# Patient Record
Sex: FEMALE | Race: WHITE | Employment: FULL TIME | URBAN - METROPOLITAN AREA
[De-identification: names, ages, dates, MRNs, and addresses within clinical notes are randomized per-mention and may not be internally consistent; named-entity substitution may affect disease eponyms.]

---

## 2017-01-09 RX ORDER — FOLIC ACID 1 MG/1
TABLET ORAL
Qty: 90 TABLET | Refills: 3 | Status: SHIPPED | OUTPATIENT
Start: 2017-01-09 | End: 2018-01-22

## 2017-01-09 NOTE — TELEPHONE ENCOUNTER
LOV:11-7  Last Filled: Has not been filled by Dr. Owusu before  Labs:   Future Appointments  Date Time Provider Joel Mann   1/21/2017 10:00 AM [facility] ALLERGY ECSCHALRDEBORAH [facility]   2/20/2017 6:40 PM Debra Alanis MD 2014 [facility]   3/15/2017 4:

## 2017-01-17 ENCOUNTER — TELEPHONE (OUTPATIENT)
Dept: ALLERGY | Facility: CLINIC | Age: 57
End: 2017-01-17

## 2017-01-17 NOTE — TELEPHONE ENCOUNTER
Patient requesting RX for Tussinex. Patient was last seen in June 2016 for dermatitis. LOV for asthma and AR in April 2016 and was advised to f/u in 6 months.      Informed patient that she will need an appointment for further evaluation of her cough an

## 2017-01-17 NOTE — TELEPHONE ENCOUNTER
Call reviewed and noted. Agree with triage advice provided. Patient with follow-up appointment next week on January 24 at 430. Patient offered appointment today at 4:45 PM.  Unable to make it today.

## 2017-01-21 ENCOUNTER — NURSE ONLY (OUTPATIENT)
Dept: ALLERGY | Facility: CLINIC | Age: 57
End: 2017-01-21

## 2017-01-21 DIAGNOSIS — J30.89 ENVIRONMENTAL AND SEASONAL ALLERGIES: Primary | ICD-10-CM

## 2017-01-21 PROCEDURE — 95117 IMMUNOTHERAPY INJECTIONS: CPT | Performed by: ALLERGY & IMMUNOLOGY

## 2017-01-21 PROCEDURE — 95165 ANTIGEN THERAPY SERVICES: CPT | Performed by: ALLERGY & IMMUNOLOGY

## 2017-01-23 NOTE — TELEPHONE ENCOUNTER
LOV: 11-7-16  Last Refilled:#40, 4rfs 8/29/16  Labs:AST 17  ALT 9  11/7/16  Future Appointments  Date Time Provider Joel Mackenzie   1/24/2017 4:30 PM Bryan Rg MD [facility] Mauro João   2/20/2017 6:40 PM Ajay Klein MD [address] [place] [address]

## 2017-01-24 ENCOUNTER — OFFICE VISIT (OUTPATIENT)
Dept: ALLERGY | Facility: CLINIC | Age: 57
End: 2017-01-24

## 2017-01-24 VITALS
RESPIRATION RATE: 19 BRPM | HEIGHT: 69 IN | DIASTOLIC BLOOD PRESSURE: 78 MMHG | TEMPERATURE: 99 F | OXYGEN SATURATION: 96 % | HEART RATE: 93 BPM | BODY MASS INDEX: 43.4 KG/M2 | WEIGHT: 293 LBS | SYSTOLIC BLOOD PRESSURE: 136 MMHG

## 2017-01-24 DIAGNOSIS — R05.9 COUGH: Primary | ICD-10-CM

## 2017-01-24 PROCEDURE — 90736 HZV VACCINE LIVE SUBQ: CPT | Performed by: ALLERGY & IMMUNOLOGY

## 2017-01-24 PROCEDURE — 99213 OFFICE O/P EST LOW 20 MIN: CPT | Performed by: ALLERGY & IMMUNOLOGY

## 2017-01-24 PROCEDURE — 99214 OFFICE O/P EST MOD 30 MIN: CPT | Performed by: ALLERGY & IMMUNOLOGY

## 2017-01-24 PROCEDURE — 94010 BREATHING CAPACITY TEST: CPT | Performed by: ALLERGY & IMMUNOLOGY

## 2017-01-24 PROCEDURE — 90471 IMMUNIZATION ADMIN: CPT | Performed by: ALLERGY & IMMUNOLOGY

## 2017-01-24 RX ORDER — HYDROCODONE POLISTIREX AND CHLORPHENIRAMINE POLISTIREX 10; 8 MG/5ML; MG/5ML
5 SUSPENSION, EXTENDED RELEASE ORAL 2 TIMES DAILY PRN
Qty: 473 ML | Refills: 0 | Status: SHIPPED | OUTPATIENT
Start: 2017-01-24 | End: 2017-02-07

## 2017-01-24 RX ORDER — ALBUTEROL SULFATE 90 UG/1
2 AEROSOL, METERED RESPIRATORY (INHALATION) EVERY 6 HOURS PRN
Qty: 1 INHALER | Refills: 0 | Status: SHIPPED | OUTPATIENT
Start: 2017-01-24 | End: 2017-03-16

## 2017-01-24 NOTE — PROGRESS NOTES
Ryan Wyatt is a 55year old female. HPI:   Patient presents with:  Cough: non-productive with yelllow/gree secretions in throat noted post nasal    Patient is a 60-year-old female who presents for follow-up with a chief complaint of cough.     Gabriel of Onset   • Heart Disorder Father    • leukemia[other] [OTHER] Mother    • Heart Disorder Mother    • Stroke Mother    • Heart Disorder Brother       Social History:   Smoking Status: Never Smoker                      Smokeless Status: Never Used needed Disp: 140 mL Rfl: 0   ergocalciferol (DRISDOL/VITAMIN D2) 95956 UNITS Oral Cap 50,000 Units every 30 (thirty) days.  Disp:  Rfl:    carbidopa-levodopa (SINEMET)  MG Oral Tab Take 5 tablets by oral route every day Disp:  Rfl: 0   PROVENTIL HFA 1 cyanosis, or clubbing     ASSESSMENT/PLAN:   Assessment  Cough  (primary encounter diagnosis)    Patient is a 61-year-old female with one-week history of URI symptoms and cough has become more of a not dry nonproductive cough.   No associated wheezing or sh

## 2017-02-03 ENCOUNTER — LAB ENCOUNTER (OUTPATIENT)
Dept: LAB | Facility: HOSPITAL | Age: 57
End: 2017-02-03
Attending: INTERNAL MEDICINE
Payer: COMMERCIAL

## 2017-02-03 DIAGNOSIS — M06.9 RHEUMATOID ARTHRITIS INVOLVING MULTIPLE SITES, UNSPECIFIED RHEUMATOID FACTOR PRESENCE: ICD-10-CM

## 2017-02-03 DIAGNOSIS — Z51.81 THERAPEUTIC DRUG MONITORING: ICD-10-CM

## 2017-02-03 LAB
ALT SERPL-CCNC: 9 U/L (ref 14–54)
AST SERPL-CCNC: 23 U/L (ref 15–41)
BASOPHILS # BLD: 0.1 K/UL (ref 0–0.2)
BASOPHILS NFR BLD: 1 %
CREAT SERPL-MCNC: 0.87 MG/DL (ref 0.5–1.5)
CRP SERPL-MCNC: 2.2 MG/DL (ref 0–0.9)
EOSINOPHIL # BLD: 0.2 K/UL (ref 0–0.7)
EOSINOPHIL NFR BLD: 2 %
ERYTHROCYTE [DISTWIDTH] IN BLOOD BY AUTOMATED COUNT: 17.7 % (ref 11–15)
ERYTHROCYTE [SEDIMENTATION RATE] IN BLOOD: 31 MM/HR (ref 0–30)
HCT VFR BLD AUTO: 39 % (ref 35–48)
HGB BLD-MCNC: 12.9 G/DL (ref 12–16)
LYMPHOCYTES # BLD: 2.1 K/UL (ref 1–4)
LYMPHOCYTES NFR BLD: 22 %
MCH RBC QN AUTO: 28.6 PG (ref 27–32)
MCHC RBC AUTO-ENTMCNC: 33.1 G/DL (ref 32–37)
MCV RBC AUTO: 86.3 FL (ref 80–100)
MONOCYTES # BLD: 0.5 K/UL (ref 0–1)
MONOCYTES NFR BLD: 5 %
NEUTROPHILS # BLD AUTO: 6.7 K/UL (ref 1.8–7.7)
NEUTROPHILS NFR BLD: 70 %
PLATELET # BLD AUTO: 249 K/UL (ref 140–400)
PMV BLD AUTO: 8.5 FL (ref 7.4–10.3)
RBC # BLD AUTO: 4.52 M/UL (ref 3.7–5.4)
WBC # BLD AUTO: 9.5 K/UL (ref 4–11)

## 2017-02-03 PROCEDURE — 85025 COMPLETE CBC W/AUTO DIFF WBC: CPT

## 2017-02-03 PROCEDURE — 84460 ALANINE AMINO (ALT) (SGPT): CPT

## 2017-02-03 PROCEDURE — 85652 RBC SED RATE AUTOMATED: CPT

## 2017-02-03 PROCEDURE — 86140 C-REACTIVE PROTEIN: CPT

## 2017-02-03 PROCEDURE — 84450 TRANSFERASE (AST) (SGOT): CPT

## 2017-02-03 PROCEDURE — 82565 ASSAY OF CREATININE: CPT

## 2017-02-03 PROCEDURE — 36415 COLL VENOUS BLD VENIPUNCTURE: CPT

## 2017-02-10 ENCOUNTER — PATIENT MESSAGE (OUTPATIENT)
Dept: RHEUMATOLOGY | Facility: CLINIC | Age: 57
End: 2017-02-10

## 2017-02-13 ENCOUNTER — NURSE ONLY (OUTPATIENT)
Dept: ALLERGY | Facility: CLINIC | Age: 57
End: 2017-02-13

## 2017-02-13 DIAGNOSIS — J30.89 ENVIRONMENTAL AND SEASONAL ALLERGIES: Primary | ICD-10-CM

## 2017-02-13 PROCEDURE — 95117 IMMUNOTHERAPY INJECTIONS: CPT | Performed by: ALLERGY & IMMUNOLOGY

## 2017-02-13 NOTE — TELEPHONE ENCOUNTER
Please see below and advise on 2/3/17 test results. Pt was able to view all her results at this time.

## 2017-02-13 NOTE — TELEPHONE ENCOUNTER
Her inflammation markers remain elevated. Otherwise look good. Please release results to her. Thanks.

## 2017-02-13 NOTE — TELEPHONE ENCOUNTER
From: Ryan Wyatt  To: Avtar Trent MD  Sent: 2/10/2017 8:05 PM CST  Subject: Test Results Question    Only have gotten two of the 2/3 test results, when will the others be available?

## 2017-02-15 ENCOUNTER — PATIENT MESSAGE (OUTPATIENT)
Dept: INTERNAL MEDICINE CLINIC | Facility: CLINIC | Age: 57
End: 2017-02-15

## 2017-02-17 RX ORDER — LEVOTHYROXINE SODIUM 137 UG/1
137 TABLET ORAL
Qty: 30 TABLET | Refills: 5 | Status: SHIPPED | OUTPATIENT
Start: 2017-02-17 | End: 2017-03-09

## 2017-02-17 RX ORDER — CALCITRIOL 0.25 UG/1
0.25 CAPSULE, LIQUID FILLED ORAL DAILY
Qty: 30 CAPSULE | Refills: 5 | Status: SHIPPED | OUTPATIENT
Start: 2017-02-17 | End: 2017-03-09

## 2017-02-17 NOTE — TELEPHONE ENCOUNTER
From: Antony Mcgee  To: Eyad Chi MD  Sent: 2/15/2017 8:57 PM CST  Subject: Prescription Question    My Calcitriol & Levothyroxine prescriptions need to be refilled.  Can you please change them to 90 day supplies instead of 30 day supplies for the

## 2017-02-17 NOTE — TELEPHONE ENCOUNTER
Refill Protocol Appointment Criteria  · Appointment scheduled in the past 12 months or in the next 3 months  Recent Visits       Provider Department Primary Dx    4 months ago Jaquan Hernandez MD [facility], [facility], [address], [place] Hypothyroidism, unspeci

## 2017-02-20 ENCOUNTER — OFFICE VISIT (OUTPATIENT)
Dept: RHEUMATOLOGY | Facility: CLINIC | Age: 57
End: 2017-02-20

## 2017-02-20 VITALS — HEART RATE: 91 BPM | SYSTOLIC BLOOD PRESSURE: 151 MMHG | HEIGHT: 69 IN | DIASTOLIC BLOOD PRESSURE: 83 MMHG

## 2017-02-20 DIAGNOSIS — G20 PARKINSON'S DISEASE (HCC): ICD-10-CM

## 2017-02-20 DIAGNOSIS — M06.9 RHEUMATOID ARTHRITIS INVOLVING MULTIPLE SITES, UNSPECIFIED RHEUMATOID FACTOR PRESENCE: Primary | ICD-10-CM

## 2017-02-20 DIAGNOSIS — Z51.81 THERAPEUTIC DRUG MONITORING: ICD-10-CM

## 2017-02-20 PROCEDURE — 99212 OFFICE O/P EST SF 10 MIN: CPT | Performed by: INTERNAL MEDICINE

## 2017-02-20 PROCEDURE — 99214 OFFICE O/P EST MOD 30 MIN: CPT | Performed by: INTERNAL MEDICINE

## 2017-02-20 RX ORDER — MELATONIN 10 MG
1 CAPSULE ORAL NIGHTLY
COMMUNITY
End: 2018-10-04

## 2017-02-20 RX ORDER — SULFASALAZINE 500 MG/1
500 TABLET ORAL 2 TIMES DAILY
Qty: 60 TABLET | Refills: 3 | Status: SHIPPED | OUTPATIENT
Start: 2017-02-20 | End: 2017-10-16

## 2017-02-21 NOTE — PATIENT INSTRUCTIONS
1. Stay on  methotrexate  8 tablets a week and folic acid 1mg a day   2. Cont. Physical therapy exercises at home -   3. Sulfasalazine 500mg twice a day -   4. Check labs in 3 months   5. Tramadol 50mg every 12 hours as needed.    6. Return to clinic in 3 m If you miss a dose, take it as soon as you can. If it is almost time for your next dose, take only that dose. Do not take double or extra doses. Where should I keep my medicine? Keep out of the reach of children.   Store at room temperature between 15 and

## 2017-02-21 NOTE — PROGRESS NOTES
Jerrell Gibson is a 55year old female who presents for Patient presents with:  Rheumatoid Arthritis  Leg Pain  . HPI:     She is a pleasant 49year old who has elevated RF >300 and foot pain - dx with RA -  here on3/7/2016.  I had last seen he gabe  11/3 bothering her for 3-4 weeks. She can still get up the stairs. She feels it's more dragging. No numbness and tingling. She feels her joints are better except her feet for several weeks. Her elbows and fingers are better.    She feels the surgery ddin No shoulders are ok. Her knees still hurts. Wt Readings from Last 2 Encounters:  01/24/17 : 350 lb 8 oz (158.986 kg)  12/19/16 : 350 lb 8 oz (158.986 kg)    There is no weight on file to calculate BMI.         Current Outpatient Prescriptions:  [facility] ergocalciferol (DRISDOL/VITAMIN D2) 65074 UNITS Oral Cap 50,000 Units every 30 (thirty) days.  Disp:  Rfl:    carbidopa-levodopa (SINEMET)  MG Oral Tab Take 5 tablets by oral route every day Disp:  Rfl: 0   Misc Natural Products (OSTEO BI-FLEX ADV D acute distress, no CAD, no neck tendnerness, good ROM,   No rashes  CVS: RRR, no murmurs  RS: CTAB, no crackles, no rhonchi  ABD: Soft Non tender, no HSM felt, BS positive  Joint exam:   Her left and righit ankles tender with braces on  No other joitn tend sesamoiditis. 1. Moderate inframalleolar peroneus longus tendinosis. 2. 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 3. Abductor digit minimi muscle atrophy compatible with Cárdenas neuropathy.   4. Chronic healed fracture deformity of the f 0. 0-0.2 K/UL 0.1 0.1   CREATININE      0.50-1.50 mg/dL 0.87 0.75   GFR, Non-      >=60 >60 >60   GFR, -American      >=60 >60 >60   Vitamin D, 25OH, Total        24.4   ALT (SGPT)      14-54 U/L 9 (L) 9 (L)   AST (SGOT)      15-41 U/ Summary:  1. Stay on  methotrexate  8 tablets a week and folic acid 1mg a day   2. Cont. Physical therapy exercises at home -   3. Sulfasalazine 500mg twice a day -   4. Check labs in 3 months   5. Tramadol 50mg every 12 hours as needed.    6. Return

## 2017-03-01 RX ORDER — DICLOFENAC SODIUM 75 MG/1
TABLET, DELAYED RELEASE ORAL
Qty: 60 TABLET | Refills: 3 | Status: SHIPPED | OUTPATIENT
Start: 2017-03-01 | End: 2017-07-11

## 2017-03-01 NOTE — TELEPHONE ENCOUNTER
NYM:3-27  Last Filled:10-31.  #60 with 3 refills  Labs:2-3, ALT 9, AST 23  Future Appointments  Date Time Provider Joel Mann   3/15/2017 4:20 PM [facility]       Please Advise

## 2017-03-07 ENCOUNTER — PATIENT MESSAGE (OUTPATIENT)
Dept: INTERNAL MEDICINE CLINIC | Facility: CLINIC | Age: 57
End: 2017-03-07

## 2017-03-09 ENCOUNTER — APPOINTMENT (OUTPATIENT)
Dept: LAB | Facility: HOSPITAL | Age: 57
End: 2017-03-09
Attending: INTERNAL MEDICINE
Payer: COMMERCIAL

## 2017-03-09 PROCEDURE — 84443 ASSAY THYROID STIM HORMONE: CPT | Performed by: INTERNAL MEDICINE

## 2017-03-09 RX ORDER — LEVOTHYROXINE SODIUM 137 UG/1
137 TABLET ORAL
Qty: 90 TABLET | Refills: 1 | Status: SHIPPED | OUTPATIENT
Start: 2017-03-09 | End: 2017-03-17

## 2017-03-09 RX ORDER — CALCITRIOL 0.25 UG/1
0.25 CAPSULE, LIQUID FILLED ORAL DAILY
Qty: 90 CAPSULE | Refills: 1 | Status: SHIPPED | OUTPATIENT
Start: 2017-03-09 | End: 2017-12-14

## 2017-03-09 NOTE — TELEPHONE ENCOUNTER
From: Michael Goff  To: Buffy Hare MD  Sent: 3/7/2017 12:16 AM CST  Subject: Prescription Question    My prescriptions for Levothyroxine and Calcitriol need to be refilled. Can you please make them for 90 days instead of 30 days.  Thank you

## 2017-03-09 NOTE — TELEPHONE ENCOUNTER
Advised patient that overdue to get TSH level done from 9/2016. Patient stated that will try and get it done today or tomorrow. Patient stated that has 6 pills left of both medications and would like to get refills for 90 days. Please advise.

## 2017-03-13 ENCOUNTER — NURSE ONLY (OUTPATIENT)
Dept: ALLERGY | Facility: CLINIC | Age: 57
End: 2017-03-13

## 2017-03-13 DIAGNOSIS — J30.89 ENVIRONMENTAL AND SEASONAL ALLERGIES: Primary | ICD-10-CM

## 2017-03-13 PROCEDURE — 95117 IMMUNOTHERAPY INJECTIONS: CPT | Performed by: ALLERGY & IMMUNOLOGY

## 2017-03-14 ENCOUNTER — TELEPHONE (OUTPATIENT)
Dept: INTERNAL MEDICINE CLINIC | Facility: CLINIC | Age: 57
End: 2017-03-14

## 2017-03-14 DIAGNOSIS — E03.8 OTHER SPECIFIED HYPOTHYROIDISM: Primary | ICD-10-CM

## 2017-03-14 NOTE — TELEPHONE ENCOUNTER
The chart shows that she is taking 137 µg a day of levothyroxine. We need to increase the dose up to 150 µg.  Okay to give #30 with 5 refills. Repeat TSH in 8 weeks with diagnosis of hypothyroidism.

## 2017-03-14 NOTE — TELEPHONE ENCOUNTER
Please see result notes. Per pt is taking thyroid medication. Pt states she picked up medication and is concerned if medication need adjusting she will not be able to afford it. Please advise.        Entered by Allison Bragg MD at 3/13/2017 10:16 PM

## 2017-03-15 ENCOUNTER — OFFICE VISIT (OUTPATIENT)
Dept: OBGYN CLINIC | Facility: CLINIC | Age: 57
End: 2017-03-15

## 2017-03-15 VITALS
HEIGHT: 69 IN | BODY MASS INDEX: 43.4 KG/M2 | DIASTOLIC BLOOD PRESSURE: 75 MMHG | HEART RATE: 90 BPM | SYSTOLIC BLOOD PRESSURE: 126 MMHG | WEIGHT: 293 LBS

## 2017-03-15 DIAGNOSIS — Z01.419 WELL WOMAN EXAM WITH ROUTINE GYNECOLOGICAL EXAM: Primary | ICD-10-CM

## 2017-03-15 DIAGNOSIS — Z12.31 ENCOUNTER FOR SCREENING MAMMOGRAM FOR MALIGNANT NEOPLASM OF BREAST: ICD-10-CM

## 2017-03-15 PROCEDURE — 99396 PREV VISIT EST AGE 40-64: CPT | Performed by: CLINICAL NURSE SPECIALIST

## 2017-03-15 NOTE — PROGRESS NOTES
Mary Herman is a 55year old female  No LMP recorded. Patient is not currently having periods (Reason: Perimenopausal). Patient presents with:  Gyn Exam: annual  Last annual exam was 3/9/16. Last pap was 3/4/15 and was normal, HPV negative.  Oliva Galeano Alcohol Use: Yes  0.0 oz/week    0 Standard drinks or equivalent per week         Comment: OCC    Drug Use: No    Sexual Activity: Yes    Partners: Male     Other Topics Concern    Caffeine Concern Yes    Comment: SODA 1 CAN DAILY, TEA 3 CUPS/DAY     Socia AS NEEDED, Disp: 60 tablet, Rfl: 1  •  clotrimazole-betamethasone 1-0.05 % External Cream, Apply 1 Application topically 2 (two) times daily as needed. , Disp: 15 g, Rfl: 5  •  Pravastatin Sodium 40 MG Oral Tab, Take 1 tablet (40 mg total) by mouth once dre developed, well nourished  Head/Face: normocephalic  Neck/Thyroid: thyroid symmetric, no thyromegaly, no nodules, no adenopathy  Lymphatic:no abnormal supraclavicular or axillary adenopathy is noted  Breast: normal without palpable masses, tenderness, asym

## 2017-03-16 NOTE — TELEPHONE ENCOUNTER
Dr. Sruthi Rosenberg,  Pt. Requesting:  Albuterol Sulfate HFA (PROAIR HFA) 108 (90 BASE) MCG/ACT Inhalation Aero Soln 1 Inhaler 0 1/24/2017      Sig :  Inhale 2 puffs into the lungs every 6 (six) hours as needed for Wheezing.         LOV: 1/24/17  Last filled; 1/24/17

## 2017-03-17 RX ORDER — LEVOTHYROXINE SODIUM 0.15 MG/1
150 TABLET ORAL
Qty: 90 TABLET | Refills: 1 | Status: SHIPPED | OUTPATIENT
Start: 2017-03-17 | End: 2017-08-19

## 2017-04-10 ENCOUNTER — NURSE ONLY (OUTPATIENT)
Dept: ALLERGY | Facility: CLINIC | Age: 57
End: 2017-04-10

## 2017-04-10 DIAGNOSIS — J30.89 ENVIRONMENTAL AND SEASONAL ALLERGIES: Primary | ICD-10-CM

## 2017-04-10 PROCEDURE — 95117 IMMUNOTHERAPY INJECTIONS: CPT | Performed by: ALLERGY & IMMUNOLOGY

## 2017-04-17 ENCOUNTER — TELEPHONE (OUTPATIENT)
Dept: OBGYN CLINIC | Facility: CLINIC | Age: 57
End: 2017-04-17

## 2017-04-17 NOTE — TELEPHONE ENCOUNTER
C/O STARTED SOME SPOTTING/LIGHT BLEEDING ON Saturday. DOES NOT FILL A PAD OR A TAMPON BUT CONTINUES WITH THIS. ADVISED TO BE SEEN AND SHE SAID SHE ALREADY MADE AN APPT.   EXPLAINED PROBABLY EB AND SUGGESTED TAKING 2-3 IBUPROFEN ABOUT 30 MINUTES PRIOR TO T

## 2017-04-17 NOTE — TELEPHONE ENCOUNTER
Saw JAMAR on 3/15 and was told if started to bleed to call her she is bleeding very little appt for Wednesday would like to joanne welch nurse.  [phone]

## 2017-04-19 ENCOUNTER — TELEPHONE (OUTPATIENT)
Dept: OBGYN CLINIC | Facility: CLINIC | Age: 57
End: 2017-04-19

## 2017-04-19 RX ORDER — MISOPROSTOL 200 UG/1
400 TABLET ORAL ONCE
Qty: 2 TABLET | Refills: 0 | Status: SHIPPED | OUTPATIENT
Start: 2017-04-19 | End: 2017-04-19

## 2017-04-19 NOTE — TELEPHONE ENCOUNTER
This patient is on my schedule today for an EMB and has not been given Cytotec. She will need to reschedule after she has taken Cytotec the night before.  I agree that an EMB is what she needs but I don't want to do it without having her take the Cytotec th

## 2017-04-19 NOTE — TELEPHONE ENCOUNTER
Reached pt at her home number. Informed pt that MAF wants her to reschedule for the EMB that she has today. Informed pt that a rx was sent for Cytotec and she needs to take the night before her EMB. Pt rescheduled for her EMB for tomorrow.

## 2017-04-20 ENCOUNTER — OFFICE VISIT (OUTPATIENT)
Dept: OBGYN CLINIC | Facility: CLINIC | Age: 57
End: 2017-04-20

## 2017-04-20 VITALS
BODY MASS INDEX: 50 KG/M2 | HEART RATE: 87 BPM | DIASTOLIC BLOOD PRESSURE: 82 MMHG | SYSTOLIC BLOOD PRESSURE: 148 MMHG | WEIGHT: 293 LBS

## 2017-04-20 DIAGNOSIS — N95.0 POSTMENOPAUSAL BLEEDING: Primary | ICD-10-CM

## 2017-04-20 PROCEDURE — 58100 BIOPSY OF UTERUS LINING: CPT | Performed by: CLINICAL NURSE SPECIALIST

## 2017-04-20 PROCEDURE — 88305 TISSUE EXAM BY PATHOLOGIST: CPT | Performed by: CLINICAL NURSE SPECIALIST

## 2017-04-20 NOTE — PROCEDURES
Endometrial Biopsy    Pre-Procedure Care:   Consent was obtained. Procedure/risks were explained. Questions were answered. Correct patient was identified. Correct side and site were confirmed.     Pregnancy Results:  N/a-postmenopausal  Birth control me

## 2017-04-24 ENCOUNTER — TELEPHONE (OUTPATIENT)
Dept: INTERNAL MEDICINE CLINIC | Facility: CLINIC | Age: 57
End: 2017-04-24

## 2017-04-24 ENCOUNTER — HOSPITAL ENCOUNTER (EMERGENCY)
Facility: HOSPITAL | Age: 57
Discharge: HOME | End: 2017-04-24
Attending: EMERGENCY MEDICINE
Payer: COMMERCIAL

## 2017-04-24 ENCOUNTER — APPOINTMENT (OUTPATIENT)
Dept: GENERAL RADIOLOGY | Facility: HOSPITAL | Age: 57
End: 2017-04-24
Attending: EMERGENCY MEDICINE
Payer: COMMERCIAL

## 2017-04-24 VITALS
OXYGEN SATURATION: 95 % | HEART RATE: 105 BPM | BODY MASS INDEX: 43.4 KG/M2 | DIASTOLIC BLOOD PRESSURE: 79 MMHG | HEIGHT: 69 IN | SYSTOLIC BLOOD PRESSURE: 150 MMHG | TEMPERATURE: 97 F | WEIGHT: 293 LBS | RESPIRATION RATE: 22 BRPM

## 2017-04-24 DIAGNOSIS — M25.512 ACUTE PAIN OF LEFT SHOULDER: Primary | ICD-10-CM

## 2017-04-24 PROCEDURE — 73030 X-RAY EXAM OF SHOULDER: CPT

## 2017-04-24 PROCEDURE — 99284 EMERGENCY DEPT VISIT MOD MDM: CPT

## 2017-04-24 PROCEDURE — 93010 ELECTROCARDIOGRAM REPORT: CPT | Performed by: EMERGENCY MEDICINE

## 2017-04-24 PROCEDURE — 93005 ELECTROCARDIOGRAM TRACING: CPT

## 2017-04-24 RX ORDER — HYDROCODONE BITARTRATE AND ACETAMINOPHEN 5; 325 MG/1; MG/1
1 TABLET ORAL ONCE
Status: COMPLETED | OUTPATIENT
Start: 2017-04-24 | End: 2017-04-24

## 2017-04-24 RX ORDER — HYDROCODONE BITARTRATE AND ACETAMINOPHEN 5; 325 MG/1; MG/1
1-2 TABLET ORAL EVERY 4 HOURS PRN
Qty: 10 TABLET | Refills: 0 | Status: SHIPPED | OUTPATIENT
Start: 2017-04-24 | End: 2017-05-01

## 2017-04-24 NOTE — TELEPHONE ENCOUNTER
Actions Requested: Looking for advice for severe pain, advised to go to ED. Situation/Background   Problem: Severe pain to left shoulder and arm.    Onset: this morning, worsening throughout the day   Associated Symptoms: Unsure if she is feeling any pare

## 2017-04-24 NOTE — TELEPHONE ENCOUNTER
Patient said that she has RA and did some gardening yesterday, since then, her left shoulder is in a lot of pain. Took tramadol 11 AM and Ibuprofen at 4:30  She needs advise.   Pain level is a 10

## 2017-04-25 NOTE — ED PROVIDER NOTES
Patient Seen in: [facility] Emergency Department    History   Patient presents with:  Upper Extremity Injury (musculoskeletal)    Stated Complaint: Left shoulder pain, unknown MATTEO    HPI    50-year-old female presents for evaluation of left should breakfast.   PROAIR  (90 Base) MCG/ACT Inhalation Aero Soln,  INHALE 2 PUFFS INTO THE LUNGS EVERY 6 (SIX) HOURS AS NEEDED FOR WHEEZING. calciTRIOL 0.25 MCG Oral Cap,  Take 1 capsule (0.25 mcg total) by mouth daily.    DICLOFENAC SODIUM 75 MG Oral T Cancer Mother      AML-cause of death   • Heart Disorder Brother    • Cancer Paternal Aunt      breast cancer-cause of death at age [de-identified]         Smoking Status: Never Smoker                      Smokeless Status: Never Used                        Alcohol Use Neurological: She is alert and oriented to person, place, and time. No focal deficit, distal left upper extremity 5/5, sensation intact   Skin: Skin is warm and dry. No rash noted. Psychiatric: She has a normal mood and affect.    Nursing note and vit (four) hours as needed for Pain (Do not exceed 8 tabs per day. )., Script printed, Disp-10 tablet, R-0

## 2017-04-25 NOTE — ED INITIAL ASSESSMENT (HPI)
PT reports right shoulder pain r/t gardening over the weekend. Pt is anxious, crying in triage. Pt denies cp, or sob.

## 2017-04-27 ENCOUNTER — LAB ENCOUNTER (OUTPATIENT)
Dept: LAB | Age: 57
End: 2017-04-27
Attending: INTERNAL MEDICINE
Payer: COMMERCIAL

## 2017-04-27 DIAGNOSIS — M06.9 RHEUMATOID ARTHRITIS INVOLVING MULTIPLE SITES, UNSPECIFIED RHEUMATOID FACTOR PRESENCE: ICD-10-CM

## 2017-04-27 DIAGNOSIS — Z51.81 THERAPEUTIC DRUG MONITORING: ICD-10-CM

## 2017-04-27 PROCEDURE — 86140 C-REACTIVE PROTEIN: CPT

## 2017-04-27 PROCEDURE — 85025 COMPLETE CBC W/AUTO DIFF WBC: CPT

## 2017-04-27 PROCEDURE — 36415 COLL VENOUS BLD VENIPUNCTURE: CPT

## 2017-04-27 PROCEDURE — 84450 TRANSFERASE (AST) (SGOT): CPT

## 2017-04-27 PROCEDURE — 82565 ASSAY OF CREATININE: CPT

## 2017-04-27 PROCEDURE — 85652 RBC SED RATE AUTOMATED: CPT

## 2017-04-27 PROCEDURE — 84460 ALANINE AMINO (ALT) (SGPT): CPT

## 2017-04-28 ENCOUNTER — HOSPITAL ENCOUNTER (OUTPATIENT)
Dept: ULTRASOUND IMAGING | Facility: HOSPITAL | Age: 57
Discharge: HOME | End: 2017-04-28
Attending: CLINICAL NURSE SPECIALIST
Payer: COMMERCIAL

## 2017-04-28 DIAGNOSIS — N95.0 POSTMENOPAUSAL BLEEDING: ICD-10-CM

## 2017-04-28 PROCEDURE — 76856 US EXAM PELVIC COMPLETE: CPT

## 2017-04-28 PROCEDURE — 76830 TRANSVAGINAL US NON-OB: CPT

## 2017-05-09 ENCOUNTER — HOSPITAL ENCOUNTER (OUTPATIENT)
Dept: MAMMOGRAPHY | Facility: HOSPITAL | Age: 57
Discharge: HOME | End: 2017-05-09
Attending: CLINICAL NURSE SPECIALIST
Payer: COMMERCIAL

## 2017-05-09 DIAGNOSIS — Z12.31 ENCOUNTER FOR SCREENING MAMMOGRAM FOR MALIGNANT NEOPLASM OF BREAST: ICD-10-CM

## 2017-05-09 PROCEDURE — 77067 SCR MAMMO BI INCL CAD: CPT | Performed by: CLINICAL NURSE SPECIALIST

## 2017-05-13 ENCOUNTER — APPOINTMENT (OUTPATIENT)
Dept: LAB | Age: 57
End: 2017-05-13
Attending: INTERNAL MEDICINE
Payer: COMMERCIAL

## 2017-05-13 ENCOUNTER — NURSE ONLY (OUTPATIENT)
Dept: ALLERGY | Facility: CLINIC | Age: 57
End: 2017-05-13

## 2017-05-13 DIAGNOSIS — E03.8 OTHER SPECIFIED HYPOTHYROIDISM: ICD-10-CM

## 2017-05-13 DIAGNOSIS — J30.89 ENVIRONMENTAL AND SEASONAL ALLERGIES: Primary | ICD-10-CM

## 2017-05-13 PROCEDURE — 84443 ASSAY THYROID STIM HORMONE: CPT

## 2017-05-13 PROCEDURE — 36415 COLL VENOUS BLD VENIPUNCTURE: CPT

## 2017-05-13 PROCEDURE — 95117 IMMUNOTHERAPY INJECTIONS: CPT | Performed by: ALLERGY & IMMUNOLOGY

## 2017-05-22 ENCOUNTER — OFFICE VISIT (OUTPATIENT)
Dept: RHEUMATOLOGY | Facility: CLINIC | Age: 57
End: 2017-05-22

## 2017-05-22 VITALS
HEART RATE: 85 BPM | BODY MASS INDEX: 43.4 KG/M2 | SYSTOLIC BLOOD PRESSURE: 127 MMHG | WEIGHT: 293 LBS | HEIGHT: 69 IN | DIASTOLIC BLOOD PRESSURE: 77 MMHG

## 2017-05-22 DIAGNOSIS — G20 PARKINSON'S DISEASE (HCC): ICD-10-CM

## 2017-05-22 DIAGNOSIS — Z51.81 THERAPEUTIC DRUG MONITORING: ICD-10-CM

## 2017-05-22 DIAGNOSIS — M06.9 RHEUMATOID ARTHRITIS INVOLVING MULTIPLE SITES, UNSPECIFIED RHEUMATOID FACTOR PRESENCE: Primary | ICD-10-CM

## 2017-05-22 PROCEDURE — 99214 OFFICE O/P EST MOD 30 MIN: CPT | Performed by: INTERNAL MEDICINE

## 2017-05-22 PROCEDURE — 99212 OFFICE O/P EST SF 10 MIN: CPT | Performed by: INTERNAL MEDICINE

## 2017-05-22 RX ORDER — LEFLUNOMIDE 10 MG/1
10 TABLET ORAL DAILY
Qty: 90 TABLET | Refills: 0 | Status: SHIPPED | OUTPATIENT
Start: 2017-05-22 | End: 2017-08-20

## 2017-05-22 NOTE — PATIENT INSTRUCTIONS
1. Stay on  methotrexate  8 tablets a week and folic acid 1mg a day  - go back to 8 tablets a week   2. Stop Sulfasalazine  3. Add leflunomide 10mg a day   4. Check labs in 2 months   5. Tramadol 50mg every 12 hours as needed.    6. Return to clinic in 2 mo If you miss a dose, take it as soon as you can. If it is almost time for your next dose, take only that dose. Do not take double or extra doses. Where should I keep my medicine? Keep out of the reach of children.   Store at room temperature between 15 and

## 2017-05-22 NOTE — PROGRESS NOTES
Ryan Wyatt is a 55year old female who presents for Patient presents with:  Rheumatoid Arthritis: leg pain  . HPI:     She is a pleasant 49year old who has elevated RF >300 and foot pain - dx with RA -  here on3/7/2016.  I had last seen he gabe  11/3 bothering her for 3-4 weeks. She can still get up the stairs. She feels it's more dragging. No numbness and tingling. She feels her joints are better except her feet for several weeks. Her elbows and fingers are better.    She feels the surgery ddin No shoulders are ok. Her knees still hurts. 5/22/2017  Her left t shoulder flared - she had bad tramadol. She felt no position helped. She was tearful from the pain . She went nto the ER on 4/27/2017. She was in bad pain.  She was given some norco. For tablet Rfl: 1   TraMADol HCl 50 MG Oral Tab TAKE 1 TABLET BY MOUTH EVERY 12 HOURS AS NEEDED Disp: 60 tablet Rfl: 1   clotrimazole-betamethasone 1-0.05 % External Cream Apply 1 Application topically 2 (two) times daily as needed.  Disp: 15 g Rfl: 5   Pravast COLONOSCOPY  12/16/2013    D & C      Comment for extended period    [address] for infertility    OTHER SURGICAL HISTORY Right 12/3/2015    THYROIDECTOMY      Comment Total       Family hx - reviewed   Social History:  Shashank Mas proximal shaft of the  fifth metatarsal now present which has resulted in broadening of the  proximal fifth metatarsal shaft.  There is a slight medial bowing deformity  of the fifth metatarsal there is a result of the previous fracture but the  distal shaf 12.0-16.0 g/dL  12.9   Hematocrit      35.0-48.0 %  39.6   MCV      80.0-100.0 fL  90.9   MCH      27.0-32.0 pg  29.5   MCHC      32.0-37.0 g/dl  32.5   RDW      11.0-15.0 %  18.9 (H)   Platelet Count      [phone] K/UL  215   MEAN PLATELET VOLUME      7.4- her pain is improved with her other areas except feet  - no rashses on foot - s/p clindamycin  Other hx:  - mri foot in the past showed changes in mtps of left foot that could be early RA - but non specific and not synovitis   - hx of strep viridans in birdie

## 2017-06-05 ENCOUNTER — NURSE ONLY (OUTPATIENT)
Dept: ALLERGY | Facility: CLINIC | Age: 57
End: 2017-06-05

## 2017-06-05 DIAGNOSIS — J30.89 ENVIRONMENTAL AND SEASONAL ALLERGIES: Primary | ICD-10-CM

## 2017-06-05 PROCEDURE — 95165 ANTIGEN THERAPY SERVICES: CPT | Performed by: ALLERGY & IMMUNOLOGY

## 2017-06-05 PROCEDURE — 95117 IMMUNOTHERAPY INJECTIONS: CPT | Performed by: ALLERGY & IMMUNOLOGY

## 2017-07-03 ENCOUNTER — NURSE ONLY (OUTPATIENT)
Dept: ALLERGY | Facility: CLINIC | Age: 57
End: 2017-07-03

## 2017-07-03 DIAGNOSIS — J30.1 SEASONAL ALLERGIC RHINITIS DUE TO POLLEN: ICD-10-CM

## 2017-07-03 PROCEDURE — 95117 IMMUNOTHERAPY INJECTIONS: CPT | Performed by: INTERNAL MEDICINE

## 2017-07-10 ENCOUNTER — PATIENT MESSAGE (OUTPATIENT)
Dept: RHEUMATOLOGY | Facility: CLINIC | Age: 57
End: 2017-07-10

## 2017-07-11 NOTE — TELEPHONE ENCOUNTER
From: Geremias Pappas  To: Rio Benjamin MD  Sent: 7/10/2017 6:19 PM CDT  Subject: Prescription Question    Can you please refill my Diclofenac prescription? [facility] has been trying to get it refilled for a week and they said you're not responding.  I wi

## 2017-07-11 NOTE — TELEPHONE ENCOUNTER
LOV: 5/22/17  Future Appointments  Date Time Provider Joel Mackenzie   7/17/2017 3:30 PM Jian Amanda MD 2014 [facility]     Labs:   Component      Latest Ref Rng & Units 4/27/2017   WBC      4.0 - 11.0 K/UL 6.2   RBC      3.70 - 5.40 M/UL 4.3

## 2017-07-11 NOTE — TELEPHONE ENCOUNTER
Pt calling very upset states she will be out of meds on 07/13. Pt states she requested at pharmacy and is upset it has taken so long. Please advise.

## 2017-07-11 NOTE — TELEPHONE ENCOUNTER
LOV:5/22/17   Last Refilled:#60, 3rfs 3/1/17    Future Appointments  Date Time Provider Joel Mann   7/17/2017 3:30 PM Mundo Richardson MD [address] [place] [address]       Please advise.

## 2017-07-12 RX ORDER — DICLOFENAC SODIUM 75 MG/1
75 TABLET, DELAYED RELEASE ORAL 2 TIMES DAILY
Qty: 60 TABLET | Refills: 3 | Status: SHIPPED | OUTPATIENT
Start: 2017-07-12 | End: 2017-11-10

## 2017-07-17 ENCOUNTER — OFFICE VISIT (OUTPATIENT)
Dept: RHEUMATOLOGY | Facility: CLINIC | Age: 57
End: 2017-07-17

## 2017-07-17 ENCOUNTER — LAB ENCOUNTER (OUTPATIENT)
Dept: LAB | Facility: HOSPITAL | Age: 57
End: 2017-07-17
Attending: INTERNAL MEDICINE
Payer: COMMERCIAL

## 2017-07-17 VITALS
HEIGHT: 69 IN | SYSTOLIC BLOOD PRESSURE: 129 MMHG | HEART RATE: 86 BPM | DIASTOLIC BLOOD PRESSURE: 81 MMHG | WEIGHT: 293 LBS | BODY MASS INDEX: 43.4 KG/M2

## 2017-07-17 DIAGNOSIS — Z51.81 THERAPEUTIC DRUG MONITORING: ICD-10-CM

## 2017-07-17 DIAGNOSIS — M06.9 RHEUMATOID ARTHRITIS INVOLVING MULTIPLE SITES, UNSPECIFIED RHEUMATOID FACTOR PRESENCE: ICD-10-CM

## 2017-07-17 DIAGNOSIS — M54.5 CHRONIC MIDLINE LOW BACK PAIN, WITH SCIATICA PRESENCE UNSPECIFIED: ICD-10-CM

## 2017-07-17 DIAGNOSIS — G89.29 CHRONIC MIDLINE LOW BACK PAIN, WITH SCIATICA PRESENCE UNSPECIFIED: ICD-10-CM

## 2017-07-17 DIAGNOSIS — M06.9 RHEUMATOID ARTHRITIS INVOLVING MULTIPLE SITES, UNSPECIFIED RHEUMATOID FACTOR PRESENCE: Primary | ICD-10-CM

## 2017-07-17 LAB
ALT SERPL-CCNC: 10 U/L (ref 14–54)
AST SERPL-CCNC: 18 U/L (ref 15–41)
BASOPHILS # BLD: 0 K/UL (ref 0–0.2)
BASOPHILS NFR BLD: 1 %
CREAT SERPL-MCNC: 0.85 MG/DL (ref 0.5–1.5)
CRP SERPL-MCNC: 3.1 MG/DL (ref 0–0.9)
EOSINOPHIL # BLD: 0.3 K/UL (ref 0–0.7)
EOSINOPHIL NFR BLD: 4 %
ERYTHROCYTE [DISTWIDTH] IN BLOOD BY AUTOMATED COUNT: 17.1 % (ref 11–15)
ERYTHROCYTE [SEDIMENTATION RATE] IN BLOOD: 37 MM/HR (ref 0–30)
HCT VFR BLD AUTO: 39.7 % (ref 35–48)
HGB BLD-MCNC: 13 G/DL (ref 12–16)
LYMPHOCYTES # BLD: 1.6 K/UL (ref 1–4)
LYMPHOCYTES NFR BLD: 25 %
MCH RBC QN AUTO: 29.6 PG (ref 27–32)
MCHC RBC AUTO-ENTMCNC: 32.8 G/DL (ref 32–37)
MCV RBC AUTO: 90.3 FL (ref 80–100)
MONOCYTES # BLD: 0.5 K/UL (ref 0–1)
MONOCYTES NFR BLD: 8 %
NEUTROPHILS # BLD AUTO: 4.2 K/UL (ref 1.8–7.7)
NEUTROPHILS NFR BLD: 63 %
PLATELET # BLD AUTO: 220 K/UL (ref 140–400)
PMV BLD AUTO: 10 FL (ref 7.4–10.3)
RBC # BLD AUTO: 4.4 M/UL (ref 3.7–5.4)
WBC # BLD AUTO: 6.6 K/UL (ref 4–11)

## 2017-07-17 PROCEDURE — 99214 OFFICE O/P EST MOD 30 MIN: CPT | Performed by: INTERNAL MEDICINE

## 2017-07-17 PROCEDURE — 86140 C-REACTIVE PROTEIN: CPT

## 2017-07-17 PROCEDURE — 82565 ASSAY OF CREATININE: CPT

## 2017-07-17 PROCEDURE — 84443 ASSAY THYROID STIM HORMONE: CPT | Performed by: INTERNAL MEDICINE

## 2017-07-17 PROCEDURE — 84450 TRANSFERASE (AST) (SGOT): CPT

## 2017-07-17 PROCEDURE — 99212 OFFICE O/P EST SF 10 MIN: CPT | Performed by: INTERNAL MEDICINE

## 2017-07-17 PROCEDURE — 36415 COLL VENOUS BLD VENIPUNCTURE: CPT

## 2017-07-17 PROCEDURE — 84460 ALANINE AMINO (ALT) (SGPT): CPT

## 2017-07-17 PROCEDURE — 85025 COMPLETE CBC W/AUTO DIFF WBC: CPT

## 2017-07-17 PROCEDURE — 85652 RBC SED RATE AUTOMATED: CPT

## 2017-07-17 RX ORDER — LEFLUNOMIDE 20 MG/1
20 TABLET ORAL DAILY
Qty: 90 TABLET | Refills: 0 | Status: SHIPPED | OUTPATIENT
Start: 2017-07-17 | End: 2017-09-06

## 2017-07-17 NOTE — PATIENT INSTRUCTIONS
1. Cont. methotrexate  8 tablets a week and folic acid 1mg a day      2. Increase  leflunomide to 20mg a day     4. Check labs in 2 months     5. Tramadol 50mg every 12 hours as needed. 6. Return to clinic in 2-3 months.      Physical therapy - [phone]

## 2017-07-17 NOTE — PROGRESS NOTES
Ryan Wyatt is a 55year old female who presents for Patient presents with:  Rheumatoid Arthritis  Follow - Up  . HPI:     She is a pleasant 49year old who has elevated RF >300 and foot pain - dx with RA -  here on3/7/2016.  I had last seen he gabe  1 It's bothering her for 3-4 weeks. She can still get up the stairs. She feels it's more dragging. No numbness and tingling. She feels her joints are better except her feet for several weeks. Her elbows and fingers are better.    She feels the surgery better. No shoulders are ok. Her knees still hurts. 5/22/2017  Her left t shoulder flared - she had bad tramadol. She felt no position helped. She was tearful from the pain . She went nto the ER on 4/27/2017. She was in bad pain.  She was given some no Oral Cap Take 1 capsule (0.25 mcg total) by mouth daily. Disp: 90 capsule Rfl: 1   Melatonin 10 MG Oral Cap Take 1 tablet by mouth nightly. Disp:  Rfl:    FOLIC ACID 1 MG Oral Tab TAKE 1 TABLET BY MOUTH DAILY.  Disp: 90 tablet Rfl: 3   fluticasone-salmetero CARDIAC CATHETERIZATION - NORMAL LVF; MILD DIAG IRREG'S; NO OBSTRUCTIVE DISEASE   • Chicken pox    • Chronic cough    • Extrapyramidal syndrome 2013    MEDICATION MGMT.  W/ PARKINSONS FEATURES   • Extrinsic asthma, unspecified    • Infertility, female    • tender  Right hip mild tender   Good ext rotation of both hips   SLR positive in right side mildly   No lower back tendnerss to touch      1/28/2005 - esr is 42mm/hr,   2/9/2013 - shana 1:40  8/18/2014 - MRI left foot -   1.  There is localized edema within t related to chronic stress response.     Reviewed notes from 4/2008 - chronic plantar fasciitis  1/23/2013 - mri brain - unremarkable,   3/13/2012 - us venous doppler - right - normal   4/30/2009 - chest xray - unchange, mildly prominent pulmonary markings presents with:  Rheumatoid Arthritis  Follow - Up    1.  Positive AMINAH 1:80, , polyarthralgia with rash on feet - now positive ccp abs - very specific for seropositive Rheumatoid arthriits - likely early migratory -   - stay on  methotrexate 20mg a wee

## 2017-07-22 ENCOUNTER — TELEPHONE (OUTPATIENT)
Dept: INTERNAL MEDICINE CLINIC | Facility: CLINIC | Age: 57
End: 2017-07-22

## 2017-07-22 DIAGNOSIS — R79.89 ABNORMAL TSH: Primary | ICD-10-CM

## 2017-07-22 RX ORDER — LEVOTHYROXINE SODIUM 0.2 MG/1
200 TABLET ORAL
Qty: 90 TABLET | Refills: 0 | Status: SHIPPED | OUTPATIENT
Start: 2017-07-22 | End: 2017-09-06

## 2017-07-22 NOTE — TELEPHONE ENCOUNTER
Pt contacted (Name and  verified) and MD message relayed to pt. Pt verbalizes understanding and states that she has her calendar marked to have her TSH in 3 months. TSH order placed in chart.

## 2017-07-22 NOTE — TELEPHONE ENCOUNTER
Reason for call changed from blank to results  erx for levothyroxine 200mcg 1 po QD #90 R 0  Patient notified      Entered by Allison Bragg MD at 7/22/2017  8:09 AM   Read by Kathi Vazquez at 7/22/2017  8:33 AM   The TSH level is elevated.  It is wors

## 2017-07-22 NOTE — TELEPHONE ENCOUNTER
Patient states she received a message from Dr. Atul Morris thru My chart letting her know that due to the test results he will need to increase her Levothyroxine medication from 150mcg to 200 mcg doctor stated for her to call back and inform if that was okay w

## 2017-07-24 RX ORDER — MONTELUKAST SODIUM 10 MG/1
10 TABLET ORAL NIGHTLY
Qty: 90 TABLET | Refills: 0 | Status: SHIPPED | OUTPATIENT
Start: 2017-07-24 | End: 2017-10-27

## 2017-07-24 NOTE — TELEPHONE ENCOUNTER
Received refill request for:    Medication Quantity Refills Start End   Montelukast Sodium 10 MG Oral Tab 90 tablet 1 12/19/2016    Sig :  Take 1 tablet (10 mg total) by mouth nightly.        LOV: 1/23/17 (dx cough); 12/19/16 (dx asthma, AR)     Advised f/u

## 2017-07-24 NOTE — TELEPHONE ENCOUNTER
Left message for patient that her requested medication was refilled and sent to pharmacy. Due for 6 month f/u. Please call our office to schedule f/u appointment.

## 2017-07-24 NOTE — TELEPHONE ENCOUNTER
Call reviewed and noted. Patient last seen in January 2017 and due for six-month follow-up. Please call to schedule.   Singular prescription renewed ×90 days

## 2017-07-26 RX ORDER — PRAVASTATIN SODIUM 40 MG
40 TABLET ORAL
Qty: 90 TABLET | Refills: 3 | Status: SHIPPED | OUTPATIENT
Start: 2017-07-26 | End: 2018-05-13

## 2017-07-31 ENCOUNTER — NURSE ONLY (OUTPATIENT)
Dept: ALLERGY | Facility: CLINIC | Age: 57
End: 2017-07-31

## 2017-07-31 DIAGNOSIS — J30.89 ENVIRONMENTAL AND SEASONAL ALLERGIES: ICD-10-CM

## 2017-07-31 PROCEDURE — 95117 IMMUNOTHERAPY INJECTIONS: CPT | Performed by: ALLERGY & IMMUNOLOGY

## 2017-08-19 ENCOUNTER — OFFICE VISIT (OUTPATIENT)
Dept: ALLERGY | Facility: CLINIC | Age: 57
End: 2017-08-19

## 2017-08-19 ENCOUNTER — NURSE ONLY (OUTPATIENT)
Dept: ALLERGY | Facility: CLINIC | Age: 57
End: 2017-08-19

## 2017-08-19 VITALS
OXYGEN SATURATION: 94 % | HEART RATE: 97 BPM | RESPIRATION RATE: 20 BRPM | DIASTOLIC BLOOD PRESSURE: 65 MMHG | TEMPERATURE: 98 F | SYSTOLIC BLOOD PRESSURE: 130 MMHG

## 2017-08-19 DIAGNOSIS — J45.40 ASTHMA, EXTRINSIC, MODERATE PERSISTENT, UNCOMPLICATED: Primary | ICD-10-CM

## 2017-08-19 DIAGNOSIS — Z91.09 ALLERGY TO AMERICAN HOUSE DUST MITE: ICD-10-CM

## 2017-08-19 DIAGNOSIS — J30.81 CHRONIC ALLERGIC RHINITIS DUE TO ANIMAL HAIR AND DANDER: ICD-10-CM

## 2017-08-19 DIAGNOSIS — J30.89 ENVIRONMENTAL AND SEASONAL ALLERGIES: ICD-10-CM

## 2017-08-19 DIAGNOSIS — J30.1 CHRONIC SEASONAL ALLERGIC RHINITIS DUE TO POLLEN: ICD-10-CM

## 2017-08-19 PROCEDURE — 99214 OFFICE O/P EST MOD 30 MIN: CPT | Performed by: ALLERGY & IMMUNOLOGY

## 2017-08-19 PROCEDURE — 95117 IMMUNOTHERAPY INJECTIONS: CPT | Performed by: ALLERGY & IMMUNOLOGY

## 2017-08-19 PROCEDURE — 99212 OFFICE O/P EST SF 10 MIN: CPT | Performed by: ALLERGY & IMMUNOLOGY

## 2017-08-19 RX ORDER — DIPHENHYDRAMINE HYDROCHLORIDE 12.5 MG/5ML
25 SOLUTION ORAL ONCE
Status: COMPLETED | OUTPATIENT
Start: 2017-08-19 | End: 2017-08-19

## 2017-08-19 RX ORDER — FLUTICASONE PROPIONATE AND SALMETEROL 250; 50 UG/1; UG/1
POWDER RESPIRATORY (INHALATION)
Qty: 3 EACH | Refills: 1 | Status: SHIPPED | OUTPATIENT
Start: 2017-08-19 | End: 2018-09-17

## 2017-08-19 RX ADMIN — DIPHENHYDRAMINE HYDROCHLORIDE 25 MG: 12.5 SOLUTION ORAL at 11:25:00

## 2017-08-19 NOTE — PROGRESS NOTES
Kb Wang is a 55year old female. HPI:   Patient presents with: Allergies: no symptoms  Asthma: no flare ups    Patient is a 52-year-old female who presents for follow-up with a chief complaint of asthma and allergies.     Patient last seen by me date: FOOT SURGERY Left      Comment: Had right foot done recently.   No date: KNEE ARTHROSCOPY Right  1990: LAPAROSCOPY,PELVIC,BIOPSY      Comment: for infertility  12/3/2015: OTHER SURGICAL HISTORY Right  No date: THYROIDECTOMY      Comment: Total    Fami Disp: 90 capsule Rfl: 1   Melatonin 10 MG Oral Cap Take 1 tablet by mouth nightly. Disp:  Rfl:    FOLIC ACID 1 MG Oral Tab TAKE 1 TABLET BY MOUTH DAILY.  Disp: 90 tablet Rfl: 3   TraMADol HCl 50 MG Oral Tab TAKE 1 TABLET BY MOUTH EVERY 12 HOURS AS NEEDED Jon Wilkinson rash  Respiratory:  Negative for cough, dyspnea and wheezing    PHYSICAL EXAM:   Constitutional: responsive, no acute distress noted  Head/Face: NC/Atraumatic  Eyes/Vision: conjunctiva and lids are normal extraocular motion is intact   Ears/Audiometry: tym interim    Recs: Reviewed avoidance measures  Patient wishes to continue with immunotherapy at this time due to sustained control.   Reviewed with patient that she has been on maintenance dosing since March 2009 is completed the minimum of 3-5 years  Contin

## 2017-09-06 RX ORDER — LEFLUNOMIDE 20 MG/1
20 TABLET ORAL DAILY
Qty: 90 TABLET | Refills: 0 | OUTPATIENT
Start: 2017-09-06 | End: 2017-12-05

## 2017-09-06 RX ORDER — LEVOTHYROXINE SODIUM 0.15 MG/1
150 TABLET ORAL
Qty: 90 TABLET | Refills: 1 | OUTPATIENT
Start: 2017-09-06

## 2017-09-06 RX ORDER — LEFLUNOMIDE 20 MG/1
20 TABLET ORAL DAILY
Qty: 90 TABLET | Refills: 0 | Status: SHIPPED | OUTPATIENT
Start: 2017-09-06 | End: 2018-01-31

## 2017-09-06 RX ORDER — LEVOTHYROXINE SODIUM 0.2 MG/1
200 TABLET ORAL
Qty: 90 TABLET | Refills: 0 | Status: SHIPPED | OUTPATIENT
Start: 2017-09-06 | End: 2017-09-23

## 2017-09-06 NOTE — TELEPHONE ENCOUNTER
SEE lab note. Patient now on 200mg of levothyroxine.   Due to recheck TSH in OCT>  Hypothyroid Medications  Protocol Criteria:  Appointment scheduled in the past 12 months or the next 3 months  TSH resulted in the past 12 months that is normal  Recent Outp

## 2017-09-06 NOTE — TELEPHONE ENCOUNTER
Medication last filled as follows:    leflunomide 20 MG Oral Tab 90 tablet 0 7/17/2017 10/15/2017    Sig - Route:  Take 1 tablet (20 mg total) by mouth daily. - Oral    E-Prescribing Status: Receipt confirmed by pharmacy (7/17/2017  4:15 PM CDT)        [employer]

## 2017-09-06 NOTE — TELEPHONE ENCOUNTER
Patient calling and stated needs thyroid medication. The synthroid was sent on 7/25/17 but the pharmacy stated never received. Resent to the pharmacy. Please advise on Leflunomide. Last refilled on 7/17/17 for 90 day. Last office visit 10/13/16.

## 2017-09-14 ENCOUNTER — TELEPHONE (OUTPATIENT)
Dept: RHEUMATOLOGY | Facility: CLINIC | Age: 57
End: 2017-09-14

## 2017-09-14 DIAGNOSIS — M54.5 CHRONIC LOW BACK PAIN, UNSPECIFIED BACK PAIN LATERALITY, WITH SCIATICA PRESENCE UNSPECIFIED: Primary | ICD-10-CM

## 2017-09-14 DIAGNOSIS — R29.898 LEFT LEG WEAKNESS: ICD-10-CM

## 2017-09-14 DIAGNOSIS — M06.9 RHEUMATOID ARTHRITIS, INVOLVING UNSPECIFIED SITE, UNSPECIFIED RHEUMATOID FACTOR PRESENCE: ICD-10-CM

## 2017-09-14 DIAGNOSIS — G89.29 CHRONIC LOW BACK PAIN, UNSPECIFIED BACK PAIN LATERALITY, WITH SCIATICA PRESENCE UNSPECIFIED: Primary | ICD-10-CM

## 2017-09-14 NOTE — TELEPHONE ENCOUNTER
HYDRO PHYSICAL THERAPY  PATIENT FOUND A PLACE IN [place] AND NEED AN ORDER FOR THE THERAPY   PATIENT HAS APT ON 9/18  FAX  [phone]       OK TO SEND TO MY CHART

## 2017-09-14 NOTE — TELEPHONE ENCOUNTER
Please order to number below and ok to send to [facility], [employer]

## 2017-09-16 ENCOUNTER — LAB ENCOUNTER (OUTPATIENT)
Dept: LAB | Age: 57
End: 2017-09-16
Attending: INTERNAL MEDICINE
Payer: COMMERCIAL

## 2017-09-16 ENCOUNTER — NURSE ONLY (OUTPATIENT)
Dept: ALLERGY | Facility: CLINIC | Age: 57
End: 2017-09-16

## 2017-09-16 DIAGNOSIS — Z51.81 THERAPEUTIC DRUG MONITORING: ICD-10-CM

## 2017-09-16 DIAGNOSIS — M06.9 RHEUMATOID ARTHRITIS INVOLVING MULTIPLE SITES, UNSPECIFIED RHEUMATOID FACTOR PRESENCE: ICD-10-CM

## 2017-09-16 DIAGNOSIS — R79.89 ABNORMAL TSH: ICD-10-CM

## 2017-09-16 DIAGNOSIS — J30.1 SEASONAL ALLERGIC RHINITIS DUE TO POLLEN, UNSPECIFIED CHRONICITY: ICD-10-CM

## 2017-09-16 LAB
ALT SERPL-CCNC: 7 U/L (ref 14–54)
AST SERPL-CCNC: 22 U/L (ref 15–41)
BASOPHILS # BLD: 0 K/UL (ref 0–0.2)
BASOPHILS NFR BLD: 1 %
CREAT SERPL-MCNC: 0.82 MG/DL (ref 0.5–1.5)
CRP SERPL-MCNC: 2.6 MG/DL (ref 0–0.9)
EOSINOPHIL # BLD: 0.2 K/UL (ref 0–0.7)
EOSINOPHIL NFR BLD: 4 %
ERYTHROCYTE [DISTWIDTH] IN BLOOD BY AUTOMATED COUNT: 16.7 % (ref 11–15)
ERYTHROCYTE [SEDIMENTATION RATE] IN BLOOD: 28 MM/HR (ref 0–30)
HCT VFR BLD AUTO: 40.9 % (ref 35–48)
HGB BLD-MCNC: 13.4 G/DL (ref 12–16)
LYMPHOCYTES # BLD: 1.1 K/UL (ref 1–4)
LYMPHOCYTES NFR BLD: 17 %
MCH RBC QN AUTO: 30 PG (ref 27–32)
MCHC RBC AUTO-ENTMCNC: 32.9 G/DL (ref 32–37)
MCV RBC AUTO: 91.3 FL (ref 80–100)
MONOCYTES # BLD: 0.4 K/UL (ref 0–1)
MONOCYTES NFR BLD: 6 %
NEUTROPHILS # BLD AUTO: 4.7 K/UL (ref 1.8–7.7)
NEUTROPHILS NFR BLD: 72 %
PLATELET # BLD AUTO: 167 K/UL (ref 140–400)
PMV BLD AUTO: 9.9 FL (ref 7.4–10.3)
RBC # BLD AUTO: 4.48 M/UL (ref 3.7–5.4)
TSH SERPL-ACNC: 0.72 UIU/ML (ref 0.45–5.33)
WBC # BLD AUTO: 6.5 K/UL (ref 4–11)

## 2017-09-16 PROCEDURE — 36415 COLL VENOUS BLD VENIPUNCTURE: CPT

## 2017-09-16 PROCEDURE — 84443 ASSAY THYROID STIM HORMONE: CPT

## 2017-09-16 PROCEDURE — 84460 ALANINE AMINO (ALT) (SGPT): CPT

## 2017-09-16 PROCEDURE — 86140 C-REACTIVE PROTEIN: CPT

## 2017-09-16 PROCEDURE — 95117 IMMUNOTHERAPY INJECTIONS: CPT | Performed by: ALLERGY & IMMUNOLOGY

## 2017-09-16 PROCEDURE — 85025 COMPLETE CBC W/AUTO DIFF WBC: CPT

## 2017-09-16 PROCEDURE — 85652 RBC SED RATE AUTOMATED: CPT

## 2017-09-16 PROCEDURE — 84450 TRANSFERASE (AST) (SGOT): CPT

## 2017-09-16 PROCEDURE — 82565 ASSAY OF CREATININE: CPT

## 2017-09-20 DIAGNOSIS — Z51.81 THERAPEUTIC DRUG MONITORING: ICD-10-CM

## 2017-09-20 DIAGNOSIS — M06.9 RHEUMATOID ARTHRITIS INVOLVING MULTIPLE SITES, UNSPECIFIED RHEUMATOID FACTOR PRESENCE: ICD-10-CM

## 2017-09-20 RX ORDER — TRAMADOL HYDROCHLORIDE 50 MG/1
TABLET ORAL
Qty: 60 TABLET | Refills: 0 | OUTPATIENT
Start: 2017-09-20 | End: 2017-09-25

## 2017-09-20 NOTE — TELEPHONE ENCOUNTER
LOV: 7/17/17 Script last given 11/7/2016 #60 with 1 refils. Future Appointments  Date Time Provider Joel Coheni   9/23/2017 10:00 AM Terrell Morillo MD [facility]   10/16/2017 6:20 PM Octavia Dewitt MD [address] [place] [address]     Please advise pt request to  script at [facility].

## 2017-09-20 NOTE — TELEPHONE ENCOUNTER
ASKING FOR SCRIPT BY Saturday   [facility] LOCATION TO      Current Outpatient Prescriptions:  TraMADol HCl 50 MG Oral Tab TAKE 1 TABLET BY MOUTH EVERY 12 HOURS AS NEEDED Disp: 60 tablet Rfl: 1

## 2017-09-20 NOTE — TELEPHONE ENCOUNTER
Phoned [facility] pharmacy and left a Tramadol 50mg #60 with 0 refill on the pharmacy voice mail. Phoned patient and left a message on her voice mail that she will get more refills at her f/u visit.

## 2017-09-21 ENCOUNTER — TELEPHONE (OUTPATIENT)
Dept: INTERNAL MEDICINE CLINIC | Facility: CLINIC | Age: 57
End: 2017-09-21

## 2017-09-21 DIAGNOSIS — M06.9 RHEUMATOID ARTHRITIS INVOLVING MULTIPLE SITES, UNSPECIFIED RHEUMATOID FACTOR PRESENCE: ICD-10-CM

## 2017-09-21 DIAGNOSIS — Z51.81 THERAPEUTIC DRUG MONITORING: ICD-10-CM

## 2017-09-23 ENCOUNTER — OFFICE VISIT (OUTPATIENT)
Dept: INTERNAL MEDICINE CLINIC | Facility: CLINIC | Age: 57
End: 2017-09-23

## 2017-09-23 VITALS
SYSTOLIC BLOOD PRESSURE: 128 MMHG | RESPIRATION RATE: 20 BRPM | BODY MASS INDEX: 43.4 KG/M2 | DIASTOLIC BLOOD PRESSURE: 76 MMHG | HEART RATE: 94 BPM | WEIGHT: 293 LBS | HEIGHT: 69 IN | TEMPERATURE: 98 F | OXYGEN SATURATION: 95 %

## 2017-09-23 DIAGNOSIS — M06.9 RHEUMATOID ARTHRITIS INVOLVING MULTIPLE SITES, UNSPECIFIED RHEUMATOID FACTOR PRESENCE: ICD-10-CM

## 2017-09-23 DIAGNOSIS — J45.20 MILD INTERMITTENT ASTHMA WITHOUT COMPLICATION: ICD-10-CM

## 2017-09-23 DIAGNOSIS — E03.9 HYPOTHYROIDISM, UNSPECIFIED TYPE: ICD-10-CM

## 2017-09-23 DIAGNOSIS — G20 PARKINSON DISEASE (HCC): ICD-10-CM

## 2017-09-23 DIAGNOSIS — Z23 NEED FOR VACCINATION: ICD-10-CM

## 2017-09-23 DIAGNOSIS — Z00.00 ROUTINE PHYSICAL EXAMINATION: Primary | ICD-10-CM

## 2017-09-23 DIAGNOSIS — N60.81 SEBACEOUS CYST OF SKIN OF RIGHT BREAST: ICD-10-CM

## 2017-09-23 PROCEDURE — 99396 PREV VISIT EST AGE 40-64: CPT | Performed by: INTERNAL MEDICINE

## 2017-09-23 PROCEDURE — 90686 IIV4 VACC NO PRSV 0.5 ML IM: CPT | Performed by: INTERNAL MEDICINE

## 2017-09-23 PROCEDURE — 90471 IMMUNIZATION ADMIN: CPT | Performed by: INTERNAL MEDICINE

## 2017-09-23 RX ORDER — LEVOTHYROXINE SODIUM 0.2 MG/1
200 TABLET ORAL
Qty: 90 TABLET | Refills: 3 | Status: SHIPPED | OUTPATIENT
Start: 2017-09-23 | End: 2018-04-25 | Stop reason: DRUGHIGH

## 2017-09-23 NOTE — PROGRESS NOTES
HPI:    Patient ID: Farnaz Ellis is a 55year old female. HPI  Patient is here for physical exam and follow-up on chronic medical issues as listed below mainly remarkable for obesity, Parkinson's, rheumatoid arthritis, asthma and allergies.   Last see unspecified    • Infertility, female    • Lipid screening 8/31/2013   • Other and unspecified hyperlipidemia    • [facility] disease ([facility]    • Tibia/fibula fracture     OPEN REDUCTION INTERNAL FIXATION   • Urinary incontinence 2/12/95    after childbirth nervous/anxious.              Current Outpatient Prescriptions:  TraMADol HCl 50 MG Oral Tab TAKE 1 TABLET BY MOUTH EVERY 12 HOURS AS NEEDED Disp: 60 tablet Rfl: 0   Levothyroxine Sodium 200 MCG Oral Tab Take 1 tablet (200 mcg total) by mouth before breakfa by mouth as needed Disp: 140 mL Rfl: 0   ergocalciferol (DRISDOL/VITAMIN D2) 34954 UNITS Oral Cap 50,000 Units every 30 (thirty) days.  Disp:  Rfl:    carbidopa-levodopa (SINEMET)  MG Oral Tab Take 5 tablets by oral route every day Disp:  Rfl: 0   Mis type  Plan: Recent TSH has been normal.  Continue current medication.    (G20) Parkinson disease ([address]  Plan: Patient with significant involuntary body movement. Possible tremors versus dyskinesia.   Follow-up with neurology.    (M06.9) Rheumatoid arthriti

## 2017-09-23 NOTE — TELEPHONE ENCOUNTER
Per pt, she is out of med and waiting for her med. Told pt that it might be address by Monday and pt understood.

## 2017-09-25 RX ORDER — TRAMADOL HYDROCHLORIDE 50 MG/1
TABLET ORAL
Qty: 60 TABLET | Refills: 4 | OUTPATIENT
Start: 2017-09-25 | End: 2018-04-18

## 2017-09-25 NOTE — TELEPHONE ENCOUNTER
Talked to pt. - she's been on this for a while -   Called in refills on tramadol - she'sh tolerated in the past - only emily #60 every 12 hours - with 3 refill s  - if the pharmacy calls back - please tell them it's ok -

## 2017-09-25 NOTE — TELEPHONE ENCOUNTER
Contacted pharmacy and notified them PP said it is okay to dispense tramadol as patient has been on it in the past and has tolerated. They will fill for patient.

## 2017-10-05 ENCOUNTER — TELEPHONE (OUTPATIENT)
Dept: RHEUMATOLOGY | Facility: CLINIC | Age: 57
End: 2017-10-05

## 2017-10-14 ENCOUNTER — NURSE ONLY (OUTPATIENT)
Dept: ALLERGY | Facility: CLINIC | Age: 57
End: 2017-10-14

## 2017-10-14 DIAGNOSIS — J45.909 ASTHMA DUE TO ENVIRONMENTAL ALLERGIES: ICD-10-CM

## 2017-10-14 PROCEDURE — 95165 ANTIGEN THERAPY SERVICES: CPT | Performed by: ALLERGY & IMMUNOLOGY

## 2017-10-14 PROCEDURE — 95117 IMMUNOTHERAPY INJECTIONS: CPT | Performed by: ALLERGY & IMMUNOLOGY

## 2017-10-16 ENCOUNTER — OFFICE VISIT (OUTPATIENT)
Dept: RHEUMATOLOGY | Facility: CLINIC | Age: 57
End: 2017-10-16

## 2017-10-16 ENCOUNTER — TELEPHONE (OUTPATIENT)
Dept: RHEUMATOLOGY | Facility: CLINIC | Age: 57
End: 2017-10-16

## 2017-10-16 ENCOUNTER — HOSPITAL ENCOUNTER (OUTPATIENT)
Dept: GENERAL RADIOLOGY | Facility: HOSPITAL | Age: 57
Discharge: HOME | End: 2017-10-16
Attending: INTERNAL MEDICINE
Payer: COMMERCIAL

## 2017-10-16 VITALS
DIASTOLIC BLOOD PRESSURE: 77 MMHG | BODY MASS INDEX: 43.4 KG/M2 | HEART RATE: 98 BPM | SYSTOLIC BLOOD PRESSURE: 155 MMHG | WEIGHT: 293 LBS | HEIGHT: 69 IN

## 2017-10-16 DIAGNOSIS — Z51.81 THERAPEUTIC DRUG MONITORING: ICD-10-CM

## 2017-10-16 DIAGNOSIS — M54.50 LOW BACK PAIN RADIATING TO LEFT LOWER EXTREMITY: Primary | ICD-10-CM

## 2017-10-16 DIAGNOSIS — M54.50 CHRONIC MIDLINE LOW BACK PAIN WITHOUT SCIATICA: ICD-10-CM

## 2017-10-16 DIAGNOSIS — M06.9 RHEUMATOID ARTHRITIS INVOLVING MULTIPLE SITES, UNSPECIFIED RHEUMATOID FACTOR PRESENCE: Primary | ICD-10-CM

## 2017-10-16 DIAGNOSIS — G89.29 CHRONIC MIDLINE LOW BACK PAIN WITHOUT SCIATICA: ICD-10-CM

## 2017-10-16 DIAGNOSIS — M79.605 LOW BACK PAIN RADIATING TO LEFT LOWER EXTREMITY: Primary | ICD-10-CM

## 2017-10-16 PROCEDURE — 72100 X-RAY EXAM L-S SPINE 2/3 VWS: CPT | Performed by: INTERNAL MEDICINE

## 2017-10-16 PROCEDURE — 99214 OFFICE O/P EST MOD 30 MIN: CPT | Performed by: INTERNAL MEDICINE

## 2017-10-16 PROCEDURE — 99212 OFFICE O/P EST SF 10 MIN: CPT | Performed by: INTERNAL MEDICINE

## 2017-10-16 PROCEDURE — 72202 X-RAY EXAM SI JOINTS 3/> VWS: CPT | Performed by: INTERNAL MEDICINE

## 2017-10-16 RX ORDER — HYDROCODONE BITARTRATE AND ACETAMINOPHEN 5; 325 MG/1; MG/1
1 TABLET ORAL EVERY 12 HOURS PRN
Qty: 60 TABLET | Refills: 0 | Status: SHIPPED | OUTPATIENT
Start: 2017-10-16 | End: 2018-03-21

## 2017-10-16 RX ORDER — PREDNISONE 10 MG/1
TABLET ORAL
Qty: 21 TABLET | Refills: 0 | Status: SHIPPED | OUTPATIENT
Start: 2017-10-16 | End: 2017-10-20

## 2017-10-16 NOTE — PROGRESS NOTES
Danny Rose is a 65year old female who presents for Patient presents with:  Rheumatoid Arthritis: thigh pain  Back Pain  . HPI:     She is a pleasant 49year old who has elevated RF >300 and foot pain - dx with RA -  here on3/7/2016.  I had last seen move. It's bothering her for 3-4 weeks. She can still get up the stairs. She feels it's more dragging. No numbness and tingling. She feels her joints are better except her feet for several weeks. Her elbows and fingers are better.    She feels the s are better. No shoulders are ok. Her knees still hurts. 5/22/2017  Her left t shoulder flared - she had bad tramadol. She felt no position helped. She was tearful from the pain . She went nto the ER on 4/27/2017. She was in bad pain.  She was given zari 4   Levothyroxine Sodium 200 MCG Oral Tab Take 1 tablet (200 mcg total) by mouth before breakfast. Disp: 90 tablet Rfl: 3   leflunomide 20 MG Oral Tab Take 1 tablet (20 mg total) by mouth daily.  Disp: 90 tablet Rfl: 0   fluticasone-salmeterol (Ferdinand Pick BI-FLEX ADV DOUBLE ST OR) Take  by mouth. Disp:  Rfl:    AZILECT 1 MG Oral Tab Take 1 tablet by mouth once daily. Disp:  Rfl: 3   omega-3 fatty acids (FISH OIL) 1000 MG Oral Cap Take 1,000 mg by mouth daily.  Disp:  Rfl:       Past Medical History:   Liliane Borges HSM felt, BS positive  Joint exam:   Her left and righit ankles not tender   Left shoudler not tender now -   No knee tendnerss. No other joitn tendernss -no mtp, or ankle tenderness  No joint tendneress at this time.    No lower back or neck tenderess  N peroneus longus tendinosis. 2. 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 3. Abductor digit minimi muscle atrophy compatible with Cárdenas neuropathy.   4. Chronic healed fracture deformity of the fifth metatarsal.  5. Mild bone marrow jihan GFR, -American      >=60  >60   ALT (SGPT)      14 - 54 U/L  5 (L)   AST (SGOT)      15 - 41 U/L  26   C-REACTIVE PROTEIN      0.0 - 0.9 mg/dL  5.4 (H)   SED RATE      0 - 30 mm/Hr  25   TSH      0.45 - 5.33 uIU/mL 6.33 (H)      Component      Lat difficutly to distinguish from her parkinson's but her crp is elevated still. S/p burst prednisone helped with flarea   - rtc in 3-4 months -   - cont.  diclfoenac 75mg 1-2 tablets prn   - briefly discussed with her biologics - but will see on mtx helps

## 2017-10-17 NOTE — TELEPHONE ENCOUNTER
Spoke with MRI department at the hospital. They do them once a day, it will need to be coordinated with hospital anesthesia.

## 2017-10-17 NOTE — PATIENT INSTRUCTIONS
1. Cont. methotrexate  8 tablets a week and folic acid 1mg a day    2. Cont. leflunomide 20mg a day   3.  Start prednisone 10mg - Take 6 tabsx 1 day, take 5 tabsx 1 day, take 4 tabsx 1 day,take 3 tabsx 1 day, take 2 tabsx 1 day, take 1 tabsx 1 day, then off

## 2017-10-18 ENCOUNTER — PATIENT MESSAGE (OUTPATIENT)
Dept: RHEUMATOLOGY | Facility: CLINIC | Age: 57
End: 2017-10-18

## 2017-10-19 NOTE — TELEPHONE ENCOUNTER
From: Bronson Begin  To: Farida Tierney MD  Sent: 10/18/2017 8:25 PM CDT  Subject: Test Results Question    I have a question about XR LUMBAR SPINE (MIN 2 VIEWS) (CPT=72100) resulted on 10/16/17 at 11:49 PM.    What does this mean?  If the lumbar ar

## 2017-10-20 RX ORDER — PREDNISONE 10 MG/1
TABLET ORAL
Qty: 21 TABLET | Refills: 0 | Status: SHIPPED | OUTPATIENT
Start: 2017-10-20 | End: 2017-10-30 | Stop reason: ALTCHOICE

## 2017-10-20 NOTE — TELEPHONE ENCOUNTER
Talked to pt. - she feels bad today - she was feeling better. Her back is not bad - it's the hips and leg that is hurting her. It's really bad today - she couldn't work today - she had to work from home.    Re-rodered pred burst and diclfoenac topical

## 2017-10-23 NOTE — TELEPHONE ENCOUNTER
PA approved via [employer] #[id number] from 10/23/17 to 11/21/17. Pt will call to schedule MRI, she will call office back if she has any issues.

## 2017-10-26 ENCOUNTER — ANESTHESIA EVENT (OUTPATIENT)
Dept: MRI IMAGING | Facility: HOSPITAL | Age: 57
End: 2017-10-26
Payer: COMMERCIAL

## 2017-10-26 ENCOUNTER — HOSPITAL ENCOUNTER (OUTPATIENT)
Dept: MRI IMAGING | Facility: HOSPITAL | Age: 57
Discharge: HOME | End: 2017-10-26
Attending: INTERNAL MEDICINE
Payer: COMMERCIAL

## 2017-10-26 ENCOUNTER — ANESTHESIA (OUTPATIENT)
Dept: MRI IMAGING | Facility: HOSPITAL | Age: 57
End: 2017-10-26
Payer: COMMERCIAL

## 2017-10-26 VITALS
OXYGEN SATURATION: 94 % | DIASTOLIC BLOOD PRESSURE: 63 MMHG | WEIGHT: 293 LBS | BODY MASS INDEX: 43.4 KG/M2 | RESPIRATION RATE: 16 BRPM | HEART RATE: 82 BPM | TEMPERATURE: 98 F | HEIGHT: 69 IN | SYSTOLIC BLOOD PRESSURE: 135 MMHG

## 2017-10-26 DIAGNOSIS — M54.50 LOW BACK PAIN RADIATING TO LEFT LOWER EXTREMITY: Primary | ICD-10-CM

## 2017-10-26 DIAGNOSIS — M79.605 LOW BACK PAIN RADIATING TO LEFT LOWER EXTREMITY: Primary | ICD-10-CM

## 2017-10-26 PROCEDURE — 72148 MRI LUMBAR SPINE W/O DYE: CPT | Performed by: INTERNAL MEDICINE

## 2017-10-26 RX ORDER — ONDANSETRON 2 MG/ML
4 INJECTION INTRAMUSCULAR; INTRAVENOUS ONCE AS NEEDED
Status: ACTIVE | OUTPATIENT
Start: 2017-10-26 | End: 2017-10-26

## 2017-10-26 RX ORDER — SODIUM CHLORIDE, SODIUM LACTATE, POTASSIUM CHLORIDE, CALCIUM CHLORIDE 600; 310; 30; 20 MG/100ML; MG/100ML; MG/100ML; MG/100ML
INJECTION, SOLUTION INTRAVENOUS CONTINUOUS
Status: DISCONTINUED | OUTPATIENT
Start: 2017-10-26 | End: 2017-10-30

## 2017-10-26 RX ORDER — MIDAZOLAM HYDROCHLORIDE 1 MG/ML
INJECTION INTRAMUSCULAR; INTRAVENOUS AS NEEDED
Status: DISCONTINUED | OUTPATIENT
Start: 2017-10-26 | End: 2017-10-26 | Stop reason: SURG

## 2017-10-26 RX ORDER — MORPHINE SULFATE 2 MG/ML
2 INJECTION, SOLUTION INTRAMUSCULAR; INTRAVENOUS EVERY 10 MIN PRN
Status: DISCONTINUED | OUTPATIENT
Start: 2017-10-26 | End: 2017-10-30

## 2017-10-26 RX ORDER — NEOSTIGMINE METHYLSULFATE 0.5 MG/ML
INJECTION INTRAVENOUS AS NEEDED
Status: DISCONTINUED | OUTPATIENT
Start: 2017-10-26 | End: 2017-10-26 | Stop reason: SURG

## 2017-10-26 RX ORDER — METOCLOPRAMIDE 10 MG/1
10 TABLET ORAL ONCE
Status: COMPLETED | OUTPATIENT
Start: 2017-10-26 | End: 2017-10-26

## 2017-10-26 RX ORDER — MORPHINE SULFATE 10 MG/ML
6 INJECTION, SOLUTION INTRAMUSCULAR; INTRAVENOUS EVERY 10 MIN PRN
Status: DISCONTINUED | OUTPATIENT
Start: 2017-10-26 | End: 2017-10-30

## 2017-10-26 RX ORDER — ONDANSETRON 2 MG/ML
INJECTION INTRAMUSCULAR; INTRAVENOUS AS NEEDED
Status: DISCONTINUED | OUTPATIENT
Start: 2017-10-26 | End: 2017-10-26 | Stop reason: SURG

## 2017-10-26 RX ORDER — ACETAMINOPHEN 325 MG/1
650 TABLET ORAL ONCE
Status: DISCONTINUED | OUTPATIENT
Start: 2017-10-26 | End: 2017-10-30

## 2017-10-26 RX ORDER — FAMOTIDINE 20 MG/1
20 TABLET ORAL ONCE
Status: COMPLETED | OUTPATIENT
Start: 2017-10-26 | End: 2017-10-26

## 2017-10-26 RX ORDER — LIDOCAINE HYDROCHLORIDE 10 MG/ML
INJECTION, SOLUTION EPIDURAL; INFILTRATION; INTRACAUDAL; PERINEURAL AS NEEDED
Status: DISCONTINUED | OUTPATIENT
Start: 2017-10-26 | End: 2017-10-26 | Stop reason: SURG

## 2017-10-26 RX ORDER — HYDROCODONE BITARTRATE AND ACETAMINOPHEN 5; 325 MG/1; MG/1
1 TABLET ORAL AS NEEDED
Status: DISCONTINUED | OUTPATIENT
Start: 2017-10-26 | End: 2017-10-30

## 2017-10-26 RX ORDER — MORPHINE SULFATE 4 MG/ML
4 INJECTION, SOLUTION INTRAMUSCULAR; INTRAVENOUS EVERY 10 MIN PRN
Status: DISCONTINUED | OUTPATIENT
Start: 2017-10-26 | End: 2017-10-30

## 2017-10-26 RX ORDER — HYDROMORPHONE HYDROCHLORIDE 1 MG/ML
0.2 INJECTION, SOLUTION INTRAMUSCULAR; INTRAVENOUS; SUBCUTANEOUS EVERY 5 MIN PRN
Status: DISCONTINUED | OUTPATIENT
Start: 2017-10-26 | End: 2017-10-30

## 2017-10-26 RX ORDER — HYDROMORPHONE HYDROCHLORIDE 1 MG/ML
0.6 INJECTION, SOLUTION INTRAMUSCULAR; INTRAVENOUS; SUBCUTANEOUS EVERY 5 MIN PRN
Status: DISCONTINUED | OUTPATIENT
Start: 2017-10-26 | End: 2017-10-30

## 2017-10-26 RX ORDER — DEXAMETHASONE SODIUM PHOSPHATE 4 MG/ML
VIAL (ML) INJECTION AS NEEDED
Status: DISCONTINUED | OUTPATIENT
Start: 2017-10-26 | End: 2017-10-26 | Stop reason: SURG

## 2017-10-26 RX ORDER — GLYCOPYRROLATE 0.2 MG/ML
INJECTION INTRAMUSCULAR; INTRAVENOUS AS NEEDED
Status: DISCONTINUED | OUTPATIENT
Start: 2017-10-26 | End: 2017-10-26 | Stop reason: SURG

## 2017-10-26 RX ORDER — HALOPERIDOL 5 MG/ML
0.25 INJECTION INTRAMUSCULAR ONCE AS NEEDED
Status: ACTIVE | OUTPATIENT
Start: 2017-10-26 | End: 2017-10-26

## 2017-10-26 RX ORDER — HYDROCODONE BITARTRATE AND ACETAMINOPHEN 5; 325 MG/1; MG/1
2 TABLET ORAL AS NEEDED
Status: DISCONTINUED | OUTPATIENT
Start: 2017-10-26 | End: 2017-10-30

## 2017-10-26 RX ORDER — NALOXONE HYDROCHLORIDE 0.4 MG/ML
80 INJECTION, SOLUTION INTRAMUSCULAR; INTRAVENOUS; SUBCUTANEOUS AS NEEDED
Status: ACTIVE | OUTPATIENT
Start: 2017-10-26 | End: 2017-10-26

## 2017-10-26 RX ORDER — HYDROMORPHONE HYDROCHLORIDE 1 MG/ML
0.4 INJECTION, SOLUTION INTRAMUSCULAR; INTRAVENOUS; SUBCUTANEOUS EVERY 5 MIN PRN
Status: DISCONTINUED | OUTPATIENT
Start: 2017-10-26 | End: 2017-10-30

## 2017-10-26 RX ORDER — ROCURONIUM BROMIDE 10 MG/ML
INJECTION, SOLUTION INTRAVENOUS AS NEEDED
Status: DISCONTINUED | OUTPATIENT
Start: 2017-10-26 | End: 2017-10-26 | Stop reason: SURG

## 2017-10-26 RX ADMIN — METOCLOPRAMIDE 10 MG: 10 TABLET ORAL at 12:47:00

## 2017-10-26 RX ADMIN — LIDOCAINE HYDROCHLORIDE 50 MG: 10 INJECTION, SOLUTION EPIDURAL; INFILTRATION; INTRACAUDAL; PERINEURAL at 13:50:00

## 2017-10-26 RX ADMIN — MIDAZOLAM HYDROCHLORIDE 2 MG: 1 INJECTION INTRAMUSCULAR; INTRAVENOUS at 13:50:00

## 2017-10-26 RX ADMIN — SODIUM CHLORIDE, SODIUM LACTATE, POTASSIUM CHLORIDE, CALCIUM CHLORIDE: 600; 310; 30; 20 INJECTION, SOLUTION INTRAVENOUS at 12:56:00

## 2017-10-26 RX ADMIN — GLYCOPYRROLATE 0.2 MG: 0.2 INJECTION INTRAMUSCULAR; INTRAVENOUS at 13:50:00

## 2017-10-26 RX ADMIN — SODIUM CHLORIDE, SODIUM LACTATE, POTASSIUM CHLORIDE, CALCIUM CHLORIDE: 600; 310; 30; 20 INJECTION, SOLUTION INTRAVENOUS at 13:49:00

## 2017-10-26 RX ADMIN — DEXAMETHASONE SODIUM PHOSPHATE 4 MG: 4 MG/ML VIAL (ML) INJECTION at 13:50:00

## 2017-10-26 RX ADMIN — FAMOTIDINE 20 MG: 20 TABLET ORAL at 12:47:00

## 2017-10-26 RX ADMIN — ONDANSETRON 4 MG: 2 INJECTION INTRAMUSCULAR; INTRAVENOUS at 13:50:00

## 2017-10-26 RX ADMIN — NEOSTIGMINE METHYLSULFATE 1.5 MG: 0.5 INJECTION INTRAVENOUS at 14:30:00

## 2017-10-26 RX ADMIN — GLYCOPYRROLATE 0.3 MG: 0.2 INJECTION INTRAMUSCULAR; INTRAVENOUS at 14:30:00

## 2017-10-26 RX ADMIN — ROCURONIUM BROMIDE 15 MG: 10 INJECTION, SOLUTION INTRAVENOUS at 14:05:00

## 2017-10-26 RX ADMIN — SODIUM CHLORIDE, SODIUM LACTATE, POTASSIUM CHLORIDE, CALCIUM CHLORIDE: 600; 310; 30; 20 INJECTION, SOLUTION INTRAVENOUS at 14:30:00

## 2017-10-26 NOTE — ANESTHESIA PREPROCEDURE EVALUATION
Anesthesia PreOp Note    HPI:     Danny Rose is a 65year old female who presents for preoperative consultation requested by: * No surgeons listed *    Date of Surgery: 10/26/2017    * No procedures listed *  Indication: * No pre-op diagnosis entered THYROIDECTOMY      Comment: Total       (Not in a hospital admission)    Current Outpatient Prescriptions Ordered in Epic:  PREDNISONE 10 MG Oral Tab Take 6 tabsx 1 day, take 5 tabsx 1 day, take 4 tabsx 1 day,take 3 tabsx 1 day, take 2 tabsx 1 day, take 1 Natural Products (OSTEO BI-FLEX ADV DOUBLE ST OR) Take  by mouth. Disp:  Rfl:    AZILECT 1 MG Oral Tab Take 1 tablet by mouth once daily. Disp:  Rfl: 3   omega-3 fatty acids (FISH OIL) 1000 MG Oral Cap Take 1,000 mg by mouth daily.  Disp:  Rfl:    PROAIR HF Comment: OCC    Drug use: No    Sexual activity: Yes    Partners: Male     Other Topics Concern    Caffeine Concern Yes    Comment: SODA 1 CAN DAILY, TEA 3 CUPS/DAY     Social History Narrative   None on file       Available pre-op labs reviewed.     Lab Re plan with:  CRNA, attending and surgeon  Provider Attestation (if preop done by other):   Albuterol pre TX,GA/ETT/video,PONV,dental damages etc,      I have informed Mauro João  of the nature of the anesthetic plan, benefits, risks, major complications

## 2017-10-26 NOTE — H&P
[address] Patient Status:  Outpatient    1960 MRN [id number]   Location [facility] MRI Attending Fer Dahl MD   Hosp Day # 0 PCP Fiona Hilario MD     Date:  10/26/2017  D Mother    • Cancer Paternal Aunt      breast cancer-cause of death at age [de-identified]   • Heart Disorder Brother      Social History:  Smoking status: Never Smoker                                                              Smokeless tobacco: Never Used tenderness.   Pulmonary:  clear to auscultation bilaterally  Cardiovascular: S1, S2 normal, no murmur, click, rub or gallop, regular rate and rhythm  Abdominal: soft, non-tender; bowel sounds normal; no masses,  no organomegaly  Extremities: extremities nor

## 2017-10-26 NOTE — ANESTHESIA POSTPROCEDURE EVALUATION
Patient: Stella Spatz    Procedure Summary     Date:  10/26/17 Room / Location:  [address] [place] [address]; [address] [place] [address] Anesthesia Care Unit    Anesthesia Start:  8594 Anesthesia Stop:      Procedure:  MRI SPINE LUMBAR (VRV=11763) Diagnosis:  Low

## 2017-10-27 ENCOUNTER — TELEPHONE (OUTPATIENT)
Dept: RHEUMATOLOGY | Facility: CLINIC | Age: 57
End: 2017-10-27

## 2017-10-27 NOTE — TELEPHONE ENCOUNTER
Dr. Alanis Cardoso,  Pt. Requesting:  MONTELUKAST SODIUM 10 MG Oral Tab 90 tablet 0 7/24/2017     Sig - Route: TAKE 1 TABLET (10 MG TOTAL) BY MOUTH NIGHTLY.  - Oral    E-Prescribing Status: Receipt confirmed by pharmacy (7/24/2017 11:20 AM CDT)        LOV: 8/19/17

## 2017-10-27 NOTE — TELEPHONE ENCOUNTER
Per pt, she needs a refill on her MONTELUKAST SODIUM and pt stts that she is out of med and this med use to given by Dr Sruthi Rosenberg but since Allergy is out today and tomorrow pt would like to know if Dr Marlene Chew can give her even just one refill until dr Sruthi Rosenberg w

## 2017-10-27 NOTE — TELEPHONE ENCOUNTER
Called pt.  And d/w her the mri lumbar results - she is having more today -   Referred her to dr. Rosana Perez - /dr. Donovan Colbert -

## 2017-10-27 NOTE — TELEPHONE ENCOUNTER
Spoke to pt, she was advised that Dr Christa Ibrahim also out of office. Informed her that her request would be sent to on call physician for review. Pt voices understanding and agrees with plan. Dr. Jacinda Alicia, please advise on behalf of Christa Ibrahim.  Med was prescribed by Dr. Cerrato Grew

## 2017-10-29 RX ORDER — MONTELUKAST SODIUM 10 MG/1
10 TABLET ORAL NIGHTLY
Qty: 90 TABLET | Refills: 1 | Status: SHIPPED | OUTPATIENT
Start: 2017-10-29 | End: 2018-06-25

## 2017-10-30 ENCOUNTER — OFFICE VISIT (OUTPATIENT)
Dept: NEUROLOGY | Facility: CLINIC | Age: 57
End: 2017-10-30

## 2017-10-30 ENCOUNTER — TELEPHONE (OUTPATIENT)
Dept: NEUROLOGY | Facility: CLINIC | Age: 57
End: 2017-10-30

## 2017-10-30 VITALS
BODY MASS INDEX: 43.4 KG/M2 | WEIGHT: 293 LBS | HEART RATE: 92 BPM | RESPIRATION RATE: 16 BRPM | SYSTOLIC BLOOD PRESSURE: 132 MMHG | DIASTOLIC BLOOD PRESSURE: 80 MMHG | HEIGHT: 69 IN

## 2017-10-30 DIAGNOSIS — M48.062 LUMBAR STENOSIS WITH NEUROGENIC CLAUDICATION: Primary | ICD-10-CM

## 2017-10-30 PROCEDURE — 99204 OFFICE O/P NEW MOD 45 MIN: CPT | Performed by: PHYSICAL MEDICINE & REHABILITATION

## 2017-10-30 RX ORDER — MONTELUKAST SODIUM 10 MG/1
10 TABLET ORAL NIGHTLY
Qty: 90 TABLET | Refills: 1 | Status: SHIPPED | OUTPATIENT
Start: 2017-10-30 | End: 2017-10-30

## 2017-10-30 NOTE — H&P
No referring provider defined for this encounter.   Roger Washington MD    PHYSICAL MEDICINE and REHABILITATION NEW PATIENT    Chief Complaint: low back pain    HPI: Cy Franco is a 65year old female who presents with a chief complaint of Low Back Pain Activites of Daily Living affected:  Difficulty walking     Previous diagnostic tests: MRI L-spine. Xray of the L-spine  Previous treatments:  PT and meds as above    Current Pain: 10 with walking.  5-6 with sitting    Sleep: unaffected    Review of system/ PREDNISONE 10 MG Oral Tab Take 6 tabsx 1 day, take 5 tabsx 1 day, take 4 tabsx 1 day,take 3 tabsx 1 day, take 2 tabsx 1 day, take 1 tabsx 1 day, then off Disp: 21 tablet Rfl: 0   Diclofenac Sodium 1 % Transdermal Gel Apply 4 g topically 4 (four) times deborah Albuterol Sulfate (2.5 MG/3ML) 0.083% Inhalation Nebu Soln Take 3 mL (2.5 mg total) by nebulization every 4 (four) hours as needed for Wheezing.  Disp: 3 Box Rfl: 0   Hydrocod Polst-CPM Polst ER 10-8 MG/5ML Oral Liquid CR Take 5 mL by mouth 2 (two) times da Pain Score: The pain is currently  10/10. General/Mental Status: Morbidly obese  female. Patient appears stated age, answers my questions appropriately.   Psych: Does not appear depressed  Skin: No rash and ulceration   CV: RRR  Resp: CTAB  Vasc Imaging: XR of the L-spine reviewed with patient; she has disc space narrowing at the L5-S1 and L3-4 levels with osteophytes at the L4-5 level.  MRI of the L-spine reviewed with patient; she has lumbar central stenosis at the L3-4 and L4-5 levels, worse at

## 2017-10-31 ENCOUNTER — TELEPHONE (OUTPATIENT)
Dept: NEUROLOGY | Facility: CLINIC | Age: 57
End: 2017-10-31

## 2017-10-31 ENCOUNTER — MED REC SCAN ONLY (OUTPATIENT)
Dept: NEUROLOGY | Facility: CLINIC | Age: 57
End: 2017-10-31

## 2017-10-31 NOTE — TELEPHONE ENCOUNTER
Records of previous spinal interventional procedures were received from [facility]. On 12/12/2012 the patient underwent a lumbar epidural steroid injection, interlaminar, at L4-L5.   The indication at that time was for lumbar radiculopathy, degene

## 2017-10-31 NOTE — TELEPHONE ENCOUNTER
Patient was scheduled for interlaminar lumbar epidural steroid injection L4-5 under sedation  on Wednesday, November 8, 2017. Medications and allergies reviewed.  Patient informed to hold aspirins, nsaids, blood thinners, vitamins and fish oils 7 days prio

## 2017-10-31 NOTE — TELEPHONE ENCOUNTER
Called [facility] BS for authorization of approval of L4-5 ILESI cpt code 69435. Talked to Alex Cooley. who no authorization is required. Reference # [id number]. Will inform Nursing.

## 2017-11-03 ENCOUNTER — TELEPHONE (OUTPATIENT)
Dept: RHEUMATOLOGY | Facility: CLINIC | Age: 57
End: 2017-11-03

## 2017-11-03 ENCOUNTER — TELEPHONE (OUTPATIENT)
Dept: NEUROLOGY | Facility: CLINIC | Age: 57
End: 2017-11-03

## 2017-11-03 DIAGNOSIS — M48.062 LUMBAR STENOSIS WITH NEUROGENIC CLAUDICATION: ICD-10-CM

## 2017-11-03 RX ORDER — OXYCODONE HYDROCHLORIDE AND ACETAMINOPHEN 5; 325 MG/1; MG/1
1 TABLET ORAL EVERY 8 HOURS PRN
Qty: 15 TABLET | Refills: 0 | Status: SHIPPED | OUTPATIENT
Start: 2017-11-03 | End: 2018-03-21

## 2017-11-03 RX ORDER — OXYCODONE AND ACETAMINOPHEN 2.5; 325 MG/1; MG/1
1 TABLET ORAL EVERY 8 HOURS PRN
Qty: 15 TABLET | Refills: 0 | Status: SHIPPED | OUTPATIENT
Start: 2017-11-03 | End: 2017-11-03 | Stop reason: CLARIF

## 2017-11-03 RX ORDER — OXYCODONE AND ACETAMINOPHEN 2.5; 325 MG/1; MG/1
1 TABLET ORAL EVERY 8 HOURS PRN
Qty: 15 TABLET | Refills: 0 | Status: SHIPPED | OUTPATIENT
Start: 2017-11-03 | End: 2017-11-03 | Stop reason: DRUGHIGH

## 2017-11-03 NOTE — TELEPHONE ENCOUNTER
Pt states she can barely walk after stopping her anti-inflammatory medication, would like to know if there is anything she can do for the pain in the meantime.

## 2017-11-03 NOTE — TELEPHONE ENCOUNTER
Pt is concerned bout this: lateral midpole cortex of the left kidney compatible with cyst. She stated she was not aware she has a cyst on her kidney.

## 2017-11-03 NOTE — TELEPHONE ENCOUNTER
I gave her a phone call; she has Yoli Barrios at home. She was instructed to take 2 tabs q6h PRN.  She will give the office a phone call at [facility] and let me know how she is doing; if needed I will write her for percocet to control the pain while she is off dic

## 2017-11-03 NOTE — TELEPHONE ENCOUNTER
Talked to pt.  About renal cyst - it's present on T2 enhanced imaging - not sure this means it's contrast enhanced -   - will need to discuss with radiologist - Dr. Jeanette Schrader  Will need to get us kidney -

## 2017-11-03 NOTE — TELEPHONE ENCOUNTER
Spoke to patient who states she is doing well with the Norco 5-325mg taking 2 tabs q6h prn pain. Patient states she is able to walk around and feels she will do fine until her lumbar injection on 11/8/2017.    Reviewed with Dr. Mairno Barajas who gave verbal Alvester Son

## 2017-11-07 ENCOUNTER — TELEPHONE (OUTPATIENT)
Dept: NEUROLOGY | Facility: CLINIC | Age: 57
End: 2017-11-07

## 2017-11-08 ENCOUNTER — OFFICE VISIT (OUTPATIENT)
Dept: SURGERY | Facility: CLINIC | Age: 57
End: 2017-11-08

## 2017-11-08 DIAGNOSIS — M48.062 LUMBAR STENOSIS WITH NEUROGENIC CLAUDICATION: Primary | ICD-10-CM

## 2017-11-08 PROCEDURE — 62323 NJX INTERLAMINAR LMBR/SAC: CPT | Performed by: PHYSICAL MEDICINE & REHABILITATION

## 2017-11-08 NOTE — PROCEDURES
Procedure Note - LUMBAR EPIDURAL STEROID INJECTION, L5-S1 MIDLINE     Informed Consent Obtained by:  Nolene Saint DO  Procedure performed by: Nolene Saint DO    Procedure: LUMBAR EPIDURAL STEROID INJECTION UNDER FLUOROSCOPY OF L5-S1 MIDLINE    Pre-operat condition    Discharge Instructions:    1) Patient was given post-procedure discharge instructions. 2) Patient was ambulating and discharged in stable condition in the care of family member/friend.   3) Patient may continue taking his/her medication as pre

## 2017-11-09 NOTE — TELEPHONE ENCOUNTER
Pt calling checking status of refill after pharmacy received no response. Current Outpatient Prescriptions:  Diclofenac Sodium 75 MG Oral Tab EC Take 1 tablet (75 mg total) by mouth 2 (two) times daily.  Disp: 60 tablet Rfl: 3`   methotrexate 2.5 MG O

## 2017-11-10 RX ORDER — DICLOFENAC SODIUM 75 MG/1
75 TABLET, DELAYED RELEASE ORAL 2 TIMES DAILY
Qty: 60 TABLET | Refills: 3 | Status: SHIPPED | OUTPATIENT
Start: 2017-11-10 | End: 2018-03-14

## 2017-11-10 NOTE — TELEPHONE ENCOUNTER
LOV: 10/16/17  Future Appointments  Date Time Provider Joel Mann   11/21/2017 4:00 PM DO [facility]     Labs:   Component      Latest Ref Rng & Units 9/16/2017   WBC      4.0 - 11.0 K/UL 6.5   RBC      3.70 - 5.40 M/UL 4.

## 2017-11-10 NOTE — TELEPHONE ENCOUNTER
LOV: 10-16-17  Last Refilled: #MTX#45, 4rfs 3/31/17 /  Diclofenac#60, 3rfs 7/12/17  Labs:AST 22  ALT 7  Future Appointments  Date Time Provider Joel Mann   11/21/2017 4:00 PM Inder Arciniega [facility]       Please advise.

## 2017-11-10 NOTE — TELEPHONE ENCOUNTER
pharmacy is calling to follow up on refill requests on       Current Outpatient Prescriptions:  Diclofenac Sodium 1 % Transdermal Gel Apply topically 4 (four) times daily.  Disp:  Rfl:      Methotrexate 2.5 MG

## 2017-11-13 ENCOUNTER — NURSE ONLY (OUTPATIENT)
Dept: ALLERGY | Facility: CLINIC | Age: 57
End: 2017-11-13

## 2017-11-13 DIAGNOSIS — J30.89 ENVIRONMENTAL AND SEASONAL ALLERGIES: ICD-10-CM

## 2017-11-13 PROCEDURE — 95117 IMMUNOTHERAPY INJECTIONS: CPT | Performed by: ALLERGY & IMMUNOLOGY

## 2017-11-21 ENCOUNTER — OFFICE VISIT (OUTPATIENT)
Dept: NEUROLOGY | Facility: CLINIC | Age: 57
End: 2017-11-21

## 2017-11-21 ENCOUNTER — TELEPHONE (OUTPATIENT)
Dept: NEUROLOGY | Facility: CLINIC | Age: 57
End: 2017-11-21

## 2017-11-21 VITALS
SYSTOLIC BLOOD PRESSURE: 110 MMHG | BODY MASS INDEX: 43.4 KG/M2 | WEIGHT: 293 LBS | HEART RATE: 95 BPM | HEIGHT: 69 IN | DIASTOLIC BLOOD PRESSURE: 60 MMHG

## 2017-11-21 DIAGNOSIS — M48.061 SPINAL STENOSIS OF LUMBAR REGION WITHOUT NEUROGENIC CLAUDICATION: ICD-10-CM

## 2017-11-21 DIAGNOSIS — M70.62 GREATER TROCHANTERIC BURSITIS OF LEFT HIP: Primary | ICD-10-CM

## 2017-11-21 PROCEDURE — 20610 DRAIN/INJ JOINT/BURSA W/O US: CPT | Performed by: PHYSICAL MEDICINE & REHABILITATION

## 2017-11-21 PROCEDURE — 99213 OFFICE O/P EST LOW 20 MIN: CPT | Performed by: PHYSICAL MEDICINE & REHABILITATION

## 2017-11-21 RX ORDER — TRIAMCINOLONE ACETONIDE 40 MG/ML
40 INJECTION, SUSPENSION INTRA-ARTICULAR; INTRAMUSCULAR ONCE
Status: COMPLETED | OUTPATIENT
Start: 2017-11-21 | End: 2017-11-21

## 2017-11-21 RX ORDER — LIDOCAINE HYDROCHLORIDE 10 MG/ML
3 INJECTION, SOLUTION INFILTRATION; PERINEURAL ONCE
Status: COMPLETED | OUTPATIENT
Start: 2017-11-21 | End: 2017-11-21

## 2017-11-21 NOTE — PATIENT INSTRUCTIONS
As of October 6th 2014, the Drug Enforcement Agency [facility]) is reclassifying all hydrocodone combination medications from Schedule III to Schedule II. This includes medications such as Norco, Vicodin, Lortab, Zohydro, and Vicoprofen.     What this means for y

## 2017-11-21 NOTE — PROGRESS NOTES
No referring provider defined for this encounter.   Zainab Machado MD    Chief Complaint: Injection (EST Lumbar injection procedure 11/08/2017, c/o of Lt Hip pain, 85/90% better with the injection of the Lumbar)    HPI:  Antony Mcgee is a 65year old fe Rheumatoid arthritis, adult ([facility]     Parkinson disease ([facility]     Post-surgical hypothyroidism     L3-4, L4-5 central stenosis    Impression/plan:  1) lumbar central stenosis, L3-4, L4-5  2) left trochanteric bursitis    Recommend left trochanteric burs

## 2017-11-28 ENCOUNTER — MED REC SCAN ONLY (OUTPATIENT)
Dept: NEUROLOGY | Facility: CLINIC | Age: 57
End: 2017-11-28

## 2017-12-11 ENCOUNTER — OFFICE VISIT (OUTPATIENT)
Dept: NEUROLOGY | Facility: CLINIC | Age: 57
End: 2017-12-11

## 2017-12-11 ENCOUNTER — MED REC SCAN ONLY (OUTPATIENT)
Dept: NEUROLOGY | Facility: CLINIC | Age: 57
End: 2017-12-11

## 2017-12-11 ENCOUNTER — PATIENT MESSAGE (OUTPATIENT)
Dept: RHEUMATOLOGY | Facility: CLINIC | Age: 57
End: 2017-12-11

## 2017-12-11 VITALS
HEIGHT: 69 IN | HEART RATE: 16 BPM | SYSTOLIC BLOOD PRESSURE: 134 MMHG | DIASTOLIC BLOOD PRESSURE: 72 MMHG | WEIGHT: 293 LBS | RESPIRATION RATE: 16 BRPM | BODY MASS INDEX: 43.4 KG/M2

## 2017-12-11 DIAGNOSIS — M70.62 GREATER TROCHANTERIC BURSITIS, LEFT: Primary | ICD-10-CM

## 2017-12-11 DIAGNOSIS — Z51.81 ENCOUNTER FOR THERAPEUTIC DRUG MONITORING: ICD-10-CM

## 2017-12-11 DIAGNOSIS — M48.062 LUMBAR STENOSIS WITH NEUROGENIC CLAUDICATION: ICD-10-CM

## 2017-12-11 DIAGNOSIS — M06.9 RHEUMATOID ARTHRITIS, INVOLVING UNSPECIFIED SITE, UNSPECIFIED RHEUMATOID FACTOR PRESENCE: Primary | ICD-10-CM

## 2017-12-11 PROCEDURE — 99213 OFFICE O/P EST LOW 20 MIN: CPT | Performed by: PHYSICAL MEDICINE & REHABILITATION

## 2017-12-11 RX ORDER — PREDNISONE 10 MG/1
TABLET ORAL
Qty: 30 TABLET | Refills: 0 | Status: SHIPPED | OUTPATIENT
Start: 2017-12-11 | End: 2018-01-23 | Stop reason: ALTCHOICE

## 2017-12-11 NOTE — PROGRESS NOTES
No referring provider defined for this encounter. Taiwo Espinal MD    Chief Complaint: left leg pain    HPI:  Linda Rock is a 65year old female with h/o RA who presents with complaints of Leg Pain (LOV: 11/21/17.  Patient had left trochanteric Burs and states that this usually affects her hands. Denies bowel and bladder incontinence, acute balance change, fever, chills.     PHYSICAL EXAMINATION  /72 (BP Location: Right arm, Patient Position: Sitting, Cuff Size: adult)   Pulse (!) 16   Resp 16 after the completion of physical therapy. Does not need refills of tramadol at this time.     Leanord Search, DO  11:32 AM  12/11/2017

## 2017-12-11 NOTE — PATIENT INSTRUCTIONS
1) Prednisone taper prescribed. Start with 4 tabs daily for 3 days, then 3 tabs daily for 3 days, then 2 tabs daily for 3 days, then 1 tab daily for 3 days. 2) Come see when you're done with physical therapy.     As of October 6th 2014, the Drug Enforcem picking up prescription, office must be given name of individual in advance and they must present an ID as well. · The name of the person picking up your prescription must be documented in your chart.

## 2017-12-11 NOTE — TELEPHONE ENCOUNTER
From: Aure Garcia  To: Sandro Gandhi MD  Sent: 12/11/2017 1:15 PM CST  Subject: Non-Urgent Medical Question    Can you tell when I was suppose to schedule a follow-up visit? Usually it is 3 months. but I want to make sure.  Also, I went to do my

## 2017-12-14 ENCOUNTER — TELEPHONE (OUTPATIENT)
Dept: RHEUMATOLOGY | Facility: CLINIC | Age: 57
End: 2017-12-14

## 2017-12-14 RX ORDER — CALCITRIOL 0.25 UG/1
0.25 CAPSULE, LIQUID FILLED ORAL DAILY
Qty: 90 CAPSULE | Refills: 1 | Status: SHIPPED | OUTPATIENT
Start: 2017-12-14 | End: 2018-06-03

## 2017-12-14 NOTE — TELEPHONE ENCOUNTER
please advise as does not meet RN protocol for refill criteria  calcitrol LR 3/9/17 390 w/1RF    LOV 9/23/17        Refill Protocol Appointment Criteria  · Appointment scheduled in the past 12 months or in the next 3 months  Recent Outpatient Visits

## 2017-12-14 NOTE — TELEPHONE ENCOUNTER
Pt called in requesting a refill for the following medication:      Current Outpatient Prescriptions:     •  calciTRIOL 0.25 MCG Oral Cap, Take 1 capsule (0.25 mcg total) by mouth daily. , Disp: 90 capsule, Rfl: 1

## 2017-12-14 NOTE — TELEPHONE ENCOUNTER
Pt called in requesting a refill for the following medications:      Current Outpatient Prescriptions:     •  ergocalciferol (DRISDOL/VITAMIN D2) 35949 UNITS Oral Cap, 50,000 Units every 30 (thirty) days. , Disp: , Rfl:     leflunomide 20 MG Oral Tab 90 tab

## 2017-12-15 NOTE — TELEPHONE ENCOUNTER
please advise as does not meet RN protocol for refill criteria - historical written Vit D      Ref Range & Units 11/7/16  4:46 PM   Vitamin D, 25OH, Total ng/mL 24.4      LOV 9/23/17    Refill Protocol Appointment Criteria  · Appointment scheduled in the

## 2017-12-21 ENCOUNTER — PATIENT MESSAGE (OUTPATIENT)
Dept: RHEUMATOLOGY | Facility: CLINIC | Age: 57
End: 2017-12-21

## 2017-12-21 DIAGNOSIS — E55.9 VITAMIN D DEFICIENCY: Primary | ICD-10-CM

## 2017-12-22 NOTE — TELEPHONE ENCOUNTER
LOV 10/16/17 -F/U 2/5/18    Pt requesting Ergocaliferol 46283 units Q monthly refill    Per LOV Notes \"Vit d def - check level again\"    Vit D lab pended for provider      Component      Latest Ref Rng & Units 11/7/2016   Vitamin D, 25OH, Total      ng/m

## 2017-12-22 NOTE — TELEPHONE ENCOUNTER
From: Bronson Begin  To: Farida Tierney MD  Sent: 12/21/2017 8:33 PM CST  Subject: Prescription Question    I have been trying to get a refill on my Vitamin D for 2 weeks.  I even called at the beginning of this week to verbally check & they said th

## 2017-12-23 RX ORDER — ERGOCALCIFEROL 1.25 MG/1
50000 CAPSULE ORAL
Qty: 3 CAPSULE | Refills: 0 | Status: SHIPPED | OUTPATIENT
Start: 2017-12-23 | End: 2018-03-23

## 2018-01-06 ENCOUNTER — TELEPHONE (OUTPATIENT)
Dept: ALLERGY | Facility: CLINIC | Age: 58
End: 2018-01-06

## 2018-01-06 NOTE — TELEPHONE ENCOUNTER
Pt called in stating that she missed her allergy shots for the month of December and would like to know what would she need to do to get her next set of allergy shots.   Please advise

## 2018-01-08 RX ORDER — LEVOTHYROXINE SODIUM 0.2 MG/1
200 TABLET ORAL
Qty: 90 TABLET | Refills: 0 | OUTPATIENT
Start: 2018-01-08

## 2018-01-08 NOTE — TELEPHONE ENCOUNTER
Spoke with pt and explained it has been 56 days at this point since last injection. Explained she will need to build up and that Dr will set the dosage when he is in the office tomorrow.  Pt states she has been in good health but due to holiday and travelin

## 2018-01-08 NOTE — TELEPHONE ENCOUNTER
Pt called to find out why med refill was denied. Informed pt med was refilled for one year on 9/23/17. Contacted pharmacy, prescription refill on file, notified pt.

## 2018-01-09 NOTE — TELEPHONE ENCOUNTER
Pt called back and spoke to her about Dr's plan she states she will be in on Saturday at the [facility] pt I would call her back if dosage changes by waiting till Saturday. Pt stated understanding.  Spoke with  and he stated ok to stay at 0.35ml in yudelka

## 2018-01-09 NOTE — TELEPHONE ENCOUNTER
Pt driving will call back told pt to ask for nurse in allergy. Will tell her Dr set dose for next injection. Dr set dose 4 doses down from her top dose. Dosage will be 0.35ml in the blue level.  She will need to build 4 times then do the time spread accordi

## 2018-01-13 ENCOUNTER — LAB ENCOUNTER (OUTPATIENT)
Dept: LAB | Age: 58
End: 2018-01-13
Attending: INTERNAL MEDICINE
Payer: COMMERCIAL

## 2018-01-13 ENCOUNTER — NURSE ONLY (OUTPATIENT)
Dept: ALLERGY | Facility: CLINIC | Age: 58
End: 2018-01-13

## 2018-01-13 DIAGNOSIS — E55.9 VITAMIN D DEFICIENCY: ICD-10-CM

## 2018-01-13 DIAGNOSIS — Z51.81 ENCOUNTER FOR THERAPEUTIC DRUG MONITORING: ICD-10-CM

## 2018-01-13 DIAGNOSIS — J30.89 ENVIRONMENTAL AND SEASONAL ALLERGIES: ICD-10-CM

## 2018-01-13 DIAGNOSIS — M06.9 RHEUMATOID ARTHRITIS, INVOLVING UNSPECIFIED SITE, UNSPECIFIED RHEUMATOID FACTOR PRESENCE: ICD-10-CM

## 2018-01-13 LAB
ALBUMIN SERPL BCP-MCNC: 3.4 G/DL (ref 3.5–4.8)
ALT SERPL-CCNC: 7 U/L (ref 14–54)
AST SERPL-CCNC: 27 U/L (ref 15–41)
BASOPHILS # BLD: 0.1 K/UL (ref 0–0.2)
BASOPHILS NFR BLD: 1 %
CREAT SERPL-MCNC: 0.91 MG/DL (ref 0.5–1.5)
CRP SERPL-MCNC: 3.2 MG/DL (ref 0–0.9)
EOSINOPHIL # BLD: 0.3 K/UL (ref 0–0.7)
EOSINOPHIL NFR BLD: 7 %
ERYTHROCYTE [DISTWIDTH] IN BLOOD BY AUTOMATED COUNT: 17.9 % (ref 11–15)
ERYTHROCYTE [SEDIMENTATION RATE] IN BLOOD: 25 MM/HR (ref 0–30)
HCT VFR BLD AUTO: 39.8 % (ref 35–48)
HGB BLD-MCNC: 13 G/DL (ref 12–16)
LYMPHOCYTES # BLD: 0.9 K/UL (ref 1–4)
LYMPHOCYTES NFR BLD: 17 %
MCH RBC QN AUTO: 30.3 PG (ref 27–32)
MCHC RBC AUTO-ENTMCNC: 32.7 G/DL (ref 32–37)
MCV RBC AUTO: 92.7 FL (ref 80–100)
MONOCYTES # BLD: 0.5 K/UL (ref 0–1)
MONOCYTES NFR BLD: 10 %
NEUTROPHILS # BLD AUTO: 3.5 K/UL (ref 1.8–7.7)
NEUTROPHILS NFR BLD: 65 %
PLATELET # BLD AUTO: 183 K/UL (ref 140–400)
PMV BLD AUTO: 9.7 FL (ref 7.4–10.3)
RBC # BLD AUTO: 4.29 M/UL (ref 3.7–5.4)
WBC # BLD AUTO: 5.3 K/UL (ref 4–11)

## 2018-01-13 PROCEDURE — 95117 IMMUNOTHERAPY INJECTIONS: CPT | Performed by: ALLERGY & IMMUNOLOGY

## 2018-01-13 PROCEDURE — 36415 COLL VENOUS BLD VENIPUNCTURE: CPT

## 2018-01-13 PROCEDURE — 82306 VITAMIN D 25 HYDROXY: CPT

## 2018-01-13 PROCEDURE — 86140 C-REACTIVE PROTEIN: CPT

## 2018-01-13 PROCEDURE — 85025 COMPLETE CBC W/AUTO DIFF WBC: CPT

## 2018-01-13 PROCEDURE — 84450 TRANSFERASE (AST) (SGOT): CPT

## 2018-01-13 PROCEDURE — 82565 ASSAY OF CREATININE: CPT

## 2018-01-13 PROCEDURE — 84460 ALANINE AMINO (ALT) (SGPT): CPT

## 2018-01-13 PROCEDURE — 85652 RBC SED RATE AUTOMATED: CPT

## 2018-01-13 PROCEDURE — 82040 ASSAY OF SERUM ALBUMIN: CPT

## 2018-01-15 LAB — 25(OH)D3 SERPL-MCNC: 32.6 NG/ML

## 2018-01-22 ENCOUNTER — NURSE ONLY (OUTPATIENT)
Dept: ALLERGY | Facility: CLINIC | Age: 58
End: 2018-01-22

## 2018-01-22 DIAGNOSIS — J30.89 ENVIRONMENTAL AND SEASONAL ALLERGIES: ICD-10-CM

## 2018-01-22 PROCEDURE — 95117 IMMUNOTHERAPY INJECTIONS: CPT | Performed by: ALLERGY & IMMUNOLOGY

## 2018-01-22 RX ORDER — FOLIC ACID 1 MG/1
1 TABLET ORAL
Qty: 90 TABLET | Refills: 3 | Status: SHIPPED | OUTPATIENT
Start: 2018-01-22 | End: 2018-12-13

## 2018-01-22 NOTE — TELEPHONE ENCOUNTER
LOV:10-16  Last Filled:1-9-17, #90 with 3 refills  Labs:   Future Appointments  Date Time Provider Joel Mann   1/23/2018 5:00 PM RAFAELA Hewitt [address]   1/30/2018 11:00 AM DO BLANK [facility]   2/5/2018 6:

## 2018-01-23 ENCOUNTER — OFFICE VISIT (OUTPATIENT)
Dept: SURGERY | Facility: CLINIC | Age: 58
End: 2018-01-23

## 2018-01-23 VITALS
HEIGHT: 69 IN | WEIGHT: 293 LBS | SYSTOLIC BLOOD PRESSURE: 110 MMHG | BODY MASS INDEX: 43.4 KG/M2 | HEART RATE: 84 BPM | RESPIRATION RATE: 18 BRPM | DIASTOLIC BLOOD PRESSURE: 72 MMHG

## 2018-01-23 DIAGNOSIS — M06.9 RHEUMATOID ARTHRITIS, INVOLVING UNSPECIFIED SITE, UNSPECIFIED RHEUMATOID FACTOR PRESENCE: ICD-10-CM

## 2018-01-23 DIAGNOSIS — G20 PARKINSON DISEASE (HCC): ICD-10-CM

## 2018-01-23 DIAGNOSIS — G89.29 CHRONIC BACK PAIN, UNSPECIFIED BACK LOCATION, UNSPECIFIED BACK PAIN LATERALITY: ICD-10-CM

## 2018-01-23 DIAGNOSIS — M54.9 CHRONIC BACK PAIN, UNSPECIFIED BACK LOCATION, UNSPECIFIED BACK PAIN LATERALITY: ICD-10-CM

## 2018-01-23 DIAGNOSIS — J45.909 ASTHMA, UNSPECIFIED ASTHMA SEVERITY, UNSPECIFIED WHETHER COMPLICATED, UNSPECIFIED WHETHER PERSISTENT: ICD-10-CM

## 2018-01-23 DIAGNOSIS — E78.5 HYPERLIPIDEMIA, UNSPECIFIED HYPERLIPIDEMIA TYPE: Primary | ICD-10-CM

## 2018-01-23 DIAGNOSIS — E66.01 MORBID OBESITY WITH BMI OF 45.0-49.9, ADULT (HCC): ICD-10-CM

## 2018-01-23 PROCEDURE — 99203 OFFICE O/P NEW LOW 30 MIN: CPT | Performed by: NURSE PRACTITIONER

## 2018-01-23 RX ORDER — LEFLUNOMIDE 20 MG/1
TABLET ORAL
COMMUNITY
Start: 2017-11-05 | End: 2018-02-27 | Stop reason: ALTCHOICE

## 2018-01-23 NOTE — PROGRESS NOTES
The [facility]ness [facility] Weight Loss Consultation Note       Date of Consult:  2018    Patient:  Tayla Dye  :      1960  MRN:      [id number]    Referring Provider: Dr. Diane jauregui.  provider found    Chief Complaint:  Patient presents with:  Weight Greater trochanteric bursitis, left 11/21/2017   • High cholesterol    • Infertility, female    • Lipid screening 8/31/2013   • Other and unspecified hyperlipidemia    • [facility] disease ([facility]    • Tibia/fibula fracture     OPEN REDUCTION INTERNAL FIXATIO Activated INHALE 1 PUFF INTO THE LUNGS EVERY 12 (TWELVE) HOURS. Disp: 3 each Rfl: 1   PRAVASTATIN SODIUM 40 MG Oral Tab TAKE 1 TABLET (40 MG TOTAL) BY MOUTH ONCE DAILY.  Disp: 90 tablet Rfl: 3   PROAIR  (90 Base) MCG/ACT Inhalation Aero Soln INHALE 2 COLONOSCOPY  No date: D & C      Comment: for extended period  No date: FOOT SURGERY Left      Comment: Had right foot done recently.   No date: KNEE ARTHROSCOPY Right  1990: LAPAROSCOPY,PELVIC,BIOPSY      Comment: for infertility  12/3/2015: OTHER SURGICAL Negative. Respiratory: Negative. Negative for cough, shortness of breath and wheezing. Cardiovascular: Negative. Negative for chest pain, palpitations and leg swelling. Gastrointestinal: Negative.   Negative for abdominal distention, abdominal joe involving unspecified site, unspecified rheumatoid factor presence (HCC)  Chronic back pain, unspecified back location, unspecified back pain laterality  Morbid obesity with BMI of 45.0-49.9, adult ([facility]    Charles Rossi is unsure about having bariatric s Patient    Follow up in 4 weeks    RAFAELA Van  1/23/2018

## 2018-01-29 ENCOUNTER — NURSE ONLY (OUTPATIENT)
Dept: ALLERGY | Facility: CLINIC | Age: 58
End: 2018-01-29

## 2018-01-29 ENCOUNTER — TELEPHONE (OUTPATIENT)
Dept: ALLERGY | Facility: CLINIC | Age: 58
End: 2018-01-29

## 2018-01-29 DIAGNOSIS — J30.89 ENVIRONMENTAL AND SEASONAL ALLERGIES: ICD-10-CM

## 2018-01-29 PROCEDURE — 95117 IMMUNOTHERAPY INJECTIONS: CPT | Performed by: ALLERGY & IMMUNOLOGY

## 2018-01-29 PROCEDURE — 95165 ANTIGEN THERAPY SERVICES: CPT | Performed by: ALLERGY & IMMUNOLOGY

## 2018-01-29 NOTE — TELEPHONE ENCOUNTER
Pt in for AIT program and requested a script for a new aerochamber for her inhalers to be sent to her pharmacy on record.

## 2018-01-30 ENCOUNTER — OFFICE VISIT (OUTPATIENT)
Dept: NEUROLOGY | Facility: CLINIC | Age: 58
End: 2018-01-30

## 2018-01-30 ENCOUNTER — TELEPHONE (OUTPATIENT)
Dept: NEUROLOGY | Facility: CLINIC | Age: 58
End: 2018-01-30

## 2018-01-30 VITALS
BODY MASS INDEX: 43.4 KG/M2 | WEIGHT: 293 LBS | RESPIRATION RATE: 16 BRPM | DIASTOLIC BLOOD PRESSURE: 64 MMHG | SYSTOLIC BLOOD PRESSURE: 138 MMHG | HEART RATE: 92 BPM | HEIGHT: 69 IN

## 2018-01-30 DIAGNOSIS — M54.41 CHRONIC BILATERAL LOW BACK PAIN WITH BILATERAL SCIATICA: ICD-10-CM

## 2018-01-30 DIAGNOSIS — G89.29 CHRONIC BILATERAL LOW BACK PAIN WITH BILATERAL SCIATICA: ICD-10-CM

## 2018-01-30 DIAGNOSIS — M54.42 CHRONIC BILATERAL LOW BACK PAIN WITH BILATERAL SCIATICA: ICD-10-CM

## 2018-01-30 DIAGNOSIS — M48.062 LUMBAR STENOSIS WITH NEUROGENIC CLAUDICATION: Primary | ICD-10-CM

## 2018-01-30 PROCEDURE — 99213 OFFICE O/P EST LOW 20 MIN: CPT | Performed by: PHYSICAL MEDICINE & REHABILITATION

## 2018-01-30 RX ORDER — HYDROCODONE BITARTRATE AND ACETAMINOPHEN 10; 325 MG/1; MG/1
1 TABLET ORAL EVERY 6 HOURS PRN
Qty: 40 TABLET | Refills: 0 | Status: SHIPPED | OUTPATIENT
Start: 2018-01-30 | End: 2018-03-21

## 2018-01-30 RX ORDER — INHALER, ASSIST DEVICES
SPACER (EA) MISCELLANEOUS
Qty: 1 EACH | Refills: 1 | Status: SHIPPED | OUTPATIENT
Start: 2018-01-30 | End: 2019-06-03

## 2018-01-30 NOTE — TELEPHONE ENCOUNTER
Called [facility] BS for authorization of approval of L5-S1 ILESI cpt code 53145. Talked to Roland George. who states no authorization is required. Reference # [id number]. To be done at [facility]. Will  inform Nursing.

## 2018-01-30 NOTE — PROGRESS NOTES
HPI:    Patient ID: Jerrell Gibson is a 65year old female. This 60-year-old female with a past medical history of Parkinson's disease and rheumatoid arthritis presents for follow-up. I performed the following procedures:    1.   L5-S1 interlaminar lum HYDROcodone-acetaminophen  MG Oral Tab Take 1 tablet by mouth every 6 (six) hours as needed for Pain. Disp: 40 tablet Rfl: 0   leflunomide 20 MG Oral Tab  Disp:  Rfl:    folic acid 1 MG Oral Tab Take 1 tablet (1 mg total) by mouth once daily.  Disp: Albuterol Sulfate (2.5 MG/3ML) 0.083% Inhalation Nebu Soln Take 3 mL (2.5 mg total) by nebulization every 4 (four) hours as needed for Wheezing.  Disp: 3 Box Rfl: 0   Hydrocod Polst-CPM Polst ER 10-8 MG/5ML Oral Liquid CR Take 5 mL by mouth 2 (two) times she is off the diclofenac. Follow-up for injection.

## 2018-01-30 NOTE — PATIENT INSTRUCTIONS
As of October 6th 2014, the Drug Enforcement Agency [facility]) is reclassifying all hydrocodone combination medications from Schedule III to Schedule II. This includes medications such as Norco, Vicodin, Lortab, Zohydro, and Vicoprofen.     What this means for y

## 2018-01-31 RX ORDER — LEFLUNOMIDE 20 MG/1
20 TABLET ORAL DAILY
Qty: 90 TABLET | Refills: 1 | Status: SHIPPED | OUTPATIENT
Start: 2018-01-31 | End: 2018-03-21

## 2018-01-31 NOTE — TELEPHONE ENCOUNTER
LOV: 10/16/17  Last Refilled:#Never filled by you  Labs:AST 27  ALT 7  1/13/18  Future Appointments  Date Time Provider Joel Mann   2/5/2018 6:40 PM Luciana Betancourt MD 2014 [facility]   2/7/2018 10:40 AM DO Karin Krause [address]

## 2018-02-02 ENCOUNTER — TELEPHONE (OUTPATIENT)
Dept: NEUROLOGY | Facility: CLINIC | Age: 58
End: 2018-02-02

## 2018-02-02 NOTE — TELEPHONE ENCOUNTER
Spoke to patient and notified her of Dr. Farrell Landing message below. She was understanding and thankful for the call.

## 2018-02-05 ENCOUNTER — OFFICE VISIT (OUTPATIENT)
Dept: RHEUMATOLOGY | Facility: CLINIC | Age: 58
End: 2018-02-05

## 2018-02-05 ENCOUNTER — NURSE ONLY (OUTPATIENT)
Dept: ALLERGY | Facility: CLINIC | Age: 58
End: 2018-02-05

## 2018-02-05 VITALS
HEIGHT: 69 IN | HEART RATE: 102 BPM | WEIGHT: 293 LBS | DIASTOLIC BLOOD PRESSURE: 49 MMHG | BODY MASS INDEX: 43.4 KG/M2 | SYSTOLIC BLOOD PRESSURE: 112 MMHG

## 2018-02-05 DIAGNOSIS — J30.89 ENVIRONMENTAL AND SEASONAL ALLERGIES: ICD-10-CM

## 2018-02-05 DIAGNOSIS — G20 PARKINSON'S DISEASE (HCC): ICD-10-CM

## 2018-02-05 DIAGNOSIS — M06.9 RHEUMATOID ARTHRITIS, INVOLVING UNSPECIFIED SITE, UNSPECIFIED RHEUMATOID FACTOR PRESENCE: Primary | ICD-10-CM

## 2018-02-05 DIAGNOSIS — M54.50 LOW BACK PAIN RADIATING TO LEFT LOWER EXTREMITY: ICD-10-CM

## 2018-02-05 DIAGNOSIS — Z51.81 ENCOUNTER FOR THERAPEUTIC DRUG MONITORING: ICD-10-CM

## 2018-02-05 DIAGNOSIS — M79.605 LOW BACK PAIN RADIATING TO LEFT LOWER EXTREMITY: ICD-10-CM

## 2018-02-05 PROCEDURE — 99214 OFFICE O/P EST MOD 30 MIN: CPT | Performed by: INTERNAL MEDICINE

## 2018-02-05 PROCEDURE — 99212 OFFICE O/P EST SF 10 MIN: CPT | Performed by: INTERNAL MEDICINE

## 2018-02-05 PROCEDURE — 95117 IMMUNOTHERAPY INJECTIONS: CPT | Performed by: ALLERGY & IMMUNOLOGY

## 2018-02-06 ENCOUNTER — TELEPHONE (OUTPATIENT)
Dept: RHEUMATOLOGY | Facility: CLINIC | Age: 58
End: 2018-02-06

## 2018-02-06 NOTE — PROGRESS NOTES
Beryle Chain is a 65year old female who presents for Patient presents with:  Rheumatoid Arthritis  . HPI:     She is a pleasant 65year old who has elevated RF >300 and foot pain - dx with RA -  here on 2/5/2018.  I fer seen her in 10/2017 and 3/7 a car and bed it wont' move. It's bothering her for 3-4 weeks. She can still get up the stairs. She feels it's more dragging. No numbness and tingling. She feels her joints are better except her feet for several weeks.    Her elbows and fingers are be needed. Her fingers are better. No shoulders are ok. Her knees still hurts. 5/22/2017  Her left t shoulder flared - she had bad tramadol. She felt no position helped. She was tearful from the pain . She went nto the ER on 4/27/2017.   She was in bad p injecitons only helped hte week or tow. She felt the PT and prednisone doesn't do much. She's working at home right now. She's taking norco 10 #40 with dr. Amina Parekh. Her parkinson's is bad today b/c of stress.  She normally doesn't think she can use Powder, Breath Activated INHALE 1 PUFF INTO THE LUNGS EVERY 12 (TWELVE) HOURS. Disp: 3 each Rfl: 1   PRAVASTATIN SODIUM 40 MG Oral Tab TAKE 1 TABLET (40 MG TOTAL) BY MOUTH ONCE DAILY.  Disp: 90 tablet Rfl: 3   PROAIR  (90 Base) MCG/ACT Inhalation Aer screening 8/31/2013   • Other and unspecified hyperlipidemia    • Parkinson disease ([facility] [address]    • Tibia/fibula fracture     OPEN REDUCTION INTERNAL FIXATION   • Urinary incontinence 2/12/95    after childbirth   • Viral conjunctivitis 2008      Past Surgical Ref Rng & Units 1/13/2018   WBC      4.0 - 11.0 K/UL 5.3   RBC      3.70 - 5.40 M/UL 4.29   Hemoglobin      12.0 - 16.0 g/dL 13.0   Hematocrit      35.0 - 48.0 % 39.8   MCV      80.0 - 100.0 fL 92.7   MCH      27.0 - 32.0 pg 30.3   MCHC      32.0 - 37.0 g/ metatarsals likely  representing a change related to positioning     8/18/2014 - MRI right foot  There is a chronic healed fracture deformity of the proximal shaft of the  fifth metatarsal now present which has resulted in broadening of the  proximal fifth PLAN:   Buffy Abdul is a 65year old female who presents for Patient presents with:  Rheumatoid Arthritis    1.  Positive AMINAH 1:80, , polyarthralgia with rash on feet - now positive ccp abs - very specific for seropositive Rheumatoid arthriits - l left hihg.   - physical thearpy - helped -   - f/u with jo neurologist at [facility] in Jan.   - no hx of CVA and Stroke   5. Vit d def - check level agai -   6.  Lower back pain classic for sspinal stenosis - f/u physiatry - braod based disc bulgd in L3-4

## 2018-02-06 NOTE — PATIENT INSTRUCTIONS
1. Cont. methotrexate  8 tablets a week and folic acid 1mg a day    2. Cont. leflunomide 20mg a day  - plan  To stop this once we start xeljanz -   3. Plan to start xlejanz 11mg a day   4. Return to clinic in 6 weeks. 5. Check labs in 6 weeks.    Kiki · abatacept  · adalimumab  · anakinra  · azathioprine  · certolizumab  · cyclosporine  · etanercept  · golimumab  · infliximab  · live vaccines  · rituximab  · tacrolimus  · tocilizumab  This medicine may also interact with the following medications:  · fl NOTE:This sheet is a summary. It may not cover all possible information. If you have questions about this medicine, talk to your doctor, pharmacist, or health care provider.  Copyright© 2017 [employer]vier

## 2018-02-07 ENCOUNTER — OFFICE VISIT (OUTPATIENT)
Dept: SURGERY | Facility: CLINIC | Age: 58
End: 2018-02-07

## 2018-02-07 DIAGNOSIS — M54.41 CHRONIC BILATERAL LOW BACK PAIN WITH BILATERAL SCIATICA: ICD-10-CM

## 2018-02-07 DIAGNOSIS — G89.29 CHRONIC BILATERAL LOW BACK PAIN WITH BILATERAL SCIATICA: ICD-10-CM

## 2018-02-07 DIAGNOSIS — M48.062 LUMBAR STENOSIS WITH NEUROGENIC CLAUDICATION: ICD-10-CM

## 2018-02-07 DIAGNOSIS — M54.42 CHRONIC BILATERAL LOW BACK PAIN WITH BILATERAL SCIATICA: ICD-10-CM

## 2018-02-07 PROCEDURE — 62323 NJX INTERLAMINAR LMBR/SAC: CPT | Performed by: PHYSICAL MEDICINE & REHABILITATION

## 2018-02-07 NOTE — PROCEDURES
Procedure Note - LUMBAR EPIDURAL STEROID INJECTION, L5-S1 paramedian towards the right     Informed Consent Obtained by:  Eloisa Skinner DO  Procedure performed by: Eloisa Skinner DO     Procedure: LUMBAR EPIDURAL STEROID INJECTION UNDER FLUOROSCOPY OF L5-S1 in care of family/friend in stable condition     Discharge Instructions:    1) Patient was given post-procedure discharge instructions. 2) Patient was ambulating and discharged in stable condition in the care of family member/friend.   3) Patient may urmila

## 2018-02-08 ENCOUNTER — TELEPHONE (OUTPATIENT)
Dept: RHEUMATOLOGY | Facility: CLINIC | Age: 58
End: 2018-02-08

## 2018-02-08 DIAGNOSIS — M06.9 RHEUMATOID ARTHRITIS, INVOLVING UNSPECIFIED SITE, UNSPECIFIED RHEUMATOID FACTOR PRESENCE: Primary | ICD-10-CM

## 2018-02-08 DIAGNOSIS — Z51.81 THERAPEUTIC DRUG MONITORING: ICD-10-CM

## 2018-02-12 ENCOUNTER — NURSE ONLY (OUTPATIENT)
Dept: ALLERGY | Facility: CLINIC | Age: 58
End: 2018-02-12

## 2018-02-12 DIAGNOSIS — J30.89 ENVIRONMENTAL AND SEASONAL ALLERGIES: ICD-10-CM

## 2018-02-12 PROCEDURE — 95117 IMMUNOTHERAPY INJECTIONS: CPT | Performed by: ALLERGY & IMMUNOLOGY

## 2018-02-13 ENCOUNTER — TELEPHONE (OUTPATIENT)
Dept: RHEUMATOLOGY | Facility: CLINIC | Age: 58
End: 2018-02-13

## 2018-02-13 NOTE — TELEPHONE ENCOUNTER
Please see below. Pt rec'd sample of Xeljanz XR 11 mg one daily. Pt advised to start [facility] tomorrow and stop take leflunomide. Pt agreeable with plan and have follow up scheduled for 3/26. Summary:  1. Cont.  methotrexate  8 tablets a week and folic ac

## 2018-02-13 NOTE — TELEPHONE ENCOUNTER
Pt states that she got a trial script for medication below. Pt states that she needs to clarify instructions and follow up to see if there is another medication she needs to stop. Please advise.      Current Outpatient Prescriptions:   •  Tofacitinib Citrat

## 2018-02-16 ENCOUNTER — APPOINTMENT (OUTPATIENT)
Dept: LAB | Facility: HOSPITAL | Age: 58
End: 2018-02-16
Attending: INTERNAL MEDICINE
Payer: COMMERCIAL

## 2018-02-16 DIAGNOSIS — Z51.81 THERAPEUTIC DRUG MONITORING: ICD-10-CM

## 2018-02-16 DIAGNOSIS — M06.9 RHEUMATOID ARTHRITIS, INVOLVING UNSPECIFIED SITE, UNSPECIFIED RHEUMATOID FACTOR PRESENCE: ICD-10-CM

## 2018-02-16 PROCEDURE — 86480 TB TEST CELL IMMUN MEASURE: CPT

## 2018-02-16 PROCEDURE — 36415 COLL VENOUS BLD VENIPUNCTURE: CPT

## 2018-02-19 LAB
M TB IFN-G CD4+ BCKGRND COR BLD-ACNC: -0 IU/ML
M TB IFN-G CD4+ T-CELLS BLD-ACNC: 0.05 IU/ML
M TB TUBERC IFN-G BLD QL: NEGATIVE
M TB TUBERC IGNF/MITOGEN IGNF CONTROL: 8.48 IU/ML

## 2018-02-23 NOTE — TELEPHONE ENCOUNTER
Per pt received denial letter that state Preferred medications are Enbrel , Humira, Jackqueline Baldo. Pt states she can not take injectable drugs. Please advise.

## 2018-02-23 NOTE — TELEPHONE ENCOUNTER
Lets appeal b/c of her parkinson's she can't take the injectiables. The other option is to have her come in for infusions.

## 2018-02-26 ENCOUNTER — NURSE ONLY (OUTPATIENT)
Dept: ALLERGY | Facility: CLINIC | Age: 58
End: 2018-02-26

## 2018-02-26 DIAGNOSIS — J30.89 ENVIRONMENTAL AND SEASONAL ALLERGIES: ICD-10-CM

## 2018-02-26 PROCEDURE — 95117 IMMUNOTHERAPY INJECTIONS: CPT | Performed by: ALLERGY & IMMUNOLOGY

## 2018-02-26 NOTE — TELEPHONE ENCOUNTER
Denial letter rec'd and [employer] to work on appeal. David Horton will call office if anything else is needed from office.

## 2018-02-27 ENCOUNTER — TELEPHONE (OUTPATIENT)
Dept: SURGERY | Facility: CLINIC | Age: 58
End: 2018-02-27

## 2018-02-27 ENCOUNTER — OFFICE VISIT (OUTPATIENT)
Dept: SURGERY | Facility: CLINIC | Age: 58
End: 2018-02-27

## 2018-02-27 VITALS
HEART RATE: 72 BPM | SYSTOLIC BLOOD PRESSURE: 138 MMHG | RESPIRATION RATE: 16 BRPM | DIASTOLIC BLOOD PRESSURE: 88 MMHG | OXYGEN SATURATION: 96 % | WEIGHT: 293 LBS | BODY MASS INDEX: 43.4 KG/M2 | HEIGHT: 69 IN

## 2018-02-27 DIAGNOSIS — G89.29 CHRONIC BILATERAL LOW BACK PAIN WITH BILATERAL SCIATICA: ICD-10-CM

## 2018-02-27 DIAGNOSIS — E66.01 MORBID OBESITY WITH BMI OF 45.0-49.9, ADULT (HCC): ICD-10-CM

## 2018-02-27 DIAGNOSIS — E78.5 HYPERLIPIDEMIA, UNSPECIFIED HYPERLIPIDEMIA TYPE: Primary | ICD-10-CM

## 2018-02-27 DIAGNOSIS — M54.41 CHRONIC BILATERAL LOW BACK PAIN WITH BILATERAL SCIATICA: ICD-10-CM

## 2018-02-27 DIAGNOSIS — M06.9 RHEUMATOID ARTHRITIS, INVOLVING UNSPECIFIED SITE, UNSPECIFIED RHEUMATOID FACTOR PRESENCE: ICD-10-CM

## 2018-02-27 DIAGNOSIS — G20 PARKINSON DISEASE (HCC): ICD-10-CM

## 2018-02-27 DIAGNOSIS — M54.42 CHRONIC BILATERAL LOW BACK PAIN WITH BILATERAL SCIATICA: ICD-10-CM

## 2018-02-27 DIAGNOSIS — J45.909 ASTHMA, UNSPECIFIED ASTHMA SEVERITY, UNSPECIFIED WHETHER COMPLICATED, UNSPECIFIED WHETHER PERSISTENT: ICD-10-CM

## 2018-02-27 PROCEDURE — 99213 OFFICE O/P EST LOW 20 MIN: CPT | Performed by: NURSE PRACTITIONER

## 2018-02-27 NOTE — PROGRESS NOTES
[address] [place] [address] Jennifer Thomas, [address] [address] [address]  [address]  Dept: [phone]     Date:   2018    Patient:  Aure Garcia  :      1960  MRN:      [id number]    Chief Comp Rfl: 0   LEFLUNOMIDE 20 MG Oral Tab TAKE 1 TABLET (20 MG TOTAL) BY MOUTH DAILY.  Disp: 90 tablet Rfl: 1   Spacer/Aero-Holding Chambers (AEROCHAMBER PLUS) Does not apply Misc Please dispense 1 aerochamber Disp: 1 each Rfl: 1   HYDROcodone-acetaminophen 10-32 INHALE 2 PUFFS INTO THE LUNGS EVERY 6 (SIX) HOURS AS NEEDED FOR WHEEZING. Disp: 8.5 Inhaler Rfl: 0   Albuterol Sulfate (2.5 MG/3ML) 0.083% Inhalation Nebu Soln Take 3 mL (2.5 mg total) by nebulization every 4 (four) hours as needed for Wheezing.  Disp: 3 Zaid Total   Family History:    Family History   Problem Relation Age of Onset   • Heart Disorder Father 54     heart attack-cause of death.    • Heart Disorder Mother    • Stroke Mother    • Cancer Mother      AML-cause of death   • leukemia Gwenevere Sherlyn Mother    • Physical Exam:   Physical Exam   Constitutional: She is oriented to person, place, and time. She appears well-developed and well-nourished. No distress. HENT:   Head: Normocephalic and atraumatic. Neck: Neck supple.    Pulmonary/Chest: Effort norm       Asthma: Continue current meds; stable at this time     Parkinson's disease: continue current meds; stable at this time     Rheumatoid arthritis: continue current meds; stable at this time     Low Vitamin D level: Radha Ayala takes [facility]

## 2018-02-27 NOTE — TELEPHONE ENCOUNTER
2/26/18 @ 8:25am Spoke to Angie at [facility], [phone], SHAI#[phone]. She verified that patient has following benefits for Bariatric services: No weight management criteria. No MAKI/Blue Distinction required.  JOE (RADU#[id number]) DX E66.01 Pt has kala

## 2018-02-28 NOTE — TELEPHONE ENCOUNTER
Pt calling to follow up on messages below. Pt stating that she was supposed to have a call yesterday but did not receive anything. Please advise.

## 2018-03-05 RX ORDER — ERGOCALCIFEROL 1.25 MG/1
50000 CAPSULE ORAL
Qty: 3 CAPSULE | Refills: 0 | OUTPATIENT
Start: 2018-03-05 | End: 2018-06-03

## 2018-03-05 NOTE — TELEPHONE ENCOUNTER
LOV:2-5  Last Filled:12-23, #3 with 0 refill  Labs:1-13, Vitamin D 32.6  Future Appointments  Date Time Provider Joel Mann   3/21/2018 6:20 PM RAFAELA Sneed ECCFHOBGYAKASH [facility] OF THE [facility]   3/26/2018 6:40 PM Eddie Ventura MD [address] [place] [address] OF THE [facility]   3/29/2018 4:20 PM Mateus Ross MD [facility] OF [facility]   4/17/2018 5:30 PM Manfred Molina APN [facility] SPECIALTY [address]       Please Advise

## 2018-03-10 ENCOUNTER — NURSE ONLY (OUTPATIENT)
Dept: ALLERGY | Facility: CLINIC | Age: 58
End: 2018-03-10

## 2018-03-10 ENCOUNTER — LAB ENCOUNTER (OUTPATIENT)
Dept: LAB | Age: 58
End: 2018-03-10
Attending: INTERNAL MEDICINE
Payer: COMMERCIAL

## 2018-03-10 DIAGNOSIS — Z51.81 ENCOUNTER FOR THERAPEUTIC DRUG MONITORING: ICD-10-CM

## 2018-03-10 DIAGNOSIS — J30.1 SEASONAL ALLERGIC RHINITIS DUE TO POLLEN: ICD-10-CM

## 2018-03-10 DIAGNOSIS — M06.9 RHEUMATOID ARTHRITIS, INVOLVING UNSPECIFIED SITE, UNSPECIFIED RHEUMATOID FACTOR PRESENCE: ICD-10-CM

## 2018-03-10 LAB
ALBUMIN SERPL BCP-MCNC: 3.2 G/DL (ref 3.5–4.8)
ALT SERPL-CCNC: 5 U/L (ref 14–54)
AST SERPL-CCNC: 23 U/L (ref 15–41)
BASOPHILS # BLD: 0 K/UL (ref 0–0.2)
BASOPHILS NFR BLD: 1 %
CREAT SERPL-MCNC: 0.8 MG/DL (ref 0.5–1.5)
CRP SERPL-MCNC: 1.1 MG/DL (ref 0–0.9)
EOSINOPHIL # BLD: 0.2 K/UL (ref 0–0.7)
EOSINOPHIL NFR BLD: 3 %
ERYTHROCYTE [DISTWIDTH] IN BLOOD BY AUTOMATED COUNT: 16.4 % (ref 11–15)
ERYTHROCYTE [SEDIMENTATION RATE] IN BLOOD: 30 MM/HR (ref 0–30)
HCT VFR BLD AUTO: 39 % (ref 35–48)
HGB BLD-MCNC: 12.4 G/DL (ref 12–16)
LYMPHOCYTES # BLD: 1.6 K/UL (ref 1–4)
LYMPHOCYTES NFR BLD: 23 %
MCH RBC QN AUTO: 30.1 PG (ref 27–32)
MCHC RBC AUTO-ENTMCNC: 31.8 G/DL (ref 32–37)
MCV RBC AUTO: 94.5 FL (ref 80–100)
MONOCYTES # BLD: 0.5 K/UL (ref 0–1)
MONOCYTES NFR BLD: 7 %
NEUTROPHILS # BLD AUTO: 4.6 K/UL (ref 1.8–7.7)
NEUTROPHILS NFR BLD: 67 %
PLATELET # BLD AUTO: 182 K/UL (ref 140–400)
PMV BLD AUTO: 10.2 FL (ref 7.4–10.3)
RBC # BLD AUTO: 4.12 M/UL (ref 3.7–5.4)
WBC # BLD AUTO: 6.9 K/UL (ref 4–11)

## 2018-03-10 PROCEDURE — 82040 ASSAY OF SERUM ALBUMIN: CPT

## 2018-03-10 PROCEDURE — 82565 ASSAY OF CREATININE: CPT

## 2018-03-10 PROCEDURE — 86140 C-REACTIVE PROTEIN: CPT

## 2018-03-10 PROCEDURE — 84450 TRANSFERASE (AST) (SGOT): CPT

## 2018-03-10 PROCEDURE — 85652 RBC SED RATE AUTOMATED: CPT

## 2018-03-10 PROCEDURE — 85025 COMPLETE CBC W/AUTO DIFF WBC: CPT

## 2018-03-10 PROCEDURE — 36415 COLL VENOUS BLD VENIPUNCTURE: CPT

## 2018-03-10 PROCEDURE — 95117 IMMUNOTHERAPY INJECTIONS: CPT | Performed by: ALLERGY & IMMUNOLOGY

## 2018-03-10 PROCEDURE — 84460 ALANINE AMINO (ALT) (SGPT): CPT

## 2018-03-13 ENCOUNTER — TELEPHONE (OUTPATIENT)
Dept: RHEUMATOLOGY | Facility: CLINIC | Age: 58
End: 2018-03-13

## 2018-03-13 NOTE — TELEPHONE ENCOUNTER
Nagi Dowd would like clinical notes present and post, pt's next office visit. Also needs to complete section 3 of the enrollment form that was faxed over. Please fax to [phone].   please advise

## 2018-03-14 RX ORDER — DICLOFENAC SODIUM 75 MG/1
75 TABLET, DELAYED RELEASE ORAL 2 TIMES DAILY
Qty: 60 TABLET | Refills: 3 | Status: SHIPPED | OUTPATIENT
Start: 2018-03-14 | End: 2018-07-01

## 2018-03-14 NOTE — TELEPHONE ENCOUNTER
LOV:2-5  Last Filled:11-10, #60 with 3 refills  Labs:3-10  AST 23 and ALT 5  Future Appointments  Date Time Provider Joel Mann   3/21/2018 6:20 PM [facility]   3/26/2018 6:40 PM Corby Murry MD 2014 [facility]

## 2018-03-21 ENCOUNTER — OFFICE VISIT (OUTPATIENT)
Dept: OBGYN CLINIC | Facility: CLINIC | Age: 58
End: 2018-03-21

## 2018-03-21 VITALS
DIASTOLIC BLOOD PRESSURE: 81 MMHG | HEIGHT: 69 IN | HEART RATE: 103 BPM | BODY MASS INDEX: 43.4 KG/M2 | WEIGHT: 293 LBS | SYSTOLIC BLOOD PRESSURE: 125 MMHG

## 2018-03-21 DIAGNOSIS — Z12.31 ENCOUNTER FOR SCREENING MAMMOGRAM FOR MALIGNANT NEOPLASM OF BREAST: ICD-10-CM

## 2018-03-21 DIAGNOSIS — Z01.419 WELL WOMAN EXAM WITH ROUTINE GYNECOLOGICAL EXAM: Primary | ICD-10-CM

## 2018-03-21 PROCEDURE — 99396 PREV VISIT EST AGE 40-64: CPT | Performed by: CLINICAL NURSE SPECIALIST

## 2018-03-22 NOTE — PROGRESS NOTES
Poncho Byrd is a 65year old female [id number] Patient's last menstrual period was 04/18/2015 (exact date). Patient presents with:  Gyn Exam: annual  Last annual exam was 3/15/17. Last pap was 3/4/15 and normal, HPV negative. Denies hx of abnormal pap's. SURGICAL HISTORY Right  12/3/2015: OTHER SURGICAL HISTORY  No date: THYROIDECTOMY      Comment: Total     SOCIAL HISTORY:    Social History  Social History   Marital status:   Spouse name: N/A    Years of education: N/A  Number of children: N/A HCl 50 MG Oral Tab, TAKE 1 TABLET BY MOUTH EVERY 12 HOURS AS NEEDED, Disp: 60 tablet, Rfl: 4  •  Levothyroxine Sodium 200 MCG Oral Tab, Take 1 tablet (200 mcg total) by mouth before breakfast., Disp: 90 tablet, Rfl: 3  •  PRAVASTATIN SODIUM 40 MG Oral Tab, constipation  Genitourinary:  denies dysuria, incontinence, abnormal vaginal discharge, vaginal itching  Musculoskeletal:  denies back pain. Skin/Breast:  Denies any breast pain, lumps, or discharge.    Neurological:  denies headaches, extremity weakness o Encouraged weight bearing exercise. Follow-up in one year.

## 2018-03-26 ENCOUNTER — TELEPHONE (OUTPATIENT)
Dept: RHEUMATOLOGY | Facility: CLINIC | Age: 58
End: 2018-03-26

## 2018-03-26 ENCOUNTER — NURSE ONLY (OUTPATIENT)
Dept: ALLERGY | Facility: CLINIC | Age: 58
End: 2018-03-26

## 2018-03-26 ENCOUNTER — OFFICE VISIT (OUTPATIENT)
Dept: RHEUMATOLOGY | Facility: CLINIC | Age: 58
End: 2018-03-26

## 2018-03-26 VITALS
BODY MASS INDEX: 43.4 KG/M2 | HEIGHT: 69 IN | SYSTOLIC BLOOD PRESSURE: 115 MMHG | HEART RATE: 94 BPM | DIASTOLIC BLOOD PRESSURE: 71 MMHG | WEIGHT: 293 LBS

## 2018-03-26 DIAGNOSIS — M79.605 LOW BACK PAIN RADIATING TO LEFT LOWER EXTREMITY: ICD-10-CM

## 2018-03-26 DIAGNOSIS — J30.1 SEASONAL ALLERGIC RHINITIS DUE TO POLLEN: ICD-10-CM

## 2018-03-26 DIAGNOSIS — G20 PARKINSON'S DISEASE (HCC): ICD-10-CM

## 2018-03-26 DIAGNOSIS — M54.50 LOW BACK PAIN RADIATING TO LEFT LOWER EXTREMITY: ICD-10-CM

## 2018-03-26 DIAGNOSIS — Z51.81 THERAPEUTIC DRUG MONITORING: ICD-10-CM

## 2018-03-26 DIAGNOSIS — M06.9 RHEUMATOID ARTHRITIS, INVOLVING UNSPECIFIED SITE, UNSPECIFIED RHEUMATOID FACTOR PRESENCE: Primary | ICD-10-CM

## 2018-03-26 PROCEDURE — 95117 IMMUNOTHERAPY INJECTIONS: CPT | Performed by: ALLERGY & IMMUNOLOGY

## 2018-03-26 PROCEDURE — 99212 OFFICE O/P EST SF 10 MIN: CPT | Performed by: INTERNAL MEDICINE

## 2018-03-26 PROCEDURE — 99214 OFFICE O/P EST MOD 30 MIN: CPT | Performed by: INTERNAL MEDICINE

## 2018-03-26 RX ORDER — CYCLOBENZAPRINE HCL 5 MG
5 TABLET ORAL NIGHTLY
Qty: 30 TABLET | Refills: 1 | Status: SHIPPED | OUTPATIENT
Start: 2018-03-26 | End: 2018-05-19

## 2018-03-26 RX ORDER — HYDROCODONE BITARTRATE AND ACETAMINOPHEN 10; 325 MG/1; MG/1
1 TABLET ORAL EVERY 6 HOURS PRN
COMMUNITY
Start: 2018-02-01 | End: 2019-11-23

## 2018-03-26 RX ORDER — ERGOCALCIFEROL (VITAMIN D2) 1250 MCG
50000 CAPSULE ORAL
Qty: 3 CAPSULE | Refills: 4 | Status: SHIPPED | OUTPATIENT
Start: 2018-03-26 | End: 2019-04-21

## 2018-03-27 NOTE — TELEPHONE ENCOUNTER
[facility] calling to advise Dr. Bren Cornell that it is an drug interaction with these 2 meds and would to know what do  want to change meds to.     •  Cyclobenzaprine HCl 5 MG Oral Tab, Take 1 tablet (5 mg total) by mouth nightly., Disp: 30 tablet, Rfl: 1  •  DOC

## 2018-03-27 NOTE — PATIENT INSTRUCTIONS
1. Cont. methotrexate  8 tablets a week and folic acid 1mg a day    2. Cont. xlejanz 11mg a day   3. Return to clinic in 6 -8 weeks. 4. .Check labs in 6 -8 weeks. 5.. Start cylboenzaprine 5mg at night - don't drive on this - for lower back pain.    6.

## 2018-03-27 NOTE — TELEPHONE ENCOUNTER
J) PHARMACOKINETICS  1) Tmax for immediate-release and extended release tablets are 0.5 to 1 hour and 4 hours, respectively. The absolute oral bioavailability is 74%. Protein binding is approximately 40%, primarily to albumin; volume of distribution following IV administration is 87 L. Hepatic metabolism accounts for approximately 70% of the clearance of tofacitinib. The major enzyme responsible for metabolism is CY, with minor contribution from [address] The elimination half-life of tofacitinib is about 3 hours and 6 hours following administration of immediate-release tablets and extended-release tablets, respectively    ADME  Absorption  A) Effects of Food  1) High protein diet: Absorption may be impaired [1]  a) The absorption of carbidopa may be impaired in some patients on a high protein diet [1]. B) Toxic  1) Laboratory Parameters  a) Evaluate hepatic, hematopoietic, and renal function periodically during extended therapy [1].

## 2018-03-27 NOTE — PROGRESS NOTES
Bronson Cortez is a 65year old female who presents for Patient presents with:  Rheumatoid Arthritis: thigh  Back Pain  . HPI:     She is a pleasant 65year old who has elevated RF >300 and foot pain - dx with RA -  here on 3/26/2018.  I had last seen he can't lift her left thigh. She can't lift it out of a car and bed it wont' move. It's bothering her for 3-4 weeks. She can still get up the stairs. She feels it's more dragging. No numbness and tingling.    She feels her joints are better except her fee 8 tablets a week. She takes tramadol once a day as needed. Her fingers are better. No shoulders are ok. Her knees still hurts. 5/22/2017  Her left t shoulder flared - she had bad tramadol. She felt no position helped. She was tearful from the pain . She's off nsaids until her intjeciton. The injecitons only helped hte week or tow. She felt the PT and prednisone doesn't do much. She's working at home right now. She's taking norco 10 #40 with dr. Oziel Colon.    Her parkinson's is bad today b/c of (10 MG TOTAL) BY MOUTH NIGHTLY.  Disp: 90 tablet Rfl: 1   TraMADol HCl 50 MG Oral Tab TAKE 1 TABLET BY MOUTH EVERY 12 HOURS AS NEEDED Disp: 60 tablet Rfl: 4   Levothyroxine Sodium 200 MCG Oral Tab Take 1 tablet (200 mcg total) by mouth before breakfast. Dis trochanteric bursitis, left 11/21/2017   • High cholesterol    • Hyperlipidemia    • Infertility, female    • Lipid screening 8/31/2013   • Other and unspecified hyperlipidemia    • [facility] ([facility]    • Thyroid disease 2016   • Tibia/fibula fractur ankle tenderness- doing better -   No joint tendneress at this time. lower back midline tendnesrs and b/l gluteal tendnerss   No tenderness sin hands.    Squeeze test note   Left hip not tender  Right hip not tender   Good ext rotation of both hips   SLR strain. 2. Low-grade strain pattern in the abductor digiti minimi muscle belly  close to the calcaneus. 3. Prominent tendinopathy of the peroneus longus tendon as it courses  around the lateral malleolus.   There is a calcaneal spur at the insertion of th dessication of l2-3 otherwise unremarkable study wihtout evidenc of tfocal disc herniation or spinal stenosis  10/6/2006 - fev1/fvc is 87  - dlco is 65%,   2/14/2014 - left knee cx - strep mitis Nancy Marti    7/22/2015 - b/l knee xray   Bilateral moderate 2 c of left foot that could be early RA - but non specific and not synovitis   - hx of strep viridans in knee cutlrue in the past - ? - concern for any ongoing or susceptibility to infection - will have to monitor her -       2.  OA knees - s/p cortisone inject

## 2018-03-28 RX ORDER — TIZANIDINE HYDROCHLORIDE 4 MG/1
4 CAPSULE, GELATIN COATED ORAL NIGHTLY PRN
Qty: 30 CAPSULE | Refills: 1 | Status: SHIPPED | OUTPATIENT
Start: 2018-03-28 | End: 2018-05-19

## 2018-03-28 NOTE — TELEPHONE ENCOUNTER
Sent in tizanidine 4mg at night   Talked to pt. And let her know that we switched the prescritpion - she is aware and knows it should be available today.

## 2018-03-29 ENCOUNTER — OFFICE VISIT (OUTPATIENT)
Dept: INTERNAL MEDICINE CLINIC | Facility: CLINIC | Age: 58
End: 2018-03-29

## 2018-03-29 VITALS
WEIGHT: 293 LBS | DIASTOLIC BLOOD PRESSURE: 88 MMHG | TEMPERATURE: 97 F | RESPIRATION RATE: 20 BRPM | HEIGHT: 69 IN | SYSTOLIC BLOOD PRESSURE: 148 MMHG | HEART RATE: 86 BPM | BODY MASS INDEX: 43.4 KG/M2 | OXYGEN SATURATION: 96 %

## 2018-03-29 DIAGNOSIS — G20 PARKINSON DISEASE (HCC): ICD-10-CM

## 2018-03-29 DIAGNOSIS — E78.5 HYPERLIPIDEMIA LDL GOAL <130: ICD-10-CM

## 2018-03-29 DIAGNOSIS — F43.9 STRESS: ICD-10-CM

## 2018-03-29 DIAGNOSIS — J45.20 MILD INTERMITTENT ASTHMA WITHOUT COMPLICATION: ICD-10-CM

## 2018-03-29 DIAGNOSIS — M06.9 RHEUMATOID ARTHRITIS INVOLVING MULTIPLE SITES, UNSPECIFIED RHEUMATOID FACTOR PRESENCE: ICD-10-CM

## 2018-03-29 DIAGNOSIS — E03.9 HYPOTHYROIDISM, UNSPECIFIED TYPE: Primary | ICD-10-CM

## 2018-03-29 PROCEDURE — 99214 OFFICE O/P EST MOD 30 MIN: CPT | Performed by: INTERNAL MEDICINE

## 2018-03-29 PROCEDURE — 99212 OFFICE O/P EST SF 10 MIN: CPT | Performed by: INTERNAL MEDICINE

## 2018-03-29 RX ORDER — FLUTICASONE PROPIONATE AND SALMETEROL 250; 50 UG/1; UG/1
1 POWDER RESPIRATORY (INHALATION)
COMMUNITY
Start: 2015-10-05 | End: 2018-03-29

## 2018-03-29 RX ORDER — FOLIC ACID 1 MG/1
1 TABLET ORAL
COMMUNITY
Start: 2015-10-05 | End: 2018-03-29

## 2018-03-29 RX ORDER — LEVOTHYROXINE SODIUM 0.2 MG/1
TABLET ORAL
COMMUNITY
Start: 2018-01-08 | End: 2018-03-29

## 2018-03-29 RX ORDER — MONTELUKAST SODIUM 10 MG/1
TABLET ORAL
COMMUNITY
Start: 2015-09-28 | End: 2018-03-29

## 2018-03-29 RX ORDER — ERGOCALCIFEROL 1.25 MG/1
CAPSULE ORAL
COMMUNITY
Start: 2015-08-31 | End: 2018-03-29

## 2018-03-29 RX ORDER — RASAGILINE 1 MG/1
1 TABLET ORAL
COMMUNITY
Start: 2016-01-13 | End: 2018-04-25

## 2018-03-29 RX ORDER — DICLOFENAC SODIUM 75 MG/1
75 TABLET, DELAYED RELEASE ORAL
COMMUNITY
Start: 2015-10-14 | End: 2018-03-29

## 2018-03-29 RX ORDER — HYDROCODONE POLISTIREX AND CHLORPHENIRAMINE POLISTIREX 10; 8 MG/5ML; MG/5ML
1 SUSPENSION, EXTENDED RELEASE ORAL
COMMUNITY
Start: 2016-05-14 | End: 2018-03-29

## 2018-03-29 RX ORDER — CALCITRIOL 0.25 UG/1
CAPSULE, LIQUID FILLED ORAL
COMMUNITY
Start: 2017-12-17 | End: 2018-03-29

## 2018-03-29 RX ORDER — ALBUTEROL SULFATE 2.5 MG/3ML
SOLUTION RESPIRATORY (INHALATION)
COMMUNITY
Start: 2016-04-04 | End: 2018-03-29

## 2018-03-29 RX ORDER — PRAVASTATIN SODIUM 40 MG
TABLET ORAL
COMMUNITY
Start: 2015-07-28 | End: 2018-03-29

## 2018-03-29 RX ORDER — TRAMADOL HYDROCHLORIDE 50 MG/1
50 TABLET ORAL
COMMUNITY
End: 2018-03-29

## 2018-03-29 NOTE — PROGRESS NOTES
HPI:    Patient ID: Michael Goff is a 65year old female. HPI  Patient is here requesting follow-up on chronic medical issues as listed below including rheumatoid arthritis, Parkinson's, obesity, hypothyroidism. Last seen here on June 23.   TSH was n • Extrinsic asthma, unspecified    • Greater trochanteric bursitis, left 11/21/2017   • High cholesterol    • Hyperlipidemia    • Infertility, female    • Lipid screening 8/31/2013   • Other and unspecified hyperlipidemia    • Parkinson disease ([facility] Negative for dysuria, hematuria, vaginal discharge, menstrual problem and sexual dysfunction. Musculoskeletal: Positive for joint pain. Skin: Negative for rash. Neurological: Positive for weakness. Negative for numbness and headaches.    Hematological Oral Tab Take 1 tablet (200 mcg total) by mouth before breakfast. Disp: 90 tablet Rfl: 3   fluticasone-salmeterol (ADVAIR DISKUS) 250-50 MCG/DOSE Inhalation Aerosol Powder, Breath Activated INHALE 1 PUFF INTO THE LUNGS EVERY 12 (TWELVE) HOURS.  Disp: 3 each normal. She has no wheezes. She has no rales. Abdominal: Soft. Bowel sounds are normal. She exhibits no mass. There is no tenderness. Musculoskeletal: She exhibits no tenderness. Lymphadenopathy:     She has no cervical adenopathy.    Neurological: Sh

## 2018-03-31 ENCOUNTER — APPOINTMENT (OUTPATIENT)
Dept: LAB | Age: 58
End: 2018-03-31
Attending: INTERNAL MEDICINE
Payer: COMMERCIAL

## 2018-03-31 DIAGNOSIS — E03.9 HYPOTHYROIDISM, UNSPECIFIED TYPE: ICD-10-CM

## 2018-03-31 PROCEDURE — 80061 LIPID PANEL: CPT

## 2018-03-31 PROCEDURE — 82607 VITAMIN B-12: CPT

## 2018-03-31 PROCEDURE — 36415 COLL VENOUS BLD VENIPUNCTURE: CPT

## 2018-03-31 PROCEDURE — 80053 COMPREHEN METABOLIC PANEL: CPT

## 2018-03-31 PROCEDURE — 84443 ASSAY THYROID STIM HORMONE: CPT

## 2018-04-03 ENCOUNTER — TELEPHONE (OUTPATIENT)
Dept: INTERNAL MEDICINE CLINIC | Facility: CLINIC | Age: 58
End: 2018-04-03

## 2018-04-03 DIAGNOSIS — E03.9 HYPOTHYROIDISM, UNSPECIFIED TYPE: Primary | ICD-10-CM

## 2018-04-03 DIAGNOSIS — R79.89 ABNORMAL TSH: ICD-10-CM

## 2018-04-03 RX ORDER — LEVOTHYROXINE SODIUM 175 UG/1
175 TABLET ORAL
Qty: 30 TABLET | Refills: 5 | Status: SHIPPED | OUTPATIENT
Start: 2018-04-03 | End: 2018-05-19

## 2018-04-03 NOTE — TELEPHONE ENCOUNTER
Pt would like to further discuss the change in prescription per the msg she received via [employer]. pls advise.  Thank you

## 2018-04-03 NOTE — TELEPHONE ENCOUNTER
Spoke to patient regarding abnormal TSH. Patient agrees to the plan to decrease the dose of levothyroxine from 200 mcg to 175 mcg once daily. New prescription has been sent to the pharmacy.   Will recheck TSH in about 8 weeks, lab orders have been generat

## 2018-04-16 ENCOUNTER — NURSE ONLY (OUTPATIENT)
Dept: ALLERGY | Facility: CLINIC | Age: 58
End: 2018-04-16

## 2018-04-16 DIAGNOSIS — J30.1 SEASONAL ALLERGIC RHINITIS DUE TO POLLEN: ICD-10-CM

## 2018-04-16 PROCEDURE — 95117 IMMUNOTHERAPY INJECTIONS: CPT | Performed by: ALLERGY & IMMUNOLOGY

## 2018-04-16 PROCEDURE — 95165 ANTIGEN THERAPY SERVICES: CPT | Performed by: ALLERGY & IMMUNOLOGY

## 2018-04-17 ENCOUNTER — OFFICE VISIT (OUTPATIENT)
Dept: SURGERY | Facility: CLINIC | Age: 58
End: 2018-04-17

## 2018-04-17 ENCOUNTER — TELEPHONE (OUTPATIENT)
Dept: RHEUMATOLOGY | Facility: CLINIC | Age: 58
End: 2018-04-17

## 2018-04-17 VITALS
SYSTOLIC BLOOD PRESSURE: 110 MMHG | DIASTOLIC BLOOD PRESSURE: 80 MMHG | HEIGHT: 69 IN | HEART RATE: 98 BPM | BODY MASS INDEX: 43.4 KG/M2 | OXYGEN SATURATION: 99 % | WEIGHT: 293 LBS | RESPIRATION RATE: 18 BRPM

## 2018-04-17 DIAGNOSIS — M54.41 CHRONIC BILATERAL LOW BACK PAIN WITH BILATERAL SCIATICA: ICD-10-CM

## 2018-04-17 DIAGNOSIS — M06.9 RHEUMATOID ARTHRITIS, INVOLVING UNSPECIFIED SITE, UNSPECIFIED RHEUMATOID FACTOR PRESENCE: ICD-10-CM

## 2018-04-17 DIAGNOSIS — J45.909 ASTHMA, UNSPECIFIED ASTHMA SEVERITY, UNSPECIFIED WHETHER COMPLICATED, UNSPECIFIED WHETHER PERSISTENT: ICD-10-CM

## 2018-04-17 DIAGNOSIS — E66.01 MORBID OBESITY WITH BMI OF 45.0-49.9, ADULT (HCC): ICD-10-CM

## 2018-04-17 DIAGNOSIS — E78.5 HYPERLIPIDEMIA, UNSPECIFIED HYPERLIPIDEMIA TYPE: Primary | ICD-10-CM

## 2018-04-17 DIAGNOSIS — G89.29 CHRONIC BILATERAL LOW BACK PAIN WITH BILATERAL SCIATICA: ICD-10-CM

## 2018-04-17 DIAGNOSIS — M54.42 CHRONIC BILATERAL LOW BACK PAIN WITH BILATERAL SCIATICA: ICD-10-CM

## 2018-04-17 DIAGNOSIS — G20 PARKINSON DISEASE (HCC): ICD-10-CM

## 2018-04-17 PROCEDURE — 99213 OFFICE O/P EST LOW 20 MIN: CPT | Performed by: NURSE PRACTITIONER

## 2018-04-17 NOTE — PROGRESS NOTES
[address] [place] [address] Jennifer Thomas, [address] [address] [address], [address]  [address]  Dept: [phone]     Date:   18    Patient:  Aure Garcia  :      1960  MRN:      [id number]    Chief Compla total) by mouth before breakfast. Disp: 30 tablet Rfl: 5   Rasagiline Mesylate (AZILECT) 1 MG Oral Tab Take 1 mg by mouth. Disp:  Rfl:    triamcinolone acetonide 0.1 % External Ointment Apply 1 Application topically.  Disp:  Rfl:    TiZANidine HCl 4 MG Oral PRAVASTATIN SODIUM 40 MG Oral Tab TAKE 1 TABLET (40 MG TOTAL) BY MOUTH ONCE DAILY. Disp: 90 tablet Rfl: 3   PROAIR  (90 Base) MCG/ACT Inhalation Aero Soln INHALE 2 PUFFS INTO THE LUNGS EVERY 6 (SIX) HOURS AS NEEDED FOR WHEEZING.  Disp: 8.5 Inhaler Comment: for extended period  No date: FOOT SURGERY Left      Comment: Had right foot done recently.   No date: KNEE ARTHROSCOPY Right  1990: LAPAROSCOPY,PELVIC,BIOPSY      Comment: for infertility  1990: LAPAROSCOPY,PELVIC,BIOPSY      Comment: for infer Reviewed and Discussed    Chair exercises for  30 Minutes    ROS:    Review of Systems   Constitutional: Negative. Respiratory: Negative. Cardiovascular: Negative. Gastrointestinal: Negative. Genitourinary: Negative.     Musculoskeletal: Positiv medical team. I did advise her against lap band as she has a hx of an autoimmune illness and her chance of rejection is higher. I did explain to Nieves Atwood that after bariatric surgery, she is to avoid caffeine, carbonation and NSAIDS.  Nieves Atwood verbalized Vivien

## 2018-04-17 NOTE — TELEPHONE ENCOUNTER
Per Matias Boyd, she would like to talk to Qamar in regards to pt PA of Brie Good see communication dated 02/08/2018.

## 2018-04-17 NOTE — TELEPHONE ENCOUNTER
Spoke with [employer] and all information was rec'd for them to work on appeal. They will call office back if anything else is needed.

## 2018-04-17 NOTE — PATIENT INSTRUCTIONS
Goals for next month:  1. Keep a food log using [facility]  2. Drink 48-64 ounces of water per day. No fruit juices or regular soda. 3. Increase aerobic exercises (goal is 150 minutes per week)  4. Increase fruit and vegetable servings/day  5.  Keep carbs at or b

## 2018-04-18 ENCOUNTER — PATIENT MESSAGE (OUTPATIENT)
Dept: RHEUMATOLOGY | Facility: CLINIC | Age: 58
End: 2018-04-18

## 2018-04-18 DIAGNOSIS — Z51.81 THERAPEUTIC DRUG MONITORING: ICD-10-CM

## 2018-04-18 DIAGNOSIS — M06.9 RHEUMATOID ARTHRITIS INVOLVING MULTIPLE SITES, UNSPECIFIED RHEUMATOID FACTOR PRESENCE: ICD-10-CM

## 2018-04-18 RX ORDER — TRAMADOL HYDROCHLORIDE 50 MG/1
TABLET ORAL
Qty: 60 TABLET | Refills: 4 | Status: SHIPPED
Start: 2018-04-18 | End: 2018-11-16

## 2018-04-18 NOTE — TELEPHONE ENCOUNTER
From: Geremias Pappas  To: Rio Benjamin MD  Sent: 4/18/2018 11:33 AM CDT  Subject: Prescription Question    I am contemplating taking turmeric and I was wondering if that would interact with any of my other medicines?

## 2018-04-18 NOTE — TELEPHONE ENCOUNTER
Per pt, she stts that her pharmacy have been faxing to office for a refill on her Tramadol but nothing was received,  Pt is totally out of med. Pls advise.       Current Outpatient Prescriptions:

## 2018-04-18 NOTE — TELEPHONE ENCOUNTER
LOV: 3/26/18 LR 9/25/17 #60 with 4 refills  Future Appointments  Date Time Provider Joel Mann   5/10/2018 5:00 PM Alexis Dooely [facility]   5/12/2018 10:00 AM [facility] RM2 [facility] EM [facility]   5/17/2018 5:00 PM Jass Alvarez [facility]

## 2018-04-20 NOTE — TELEPHONE ENCOUNTER
Tramadol 50mg faxed to [facility] pharmacy at [phone] per Dr. Elieser Morales on 4-18. Confirmation received.

## 2018-04-25 ENCOUNTER — OFFICE VISIT (OUTPATIENT)
Dept: NEUROLOGY | Facility: CLINIC | Age: 58
End: 2018-04-25

## 2018-04-25 ENCOUNTER — TELEPHONE (OUTPATIENT)
Dept: NEUROLOGY | Facility: CLINIC | Age: 58
End: 2018-04-25

## 2018-04-25 VITALS
DIASTOLIC BLOOD PRESSURE: 64 MMHG | HEART RATE: 70 BPM | WEIGHT: 293 LBS | RESPIRATION RATE: 20 BRPM | BODY MASS INDEX: 43.4 KG/M2 | HEIGHT: 69 IN | SYSTOLIC BLOOD PRESSURE: 130 MMHG

## 2018-04-25 DIAGNOSIS — M48.062 LUMBAR STENOSIS WITH NEUROGENIC CLAUDICATION: Primary | ICD-10-CM

## 2018-04-25 PROCEDURE — 99213 OFFICE O/P EST LOW 20 MIN: CPT | Performed by: PHYSICAL MEDICINE & REHABILITATION

## 2018-04-25 NOTE — PROGRESS NOTES
HPI:    Patient ID: Jacquelin Sauceda is a 65year old female. This 60-year-old female with a past medical history of Parkinson's disease,obesity, lumbar stenosis and rheumatoid arthritis presents for follow-up.   I performed the following procedures:    problem. Skin: Negative for color change and rash. Neurological: Negative for weakness and numbness.           Current Outpatient Prescriptions:  TraMADol HCl 50 MG Oral Tab TAKE 1 TABLET BY MOUTH EVERY 12 HOURS AS NEEDED Disp: 60 tablet Rfl: 4   [facility] breakfast. Disp: 90 tablet Rfl: 3   fluticasone-salmeterol (ADVAIR DISKUS) 250-50 MCG/DOSE Inhalation Aerosol Powder, Breath Activated INHALE 1 PUFF INTO THE LUNGS EVERY 12 (TWELVE) HOURS.  Disp: 3 each Rfl: 1   PRAVASTATIN SODIUM 40 MG Oral Tab TAKE 1 TABL hip.   Neurological:   Strength: normal muscle bulk in b/l Le. 5/5 b/ LE    Sensation: intact to light touch bilaterally    Resting tremor noted   Nursing note and vitals reviewed.          ASSESSMENT/PLAN:   L3-4, l4-5 central stenosis  (primary encounter

## 2018-04-25 NOTE — TELEPHONE ENCOUNTER
Patient has been scheduled for a Bilateral L5 transforaminal epidural steroid injection under fluoroscopy with MAC anesthesia for Parkinsonian movements. on 5/2/18 at the [facility]. Medications and allergies reviewed.  Patient informed to hold aspirins, nsaids,

## 2018-04-25 NOTE — TELEPHONE ENCOUNTER
Called [facility], for authorization of Bilateral L5 TFESIs under fluoroscopy. CPT code/s R7815414, [id number]. T/t Sarath Peraza who states no authorization is required. Reference #[id number]. Will inform nursing.

## 2018-04-25 NOTE — PATIENT INSTRUCTIONS
As of October 6th 2014, the Drug Enforcement Agency [facility]) is reclassifying all hydrocodone combination medications from Schedule III to Schedule II. This includes medications such as Norco, Vicodin, Lortab, Zohydro, and Vicoprofen.     What this means for y

## 2018-05-02 ENCOUNTER — OFFICE VISIT (OUTPATIENT)
Dept: SURGERY | Facility: CLINIC | Age: 58
End: 2018-05-02

## 2018-05-02 DIAGNOSIS — M48.062 LUMBAR STENOSIS WITH NEUROGENIC CLAUDICATION: Primary | ICD-10-CM

## 2018-05-02 PROCEDURE — 64483 NJX AA&/STRD TFRM EPI L/S 1: CPT | Performed by: PHYSICAL MEDICINE & REHABILITATION

## 2018-05-02 NOTE — PROCEDURES
Husam BARLOW 7.    BILATERAL LUMBAR TRANSFORAMINAL   NAME:  Jessica Padilla    MR #:    [id number] :  1960     PHYSICIAN:  Tao Eagle        Operative Report    DATE OF PROCEDURE: 2018   PREOPERATIVE DIAGNOSES: 1.  L3-4, L4 up the left L5-S1 intervertebral foramen. At this point in time, the patient's skin was anesthetized with 1 to 2 cc of  1% PF lidocaine without epinephrine.   Then, a 7 inch, 22-gauge spinal needle was inserted and directed towards the left L5-S1 intervert

## 2018-05-07 ENCOUNTER — NURSE ONLY (OUTPATIENT)
Dept: ALLERGY | Facility: CLINIC | Age: 58
End: 2018-05-07

## 2018-05-07 DIAGNOSIS — J30.1 SEASONAL ALLERGIC RHINITIS DUE TO POLLEN: ICD-10-CM

## 2018-05-07 PROCEDURE — 95117 IMMUNOTHERAPY INJECTIONS: CPT | Performed by: ALLERGY & IMMUNOLOGY

## 2018-05-09 RX ORDER — METHOTREXATE 2.5 MG/1
22.5 TABLET ORAL
Qty: 45 TABLET | Refills: 3 | Status: SHIPPED | OUTPATIENT
Start: 2018-05-09 | End: 2018-08-27

## 2018-05-09 NOTE — TELEPHONE ENCOUNTER
LOV: 3-26-18  Last Refilled:#45, 4rfs 11/10/17  Labs:AST 25  ALT 7  3/31/18    Future Appointments  Date Time Provider Joel Mackenzie   5/10/2018 5:00 PM Opal Carcamo [address] [place] [address]. [facility]   5/12/2018 10:00 AM [facility] RM2 [facility] EM [facility]   5/17/2018 5:00 PM Opal Carcamo LCPC [facility]   5/19/2018 9:40 AM Clark Campo MD 2014 [facility]   5/22/2018 5:00 PM Riddhi Molina APN [id number] [address]   5/24/2018 5:00 PM Opal Carcamo [facility]   5/31/2018 5:00 PM Opal Carcamo [facility]   8/1/2018 3:00 PM RAFAELA Blevins [facility]   10/4/2018 4:20 PM Raheem Mcmahon MD [facility]       Please advise.

## 2018-05-12 ENCOUNTER — HOSPITAL ENCOUNTER (OUTPATIENT)
Dept: MAMMOGRAPHY | Age: 58
Discharge: HOME | End: 2018-05-12
Attending: CLINICAL NURSE SPECIALIST
Payer: COMMERCIAL

## 2018-05-12 DIAGNOSIS — Z12.31 ENCOUNTER FOR SCREENING MAMMOGRAM FOR MALIGNANT NEOPLASM OF BREAST: ICD-10-CM

## 2018-05-12 PROCEDURE — 77067 SCR MAMMO BI INCL CAD: CPT | Performed by: CLINICAL NURSE SPECIALIST

## 2018-05-14 RX ORDER — PRAVASTATIN SODIUM 40 MG
40 TABLET ORAL
Qty: 90 TABLET | Refills: 3 | Status: SHIPPED | OUTPATIENT
Start: 2018-05-14 | End: 2019-06-14

## 2018-05-17 ENCOUNTER — PATIENT MESSAGE (OUTPATIENT)
Dept: NEUROLOGY | Facility: CLINIC | Age: 58
End: 2018-05-17

## 2018-05-17 NOTE — TELEPHONE ENCOUNTER
From: Rocky Lizama  To: Huang Thompson DO  Sent: 5/17/2018 9:45 AM CDT  Subject: Visit Follow-up Question    Ever since I had the last back injections my left arm has been numb and getting progressively worse. I'm starting to get concerned.  Please advi

## 2018-05-19 ENCOUNTER — OFFICE VISIT (OUTPATIENT)
Dept: RHEUMATOLOGY | Facility: CLINIC | Age: 58
End: 2018-05-19

## 2018-05-19 ENCOUNTER — TELEPHONE (OUTPATIENT)
Dept: RHEUMATOLOGY | Facility: CLINIC | Age: 58
End: 2018-05-19

## 2018-05-19 VITALS
HEART RATE: 102 BPM | DIASTOLIC BLOOD PRESSURE: 66 MMHG | HEIGHT: 69 IN | SYSTOLIC BLOOD PRESSURE: 120 MMHG | BODY MASS INDEX: 43.4 KG/M2 | WEIGHT: 293 LBS

## 2018-05-19 DIAGNOSIS — M54.5 CHRONIC LOW BACK PAIN, UNSPECIFIED BACK PAIN LATERALITY, WITH SCIATICA PRESENCE UNSPECIFIED: ICD-10-CM

## 2018-05-19 DIAGNOSIS — G20 PARKINSON'S DISEASE (HCC): ICD-10-CM

## 2018-05-19 DIAGNOSIS — Z51.81 THERAPEUTIC DRUG MONITORING: ICD-10-CM

## 2018-05-19 DIAGNOSIS — M06.9 RHEUMATOID ARTHRITIS INVOLVING MULTIPLE SITES, UNSPECIFIED RHEUMATOID FACTOR PRESENCE: Primary | ICD-10-CM

## 2018-05-19 DIAGNOSIS — G89.29 CHRONIC LOW BACK PAIN, UNSPECIFIED BACK PAIN LATERALITY, WITH SCIATICA PRESENCE UNSPECIFIED: ICD-10-CM

## 2018-05-19 PROCEDURE — 99214 OFFICE O/P EST MOD 30 MIN: CPT | Performed by: INTERNAL MEDICINE

## 2018-05-19 PROCEDURE — 99212 OFFICE O/P EST SF 10 MIN: CPT | Performed by: INTERNAL MEDICINE

## 2018-05-19 RX ORDER — TIZANIDINE HYDROCHLORIDE 4 MG/1
CAPSULE, GELATIN COATED ORAL
Qty: 60 CAPSULE | Refills: 1 | Status: SHIPPED | OUTPATIENT
Start: 2018-05-19 | End: 2018-07-15

## 2018-05-19 NOTE — PROGRESS NOTES
Rk Vasquez is a 65year old female who presents for Patient presents with:  Rheumatoid Arthritis  Back Pain  Leg Pain  . HPI:     She is a pleasant 65year old who has elevated RF >300 and foot pain - dx with RA -  here on 5/19/2018.  I had hlast see mensiscal tear repair. 6/6/2016  She can't lift her left thigh. She can't lift it out of a car and bed it wont' move. It's bothering her for 3-4 weeks. She can still get up the stairs. She feels it's more dragging. No numbness and tingling.    She parkinson's or thyroid. She's on methotrexate 8 tablets a week. She takes tramadol once a day as needed. Her fingers are better. No shoulders are ok. Her knees still hurts. 5/22/2017  Her left t shoulder flared - she had bad tramadol.  She felt no p she's able to help it with the diclfoenac gel . She's off nsaids until her intjeciton. The injecitons only helped hte week or tow. She felt the PT and prednisone doesn't do much. She's working at home right now.    She's taking norco 10 #40 with  Disp: 90 tablet Rfl: 3   METHOTREXATE 2.5 MG Oral Tab TAKE 9 TABLETS (22.5 MG TOTAL) BY MOUTH EVERY 7 DAYS.  Disp: 45 tablet Rfl: 3   TraMADol HCl 50 MG Oral Tab TAKE 1 TABLET BY MOUTH EVERY 12 HOURS AS NEEDED Disp: 60 tablet Rfl: 4   triamcinolone acetonid Albuterol Sulfate (2.5 MG/3ML) 0.083% Inhalation Nebu Soln Take 3 mL (2.5 mg total) by nebulization every 4 (four) hours as needed for Wheezing.  Disp: 3 Box Rfl: 0   Hydrocod Polst-CPM Polst ER 10-8 MG/5ML Oral Liquid CR Take 5 mL by mouth 2 (two) times Right  12/3/2015: OTHER SURGICAL HISTORY  No date: THYROIDECTOMY      Comment: Total    Family hx - reviewed   Social History:  reviewed     REVIEW OF SYSTEMS:   Review Of Systems:  Fatigue  Constitutional:No fever, no change in weight or appetitie  Derm: - 100 U/L 66    Total Bilirubin      0.3 - 1.2 mg/dL 0.6    TOTAL PROTEIN      5.9 - 8.4 g/dL 6.4    Albumin      3.5 - 4.8 g/dL 3.3 (L) 3.2 (L)   GLOBULIN, TOTAL      2.5 - 3.7 g/dL 3.1    A/G RATIO      1.0 - 2.0 1.1    ANION GAP      0 - 18 mmol/L 6 tendinopathy of the peroneus longus tendon as it courses  around the lateral malleolus. There is a calcaneal spur at the insertion of the plantar  fascia.  There is minimal increased marrow signal within  the base of the second-fifth metatarsals likely  re fev1/fvc is 87  - dlco is 65%,   2/14/2014 - left knee cx - strep mitis Ethyl Rust    7/22/2015 - b/l knee xray   Bilateral moderate 2 compartmental knee osteoarthritis, more  advanced on the right.  Changes have pro\      ASSESSMENT AND PLAN:   Gomez Stroud to infection - will have to monitor her -       2. OA knees - s/p cortisone injections b/l 7/22/2015 by ortho  Diclofenac gel is really helping her. -   3. Hx of parkinson's -   4.  Left thigh welakness - PT is helping - her left leg,   - prednisone burst -

## 2018-05-19 NOTE — PATIENT INSTRUCTIONS
1. Cont. methotrexate  8 tablets a week and folic acid 1mg a day    2. Cont. xlejanz 11mg a day   3. Return to clinic in 2-3 months. 4. .Check labs in June. 5.. Cont. tizandine 4 mg at night - ok to try 4-8 mg at night - watch for drowsiness   6.  Tram

## 2018-05-21 ENCOUNTER — TELEPHONE (OUTPATIENT)
Dept: RHEUMATOLOGY | Facility: CLINIC | Age: 58
End: 2018-05-21

## 2018-05-21 ENCOUNTER — OFFICE VISIT (OUTPATIENT)
Dept: NEUROLOGY | Facility: CLINIC | Age: 58
End: 2018-05-21

## 2018-05-21 VITALS
SYSTOLIC BLOOD PRESSURE: 140 MMHG | BODY MASS INDEX: 43.4 KG/M2 | DIASTOLIC BLOOD PRESSURE: 70 MMHG | HEIGHT: 69 IN | WEIGHT: 293 LBS | HEART RATE: 80 BPM | RESPIRATION RATE: 16 BRPM

## 2018-05-21 DIAGNOSIS — M48.062 LUMBAR STENOSIS WITH NEUROGENIC CLAUDICATION: Primary | ICD-10-CM

## 2018-05-21 DIAGNOSIS — R20.0 LEFT ARM NUMBNESS: ICD-10-CM

## 2018-05-21 PROCEDURE — 99213 OFFICE O/P EST LOW 20 MIN: CPT | Performed by: PHYSICAL MEDICINE & REHABILITATION

## 2018-05-21 RX ORDER — LEVOTHYROXINE SODIUM 175 UG/1
175 TABLET ORAL
COMMUNITY
End: 2018-08-27 | Stop reason: DRUGHIGH

## 2018-05-21 RX ORDER — GABAPENTIN 300 MG/1
300 CAPSULE ORAL 2 TIMES DAILY
Qty: 60 CAPSULE | Refills: 0 | Status: SHIPPED | OUTPATIENT
Start: 2018-05-21 | End: 2018-06-18

## 2018-05-21 NOTE — TELEPHONE ENCOUNTER
Melita Parents Nurse w [address] [facility], [address] is requesting clinical from visit 05/19/2018 due to working on appeal for [employer]. Clinical must state if Patient has responded to [employer].      Fax number [phone]

## 2018-05-21 NOTE — PROGRESS NOTES
HPI:    Patient ID: Shona Lobato is a 65year old female. This 64-year-old female with a past medical history of Parkinson's disease,obesity, lumbar stenosis and rheumatoid arthritis presents for follow-up.   I performed the following procedures:    Musculoskeletal: Positive for back pain and neck pain. Skin: Negative for color change and rash. Neurological: Positive for numbness. Negative for weakness.         Current Outpatient Prescriptions:  Levothyroxine Sodium 175 MCG Oral Tab Take 175 mcg PROAIR  (90 Base) MCG/ACT Inhalation Aero Soln INHALE 2 PUFFS INTO THE LUNGS EVERY 6 (SIX) HOURS AS NEEDED FOR WHEEZING. Disp: 8.5 Inhaler Rfl: 0   Melatonin 10 MG Oral Cap Take 1 tablet by mouth nightly.  Disp:  Rfl:    Albuterol Sulfate (2.5 MG/3 bilaterally    Lange test: Negative bilaterally   Skin:   Healing bruise appreciated in the dorsum of the forearm, about the side of a credit card. Nursing note and vitals reviewed.          ASSESSMENT/PLAN:   L3-4, l4-5 central stenosis  (primary encou

## 2018-05-21 NOTE — TELEPHONE ENCOUNTER
Office notes dated 05/19/18 from Dr. Ron Rhodes were faxed over to [address] [place] [address],  [phone] as requested. FAX # [phone]. No further action is required by RN at this time.

## 2018-05-21 NOTE — PATIENT INSTRUCTIONS
If you are still having issues with the numbness and pain in the left arm then we will do an EMG on June 6th in the afternoon.  Please keep me updated as this will not be officially scheduled until I hear from you - if I don't hear from you I will assume yo the office 48-72 hours to fill the prescription as our physicians rotate between multiple offices and procedure days in the hospitals. · Patient must present photo ID at time of .  If a designated family member will be picking up prescription, offic

## 2018-05-22 ENCOUNTER — OFFICE VISIT (OUTPATIENT)
Dept: SURGERY | Facility: CLINIC | Age: 58
End: 2018-05-22

## 2018-05-22 VITALS
OXYGEN SATURATION: 98 % | RESPIRATION RATE: 16 BRPM | HEIGHT: 69 IN | WEIGHT: 293 LBS | DIASTOLIC BLOOD PRESSURE: 78 MMHG | BODY MASS INDEX: 43.4 KG/M2 | SYSTOLIC BLOOD PRESSURE: 126 MMHG | HEART RATE: 87 BPM

## 2018-05-22 DIAGNOSIS — E78.5 HYPERLIPIDEMIA, UNSPECIFIED HYPERLIPIDEMIA TYPE: ICD-10-CM

## 2018-05-22 DIAGNOSIS — E66.01 MORBID OBESITY WITH BMI OF 45.0-49.9, ADULT (HCC): Primary | ICD-10-CM

## 2018-05-22 DIAGNOSIS — G20 PARKINSON DISEASE (HCC): ICD-10-CM

## 2018-05-22 DIAGNOSIS — J45.909 ASTHMA, UNSPECIFIED ASTHMA SEVERITY, UNSPECIFIED WHETHER COMPLICATED, UNSPECIFIED WHETHER PERSISTENT: ICD-10-CM

## 2018-05-22 DIAGNOSIS — M06.9 RHEUMATOID ARTHRITIS, INVOLVING UNSPECIFIED SITE, UNSPECIFIED RHEUMATOID FACTOR PRESENCE: ICD-10-CM

## 2018-05-22 PROCEDURE — 99213 OFFICE O/P EST LOW 20 MIN: CPT | Performed by: NURSE PRACTITIONER

## 2018-05-22 RX ORDER — RASAGILINE 1 MG/1
1 TABLET ORAL DAILY
COMMUNITY
Start: 2018-03-18 | End: 2019-04-10

## 2018-05-22 NOTE — PATIENT INSTRUCTIONS
Goals for next month:  1. Keep a food log using [facility]  2. Drink 48-64 ounces of water per day. No fruit juices or regular soda. 3. Increase aerobic exercises (goal is 150 minutes per week)  4. Increase fruit and vegetable servings/day  5.  Keep carbs at or b

## 2018-05-22 NOTE — PROGRESS NOTES
[address] [facility] ([place], [place]) Quinn Brewer, [address] [address] [address], [address]  [address]  Dept: [phone]     Date:  18    Patient:  Rocky Lizama  :      1960  MRN:      [id number]    Chief Complai MCG Oral Tab Take 175 mcg by mouth before breakfast. Disp:  Rfl:    gabapentin 300 MG Oral Cap Take 1 capsule (300 mg total) by mouth 2 (two) times daily.  1 cap qHS x1 week; if no side effects in am then BID Disp: 60 capsule Rfl: 0   TiZANidine HCl 4 MG Or PUFFS INTO THE LUNGS EVERY 6 (SIX) HOURS AS NEEDED FOR WHEEZING. Disp: 8.5 Inhaler Rfl: 0   Melatonin 10 MG Oral Cap Take 1 tablet by mouth nightly.  Disp:  Rfl:    Albuterol Sulfate (2.5 MG/3ML) 0.083% Inhalation Nebu Soln Take 3 mL (2.5 mg total) by nebul infertility  1990: LAPAROSCOPY,PELVIC,BIOPSY      Comment: for infertility  12/3/2015: OTHER SURGICAL HISTORY Right  12/3/2015: OTHER SURGICAL HISTORY  No date: THYROIDECTOMY      Comment: Total   Family History:    Family History   Problem Relation Age of Negative. Cardiovascular: Negative. Gastrointestinal: Negative. Genitourinary: Negative. Musculoskeletal: Positive for arthralgias, back pain, gait problem and myalgias. Skin: Negative. Psychiatric/Behavioral: Negative.         Physical Exa function stable.        Lab Results  Component Value Date/Time   CHOLEST 193 05/28/2016 12:09 PM    (H) 05/28/2016 12:09 PM   HDL 58 05/28/2016 12:09 PM   TRIG 138 05/28/2016 12:09 PM         Asthma: Continue current meds; stable at this time     Pa

## 2018-05-23 NOTE — TELEPHONE ENCOUNTER
Irena WHITNEY, notes from 5/19 office visit rec'd for appeal. Pt is approved for interim care. Pt can call for medication to be shipped to her. Pt contacted and aware. She will call office back with any issues.

## 2018-06-02 ENCOUNTER — NURSE ONLY (OUTPATIENT)
Dept: ALLERGY | Facility: CLINIC | Age: 58
End: 2018-06-02

## 2018-06-02 ENCOUNTER — LAB ENCOUNTER (OUTPATIENT)
Dept: LAB | Age: 58
End: 2018-06-02
Attending: INTERNAL MEDICINE
Payer: COMMERCIAL

## 2018-06-02 DIAGNOSIS — Z51.81 ENCOUNTER FOR THERAPEUTIC DRUG MONITORING: ICD-10-CM

## 2018-06-02 DIAGNOSIS — J30.89 ENVIRONMENTAL AND SEASONAL ALLERGIES: ICD-10-CM

## 2018-06-02 DIAGNOSIS — M06.9 RHEUMATOID ARTHRITIS, INVOLVING UNSPECIFIED SITE, UNSPECIFIED RHEUMATOID FACTOR PRESENCE: ICD-10-CM

## 2018-06-02 PROCEDURE — 82040 ASSAY OF SERUM ALBUMIN: CPT

## 2018-06-02 PROCEDURE — 85027 COMPLETE CBC AUTOMATED: CPT

## 2018-06-02 PROCEDURE — 95117 IMMUNOTHERAPY INJECTIONS: CPT | Performed by: ALLERGY & IMMUNOLOGY

## 2018-06-02 PROCEDURE — 85652 RBC SED RATE AUTOMATED: CPT

## 2018-06-02 PROCEDURE — 84460 ALANINE AMINO (ALT) (SGPT): CPT

## 2018-06-02 PROCEDURE — 36415 COLL VENOUS BLD VENIPUNCTURE: CPT

## 2018-06-02 PROCEDURE — 84450 TRANSFERASE (AST) (SGOT): CPT

## 2018-06-02 PROCEDURE — 85025 COMPLETE CBC W/AUTO DIFF WBC: CPT

## 2018-06-02 PROCEDURE — 85007 BL SMEAR W/DIFF WBC COUNT: CPT

## 2018-06-02 PROCEDURE — 82565 ASSAY OF CREATININE: CPT

## 2018-06-02 PROCEDURE — 86140 C-REACTIVE PROTEIN: CPT

## 2018-06-04 ENCOUNTER — TELEPHONE (OUTPATIENT)
Dept: NEUROLOGY | Facility: CLINIC | Age: 58
End: 2018-06-04

## 2018-06-04 NOTE — TELEPHONE ENCOUNTER
Spoke to patient. She states that she is taking gabapentin 300 mg BID and it is greatly helping with left hand. She is wondering if she sill needs to get the EMG done since her symptoms seems to be improving with medication. Please advise.

## 2018-06-04 NOTE — TELEPHONE ENCOUNTER
Spoke to patient and notified her of below. She was understanding. She is scheduled for a follow-up on 7/2/18.

## 2018-06-04 NOTE — TELEPHONE ENCOUNTER
No need for EMG if the patient is improving; please cancel EMG for Wednesday.  Please have the patient come see me in 4 weeks if the symptoms persist.

## 2018-06-05 RX ORDER — CALCITRIOL 0.25 UG/1
0.25 CAPSULE, LIQUID FILLED ORAL DAILY
Qty: 90 CAPSULE | Refills: 1 | Status: SHIPPED | OUTPATIENT
Start: 2018-06-05 | End: 2018-11-30

## 2018-06-05 NOTE — TELEPHONE ENCOUNTER
Please advise on refill request.    Refill Protocol Appointment Criteria  · Appointment scheduled in the past 6 months or in the next 3 months  Recent Outpatient Visits            3 days ago Environmental and seasonal allergies    [address]

## 2018-06-18 DIAGNOSIS — M48.062 LUMBAR STENOSIS WITH NEUROGENIC CLAUDICATION: ICD-10-CM

## 2018-06-18 RX ORDER — GABAPENTIN 300 MG/1
300 CAPSULE ORAL 2 TIMES DAILY
Qty: 60 CAPSULE | Refills: 0 | Status: SHIPPED | OUTPATIENT
Start: 2018-06-18 | End: 2018-07-03 | Stop reason: DRUGHIGH

## 2018-06-18 NOTE — TELEPHONE ENCOUNTER
Refill request for gabapentin 300 mg, BID, #60, no refills    LOV: 5/21/18  NOV: 7/2/18  Last refilled on 5/21/18

## 2018-06-25 ENCOUNTER — OFFICE VISIT (OUTPATIENT)
Dept: ALLERGY | Facility: CLINIC | Age: 58
End: 2018-06-25

## 2018-06-25 ENCOUNTER — NURSE ONLY (OUTPATIENT)
Dept: ALLERGY | Facility: CLINIC | Age: 58
End: 2018-06-25

## 2018-06-25 VITALS
OXYGEN SATURATION: 96 % | SYSTOLIC BLOOD PRESSURE: 136 MMHG | HEART RATE: 88 BPM | DIASTOLIC BLOOD PRESSURE: 79 MMHG | TEMPERATURE: 99 F | RESPIRATION RATE: 18 BRPM

## 2018-06-25 DIAGNOSIS — J30.1 SEASONAL ALLERGIC RHINITIS DUE TO POLLEN: ICD-10-CM

## 2018-06-25 DIAGNOSIS — Z91.09 ALLERGY TO AMERICAN HOUSE DUST MITE: ICD-10-CM

## 2018-06-25 DIAGNOSIS — J45.40 ASTHMA, EXTRINSIC, MODERATE PERSISTENT, UNCOMPLICATED: Primary | ICD-10-CM

## 2018-06-25 DIAGNOSIS — J30.89 ENVIRONMENTAL AND SEASONAL ALLERGIES: ICD-10-CM

## 2018-06-25 PROBLEM — F43.23 ADJUSTMENT DISORDER WITH MIXED ANXIETY AND DEPRESSED MOOD: Status: ACTIVE | Noted: 2018-06-25

## 2018-06-25 PROCEDURE — 99212 OFFICE O/P EST SF 10 MIN: CPT | Performed by: ALLERGY & IMMUNOLOGY

## 2018-06-25 PROCEDURE — 95117 IMMUNOTHERAPY INJECTIONS: CPT | Performed by: ALLERGY & IMMUNOLOGY

## 2018-06-25 PROCEDURE — 94010 BREATHING CAPACITY TEST: CPT | Performed by: ALLERGY & IMMUNOLOGY

## 2018-06-25 PROCEDURE — 99214 OFFICE O/P EST MOD 30 MIN: CPT | Performed by: ALLERGY & IMMUNOLOGY

## 2018-06-25 RX ORDER — MONTELUKAST SODIUM 10 MG/1
10 TABLET ORAL NIGHTLY
Qty: 90 TABLET | Refills: 1 | Status: SHIPPED | OUTPATIENT
Start: 2018-06-25 | End: 2018-12-18

## 2018-06-25 NOTE — PROGRESS NOTES
Valerio Crapio is a 65year old female. HPI:   Patient presents with:  Asthma: Pt reports no asthma flare ups. Allergies: Pt reports symptoms are okay. Post AIT will have a small local reaction, but it is gone within 24 hours.      Patient is a 57-yea OPEN REDUCTION INTERNAL FIXATION   • Urinary incontinence 2/12/95    after childbirth   • Viral conjunctivitis 2008      Past Surgical History:  12/16/2013: COLONOSCOPY  No date: COLPOSCOPY, CERVIX W/UPPER ADJACENT VAGINA; W/*  No date: D & C      Comme Oral Tab TAKE 9 TABLETS (22.5 MG TOTAL) BY MOUTH EVERY 7 DAYS.  Disp: 45 tablet Rfl: 3   TraMADol HCl 50 MG Oral Tab TAKE 1 TABLET BY MOUTH EVERY 12 HOURS AS NEEDED Disp: 60 tablet Rfl: 4   HYDROcodone-acetaminophen  MG Oral Tab Take 1 tablet by mouth (2.5 mg total) by nebulization every 4 (four) hours as needed for Wheezing. Disp: 3 Box Rfl: 0       Allergies: Other                   HIVES    Comment:Reports side effect from DPT vaccine.   Penicillins             HIVES  Diptheria-Tetanus T*    HIVES in the interim. Denies symptoms more than 2 days per week.   Vaccine is up-to-date    Recs: Patient to contact my office when she completes her current Advair 250/50 as we will attempt to stepdown to Advair 100/51 puff once a day  Continue with singular on

## 2018-07-02 ENCOUNTER — OFFICE VISIT (OUTPATIENT)
Dept: NEUROLOGY | Facility: CLINIC | Age: 58
End: 2018-07-02

## 2018-07-02 VITALS
HEART RATE: 88 BPM | WEIGHT: 293 LBS | DIASTOLIC BLOOD PRESSURE: 84 MMHG | SYSTOLIC BLOOD PRESSURE: 140 MMHG | HEIGHT: 69 IN | RESPIRATION RATE: 20 BRPM | BODY MASS INDEX: 43.4 KG/M2

## 2018-07-02 DIAGNOSIS — M48.062 LUMBAR STENOSIS WITH NEUROGENIC CLAUDICATION: Primary | ICD-10-CM

## 2018-07-02 PROCEDURE — 99213 OFFICE O/P EST LOW 20 MIN: CPT | Performed by: PHYSICAL MEDICINE & REHABILITATION

## 2018-07-02 RX ORDER — GABAPENTIN 600 MG/1
600 TABLET ORAL 2 TIMES DAILY
Qty: 60 TABLET | Refills: 0 | Status: SHIPPED | OUTPATIENT
Start: 2018-07-02 | End: 2018-07-16

## 2018-07-02 RX ORDER — DICLOFENAC SODIUM 75 MG/1
75 TABLET, DELAYED RELEASE ORAL 2 TIMES DAILY
Qty: 60 TABLET | Refills: 3 | Status: SHIPPED | OUTPATIENT
Start: 2018-07-02 | End: 2018-11-17

## 2018-07-02 NOTE — PATIENT INSTRUCTIONS
Send me a [employer] message in a month and let me know how you are doing. If the medication is helpful we can discuss either increasing the dose or keeping it at the same dose depending on how you are doing.  If you do not notice a difference then I will star photo ID at time of . If a designated family member will be picking up prescription, office must be given name of individual in advance and they must present an ID as well.   · The name of the person picking up your prescription must be documented in

## 2018-07-02 NOTE — TELEPHONE ENCOUNTER
LOV: 5/19/18  Future Appointments  Date Time Provider Joel Mann   7/2/2018 4:00 PM Neo Arsh [facility] [place] [facility]   7/3/2018 5:00 PM RAFAELA Diggs [facility]   7/30/2018 5:00 PM Katelyn Kennedy [facility] VALENTINO Zavala

## 2018-07-02 NOTE — PROGRESS NOTES
HPI:    Patient ID: Cy Franco is a 65year old female. This 52-year-old female with a past medical history of Parkinson's disease,obesity, lumbar stenosis and rheumatoid arthritis presents for follow-up.   I performed the following procedures:    total) by mouth nightly. Disp: 90 tablet Rfl: 1   gabapentin 300 MG Oral Cap Take 1 capsule (300 mg total) by mouth 2 (two) times daily. Disp: 60 capsule Rfl: 0   CALCITRIOL 0.25 MCG Oral Cap TAKE 1 CAPSULE (0.25 MCG TOTAL) BY MOUTH DAILY.  Disp: 90 capsule as needed for Wheezing. Disp: 3 Box Rfl: 0   Hydrocod Polst-CPM Polst ER 10-8 MG/5ML Oral Liquid CR Take 5 mL by mouth 2 (two) times daily as needed.  Take 1 teaspoon by mouth as needed Disp: 140 mL Rfl: 0   carbidopa-levodopa (SINEMET)  MG Oral Tab T

## 2018-07-03 ENCOUNTER — OFFICE VISIT (OUTPATIENT)
Dept: SURGERY | Facility: CLINIC | Age: 58
End: 2018-07-03

## 2018-07-03 VITALS
OXYGEN SATURATION: 97 % | BODY MASS INDEX: 43.4 KG/M2 | HEIGHT: 69 IN | WEIGHT: 293 LBS | DIASTOLIC BLOOD PRESSURE: 70 MMHG | SYSTOLIC BLOOD PRESSURE: 126 MMHG | RESPIRATION RATE: 16 BRPM | HEART RATE: 96 BPM

## 2018-07-03 DIAGNOSIS — E78.5 HYPERLIPIDEMIA, UNSPECIFIED HYPERLIPIDEMIA TYPE: ICD-10-CM

## 2018-07-03 DIAGNOSIS — J45.909 ASTHMA, UNSPECIFIED ASTHMA SEVERITY, UNSPECIFIED WHETHER COMPLICATED, UNSPECIFIED WHETHER PERSISTENT: ICD-10-CM

## 2018-07-03 DIAGNOSIS — G20 PARKINSON DISEASE (HCC): ICD-10-CM

## 2018-07-03 DIAGNOSIS — E66.01 MORBID OBESITY WITH BMI OF 45.0-49.9, ADULT (HCC): Primary | ICD-10-CM

## 2018-07-03 DIAGNOSIS — M06.9 RHEUMATOID ARTHRITIS, INVOLVING UNSPECIFIED SITE, UNSPECIFIED RHEUMATOID FACTOR PRESENCE: ICD-10-CM

## 2018-07-03 PROCEDURE — 99213 OFFICE O/P EST LOW 20 MIN: CPT | Performed by: NURSE PRACTITIONER

## 2018-07-03 NOTE — PROGRESS NOTES
[address], [address] [place] [address], [address] [address] [address]  [address]  Dept: [phone]     Date:  7/3/18    Patient:  Geremias Pappas  :      1960  MRN:      [id number]    Chief Complain BY MOUTH 2 (TWO) TIMES DAILY. Disp: 60 tablet Rfl: 3   gabapentin 600 MG Oral Tab Take 1 tablet (600 mg total) by mouth 2 (two) times daily. Disp: 60 tablet Rfl: 0   Montelukast Sodium 10 MG Oral Tab Take 1 tablet (10 mg total) by mouth nightly.  Disp: 90 t Disp: 8.5 Inhaler Rfl: 0   Melatonin 10 MG Oral Cap Take 1 tablet by mouth nightly. Disp:  Rfl:    Albuterol Sulfate (2.5 MG/3ML) 0.083% Inhalation Nebu Soln Take 3 mL (2.5 mg total) by nebulization every 4 (four) hours as needed for Wheezing.  Disp: 3 Box infertility  12/3/2015: OTHER SURGICAL HISTORY Right  12/3/2015: OTHER SURGICAL HISTORY  No date: THYROIDECTOMY      Comment: Total   Family History:    Family History   Problem Relation Age of Onset   • Heart Disorder Father 54     heart attack-cause of d Genitourinary: Negative. Musculoskeletal: Positive for arthralgias, back pain, gait problem and myalgias. Skin: Negative. Psychiatric/Behavioral: Negative.         Physical Exam:   Physical Exam   Constitutional: She is oriented to person, place, CHOLEST 193 05/28/2016 12:09 PM    (H) 05/28/2016 12:09 PM   HDL 58 05/28/2016 12:09 PM   TRIG 138 05/28/2016 12:09 PM         Asthma: Continue current meds; stable at this time     Parkinson's disease: continue current meds; stable at this time

## 2018-07-16 RX ORDER — TIZANIDINE HYDROCHLORIDE 4 MG/1
CAPSULE, GELATIN COATED ORAL
Qty: 60 CAPSULE | Refills: 1 | Status: SHIPPED | OUTPATIENT
Start: 2018-07-16 | End: 2018-11-17

## 2018-07-16 RX ORDER — GABAPENTIN 600 MG/1
600 TABLET ORAL 2 TIMES DAILY
Qty: 60 TABLET | Refills: 0 | Status: SHIPPED | OUTPATIENT
Start: 2018-07-16 | End: 2018-08-21

## 2018-07-16 NOTE — TELEPHONE ENCOUNTER
[address] [facility] [address], #60 with 1 refill  Labs:   Future Appointments  Date Time Provider Joel Mann   7/30/2018 5:00 PM Shagufta [facility]   8/21/2018 5:30 PM RAFAELA Schumacher [facility]   8/27/2018 6:2

## 2018-07-16 NOTE — TELEPHONE ENCOUNTER
Refill request for gabapentin 600 mg, BID, #60, no refills    LOV: 7/2/18  NOV: 10/1/18  Last refilled on 7/2/18

## 2018-07-23 ENCOUNTER — NURSE ONLY (OUTPATIENT)
Dept: ALLERGY | Facility: CLINIC | Age: 58
End: 2018-07-23
Payer: COMMERCIAL

## 2018-07-23 DIAGNOSIS — J30.89 ENVIRONMENTAL AND SEASONAL ALLERGIES: ICD-10-CM

## 2018-07-23 PROCEDURE — 95117 IMMUNOTHERAPY INJECTIONS: CPT | Performed by: ALLERGY & IMMUNOLOGY

## 2018-07-27 NOTE — TELEPHONE ENCOUNTER
LOV: 5-19-18  Last Refilled:Never filled by you    Future Appointments  Date Time Provider Joel Mann   7/30/2018 5:00 PM Bonita Cohen [facility]   8/21/2018 5:30 PM RAFAELA [address]   8/27/2018 6:20 PM Kayce Huertas MD [address] [place] [address] SYSTEM OF THE [facility]   10/1/2018 4:00 PM [facility]   10/4/2018 4:20 PM Stacy Feldman [facility]       Please advise.

## 2018-08-01 NOTE — TELEPHONE ENCOUNTER
Pt is running out of her medication and she states she's in the process of a prior authorization. . pls advise. Thank you      Current Outpatient Prescriptions:  Tofacitinib Citrate (XELJANZ XR) 11 MG Oral Tablet 24 Hr Take 11 mg by mouth daily.  Disp:  Rfl:

## 2018-08-01 NOTE — TELEPHONE ENCOUNTER
LOV: 5-19-18  Last Refilled:#30, 0rfs 2/6/18  Pt received interim supply of this. Future Appointments  Date Time Provider Joel Mann   8/9/2018 5:00 PM Derik Howell [facility]   8/21/2018 5:30 PM RAFAELA Espinoza [facility]   8/23/2018 6:00 PM Derik Howell [facility]   8/27/2018 6:20 PM Mora Aguero MD [address] [facility]   10/1/2018 4:00 PM Jules [facility]   10/4/2018 4:20 PM Mely Zhang [facility]       Please advise.

## 2018-08-08 ENCOUNTER — TELEPHONE (OUTPATIENT)
Dept: RHEUMATOLOGY | Facility: CLINIC | Age: 58
End: 2018-08-08

## 2018-08-13 ENCOUNTER — TELEPHONE (OUTPATIENT)
Dept: RHEUMATOLOGY | Facility: CLINIC | Age: 58
End: 2018-08-13

## 2018-08-16 NOTE — TELEPHONE ENCOUNTER
[facility] called in stating they received notice that the PA was denied by the insurance. Calling to check with Dr. Pierson Clayton would like to do.      Pharmacy states that forms should be faxed to the office for completion but if an appeal is desired, p

## 2018-08-16 NOTE — TELEPHONE ENCOUNTER
Spoke with pt and aware insurance denied because is need to try and fail injectable medication first. Office is working with [employer] to continue interim care. Office will work on appeal letter.

## 2018-08-20 ENCOUNTER — NURSE ONLY (OUTPATIENT)
Dept: ALLERGY | Facility: CLINIC | Age: 58
End: 2018-08-20
Payer: COMMERCIAL

## 2018-08-20 ENCOUNTER — LAB ENCOUNTER (OUTPATIENT)
Dept: LAB | Facility: HOSPITAL | Age: 58
End: 2018-08-20
Attending: INTERNAL MEDICINE
Payer: COMMERCIAL

## 2018-08-20 DIAGNOSIS — R79.89 ABNORMAL TSH: ICD-10-CM

## 2018-08-20 DIAGNOSIS — M06.9 RHEUMATOID ARTHRITIS, INVOLVING UNSPECIFIED SITE, UNSPECIFIED RHEUMATOID FACTOR PRESENCE: ICD-10-CM

## 2018-08-20 DIAGNOSIS — J30.1 SEASONAL ALLERGIC RHINITIS DUE TO POLLEN: ICD-10-CM

## 2018-08-20 DIAGNOSIS — Z51.81 ENCOUNTER FOR THERAPEUTIC DRUG MONITORING: ICD-10-CM

## 2018-08-20 DIAGNOSIS — E03.9 HYPOTHYROIDISM, UNSPECIFIED TYPE: ICD-10-CM

## 2018-08-20 LAB
ALBUMIN SERPL BCP-MCNC: 3.5 G/DL (ref 3.5–4.8)
ALT SERPL-CCNC: 9 U/L (ref 14–54)
AST SERPL-CCNC: 21 U/L (ref 15–41)
BASOPHILS # BLD: 0 K/UL (ref 0–0.2)
BASOPHILS NFR BLD: 1 %
CREAT SERPL-MCNC: 1.04 MG/DL (ref 0.5–1.5)
CRP SERPL-MCNC: 1.4 MG/DL (ref 0–0.9)
EOSINOPHIL # BLD: 0.1 K/UL (ref 0–0.7)
EOSINOPHIL NFR BLD: 2 %
ERYTHROCYTE [DISTWIDTH] IN BLOOD BY AUTOMATED COUNT: 16.6 % (ref 11–15)
ERYTHROCYTE [SEDIMENTATION RATE] IN BLOOD: 27 MM/HR (ref 0–30)
HCT VFR BLD AUTO: 39 % (ref 35–48)
HGB BLD-MCNC: 12.8 G/DL (ref 12–16)
LYMPHOCYTES # BLD: 2 K/UL (ref 1–4)
LYMPHOCYTES NFR BLD: 28 %
MCH RBC QN AUTO: 29.9 PG (ref 27–32)
MCHC RBC AUTO-ENTMCNC: 32.8 G/DL (ref 32–37)
MCV RBC AUTO: 91 FL (ref 80–100)
MONOCYTES # BLD: 0.8 K/UL (ref 0–1)
MONOCYTES NFR BLD: 10 %
NEUTROPHILS # BLD AUTO: 4.4 K/UL (ref 1.8–7.7)
NEUTROPHILS NFR BLD: 60 %
PLATELET # BLD AUTO: 231 K/UL (ref 140–400)
PMV BLD AUTO: 9.4 FL (ref 7.4–10.3)
RBC # BLD AUTO: 4.29 M/UL (ref 3.7–5.4)
TSH SERPL-ACNC: 0.25 UIU/ML (ref 0.45–5.33)
WBC # BLD AUTO: 7.4 K/UL (ref 4–11)

## 2018-08-20 PROCEDURE — 84460 ALANINE AMINO (ALT) (SGPT): CPT

## 2018-08-20 PROCEDURE — 86140 C-REACTIVE PROTEIN: CPT

## 2018-08-20 PROCEDURE — 84443 ASSAY THYROID STIM HORMONE: CPT

## 2018-08-20 PROCEDURE — 85025 COMPLETE CBC W/AUTO DIFF WBC: CPT

## 2018-08-20 PROCEDURE — 82565 ASSAY OF CREATININE: CPT

## 2018-08-20 PROCEDURE — 82040 ASSAY OF SERUM ALBUMIN: CPT

## 2018-08-20 PROCEDURE — 84450 TRANSFERASE (AST) (SGOT): CPT

## 2018-08-20 PROCEDURE — 36415 COLL VENOUS BLD VENIPUNCTURE: CPT

## 2018-08-20 PROCEDURE — 95165 ANTIGEN THERAPY SERVICES: CPT | Performed by: ALLERGY & IMMUNOLOGY

## 2018-08-20 PROCEDURE — 95117 IMMUNOTHERAPY INJECTIONS: CPT | Performed by: ALLERGY & IMMUNOLOGY

## 2018-08-20 PROCEDURE — 85652 RBC SED RATE AUTOMATED: CPT

## 2018-08-21 ENCOUNTER — OFFICE VISIT (OUTPATIENT)
Dept: SURGERY | Facility: CLINIC | Age: 58
End: 2018-08-21
Payer: COMMERCIAL

## 2018-08-21 VITALS
BODY MASS INDEX: 43.4 KG/M2 | DIASTOLIC BLOOD PRESSURE: 70 MMHG | WEIGHT: 293 LBS | HEART RATE: 78 BPM | SYSTOLIC BLOOD PRESSURE: 120 MMHG | OXYGEN SATURATION: 98 % | RESPIRATION RATE: 18 BRPM | HEIGHT: 69 IN

## 2018-08-21 DIAGNOSIS — E78.5 HYPERLIPIDEMIA, UNSPECIFIED HYPERLIPIDEMIA TYPE: ICD-10-CM

## 2018-08-21 DIAGNOSIS — E66.01 MORBID OBESITY WITH BMI OF 45.0-49.9, ADULT (HCC): Primary | ICD-10-CM

## 2018-08-21 DIAGNOSIS — G20 PARKINSON DISEASE (HCC): ICD-10-CM

## 2018-08-21 DIAGNOSIS — M06.9 RHEUMATOID ARTHRITIS, INVOLVING UNSPECIFIED SITE, UNSPECIFIED RHEUMATOID FACTOR PRESENCE: ICD-10-CM

## 2018-08-21 DIAGNOSIS — J45.909 ASTHMA, UNSPECIFIED ASTHMA SEVERITY, UNSPECIFIED WHETHER COMPLICATED, UNSPECIFIED WHETHER PERSISTENT: ICD-10-CM

## 2018-08-21 PROCEDURE — 99213 OFFICE O/P EST LOW 20 MIN: CPT | Performed by: NURSE PRACTITIONER

## 2018-08-21 RX ORDER — ERGOCALCIFEROL 1.25 MG/1
CAPSULE ORAL
Refills: 4 | COMMUNITY
Start: 2018-07-01 | End: 2019-04-22

## 2018-08-21 NOTE — PROGRESS NOTES
[address] [place] [address], [address] [address] [address]  [address]  Dept: [phone]     Date:  18    Patient:  Jacquelin Sauceda  :      1960  MRN:      [id number]    Chief Complai tablet Rfl: 0   ergocalciferol 46369 units Oral Cap TAKE 1 CAPSULE (50,000 UNITS TOTAL) BY MOUTH EVERY 30 (THIRTY) DAYS. Disp:  Rfl: 4   Tofacitinib Citrate (XELJANZ XR) 11 MG Oral Tablet 24 Hr Take 11 mg by mouth daily.  Disp: 30 tablet Rfl: 3   DICLOFENAC FOR WHEEZING. Disp: 8.5 Inhaler Rfl: 0   Melatonin 10 MG Oral Cap Take 1 tablet by mouth nightly.  Disp:  Rfl:    Albuterol Sulfate (2.5 MG/3ML) 0.083% Inhalation Nebu Soln Take 3 mL (2.5 mg total) by nebulization every 4 (four) hours as needed for Wheezing Comment: for infertility  12/3/2015: OTHER SURGICAL HISTORY Right  12/3/2015: OTHER SURGICAL HISTORY  No date: THYROIDECTOMY      Comment: Total   Family History:    Family History   Problem Relation Age of Onset   • Heart Disorder Father 54     heart bekah Gastrointestinal: Negative. Genitourinary: Negative. Musculoskeletal: Positive for arthralgias, back pain, gait problem and myalgias. Skin: Negative. Psychiatric/Behavioral: Negative.         Physical Exam:   Physical Exam   Constitutional: She Further consideration for bariatric surgery will be discussed at upcoming clinic visits.       I also advised Georgiana aBrrett to discuss bariatric surgery with her medical team. I did advise her against lap band as she has a hx of an autoimmune illness and her chance carbs at or below 100g/day  6. Avoid skipping meals  7. Prepare meals and snacks in advance  8.  Daily calorie goal 1800cal    Has cut out carol    Advised Lindsay to eat [place] yogurt/cottage cheese/eggs for breakfast instead of cereals and oatmeal as they

## 2018-08-23 NOTE — TELEPHONE ENCOUNTER
Spoke with DEVONTE, pt will have interim care for the next month. Trixie Brambila will call her for delivery.

## 2018-08-23 NOTE — TELEPHONE ENCOUNTER
Pt aware appeal letter sent to insurance.  Spoke with DEVONTE, he will follow up on interim care for [facility] during appeal.

## 2018-08-24 ENCOUNTER — TELEPHONE (OUTPATIENT)
Dept: NEUROLOGY | Facility: CLINIC | Age: 58
End: 2018-08-24

## 2018-08-24 NOTE — TELEPHONE ENCOUNTER
Medication request: Gabapentin 600mg. Take 1 tab TID. #60. No refills        Request denied.   Refilled on 8/21/2018

## 2018-08-27 ENCOUNTER — OFFICE VISIT (OUTPATIENT)
Dept: RHEUMATOLOGY | Facility: CLINIC | Age: 58
End: 2018-08-27
Payer: COMMERCIAL

## 2018-08-27 ENCOUNTER — TELEPHONE (OUTPATIENT)
Dept: OTHER | Age: 58
End: 2018-08-27

## 2018-08-27 VITALS
WEIGHT: 293 LBS | HEART RATE: 81 BPM | SYSTOLIC BLOOD PRESSURE: 126 MMHG | DIASTOLIC BLOOD PRESSURE: 70 MMHG | BODY MASS INDEX: 43.4 KG/M2 | HEIGHT: 69 IN

## 2018-08-27 DIAGNOSIS — Z51.81 THERAPEUTIC DRUG MONITORING: ICD-10-CM

## 2018-08-27 DIAGNOSIS — R29.898 LEFT LEG WEAKNESS: ICD-10-CM

## 2018-08-27 DIAGNOSIS — M48.061 SPINAL STENOSIS OF LUMBAR REGION, UNSPECIFIED WHETHER NEUROGENIC CLAUDICATION PRESENT: ICD-10-CM

## 2018-08-27 DIAGNOSIS — M06.9 RHEUMATOID ARTHRITIS, INVOLVING UNSPECIFIED SITE, UNSPECIFIED RHEUMATOID FACTOR PRESENCE: Primary | ICD-10-CM

## 2018-08-27 DIAGNOSIS — M25.552 LEFT HIP PAIN: ICD-10-CM

## 2018-08-27 DIAGNOSIS — R79.89 ABNORMAL TSH: Primary | ICD-10-CM

## 2018-08-27 PROCEDURE — 99214 OFFICE O/P EST MOD 30 MIN: CPT | Performed by: INTERNAL MEDICINE

## 2018-08-27 PROCEDURE — 99212 OFFICE O/P EST SF 10 MIN: CPT | Performed by: INTERNAL MEDICINE

## 2018-08-27 RX ORDER — LEVOTHYROXINE SODIUM 0.15 MG/1
150 TABLET ORAL
Qty: 90 TABLET | Refills: 0 | Status: SHIPPED | OUTPATIENT
Start: 2018-08-27 | End: 2018-11-29

## 2018-08-27 NOTE — PATIENT INSTRUCTIONS
1. Cont. methotrexate - take  8 tablets a week and folic acid 1mg a day    2. Cont. xlejanz 11mg a day   3. Return to clinic in 2-3 months. 4. .Check labs in 3 months . 5. Tramadol 50mg every 12 hours as needed.    - phyiscal thearpy for left leg

## 2018-08-27 NOTE — PROGRESS NOTES
Geremias Pappas is a 65year old female who presents for Patient presents with:  Rheumatoid Arthritis  Leg Pain  Back Pain  . HPI:     She is a pleasant 65year old who has elevated RF >300 and foot pain - dx with RA -  here on 8/27/2018 .  I had last see the foot. She has a hx of mensiscal tear repair. 6/6/2016  She can't lift her left thigh. She can't lift it out of a car and bed it wont' move. It's bothering her for 3-4 weeks. She can still get up the stairs. She feels it's more dragging.    No doesn 't know if the ra, parkinson's or thyroid. She's on methotrexate 8 tablets a week. She takes tramadol once a day as needed. Her fingers are better. No shoulders are ok. Her knees still hurts.      5/22/2017  Her left t shoulder flared - she had ba She has left hand pain - she's able to help it with the diclfoenac gel . She's off nsaids until her intjeciton. The injecitons only helped hte week or tow. She felt the PT and prednisone doesn't do much. She's working at home right now.    She's t feels her back is her main area of pain. She feels gabapnetin is helping. She's not taking a muscle relaxer.    She is following with dr Tanna Lemus Readings from Last 2 Encounters:  08/27/18 : (!) 322 lb 6.4 oz (146.2 kg)  08/21/18 : (!) 320 lb (1 fluticasone-salmeterol (ADVAIR DISKUS) 250-50 MCG/DOSE Inhalation Aerosol Powder, Breath Activated INHALE 1 PUFF INTO THE LUNGS EVERY 12 (TWELVE) HOURS.  Disp: 3 each Rfl: 1   PROAIR  (90 Base) MCG/ACT Inhalation Aero Soln INHALE 2 PUFFS INTO THE L Thyroid disease 2016   • Tibia/fibula fracture     OPEN REDUCTION INTERNAL FIXATION   • Urinary incontinence 2/12/95    after childbirth   • Viral conjunctivitis 2008      Past Surgical History:  12/16/2013: COLONOSCOPY  No date: COLPOSCOPY, CERVIX W/UPPER hands.   Squeeze test imporved.    Left hip not tender  Right hip not tender   No lower back tendnerss to touch    Component      Latest Ref Rng & Units 8/20/2018   WBC      4.0 - 11.0 K/UL 7.4   RBC      3.70 - 5.40 M/UL 4.29   Hemoglobin      12.0 - 16.0 deformity of the proximal shaft of the  fifth metatarsal now present which has resulted in broadening of the  proximal fifth metatarsal shaft.  There is a slight medial bowing deformity  of the fifth metatarsal there is a result of the previous fracture but Positive AMINAH 1:80, , polyarthralgia with rash on feet - now positive ccp abs - very specific for seropositive Rheumatoid arthriits - likely early migratory -   - stay on  Methotrexate - cut down from 22.5m to  20mg a week   On xeljanz since 2/2018 - that right now. Summary:  1. Cont. methotrexate - take  8 tablets a week and folic acid 1mg a day    2. Cont. xlejanz 11mg a day   3. Return to clinic in 2-3 months. 4. .Check labs in 3 months . 5. Tramadol 50mg every 12 hours as needed.    - physi

## 2018-08-27 NOTE — TELEPHONE ENCOUNTER
PA approved for Janki Giordano from 7/25/18 to 8/25/2020. Script sent to [employer]. Pt aware to activate a co-pay. She will call office back with any questions or concerns.

## 2018-08-27 NOTE — TELEPHONE ENCOUNTER
Pt called, message per  given.   Verbalized understanding and compliance      Viewed by Mariia Levine on 8/27/2018 11:18 AM   Written by Jacqueline Nicole PA-C on 8/22/2018  8:52 PM   Harshad Montoya,     The TSH thyroid blood level is still abnormal.  It is bet

## 2018-08-29 NOTE — TELEPHONE ENCOUNTER
Pt aware [employer] to sent co-pay information to pharmacy, follow up with pharmacy and pt with co-pay card information.

## 2018-09-17 ENCOUNTER — NURSE ONLY (OUTPATIENT)
Dept: ALLERGY | Facility: CLINIC | Age: 58
End: 2018-09-17
Payer: COMMERCIAL

## 2018-09-17 ENCOUNTER — TELEPHONE (OUTPATIENT)
Dept: ALLERGY | Facility: CLINIC | Age: 58
End: 2018-09-17

## 2018-09-17 DIAGNOSIS — J30.89 ENVIRONMENTAL AND SEASONAL ALLERGIES: ICD-10-CM

## 2018-09-17 PROCEDURE — 95117 IMMUNOTHERAPY INJECTIONS: CPT | Performed by: ALLERGY & IMMUNOLOGY

## 2018-09-17 RX ORDER — GABAPENTIN 600 MG/1
600 TABLET ORAL 3 TIMES DAILY
Qty: 60 TABLET | Refills: 0 | Status: SHIPPED | OUTPATIENT
Start: 2018-09-17 | End: 2018-10-04 | Stop reason: DRUGHIGH

## 2018-09-17 RX ORDER — FLUTICASONE PROPIONATE AND SALMETEROL 100; 50 UG/1; UG/1
1 POWDER RESPIRATORY (INHALATION) 2 TIMES DAILY
Qty: 1 EACH | Refills: 0 | Status: SHIPPED | OUTPATIENT
Start: 2018-09-17 | End: 2018-10-20

## 2018-09-17 NOTE — TELEPHONE ENCOUNTER
Prescription for Advair 100/50 1 puff twice a day sent to patient's pharmacy ×1 month.   Please have patient call us in 1 month to ensure she is tolerating the lower dose of Advair and if so will place for additional prescriptions until December

## 2018-09-17 NOTE — TELEPHONE ENCOUNTER
Medication request: Gabapentin 600 mg, Take 1 tablet by mouth three times daily.  [address] Refills 0    LOV: 7/2/18  NOV: 10/1/18    Last refill: 8/21/18

## 2018-09-17 NOTE — TELEPHONE ENCOUNTER
Pt in for AIT and requested refill on her Advair, has finished her 250/50. LOV 6/2018, has an appointment in Dec 2018. Pt stated Dr was thinking of changing Advair strength at last visit when 250 ran out, to 100/51. Please advise.

## 2018-09-18 ENCOUNTER — OFFICE VISIT (OUTPATIENT)
Dept: SURGERY | Facility: CLINIC | Age: 58
End: 2018-09-18
Payer: COMMERCIAL

## 2018-09-18 VITALS
OXYGEN SATURATION: 97 % | SYSTOLIC BLOOD PRESSURE: 120 MMHG | HEART RATE: 84 BPM | BODY MASS INDEX: 43.4 KG/M2 | HEIGHT: 69 IN | WEIGHT: 293 LBS | DIASTOLIC BLOOD PRESSURE: 70 MMHG

## 2018-09-18 DIAGNOSIS — E78.5 HYPERLIPIDEMIA, UNSPECIFIED HYPERLIPIDEMIA TYPE: ICD-10-CM

## 2018-09-18 DIAGNOSIS — J45.909 ASTHMA, UNSPECIFIED ASTHMA SEVERITY, UNSPECIFIED WHETHER COMPLICATED, UNSPECIFIED WHETHER PERSISTENT: ICD-10-CM

## 2018-09-18 DIAGNOSIS — G20 PARKINSON DISEASE (HCC): ICD-10-CM

## 2018-09-18 DIAGNOSIS — M06.9 RHEUMATOID ARTHRITIS, INVOLVING UNSPECIFIED SITE, UNSPECIFIED RHEUMATOID FACTOR PRESENCE: ICD-10-CM

## 2018-09-18 DIAGNOSIS — E66.01 MORBID OBESITY WITH BMI OF 45.0-49.9, ADULT (HCC): Primary | ICD-10-CM

## 2018-09-18 PROCEDURE — 99213 OFFICE O/P EST LOW 20 MIN: CPT | Performed by: NURSE PRACTITIONER

## 2018-09-19 ENCOUNTER — PATIENT MESSAGE (OUTPATIENT)
Dept: INTERNAL MEDICINE CLINIC | Facility: CLINIC | Age: 58
End: 2018-09-19

## 2018-09-19 NOTE — PATIENT INSTRUCTIONS
Goals for next month:  1. Keep a food log using [facility]  2. Drink 48-64 ounces of water per day. No fruit juices or regular soda. 3. Increase aerobic exercises (goal is 150 minutes per week)  4. Increase fruit and vegetable servings/day  5.  Keep carbs at or b

## 2018-09-19 NOTE — PROGRESS NOTES
[address], [address] [place] [address], [address] [address] [address]  [address]  Dept: [phone]     Date:  18    Patient:  Mary Herman  :      1960  MRN:      [id number]    Chief Complai 3 (three) times daily. Disp: 60 tablet Rfl: 0   fluticasone-salmeterol (ADVAIR DISKUS) 100-50 MCG/DOSE Inhalation Aerosol Powder, Breath Activated Inhale 1 puff into the lungs 2 (two) times daily.  Disp: 1 each Rfl: 0   Tofacitinib Citrate (XELJANZ XR) 11 M Inhalation Aero Soln INHALE 2 PUFFS INTO THE LUNGS EVERY 6 (SIX) HOURS AS NEEDED FOR WHEEZING. Disp: 8.5 Inhaler Rfl: 0   Melatonin 10 MG Oral Cap Take 1 tablet by mouth nightly.  Disp:  Rfl:    Albuterol Sulfate (2.5 MG/3ML) 0.083% Inhalation Nebu Soln Paramjit Hazards: Not Asked        Sleep Concern: Not Asked        Stress Concern: Not Asked        Weight Concern: Not Asked        Special Diet: Not Asked        Back Care: Not Asked        Exercise: No        Bike Helmet: Not Asked        Seat Belt: Not Asked nuts  · Amount of soda consumption per day:  Diet mountain dew  · Amount of water (in ounces) per day:  32-40oz  · Drinking between meals only:  no  · Toughest challenge:  exercise    Nutritional Goals  Limit carbohydrates to 100 gms per day, Eat 100-200 c (H) 05/28/2016 12:09 PM   HDL 58 05/28/2016 12:09 PM   TRIG 138 05/28/2016 12:09 PM        Encounter Diagnosis(ses):   No diagnosis found.     PLAN   Attended Bariatric Information Seminar- still unsure about surgery    Discussed with patient the risks and stable.        Lab Results  Component Value Date/Time   CHOLEST 193 05/28/2016 12:09 PM    (H) 05/28/2016 12:09 PM   HDL 58 05/28/2016 12:09 PM   TRIG 138 05/28/2016 12:09 PM      Asthma: Continue current meds; stable at this time     Parkinson's di

## 2018-09-19 NOTE — TELEPHONE ENCOUNTER
From: Jerrell Gibson  To: Michelle Castaneda MD  Sent: 9/19/2018 11:46 AM CDT  Subject: Other    I have a follow-up appointment with Dr. Jamar Garrido on October 4th. Is it possible to get a flu shot while I'm there?

## 2018-10-01 ENCOUNTER — OFFICE VISIT (OUTPATIENT)
Dept: NEUROLOGY | Facility: CLINIC | Age: 58
End: 2018-10-01
Payer: COMMERCIAL

## 2018-10-01 VITALS
BODY MASS INDEX: 43.4 KG/M2 | HEART RATE: 88 BPM | DIASTOLIC BLOOD PRESSURE: 72 MMHG | SYSTOLIC BLOOD PRESSURE: 128 MMHG | WEIGHT: 293 LBS | HEIGHT: 69 IN

## 2018-10-01 DIAGNOSIS — M48.062 LUMBAR STENOSIS WITH NEUROGENIC CLAUDICATION: Primary | ICD-10-CM

## 2018-10-01 PROCEDURE — 99214 OFFICE O/P EST MOD 30 MIN: CPT | Performed by: PHYSICAL MEDICINE & REHABILITATION

## 2018-10-01 RX ORDER — GABAPENTIN 800 MG/1
800 TABLET ORAL 3 TIMES DAILY
Qty: 90 TABLET | Refills: 0 | Status: SHIPPED | OUTPATIENT
Start: 2018-10-01 | End: 2018-10-31

## 2018-10-01 NOTE — PATIENT INSTRUCTIONS
1) We will increase the gabapentin to 800mg TID. 2) If you still have pain that is not managed well with the increase in gabapentin then we will transition you over to Lyrica. Before we do that please ask your insurance if lyrica is covered.     3) May c

## 2018-10-01 NOTE — PROGRESS NOTES
HPI:    Patient ID: Rk Vasquez is a 63year old female. She has a past medical history of Parkinson's disease,obesity, lumbar stenosis and rheumatoid arthritis presents for follow-up.  Has been complaining of chronic lower back pain radiating down fluticasone-salmeterol (ADVAIR DISKUS) 100-50 MCG/DOSE Inhalation Aerosol Powder, Breath Activated Inhale 1 puff into the lungs 2 (two) times daily. Disp: 1 each Rfl: 0   Tofacitinib Citrate (XELJANZ XR) 11 MG Oral Tablet 24 Hr Take 11 mg by mouth daily. LUNGS EVERY 6 (SIX) HOURS AS NEEDED FOR WHEEZING. Disp: 8.5 Inhaler Rfl: 0   Melatonin 10 MG Oral Cap Take 1 tablet by mouth nightly.  Disp:  Rfl:    Albuterol Sulfate (2.5 MG/3ML) 0.083% Inhalation Nebu Soln Take 3 mL (2.5 mg total) by nebulization every 4 epidural injections next month. Follow up in 2 months. 25 minutes spent with patient, 1/2 counseling.     Rachelle Glez DO  Physical Medicine and [facility]

## 2018-10-04 ENCOUNTER — OFFICE VISIT (OUTPATIENT)
Dept: INTERNAL MEDICINE CLINIC | Facility: CLINIC | Age: 58
End: 2018-10-04
Payer: COMMERCIAL

## 2018-10-04 VITALS
HEART RATE: 86 BPM | WEIGHT: 293 LBS | RESPIRATION RATE: 18 BRPM | SYSTOLIC BLOOD PRESSURE: 126 MMHG | HEIGHT: 69 IN | TEMPERATURE: 98 F | DIASTOLIC BLOOD PRESSURE: 78 MMHG | BODY MASS INDEX: 43.4 KG/M2

## 2018-10-04 DIAGNOSIS — M06.9 RHEUMATOID ARTHRITIS INVOLVING MULTIPLE SITES, UNSPECIFIED RHEUMATOID FACTOR PRESENCE: ICD-10-CM

## 2018-10-04 DIAGNOSIS — Z23 NEED FOR VACCINATION: ICD-10-CM

## 2018-10-04 DIAGNOSIS — J45.20 MILD INTERMITTENT ASTHMA WITHOUT COMPLICATION: ICD-10-CM

## 2018-10-04 DIAGNOSIS — G20 PARKINSON DISEASE (HCC): ICD-10-CM

## 2018-10-04 DIAGNOSIS — E03.9 HYPOTHYROIDISM, UNSPECIFIED TYPE: Primary | ICD-10-CM

## 2018-10-04 DIAGNOSIS — E78.5 HYPERLIPIDEMIA LDL GOAL <130: ICD-10-CM

## 2018-10-04 DIAGNOSIS — D23.30 BENIGN NEOPLASM OF SKIN OF FACE: ICD-10-CM

## 2018-10-04 PROCEDURE — 99212 OFFICE O/P EST SF 10 MIN: CPT | Performed by: INTERNAL MEDICINE

## 2018-10-04 PROCEDURE — 90471 IMMUNIZATION ADMIN: CPT | Performed by: INTERNAL MEDICINE

## 2018-10-04 PROCEDURE — 99214 OFFICE O/P EST MOD 30 MIN: CPT | Performed by: INTERNAL MEDICINE

## 2018-10-04 PROCEDURE — 90686 IIV4 VACC NO PRSV 0.5 ML IM: CPT | Performed by: INTERNAL MEDICINE

## 2018-10-04 NOTE — PROGRESS NOTES
HPI:    Patient ID: Aure Garcia is a 63year old female. HPI  Patient is here for follow-up on chronic medical issues as listed below. Last seen here in late March. Her weight was done at that 0.50 pounds.   Refer to behavioral health navigator at [facility] trochanteric bursitis, left 11/21/2017   • High cholesterol    • Hyperlipidemia    • Infertility, female    • Lipid screening 8/31/2013   • Other and unspecified hyperlipidemia    • [facility] ([facility]    • Thyroid disease 2016   • Tibia/fibula hematuria, vaginal discharge, menstrual problem and sexual dysfunction. Musculoskeletal: Positive for joint pain. Skin: Positive for rash. Neurological: Positive for numbness. Negative for weakness and headaches.    Hematological: Bruises/bleeds easil triamcinolone acetonide 0.1 % External Ointment Apply 1 Application topically. Disp:  Rfl:    HYDROcodone-acetaminophen  MG Oral Tab Take 1 tablet by mouth every 6 (six) hours as needed.    Disp:  Rfl:    Spacer/Aero-Holding Chambers (AEROCHAMBER PL Musculoskeletal: She exhibits no tenderness. Lymphadenopathy:     She has no cervical adenopathy. Neurological: She is alert. Marked involuntary tremors and movements particular the upper extremities. Skin: Skin is warm and dry. No rash noted.

## 2018-10-15 DIAGNOSIS — M48.062 LUMBAR STENOSIS WITH NEUROGENIC CLAUDICATION: ICD-10-CM

## 2018-10-16 ENCOUNTER — NURSE ONLY (OUTPATIENT)
Dept: ALLERGY | Facility: CLINIC | Age: 58
End: 2018-10-16
Payer: COMMERCIAL

## 2018-10-16 ENCOUNTER — OFFICE VISIT (OUTPATIENT)
Dept: SURGERY | Facility: CLINIC | Age: 58
End: 2018-10-16
Payer: COMMERCIAL

## 2018-10-16 VITALS
WEIGHT: 293 LBS | SYSTOLIC BLOOD PRESSURE: 120 MMHG | BODY MASS INDEX: 43.4 KG/M2 | HEIGHT: 69 IN | DIASTOLIC BLOOD PRESSURE: 70 MMHG

## 2018-10-16 DIAGNOSIS — F43.23 ADJUSTMENT DISORDER WITH MIXED ANXIETY AND DEPRESSED MOOD: ICD-10-CM

## 2018-10-16 DIAGNOSIS — E78.5 HYPERLIPIDEMIA, UNSPECIFIED HYPERLIPIDEMIA TYPE: ICD-10-CM

## 2018-10-16 DIAGNOSIS — G20 PARKINSON DISEASE (HCC): ICD-10-CM

## 2018-10-16 DIAGNOSIS — M54.42 CHRONIC BILATERAL LOW BACK PAIN WITH BILATERAL SCIATICA: ICD-10-CM

## 2018-10-16 DIAGNOSIS — G89.29 CHRONIC BILATERAL LOW BACK PAIN WITH BILATERAL SCIATICA: ICD-10-CM

## 2018-10-16 DIAGNOSIS — M54.41 CHRONIC BILATERAL LOW BACK PAIN WITH BILATERAL SCIATICA: ICD-10-CM

## 2018-10-16 DIAGNOSIS — J30.1 SEASONAL ALLERGIC RHINITIS DUE TO POLLEN: ICD-10-CM

## 2018-10-16 DIAGNOSIS — M06.9 RHEUMATOID ARTHRITIS, INVOLVING UNSPECIFIED SITE, UNSPECIFIED RHEUMATOID FACTOR PRESENCE: ICD-10-CM

## 2018-10-16 DIAGNOSIS — E66.01 MORBID OBESITY WITH BMI OF 45.0-49.9, ADULT (HCC): Primary | ICD-10-CM

## 2018-10-16 DIAGNOSIS — J45.909 ASTHMA, UNSPECIFIED ASTHMA SEVERITY, UNSPECIFIED WHETHER COMPLICATED, UNSPECIFIED WHETHER PERSISTENT: ICD-10-CM

## 2018-10-16 PROCEDURE — 95117 IMMUNOTHERAPY INJECTIONS: CPT | Performed by: ALLERGY & IMMUNOLOGY

## 2018-10-16 PROCEDURE — 99213 OFFICE O/P EST LOW 20 MIN: CPT | Performed by: NURSE PRACTITIONER

## 2018-10-16 RX ORDER — GABAPENTIN 800 MG/1
TABLET ORAL
Qty: 90 TABLET | Refills: 0 | OUTPATIENT
Start: 2018-10-16

## 2018-10-16 NOTE — PROGRESS NOTES
[address], [address] [place] [address], [address] [address] [address]  [address]  Dept: [phone]     Date:  10/16/18    Patient:  Mary Herman  :      1960  MRN:      [id number]    Chief Compla 100 g Rfl: 3   gabapentin 800 MG Oral Tab Take 1 tablet (800 mg total) by mouth 3 (three) times daily.  Disp: 90 tablet Rfl: 0   fluticasone-salmeterol (ADVAIR DISKUS) 100-50 MCG/DOSE Inhalation Aerosol Powder, Breath Activated Inhale 1 puff into the lungs INHALE 2 PUFFS INTO THE LUNGS EVERY 6 (SIX) HOURS AS NEEDED FOR WHEEZING. Disp: 8.5 Inhaler Rfl: 0   Albuterol Sulfate (2.5 MG/3ML) 0.083% Inhalation Nebu Soln Take 3 mL (2.5 mg total) by nebulization every 4 (four) hours as needed for Wheezing.  Disp: 3 Zaid Concern: Not Asked        Special Diet: Not Asked        Back Care: Not Asked        Exercise: No        Bike Helmet: Not Asked        Seat Belt: Not Asked        Self-Exams: Not Asked    Social History Narrative      The patient uses the following assisti challenge:  exercise    Nutritional Goals  Limit carbohydrates to 100 gms per day, Eat 100-200 calories within 1 hour of waking  and Eat 3-4 cups of fresh fruits or vegetables daily    Behavior Modifications Reviewed and Discussed  Eat breakfast, Eat 3 shira diagnosis found. PLAN   Attended Bariatric Information Seminar- still unsure about surgery    Discussed with patient the risks and benefits of RYGBP and VSG. The patient is interested in bariatric surgery and will begin our presurgical process.      Give 05/28/2016 12:09 PM   TRIG 138 05/28/2016 12:09 PM      Asthma: Continue current meds; stable at this time     Parkinson's disease: continue current meds; stable at this time     Rheumatoid arthritis: continue current meds; stable at this time     Goals fo

## 2018-10-17 NOTE — PATIENT INSTRUCTIONS
Goals: 1. Keep a food log using [facility]  2. Drink 48-64 ounces of water per day. No fruit juices or regular soda. 3. Increase aerobic exercises (goal is 150 minutes per week)  4. Increase fruit and vegetable servings/day  5.  Keep carbs at or below 100g/day

## 2018-10-31 DIAGNOSIS — M48.062 LUMBAR STENOSIS WITH NEUROGENIC CLAUDICATION: ICD-10-CM

## 2018-10-31 RX ORDER — GABAPENTIN 800 MG/1
TABLET ORAL
Qty: 90 TABLET | Refills: 0 | Status: SHIPPED | OUTPATIENT
Start: 2018-10-31 | End: 2018-11-29

## 2018-10-31 NOTE — TELEPHONE ENCOUNTER
Refill request for gabapentin 800 mg, TID, #90, no refills    LOV: 10/1/18  NOV: 12/3/18  Last refilled on 10/1/18

## 2018-11-08 ENCOUNTER — LAB ENCOUNTER (OUTPATIENT)
Dept: LAB | Facility: HOSPITAL | Age: 58
End: 2018-11-08
Attending: INTERNAL MEDICINE
Payer: COMMERCIAL

## 2018-11-08 DIAGNOSIS — R79.89 ABNORMAL TSH: ICD-10-CM

## 2018-11-08 DIAGNOSIS — M06.9 RHEUMATOID ARTHRITIS, INVOLVING UNSPECIFIED SITE, UNSPECIFIED RHEUMATOID FACTOR PRESENCE: ICD-10-CM

## 2018-11-08 DIAGNOSIS — Z51.81 ENCOUNTER FOR THERAPEUTIC DRUG MONITORING: ICD-10-CM

## 2018-11-08 PROCEDURE — 86140 C-REACTIVE PROTEIN: CPT

## 2018-11-08 PROCEDURE — 36415 COLL VENOUS BLD VENIPUNCTURE: CPT

## 2018-11-08 PROCEDURE — 82565 ASSAY OF CREATININE: CPT

## 2018-11-08 PROCEDURE — 85652 RBC SED RATE AUTOMATED: CPT

## 2018-11-08 PROCEDURE — 84443 ASSAY THYROID STIM HORMONE: CPT

## 2018-11-08 PROCEDURE — 84460 ALANINE AMINO (ALT) (SGPT): CPT

## 2018-11-08 PROCEDURE — 82040 ASSAY OF SERUM ALBUMIN: CPT

## 2018-11-08 PROCEDURE — 84450 TRANSFERASE (AST) (SGOT): CPT

## 2018-11-08 PROCEDURE — 85025 COMPLETE CBC W/AUTO DIFF WBC: CPT

## 2018-11-12 ENCOUNTER — NURSE ONLY (OUTPATIENT)
Dept: ALLERGY | Facility: CLINIC | Age: 58
End: 2018-11-12
Payer: COMMERCIAL

## 2018-11-12 DIAGNOSIS — J30.89 ENVIRONMENTAL AND SEASONAL ALLERGIES: ICD-10-CM

## 2018-11-12 PROCEDURE — 95117 IMMUNOTHERAPY INJECTIONS: CPT | Performed by: ALLERGY & IMMUNOLOGY

## 2018-11-16 DIAGNOSIS — M06.9 RHEUMATOID ARTHRITIS INVOLVING MULTIPLE SITES, UNSPECIFIED RHEUMATOID FACTOR PRESENCE: ICD-10-CM

## 2018-11-16 DIAGNOSIS — Z51.81 THERAPEUTIC DRUG MONITORING: ICD-10-CM

## 2018-11-16 RX ORDER — TRAMADOL HYDROCHLORIDE 50 MG/1
TABLET ORAL
Qty: 60 TABLET | Refills: 4 | OUTPATIENT
Start: 2018-11-16 | End: 2019-05-03

## 2018-11-16 NOTE — TELEPHONE ENCOUNTER
LOV:8-27  Last Filled:4-18, #60 with 4 refills  Labs:   Future Appointments   Date Time Provider Joel Mackenzie   11/19/2018  5:30 PM Khushboo Velazquez MD [facility]   11/26/2018  6:40 PM Letha Meneses MD [address] [place] [address]   12/3/2018  5

## 2018-11-16 NOTE — TELEPHONE ENCOUNTER
Pt called in stating that she will be out of her medication by this weekend and is requesting a refill as soon as possible.   Please advise

## 2018-11-16 NOTE — TELEPHONE ENCOUNTER
LOV: 8-27-18  Last Refilled:#60, 4rfs 4/18/18    Future Appointments   Date Time Provider Joel Mackenzie   11/19/2018  5:30 PM Jalen Bell MD [facility]   11/26/2018  6:40 PM Patricio Iyer MD [address] [place] [address]   12/3/2018  5:00 PM

## 2018-11-17 NOTE — TELEPHONE ENCOUNTER
LOV: 8/27/18  Last Refilled:#60, 1rf  7/16/18        Future Appointments   Date Time Provider Joel Mackenzie   11/26/2018  6:40 PM Michelle Moran MD [address] [facility] [address]   12/3/2018  5:00 PM DO BLANK Borden [address]   12/3/2018  6:00

## 2018-11-17 NOTE — TELEPHONE ENCOUNTER
LOV: 8/27/18  Last Refilled:#60, 3rfs  7/2/18    Future Appointments   Date Time Provider Joel Mann   11/26/2018  6:40 PM Mia Colunga MD 2014 [facility]   12/3/2018  5:00 PM DO BLANK Carpenter [facility] OF THE [facility]   12/3/2018  6:00 PM POLO

## 2018-11-19 RX ORDER — DICLOFENAC SODIUM 75 MG/1
75 TABLET, DELAYED RELEASE ORAL 2 TIMES DAILY
Qty: 60 TABLET | Refills: 3 | Status: SHIPPED | OUTPATIENT
Start: 2018-11-19 | End: 2019-03-13

## 2018-11-19 RX ORDER — TIZANIDINE HYDROCHLORIDE 4 MG/1
CAPSULE, GELATIN COATED ORAL
Qty: 60 CAPSULE | Refills: 1 | Status: SHIPPED | OUTPATIENT
Start: 2018-11-19 | End: 2019-01-15

## 2018-11-26 ENCOUNTER — OFFICE VISIT (OUTPATIENT)
Dept: RHEUMATOLOGY | Facility: CLINIC | Age: 58
End: 2018-11-26
Payer: COMMERCIAL

## 2018-11-26 VITALS
WEIGHT: 293 LBS | HEART RATE: 86 BPM | SYSTOLIC BLOOD PRESSURE: 114 MMHG | BODY MASS INDEX: 43.4 KG/M2 | DIASTOLIC BLOOD PRESSURE: 76 MMHG | HEIGHT: 69 IN

## 2018-11-26 DIAGNOSIS — M06.9 RHEUMATOID ARTHRITIS INVOLVING MULTIPLE SITES, UNSPECIFIED RHEUMATOID FACTOR PRESENCE: Primary | ICD-10-CM

## 2018-11-26 DIAGNOSIS — G89.29 CHRONIC LOW BACK PAIN, UNSPECIFIED BACK PAIN LATERALITY, WITH SCIATICA PRESENCE UNSPECIFIED: ICD-10-CM

## 2018-11-26 DIAGNOSIS — M54.5 CHRONIC LOW BACK PAIN, UNSPECIFIED BACK PAIN LATERALITY, WITH SCIATICA PRESENCE UNSPECIFIED: ICD-10-CM

## 2018-11-26 DIAGNOSIS — G20 PARKINSON'S DISEASE (HCC): ICD-10-CM

## 2018-11-26 DIAGNOSIS — Z51.81 THERAPEUTIC DRUG MONITORING: ICD-10-CM

## 2018-11-26 PROCEDURE — 99212 OFFICE O/P EST SF 10 MIN: CPT | Performed by: INTERNAL MEDICINE

## 2018-11-26 PROCEDURE — 99214 OFFICE O/P EST MOD 30 MIN: CPT | Performed by: INTERNAL MEDICINE

## 2018-11-26 RX ORDER — PROPRANOLOL/HYDROCHLOROTHIAZID 40 MG-25MG
TABLET ORAL 2 TIMES DAILY
COMMUNITY
End: 2019-11-06

## 2018-11-27 NOTE — PROGRESS NOTES
Anika Forte is a 63year old female who presents for Patient presents with:  Rheumatoid Arthritis  Back Pain  Hip Pain  . HPI:     She is a pleasant 65year old who has elevated RF >300 and foot pain - c/w seropositive  RA .  I  Last saw her  on 11/26 day goes on. She has   She has bad hip pain. The rest of her pains is not bad. She has to use a walked and get into work. She cna't get an MRI of her lumbar -   Valium doesn't help her. richard needs conscious sedation b/c of her parkinson's.    phsyical th knows that if she tilts her pelvis in the back pain gets better. She sees Dr. Glenn Wang on Monday. She is better in her other joitns. She sees parkinson's clinci in July - and notcied she takes less of the medicatoin while being on [place].      8/27/20 times daily. Disp: 100 g Rfl: 3   Tofacitinib Citrate (XELJANZ XR) 11 MG Oral Tablet 24 Hr Take 11 mg by mouth daily.  Disp: 90 tablet Rfl: 3   Levothyroxine Sodium 150 MCG Oral Tab Take 1 tablet (150 mcg total) by mouth before breakfast. Disp: 90 tablet Rf (FISH OIL) 1000 MG Oral Cap Take 1,000 mg by mouth daily. Disp:  Rfl:    triamcinolone acetonide 0.1 % External Ointment Apply 1 Application topically.  Disp:  Rfl:       Past Medical History:   Diagnosis Date   • Acute torn meniscus of knee 2010    ARTHROS Size: large)   Pulse 86   Ht 5' 9\" (1.753 m)   Wt (!) 327 lb (148.3 kg)   LMP 04/18/2015 (Exact Date)   BMI 48.29 kg/m²   HEENT: Clear oropharynx, no oral ulcers, EOM intact, clear sclear, PERRLA, pleasant, no acute distress, no CAD, no neck tendnerness, 1.5 (H)   SED RATE      0 - 30 mm/Hr 26   TSH      0.45 - 5.33 uIU/mL 0.75       1/28/2005 - esr is 42mm/hr,   2/9/2013 - shana 1:40  8/18/2014 - MRI left foot -   1.  There is localized edema within the intermetatarsal muscle between the  second and third me 4/2008 - chronic plantar fasciitis  1/23/2013 - mri brain - unremarkable,   3/13/2012 - us venous doppler - right - normal   4/30/2009 - chest xray - unchange, mildly prominent pulmonary markings  9/27/2007 - b/l knee ortho xray - minimla degenerative find susceptibility to infection - will have to monitor her -       2. OA knees - s/p cortisone injections b/l 7/22/2015 by ortho  Diclofenac gel is really helping her. -   3. Hx of parkinson's -   4.  Left thigh welakness -  - prednisone burst - helped a little

## 2018-11-27 NOTE — PATIENT INSTRUCTIONS
1. Cont. methotrexate - 8 tablets a week and folic acid 1mg a day  - January can go down to 7 tablets a week. 2. Cont. xlejanz 11mg a day   3. Return to clinic in 2-3 months. 4. .Check labs in 3 months . 5. Tramadol 50mg every 12 hours as needed.

## 2018-11-29 DIAGNOSIS — M48.062 LUMBAR STENOSIS WITH NEUROGENIC CLAUDICATION: ICD-10-CM

## 2018-11-29 RX ORDER — GABAPENTIN 800 MG/1
TABLET ORAL
Qty: 90 TABLET | Refills: 0 | Status: SHIPPED | OUTPATIENT
Start: 2018-11-29 | End: 2018-12-28

## 2018-11-29 NOTE — TELEPHONE ENCOUNTER
Refill request for gabapentin 800 mg, TID, #90, no refills    LOV: 10/1/18  NOV: 12/3/18  Last refilled on 10/31/18

## 2018-12-01 NOTE — TELEPHONE ENCOUNTER
No Protocol on this med. Requested Prescriptions     Pending Prescriptions Disp Refills   • CALCITRIOL 0.25 MCG Oral Cap [Pharmacy Med Name: CALCITRIOL 0.25 MCG CAPSULE] 90 capsule 1     Sig: TAKE 1 CAPSULE (0.25 MCG TOTAL) BY MOUTH DAILY.        Last O

## 2018-12-01 NOTE — TELEPHONE ENCOUNTER
Requested Prescriptions     Pending Prescriptions Disp Refills   • LEVOTHYROXINE SODIUM 150 MCG Oral Tab [Pharmacy Med Name: LEVOTHYROXINE 150 MCG TABLET] 90 tablet 0     Sig: TAKE 1 TABLET (150 MCG TOTAL) BY MOUTH BEFORE BREAKFAST.        Last Office Visit

## 2018-12-03 ENCOUNTER — OFFICE VISIT (OUTPATIENT)
Dept: ALLERGY | Facility: CLINIC | Age: 58
End: 2018-12-03
Payer: COMMERCIAL

## 2018-12-03 ENCOUNTER — TELEPHONE (OUTPATIENT)
Dept: NEUROLOGY | Facility: CLINIC | Age: 58
End: 2018-12-03

## 2018-12-03 ENCOUNTER — OFFICE VISIT (OUTPATIENT)
Dept: NEUROLOGY | Facility: CLINIC | Age: 58
End: 2018-12-03
Payer: COMMERCIAL

## 2018-12-03 VITALS
TEMPERATURE: 98 F | HEART RATE: 89 BPM | OXYGEN SATURATION: 98 % | SYSTOLIC BLOOD PRESSURE: 117 MMHG | DIASTOLIC BLOOD PRESSURE: 77 MMHG

## 2018-12-03 VITALS
SYSTOLIC BLOOD PRESSURE: 122 MMHG | HEIGHT: 69 IN | RESPIRATION RATE: 20 BRPM | HEART RATE: 84 BPM | WEIGHT: 293 LBS | DIASTOLIC BLOOD PRESSURE: 82 MMHG | BODY MASS INDEX: 43.4 KG/M2

## 2018-12-03 DIAGNOSIS — Z91.09 ALLERGY TO AMERICAN HOUSE DUST MITE: ICD-10-CM

## 2018-12-03 DIAGNOSIS — J30.1 SEASONAL ALLERGIC RHINITIS DUE TO POLLEN: ICD-10-CM

## 2018-12-03 DIAGNOSIS — M48.062 LUMBAR STENOSIS WITH NEUROGENIC CLAUDICATION: Primary | ICD-10-CM

## 2018-12-03 DIAGNOSIS — M21.371 ACQUIRED RIGHT FOOT DROP: ICD-10-CM

## 2018-12-03 DIAGNOSIS — J45.40 ASTHMA, EXTRINSIC, MODERATE PERSISTENT, UNCOMPLICATED: Primary | ICD-10-CM

## 2018-12-03 PROCEDURE — 99212 OFFICE O/P EST SF 10 MIN: CPT | Performed by: ALLERGY & IMMUNOLOGY

## 2018-12-03 PROCEDURE — 99214 OFFICE O/P EST MOD 30 MIN: CPT | Performed by: ALLERGY & IMMUNOLOGY

## 2018-12-03 PROCEDURE — 99214 OFFICE O/P EST MOD 30 MIN: CPT | Performed by: PHYSICAL MEDICINE & REHABILITATION

## 2018-12-03 RX ORDER — CALCITRIOL 0.25 UG/1
0.25 CAPSULE, LIQUID FILLED ORAL DAILY
Qty: 90 CAPSULE | Refills: 1 | Status: SHIPPED | OUTPATIENT
Start: 2018-12-03 | End: 2019-05-25

## 2018-12-03 RX ORDER — LEVOTHYROXINE SODIUM 0.15 MG/1
150 TABLET ORAL
Qty: 90 TABLET | Refills: 1 | Status: SHIPPED | OUTPATIENT
Start: 2018-12-03 | End: 2019-05-25

## 2018-12-03 NOTE — TELEPHONE ENCOUNTER
Called [facility] BSor authorization of approval of  Bilateral L4 transforaminal epidural steroid injection under MAC x 3  Talked to [facility]. who states no authorization is required. Reference #       [id number].   NOTE: IF 2nd or 3rd injection is denied, can sub

## 2018-12-03 NOTE — PROGRESS NOTES
HPI:    Patient ID: Farnaz Ellis is a 63year old female. She has a past medical history of Parkinson's disease,obesity, lumbar stenosis and rheumatoid arthritis presents for follow-up.  Has been complaining of chronic lower back pain radiating down b TRAMADOL HCL 50 MG Oral Tab TAKE 1 TABLET BY MOUTH EVERY 12 HOURS AS NEEDED Disp: 60 tablet Rfl: 4   ADVAIR DISKUS 100-50 MCG/DOSE Inhalation Aerosol Powder, Breath Activated TAKE 1 PUFF BY MOUTH TWICE A DAY (Patient taking differently: TAKE 1 PUFF BY MO hours as needed for Wheezing. Disp: 3 Box Rfl: 0   Hydrocod Polst-CPM Polst ER 10-8 MG/5ML Oral Liquid CR Take 5 mL by mouth 2 (two) times daily as needed.  Take 1 teaspoon by mouth as needed Disp: 140 mL Rfl: 0   carbidopa-levodopa (SINEMET)  MG Oral

## 2018-12-04 ENCOUNTER — TELEPHONE (OUTPATIENT)
Dept: NEUROLOGY | Facility: CLINIC | Age: 58
End: 2018-12-04

## 2018-12-04 NOTE — PROGRESS NOTES
Kathi Vazquez is a 63year old female. HPI:   Patient presents with:  Asthma: Pt reports no exacerbations   Allergies: Pt reports no symptoms.       Patient presents with:  Asthma: Pt reports no exacerbations   Allergies: Pt reports no symptoms.     Pa • Thyroid disease 2016   • Tibia/fibula fracture     OPEN REDUCTION INTERNAL FIXATION   • Urinary incontinence 2/12/95    after childbirth   • Viral conjunctivitis 2008      Past Surgical History:   Procedure Laterality Date   • COLONOSCOPY  12/16/2013 MOUTH 2 (TWO) TIMES DAILY.  Disp: 60 tablet Rfl: 3   TRAMADOL HCL 50 MG Oral Tab TAKE 1 TABLET BY MOUTH EVERY 12 HOURS AS NEEDED Disp: 60 tablet Rfl: 4   ADVAIR DISKUS 100-50 MCG/DOSE Inhalation Aerosol Powder, Breath Activated TAKE 1 PUFF BY MOUTH TWICE A Chambers (AEROCHAMBER PLUS) Does not apply Misc Please dispense 1 aerochamber Disp: 1 each Rfl: 1   Albuterol Sulfate (2.5 MG/3ML) 0.083% Inhalation Nebu Soln Take 3 mL (2.5 mg total) by nebulization every 4 (four) hours as needed for Wheezing.  Disp: 3 Box with current regimen.   Medications include Advair 100/51 puff twice a day, singular once a day Pro Air as needed and maintenance dose immunotherapy every 4 weeks to trees ragweed weeds dust mites cats and dogs  Flu vaccine is up-to-date continue with sophia

## 2018-12-04 NOTE — TELEPHONE ENCOUNTER
Contacted patient to schedule bilateral L4 TFESI under MAC sedation.   Patient was at another doctors appt and will call back to schedule

## 2018-12-04 NOTE — TELEPHONE ENCOUNTER
Patient has been scheduled for a bilateral L4 TFESIs  on 12/10/18 and 12/26/18 at the [facility]. Medications and allergies reviewed. Patient informed to hold aspirins, nsaids, blood thinners, multivitamins, vitamin E and fish oils 3-7 days prior to procedure.  P

## 2018-12-07 NOTE — TELEPHONE ENCOUNTER
Called patient. Patient states the [facility] just called her and answered her questions.

## 2018-12-10 ENCOUNTER — OFFICE VISIT (OUTPATIENT)
Dept: SURGERY | Facility: CLINIC | Age: 58
End: 2018-12-10
Payer: COMMERCIAL

## 2018-12-10 ENCOUNTER — NURSE ONLY (OUTPATIENT)
Dept: ALLERGY | Facility: CLINIC | Age: 58
End: 2018-12-10
Payer: COMMERCIAL

## 2018-12-10 DIAGNOSIS — J30.89 ENVIRONMENTAL AND SEASONAL ALLERGIES: ICD-10-CM

## 2018-12-10 DIAGNOSIS — M48.062 LUMBAR STENOSIS WITH NEUROGENIC CLAUDICATION: Primary | ICD-10-CM

## 2018-12-10 PROCEDURE — 64483 NJX AA&/STRD TFRM EPI L/S 1: CPT | Performed by: PHYSICAL MEDICINE & REHABILITATION

## 2018-12-10 PROCEDURE — 95117 IMMUNOTHERAPY INJECTIONS: CPT | Performed by: ALLERGY & IMMUNOLOGY

## 2018-12-10 NOTE — PROCEDURES
[address]    BILATERAL LUMBAR TRANSFORAMINAL   NAME:  Tayla Dye    MR #:    [id number] :  1960     PHYSICIAN:  Ros Crowe        Operative Report    DATE OF PROCEDURE: 12/10/2018   PREOPERATIVE DIAGNOSES: 1.  L3-4, epinephrine. Then, a 7 inch, 22-gauge spinal needle was inserted and directed towards the left L4-5 intervertebral foramen.   When it felt to be in good position, AP fluoroscopy was used to advance the needle to the 6 o'clock position on the left L4 pedicl

## 2018-12-13 RX ORDER — FOLIC ACID 1 MG/1
1 TABLET ORAL
Qty: 90 TABLET | Refills: 3 | Status: SHIPPED | OUTPATIENT
Start: 2018-12-13 | End: 2019-09-04

## 2018-12-13 NOTE — TELEPHONE ENCOUNTER
Requested medication: folic acid 1 MG Oral Tab    LOV: 11/26/2018  Future Appointments   Date Time Provider Joel [place]   12/13/2018  3:30 PM Pipe Mccracken PsyD [facility] [place]   12/26/2018 10:20 AM Cl Valle, [address]

## 2018-12-18 ENCOUNTER — TELEPHONE (OUTPATIENT)
Dept: NEUROLOGY | Facility: CLINIC | Age: 58
End: 2018-12-18

## 2018-12-18 RX ORDER — MONTELUKAST SODIUM 10 MG/1
TABLET ORAL
Qty: 90 TABLET | Refills: 1 | Status: SHIPPED | OUTPATIENT
Start: 2018-12-18 | End: 2019-06-04

## 2018-12-18 NOTE — TELEPHONE ENCOUNTER
Last visit to Allergy was 12/3/2018 for . . .    Asthma, extrinsic, moderate persistent, uncomplicated  (primary encounter diagnosis)  Seasonal allergic rhinitis due to pollen  Allergy to [employer] house dust mite    Refill request received for .  Kvng Lopez Newer

## 2018-12-18 NOTE — TELEPHONE ENCOUNTER
Prescription Letter/Certificate of Medical Necessity for Right AFO from [facility] completed and faxed back [phone]

## 2018-12-18 NOTE — TELEPHONE ENCOUNTER
Patient calling s/p Bilateral L4 transforaminal epidural steroid injections on 12/10/2018    Patient reports 50% improvement to low back. Describes pain as ache which is constant with walking/standing but no pain when sitting. Pain radiates to BLE.  Pain in

## 2018-12-19 ENCOUNTER — MED REC SCAN ONLY (OUTPATIENT)
Dept: NEUROLOGY | Facility: CLINIC | Age: 58
End: 2018-12-19

## 2018-12-26 ENCOUNTER — OFFICE VISIT (OUTPATIENT)
Dept: SURGERY | Facility: CLINIC | Age: 58
End: 2018-12-26
Payer: COMMERCIAL

## 2018-12-26 DIAGNOSIS — M48.062 LUMBAR STENOSIS WITH NEUROGENIC CLAUDICATION: Primary | ICD-10-CM

## 2018-12-26 PROCEDURE — 64483 NJX AA&/STRD TFRM EPI L/S 1: CPT | Performed by: PHYSICAL MEDICINE & REHABILITATION

## 2018-12-26 NOTE — PROCEDURES
[address]    BILATERAL LUMBAR TRANSFORAMINAL   NAME:  Mary Herman    MR #:    [id number] :  1960     PHYSICIAN:  Tammy Barrios        Operative Report    DATE OF PROCEDURE: 2018   PREOPERATIVE DIAGNOSES: 1.  L3-4, skin was anesthetized with 1 to 2 cc of 1% PF lidocaine without epinephrine. Then, a 7 inch, 22-gauge spinal needle was inserted and directed towards the left L4-5 intervertebral foramen.   When it felt to be in good position, AP fluoroscopy was used to ad

## 2018-12-27 ENCOUNTER — OFFICE VISIT (OUTPATIENT)
Dept: SURGERY | Facility: CLINIC | Age: 58
End: 2018-12-27
Payer: COMMERCIAL

## 2018-12-27 VITALS
SYSTOLIC BLOOD PRESSURE: 120 MMHG | BODY MASS INDEX: 43.4 KG/M2 | WEIGHT: 293 LBS | DIASTOLIC BLOOD PRESSURE: 70 MMHG | HEIGHT: 69 IN

## 2018-12-27 DIAGNOSIS — E78.5 HYPERLIPIDEMIA, UNSPECIFIED HYPERLIPIDEMIA TYPE: Primary | ICD-10-CM

## 2018-12-27 DIAGNOSIS — E66.01 MORBID OBESITY WITH BMI OF 45.0-49.9, ADULT (HCC): ICD-10-CM

## 2018-12-27 PROBLEM — R53.82 CHRONIC FATIGUE: Status: ACTIVE | Noted: 2018-12-27

## 2018-12-27 PROCEDURE — 99214 OFFICE O/P EST MOD 30 MIN: CPT | Performed by: INTERNAL MEDICINE

## 2018-12-27 NOTE — PROGRESS NOTES
[address], [address] [place] [address] Siri Mcgowan, [address] [address] [address]  [address]  Dept: [phone]     Date:  10/16/18    Patient:  Ryan Wyatt  :      1960  MRN:      [id number]    Chief Compla tablet for the first week Disp: 30 tablet Rfl: 1   MONTELUKAST SODIUM 10 MG Oral Tab TAKE 1 TABLET BY MOUTH EVERY DAY AT NIGHT Disp: 90 tablet Rfl: 1   FOLIC ACID 1 MG Oral Tab TAKE 1 TABLET (1 MG TOTAL) BY MOUTH ONCE DAILY.  Disp: 90 tablet Rfl: 3   LEVOTH apply Misc Please dispense 1 aerochamber Disp: 1 each Rfl: 1   PROAIR  (90 Base) MCG/ACT Inhalation Aero Soln INHALE 2 PUFFS INTO THE LUNGS EVERY 6 (SIX) HOURS AS NEEDED FOR WHEEZING.  Disp: 8.5 Inhaler Rfl: 0   Albuterol Sulfate (2.5 MG/3ML) 0.083% Asked        Hobby Hazards: Not Asked        Sleep Concern: Not Asked        Stress Concern: Not Asked        Weight Concern: Not Asked        Special Diet: Not Asked        Back Care: Not Asked        Exercise: No        Bike Helmet: Not Asked        Seat · Amount of water (in ounces) per day:  32-40oz  · Drinking between meals only:  no  · Toughest challenge:  exercise    Nutritional Goals  Limit carbohydrates to 100 gms per day, Eat 100-200 calories within 1 hour of waking  and Eat 3-4 cups of fresh fru HDL 58 05/28/2016 12:09 PM   TRIG 138 05/28/2016 12:09 PM        Encounter Diagnosis(ses):   Hyperlipidemia, unspecified hyperlipidemia type  (primary encounter diagnosis)  Morbid obesity with bmi of 45.0-49.9, adult (hcc)    PLAN   Attended Bariatric In 12:09 PM    (H) 05/28/2016 12:09 PM   HDL 58 05/28/2016 12:09 PM   TRIG 138 05/28/2016 12:09 PM      Asthma: Continue current meds; stable at this time     Parkinson's disease: continue current meds; stable at this time     Rheumatoid arthritis: con

## 2018-12-28 DIAGNOSIS — M48.062 LUMBAR STENOSIS WITH NEUROGENIC CLAUDICATION: ICD-10-CM

## 2018-12-28 RX ORDER — GABAPENTIN 800 MG/1
TABLET ORAL
Qty: 90 TABLET | Refills: 0 | Status: SHIPPED | OUTPATIENT
Start: 2018-12-28 | End: 2019-01-29

## 2018-12-28 NOTE — TELEPHONE ENCOUNTER
Medication request: Gabapentin 800mg. Take 1 tablet TID #90. No refills.     LOV-12/3/2018  NOV-none    [place]:11/29/2018

## 2019-01-05 ENCOUNTER — NURSE ONLY (OUTPATIENT)
Dept: ALLERGY | Facility: CLINIC | Age: 59
End: 2019-01-05
Payer: COMMERCIAL

## 2019-01-05 DIAGNOSIS — J30.89 ENVIRONMENTAL AND SEASONAL ALLERGIES: ICD-10-CM

## 2019-01-05 PROCEDURE — 95117 IMMUNOTHERAPY INJECTIONS: CPT | Performed by: ALLERGY & IMMUNOLOGY

## 2019-01-07 ENCOUNTER — TELEPHONE (OUTPATIENT)
Dept: SURGERY | Facility: CLINIC | Age: 59
End: 2019-01-07

## 2019-01-07 NOTE — TELEPHONE ENCOUNTER
Called pharmacy to discuss interaction between zoloft and another medicine x2    No answer/phone line went dead

## 2019-01-07 NOTE — TELEPHONE ENCOUNTER
Regarding: FW: Prescription Question  Contact: [phone]  Sent to essence  ----- Message -----  From: Anika Forte  Sent: 1/5/2019  12:45 PM  To: Susana Singh Clinical Staff  Subject: Prescription Question                            ----- Message from [facility]

## 2019-01-08 ENCOUNTER — TELEPHONE (OUTPATIENT)
Dept: SURGERY | Facility: CLINIC | Age: 59
End: 2019-01-08

## 2019-01-08 NOTE — TELEPHONE ENCOUNTER
Called patient to let her know per dr Elvis Greene she cannot take her sertraline because of a her being on rasagiline. Patient stated she already spoke with dr Elvis Greene and is aware, she is going to her specialist at [facility] and will discuss.

## 2019-01-09 ENCOUNTER — PATIENT MESSAGE (OUTPATIENT)
Dept: ALLERGY | Facility: CLINIC | Age: 59
End: 2019-01-09

## 2019-01-09 ENCOUNTER — TELEPHONE (OUTPATIENT)
Dept: ALLERGY | Facility: CLINIC | Age: 59
End: 2019-01-09

## 2019-01-09 RX ORDER — CODEINE PHOSPHATE AND GUAIFENESIN 10; 100 MG/5ML; MG/5ML
5 SOLUTION ORAL EVERY 6 HOURS PRN
Qty: 180 ML | Refills: 0 | Status: SHIPPED | OUTPATIENT
Start: 2019-01-09 | End: 2019-05-03

## 2019-01-09 NOTE — TELEPHONE ENCOUNTER
Prescription for Cheratussin printed for patient to  as it cannot be faxed or sent via EMR.   Follow-up  In my office if not improving over the next 7-10 days

## 2019-01-09 NOTE — TELEPHONE ENCOUNTER
Dr. Amparo Jeffery, patient requesting refill for Cheratussin cough syrup for dry cough that started 2 days ago. No other symptoms. Reviewed allergies and preferred pharmacy in chart. Patient sent [employer] message c/o cough. Called to triage. Sincere Alexis.  She s

## 2019-01-15 NOTE — TELEPHONE ENCOUNTER
LOV: 11-26-18  Last Refilled:#60, 1rf 11/19/18    Future Appointments   Date Time Provider Department Center   1/16/2019  2:45 PM Judith Pacheco MD [facility]   1/17/2019  3:30 PM Ria Carlson [facility]   2/4/2019  6:20 PM Kayce Huertas MD [address] [place] [address] OF [facility]   2/18/2019  4:00 PM Pablo Morel MD [address] [place] [address]   3/27/2019  5:00 PM SILAS Foster [facility]   4/11/2019  4:20 PM Stacy Feldman MD [facility]       Please advise.

## 2019-01-16 ENCOUNTER — OFFICE VISIT (OUTPATIENT)
Dept: ALLERGY | Facility: CLINIC | Age: 59
End: 2019-01-16
Payer: COMMERCIAL

## 2019-01-16 VITALS
HEART RATE: 88 BPM | OXYGEN SATURATION: 96 % | SYSTOLIC BLOOD PRESSURE: 156 MMHG | RESPIRATION RATE: 17 BRPM | DIASTOLIC BLOOD PRESSURE: 97 MMHG

## 2019-01-16 DIAGNOSIS — J45.40 ASTHMA, EXTRINSIC, MODERATE PERSISTENT, UNCOMPLICATED: Primary | ICD-10-CM

## 2019-01-16 DIAGNOSIS — J21.9 ACUTE BRONCHIOLITIS DUE TO UNSPECIFIED ORGANISM: ICD-10-CM

## 2019-01-16 PROCEDURE — 99214 OFFICE O/P EST MOD 30 MIN: CPT | Performed by: ALLERGY & IMMUNOLOGY

## 2019-01-16 PROCEDURE — 99212 OFFICE O/P EST SF 10 MIN: CPT | Performed by: ALLERGY & IMMUNOLOGY

## 2019-01-16 RX ORDER — TIZANIDINE HYDROCHLORIDE 4 MG/1
CAPSULE, GELATIN COATED ORAL
Qty: 60 CAPSULE | Refills: 3 | Status: SHIPPED | OUTPATIENT
Start: 2019-01-16 | End: 2019-11-06 | Stop reason: ALTCHOICE

## 2019-01-16 RX ORDER — PREDNISONE 20 MG/1
TABLET ORAL
Qty: 10 TABLET | Refills: 0 | Status: SHIPPED | OUTPATIENT
Start: 2019-01-16 | End: 2019-04-10

## 2019-01-16 RX ORDER — HYDROCODONE POLISTIREX AND CHLORPHENIRAMINE POLISTIREX 10; 8 MG/5ML; MG/5ML
5 SUSPENSION, EXTENDED RELEASE ORAL 2 TIMES DAILY PRN
Qty: 115 ML | Refills: 0 | Status: SHIPPED
Start: 2019-01-16 | End: 2019-11-23

## 2019-01-16 RX ORDER — ALBUTEROL SULFATE 90 UG/1
2 AEROSOL, METERED RESPIRATORY (INHALATION) EVERY 6 HOURS PRN
Qty: 1 INHALER | Refills: 0 | Status: SHIPPED | OUTPATIENT
Start: 2019-01-16 | End: 2021-01-30

## 2019-01-16 NOTE — PROGRESS NOTES
Rk Vasquez is a 63year old female. HPI:   Patient presents with:  Cough: Pt reports cough started last friday. She reports it is keeping her up at night. Worse at night, especially when she lays flat.  Albuterol HFA will help, but only after 3-4 pu • Infertility, female    • Lipid screening 8/31/2013   • Other and unspecified hyperlipidemia    • Parkinson disease ([facility]    • Thyroid disease 2016   • Tibia/fibula fracture     OPEN REDUCTION INTERNAL FIXATION   • Urinary incontinence 2/12/95    after 10-8 MG/5ML Oral Suspension Extended Release Take 5 mL by mouth 2 (two) times daily as needed. Disp: 115 mL Rfl: 0   guaiFENesin-codeine (CHERATUSSIN AC) 100-10 MG/5ML Oral Solution Take 5 mL by mouth every 6 (six) hours as needed for cough.  Disp: 180 mL R by mouth every 6 (six) hours as needed.    Disp:  Rfl:    Spacer/Aero-Holding Chambers (AEROCHAMBER PLUS) Does not apply Misc Please dispense 1 aerochamber Disp: 1 each Rfl: 1   PROAIR  (90 Base) MCG/ACT Inhalation Aero Soln INHALE 2 PUFFS INTO THE L Ears/Audiometry: tympanic membranes are normal bilaterally hearing is grossly intact  Nose/Mouth/Throat: nose and throat are clear mucous membranes are moist   Neck/Thyroid: neck is supple without adenopathy  Lymphatic: no abnormal cervical, supraclavicu training related to self administration of these medications. Teaching, instruction and sample was provided to the patient by myself. Teaching included  a review of potential adverse side effects as well as potential efficacy.   Patient's questions were a

## 2019-01-16 NOTE — PATIENT INSTRUCTIONS
Recs: Start prednisone 40 mg once a day with food times 5 days changed to Tussionex 5 mL's every 12 hours as a cough suppressant. Reviewed with patient may cause sedation and does contain a narcotic.   Recommend not to drive while taking continue with [place]

## 2019-01-28 NOTE — TELEPHONE ENCOUNTER
LOV: 11/26/2018  Future Appointments   Date Time Provider Joel [place]   1/31/2019  3:30 PM Shela Scheuermann, PsyD [facility] [place]   2/4/2019  6:20 PM Merry Haji MD [facility]   2/18/2019  4:00 PM Robel Gandhi MD [facility]

## 2019-01-29 DIAGNOSIS — M48.062 LUMBAR STENOSIS WITH NEUROGENIC CLAUDICATION: ICD-10-CM

## 2019-01-29 RX ORDER — GABAPENTIN 800 MG/1
TABLET ORAL
Qty: 90 TABLET | Refills: 0 | Status: SHIPPED | OUTPATIENT
Start: 2019-01-29 | End: 2019-03-01

## 2019-01-29 NOTE — TELEPHONE ENCOUNTER
Thanks for the update. She should touch base with me in a month for a status update. Will hold off on further injections for now.

## 2019-01-29 NOTE — TELEPHONE ENCOUNTER
Called patient s/p Bilateral L4 transforaminal epidural steroid injections on 12/26/2018. Patient reports 50% improvement to low back with BLE radicular sharp pain when walking.  Patient states pain during the day 3/10 but increases in the evening to 7/1

## 2019-01-29 NOTE — TELEPHONE ENCOUNTER
Called patient with Dr. Verner Bruin recommendations to follow up in 1 month. Patient verbalized understanding.

## 2019-01-31 ENCOUNTER — LAB ENCOUNTER (OUTPATIENT)
Dept: LAB | Facility: HOSPITAL | Age: 59
End: 2019-01-31
Attending: INTERNAL MEDICINE
Payer: COMMERCIAL

## 2019-01-31 ENCOUNTER — TELEPHONE (OUTPATIENT)
Dept: RHEUMATOLOGY | Facility: CLINIC | Age: 59
End: 2019-01-31

## 2019-01-31 DIAGNOSIS — Z51.81 ENCOUNTER FOR THERAPEUTIC DRUG MONITORING: ICD-10-CM

## 2019-01-31 DIAGNOSIS — M06.9 RHEUMATOID ARTHRITIS INVOLVING MULTIPLE SITES, UNSPECIFIED RHEUMATOID FACTOR PRESENCE: ICD-10-CM

## 2019-01-31 DIAGNOSIS — M06.9 RHEUMATOID ARTHRITIS INVOLVING MULTIPLE SITES, UNSPECIFIED RHEUMATOID FACTOR PRESENCE: Primary | ICD-10-CM

## 2019-01-31 LAB
ALBUMIN SERPL BCP-MCNC: 3.5 G/DL (ref 3.5–4.8)
ALT SERPL-CCNC: 11 U/L (ref 14–54)
AST SERPL-CCNC: 23 U/L (ref 15–41)
BASOPHILS # BLD AUTO: 0.03 X10(3) UL (ref 0–0.2)
BASOPHILS NFR BLD AUTO: 0.4 %
CREAT SERPL-MCNC: 0.83 MG/DL (ref 0.5–1.5)
CRP SERPL-MCNC: 1.4 MG/DL (ref 0–0.9)
DEPRECATED RDW RBC AUTO: 53.2 FL (ref 35.1–46.3)
EOSINOPHIL # BLD AUTO: 0.12 X10(3) UL (ref 0–0.7)
EOSINOPHIL NFR BLD AUTO: 1.4 %
ERYTHROCYTE [DISTWIDTH] IN BLOOD BY AUTOMATED COUNT: 15.9 % (ref 11–15)
ERYTHROCYTE [SEDIMENTATION RATE] IN BLOOD: 34 MM/HR (ref 0–30)
HCT VFR BLD AUTO: 40.8 % (ref 35–48)
HGB BLD-MCNC: 13.3 G/DL (ref 12–16)
IMM GRANULOCYTES # BLD AUTO: 0.03 X10(3) UL (ref 0–1)
IMM GRANULOCYTES NFR BLD: 0.4 %
LYMPHOCYTES # BLD AUTO: 1.58 X10(3) UL (ref 1–4)
LYMPHOCYTES NFR BLD AUTO: 18.8 %
MCH RBC QN AUTO: 30 PG (ref 26–34)
MCHC RBC AUTO-ENTMCNC: 32.6 G/DL (ref 31–37)
MCV RBC AUTO: 91.9 FL (ref 80–100)
MONOCYTES # BLD AUTO: 0.65 X10(3) UL (ref 0.1–1)
MONOCYTES NFR BLD AUTO: 7.7 %
NEUTROPHILS # BLD AUTO: 6 X10 (3) UL (ref 1.5–7.7)
NEUTROPHILS # BLD AUTO: 6 X10(3) UL (ref 1.5–7.7)
NEUTROPHILS NFR BLD AUTO: 71.3 %
PLATELET # BLD AUTO: 254 10(3)UL (ref 150–450)
RBC # BLD AUTO: 4.44 X10(6)UL (ref 3.8–5.3)
WBC # BLD AUTO: 8.4 X10(3) UL (ref 4–11)

## 2019-01-31 PROCEDURE — 85025 COMPLETE CBC W/AUTO DIFF WBC: CPT

## 2019-01-31 PROCEDURE — 84450 TRANSFERASE (AST) (SGOT): CPT

## 2019-01-31 PROCEDURE — 84460 ALANINE AMINO (ALT) (SGPT): CPT

## 2019-01-31 PROCEDURE — 82565 ASSAY OF CREATININE: CPT

## 2019-01-31 PROCEDURE — 82040 ASSAY OF SERUM ALBUMIN: CPT

## 2019-01-31 PROCEDURE — 36415 COLL VENOUS BLD VENIPUNCTURE: CPT

## 2019-01-31 PROCEDURE — 85652 RBC SED RATE AUTOMATED: CPT

## 2019-01-31 PROCEDURE — 86140 C-REACTIVE PROTEIN: CPT

## 2019-01-31 NOTE — TELEPHONE ENCOUNTER
Received a call from [facility] lab and patient is there for labs and needs a new SORD. New SORD made.

## 2019-02-04 ENCOUNTER — OFFICE VISIT (OUTPATIENT)
Dept: RHEUMATOLOGY | Facility: CLINIC | Age: 59
End: 2019-02-04
Payer: COMMERCIAL

## 2019-02-04 ENCOUNTER — NURSE ONLY (OUTPATIENT)
Dept: ALLERGY | Facility: CLINIC | Age: 59
End: 2019-02-04
Payer: COMMERCIAL

## 2019-02-04 VITALS
BODY MASS INDEX: 43.4 KG/M2 | DIASTOLIC BLOOD PRESSURE: 75 MMHG | HEART RATE: 75 BPM | SYSTOLIC BLOOD PRESSURE: 128 MMHG | HEIGHT: 69 IN | WEIGHT: 293 LBS

## 2019-02-04 DIAGNOSIS — Z51.81 ENCOUNTER FOR THERAPEUTIC DRUG MONITORING: ICD-10-CM

## 2019-02-04 DIAGNOSIS — M06.9 RHEUMATOID ARTHRITIS INVOLVING MULTIPLE SITES, UNSPECIFIED RHEUMATOID FACTOR PRESENCE: Primary | ICD-10-CM

## 2019-02-04 DIAGNOSIS — J30.89 ENVIRONMENTAL AND SEASONAL ALLERGIES: ICD-10-CM

## 2019-02-04 PROCEDURE — 99214 OFFICE O/P EST MOD 30 MIN: CPT | Performed by: INTERNAL MEDICINE

## 2019-02-04 PROCEDURE — 95117 IMMUNOTHERAPY INJECTIONS: CPT | Performed by: ALLERGY & IMMUNOLOGY

## 2019-02-04 PROCEDURE — 99212 OFFICE O/P EST SF 10 MIN: CPT | Performed by: INTERNAL MEDICINE

## 2019-02-05 NOTE — PATIENT INSTRUCTIONS
1. Cont. methotrexate - 8 tablets a week and folic acid 1mg a day   2. Cont. xlejanz 11mg a day   3. Return to clinic in 3-4 months. 4. .Check labs in 3-4  months . 5. Tramadol 50mg every 12 hours as needed.    6. Take diclfoenac 75mg twice ad ay - crystal

## 2019-02-05 NOTE — PROGRESS NOTES
Beryle Chain is a 63year old female who presents for Patient presents with:  Rheumatoid Arthritis: upper thigh pain  Back Pain  Hand Pain: left  . HPI:     She is a pleasant 65year old who has elevated RF >300 and foot pain - c/w seropositive  RA . pain is terrbilw as the day goes on. She has   She has bad hip pain. The rest of her pains is not bad. She has to use a walked and get into work.    She cna't get an MRI of her lumbar -   Valium doesn't help her. richard needs conscious sedation b/c of her pa botheing her the most. She knows that if she tilts her pelvis in the back pain gets better. She sees Dr. Juli Wang on Monday. She is better in her other joitns.    She sees parkinson's clinci in July - and notcied she takes less of the medicatoin while be PENNKINETIC ER) 10-8 MG/5ML Oral Suspension Extended Release Take 5 mL by mouth 2 (two) times daily as needed. Disp: 115 mL Rfl: 0   Sertraline HCl 50 MG Oral Tab Take 1 tablet (50 mg total) by mouth every evening.  Take half tablet for the first week Disp: EVERY 6 (SIX) HOURS AS NEEDED FOR WHEEZING. Disp: 8.5 Inhaler Rfl: 0   Albuterol Sulfate (2.5 MG/3ML) 0.083% Inhalation Nebu Soln Take 3 mL (2.5 mg total) by nebulization every 4 (four) hours as needed for Wheezing.  Disp: 3 Box Rfl: 0   Hydrocod Polst-CPM COLONOSCOPY  12/16/2013   • COLPOSCOPY, CERVIX W/UPPER ADJACENT VAGINA; W/BIOPSY(S), CERVIX     • D & C      for extended period   • D & C      for extended period   • FOOT SURGERY Left     Had right foot done recently.    • KNEE ARTHROSCOPY Right    • LAPA 13.3   Hematocrit      35.0 - 48.0 % 40.8   MCV      80.0 - 100.0 fL 91.9   MCH      26.0 - 34.0 pg 30.0   MCHC      31.0 - 37.0 g/dL 32.6   RDW-SD      35.1 - 46.3 fL 53.2 (H)   RDW      11.0 - 15.0 % 15.9 (H)   Platelet Count      701.0 - 450.0 10(3)uL 2 in broadening of the  proximal fifth metatarsal shaft.  There is a slight medial bowing deformity  of the fifth metatarsal there is a result of the previous fracture but the  distal shaft of the fifth metatarsal is parallel with the distal shaft of  the fou now positive ccp abs - very specific for seropositive Rheumatoid arthriits - likely early migratory -   - stay on  Methotrexate - 20mg a week   On Sharin Agent since 2/2018 - doing better -   Off  leflunomide  - she noticed no improvement of her arthriits on le folic acid 1mg a day   2. Cont. xlejanz 11mg a day   3. Return to clinic in 3-4 months. 4. .Check labs in 3-4  months . 5. Tramadol 50mg every 12 hours as needed.    6. Take diclfoenac 75mg twice ad ay - take evening dose at 4pm -       - overall she's

## 2019-02-18 ENCOUNTER — OFFICE VISIT (OUTPATIENT)
Dept: SURGERY | Facility: CLINIC | Age: 59
End: 2019-02-18
Payer: COMMERCIAL

## 2019-02-18 VITALS
HEIGHT: 69 IN | BODY MASS INDEX: 43.4 KG/M2 | WEIGHT: 293 LBS | DIASTOLIC BLOOD PRESSURE: 62 MMHG | SYSTOLIC BLOOD PRESSURE: 132 MMHG

## 2019-02-18 DIAGNOSIS — E66.01 MORBID OBESITY WITH BMI OF 45.0-49.9, ADULT (HCC): ICD-10-CM

## 2019-02-18 DIAGNOSIS — Z51.81 ENCOUNTER FOR THERAPEUTIC DRUG MONITORING: ICD-10-CM

## 2019-02-18 DIAGNOSIS — M54.42 CHRONIC BILATERAL LOW BACK PAIN WITH BILATERAL SCIATICA: ICD-10-CM

## 2019-02-18 DIAGNOSIS — R53.82 CHRONIC FATIGUE: ICD-10-CM

## 2019-02-18 DIAGNOSIS — E78.5 HYPERLIPIDEMIA, UNSPECIFIED HYPERLIPIDEMIA TYPE: Primary | ICD-10-CM

## 2019-02-18 DIAGNOSIS — M54.41 CHRONIC BILATERAL LOW BACK PAIN WITH BILATERAL SCIATICA: ICD-10-CM

## 2019-02-18 DIAGNOSIS — G89.29 CHRONIC BILATERAL LOW BACK PAIN WITH BILATERAL SCIATICA: ICD-10-CM

## 2019-02-18 PROCEDURE — 99214 OFFICE O/P EST MOD 30 MIN: CPT | Performed by: INTERNAL MEDICINE

## 2019-02-18 RX ORDER — SERTRALINE HYDROCHLORIDE 100 MG/1
100 TABLET, FILM COATED ORAL EVERY EVENING
Qty: 90 TABLET | Refills: 1 | Status: SHIPPED | OUTPATIENT
Start: 2019-02-18 | End: 2019-04-10

## 2019-02-18 NOTE — PROGRESS NOTES
[address] [place] [address] Ramona Alan, [address] [address] [address]  [address]  Dept: [phone]     Date:  10/16/18    Patient:  Janette Carty  :      1960  MRN:      [id number]    Chief Compla GABAPENTIN 800 MG Oral Tab TAKE 1 TABLET BY MOUTH THREE TIMES A DAY Disp: 90 tablet Rfl: 0   METHOTREXATE 2.5 MG Oral Tab TAKE 8 TABLETS (20 MG TOTAL) BY MOUTH EVERY 7 DAYS.  Disp: 40 tablet Rfl: 3   TIZANIDINE HCL 4 MG Oral Cap TAKE 1 TO 2 CAPSULES BY MO Tablet 24 Hr Take 11 mg by mouth daily. Disp: 90 tablet Rfl: 3   ergocalciferol 90141 units Oral Cap TAKE 1 CAPSULE (50,000 UNITS TOTAL) BY MOUTH EVERY 30 (THIRTY) DAYS. Disp:  Rfl: 4   Rasagiline Mesylate 1 MG Oral Tab Take 1 mg by mouth daily.    Disp:  R Medical: Not on file        Non-medical: Not on file    Tobacco Use      Smoking status: Never Smoker      Smokeless tobacco: Never Used    Substance and Sexual Activity      Alcohol use:  Yes        Alcohol/week: 0.0 oz        Comment: 2-3 drinks per week foot done recently.    • KNEE ARTHROSCOPY Right    • LAPAROSCOPY,PELVIC,BIOPSY  1990    for infertility   • Coy 1878    for infertility   • OTHER SURGICAL HISTORY Right 12/3/2015   • OTHER SURGICAL HISTORY  12/3/2015   • THYROIDECTOMY Review of Systems   Constitutional: Negative. Respiratory: Negative. Cardiovascular: Negative. Gastrointestinal: Negative. Genitourinary: Negative. Musculoskeletal: Positive for arthralgias, back pain, gait problem and myalgias.    Skin: Ne weight after 6 months time. Further consideration for bariatric surgery will be discussed at upcoming clinic visits.       I also advised Inez Morales to discuss bariatric surgery with her medical team. I did advise her against lap band as she has a hx of an [place] 1800cal    zoloft started  Tolerating well  Will increase dose to 100 mg    Needs support from daughter and      Follow up in 6-8 weeks    Milo Montero MD

## 2019-02-19 ENCOUNTER — ORDER TRANSCRIPTION (OUTPATIENT)
Dept: PHYSICAL THERAPY | Facility: HOSPITAL | Age: 59
End: 2019-02-19

## 2019-02-19 DIAGNOSIS — G20 PARKINSON'S DISEASE (HCC): Primary | ICD-10-CM

## 2019-02-21 NOTE — TELEPHONE ENCOUNTER
Pt would like to speak with the RN to see if her insurance approved her Felipe Chin medication. Pt can be reached at [phone] whether or not it has been approved.
Pt's pharmacy called in requesting the ICD-10 code for the [facility] medication.   Please advise
Received a call from Nita at [address] [facility] and she wanted the ICD 10 code. Rheumatoid arthritis given. She stated [facility] [employer].
See 8/13 PA encounter.
Spoke with pharmacy script needed to be resent with ICD 10 code. Medication approved for refill by provider on 8/1/18.
Yes

## 2019-02-28 ENCOUNTER — TELEPHONE (OUTPATIENT)
Dept: NEUROLOGY | Facility: CLINIC | Age: 59
End: 2019-02-28

## 2019-02-28 DIAGNOSIS — M48.062 LUMBAR STENOSIS WITH NEUROGENIC CLAUDICATION: Primary | ICD-10-CM

## 2019-02-28 NOTE — TELEPHONE ENCOUNTER
[employer] for authorization of approval for bilateral L4 TFESIs cpt codes [id number], [id number]. Authorization is not required. Transaction [id number]   Will  inform Nursing.

## 2019-02-28 NOTE — TELEPHONE ENCOUNTER
Regarding: Other  Contact: [phone]  ----- Message from Joanna Britt MA sent at 2/28/2019 11:21 AM CST -----       ----- Message from [employer] to Romero Dubose DO sent at 2/28/2019 10:39 AM -----   Dr. Zeb Frank, the pain had returned to ab

## 2019-02-28 NOTE — TELEPHONE ENCOUNTER
Patient has been scheduled for a Bilateral L4 transforaminal epidural steroid injection under fluoroscopy, MAC for Parkinsonian tremor on 03/13/19 at the [facility]. Medications and allergies reviewed.  Patient informed to hold aspirins, nsaids, blood thinners, m

## 2019-03-01 DIAGNOSIS — M48.062 LUMBAR STENOSIS WITH NEUROGENIC CLAUDICATION: ICD-10-CM

## 2019-03-01 RX ORDER — TOFACITINIB 11 MG/1
TABLET, FILM COATED, EXTENDED RELEASE ORAL
Qty: 30 TABLET | Refills: 11 | Status: SHIPPED | OUTPATIENT
Start: 2019-03-01 | End: 2020-02-28

## 2019-03-01 NOTE — TELEPHONE ENCOUNTER
Requested medication: XELJANZ XR 11 MG Oral Tablet   Last refilled: 8/27/18 #90 tablet with 3 refills  LOV: 2/4/19  Future Appointments   Date Time Provider Joel Mann   3/12/2019  5:15 PM Gladystine Marely, PT [facility] PT [facility]   3/13/2019 11:00 AM Mliss Beverage, DO [place] [address] [facility]   3/14/2019  3:30 PM Clearance Odessa PsyD [facility] LO [place]   3/19/2019  5:15 PM Degeorge, Mercy Alias, PT [facility] THE [facility]   3/21/2019  5:15 PM Degeorge, Mercy Alias, PT [facility]   3/26/2019  5:15 PM Degeorge, Mercy Alias, PT [facility]   3/28/2019  5:15 PM Degeorge, Mercy Alias, PT [facility]   4/2/2019  5:15 PM Degeorge, Mercy Alias, PT [facility]   4/4/2019  5:15 PM Degeorge, Mercy Alias, PT [facility] THE [facility]   4/9/2019  5:15 PM Degeorge, Mercy Alias, PT [facility]   4/10/2019  5:00 PM [facility]ILAS [facility]on [facility]   4/11/2019  4:20 PM Raheem Mcmahon MD [facility]   4/11/2019  5:15 PM Degeorge, Mercy Alias, PT [facility] OF [facility] THE [facility]   4/16/2019  4:15 PM Kang Jackson MD [address] CARE [facility] OF THE [facility]   4/16/2019  5:15 PM Mercy Doran, PT [facility] PT EM [facility] OF THE [place]   6/3/2019  6:20 PM Clark Campo MD [address] [place] [address]     Labs:   Component      Latest Ref Rng & Units 1/31/2019   WBC      4.0 - 11.0 x10(3) uL 8.4   RBC      3.80 - 5.30 x10(6)uL 4.44   Hemoglobin      12.0 - 16.0 g/dL 13.3   Hematocrit      35.0 - 48.0 % 40.8   MCV      80.0 - 100.0 fL 91.9   MCH      26.0 - 34.0 pg 30.0   MCHC      31.0 - 37.0 g/dL 32.6   RDW-SD      35.1 - 46.3 fL 53.2 (H)   RDW      11.0 - 15.0 % 15.9 (H)   Platelet Count      050.0 - 450.0 10(3)uL 254.0   Prelim Neutrophil Abs      1.50 - 7.70 x10 (3) uL 6.00   Neutrophils Absolute      1.50 - 7.70 x10(3) uL 6.00   Lymphocytes Absolute      1.00 - 4.00 x10(3) uL 1.58   Monocytes Absolute      0.10 - 1.00 x10(3) uL 0.65   Eosinophils Absolute      0.00 - 0.70 x10(3) uL 0.12   Basophils Absolute      0.00 - 0.20 x10(3) uL 0.03   Immature Granulocyte Absolute      0.00 - 1.00 x10(3) uL 0.03   Neutrophils %      % 71.3 Lymphocytes %      % 18.8   Monocytes %      % 7.7   Eosinophils %      % 1.4   Basophils %      % 0.4   Immature Granulocyte %      % 0.4   CREATININE      0.50 - 1.50 mg/dL 0.83   GFR, Non-      >=60 >60   GFR, -American      >=60 >60   SED RATE      0 - 30 mm/Hr 34 (H)   C-REACTIVE PROTEIN      0.0 - 0.9 mg/dL 1.4 (H)   AST (SGOT)      15 - 41 U/L 23   ALT (SGPT)      14 - 54 U/L 11 (L)   Albumin      3.5 - 4.8 g/dL 3.5       Summary:  1. Cont. methotrexate - 8 tablets a week and folic acid 1mg a day   2. Cont. xlejanz 11mg a day   3. Return to clinic in 3-4 months. 4. .Check labs in 3-4  months . 5. Tramadol 50mg every 12 hours as needed. 6. Take diclfoenac 75mg twice ad ay - take evening dose at 4pm -   - overall she's better on Cresencio Bentley MD  2/4/2019   7:09 PM  - [place]ed [place]  through Epic     Please advise.

## 2019-03-02 NOTE — TELEPHONE ENCOUNTER
Medication request: Gabapentin 800 mg, Take 1 tablet by mouth three times daily.  Qt 90 Refills 0    LOV: 12/3/18  NOV: None    Last refill: 1/29/19

## 2019-03-04 ENCOUNTER — NURSE ONLY (OUTPATIENT)
Dept: ALLERGY | Facility: CLINIC | Age: 59
End: 2019-03-04
Payer: COMMERCIAL

## 2019-03-04 DIAGNOSIS — J30.89 ENVIRONMENTAL AND SEASONAL ALLERGIES: ICD-10-CM

## 2019-03-04 PROCEDURE — 95117 IMMUNOTHERAPY INJECTIONS: CPT | Performed by: ALLERGY & IMMUNOLOGY

## 2019-03-05 RX ORDER — GABAPENTIN 800 MG/1
TABLET ORAL
Qty: 90 TABLET | Refills: 0 | Status: SHIPPED | OUTPATIENT
Start: 2019-03-05 | End: 2019-04-02

## 2019-03-12 ENCOUNTER — OFFICE VISIT (OUTPATIENT)
Dept: PHYSICAL THERAPY | Facility: HOSPITAL | Age: 59
End: 2019-03-12
Attending: Other
Payer: COMMERCIAL

## 2019-03-12 DIAGNOSIS — G20 PARKINSON'S DISEASE (HCC): ICD-10-CM

## 2019-03-12 PROCEDURE — 97162 PT EVAL MOD COMPLEX 30 MIN: CPT

## 2019-03-12 NOTE — PROGRESS NOTES
NEUROLOGICAL PHYSICAL THERAPY EVALUATION:   Referring Physician: Dr. Sadaf Barcenas  Diagnosis: Parkinson's Disease       Date of Onset: 2013 Date of Service: 3/12/2019     PATIENT SUMMARY   Geremias Pappas is a 63year old y/o female who presents to therapy today NO OBSTRUCTIVE DISEASE   • Chicken pox    • Chronic cough    • Disorder of thyroid    • Extrapyramidal syndrome 2013    MEDICATION MGMT.  W/ PARKINSONS FEATURES   • Extrinsic asthma, unspecified    • Greater trochanteric bursitis, left 11/21/2017   • High c Standing with eyes closed   ___4___7. Standing with feet together   ___3___8. Reaching forward with outstretched arm   ___4___9. Retrieving object from floor   ___4__10. Turning to look behind   ___4__11. Turning 360 degrees   ___4__12.  Placing alternate f actively participate in planning and for this course of care.     Thank you for your referral.  If you have any questions, please contact me at Dept: [phone]    Sincerely,  Nick Xiao PT, NCS      Electronically signed by therapist: Nick Xiao

## 2019-03-13 ENCOUNTER — APPOINTMENT (OUTPATIENT)
Dept: PHYSICAL THERAPY | Facility: HOSPITAL | Age: 59
End: 2019-03-13
Attending: Other
Payer: COMMERCIAL

## 2019-03-13 ENCOUNTER — OFFICE VISIT (OUTPATIENT)
Dept: SURGERY | Facility: CLINIC | Age: 59
End: 2019-03-13
Payer: COMMERCIAL

## 2019-03-13 DIAGNOSIS — M48.062 LUMBAR STENOSIS WITH NEUROGENIC CLAUDICATION: Primary | ICD-10-CM

## 2019-03-13 PROCEDURE — 64483 NJX AA&/STRD TFRM EPI L/S 1: CPT | Performed by: PHYSICAL MEDICINE & REHABILITATION

## 2019-03-13 RX ORDER — DICLOFENAC SODIUM 75 MG/1
75 TABLET, DELAYED RELEASE ORAL 2 TIMES DAILY
Qty: 60 TABLET | Refills: 5 | Status: SHIPPED | OUTPATIENT
Start: 2019-03-13 | End: 2019-07-08

## 2019-03-13 NOTE — PROCEDURES
[address].    BILATERAL LUMBAR TRANSFORAMINAL   NAME:  Janette Carty    MR #:    [id number] :  1960     PHYSICIAN:  Julio Cesar Jones        Operative Report    DATE OF PROCEDURE: 3/13/2019   PREOPERATIVE DIAGNOSES: 1.  L3-4, L intervertebral foramen. At this point in time, the patient's skin was anesthetized with 1 to 2 cc of 1% PF lidocaine without epinephrine. Then, a 5 inch, 22-gauge spinal needle was inserted and directed towards the left L4-5 intervertebral foramen.   When

## 2019-03-13 NOTE — TELEPHONE ENCOUNTER
Requested medication: DICLOFENAC SODIUM 75 MG Oral Tab EC  Last refilled: 11/19/18 #60 tab with 3 refills  LOV: 2/4/19    Future Appointments   Date Time Provider Joel Mann   3/13/2019 11:00 AM DO Karin Khan [address] [facility]   3/14/2019  3:30 PM Romie Tang PsyD [facility] L[facility] [place]   3/19/2019  5:15 PM JoniorRhea loaiza, PT CF PT EM [facility] THE [facility]   3/21/2019  5:15 PM Rhea Doran, PT CF PT EM [facility]   3/26/2019  5:15 PM Rhea Doran, PT CF PT [facility] THE [facility]   3/28/2019  5:15 PM Rhea Doran, PT CF PT EM [facility]   4/2/2019  5:15 PM Court Espino, PT [facility] PT [facility]   4/9/2019  5:15 PM Rhea Doran, PT CF PT [facility]   4/10/2019  5:00 PM [facility]IL[facility]   4/11/2019  4:20 PM Terrell Morillo MD [facility]ARE [facility] OF THE [facility]   4/11/2019  5:15 PM Court Espino, PT [facility] PT [facility]   4/16/2019  4:15 PM Jocelyn Schwartz MD [address] [facility] THE [facility]   4/16/2019  5:15 PM Court Record, PT CF PT [facility]   6/3/2019  6:20 PM Octavia Dewitt MD [facility]     Labs:   Component      Latest Ref Rng & Units 1/31/2019   WBC      4.0 - 11.0 x10(3) uL 8.4   RBC      3.80 - 5.30 x10(6)uL 4.44   Hemoglobin      12.0 - 16.0 g/dL 13.3   Hematocrit      35.0 - 48.0 % 40.8   MCV      80.0 - 100.0 fL 91.9   MCH      26.0 - 34.0 pg 30.0   MCHC      31.0 - 37.0 g/dL 32.6   RDW-SD      35.1 - 46.3 fL 53.2 (H)   RDW      11.0 - 15.0 % 15.9 (H)   Platelet Count      918.6 - 450.0 10(3)uL 254.0   Prelim Neutrophil Abs      1.50 - 7.70 x10 (3) uL 6.00   Neutrophils Absolute      1.50 - 7.70 x10(3) uL 6.00   Lymphocytes Absolute      1.00 - 4.00 x10(3) uL 1.58   Monocytes Absolute      0.10 - 1.00 x10(3) uL 0.65   Eosinophils Absolute      0.00 - 0.70 x10(3) uL 0.12   Basophils Absolute      0.00 - 0.20 x10(3) uL 0.03   Immature Granulocyte Absolute      0.00 - 1.00 x10(3) uL 0.03   Neutrophils %      % 71.3   Lymphocytes %      % 18.8   Monocytes %      % 7.7   Eosinophils %      % 1.4   Basophils %      % 0.4 Immature Granulocyte %      % 0.4   CREATININE      0.50 - 1.50 mg/dL 0.83   GFR, Non-      >=60 >60   GFR, -American      >=60 >60   SED RATE      0 - 30 mm/Hr 34 (H)   C-REACTIVE PROTEIN      0.0 - 0.9 mg/dL 1.4 (H)   AST (SGOT)      15 - 41 U/L 23   ALT (SGPT)      14 - 54 U/L 11 (L)   Albumin      3.5 - 4.8 g/dL 3.5       Summary:  1. Cont. methotrexate - 8 tablets a week and folic acid 1mg a day   2. Cont. xlejanz 11mg a day   3. Return to clinic in 3-4 months. 4. .Check labs in 3-4  months . 5. Tramadol 50mg every 12 hours as needed. 6. Take diclfoenac 75mg twice ad ay - take evening dose at 4pm -   - overall she's better on Mick Rowley MD  2/4/2019   7:09 PM  - [place]  through [facility]    Please advise.

## 2019-03-19 ENCOUNTER — OFFICE VISIT (OUTPATIENT)
Dept: PHYSICAL THERAPY | Facility: HOSPITAL | Age: 59
End: 2019-03-19
Attending: Other
Payer: COMMERCIAL

## 2019-03-19 PROCEDURE — 97112 NEUROMUSCULAR REEDUCATION: CPT

## 2019-03-19 PROCEDURE — 97110 THERAPEUTIC EXERCISES: CPT

## 2019-03-19 NOTE — PATIENT INSTRUCTIONS
Do these exercises 2 times each day    1. Lie on your back with both knees bent. Tighten your abdominals as if trying to push belly button into bed (back will flatten slightly and your pelvis may tilt a small amount).  Hold this contraction for 5 seconds, m

## 2019-03-19 NOTE — PROGRESS NOTES
Diagnosis: Parkinson's Disease  Visit (# authorized):   8 per POC [facility] : [facility] PPO)                       Referring Physician: Dr. Tan Sidhu    Precautions:  At risk for falls    Medication changes since last visit?:  No    Subjective: Had injection last week Wednesda

## 2019-03-20 ENCOUNTER — APPOINTMENT (OUTPATIENT)
Dept: PHYSICAL THERAPY | Facility: HOSPITAL | Age: 59
End: 2019-03-20
Attending: Other
Payer: COMMERCIAL

## 2019-03-21 ENCOUNTER — OFFICE VISIT (OUTPATIENT)
Dept: PHYSICAL THERAPY | Facility: HOSPITAL | Age: 59
End: 2019-03-21
Attending: Other
Payer: COMMERCIAL

## 2019-03-21 PROCEDURE — 97112 NEUROMUSCULAR REEDUCATION: CPT

## 2019-03-21 PROCEDURE — 97110 THERAPEUTIC EXERCISES: CPT

## 2019-03-21 NOTE — PROGRESS NOTES
Diagnosis: Parkinson's Disease  Visit (# authorized):   8 per POC [facility]                       Referring Physician: Dr. Rosalba Perry    Precautions: At risk for falls    Medication changes since last visit?:  No    Subjective:  Pain in back currently 6/10.

## 2019-03-22 ENCOUNTER — APPOINTMENT (OUTPATIENT)
Dept: PHYSICAL THERAPY | Facility: HOSPITAL | Age: 59
End: 2019-03-22
Attending: Other
Payer: COMMERCIAL

## 2019-03-26 ENCOUNTER — APPOINTMENT (OUTPATIENT)
Dept: PHYSICAL THERAPY | Facility: HOSPITAL | Age: 59
End: 2019-03-26
Attending: Other
Payer: COMMERCIAL

## 2019-03-27 ENCOUNTER — APPOINTMENT (OUTPATIENT)
Dept: PHYSICAL THERAPY | Facility: HOSPITAL | Age: 59
End: 2019-03-27
Attending: Other
Payer: COMMERCIAL

## 2019-03-28 ENCOUNTER — OFFICE VISIT (OUTPATIENT)
Dept: PHYSICAL THERAPY | Facility: HOSPITAL | Age: 59
End: 2019-03-28
Attending: Other
Payer: COMMERCIAL

## 2019-03-28 PROCEDURE — 97110 THERAPEUTIC EXERCISES: CPT

## 2019-03-28 PROCEDURE — 97112 NEUROMUSCULAR REEDUCATION: CPT

## 2019-03-28 NOTE — PROGRESS NOTES
Diagnosis: Parkinson's Disease  Visit (# authorized):   8 per POC [facility] : [facility] PPO)                       Referring Physician: Dr. Fredrick Swan    Precautions: At risk for falls    Medication changes since last visit?: yes     Subjective:  Pain in back currently 7/10.  P

## 2019-03-29 ENCOUNTER — APPOINTMENT (OUTPATIENT)
Dept: PHYSICAL THERAPY | Facility: HOSPITAL | Age: 59
End: 2019-03-29
Attending: Other
Payer: COMMERCIAL

## 2019-04-01 ENCOUNTER — NURSE ONLY (OUTPATIENT)
Dept: ALLERGY | Facility: CLINIC | Age: 59
End: 2019-04-01
Payer: COMMERCIAL

## 2019-04-01 DIAGNOSIS — J30.89 ENVIRONMENTAL AND SEASONAL ALLERGIES: ICD-10-CM

## 2019-04-01 PROCEDURE — 95117 IMMUNOTHERAPY INJECTIONS: CPT | Performed by: ALLERGY & IMMUNOLOGY

## 2019-04-02 ENCOUNTER — OFFICE VISIT (OUTPATIENT)
Dept: PHYSICAL THERAPY | Facility: HOSPITAL | Age: 59
End: 2019-04-02
Attending: Other
Payer: COMMERCIAL

## 2019-04-02 DIAGNOSIS — M48.062 LUMBAR STENOSIS WITH NEUROGENIC CLAUDICATION: ICD-10-CM

## 2019-04-02 PROCEDURE — 97110 THERAPEUTIC EXERCISES: CPT

## 2019-04-02 PROCEDURE — 97112 NEUROMUSCULAR REEDUCATION: CPT

## 2019-04-02 RX ORDER — GABAPENTIN 800 MG/1
TABLET ORAL
Qty: 90 TABLET | Refills: 0 | Status: SHIPPED | OUTPATIENT
Start: 2019-04-02 | End: 2019-04-24

## 2019-04-02 NOTE — TELEPHONE ENCOUNTER
Gabapentin 800mg. Take 1 tablet TID. #90. No refills.     Last filled-3/5/2019    LOV-12/3/2018  Bilateral L4 TFESI on 3/13/2019  NOV-none

## 2019-04-02 NOTE — PROGRESS NOTES
Diagnosis: Parkinson's Disease  Visit (# authorized):   8 per POC [facility] : [facility] PPO)                       Referring Physician: Dr. Theodore Memos    Precautions: At risk for falls    Medication changes since last visit?: no     Subjective:  Pain in back currently 5/10.  Fe

## 2019-04-03 ENCOUNTER — APPOINTMENT (OUTPATIENT)
Dept: PHYSICAL THERAPY | Facility: HOSPITAL | Age: 59
End: 2019-04-03
Attending: Other
Payer: COMMERCIAL

## 2019-04-04 ENCOUNTER — APPOINTMENT (OUTPATIENT)
Dept: PHYSICAL THERAPY | Facility: HOSPITAL | Age: 59
End: 2019-04-04
Attending: Other
Payer: COMMERCIAL

## 2019-04-05 ENCOUNTER — APPOINTMENT (OUTPATIENT)
Dept: PHYSICAL THERAPY | Facility: HOSPITAL | Age: 59
End: 2019-04-05
Attending: Other
Payer: COMMERCIAL

## 2019-04-09 ENCOUNTER — OFFICE VISIT (OUTPATIENT)
Dept: PHYSICAL THERAPY | Facility: HOSPITAL | Age: 59
End: 2019-04-09
Attending: Other
Payer: COMMERCIAL

## 2019-04-09 PROCEDURE — 97112 NEUROMUSCULAR REEDUCATION: CPT

## 2019-04-09 PROCEDURE — 97110 THERAPEUTIC EXERCISES: CPT

## 2019-04-09 NOTE — PROGRESS NOTES
Diagnosis: Parkinson's Disease  Visit (# authorized):   8 per POC [facility] : [facility] PPO)                       Referring Physician: Dr. Allie Hardwick    Precautions:  At risk for falls    Medication changes since last visit?: no     Subjective:  Pt reports thinking she tore he

## 2019-04-10 ENCOUNTER — APPOINTMENT (OUTPATIENT)
Dept: PHYSICAL THERAPY | Facility: HOSPITAL | Age: 59
End: 2019-04-10
Attending: Other
Payer: COMMERCIAL

## 2019-04-10 ENCOUNTER — OFFICE VISIT (OUTPATIENT)
Dept: OBGYN CLINIC | Facility: CLINIC | Age: 59
End: 2019-04-10
Payer: COMMERCIAL

## 2019-04-10 VITALS
HEART RATE: 85 BPM | DIASTOLIC BLOOD PRESSURE: 72 MMHG | SYSTOLIC BLOOD PRESSURE: 134 MMHG | WEIGHT: 293 LBS | BODY MASS INDEX: 50 KG/M2

## 2019-04-10 DIAGNOSIS — Z01.419 WELL WOMAN EXAM WITH ROUTINE GYNECOLOGICAL EXAM: Primary | ICD-10-CM

## 2019-04-10 DIAGNOSIS — Z12.31 ENCOUNTER FOR SCREENING MAMMOGRAM FOR MALIGNANT NEOPLASM OF BREAST: ICD-10-CM

## 2019-04-10 PROCEDURE — 99396 PREV VISIT EST AGE 40-64: CPT | Performed by: CLINICAL NURSE SPECIALIST

## 2019-04-11 ENCOUNTER — OFFICE VISIT (OUTPATIENT)
Dept: PHYSICAL THERAPY | Facility: HOSPITAL | Age: 59
End: 2019-04-11
Attending: Other
Payer: COMMERCIAL

## 2019-04-11 ENCOUNTER — OFFICE VISIT (OUTPATIENT)
Dept: INTERNAL MEDICINE CLINIC | Facility: CLINIC | Age: 59
End: 2019-04-11
Payer: COMMERCIAL

## 2019-04-11 VITALS
WEIGHT: 293 LBS | HEIGHT: 69 IN | HEART RATE: 84 BPM | SYSTOLIC BLOOD PRESSURE: 134 MMHG | TEMPERATURE: 98 F | DIASTOLIC BLOOD PRESSURE: 76 MMHG | BODY MASS INDEX: 43.4 KG/M2 | RESPIRATION RATE: 20 BRPM

## 2019-04-11 DIAGNOSIS — K63.5 POLYP OF COLON, UNSPECIFIED PART OF COLON, UNSPECIFIED TYPE: ICD-10-CM

## 2019-04-11 DIAGNOSIS — J45.20 MILD INTERMITTENT ASTHMA WITHOUT COMPLICATION: ICD-10-CM

## 2019-04-11 DIAGNOSIS — E03.9 HYPOTHYROIDISM, UNSPECIFIED TYPE: Primary | ICD-10-CM

## 2019-04-11 DIAGNOSIS — M06.9 RHEUMATOID ARTHRITIS INVOLVING MULTIPLE SITES, UNSPECIFIED RHEUMATOID FACTOR PRESENCE: ICD-10-CM

## 2019-04-11 DIAGNOSIS — G20 PARKINSON DISEASE (HCC): ICD-10-CM

## 2019-04-11 DIAGNOSIS — E78.5 HYPERLIPIDEMIA LDL GOAL <130: ICD-10-CM

## 2019-04-11 DIAGNOSIS — M25.512 ACUTE PAIN OF LEFT SHOULDER: ICD-10-CM

## 2019-04-11 PROCEDURE — 99214 OFFICE O/P EST MOD 30 MIN: CPT | Performed by: INTERNAL MEDICINE

## 2019-04-11 PROCEDURE — 97112 NEUROMUSCULAR REEDUCATION: CPT

## 2019-04-11 PROCEDURE — 97110 THERAPEUTIC EXERCISES: CPT

## 2019-04-11 PROCEDURE — 99212 OFFICE O/P EST SF 10 MIN: CPT | Performed by: INTERNAL MEDICINE

## 2019-04-11 NOTE — PROGRESS NOTES
HPI:    Patient ID: Mauro Moyer is a 63year old female. HPI  Patient is here for follow-up on chronic medical issues as listed below. Last seen here in October. At that time exam showed increased evidence of parkinsonian movements.   Since that ti cholesterol    • Hyperlipidemia    • Infertility, female    • Lipid screening 8/31/2013   • Other and unspecified hyperlipidemia    • [facility] disease ([facility]    • Thyroid disease 2016   • Tibia/fibula fracture     OPEN REDUCTION INTERNAL FIXATION   • Wilton sexual dysfunction. Musculoskeletal: Positive for joint pain. Skin: Negative for rash. Neurological: Negative for weakness, numbness and headaches. Hematological: Bruises/bleeds easily. Psychiatric/Behavioral: Negative for depressed mood.  The pat tablet Rfl: 1   CALCITRIOL 0.25 MCG Oral Cap TAKE 1 CAPSULE (0.25 MCG TOTAL) BY MOUTH DAILY. Disp: 90 capsule Rfl: 1   Turmeric 500 MG Oral Cap Take by mouth 2 (two) times daily.  Disp:  Rfl:    TRAMADOL HCL 50 MG Oral Tab TAKE 1 TABLET BY MOUTH EVERY 12 HO Nose normal.   Mouth/Throat: Oropharynx is clear and moist.   Eyes: Pupils are equal, round, and reactive to light. Cardiovascular: Normal rate, regular rhythm, normal heart sounds and intact distal pulses. Edema not present.   Pulmonary/Chest: Effort lose weight. 7. Polyp of colon, unspecified part of colon, unspecified type  Due for follow-up with GI doctor. - EVALUATE & TREAT, GASTRO (INTERNAL)    8. Acute pain of left shoulder  Patient with 6 weeks of ongoing left shoulder pain.   She can take ov

## 2019-04-11 NOTE — PROGRESS NOTES
Gypsy Hernandez is a 63year old female [id number] Patient's last menstrual period was 04/18/2015 (exact date). Patient presents with:  Gyn Exam: Annual  Last annual exam was last year. Last pap was 2015 and normal. HPV negative. No Hx of abnormal pap's.  Chata Acuna 2008     Past Surgical History:   Procedure Laterality Date   • COLONOSCOPY  12/16/2013   • COLPOSCOPY, CERVIX W/UPPER ADJACENT VAGINA; W/BIOPSY(S), CERVIX     • D & C      for extended period   • D & C      for extended period   • FOOT SURGERY Left     Ruperto Kawasaki Not on file    Other Topics      Concerns:         Service: Not Asked        Blood Transfusions: Not Asked        Caffeine Concern: Yes          S2 glasses tea daily        Occupational Exposure: Not Asked        Hobby Hazards: Not Asked        Sle TAKE 1 TO 2 CAPSULES BY MOUTH AT BEDTIME, Disp: 60 capsule, Rfl: 3  •  Albuterol Sulfate HFA (PROAIR HFA) 108 (90 Base) MCG/ACT Inhalation Aero Soln, Inhale 2 puffs into the lungs every 6 (six) hours as needed for Wheezing., Disp: 1 Inhaler, Rfl: 0  •  Hyd Rfl: 0  •  Albuterol Sulfate (2.5 MG/3ML) 0.083% Inhalation Nebu Soln, Take 3 mL (2.5 mg total) by nebulization every 4 (four) hours as needed for Wheezing., Disp: 3 Box, Rfl: 0  •  Hydrocod Polst-CPM Polst ER 10-8 MG/5ML Oral Liquid CR, Take 5 mL by mouth nondistended, no masses  Skin/Hair: no unusual rashes or bruises  Extremities: no edema, no cyanosis  Psychiatric:  Oriented to time, place, person and situation.  Appropriate mood and affect    Pelvic Exam:  External Genitalia: normal appearance, hair dist

## 2019-04-11 NOTE — PROGRESS NOTES
Diagnosis: Parkinson's Disease  Visit (# authorized):   8 per POC [facility] : [facility] PPO)                       Referring Physician: Dr. Santi Holt    Precautions:  At risk for falls    Medication changes since last visit?: no     Subjective:  Pt saw PCP who encouraged ice a

## 2019-04-11 NOTE — PATIENT INSTRUCTIONS
Do these exercises 2 times each day    1. Sit up tall and \"bear down\". Hold for 5-10 seconds. Keep breathing. Do 2 sets of 10.      2. Lie on your back with knees bent. Slow and gentle rotate knees side to side. Your shoulders will stay flat on the bed.

## 2019-04-12 ENCOUNTER — APPOINTMENT (OUTPATIENT)
Dept: PHYSICAL THERAPY | Facility: HOSPITAL | Age: 59
End: 2019-04-12
Attending: Other
Payer: COMMERCIAL

## 2019-04-12 ENCOUNTER — TELEPHONE (OUTPATIENT)
Dept: NEUROLOGY | Facility: CLINIC | Age: 59
End: 2019-04-12

## 2019-04-12 NOTE — TELEPHONE ENCOUNTER
Patient states she is experiencing new left shoulder pain x 6 weeks. Denies injury. Pain is constant causing limited rom. Pain score of 7-8/10 with movement. Seen Dr. Doug Suazo yesterday who informed her she may need cortisone injection.     Patient informed

## 2019-04-16 ENCOUNTER — OFFICE VISIT (OUTPATIENT)
Dept: SURGERY | Facility: CLINIC | Age: 59
End: 2019-04-16
Payer: COMMERCIAL

## 2019-04-16 ENCOUNTER — OFFICE VISIT (OUTPATIENT)
Dept: PHYSICAL THERAPY | Facility: HOSPITAL | Age: 59
End: 2019-04-16
Attending: Other
Payer: COMMERCIAL

## 2019-04-16 VITALS
BODY MASS INDEX: 43.4 KG/M2 | SYSTOLIC BLOOD PRESSURE: 142 MMHG | DIASTOLIC BLOOD PRESSURE: 78 MMHG | HEIGHT: 69 IN | WEIGHT: 293 LBS

## 2019-04-16 DIAGNOSIS — R63.2 INCREASED APPETITE: ICD-10-CM

## 2019-04-16 DIAGNOSIS — Z51.81 ENCOUNTER FOR THERAPEUTIC DRUG MONITORING: ICD-10-CM

## 2019-04-16 DIAGNOSIS — F43.9 STRESS: ICD-10-CM

## 2019-04-16 DIAGNOSIS — R53.82 CHRONIC FATIGUE: ICD-10-CM

## 2019-04-16 DIAGNOSIS — E78.5 HYPERLIPIDEMIA, UNSPECIFIED HYPERLIPIDEMIA TYPE: Primary | ICD-10-CM

## 2019-04-16 DIAGNOSIS — E66.01 MORBID OBESITY WITH BMI OF 45.0-49.9, ADULT (HCC): ICD-10-CM

## 2019-04-16 PROCEDURE — 97110 THERAPEUTIC EXERCISES: CPT

## 2019-04-16 PROCEDURE — 99214 OFFICE O/P EST MOD 30 MIN: CPT | Performed by: INTERNAL MEDICINE

## 2019-04-16 RX ORDER — TOPIRAMATE 25 MG/1
25 TABLET ORAL EVERY EVENING
Qty: 30 TABLET | Refills: 1 | Status: SHIPPED | OUTPATIENT
Start: 2019-04-16 | End: 2019-05-09

## 2019-04-16 NOTE — PROGRESS NOTES
[address], [address] [place] [address] [place], [address] [address] [address]  [address]  Dept: [phone]     Date:  10/16/18    Patient:  Poncho Blood  :      1960  MRN:      [id number]    Chief Compla MG Oral Tab TAKE 1 TABLET BY MOUTH THREE TIMES A DAY Disp: 90 tablet Rfl: 0   ADVAIR DISKUS 100-50 MCG/DOSE Inhalation Aerosol Powder, Breath Activated TAKE 1 PUFF BY MOUTH TWICE A DAY Disp: 3 each Rfl: 1   DICLOFENAC SODIUM 75 MG Oral Tab EC TAKE 1 TABLET units Oral Cap TAKE 1 CAPSULE (50,000 UNITS TOTAL) BY MOUTH EVERY 30 (THIRTY) DAYS. Disp:  Rfl: 4   PRAVASTATIN SODIUM 40 MG Oral Tab TAKE 1 TABLET (40 MG TOTAL) BY MOUTH ONCE DAILY.  Disp: 90 tablet Rfl: 3   HYDROcodone-acetaminophen  MG Oral Tab Nimesh Avelar Activity      Alcohol use:  Yes        Alcohol/week: 0.0 oz        Comment: 2-3 drinks per week      Drug use: No      Sexual activity: Yes        Partners: Male    Lifestyle      Physical activity:        Days per week: Not on file        Minutes per sessi OTHER SURGICAL HISTORY Right 12/3/2015   • OTHER SURGICAL HISTORY  12/3/2015   • THYROIDECTOMY      Total      Family History:    Family History   Problem Relation Age of Onset   • Heart Disorder Father 54        heart attack-cause of death.    • Heart Diso Musculoskeletal: Positive for arthralgias, back pain, gait problem and myalgias. Skin: Negative. Psychiatric/Behavioral: Negative. Physical Exam:   Physical Exam   Constitutional: She is oriented to person, place, and time.  She appears well-d bariatric surgery, she is to avoid caffeine, carbonation and NSAIDS. Judy Jas verbalized understanding.     Reviewed benefits- no weight management criteria    Natalieelvira Mark is still torn on whether or not to have surgery.  She spoke with her Parkinson's specialist wh

## 2019-04-16 NOTE — PROGRESS NOTES
Patient Name: Jessica Padilla, : 1960, MRN: [id number]   Date:  2019  Referring Physician:  Rafal Call    Diagnosis: Parkinson's Disease      Progress Summary    Pt has attended 8 visits in Physical Therapy.      Progress Note with functional gait tasks. In progress       Charges: 3 TE   Total timed treatment: 45 mins  Total treatment time 45 mins       Rehab Potential: good    Plan: Continue skilled Physical Therapy 1 x/week or a total of 4 visits over a 90 day period.  Treatmen

## 2019-04-17 ENCOUNTER — TELEPHONE (OUTPATIENT)
Dept: NEUROLOGY | Facility: CLINIC | Age: 59
End: 2019-04-17

## 2019-04-17 ENCOUNTER — OFFICE VISIT (OUTPATIENT)
Dept: NEUROLOGY | Facility: CLINIC | Age: 59
End: 2019-04-17
Payer: COMMERCIAL

## 2019-04-17 ENCOUNTER — APPOINTMENT (OUTPATIENT)
Dept: PHYSICAL THERAPY | Facility: HOSPITAL | Age: 59
End: 2019-04-17
Attending: Other
Payer: COMMERCIAL

## 2019-04-17 ENCOUNTER — MED REC SCAN ONLY (OUTPATIENT)
Dept: NEUROLOGY | Facility: CLINIC | Age: 59
End: 2019-04-17

## 2019-04-17 VITALS
HEART RATE: 80 BPM | WEIGHT: 293 LBS | BODY MASS INDEX: 43.4 KG/M2 | SYSTOLIC BLOOD PRESSURE: 121 MMHG | RESPIRATION RATE: 18 BRPM | DIASTOLIC BLOOD PRESSURE: 72 MMHG | HEIGHT: 69 IN

## 2019-04-17 DIAGNOSIS — M17.12 PRIMARY OSTEOARTHRITIS OF LEFT KNEE: Primary | ICD-10-CM

## 2019-04-17 DIAGNOSIS — M12.812 ROTATOR CUFF ARTHROPATHY OF LEFT SHOULDER: ICD-10-CM

## 2019-04-17 DIAGNOSIS — M48.062 LUMBAR STENOSIS WITH NEUROGENIC CLAUDICATION: ICD-10-CM

## 2019-04-17 PROCEDURE — 20610 DRAIN/INJ JOINT/BURSA W/O US: CPT | Performed by: PHYSICAL MEDICINE & REHABILITATION

## 2019-04-17 PROCEDURE — 99215 OFFICE O/P EST HI 40 MIN: CPT | Performed by: PHYSICAL MEDICINE & REHABILITATION

## 2019-04-17 RX ORDER — LIDOCAINE HYDROCHLORIDE 10 MG/ML
3 INJECTION, SOLUTION INFILTRATION; PERINEURAL ONCE
Status: COMPLETED | OUTPATIENT
Start: 2019-04-17 | End: 2019-04-17

## 2019-04-17 RX ORDER — TRIAMCINOLONE ACETONIDE 40 MG/ML
40 INJECTION, SUSPENSION INTRA-ARTICULAR; INTRAMUSCULAR ONCE
Status: COMPLETED | OUTPATIENT
Start: 2019-04-17 | End: 2019-04-17

## 2019-04-17 RX ORDER — NORTRIPTYLINE HYDROCHLORIDE 25 MG/1
25 CAPSULE ORAL NIGHTLY
Qty: 30 CAPSULE | Refills: 0 | Status: SHIPPED | OUTPATIENT
Start: 2019-04-17 | End: 2019-05-12

## 2019-04-17 NOTE — PROGRESS NOTES
HPI:    Patient ID: Keagan Rose is a 63year old female. She presents for new complaints of left knee and left shoulder pain in addition to low back pain. Left knee pain is most bothersome today.   She has had left knee pain in the past, previously aggravated by walking.     Patient-reported ROS  Constitutional  Sleep Disturbance: denies   Cardiovascular  Chest Pain: denies  Irregular Heartbeat: denies   Gastrointestinal  Bowel Incontinence: denies  Heartburn: denies   Genitourinary  Difficulty Urinat Oral Tab TAKE 1 TABLET (1 MG TOTAL) BY MOUTH ONCE DAILY. Disp: 90 tablet Rfl: 3   LEVOTHYROXINE SODIUM 150 MCG Oral Tab TAKE 1 TABLET (150 MCG TOTAL) BY MOUTH BEFORE BREAKFAST.  Disp: 90 tablet Rfl: 1   CALCITRIOL 0.25 MCG Oral Cap TAKE 1 CAPSULE (0.25 MCG Head: Normocephalic and atraumatic.    Eyes: Conjunctivae and EOM are normal.   Musculoskeletal:   left Knee exam  Observation: No appreciable knee joint effusion    Range of motion:  Flexion: 100 degrees with pain  Extension: 0 degrees    Palpation:  Med also more advanced on the right. Nodular osteophytes are                present along the peripheral aspect of the patellae, femoral                condyles, and tibial plateaus.  There is slight lateral                subluxation of the right tibia

## 2019-04-17 NOTE — TELEPHONE ENCOUNTER
[employer] for authorization of approval forLeft knee joint injection cpt codes 33957, [id number], [id number]. Authorization is not required. Transaction [id number]. Lori Hernandez RN informed.

## 2019-04-21 DIAGNOSIS — E55.9 VITAMIN D DEFICIENCY: Primary | ICD-10-CM

## 2019-04-22 ENCOUNTER — LAB ENCOUNTER (OUTPATIENT)
Dept: LAB | Facility: HOSPITAL | Age: 59
End: 2019-04-22
Attending: INTERNAL MEDICINE
Payer: COMMERCIAL

## 2019-04-22 ENCOUNTER — HOSPITAL ENCOUNTER (OUTPATIENT)
Dept: GENERAL RADIOLOGY | Facility: HOSPITAL | Age: 59
Discharge: HOME | End: 2019-04-22
Attending: PHYSICAL MEDICINE & REHABILITATION
Payer: COMMERCIAL

## 2019-04-22 DIAGNOSIS — M17.12 PRIMARY OSTEOARTHRITIS OF LEFT KNEE: ICD-10-CM

## 2019-04-22 DIAGNOSIS — Z51.81 ENCOUNTER FOR THERAPEUTIC DRUG MONITORING: ICD-10-CM

## 2019-04-22 DIAGNOSIS — E55.9 VITAMIN D DEFICIENCY: ICD-10-CM

## 2019-04-22 DIAGNOSIS — M06.9 RHEUMATOID ARTHRITIS INVOLVING MULTIPLE SITES, UNSPECIFIED RHEUMATOID FACTOR PRESENCE: ICD-10-CM

## 2019-04-22 DIAGNOSIS — E03.9 HYPOTHYROIDISM, UNSPECIFIED TYPE: ICD-10-CM

## 2019-04-22 PROCEDURE — 84460 ALANINE AMINO (ALT) (SGPT): CPT

## 2019-04-22 PROCEDURE — 82040 ASSAY OF SERUM ALBUMIN: CPT

## 2019-04-22 PROCEDURE — 82565 ASSAY OF CREATININE: CPT

## 2019-04-22 PROCEDURE — 85025 COMPLETE CBC W/AUTO DIFF WBC: CPT

## 2019-04-22 PROCEDURE — 73564 X-RAY EXAM KNEE 4 OR MORE: CPT | Performed by: PHYSICAL MEDICINE & REHABILITATION

## 2019-04-22 PROCEDURE — 85652 RBC SED RATE AUTOMATED: CPT

## 2019-04-22 PROCEDURE — 82306 VITAMIN D 25 HYDROXY: CPT

## 2019-04-22 PROCEDURE — 86140 C-REACTIVE PROTEIN: CPT

## 2019-04-22 PROCEDURE — 36415 COLL VENOUS BLD VENIPUNCTURE: CPT

## 2019-04-22 PROCEDURE — 84443 ASSAY THYROID STIM HORMONE: CPT

## 2019-04-22 PROCEDURE — 84450 TRANSFERASE (AST) (SGOT): CPT

## 2019-04-22 RX ORDER — ERGOCALCIFEROL 1.25 MG/1
CAPSULE ORAL
Qty: 3 CAPSULE | Refills: 11 | Status: SHIPPED | OUTPATIENT
Start: 2019-04-22 | End: 2019-04-26

## 2019-04-22 NOTE — TELEPHONE ENCOUNTER
LOV: 2/4/2019  Future Appointments   Date Time Provider Joel Mackenzie   4/23/2019  6[facility]   4/25/2019  3:30 PM Littlelina Crafts, PsyD [facility] [place]   4/30/2019  5:15 PM Chema Malik, [facility] CARE SYSTEM OF THE [facility] CARE SYSTEM OF [facility]   5/9/2019  3:30 PM Little Branfts, PsyD [facility]   5/16/2019  5:15 PM Ren Doran, [facility] CARE SYSTEM OF THE [facility] SYSTEM OF [facility]   5/20/2019  4:30 PM DO BLANK [place] SYSTEM OF THE [facility]   5/23/2019  3:30 PM Little Crafts, PsyD [facility] [place]   5/23/2019  6:00 PM Ren Doran, [facility] CARE SYSTEM OF THE [facility] SYSTEM OF [facility]   6/3/2019  6:20 PM Grover Navarro MD [facility] SYSTEM OF THE [facility]   6/13/2019  4:30 PM Jacob Irizarry MD 85 [facility]     Labs:   Component      Latest Ref Rng & Units 1/13/2018   Vitamin D, 25OH, Total      ng/mL 32.6

## 2019-04-23 ENCOUNTER — OFFICE VISIT (OUTPATIENT)
Dept: PHYSICAL THERAPY | Facility: HOSPITAL | Age: 59
End: 2019-04-23
Attending: Other
Payer: COMMERCIAL

## 2019-04-23 PROCEDURE — 97110 THERAPEUTIC EXERCISES: CPT

## 2019-04-23 PROCEDURE — 97112 NEUROMUSCULAR REEDUCATION: CPT

## 2019-04-23 NOTE — PROGRESS NOTES
Diagnosis: Parkinson's Disease  Visit (# authorized):   12 [facility]                       Referring Physician: Dr. Odilia Little    Precautions:  At risk for falls    Medication changes since last visit?: no     Subjective:  Saw Dr Renetta Ramsey last week who i

## 2019-04-24 DIAGNOSIS — M48.062 LUMBAR STENOSIS WITH NEUROGENIC CLAUDICATION: ICD-10-CM

## 2019-04-24 NOTE — TELEPHONE ENCOUNTER
Gabapentin 800mg. Take 1 tablet TID. #90.  No refills    Last filled-4/2/2019    LOV-4/17/2019  NOV-5/20/2019

## 2019-04-25 RX ORDER — GABAPENTIN 800 MG/1
TABLET ORAL
Qty: 90 TABLET | Refills: 0 | Status: SHIPPED | OUTPATIENT
Start: 2019-04-25 | End: 2019-05-21

## 2019-04-26 ENCOUNTER — TELEPHONE (OUTPATIENT)
Dept: RHEUMATOLOGY | Facility: CLINIC | Age: 59
End: 2019-04-26

## 2019-04-26 ENCOUNTER — TELEPHONE (OUTPATIENT)
Dept: ALLERGY | Facility: CLINIC | Age: 59
End: 2019-04-26

## 2019-04-26 RX ORDER — ERGOCALCIFEROL (VITAMIN D2) 1250 MCG
50000 CAPSULE ORAL WEEKLY
Qty: 4 CAPSULE | Refills: 2 | Status: SHIPPED | OUTPATIENT
Start: 2019-04-26 | End: 2019-05-26

## 2019-04-26 NOTE — TELEPHONE ENCOUNTER
ADVAIR DISKUS 100-50 MCG/DOSE Inhalation Aerosol Powder, Breath Activated    PHARMACY COMMENTS; alternative requested: would you please send a new rx for \"maryjane-1\" ( no substitute) patient wants the \" brand name\" only insurance will not pay unless physic

## 2019-04-26 NOTE — TELEPHONE ENCOUNTER
Pt called requesting to speak with RN reg Advair refill change. Fax was sent to office today and she wanted to know if office received fax.  Pt requesting a call back

## 2019-04-27 ENCOUNTER — APPOINTMENT (OUTPATIENT)
Dept: LAB | Facility: HOSPITAL | Age: 59
End: 2019-04-27
Attending: INTERNAL MEDICINE
Payer: COMMERCIAL

## 2019-04-27 DIAGNOSIS — E03.9 HYPOTHYROIDISM, UNSPECIFIED TYPE: ICD-10-CM

## 2019-04-27 LAB
ALBUMIN SERPL-MCNC: NORMAL G/DL
ALBUMIN/GLOB SERPL: NORMAL {RATIO}
ALP SERPL-CCNC: NORMAL U/L
ALT SERPL-CCNC: 11 U/L
ANION GAP SERPL CALC-SCNC: 6 MMOL/L
AST SERPL-CCNC: 18 U/L
BILIRUB SERPL-MCNC: NORMAL MG/DL
BUN SERPL-MCNC: 17 MG/DL
BUN/CREAT SERPL: NORMAL
CALCIUM SERPL-MCNC: NORMAL MG/DL
CHLORIDE SERPL-SCNC: 109 MMOL/L
CHOLEST SERPL-MCNC: 207 MG/DL
CHOLEST/HDLC SERPL: 68 {RATIO}
CO2 SERPL-SCNC: 27 MMOL/L
CREAT SERPL-MCNC: 0.85 MG/DL
GLOBULIN SER-MCNC: NORMAL G/DL
GLUCOSE SERPL-MCNC: 79 MG/DL
HDLC SERPL-MCNC: NORMAL MG/DL
LDLC SERPL CALC-MCNC: 108 MG/DL
LENGTH OF FAST TIME PATIENT: NORMAL H
LENGTH OF FAST TIME PATIENT: NORMAL H
NONHDLC SERPL-MCNC: 139 MG/DL
POTASSIUM SERPL-SCNC: 3.7 MMOL/L
PROT SERPL-MCNC: NORMAL G/DL
SODIUM SERPL-SCNC: 142 MMOL/L
TRIGL SERPL-MCNC: 153 MG/DL
VLDLC SERPL CALC-MCNC: NORMAL MG/DL

## 2019-04-27 PROCEDURE — 80061 LIPID PANEL: CPT

## 2019-04-27 PROCEDURE — 80053 COMPREHEN METABOLIC PANEL: CPT

## 2019-04-27 PROCEDURE — 36415 COLL VENOUS BLD VENIPUNCTURE: CPT

## 2019-04-29 ENCOUNTER — NURSE ONLY (OUTPATIENT)
Dept: ALLERGY | Facility: CLINIC | Age: 59
End: 2019-04-29
Payer: COMMERCIAL

## 2019-04-29 DIAGNOSIS — J30.89 ENVIRONMENTAL AND SEASONAL ALLERGIES: ICD-10-CM

## 2019-04-29 PROCEDURE — 95117 IMMUNOTHERAPY INJECTIONS: CPT | Performed by: ALLERGY & IMMUNOLOGY

## 2019-04-29 RX ORDER — FLUTICASONE PROPIONATE AND SALMETEROL 50; 100 UG/1; UG/1
POWDER RESPIRATORY (INHALATION)
Qty: 3 EACH | Refills: 1 | Status: SHIPPED | OUTPATIENT
Start: 2019-04-29 | End: 2019-11-09

## 2019-04-29 NOTE — TELEPHONE ENCOUNTER
Refill requested for ADVAIR DISKUS 100-50 MCG/DOSE Inhalation Aerosol Powder, Breath Activated     PHARMACY COMMENTS; alternative requested: would you please send a new rx for \"maryjane-1\" ( no substitute) patient wants the \" brand name\" only insurance will

## 2019-04-29 NOTE — TELEPHONE ENCOUNTER
Spoke to patient. Informed her that we sent the RX for Advair brand name. Pt verbalizes her understanding. No further action needed at this time.

## 2019-04-29 NOTE — TELEPHONE ENCOUNTER
Spoke to patient regarding medication refill. She verbalizes her understanding. No further action needed at this time.

## 2019-04-30 ENCOUNTER — OFFICE VISIT (OUTPATIENT)
Dept: PHYSICAL THERAPY | Facility: HOSPITAL | Age: 59
End: 2019-04-30
Attending: Other
Payer: COMMERCIAL

## 2019-04-30 NOTE — PROGRESS NOTES
Pt presented for PT today. She reports worsening L knee pain over past week. Minimal relief from recent injection and use of brace. Poor tolerance to standing and walking. Sees Dr Pérez Richardson for follow-up on Friday.      Decided to place therapy on hold until

## 2019-05-03 ENCOUNTER — OFFICE VISIT (OUTPATIENT)
Dept: NEUROLOGY | Facility: CLINIC | Age: 59
End: 2019-05-03
Payer: COMMERCIAL

## 2019-05-03 VITALS
RESPIRATION RATE: 22 BRPM | DIASTOLIC BLOOD PRESSURE: 70 MMHG | HEART RATE: 100 BPM | HEIGHT: 69 IN | SYSTOLIC BLOOD PRESSURE: 138 MMHG | WEIGHT: 293 LBS | BODY MASS INDEX: 43.4 KG/M2

## 2019-05-03 DIAGNOSIS — M25.562 LEFT KNEE PAIN, UNSPECIFIED CHRONICITY: Primary | ICD-10-CM

## 2019-05-03 PROCEDURE — 99214 OFFICE O/P EST MOD 30 MIN: CPT | Performed by: PHYSICAL MEDICINE & REHABILITATION

## 2019-05-03 RX ORDER — RASAGILINE 1 MG/1
1 TABLET ORAL DAILY
COMMUNITY

## 2019-05-03 RX ORDER — TRAMADOL HYDROCHLORIDE 50 MG/1
50 TABLET ORAL 2 TIMES DAILY PRN
Qty: 60 TABLET | Refills: 0 | Status: SHIPPED | OUTPATIENT
Start: 2019-05-03 | End: 2019-11-23

## 2019-05-03 NOTE — PROGRESS NOTES
HPI:    Patient ID: Danny Rose is a 63year old female. 63year old female presents for follow up of left medial knee pain described as a constant sharp pain, worse with weight bearing (standing and ambulation). Associated clicking, locking.  She is Aerosol Powder, Breath Activated TAKE 1 PUFF BY MOUTH TWICE A DAY (Patient taking differently: daily. TAKE 1 PUFF BY MOUTH TWICE A DAY ) Disp: 3 each Rfl: 1   ergocalciferol 71252 units Oral Cap Take 1 capsule (50,000 Units total) by mouth once a week.  Dis Diclofenac Sodium 1 % Transdermal Gel Apply 4 g topically 4 (four) times daily. Disp: 100 g Rfl: 3   PRAVASTATIN SODIUM 40 MG Oral Tab TAKE 1 TABLET (40 MG TOTAL) BY MOUTH ONCE DAILY.  Disp: 90 tablet Rfl: 3   HYDROcodone-acetaminophen  MG Oral Tab and varus stress testing: negative  Posterior drawer test: negative   Neurological:   5/5 LE bilaterally; no pain with resisted knee flexion/extension. Skin: Skin is warm and dry. No rash noted. Nursing note and vitals reviewed.          ASSESSMENT/PLAN

## 2019-05-03 NOTE — PATIENT INSTRUCTIONS
If you do not hear from us about approval for the MRI of the left knee by Tuesday give us a phone call or send a message through [address]

## 2019-05-05 DIAGNOSIS — M48.062 LUMBAR STENOSIS WITH NEUROGENIC CLAUDICATION: ICD-10-CM

## 2019-05-06 ENCOUNTER — TELEPHONE (OUTPATIENT)
Dept: NEUROLOGY | Facility: CLINIC | Age: 59
End: 2019-05-06

## 2019-05-06 RX ORDER — NORTRIPTYLINE HYDROCHLORIDE 25 MG/1
CAPSULE ORAL
Qty: 30 CAPSULE | Refills: 0 | OUTPATIENT
Start: 2019-05-06

## 2019-05-06 NOTE — TELEPHONE ENCOUNTER
[employer] for authorization of approval for MRI KNEE, LEFT CPT CODE 00479. Approval given under authorization # [id number] effective 5/6/19 to 6/4/19. Will call pt to inform. Spoke to patient and advice of approval. Can proceed with scheduling appointment.

## 2019-05-07 ENCOUNTER — TELEPHONE (OUTPATIENT)
Dept: INTERNAL MEDICINE CLINIC | Facility: CLINIC | Age: 59
End: 2019-05-07

## 2019-05-07 DIAGNOSIS — M25.562 LEFT KNEE PAIN, UNSPECIFIED CHRONICITY: Primary | ICD-10-CM

## 2019-05-07 NOTE — TELEPHONE ENCOUNTER
Pt states to have MRI on 5/16, and due to pt's Parkinson's she needs to be sedated. Pt states Dr. Jose F Calhoun informed pt to get approval first from Dr. Doug Suazo. Informed pt Dr. Doug Suazo is out of town, and will have to send to doctor on call. Please advise.

## 2019-05-07 NOTE — TELEPHONE ENCOUNTER
Spoke with pt and informed of Dr Vero Alejandre message below.  Pt states she was told by [facility] scheduling to get written request for MRI sedation from Dr Chanda Yañez and Dr Glenn Wang  already sent in the request.    Fatou Vitale with Triage RN Moody Ugarte and she advised to ask

## 2019-05-07 NOTE — TELEPHONE ENCOUNTER
For patient to be sedated with anesthesia– this needs to come through Dr. Lamine Abdul as he placed the order for the MRI.   They will need to contact diagnostics and send in a written request.

## 2019-05-08 ENCOUNTER — TELEPHONE (OUTPATIENT)
Dept: PHYSICAL THERAPY | Facility: HOSPITAL | Age: 59
End: 2019-05-08

## 2019-05-08 ENCOUNTER — NURSE ONLY (OUTPATIENT)
Dept: GASTROENTEROLOGY | Facility: CLINIC | Age: 59
End: 2019-05-08

## 2019-05-08 DIAGNOSIS — Z86.010 PERSONAL HISTORY OF COLONIC POLYPS: Primary | ICD-10-CM

## 2019-05-08 NOTE — PROGRESS NOTES
Last Procedure:  Colonoscopy on 12/16/13  Last Diagnosis:  1. Two small 6 mm polyps removed from the ascending and   transverse colon  as above. 2.  Mild left sided diverticulosis. 3.  Internal hemorrhoids.   Recalled for (months or years): 5 years  Aixa ileum.    The cecum  was confirmed by landmarks including the appendiceal orifice,   cecal strap,  ileocecal valve. The quality of the prep was good. Retroflexion   was  performed in the rectum.      COLONOSCOPY FINDINGS:  The cecum, terminal ileum were e

## 2019-05-08 NOTE — TELEPHONE ENCOUNTER
Spoke to patient. She is scheduled for MRI of left knee on 5/16/19. H&P needed within last 30 days. Patient was seen by Dr. Rosanne Vasquez on 5/3/19 so she should be set for MRI. Confirmed with MRI department.

## 2019-05-09 RX ORDER — TOPIRAMATE 25 MG/1
25 TABLET ORAL EVERY EVENING
Qty: 30 TABLET | Refills: 0 | Status: SHIPPED | OUTPATIENT
Start: 2019-05-09 | End: 2019-06-01

## 2019-05-12 DIAGNOSIS — M48.062 LUMBAR STENOSIS WITH NEUROGENIC CLAUDICATION: ICD-10-CM

## 2019-05-12 RX ORDER — POLYETHYLENE GLYCOL 3350, SODIUM CHLORIDE, POTASSIUM CHLORIDE, SODIUM BICARBONATE, AND SODIUM SULFATE 240; 5.84; 2.98; 6.72; 22.72 G/4L; G/4L; G/4L; G/4L; G/4L
POWDER, FOR SOLUTION ORAL
Qty: 1 BOTTLE | Refills: 0 | Status: ON HOLD | OUTPATIENT
Start: 2019-05-12 | End: 2019-06-11 | Stop reason: ALTCHOICE

## 2019-05-13 RX ORDER — NORTRIPTYLINE HYDROCHLORIDE 25 MG/1
CAPSULE ORAL
Qty: 30 CAPSULE | Refills: 0 | Status: SHIPPED | OUTPATIENT
Start: 2019-05-13 | End: 2019-06-09

## 2019-05-13 NOTE — PROGRESS NOTES
GI RNs -    Recent visit with Dr. Hines Grade of 4/11/2019 reviewed. Schedule colonoscopy exam at [address] [place] [address] please    This patient IS NOT appropriate for the [facility] endoscopy center.     BMI 48.7    MAC anesthesia    Golytely (PEG) 4L bowel prep sent into

## 2019-05-13 NOTE — PROGRESS NOTES
Scheduled for:  Colonoscopy - [address]  Provider Name:  Dr. Francisco Carter  Date:  6/11/19  Location:  [facility]  Sedation:  MAC  Time:  8:45 (pt is aware to arrive at 7:45 am)  Prep:  Sandro Hood instructions were given to pt over the phone, pt verbalized understanding

## 2019-05-13 NOTE — TELEPHONE ENCOUNTER
Refill request for nortriptyline 25 mg, take 1 cap daily, #30, no refills    LOV: 5/3/19  NOV: 5/20/19  Last refilled on 4/17/19

## 2019-05-14 ENCOUNTER — APPOINTMENT (OUTPATIENT)
Dept: PHYSICAL THERAPY | Facility: HOSPITAL | Age: 59
End: 2019-05-14
Attending: Other
Payer: COMMERCIAL

## 2019-05-16 ENCOUNTER — ANESTHESIA (OUTPATIENT)
Dept: MRI IMAGING | Facility: HOSPITAL | Age: 59
End: 2019-05-16
Payer: COMMERCIAL

## 2019-05-16 ENCOUNTER — APPOINTMENT (OUTPATIENT)
Dept: PHYSICAL THERAPY | Facility: HOSPITAL | Age: 59
End: 2019-05-16
Attending: Other
Payer: COMMERCIAL

## 2019-05-16 ENCOUNTER — ANESTHESIA EVENT (OUTPATIENT)
Dept: MRI IMAGING | Facility: HOSPITAL | Age: 59
End: 2019-05-16
Payer: COMMERCIAL

## 2019-05-16 ENCOUNTER — HOSPITAL ENCOUNTER (OUTPATIENT)
Dept: MRI IMAGING | Facility: HOSPITAL | Age: 59
Discharge: HOME | End: 2019-05-16
Attending: PHYSICAL MEDICINE & REHABILITATION
Payer: COMMERCIAL

## 2019-05-16 VITALS
HEIGHT: 69 IN | TEMPERATURE: 97 F | WEIGHT: 293 LBS | RESPIRATION RATE: 20 BRPM | SYSTOLIC BLOOD PRESSURE: 129 MMHG | HEART RATE: 79 BPM | OXYGEN SATURATION: 93 % | BODY MASS INDEX: 43.4 KG/M2 | DIASTOLIC BLOOD PRESSURE: 77 MMHG

## 2019-05-16 DIAGNOSIS — M25.562 LEFT KNEE PAIN, UNSPECIFIED CHRONICITY: ICD-10-CM

## 2019-05-16 PROCEDURE — 73721 MRI JNT OF LWR EXTRE W/O DYE: CPT | Performed by: PHYSICAL MEDICINE & REHABILITATION

## 2019-05-16 RX ORDER — ROCURONIUM BROMIDE 10 MG/ML
INJECTION, SOLUTION INTRAVENOUS AS NEEDED
Status: DISCONTINUED | OUTPATIENT
Start: 2019-05-16 | End: 2019-05-16 | Stop reason: SURG

## 2019-05-16 RX ORDER — SODIUM CHLORIDE, SODIUM LACTATE, POTASSIUM CHLORIDE, CALCIUM CHLORIDE 600; 310; 30; 20 MG/100ML; MG/100ML; MG/100ML; MG/100ML
INJECTION, SOLUTION INTRAVENOUS CONTINUOUS
Status: DISCONTINUED | OUTPATIENT
Start: 2019-05-16 | End: 2019-05-18

## 2019-05-16 RX ORDER — GLYCOPYRROLATE 0.2 MG/ML
INJECTION, SOLUTION INTRAMUSCULAR; INTRAVENOUS AS NEEDED
Status: DISCONTINUED | OUTPATIENT
Start: 2019-05-16 | End: 2019-05-16 | Stop reason: SURG

## 2019-05-16 RX ORDER — HYDROMORPHONE HYDROCHLORIDE 1 MG/ML
0.2 INJECTION, SOLUTION INTRAMUSCULAR; INTRAVENOUS; SUBCUTANEOUS EVERY 5 MIN PRN
Status: DISCONTINUED | OUTPATIENT
Start: 2019-05-16 | End: 2019-05-18

## 2019-05-16 RX ORDER — ACETAMINOPHEN 500 MG
1000 TABLET ORAL ONCE
Status: COMPLETED | OUTPATIENT
Start: 2019-05-16 | End: 2019-05-16

## 2019-05-16 RX ORDER — NEOSTIGMINE METHYLSULFATE 1 MG/ML
INJECTION INTRAVENOUS AS NEEDED
Status: DISCONTINUED | OUTPATIENT
Start: 2019-05-16 | End: 2019-05-16 | Stop reason: SURG

## 2019-05-16 RX ORDER — FAMOTIDINE 20 MG/1
20 TABLET ORAL ONCE
Status: COMPLETED | OUTPATIENT
Start: 2019-05-16 | End: 2019-05-16

## 2019-05-16 RX ORDER — NALOXONE HYDROCHLORIDE 0.4 MG/ML
80 INJECTION, SOLUTION INTRAMUSCULAR; INTRAVENOUS; SUBCUTANEOUS AS NEEDED
Status: ACTIVE | OUTPATIENT
Start: 2019-05-16 | End: 2019-05-16

## 2019-05-16 RX ORDER — DEXAMETHASONE SODIUM PHOSPHATE 4 MG/ML
VIAL (ML) INJECTION AS NEEDED
Status: DISCONTINUED | OUTPATIENT
Start: 2019-05-16 | End: 2019-05-16 | Stop reason: SURG

## 2019-05-16 RX ORDER — MORPHINE SULFATE 2 MG/ML
2 INJECTION, SOLUTION INTRAMUSCULAR; INTRAVENOUS EVERY 10 MIN PRN
Status: DISCONTINUED | OUTPATIENT
Start: 2019-05-16 | End: 2019-05-18

## 2019-05-16 RX ORDER — LIDOCAINE HYDROCHLORIDE 10 MG/ML
INJECTION, SOLUTION EPIDURAL; INFILTRATION; INTRACAUDAL; PERINEURAL AS NEEDED
Status: DISCONTINUED | OUTPATIENT
Start: 2019-05-16 | End: 2019-05-16 | Stop reason: SURG

## 2019-05-16 RX ORDER — ONDANSETRON 2 MG/ML
4 INJECTION INTRAMUSCULAR; INTRAVENOUS ONCE AS NEEDED
Status: ACTIVE | OUTPATIENT
Start: 2019-05-16 | End: 2019-05-16

## 2019-05-16 RX ADMIN — FAMOTIDINE 20 MG: 20 TABLET ORAL at 12:46:00

## 2019-05-16 RX ADMIN — NEOSTIGMINE METHYLSULFATE 3 MG: 1 INJECTION INTRAVENOUS at 14:44:00

## 2019-05-16 RX ADMIN — DEXAMETHASONE SODIUM PHOSPHATE 4 MG: 4 MG/ML VIAL (ML) INJECTION at 14:24:00

## 2019-05-16 RX ADMIN — GLYCOPYRROLATE 0.6 MG: 0.2 INJECTION, SOLUTION INTRAMUSCULAR; INTRAVENOUS at 14:30:00

## 2019-05-16 RX ADMIN — LIDOCAINE HYDROCHLORIDE 30 MG: 10 INJECTION, SOLUTION EPIDURAL; INFILTRATION; INTRACAUDAL; PERINEURAL at 13:59:00

## 2019-05-16 RX ADMIN — SODIUM CHLORIDE, SODIUM LACTATE, POTASSIUM CHLORIDE, CALCIUM CHLORIDE: 600; 310; 30; 20 INJECTION, SOLUTION INTRAVENOUS at 14:32:00

## 2019-05-16 RX ADMIN — ROCURONIUM BROMIDE 10 MG: 10 INJECTION, SOLUTION INTRAVENOUS at 14:02:00

## 2019-05-16 RX ADMIN — ACETAMINOPHEN 1000 MG: 500 MG TABLET ORAL at 12:46:00

## 2019-05-16 RX ADMIN — SODIUM CHLORIDE, SODIUM LACTATE, POTASSIUM CHLORIDE, CALCIUM CHLORIDE: 600; 310; 30; 20 INJECTION, SOLUTION INTRAVENOUS at 13:45:00

## 2019-05-16 RX ADMIN — SODIUM CHLORIDE, SODIUM LACTATE, POTASSIUM CHLORIDE, CALCIUM CHLORIDE: 600; 310; 30; 20 INJECTION, SOLUTION INTRAVENOUS at 12:46:00

## 2019-05-16 NOTE — ANESTHESIA PREPROCEDURE EVALUATION
Anesthesia PreOp Note    HPI:     Stella Spatz is a 63year old female who presents for preoperative consultation requested by: * No surgeons listed *    Date of Surgery: 5/16/2019    * No procedures listed *  Indication: * No pre-op diagnosis entered * DISEASE   • Chicken pox    • Chronic cough    • Disorder of thyroid    • Extrapyramidal syndrome 2013    MEDICATION MGMT.  W/ PARKINSONS FEATURES   • Extrinsic asthma, unspecified    • Greater trochanteric bursitis, left 11/21/2017   • High cholesterol    • Disp: 4 capsule Rfl: 2   GABAPENTIN 800 MG Oral Tab TAKE 1 TABLET BY MOUTH THREE TIMES A DAY Disp: 90 tablet Rfl: 0   DICLOFENAC SODIUM 75 MG Oral Tab EC TAKE 1 TABLET (75 MG TOTAL) BY MOUTH 2 (TWO) TIMES DAILY.  Disp: 60 tablet Rfl: 5   XELJANZ XR 11 MG Or Disp:  Rfl:    PEG 3350-KCl-NaBcb-NaCl-NaSulf (PEG 3350/ELECTROLYTES) 240 g Oral Recon Soln Take as directed according to colonoscopy instructions Disp: 1 Bottle Rfl: 0   Albuterol Sulfate HFA (PROAIR HFA) 108 (90 Base) MCG/ACT Inhalation Aero Soln Inhale Transportation needs:        Medical: Not on file        Non-medical: Not on file    Tobacco Use      Smoking status: Never Smoker      Smokeless tobacco: Never Used    Substance and Sexual Activity      Alcohol use:  Yes        Alcohol/week: 0.0 oz (H) 04/22/2019    .0 04/22/2019     Lab Results   Component Value Date     04/27/2019    K 3.7 04/27/2019     04/27/2019    CO2 27.0 04/27/2019    BUN 17 04/27/2019    CREATSERUM 0.85 04/27/2019    GLU 79 04/27/2019    CA 8.6 04/27/2019

## 2019-05-16 NOTE — ANESTHESIA POSTPROCEDURE EVALUATION
Patient: Jacquelin Sauceda    Procedure Summary     Date:  05/16/19 Room / Location:  [facility] MRI; [address] [facility]    Anesthesia Start:  0779 Anesthesia Stop:  5235    Procedure:  MRI KNEE, LEFT (BSC=37861) Diagnosis:  L

## 2019-05-16 NOTE — ANESTHESIA PROCEDURE NOTES
Airway  Date/Time: 5/16/2019 1:58 PM  Urgency: elective    Difficult airway    General Information and Staff    Patient location during procedure: OR  Anesthesiologist: Bo Whitaker DO  Resident/CRNA: Patricia Crespo CRNA    Indications and Patient Condition

## 2019-05-18 ENCOUNTER — HOSPITAL ENCOUNTER (OUTPATIENT)
Dept: MAMMOGRAPHY | Facility: HOSPITAL | Age: 59
Discharge: HOME | End: 2019-05-18
Attending: CLINICAL NURSE SPECIALIST
Payer: COMMERCIAL

## 2019-05-18 DIAGNOSIS — Z12.31 ENCOUNTER FOR SCREENING MAMMOGRAM FOR MALIGNANT NEOPLASM OF BREAST: ICD-10-CM

## 2019-05-18 PROCEDURE — 77067 SCR MAMMO BI INCL CAD: CPT | Performed by: CLINICAL NURSE SPECIALIST

## 2019-05-18 PROCEDURE — 77063 BREAST TOMOSYNTHESIS BI: CPT | Performed by: CLINICAL NURSE SPECIALIST

## 2019-05-20 ENCOUNTER — TELEPHONE (OUTPATIENT)
Dept: NEUROLOGY | Facility: CLINIC | Age: 59
End: 2019-05-20

## 2019-05-20 ENCOUNTER — PATIENT MESSAGE (OUTPATIENT)
Dept: NEUROLOGY | Facility: CLINIC | Age: 59
End: 2019-05-20

## 2019-05-20 NOTE — TELEPHONE ENCOUNTER
Informed patient of imaging results and recommendations per Dr. Lory Lefort. Gave Pt contact info for .

## 2019-05-20 NOTE — TELEPHONE ENCOUNTER
From: Ryan Wyatt  To:  Giselle Delaney DO  Sent: 5/20/2019 12:15 PM CDT  Subject: Test Results Question    I have a question about MRI KNEE, LEFT (WITHOUT CONTRAST) (CPT=73721) resulted on 5/16/19 at 5:20 PM.     Can you please translate this into Engl

## 2019-05-20 NOTE — TELEPHONE ENCOUNTER
----- Message from Shantelle Sanchez DO sent at 5/20/2019 12:09 PM CDT -----  Please let her know the MRI shows arthritis in the knee, along with a degenerative tear of the meniscus and an area of increased stress in the bone.  Recommend she see orthopedics, c

## 2019-05-21 ENCOUNTER — APPOINTMENT (OUTPATIENT)
Dept: PHYSICAL THERAPY | Facility: HOSPITAL | Age: 59
End: 2019-05-21
Attending: Other
Payer: COMMERCIAL

## 2019-05-21 DIAGNOSIS — M48.062 LUMBAR STENOSIS WITH NEUROGENIC CLAUDICATION: ICD-10-CM

## 2019-05-21 RX ORDER — GABAPENTIN 800 MG/1
800 TABLET ORAL 3 TIMES DAILY
Qty: 90 TABLET | Refills: 0 | Status: SHIPPED | OUTPATIENT
Start: 2019-05-21 | End: 2019-06-16

## 2019-05-21 NOTE — TELEPHONE ENCOUNTER
Medication request: Gabapentin 800 mg, Take 1 tablet by mouth TID.  Qt 90 Refills 0    LOV: 5/3/19  NOV: None    Last refill: 4/25/19

## 2019-05-23 ENCOUNTER — APPOINTMENT (OUTPATIENT)
Dept: PHYSICAL THERAPY | Facility: HOSPITAL | Age: 59
End: 2019-05-23
Attending: Other
Payer: COMMERCIAL

## 2019-05-28 ENCOUNTER — NURSE ONLY (OUTPATIENT)
Dept: ALLERGY | Facility: CLINIC | Age: 59
End: 2019-05-28
Payer: COMMERCIAL

## 2019-05-28 DIAGNOSIS — J30.89 ENVIRONMENTAL AND SEASONAL ALLERGIES: ICD-10-CM

## 2019-05-28 PROCEDURE — 95117 IMMUNOTHERAPY INJECTIONS: CPT | Performed by: ALLERGY & IMMUNOLOGY

## 2019-05-28 PROCEDURE — 95165 ANTIGEN THERAPY SERVICES: CPT | Performed by: ALLERGY & IMMUNOLOGY

## 2019-05-28 RX ORDER — CALCITRIOL 0.25 UG/1
0.25 CAPSULE, LIQUID FILLED ORAL DAILY
Qty: 90 CAPSULE | Refills: 1 | Status: SHIPPED | OUTPATIENT
Start: 2019-05-28 | End: 2019-11-22

## 2019-05-28 RX ORDER — LEVOTHYROXINE SODIUM 0.15 MG/1
150 TABLET ORAL
Qty: 90 TABLET | Refills: 1 | Status: SHIPPED | OUTPATIENT
Start: 2019-05-28 | End: 2019-10-13

## 2019-05-28 NOTE — TELEPHONE ENCOUNTER
Review pended refill request as it does not fall under a protocol.     Requested Prescriptions     Pending Prescriptions Disp Refills   • CALCITRIOL 0.25 MCG Oral Cap [Pharmacy Med Name: CALCITRIOL 0.25 MCG CAPSULE] 90 capsule 1     Sig: TAKE 1 CAPSULE (0.2

## 2019-05-28 NOTE — TELEPHONE ENCOUNTER
Refill passed per [facility] protocol.   Hypothyroid Medications  Protocol Criteria:  Appointment scheduled in the past 12 months or the next 3 months  TSH resulted in the past 12 months that is normal  Recent Outpatient Visits            2 weeks ago P Lab Results   Component Value Date    TSH 2.790 04/22/2019

## 2019-05-29 ENCOUNTER — OFFICE VISIT (OUTPATIENT)
Dept: ORTHOPEDICS CLINIC | Facility: CLINIC | Age: 59
End: 2019-05-29
Payer: COMMERCIAL

## 2019-05-29 DIAGNOSIS — S82.142A CLOSED FRACTURE OF LEFT TIBIAL PLATEAU, INITIAL ENCOUNTER: Primary | ICD-10-CM

## 2019-05-29 PROCEDURE — 99203 OFFICE O/P NEW LOW 30 MIN: CPT | Performed by: ORTHOPAEDIC SURGERY

## 2019-05-29 PROCEDURE — 99212 OFFICE O/P EST SF 10 MIN: CPT | Performed by: ORTHOPAEDIC SURGERY

## 2019-05-29 NOTE — H&P
Chief Complaint: left knee pain    NURSING INTAKE COMMENTS: Patient presents with:  Consult: C/o constant pain in the left knee for 3 months. Completed MRI left knee on 5/16. Recalls no recent injuries      History of present illness:   This 63year old f 12/3/2015   • THYROIDECTOMY      Total       Family History   Problem Relation Age of Onset   • Heart Disorder Father 54        heart attack-cause of death.    • Heart Disorder Mother    • Stroke Mother    • Cancer Mother         AML-cause of death   • Othe consult.     Diagnoses and all orders for this visit:    Closed fracture of left tibial plateau, initial encounter        Assessment: 63year old female with left knee tibial plateau insufficiency fracture    Plan:   Rest, Ice, Elevation, and NSAID's if not

## 2019-06-03 ENCOUNTER — OFFICE VISIT (OUTPATIENT)
Dept: RHEUMATOLOGY | Facility: CLINIC | Age: 59
End: 2019-06-03
Payer: COMMERCIAL

## 2019-06-03 VITALS
SYSTOLIC BLOOD PRESSURE: 131 MMHG | DIASTOLIC BLOOD PRESSURE: 76 MMHG | HEART RATE: 92 BPM | BODY MASS INDEX: 43.4 KG/M2 | HEIGHT: 69 IN | WEIGHT: 293 LBS

## 2019-06-03 DIAGNOSIS — M06.9 RHEUMATOID ARTHRITIS INVOLVING MULTIPLE SITES, UNSPECIFIED RHEUMATOID FACTOR PRESENCE: Primary | ICD-10-CM

## 2019-06-03 DIAGNOSIS — Z51.81 ENCOUNTER FOR THERAPEUTIC DRUG MONITORING: ICD-10-CM

## 2019-06-03 DIAGNOSIS — M48.061 SPINAL STENOSIS OF LUMBAR REGION, UNSPECIFIED WHETHER NEUROGENIC CLAUDICATION PRESENT: ICD-10-CM

## 2019-06-03 DIAGNOSIS — G20 PARKINSON'S DISEASE (HCC): ICD-10-CM

## 2019-06-03 PROCEDURE — 99212 OFFICE O/P EST SF 10 MIN: CPT | Performed by: INTERNAL MEDICINE

## 2019-06-03 PROCEDURE — 99214 OFFICE O/P EST MOD 30 MIN: CPT | Performed by: INTERNAL MEDICINE

## 2019-06-03 RX ORDER — TOPIRAMATE 25 MG/1
25 TABLET ORAL EVERY EVENING
Qty: 30 TABLET | Refills: 0 | Status: SHIPPED | OUTPATIENT
Start: 2019-06-03 | End: 2019-07-02

## 2019-06-03 RX ORDER — ERGOCALCIFEROL 1.25 MG/1
CAPSULE ORAL WEEKLY
COMMUNITY
End: 2019-07-31

## 2019-06-03 NOTE — PROGRESS NOTES
Stella Spatz is a 63year old female who presents for Patient presents with:  Rheumatoid Arthritis  Back Pain  Leg Pain  . HPI:     She is a pleasant 65year old who has elevated RF >300 and foot pain - c/w seropositive  RA .  I  Last saw her  on 11/26 day goes on. She has   She has bad hip pain. The rest of her pains is not bad. She has to use a walked and get into work. She cna't get an MRI of her lumbar -   Valium doesn't help her. richard needs conscious sedation b/c of her parkinson's.    phsyical th knows that if she tilts her pelvis in the back pain gets better. She sees Dr. Ketan Saba on Monday. She is better in her other joitns. She sees parkinson's clinci in July - and notcied she takes less of the medicatoin while being on [place].      8/27/20 MG Oral Tab TAKE 1 TABLET (25 MG TOTAL) BY MOUTH EVERY EVENING. Disp: 30 tablet Rfl: 0   ergocalciferol 51377 units Oral Cap Take by mouth once a week.  Disp:  Rfl:    LEVOTHYROXINE SODIUM 150 MCG Oral Tab TAKE 1 TABLET (150 MCG TOTAL) BY MOUTH BEFORE BREAK BY MOUTH EVERY DAY AT NIGHT Disp: 90 tablet Rfl: 1   FOLIC ACID 1 MG Oral Tab TAKE 1 TABLET (1 MG TOTAL) BY MOUTH ONCE DAILY. Disp: 90 tablet Rfl: 3   Turmeric 500 MG Oral Cap Take by mouth 2 (two) times daily.  Disp:  Rfl:    Diclofenac Sodium 1 % Transddanii 2016   • Tibia/fibula fracture     OPEN REDUCTION INTERNAL FIXATION   • Urinary incontinence 2/12/95    after childbirth   • Viral conjunctivitis 2008      Past Surgical History:   Procedure Laterality Date   • COLONOSCOPY  12/16/2013   • Kong Stokes tender  Right hip not tender   No lower back tendnerss to touch    Component      Latest Ref Rng & Units 4/27/2019 4/22/2019   WBC      4.0 - 11.0 x10(3) uL  8.1   RBC      3.80 - 5.30 x10(6)uL  4.38   Hemoglobin      12.0 - 16.0 g/dL  13.0   Hematocrit <200 mg/dL 207 (H)    HDL Cholesterol      40 - 59 mg/dL 68 (H)    Triglycerides      30 - 149 mg/dL 153 (H)    LDL Cholesterol Calc      <100 mg/dL 108 (H)    VLDL      0 - 30 mg/dL 31 (H)    NON HDL CHOL      <130 mg/dL 139 (H)    Patient Fasting? tendinosis. 2. 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 3. Abductor digit minimi muscle atrophy compatible with Cárdenas neuropathy.   4. Chronic healed fracture deformity of the fifth metatarsal.  5. Mild bone marrow edema in the base of place for very long b/c of severe hand tremors. Elevated inflammatory markers are better on xeljanz.   - difficutly to distinguish from her parkinson's but her crp is elevated still.    Tramadol 50mg bid prn   Doing better on xeljanz with upper body and

## 2019-06-03 NOTE — PROGRESS NOTES
Patient Name: Janette Carty, : 1960, MRN: [id number]   Date:  6/3/2019  Referring Physician:  Denise Barroso    Diagnosis: Parkinson's Disease    Discharge Summary    Pt has attended 9 visits in Physical Therapy.      Progress Note St

## 2019-06-04 RX ORDER — MONTELUKAST SODIUM 10 MG/1
TABLET ORAL
Qty: 90 TABLET | Refills: 1 | Status: SHIPPED | OUTPATIENT
Start: 2019-06-04 | End: 2019-11-27

## 2019-06-04 NOTE — TELEPHONE ENCOUNTER
Refill requested for    Name from pharmacy: MONTELUKAST SOD 10 MG TABLET         Will file in chart as: MONTELUKAST SODIUM 10 MG Oral Tab    Sig: TAKE 1 TABLET BY MOUTH EVERY DAY AT NIGHT    Disp:  90 tablet    Refills:  1    Start: 6/4/2019    Class: Norm

## 2019-06-09 DIAGNOSIS — M48.062 LUMBAR STENOSIS WITH NEUROGENIC CLAUDICATION: ICD-10-CM

## 2019-06-10 ENCOUNTER — TELEPHONE (OUTPATIENT)
Dept: GASTROENTEROLOGY | Facility: CLINIC | Age: 59
End: 2019-06-10

## 2019-06-10 RX ORDER — NORTRIPTYLINE HYDROCHLORIDE 25 MG/1
CAPSULE ORAL
Qty: 30 CAPSULE | Refills: 0 | Status: SHIPPED | OUTPATIENT
Start: 2019-06-10 | End: 2019-07-02

## 2019-06-10 NOTE — TELEPHONE ENCOUNTER
Pt has CLN scheduled tomorrow, pt has prep questions, will try to transfer to rn, if not pls call at:[phone],thanks.

## 2019-06-10 NOTE — TELEPHONE ENCOUNTER
Refill request for nortriptyline 25 mg, take 1 tab in evening, #30, no refills    LOV: 5/3/19  NOV: None  Last refilled on 5/13/19

## 2019-06-10 NOTE — TELEPHONE ENCOUNTER
I spoke to the pt    She wanted to know if she can drink the entire prep this evening at 5 pm instead of having to get up at 2:45 to drink the other half.     I advised the MD's   want the pt's to do the split dose prep because it does a better job of clean

## 2019-06-11 ENCOUNTER — HOSPITAL ENCOUNTER (OUTPATIENT)
Facility: HOSPITAL | Age: 59
Setting detail: HOSPITAL OUTPATIENT SURGERY
Discharge: HOME | End: 2019-06-11
Attending: INTERNAL MEDICINE | Admitting: INTERNAL MEDICINE
Payer: COMMERCIAL

## 2019-06-11 ENCOUNTER — ANESTHESIA EVENT (OUTPATIENT)
Dept: ENDOSCOPY | Facility: HOSPITAL | Age: 59
End: 2019-06-11
Payer: COMMERCIAL

## 2019-06-11 ENCOUNTER — ANESTHESIA (OUTPATIENT)
Dept: ENDOSCOPY | Facility: HOSPITAL | Age: 59
End: 2019-06-11
Payer: COMMERCIAL

## 2019-06-11 VITALS
HEIGHT: 69 IN | DIASTOLIC BLOOD PRESSURE: 53 MMHG | BODY MASS INDEX: 43.4 KG/M2 | OXYGEN SATURATION: 97 % | SYSTOLIC BLOOD PRESSURE: 123 MMHG | WEIGHT: 293 LBS | RESPIRATION RATE: 15 BRPM | HEART RATE: 83 BPM

## 2019-06-11 DIAGNOSIS — Z86.010 PERSONAL HISTORY OF COLONIC POLYPS: ICD-10-CM

## 2019-06-11 DIAGNOSIS — K64.8 INTERNAL HEMORRHOIDS WITHOUT COMPLICATION: Primary | ICD-10-CM

## 2019-06-11 PROCEDURE — 45378 DIAGNOSTIC COLONOSCOPY: CPT | Performed by: INTERNAL MEDICINE

## 2019-06-11 PROCEDURE — 0DJD8ZZ INSPECTION OF LOWER INTESTINAL TRACT, VIA NATURAL OR ARTIFICIAL OPENING ENDOSCOPIC: ICD-10-PCS | Performed by: INTERNAL MEDICINE

## 2019-06-11 RX ORDER — SODIUM CHLORIDE, SODIUM LACTATE, POTASSIUM CHLORIDE, CALCIUM CHLORIDE 600; 310; 30; 20 MG/100ML; MG/100ML; MG/100ML; MG/100ML
INJECTION, SOLUTION INTRAVENOUS CONTINUOUS
Status: DISCONTINUED | OUTPATIENT
Start: 2019-06-11 | End: 2019-06-11

## 2019-06-11 RX ORDER — LIDOCAINE HYDROCHLORIDE 10 MG/ML
INJECTION, SOLUTION EPIDURAL; INFILTRATION; INTRACAUDAL; PERINEURAL AS NEEDED
Status: DISCONTINUED | OUTPATIENT
Start: 2019-06-11 | End: 2019-06-11 | Stop reason: SURG

## 2019-06-11 RX ORDER — SODIUM CHLORIDE, SODIUM LACTATE, POTASSIUM CHLORIDE, CALCIUM CHLORIDE 600; 310; 30; 20 MG/100ML; MG/100ML; MG/100ML; MG/100ML
INJECTION, SOLUTION INTRAVENOUS CONTINUOUS
Status: CANCELLED | OUTPATIENT
Start: 2019-06-11

## 2019-06-11 RX ORDER — NALOXONE HYDROCHLORIDE 0.4 MG/ML
80 INJECTION, SOLUTION INTRAMUSCULAR; INTRAVENOUS; SUBCUTANEOUS AS NEEDED
Status: CANCELLED | OUTPATIENT
Start: 2019-06-11 | End: 2019-06-11

## 2019-06-11 RX ADMIN — SODIUM CHLORIDE, SODIUM LACTATE, POTASSIUM CHLORIDE, CALCIUM CHLORIDE: 600; 310; 30; 20 INJECTION, SOLUTION INTRAVENOUS at 09:50:00

## 2019-06-11 RX ADMIN — LIDOCAINE HYDROCHLORIDE 25 MG: 10 INJECTION, SOLUTION EPIDURAL; INFILTRATION; INTRACAUDAL; PERINEURAL at 09:15:00

## 2019-06-11 NOTE — ANESTHESIA PREPROCEDURE EVALUATION
Anesthesia PreOp Note    HPI:     Farnaz Ellis is a 63year old female who presents for preoperative consultation requested by: Emerita Schumacher MD    Date of Surgery: 6/11/2019    Procedure(s):  COLONOSCOPY  Indication: Personal history of col OBSTRUCTIVE DISEASE   • Chicken pox    • Chronic cough    • Disorder of thyroid    • Extrapyramidal syndrome 2013    MEDICATION MGMT.  W/ PARKINSONS FEATURES   • Extrinsic asthma, unspecified    • Greater trochanteric bursitis, left 11/21/2017   • High chol Disp:  Rfl:  6/2/2019 at 0800   LEVOTHYROXINE SODIUM 150 MCG Oral Tab TAKE 1 TABLET (150 MCG TOTAL) BY MOUTH BEFORE BREAKFAST. Disp: 90 tablet Rfl: 1 6/10/2019 at 2100   CALCITRIOL 0.25 MCG Oral Cap TAKE 1 CAPSULE (0.25 MCG TOTAL) BY MOUTH DAILY.  Disp: 90 90 tablet Rfl: 3 6/7/2019 at 0800   Turmeric 500 MG Oral Cap Take by mouth 2 (two) times daily. Disp:  Rfl:  6/7/2019 at 0800   Diclofenac Sodium 1 % Transdermal Gel Apply 4 g topically 4 (four) times daily.  Disp: 100 g Rfl: 3 6/9/2019 at prn   PRAVASTATIN       Spouse name: Not on file      Number of children: Not on file      Years of education: Not on file      Highest education level: Not on file    Occupational History      Not on file    Social Needs      Financial resource strain: Not on file following assistive device(s):  single-point cane,uses 2 canes. Walker at night               The patient does live in a home with stairs. Available pre-op labs reviewed.   Lab Results   Component Value Date    WBC 8.1 04/22/2019    RBC 4.38 04/22/2019 damage if relevant, major complications, and any alternative forms of anesthetic management. All of the patient's questions were answered to the best of my ability. The patient desires the anesthetic management as planned. Clementina NIETO AT [facility]  6/11/2019

## 2019-06-11 NOTE — ANESTHESIA POSTPROCEDURE EVALUATION
Patient: Tayla Dye    Procedure Summary     Date:  06/11/19 Room / Location:  [address] [place] [address] ENDOSCOPY 05 / [address] [place] [address] ENDOSCOPY    Anesthesia Start:  8440 Anesthesia Stop:      Procedure:  COLONOSCOPY (N/A ) Diagnosis:       Personal history of colonic polyps      (H

## 2019-06-11 NOTE — H&P
History & Physical Examination    Patient Name: Stella Spatz  MRN: [id number]  [facility]: [id number]  YOB: 1960    Diagnosis: History adenomatous colon polyps    Present Illness: 16-year-old woman with BMI 48, concern for rheumatoid arthritis, h Aerosol Powder, Breath Activated TAKE 1 PUFF BY MOUTH TWICE A DAY (Patient taking differently: daily.  TAKE 1 PUFF BY MOUTH TWICE A DAY ) Disp: 3 each Rfl: 1 6/10/2019 at 2100   DICLOFENAC SODIUM 75 MG Oral Tab EC TAKE 1 TABLET (75 MG TOTAL) BY MOUTH 2 (TWO  MG Oral Tab Take by mouth 3 (three) times daily. Take 5 tablets by oral route every day  Disp:  Rfl: 0 6/11/2019 at 3500 [facility] (OSTEO BI-FLEX ADV DOUBLE ST OR) Take by mouth daily.    Disp:  Rfl:  6/7/2019 at 0800   omega-3 fatty ac infertility   • OTHER SURGICAL HISTORY Right 12/3/2015   • OTHER SURGICAL HISTORY  12/3/2015   • THYROIDECTOMY      Total      Family History   Problem Relation Age of Onset   • Heart Disorder Father 54        heart attack-cause of death.    • Heart Disorde

## 2019-06-11 NOTE — TELEPHONE ENCOUNTER
LOV: 6/3/2019  Future Appointments   Date Time Provider Joel Mann   6/13/2019  4:30 PM Alba Lauren MD [facility]   6/20/2019  3:30 PM Anitra Sotelo PsyD [facility] [place]   6/27/2019  3:40 PM Martha Stover MD [facility] 1.02 mg/dL 0.87   GFR, Non-      >=60 74   GFR, -American      >=60 86   Albumin      3.4 - 5.0 g/dL 3.5   ALT (SGPT)      13 - 56 U/L 9 (L)   AST (SGOT)      15 - 37 U/L 13 (L)   C-REACTIVE PROTEIN      <0.30 mg/dL 2.00 (H)   SED RA

## 2019-06-11 NOTE — OPERATIVE REPORT
COLONOSCOPY PROCEDURE REPORT     DATE OF PROCEDURE:  6/11/2019     PCP: Paz Burnette MD     PREOPERATIVE DIAGNOSIS: Personal history adenomatous colon polyps     POSTOPERATIVE DIAGNOSIS:  See impression. SURGEON:  CATHY Espinosa

## 2019-06-13 ENCOUNTER — OFFICE VISIT (OUTPATIENT)
Dept: SURGERY | Facility: CLINIC | Age: 59
End: 2019-06-13
Payer: COMMERCIAL

## 2019-06-13 VITALS
SYSTOLIC BLOOD PRESSURE: 153 MMHG | DIASTOLIC BLOOD PRESSURE: 70 MMHG | HEIGHT: 69 IN | WEIGHT: 293 LBS | BODY MASS INDEX: 43.4 KG/M2

## 2019-06-13 DIAGNOSIS — R63.2 INCREASED APPETITE: ICD-10-CM

## 2019-06-13 DIAGNOSIS — Z51.81 ENCOUNTER FOR THERAPEUTIC DRUG MONITORING: ICD-10-CM

## 2019-06-13 DIAGNOSIS — G89.29 CHRONIC BILATERAL LOW BACK PAIN WITH BILATERAL SCIATICA: ICD-10-CM

## 2019-06-13 DIAGNOSIS — M54.42 CHRONIC BILATERAL LOW BACK PAIN WITH BILATERAL SCIATICA: ICD-10-CM

## 2019-06-13 DIAGNOSIS — E78.5 HYPERLIPIDEMIA, UNSPECIFIED HYPERLIPIDEMIA TYPE: Primary | ICD-10-CM

## 2019-06-13 DIAGNOSIS — R53.82 CHRONIC FATIGUE: ICD-10-CM

## 2019-06-13 DIAGNOSIS — M54.41 CHRONIC BILATERAL LOW BACK PAIN WITH BILATERAL SCIATICA: ICD-10-CM

## 2019-06-13 DIAGNOSIS — E66.01 MORBID OBESITY WITH BMI OF 45.0-49.9, ADULT (HCC): ICD-10-CM

## 2019-06-13 PROCEDURE — 99214 OFFICE O/P EST MOD 30 MIN: CPT | Performed by: INTERNAL MEDICINE

## 2019-06-13 NOTE — PROGRESS NOTES
[address], [address] [place] [address] Lauretta Bumpers, [address] [address] [address]  [address] Qu[address]  Dept: [phone]     Date:  10/16/18    Patient:  Stella Spatz  :      1960  MRN:      [id number]    Chief Compla (20 MG TOTAL) BY MOUTH EVERY 7 DAYS. Disp: 40 tablet Rfl: 3   NORTRIPTYLINE HCL 25 MG Oral Cap TAKE 1 CAPSULE BY MOUTH EVERY DAY IN THE EVENING Disp: 30 capsule Rfl: 0   Multiple Vitamins-Minerals (MULTIVITAMIN ADULT OR) Take by mouth.  Disp:  Rfl:    MONTE Oral Suspension Extended Release Take 5 mL by mouth 2 (two) times daily as needed. Disp: 529 mL Rfl: 0   FOLIC ACID 1 MG Oral Tab TAKE 1 TABLET (1 MG TOTAL) BY MOUTH ONCE DAILY.  Disp: 90 tablet Rfl: 3   Turmeric 500 MG Oral Cap Take by mouth 2 (two) times Drug use: No      Sexual activity: Yes        Partners: Male    Lifestyle      Physical activity:        Days per week: Not on file        Minutes per session: Not on file      Stress: Not on file    Relationships      Social connections:        Talk OTHER SURGICAL HISTORY Right 12/3/2015   • OTHER SURGICAL HISTORY  12/3/2015   • THYROIDECTOMY      Total      Family History:    Family History   Problem Relation Age of Onset   • Heart Disorder Father 54        heart attack-cause of death.    • Heart Diso Musculoskeletal: Positive for arthralgias, back pain, gait problem and myalgias. Skin: Negative. Psychiatric/Behavioral: Negative. Physical Exam:   Physical Exam   Constitutional: She is oriented to person, place, and time.  She appears well-d rejection is higher. I did explain to Inez Morales that after bariatric surgery, she is to avoid caffeine, carbonation and NSAIDS.  Inez Morales verbalized understanding.     Reviewed benefits- no weight management criteria    Inez Morales saw  Mago Childress (Psychologist that

## 2019-06-14 RX ORDER — PRAVASTATIN SODIUM 40 MG
40 TABLET ORAL
Qty: 90 TABLET | Refills: 1 | Status: SHIPPED | OUTPATIENT
Start: 2019-06-14 | End: 2019-12-11

## 2019-06-14 NOTE — TELEPHONE ENCOUNTER
Refill passed per [facility], [facility] protocol.   Cholesterol Medications  Protocol Criteria:  · Appointment scheduled in the past 12 months or in the next 3 months  · ALT & LDL on file in the past 12 months  · ALT result < 80  · LDL result <130   Recent Outpat Medical Group     In 4 months Samantha Reynolds MD [facility], [facility], [facility] follow up per pt        Lab Results   Component Value Date     (H) 04/27/2019     Lab Results   Component Value Date    ALT 11 (L) 04/27/2019

## 2019-06-16 DIAGNOSIS — M48.062 LUMBAR STENOSIS WITH NEUROGENIC CLAUDICATION: ICD-10-CM

## 2019-06-17 NOTE — TELEPHONE ENCOUNTER
Refill request for gabapentin 800 mg, TID, #90, no refills    LOV: 5/3/19  NOV: none  Last refilled on 5/21/19

## 2019-06-18 RX ORDER — GABAPENTIN 800 MG/1
TABLET ORAL
Qty: 90 TABLET | Refills: 0 | Status: SHIPPED | OUTPATIENT
Start: 2019-06-18 | End: 2019-07-16

## 2019-06-22 NOTE — IP AVS SNAPSHOT
[facility]            (For Outpatient Use Only) Initial Admit Date: 4/21/2021   Inpt/Obs Admit Date: Inpt: 4/21/21 / Obs: N/A   Discharge Date:    Amy Leighton:  [de-identified]   MRN: [de-identified]   CSN: [id number]   CEID: [id number]        MUT Subscriber Name:  Leonarda Peguero :    Subscriber ID:  Pt Rel to Subscriber:    Hospital Account Financial Class: [facility]    2021 DISPLAY PLAN FREE TEXT

## 2019-06-24 ENCOUNTER — NURSE ONLY (OUTPATIENT)
Dept: ALLERGY | Facility: CLINIC | Age: 59
End: 2019-06-24
Payer: COMMERCIAL

## 2019-06-24 DIAGNOSIS — J30.89 ENVIRONMENTAL AND SEASONAL ALLERGIES: ICD-10-CM

## 2019-06-24 PROCEDURE — 95117 IMMUNOTHERAPY INJECTIONS: CPT | Performed by: ALLERGY & IMMUNOLOGY

## 2019-06-27 ENCOUNTER — OFFICE VISIT (OUTPATIENT)
Dept: ORTHOPEDICS CLINIC | Facility: CLINIC | Age: 59
End: 2019-06-27
Payer: COMMERCIAL

## 2019-06-27 DIAGNOSIS — S82.142A CLOSED FRACTURE OF LEFT TIBIAL PLATEAU, INITIAL ENCOUNTER: Primary | ICD-10-CM

## 2019-06-27 PROCEDURE — 99212 OFFICE O/P EST SF 10 MIN: CPT | Performed by: ORTHOPAEDIC SURGERY

## 2019-06-27 PROCEDURE — 99213 OFFICE O/P EST LOW 20 MIN: CPT | Performed by: ORTHOPAEDIC SURGERY

## 2019-06-27 NOTE — PROGRESS NOTES
Time based billing: total face-to-face time spent examining, counseling and treating this patient 15 minutes; more than 50% of time spent in counseling/coordination of care    Patient returns for follow-up. She is feeling much better.     On examination th

## 2019-07-02 ENCOUNTER — TELEPHONE (OUTPATIENT)
Dept: SURGERY | Facility: CLINIC | Age: 59
End: 2019-07-02

## 2019-07-02 DIAGNOSIS — M48.062 LUMBAR STENOSIS WITH NEUROGENIC CLAUDICATION: ICD-10-CM

## 2019-07-02 RX ORDER — TOPIRAMATE 25 MG/1
25 TABLET ORAL EVERY EVENING
Qty: 30 TABLET | Refills: 0 | Status: SHIPPED | OUTPATIENT
Start: 2019-07-02 | End: 2019-07-27

## 2019-07-02 NOTE — TELEPHONE ENCOUNTER
Medication request: Nortriptyline 25mg 1 CAP QPM    LOV: 05/03/19  NOV: none    [place] 06/10/19 #30 r-0

## 2019-07-02 NOTE — TELEPHONE ENCOUNTER
7/2/19 @ 9:16am Spoke to Andra Maldonado at [facility], [phone], [id number]#[phone]. She verified that patient has following benefits for Bariatric services:   • No weight management criteria. • No MAKI/Blue Distinction required.    • JOE (TVF#[id number]) DX E66.01 & E6

## 2019-07-03 ENCOUNTER — DOCUMENTATION ONLY (OUTPATIENT)
Dept: SURGERY | Facility: CLINIC | Age: 59
End: 2019-07-03

## 2019-07-03 ENCOUNTER — TELEPHONE (OUTPATIENT)
Dept: SURGERY | Facility: CLINIC | Age: 59
End: 2019-07-03

## 2019-07-03 ENCOUNTER — OFFICE VISIT (OUTPATIENT)
Dept: SURGERY | Facility: CLINIC | Age: 59
End: 2019-07-03
Payer: COMMERCIAL

## 2019-07-03 VITALS
BODY MASS INDEX: 42.42 KG/M2 | SYSTOLIC BLOOD PRESSURE: 138 MMHG | WEIGHT: 293 LBS | DIASTOLIC BLOOD PRESSURE: 79 MMHG | HEART RATE: 86 BPM | HEIGHT: 69.5 IN | RESPIRATION RATE: 16 BRPM

## 2019-07-03 DIAGNOSIS — E89.0 POST-SURGICAL HYPOTHYROIDISM: ICD-10-CM

## 2019-07-03 DIAGNOSIS — M06.9 RHEUMATOID ARTHRITIS, INVOLVING UNSPECIFIED SITE, UNSPECIFIED RHEUMATOID FACTOR PRESENCE: ICD-10-CM

## 2019-07-03 DIAGNOSIS — E78.5 HYPERLIPIDEMIA, UNSPECIFIED HYPERLIPIDEMIA TYPE: Primary | ICD-10-CM

## 2019-07-03 DIAGNOSIS — G20 PARKINSON DISEASE (HCC): ICD-10-CM

## 2019-07-03 DIAGNOSIS — E66.01 MORBID OBESITY WITH BMI OF 45.0-49.9, ADULT (HCC): ICD-10-CM

## 2019-07-03 DIAGNOSIS — Z51.81 ENCOUNTER FOR THERAPEUTIC DRUG MONITORING: ICD-10-CM

## 2019-07-03 DIAGNOSIS — F43.23 ADJUSTMENT DISORDER WITH MIXED ANXIETY AND DEPRESSED MOOD: ICD-10-CM

## 2019-07-03 DIAGNOSIS — R53.82 CHRONIC FATIGUE: ICD-10-CM

## 2019-07-03 DIAGNOSIS — R63.2 INCREASED APPETITE: ICD-10-CM

## 2019-07-03 RX ORDER — NORTRIPTYLINE HYDROCHLORIDE 25 MG/1
CAPSULE ORAL
Qty: 30 CAPSULE | Refills: 0 | Status: SHIPPED | OUTPATIENT
Start: 2019-07-03 | End: 2019-07-27

## 2019-07-03 NOTE — PROGRESS NOTES
Oriented pt to the bariatric program; provided/reviewed bariatric packet of info, time line, referrals, etc; pt verbalized understanding; attended bariatric seminar on 5/14/18.

## 2019-07-03 NOTE — H&P
[address] [address]  Dept: [phone]    7/3/2019  Bariatric New Patient Evaluation    Chief Complaint:  Morbid obesity     History of Present Illness: Jannette Hermosillo MD at [address] [place] [address] ENDOSCOPY   • COLPOSCOPY, CERVIX W/UPPER ADJACENT VAGINA; W/BIOPSY(S), CERVIX     • D & C      for extended period   • D & C      for extended period   • FOOT SURGERY Left     Had right foot done recently.    • KNEE ARTHROSCOPY R 1 TABLET BY MOUTH THREE TIMES A DAY, Disp: 90 tablet, Rfl: 0  •  PRAVASTATIN SODIUM 40 MG Oral Tab, TAKE 1 TABLET (40 MG TOTAL) BY MOUTH ONCE DAILY. , Disp: 90 tablet, Rfl: 1  •  METHOTREXATE 2.5 MG Oral Tab, TAKE 8 TABLETS (20 MG TOTAL) BY MOUTH EVERY 7 DA 5 mL by mouth 2 (two) times daily as needed. , Disp: 115 mL, Rfl: 0  •  FOLIC ACID 1 MG Oral Tab, TAKE 1 TABLET (1 MG TOTAL) BY MOUTH ONCE DAILY. , Disp: 90 tablet, Rfl: 3  •  Turmeric 500 MG Oral Cap, Take by mouth 2 (two) times daily. , Disp: , Rfl:   •  Aba Marquez bilaterally, no wheezing  Heart: regular rate and rhythm, no murmur detected  Abdomen: soft, obese, non-tender, non-distended, no hernia/mass/organomegaly, small laparoscopic scar at umbilicus  Extremities: normal strength, normal ROM, walks with a walker

## 2019-07-05 ENCOUNTER — TELEPHONE (OUTPATIENT)
Dept: GASTROENTEROLOGY | Facility: CLINIC | Age: 59
End: 2019-07-05

## 2019-07-05 NOTE — TELEPHONE ENCOUNTER
Pt needs to get clearance for gastric surgery - upset that CB first opening is in October - asking if she can see PB for clearance or get in with Dr craig

## 2019-07-05 NOTE — TELEPHONE ENCOUNTER
Spoke to the pt. appt scheduled. Date, time and location verified with the pt.     Future Appointments   Date Time Provider Joel Mann   7/18/2019  3:30 PM Robert Jeffrey PsyD [facility] Pankajstephie   7/29/2019  7:00 PM Misha Hinojosa MD [facility]

## 2019-07-08 PROBLEM — J45.909 ASTHMA: Status: ACTIVE | Noted: 2019-07-08

## 2019-07-08 PROBLEM — E78.5 HYPERLIPIDEMIA: Status: ACTIVE | Noted: 2019-07-08

## 2019-07-08 PROBLEM — F43.23 ADJUSTMENT DISORDER WITH MIXED ANXIETY AND DEPRESSED MOOD: Status: ACTIVE | Noted: 2019-07-08

## 2019-07-08 PROBLEM — M70.62 GREATER TROCHANTERIC BURSITIS, LEFT: Status: ACTIVE | Noted: 2019-07-08

## 2019-07-08 PROBLEM — G89.29 CHRONIC BACK PAIN: Status: ACTIVE | Noted: 2019-07-08

## 2019-07-08 PROBLEM — J30.1 ALLERGIC RHINITIS DUE TO POLLEN: Status: ACTIVE | Noted: 2019-07-08

## 2019-07-08 PROBLEM — R63.2 INCREASED APPETITE: Status: ACTIVE | Noted: 2019-07-08

## 2019-07-08 PROBLEM — G20.A1 PARKINSON'S DISEASE: Status: ACTIVE | Noted: 2019-07-08

## 2019-07-08 PROBLEM — M06.9 RA (RHEUMATOID ARTHRITIS) (CMD): Status: ACTIVE | Noted: 2019-07-08

## 2019-07-08 PROBLEM — M54.9 CHRONIC BACK PAIN: Status: ACTIVE | Noted: 2019-07-08

## 2019-07-08 RX ORDER — DICLOFENAC SODIUM 75 MG/1
75 TABLET, DELAYED RELEASE ORAL 2 TIMES DAILY
Qty: 60 TABLET | Refills: 5 | Status: SHIPPED | OUTPATIENT
Start: 2019-07-08 | End: 2019-12-11

## 2019-07-08 NOTE — TELEPHONE ENCOUNTER
LOV: 6-3-19  Last Refilled:#60, 5rfs 3/13/19    Future Appointments   Date Time Provider Joel Mann   7/18/2019  3:30 PM Tricia Root PsyD [facility] Elmhstephie   7/29/2019  7:00 PM Shireen Romero MD [facility]   8/1/2019  3:30

## 2019-07-12 ENCOUNTER — OFFICE VISIT (OUTPATIENT)
Dept: CARDIOLOGY | Age: 59
End: 2019-07-12

## 2019-07-12 VITALS
BODY MASS INDEX: 43.4 KG/M2 | DIASTOLIC BLOOD PRESSURE: 62 MMHG | SYSTOLIC BLOOD PRESSURE: 140 MMHG | WEIGHT: 293 LBS | OXYGEN SATURATION: 96 % | HEART RATE: 100 BPM | HEIGHT: 69 IN

## 2019-07-12 DIAGNOSIS — E78.2 MIXED HYPERLIPIDEMIA: ICD-10-CM

## 2019-07-12 DIAGNOSIS — Z01.810 PREOP CARDIOVASCULAR EXAM: ICD-10-CM

## 2019-07-12 DIAGNOSIS — E78.00 HYPERCHOLESTEREMIA: Primary | ICD-10-CM

## 2019-07-12 DIAGNOSIS — R06.02 SHORTNESS OF BREATH: ICD-10-CM

## 2019-07-12 PROCEDURE — 99245 OFF/OP CONSLTJ NEW/EST HI 55: CPT | Performed by: INTERNAL MEDICINE

## 2019-07-12 RX ORDER — FOLIC ACID 1 MG/1
1 TABLET ORAL DAILY
COMMUNITY

## 2019-07-12 RX ORDER — ALBUTEROL SULFATE 90 UG/1
2 AEROSOL, METERED RESPIRATORY (INHALATION) EVERY 4 HOURS PRN
COMMUNITY

## 2019-07-12 RX ORDER — METHOTREXATE 2.5 MG/1
2.5 TABLET ORAL
COMMUNITY

## 2019-07-12 RX ORDER — RASAGILINE 1 MG/1
1 TABLET ORAL
COMMUNITY

## 2019-07-12 RX ORDER — GABAPENTIN 800 MG/1
TABLET ORAL 3 TIMES DAILY
COMMUNITY

## 2019-07-12 RX ORDER — NORTRIPTYLINE HYDROCHLORIDE 25 MG/1
25 CAPSULE ORAL NIGHTLY
COMMUNITY

## 2019-07-12 RX ORDER — MONTELUKAST SODIUM 10 MG/1
10 TABLET ORAL DAILY
COMMUNITY

## 2019-07-12 RX ORDER — CALCITRIOL 0.25 UG/1
0.25 CAPSULE, LIQUID FILLED ORAL DAILY
COMMUNITY

## 2019-07-12 RX ORDER — PRAVASTATIN SODIUM 40 MG
40 TABLET ORAL DAILY
COMMUNITY

## 2019-07-12 RX ORDER — TRAMADOL HYDROCHLORIDE 50 MG/1
50 TABLET ORAL EVERY 6 HOURS PRN
COMMUNITY

## 2019-07-12 RX ORDER — DICLOFENAC SODIUM 75 MG/1
75 TABLET, DELAYED RELEASE ORAL 2 TIMES DAILY
COMMUNITY

## 2019-07-12 RX ORDER — TIZANIDINE 4 MG/1
4 TABLET ORAL DAILY
COMMUNITY

## 2019-07-12 RX ORDER — FLUTICASONE PROPIONATE AND SALMETEROL 250; 50 UG/1; UG/1
1 POWDER RESPIRATORY (INHALATION)
COMMUNITY

## 2019-07-12 RX ORDER — TOPIRAMATE 25 MG/1
25 TABLET ORAL DAILY
COMMUNITY

## 2019-07-15 ENCOUNTER — NURSE ONLY (OUTPATIENT)
Dept: ALLERGY | Facility: CLINIC | Age: 59
End: 2019-07-15
Payer: COMMERCIAL

## 2019-07-15 DIAGNOSIS — E78.00 HYPERCHOLESTEREMIA: ICD-10-CM

## 2019-07-15 DIAGNOSIS — J30.89 ENVIRONMENTAL AND SEASONAL ALLERGIES: ICD-10-CM

## 2019-07-15 PROCEDURE — 93000 ELECTROCARDIOGRAM COMPLETE: CPT | Performed by: INTERNAL MEDICINE

## 2019-07-15 PROCEDURE — 95117 IMMUNOTHERAPY INJECTIONS: CPT | Performed by: ALLERGY & IMMUNOLOGY

## 2019-07-16 DIAGNOSIS — M48.062 LUMBAR STENOSIS WITH NEUROGENIC CLAUDICATION: ICD-10-CM

## 2019-07-16 RX ORDER — GABAPENTIN 800 MG/1
TABLET ORAL
Qty: 90 TABLET | Refills: 0 | Status: SHIPPED | OUTPATIENT
Start: 2019-07-16 | End: 2019-09-09

## 2019-07-16 NOTE — TELEPHONE ENCOUNTER
Gabapentin 800mg Take 1 table TID. #90.  No refills    Last filled-5/3/2019      LOV-5/3/2019  NOV-none

## 2019-07-18 ENCOUNTER — LAB ENCOUNTER (OUTPATIENT)
Dept: LAB | Facility: HOSPITAL | Age: 59
End: 2019-07-18
Attending: INTERNAL MEDICINE
Payer: COMMERCIAL

## 2019-07-18 DIAGNOSIS — Z51.81 ENCOUNTER FOR THERAPEUTIC DRUG MONITORING: ICD-10-CM

## 2019-07-18 DIAGNOSIS — G20 PARKINSON DISEASE (HCC): ICD-10-CM

## 2019-07-18 DIAGNOSIS — M06.9 RHEUMATOID ARTHRITIS, INVOLVING UNSPECIFIED SITE, UNSPECIFIED RHEUMATOID FACTOR PRESENCE: ICD-10-CM

## 2019-07-18 DIAGNOSIS — E66.01 MORBID OBESITY WITH BMI OF 45.0-49.9, ADULT (HCC): ICD-10-CM

## 2019-07-18 DIAGNOSIS — R63.2 INCREASED APPETITE: ICD-10-CM

## 2019-07-18 DIAGNOSIS — M06.9 RHEUMATOID ARTHRITIS INVOLVING MULTIPLE SITES, UNSPECIFIED RHEUMATOID FACTOR PRESENCE: ICD-10-CM

## 2019-07-18 DIAGNOSIS — F43.23 ADJUSTMENT DISORDER WITH MIXED ANXIETY AND DEPRESSED MOOD: ICD-10-CM

## 2019-07-18 DIAGNOSIS — E89.0 POST-SURGICAL HYPOTHYROIDISM: ICD-10-CM

## 2019-07-18 DIAGNOSIS — R53.82 CHRONIC FATIGUE: ICD-10-CM

## 2019-07-18 DIAGNOSIS — E78.5 HYPERLIPIDEMIA, UNSPECIFIED HYPERLIPIDEMIA TYPE: ICD-10-CM

## 2019-07-18 LAB
ALBUMIN SERPL-MCNC: 3.5 G/DL (ref 3.4–5)
ALT SERPL-CCNC: 7 U/L (ref 13–56)
AST SERPL-CCNC: 19 U/L (ref 15–37)
BASOPHILS # BLD AUTO: 0.04 X10(3) UL (ref 0–0.2)
BASOPHILS NFR BLD AUTO: 0.5 %
CREAT BLD-MCNC: 0.9 MG/DL (ref 0.55–1.02)
CRP SERPL-MCNC: 1.97 MG/DL (ref ?–0.3)
DEPRECATED HBV CORE AB SER IA-ACNC: 91.4 NG/ML (ref 18–340)
DEPRECATED RDW RBC AUTO: 53.5 FL (ref 35.1–46.3)
EOSINOPHIL # BLD AUTO: 0.18 X10(3) UL (ref 0–0.7)
EOSINOPHIL NFR BLD AUTO: 2.3 %
ERYTHROCYTE [DISTWIDTH] IN BLOOD BY AUTOMATED COUNT: 15.3 % (ref 11–15)
ERYTHROCYTE [SEDIMENTATION RATE] IN BLOOD: 29 MM/HR (ref 0–30)
EST. AVERAGE GLUCOSE BLD GHB EST-MCNC: 117 MG/DL (ref 68–126)
FOLATE SERPL-MCNC: >20 NG/ML (ref 8.7–?)
HAV IGM SER QL: 2.3 MG/DL (ref 1.6–2.6)
HBA1C MFR BLD HPLC: 5.7 % (ref ?–5.7)
HCT VFR BLD AUTO: 40.4 % (ref 35–48)
HGB BLD-MCNC: 12.7 G/DL (ref 12–16)
IMM GRANULOCYTES # BLD AUTO: 0.02 X10(3) UL (ref 0–1)
IMM GRANULOCYTES NFR BLD: 0.3 %
IRON SATURATION: 15 % (ref 15–50)
IRON SERPL-MCNC: 62 UG/DL (ref 50–170)
LYMPHOCYTES # BLD AUTO: 1.64 X10(3) UL (ref 1–4)
LYMPHOCYTES NFR BLD AUTO: 21.3 %
MCH RBC QN AUTO: 30 PG (ref 26–34)
MCHC RBC AUTO-ENTMCNC: 31.4 G/DL (ref 31–37)
MCV RBC AUTO: 95.3 FL (ref 80–100)
MONOCYTES # BLD AUTO: 0.53 X10(3) UL (ref 0.1–1)
MONOCYTES NFR BLD AUTO: 6.9 %
NEUTROPHILS # BLD AUTO: 5.3 X10 (3) UL (ref 1.5–7.7)
NEUTROPHILS # BLD AUTO: 5.3 X10(3) UL (ref 1.5–7.7)
NEUTROPHILS NFR BLD AUTO: 68.7 %
PHOSPHATE SERPL-MCNC: 3.4 MG/DL (ref 2.5–4.9)
PLATELET # BLD AUTO: 237 10(3)UL (ref 150–450)
RBC # BLD AUTO: 4.24 X10(6)UL (ref 3.8–5.3)
TOTAL IRON BINDING CAPACITY: 401 UG/DL (ref 240–450)
TRANSFERRIN SERPL-MCNC: 269 MG/DL (ref 200–360)
VIT B12 SERPL-MCNC: 647 PG/ML (ref 193–986)
WBC # BLD AUTO: 7.7 X10(3) UL (ref 4–11)

## 2019-07-18 PROCEDURE — 82607 VITAMIN B-12: CPT

## 2019-07-18 PROCEDURE — 84466 ASSAY OF TRANSFERRIN: CPT

## 2019-07-18 PROCEDURE — 84425 ASSAY OF VITAMIN B-1: CPT

## 2019-07-18 PROCEDURE — 86140 C-REACTIVE PROTEIN: CPT

## 2019-07-18 PROCEDURE — 84460 ALANINE AMINO (ALT) (SGPT): CPT

## 2019-07-18 PROCEDURE — 82565 ASSAY OF CREATININE: CPT

## 2019-07-18 PROCEDURE — 83036 HEMOGLOBIN GLYCOSYLATED A1C: CPT

## 2019-07-18 PROCEDURE — 84450 TRANSFERASE (AST) (SGOT): CPT

## 2019-07-18 PROCEDURE — 85652 RBC SED RATE AUTOMATED: CPT

## 2019-07-18 PROCEDURE — 83735 ASSAY OF MAGNESIUM: CPT

## 2019-07-18 PROCEDURE — 85025 COMPLETE CBC W/AUTO DIFF WBC: CPT

## 2019-07-18 PROCEDURE — 84100 ASSAY OF PHOSPHORUS: CPT

## 2019-07-18 PROCEDURE — 82040 ASSAY OF SERUM ALBUMIN: CPT

## 2019-07-18 PROCEDURE — 83540 ASSAY OF IRON: CPT

## 2019-07-18 PROCEDURE — 36415 COLL VENOUS BLD VENIPUNCTURE: CPT

## 2019-07-18 PROCEDURE — 82728 ASSAY OF FERRITIN: CPT

## 2019-07-18 PROCEDURE — 82746 ASSAY OF FOLIC ACID SERUM: CPT

## 2019-07-23 LAB — VITAMIN B1 (THIAMINE), WHOLE B: 150 NMOL/L

## 2019-07-27 DIAGNOSIS — M48.062 LUMBAR STENOSIS WITH NEUROGENIC CLAUDICATION: ICD-10-CM

## 2019-07-29 RX ORDER — TOPIRAMATE 25 MG/1
25 TABLET ORAL EVERY EVENING
Qty: 30 TABLET | Refills: 0 | Status: SHIPPED | OUTPATIENT
Start: 2019-07-29 | End: 2019-09-13

## 2019-07-29 RX ORDER — NORTRIPTYLINE HYDROCHLORIDE 25 MG/1
CAPSULE ORAL
Qty: 30 CAPSULE | Refills: 0 | Status: SHIPPED | OUTPATIENT
Start: 2019-07-29 | End: 2019-09-09

## 2019-07-29 NOTE — TELEPHONE ENCOUNTER
Medication request: Nortriptyline HCl 25mg  Take 1 Capsule by mouth every day in the Evening.     LOV  5/3/2019  NOV  none

## 2019-07-31 ENCOUNTER — TELEPHONE (OUTPATIENT)
Dept: RHEUMATOLOGY | Facility: CLINIC | Age: 59
End: 2019-07-31

## 2019-07-31 RX ORDER — ERGOCALCIFEROL 1.25 MG/1
50000 CAPSULE ORAL WEEKLY
Qty: 12 CAPSULE | Refills: 0 | Status: SHIPPED | OUTPATIENT
Start: 2019-07-31 | End: 2019-10-24

## 2019-07-31 NOTE — TELEPHONE ENCOUNTER
LOV: 6/3/19 Please advise, repeat level?   Future Appointments   Date Time Provider Joel Mann   8/5/2019 12:30 PM [address] [place] [address] MRI RM1 (1.5T WIDE) [address] [place] [address] MRI  [facility]   8/15/2019  3:30 PM Radha Blair PsyD [facility] Elmhstephie   8/24/2019 11:00 AM K

## 2019-08-05 ENCOUNTER — HOSPITAL ENCOUNTER (OUTPATIENT)
Dept: MRI IMAGING | Facility: HOSPITAL | Age: 59
Discharge: HOME | End: 2019-08-05
Attending: NEUROLOGICAL SURGERY
Payer: COMMERCIAL

## 2019-08-05 ENCOUNTER — ANESTHESIA (OUTPATIENT)
Dept: MRI IMAGING | Facility: HOSPITAL | Age: 59
End: 2019-08-05
Payer: COMMERCIAL

## 2019-08-05 ENCOUNTER — ANESTHESIA EVENT (OUTPATIENT)
Dept: MRI IMAGING | Facility: HOSPITAL | Age: 59
End: 2019-08-05
Payer: COMMERCIAL

## 2019-08-05 VITALS
WEIGHT: 293 LBS | HEIGHT: 69 IN | TEMPERATURE: 98 F | BODY MASS INDEX: 43.4 KG/M2 | RESPIRATION RATE: 18 BRPM | SYSTOLIC BLOOD PRESSURE: 136 MMHG | DIASTOLIC BLOOD PRESSURE: 65 MMHG | OXYGEN SATURATION: 95 % | HEART RATE: 85 BPM

## 2019-08-05 DIAGNOSIS — M48.061 SPINAL STENOSIS OF LUMBAR REGION, UNSPECIFIED WHETHER NEUROGENIC CLAUDICATION PRESENT: ICD-10-CM

## 2019-08-05 PROCEDURE — 72148 MRI LUMBAR SPINE W/O DYE: CPT | Performed by: NEUROLOGICAL SURGERY

## 2019-08-05 RX ORDER — PROCHLORPERAZINE EDISYLATE 5 MG/ML
5 INJECTION INTRAMUSCULAR; INTRAVENOUS ONCE AS NEEDED
Status: ACTIVE | OUTPATIENT
Start: 2019-08-05 | End: 2019-08-05

## 2019-08-05 RX ORDER — SODIUM CHLORIDE, SODIUM LACTATE, POTASSIUM CHLORIDE, CALCIUM CHLORIDE 600; 310; 30; 20 MG/100ML; MG/100ML; MG/100ML; MG/100ML
INJECTION, SOLUTION INTRAVENOUS CONTINUOUS
Status: DISCONTINUED | OUTPATIENT
Start: 2019-08-05 | End: 2019-08-07

## 2019-08-05 RX ORDER — MORPHINE SULFATE 2 MG/ML
2 INJECTION, SOLUTION INTRAMUSCULAR; INTRAVENOUS EVERY 10 MIN PRN
Status: DISCONTINUED | OUTPATIENT
Start: 2019-08-05 | End: 2019-08-07

## 2019-08-05 RX ORDER — METOCLOPRAMIDE 10 MG/1
10 TABLET ORAL ONCE
Status: DISCONTINUED | OUTPATIENT
Start: 2019-08-05 | End: 2019-08-07

## 2019-08-05 RX ORDER — LIDOCAINE HYDROCHLORIDE 10 MG/ML
INJECTION, SOLUTION EPIDURAL; INFILTRATION; INTRACAUDAL; PERINEURAL AS NEEDED
Status: DISCONTINUED | OUTPATIENT
Start: 2019-08-05 | End: 2019-08-05 | Stop reason: SURG

## 2019-08-05 RX ORDER — MORPHINE SULFATE 4 MG/ML
4 INJECTION, SOLUTION INTRAMUSCULAR; INTRAVENOUS EVERY 10 MIN PRN
Status: DISCONTINUED | OUTPATIENT
Start: 2019-08-05 | End: 2019-08-07

## 2019-08-05 RX ORDER — HYDROCODONE BITARTRATE AND ACETAMINOPHEN 5; 325 MG/1; MG/1
2 TABLET ORAL AS NEEDED
Status: DISCONTINUED | OUTPATIENT
Start: 2019-08-05 | End: 2019-08-07

## 2019-08-05 RX ORDER — FAMOTIDINE 20 MG/1
20 TABLET ORAL ONCE
Status: DISCONTINUED | OUTPATIENT
Start: 2019-08-05 | End: 2019-08-07

## 2019-08-05 RX ORDER — ONDANSETRON 2 MG/ML
INJECTION INTRAMUSCULAR; INTRAVENOUS AS NEEDED
Status: DISCONTINUED | OUTPATIENT
Start: 2019-08-05 | End: 2019-08-05 | Stop reason: SURG

## 2019-08-05 RX ORDER — DEXAMETHASONE SODIUM PHOSPHATE 4 MG/ML
VIAL (ML) INJECTION AS NEEDED
Status: DISCONTINUED | OUTPATIENT
Start: 2019-08-05 | End: 2019-08-05 | Stop reason: SURG

## 2019-08-05 RX ORDER — HYDROMORPHONE HYDROCHLORIDE 1 MG/ML
0.2 INJECTION, SOLUTION INTRAMUSCULAR; INTRAVENOUS; SUBCUTANEOUS EVERY 5 MIN PRN
Status: DISCONTINUED | OUTPATIENT
Start: 2019-08-05 | End: 2019-08-07

## 2019-08-05 RX ORDER — HYDROCODONE BITARTRATE AND ACETAMINOPHEN 5; 325 MG/1; MG/1
1 TABLET ORAL AS NEEDED
Status: DISCONTINUED | OUTPATIENT
Start: 2019-08-05 | End: 2019-08-07

## 2019-08-05 RX ORDER — NALOXONE HYDROCHLORIDE 0.4 MG/ML
80 INJECTION, SOLUTION INTRAMUSCULAR; INTRAVENOUS; SUBCUTANEOUS AS NEEDED
Status: ACTIVE | OUTPATIENT
Start: 2019-08-05 | End: 2019-08-05

## 2019-08-05 RX ORDER — ACETAMINOPHEN 500 MG
1000 TABLET ORAL ONCE
Status: DISCONTINUED | OUTPATIENT
Start: 2019-08-05 | End: 2019-08-07

## 2019-08-05 RX ORDER — MORPHINE SULFATE 10 MG/ML
6 INJECTION, SOLUTION INTRAMUSCULAR; INTRAVENOUS EVERY 10 MIN PRN
Status: DISCONTINUED | OUTPATIENT
Start: 2019-08-05 | End: 2019-08-07

## 2019-08-05 RX ORDER — HYDROMORPHONE HYDROCHLORIDE 1 MG/ML
0.4 INJECTION, SOLUTION INTRAMUSCULAR; INTRAVENOUS; SUBCUTANEOUS EVERY 5 MIN PRN
Status: DISCONTINUED | OUTPATIENT
Start: 2019-08-05 | End: 2019-08-07

## 2019-08-05 RX ORDER — HYDROMORPHONE HYDROCHLORIDE 1 MG/ML
0.6 INJECTION, SOLUTION INTRAMUSCULAR; INTRAVENOUS; SUBCUTANEOUS EVERY 5 MIN PRN
Status: DISCONTINUED | OUTPATIENT
Start: 2019-08-05 | End: 2019-08-07

## 2019-08-05 RX ORDER — HALOPERIDOL 5 MG/ML
0.25 INJECTION INTRAMUSCULAR ONCE AS NEEDED
Status: ACTIVE | OUTPATIENT
Start: 2019-08-05 | End: 2019-08-05

## 2019-08-05 RX ORDER — ONDANSETRON 2 MG/ML
4 INJECTION INTRAMUSCULAR; INTRAVENOUS ONCE AS NEEDED
Status: ACTIVE | OUTPATIENT
Start: 2019-08-05 | End: 2019-08-05

## 2019-08-05 RX ADMIN — SODIUM CHLORIDE, SODIUM LACTATE, POTASSIUM CHLORIDE, CALCIUM CHLORIDE: 600; 310; 30; 20 INJECTION, SOLUTION INTRAVENOUS at 14:30:00

## 2019-08-05 RX ADMIN — LIDOCAINE HYDROCHLORIDE 50 MG: 10 INJECTION, SOLUTION EPIDURAL; INFILTRATION; INTRACAUDAL; PERINEURAL at 13:53:00

## 2019-08-05 RX ADMIN — ONDANSETRON 4 MG: 2 INJECTION INTRAMUSCULAR; INTRAVENOUS at 14:29:00

## 2019-08-05 RX ADMIN — SODIUM CHLORIDE, SODIUM LACTATE, POTASSIUM CHLORIDE, CALCIUM CHLORIDE: 600; 310; 30; 20 INJECTION, SOLUTION INTRAVENOUS at 12:55:00

## 2019-08-05 RX ADMIN — LIDOCAINE HYDROCHLORIDE 50 MG: 10 INJECTION, SOLUTION EPIDURAL; INFILTRATION; INTRACAUDAL; PERINEURAL at 13:47:00

## 2019-08-05 RX ADMIN — DEXAMETHASONE SODIUM PHOSPHATE 4 MG: 4 MG/ML VIAL (ML) INJECTION at 14:29:00

## 2019-08-05 NOTE — ANESTHESIA POSTPROCEDURE EVALUATION
Patient: Janette Carty    Procedure Summary     Date:  08/05/19 Room / Location:  [address] [place] [address]; [address] [place] [address] Anesthesia Care Unit    Anesthesia Start:  9414 Anesthesia Stop:      Procedure:  MRI SPINE LUMBAR (CPT=72148) Diagnosis:  Spi

## 2019-08-05 NOTE — ANESTHESIA PROCEDURE NOTES
Airway  Date/Time: 8/5/2019 1:53 PM  Urgency: elective      General Information and Staff    Patient location during procedure: OR  Anesthesiologist: Mac Donnelly MD  Performed: anesthesiologist     Indications and Patient Condition  Indications for

## 2019-08-05 NOTE — ANESTHESIA PREPROCEDURE EVALUATION
Anesthesia PreOp Note    HPI:     Farnaz Ellis is a 63year old female who presents for preoperative consultation requested by: * No surgeons listed *    Date of Surgery: 8/5/2019    * No procedures listed *  Indication: * No pre-op diagnosis entered * Greater trochanteric bursitis, left 11/21/2017   • High cholesterol    • History of blood transfusion as child no reaction   • Infertility, female    • Parkinson disease ([facility]    • [facility]    • Thyroid disease 2016   • Tibia/fibula fract CALCITRIOL 0.25 MCG Oral Cap TAKE 1 CAPSULE (0.25 MCG TOTAL) BY MOUTH DAILY. Disp: 90 capsule Rfl: 1   Rasagiline Mesylate (AZILECT) 1 MG Oral Tab Take 1 tablet by mouth daily.  Disp:  Rfl:    traMADol HCl 50 MG Oral Tab Take 1 tablet (50 mg total) by enrico every 6 (six) hours as needed. Disp:  Rfl:    Albuterol Sulfate (2.5 MG/3ML) 0.083% Inhalation Nebu Soln Take 3 mL (2.5 mg total) by nebulization every 4 (four) hours as needed for Wheezing.  Disp: 3 Box Rfl: 0   Misc Natural Products (OSTEO BI-FLEX ADV D Types: 4 - 7 Glasses of wine per week        Comment: every other day       Drug use: No      Sexual activity: Yes        Partners: Male    Lifestyle      Physical activity:        Days per week: Not on file        Minutes per session: Not on file      [address] lb 1.6 oz) (abnormal). Her oral temperature is 98.1 °F (36.7 °C). Her blood pressure is 143/72 and her pulse is 93. Her respiration is 20 and oxygen saturation is 96%.     08/01/19  1443 08/05/19  1220   BP:  143/72   Pulse:  93   Resp:  20   Temp:  98.1 °F

## 2019-08-12 ENCOUNTER — NURSE ONLY (OUTPATIENT)
Dept: ALLERGY | Facility: CLINIC | Age: 59
End: 2019-08-12
Payer: COMMERCIAL

## 2019-08-12 DIAGNOSIS — J30.1 SEASONAL ALLERGIC RHINITIS DUE TO POLLEN: ICD-10-CM

## 2019-08-12 PROCEDURE — 95117 IMMUNOTHERAPY INJECTIONS: CPT | Performed by: ALLERGY & IMMUNOLOGY

## 2019-08-13 ENCOUNTER — TELEPHONE (OUTPATIENT)
Dept: INTERNAL MEDICINE CLINIC | Facility: CLINIC | Age: 59
End: 2019-08-13

## 2019-08-13 NOTE — TELEPHONE ENCOUNTER
Pt is requesting a appt to see Dr for a pre-op for back surgery on 9/9 with Dr. Marily Alejo    Please advise

## 2019-08-15 ENCOUNTER — OFFICE VISIT (OUTPATIENT)
Dept: INTERNAL MEDICINE CLINIC | Facility: CLINIC | Age: 59
End: 2019-08-15
Payer: COMMERCIAL

## 2019-08-15 ENCOUNTER — APPOINTMENT (OUTPATIENT)
Dept: LAB | Facility: HOSPITAL | Age: 59
End: 2019-08-15
Attending: NURSE PRACTITIONER
Payer: COMMERCIAL

## 2019-08-15 ENCOUNTER — HOSPITAL ENCOUNTER (OUTPATIENT)
Dept: GENERAL RADIOLOGY | Facility: HOSPITAL | Age: 59
Discharge: HOME | End: 2019-08-15
Attending: NURSE PRACTITIONER
Payer: COMMERCIAL

## 2019-08-15 VITALS
DIASTOLIC BLOOD PRESSURE: 72 MMHG | HEART RATE: 88 BPM | TEMPERATURE: 99 F | WEIGHT: 293 LBS | SYSTOLIC BLOOD PRESSURE: 112 MMHG | BODY MASS INDEX: 50 KG/M2

## 2019-08-15 DIAGNOSIS — E66.01 MORBID OBESITY WITH BMI OF 45.0-49.9, ADULT (HCC): ICD-10-CM

## 2019-08-15 DIAGNOSIS — Z01.818 PRE-OP EXAMINATION: Primary | ICD-10-CM

## 2019-08-15 DIAGNOSIS — Z01.811 PRE-OP CHEST EXAM: ICD-10-CM

## 2019-08-15 DIAGNOSIS — Z01.811 PRE-OPERATIVE RESPIRATORY EXAMINATION: Primary | ICD-10-CM

## 2019-08-15 LAB
ABSOLUTE IMMATURE GRANULOCYTES (OFFPRE24): NORMAL
ALBUMIN SERPL-MCNC: 3.4 G/DL (ref 3.4–5)
ALBUMIN SERPL-MCNC: NORMAL G/DL
ALBUMIN/GLOB SERPL: 0.9 {RATIO} (ref 1–2)
ALBUMIN/GLOB SERPL: NORMAL {RATIO}
ALP LIVER SERPL-CCNC: 120 U/L (ref 46–118)
ALP SERPL-CCNC: NORMAL U/L
ALT SERPL-CCNC: 8 U/L
ALT SERPL-CCNC: 8 U/L (ref 13–56)
ANION GAP SERPL CALC-SCNC: 9 MMOL/L
ANION GAP SERPL CALC-SCNC: 9 MMOL/L (ref 0–18)
APTT PPP: 31.2 SECONDS (ref 23.2–35.3)
AST SERPL-CCNC: 20 U/L
AST SERPL-CCNC: 20 U/L (ref 15–37)
BASO+EOS+MONOS # BLD: NORMAL 10*3/UL
BASO+EOS+MONOS NFR BLD: NORMAL %
BASOPHILS # BLD AUTO: 0.05 X10(3) UL (ref 0–0.2)
BASOPHILS # BLD: NORMAL 10*3/UL
BASOPHILS NFR BLD AUTO: 0.8 %
BASOPHILS NFR BLD: NORMAL %
BILIRUB SERPL-MCNC: 0.3 MG/DL (ref 0.1–2)
BILIRUB SERPL-MCNC: NORMAL MG/DL
BUN BLD-MCNC: 22 MG/DL (ref 7–18)
BUN SERPL-MCNC: 22 MG/DL
BUN/CREAT SERPL: 23.2 (ref 10–20)
BUN/CREAT SERPL: NORMAL
CALCIUM BLD-MCNC: 8.9 MG/DL (ref 8.5–10.1)
CALCIUM SERPL-MCNC: NORMAL MG/DL
CHLORIDE SERPL-SCNC: 108 MMOL/L
CHLORIDE SERPL-SCNC: 108 MMOL/L (ref 98–112)
CO2 SERPL-SCNC: 26 MMOL/L
CO2 SERPL-SCNC: 26 MMOL/L (ref 21–32)
CREAT BLD-MCNC: 0.95 MG/DL (ref 0.55–1.02)
CREAT SERPL-MCNC: 0.95 MG/DL
DEPRECATED RDW RBC AUTO: 54.7 FL (ref 35.1–46.3)
DIFFERENTIAL METHOD BLD: NORMAL
EOSINOPHIL # BLD AUTO: 0.16 X10(3) UL (ref 0–0.7)
EOSINOPHIL # BLD: NORMAL 10*3/UL
EOSINOPHIL NFR BLD AUTO: 2.5 %
EOSINOPHIL NFR BLD: NORMAL %
ERYTHROCYTE [DISTWIDTH] IN BLOOD BY AUTOMATED COUNT: 15.8 % (ref 11–15)
ERYTHROCYTE [DISTWIDTH] IN BLOOD: NORMAL %
GLOBULIN PLAS-MCNC: 3.9 G/DL (ref 2.8–4.4)
GLOBULIN SER-MCNC: NORMAL G/DL
GLUCOSE BLD-MCNC: 89 MG/DL (ref 70–99)
GLUCOSE SERPL-MCNC: 89 MG/DL
HCT VFR BLD AUTO: 40 % (ref 35–48)
HCT VFR BLD CALC: 40 %
HGB BLD-MCNC: 12.8 G/DL
HGB BLD-MCNC: 12.8 G/DL (ref 12–16)
IMM GRANULOCYTES # BLD AUTO: 0.01 X10(3) UL (ref 0–1)
IMM GRANULOCYTES NFR BLD: 0.2 %
IMMATURE GRANULOCYTES (OFFPRE25): NORMAL
INR BLD: 1.04 (ref 0.9–1.2)
LENGTH OF FAST TIME PATIENT: NORMAL H
LYMPHOCYTES # BLD AUTO: 1.7 X10(3) UL (ref 1–4)
LYMPHOCYTES # BLD: NORMAL 10*3/UL
LYMPHOCYTES NFR BLD AUTO: 26.7 %
LYMPHOCYTES NFR BLD: NORMAL %
M PROTEIN MFR SERPL ELPH: 7.3 G/DL (ref 6.4–8.2)
MCH RBC QN AUTO: 30.6 PG
MCH RBC QN AUTO: 30.6 PG (ref 26–34)
MCHC RBC AUTO-ENTMCNC: 32 G/DL
MCHC RBC AUTO-ENTMCNC: 32 G/DL (ref 31–37)
MCV RBC AUTO: 95.7 FL
MCV RBC AUTO: 95.7 FL (ref 80–100)
MONOCYTES # BLD AUTO: 0.55 X10(3) UL (ref 0.1–1)
MONOCYTES # BLD: NORMAL 10*3/UL
MONOCYTES NFR BLD AUTO: 8.6 %
MONOCYTES NFR BLD: NORMAL %
MPV (OFFPRE2): NORMAL
NEUTROPHILS # BLD AUTO: 3.89 X10 (3) UL (ref 1.5–7.7)
NEUTROPHILS # BLD AUTO: 3.89 X10(3) UL (ref 1.5–7.7)
NEUTROPHILS # BLD: NORMAL 10*3/UL
NEUTROPHILS NFR BLD AUTO: 61.2 %
NEUTROPHILS NFR BLD: NORMAL %
NRBC BLD MANUAL-RTO: NORMAL %
OSMOLALITY SERPL CALC.SUM OF ELEC: 299 MOSM/KG (ref 275–295)
PATIENT FASTING: NO
PLAT MORPH BLD: NORMAL
PLATELET # BLD AUTO: 249 10(3)UL (ref 150–450)
PLATELET # BLD: NORMAL 10*3/UL
POTASSIUM SERPL-SCNC: 4.4 MMOL/L
POTASSIUM SERPL-SCNC: 4.4 MMOL/L (ref 3.5–5.1)
PROT SERPL-MCNC: NORMAL G/DL
PROTHROMBIN TIME: 13.4 SECONDS (ref 11.8–14.5)
RBC # BLD AUTO: 4.18 X10(6)UL (ref 3.8–5.3)
RBC # BLD: 4.18 10*6/UL
RBC MORPH BLD: NORMAL
SODIUM SERPL-SCNC: 143 MMOL/L
SODIUM SERPL-SCNC: 143 MMOL/L (ref 136–145)
WBC # BLD AUTO: 6.4 X10(3) UL (ref 4–11)
WBC # BLD: 6.4 10*3/UL
WBC MORPH BLD: NORMAL

## 2019-08-15 PROCEDURE — 85610 PROTHROMBIN TIME: CPT

## 2019-08-15 PROCEDURE — 93005 ELECTROCARDIOGRAM TRACING: CPT

## 2019-08-15 PROCEDURE — 85730 THROMBOPLASTIN TIME PARTIAL: CPT

## 2019-08-15 PROCEDURE — 80053 COMPREHEN METABOLIC PANEL: CPT

## 2019-08-15 PROCEDURE — 99214 OFFICE O/P EST MOD 30 MIN: CPT | Performed by: NURSE PRACTITIONER

## 2019-08-15 PROCEDURE — 36415 COLL VENOUS BLD VENIPUNCTURE: CPT

## 2019-08-15 PROCEDURE — 87081 CULTURE SCREEN ONLY: CPT

## 2019-08-15 PROCEDURE — 93010 ELECTROCARDIOGRAM REPORT: CPT | Performed by: NURSE PRACTITIONER

## 2019-08-15 PROCEDURE — 71046 X-RAY EXAM CHEST 2 VIEWS: CPT | Performed by: NURSE PRACTITIONER

## 2019-08-15 PROCEDURE — 85025 COMPLETE CBC W/AUTO DIFF WBC: CPT

## 2019-08-15 NOTE — PROGRESS NOTES
HPI:    Patient ID: Eduardo Ta is a 63year old female. In menopause. HPI 67-year-old pleasant female here for pre-op visit for back pain. She has preop instructions. States she has had back pain for 10 years. (Chronic).  Pain level varies from d 1.00 x10(3) uL 0.55    Eosinophil Absolute 0.00 - 0.70 x10(3) uL 0.16    Basophil Absolute 0.00 - 0.20 x10(3) uL 0.05    Immature Granulocyte Absolute 0.00 - 1.00 x10(3) uL 0.01    Neutrophil % % 61.2    Lymphocyte % % 26.7    Monocyte % % 8.6    Eosinophi Negative for nausea, vomiting, abdominal pain, diarrhea and constipation. Endocrine: Negative for cold intolerance and heat intolerance. Genitourinary: Negative for dysuria and hematuria.    Musculoskeletal: Positive for joint swelling (Fingers and knee TAKE 8 TABLETS (20 MG TOTAL) BY MOUTH EVERY 7 DAYS. Disp: 40 tablet Rfl: 3   Multiple Vitamins-Minerals (MULTIVITAMIN ADULT OR) Take by mouth.  Disp:  Rfl:    MONTELUKAST SODIUM 10 MG Oral Tab TAKE 1 TABLET BY MOUTH EVERY DAY AT NIGHT Disp: 90 tablet Rfl: 1 Sulfate (2.5 MG/3ML) 0.083% Inhalation Nebu Soln Take 3 mL (2.5 mg total) by nebulization every 4 (four) hours as needed for Wheezing. Disp: 3 Box Rfl: 0   carbidopa-levodopa (SINEMET)  MG Oral Tab Take by mouth 3 (three) times daily.  Take 5 tablets (Completed)    PTT, ACTIVATED (Completed)    PROTHROMBIN TIME (PT) (Completed)    COMP METABOLIC PANEL (14) (Completed)    EKG 12-LEAD    XR CHEST PA + LAT CHEST (CPT=71046) (Completed)    MRSA CULTURE ONLY (Completed)          No follow-ups on file.     Or

## 2019-08-21 ENCOUNTER — OFFICE VISIT (OUTPATIENT)
Dept: SURGERY | Facility: CLINIC | Age: 59
End: 2019-08-21
Payer: COMMERCIAL

## 2019-08-21 VITALS
OXYGEN SATURATION: 93 % | DIASTOLIC BLOOD PRESSURE: 90 MMHG | HEART RATE: 90 BPM | WEIGHT: 293 LBS | SYSTOLIC BLOOD PRESSURE: 164 MMHG | HEIGHT: 69.5 IN | RESPIRATION RATE: 16 BRPM | BODY MASS INDEX: 42.42 KG/M2

## 2019-08-21 DIAGNOSIS — M54.42 CHRONIC BILATERAL LOW BACK PAIN WITH BILATERAL SCIATICA: ICD-10-CM

## 2019-08-21 DIAGNOSIS — M54.41 CHRONIC BILATERAL LOW BACK PAIN WITH BILATERAL SCIATICA: ICD-10-CM

## 2019-08-21 DIAGNOSIS — F43.23 ADJUSTMENT DISORDER WITH MIXED ANXIETY AND DEPRESSED MOOD: ICD-10-CM

## 2019-08-21 DIAGNOSIS — E78.5 HYPERLIPIDEMIA, UNSPECIFIED HYPERLIPIDEMIA TYPE: Primary | ICD-10-CM

## 2019-08-21 DIAGNOSIS — G20 PARKINSON DISEASE (HCC): ICD-10-CM

## 2019-08-21 DIAGNOSIS — E66.01 MORBID OBESITY WITH BMI OF 45.0-49.9, ADULT (HCC): ICD-10-CM

## 2019-08-21 DIAGNOSIS — G89.29 CHRONIC BILATERAL LOW BACK PAIN WITH BILATERAL SCIATICA: ICD-10-CM

## 2019-08-21 PROBLEM — Z01.818 PRE-OP EXAMINATION: Status: ACTIVE | Noted: 2019-08-21

## 2019-08-21 NOTE — PROGRESS NOTES
[address] [address]  Dept: [phone]    8/21/2019   Bariatric Patient Follow-up Evaluation    Chief Complaint:  Morbid obesity     History of Present SURGICAL HISTORY  12/3/2015   • THYROIDECTOMY      Total        Family History:    Family History   Problem Relation Age of Onset   • Heart Disorder Father 54        heart attack-cause of death.    • Heart Disorder Mother    • Stroke Mother    • Cancer Moth tablet, Rfl: 5  •  PRAVASTATIN SODIUM 40 MG Oral Tab, TAKE 1 TABLET (40 MG TOTAL) BY MOUTH ONCE DAILY. , Disp: 90 tablet, Rfl: 1  •  METHOTREXATE 2.5 MG Oral Tab, TAKE 8 TABLETS (20 MG TOTAL) BY MOUTH EVERY 7 DAYS., Disp: 40 tablet, Rfl: 3  •  Multiple Ivette Diclofenac Sodium 1 % Transdermal Gel, Apply 4 g topically 4 (four) times daily. , Disp: 100 g, Rfl: 3  •  HYDROcodone-acetaminophen  MG Oral Tab, Take 1 tablet by mouth every 6 (six) hours as needed.   , Disp: , Rfl:   •  Albuterol Sulfate (2.5 MG/3M motion  Psych: alert and oriented, normal affect  Skin: warm, dry    Assessment: 63year old female with chronic morbid obesity, BMI 49, hyperlipidemia who would benefit from significant and sustained weight loss.   She has a very long list of medications t

## 2019-08-23 NOTE — PROGRESS NOTES
HPI:    Patient ID: Antony Mcege is a 63year old female. HPI      Review of Systems         Current Outpatient Medications:  ergocalciferol 80418 units Oral Cap Take 1 capsule (50,000 Units total) by mouth once a week.  Disp: 12 capsule Rfl: 0   TOPI TO 2 CAPSULES BY MOUTH AT BEDTIME Disp: 60 capsule Rfl: 3   Albuterol Sulfate HFA (PROAIR HFA) 108 (90 Base) MCG/ACT Inhalation Aero Soln Inhale 2 puffs into the lungs every 6 (six) hours as needed for Wheezing.  Disp: 1 Inhaler Rfl: 0   Hydrocod Polst-CPM Normal rate, regular rhythm, normal heart sounds and intact distal pulses. Exam reveals no gallop and no friction rub. No murmur heard. Pulmonary/Chest: Effort normal and breath sounds normal. She has no wheezes. She has no rales. Abdominal: Soft.  B

## 2019-08-24 ENCOUNTER — OFFICE VISIT (OUTPATIENT)
Dept: ALLERGY | Facility: CLINIC | Age: 59
End: 2019-08-24
Payer: COMMERCIAL

## 2019-08-24 VITALS
RESPIRATION RATE: 18 BRPM | DIASTOLIC BLOOD PRESSURE: 74 MMHG | WEIGHT: 293 LBS | BODY MASS INDEX: 42.42 KG/M2 | HEART RATE: 97 BPM | OXYGEN SATURATION: 97 % | HEIGHT: 69.5 IN | TEMPERATURE: 99 F | SYSTOLIC BLOOD PRESSURE: 110 MMHG

## 2019-08-24 DIAGNOSIS — Z91.09 ALLERGY TO AMERICAN HOUSE DUST MITE: ICD-10-CM

## 2019-08-24 DIAGNOSIS — J45.40 ASTHMA, EXTRINSIC, MODERATE PERSISTENT, UNCOMPLICATED: Primary | ICD-10-CM

## 2019-08-24 DIAGNOSIS — J30.1 SEASONAL ALLERGIC RHINITIS DUE TO POLLEN: ICD-10-CM

## 2019-08-24 PROCEDURE — 99214 OFFICE O/P EST MOD 30 MIN: CPT | Performed by: ALLERGY & IMMUNOLOGY

## 2019-08-24 PROCEDURE — 94010 BREATHING CAPACITY TEST: CPT | Performed by: ALLERGY & IMMUNOLOGY

## 2019-08-24 NOTE — PROGRESS NOTES
Mary Boy is a 63year old female. HPI:   Patient presents with: Follow - Up: Patient here for yearly follow up. Reports she is feeling well.     Patient is a 60-year-old female who presents for follow-up with a chief complaint of asthma and aller • FOOT SURGERY Left     Had right foot done recently.    • KNEE ARTHROSCOPY Right    • LAPAROSCOPY,PELVIC,BIOPSY  1990    for infertility   • Coy 1878    for infertility   • OTHER SURGICAL HISTORY Right 12/3/2015   • OTHER SURGICAL MOUTH EVERY DAY AT NIGHT Disp: 90 tablet Rfl: 1   LEVOTHYROXINE SODIUM 150 MCG Oral Tab TAKE 1 TABLET (150 MCG TOTAL) BY MOUTH BEFORE BREAKFAST. Disp: 90 tablet Rfl: 1   CALCITRIOL 0.25 MCG Oral Cap TAKE 1 CAPSULE (0.25 MCG TOTAL) BY MOUTH DAILY.  Disp: 90 mouth 3 (three) times daily. Take 5 tablets by oral route every day  Disp:  Rfl: 0   Misc Natural Products (OSTEO BI-FLEX ADV DOUBLE ST OR) Take by mouth daily. Disp:  Rfl:    omega-3 fatty acids (FISH OIL) 1000 MG Oral Cap Take 1,000 mg by mouth daily. shows an FEV1 of 81% and FVC of 63% suggesting a restrictive pattern    Patient also has a history of Parkinson's disease and bmi of 49      1. Asthma  Continue with Advair 100/50. Currently using Advair 1 puff once a day.   Reviewed the potential to take

## 2019-08-24 NOTE — PATIENT INSTRUCTIONS
1. Asthma  Continue with Advair 100/50. Currently using Advair 1 puff once a day. Reviewed the potential to take up to twice a day if increasing asthma symptoms.   Continue with singular once a day albuterol Pneumovax 23 up-to-date 2 puffs every 4-6 hours

## 2019-08-26 ENCOUNTER — TELEPHONE (OUTPATIENT)
Dept: GASTROENTEROLOGY | Facility: CLINIC | Age: 59
End: 2019-08-26

## 2019-08-26 ENCOUNTER — OFFICE VISIT (OUTPATIENT)
Dept: SURGERY | Facility: CLINIC | Age: 59
End: 2019-08-26
Payer: COMMERCIAL

## 2019-08-26 ENCOUNTER — OFFICE VISIT (OUTPATIENT)
Dept: GASTROENTEROLOGY | Facility: CLINIC | Age: 59
End: 2019-08-26
Payer: COMMERCIAL

## 2019-08-26 VITALS
DIASTOLIC BLOOD PRESSURE: 61 MMHG | BODY MASS INDEX: 42.42 KG/M2 | HEART RATE: 103 BPM | HEIGHT: 69.5 IN | WEIGHT: 293 LBS | SYSTOLIC BLOOD PRESSURE: 131 MMHG

## 2019-08-26 VITALS — WEIGHT: 293 LBS | HEIGHT: 69.5 IN | BODY MASS INDEX: 42.42 KG/M2

## 2019-08-26 DIAGNOSIS — Z01.818 PRE-OP EXAMINATION: ICD-10-CM

## 2019-08-26 DIAGNOSIS — K21.9 GASTROESOPHAGEAL REFLUX DISEASE, ESOPHAGITIS PRESENCE NOT SPECIFIED: ICD-10-CM

## 2019-08-26 DIAGNOSIS — E66.01 MORBID OBESITY WITH BMI OF 45.0-49.9, ADULT (HCC): Primary | ICD-10-CM

## 2019-08-26 DIAGNOSIS — M06.9 RHEUMATOID ARTHRITIS, INVOLVING UNSPECIFIED SITE, UNSPECIFIED RHEUMATOID FACTOR PRESENCE: ICD-10-CM

## 2019-08-26 DIAGNOSIS — K21.9 GASTROESOPHAGEAL REFLUX DISEASE, ESOPHAGITIS PRESENCE NOT SPECIFIED: Primary | ICD-10-CM

## 2019-08-26 DIAGNOSIS — E66.01 CLASS 3 SEVERE OBESITY DUE TO EXCESS CALORIES WITH SERIOUS COMORBIDITY AND BODY MASS INDEX (BMI) OF 45.0 TO 49.9 IN ADULT (HCC): Primary | ICD-10-CM

## 2019-08-26 PROCEDURE — 99214 OFFICE O/P EST MOD 30 MIN: CPT | Performed by: INTERNAL MEDICINE

## 2019-08-26 PROCEDURE — 97802 MEDICAL NUTRITION INDIV IN: CPT | Performed by: DIETITIAN, REGISTERED

## 2019-08-26 NOTE — TELEPHONE ENCOUNTER
Pt didn't shedule in offce. Needs to be scheduled for EGD.     Scheduled for:  [phone]  Provider Name:   Date:    Location:  [facility]  Sedation: MAC   Time:    Prep:NPO after midnight  Meds/Allergies Reconciled?:  physica reviewed  Diagnosis with codes:

## 2019-08-26 NOTE — PATIENT INSTRUCTIONS
Schedule EGD (stomach endoscopy) exam at [facility] only    This patient IS NOT appropriate for the [facility] endoscopy center. Body mass index is 48.62 kg/m².     MAC anesthesia    DX = GERD, pre-operative screening bariatric surgery    Medication instruc

## 2019-08-26 NOTE — PROGRESS NOTES
HPI:    Patient ID: Shona Lobato is a 63year old woman with a very extensive allergy history and rheumatoid arthritis possibly Parkinson's disease; morbid obesity and BMI 48.6 as of today's visit of 8/26/2019; well-known to Ophelia [place] and Jessica Roman Melissa Johnson is a 63year old female who presents for Patient presents with:  Rheumatoid Arthritis  Back Pain  Leg Pain     1.  Positive AMINAH 1:80, , polyarthralgia with rash on feet - now positive ccp abs - very specific for seropositive Rheumato about the RA meds - d/w he rto hold 1 week before and 1 week after - she is not sure if she can do that right now.      Summary:  1. Cont. methotrexate - 8 tablets a week and folic acid 1mg a day   2. Cont. xlejanz 11mg a day   3.   Return to clinic in 3-4 Systems         Current Outpatient Medications:  ergocalciferol 74370 units Oral Cap Take 1 capsule (50,000 Units total) by mouth once a week. Disp: 12 capsule Rfl: 0   TOPIRAMATE 25 MG Oral Tab TAKE 1 TABLET (25 MG TOTAL) BY MOUTH EVERY EVENING.  Disp: 30 Disp: 1 Inhaler Rfl: 0   Hydrocod Polst-CPM Polst ER (TUSSIONEX PENNKINETIC ER) 10-8 MG/5ML Oral Suspension Extended Release Take 5 mL by mouth 2 (two) times daily as needed.  Disp: 120 mL Rfl: 0   FOLIC ACID 1 MG Oral Tab TAKE 1 TABLET (1 MG TOTAL) BY MOUT systems, she describes occasional diet related GERD symptoms, vague dysphagia complaint, daily diclofenac use. Suggest:    Reassuring labs of 8/15/2019 reviewed. Reassuring CBC. AST 20 ALT 8 elevated alkaline phosphatase 120, albumin 3.4.     I recomme

## 2019-08-26 NOTE — PROGRESS NOTES
[address] [place] [address] AND WEIGHT LOSS CLINIC  [address] [place] [address]  Dept: [phone]  Loc: [phone]    08/26/19    Bariatric Initial Nutrition Assessment    Janette Carty is a 63year old female.     Refer Poor food choices and Sedentary lifestyle    Patient has tried: Optifast, JENNIFER[employer], Malka Samaniego and Other: Miah-thin, fads    Patient's most successful weight loss attempt was Optifast. Lost ??? lbs  and kept it off for ~6 months.     Patient has tr 08/15/2019    ALB 3.4 08/15/2019    GLOBULIN 3.9 08/15/2019    AGRATIO 0.9 (L) 08/31/2013     08/15/2019    K 4.4 08/15/2019     08/15/2019    CO2 26.0 08/15/2019     Lab Results   Component Value Date    MG 2.3 07/18/2019     Phosphorus (mg/dL Tab, TAKE 1 TABLET (40 MG TOTAL) BY MOUTH ONCE DAILY. , Disp: 90 tablet, Rfl: 1  •  METHOTREXATE 2.5 MG Oral Tab, TAKE 8 TABLETS (20 MG TOTAL) BY MOUTH EVERY 7 DAYS., Disp: 40 tablet, Rfl: 3  •  Multiple Vitamins-Minerals (MULTIVITAMIN ADULT OR), Take by mo g topically 4 (four) times daily. , Disp: 100 g, Rfl: 3  •  HYDROcodone-acetaminophen  MG Oral Tab, Take 1 tablet by mouth every 6 (six) hours as needed.   , Disp: , Rfl:   •  Albuterol Sulfate (2.5 MG/3ML) 0.083% Inhalation Nebu Soln, Take 3 mL (2.5 m coke)    Activity Level: moderate  · Type: other: Trainer  · Duration: 180 minutes  · Frequency: per week    Other: Pt presents w/ interest in bariatric surgery, specifically the VSG.  Pt reports the struggle with weight began as a child, has tried many dif of care  · F/U to reinforce goals  · F/U on vitamin/mineral supplementation  · Review quizzes  ·  for liquid protein diet prior to surgery  · Other:  Pre-op nutrition labs    Additional RD visits required to review concepts?  Yes, and to monitor goal

## 2019-08-27 ENCOUNTER — ANCILLARY PROCEDURE (OUTPATIENT)
Dept: CARDIOLOGY | Age: 59
End: 2019-08-27
Attending: INTERNAL MEDICINE

## 2019-08-27 VITALS — HEIGHT: 69 IN | BODY MASS INDEX: 43.4 KG/M2 | WEIGHT: 293 LBS

## 2019-08-27 DIAGNOSIS — Z01.810 PREOP CARDIOVASCULAR EXAM: ICD-10-CM

## 2019-08-27 DIAGNOSIS — R06.02 SHORTNESS OF BREATH: ICD-10-CM

## 2019-08-27 PROCEDURE — X1094 NO CHARGE VISIT: HCPCS

## 2019-08-27 PROCEDURE — X1094 NO CHARGE VISIT: HCPCS | Performed by: INTERNAL MEDICINE

## 2019-08-27 PROCEDURE — 93306 TTE W/DOPPLER COMPLETE: CPT | Performed by: INTERNAL MEDICINE

## 2019-08-27 PROCEDURE — 78452 HT MUSCLE IMAGE SPECT MULT: CPT | Performed by: INTERNAL MEDICINE

## 2019-08-27 PROCEDURE — A9502 TC99M TETROFOSMIN: HCPCS | Performed by: INTERNAL MEDICINE

## 2019-08-27 PROCEDURE — 93015 CV STRESS TEST SUPVJ I&R: CPT | Performed by: INTERNAL MEDICINE

## 2019-08-27 RX ORDER — REGADENOSON 0.08 MG/ML
0.4 INJECTION, SOLUTION INTRAVENOUS ONCE
Status: COMPLETED | OUTPATIENT
Start: 2019-08-27 | End: 2019-08-27

## 2019-08-27 RX ADMIN — REGADENOSON 0.4 MG: 0.08 INJECTION, SOLUTION INTRAVENOUS at 08:05

## 2019-08-27 ASSESSMENT — EXERCISE STRESS TEST
PEAK_BP: 110/64
STAGE_CATEGORIES: RECOVERY 0
COMMENTS: 30 SECOND RECOVERY
PEAK_BP: 114/62
PEAK_RPP: 13054
PEAK_RPP: 10788
STAGE_CATEGORIES: RECOVERY 2
STAGE_CATEGORIES: RESTING
PEAK_RPP: 11330
PEAK_HR: 106
STOPPAGE_REASON: PROTOCOL COMPLETE
PEAK_HR: 103
PEAK_BP: 124/70
PEAK_BP: 122/64
PEAK_BP: 116/64
PEAK_RPP: 12084
COMMENTS: 4 MINUTE RECOVERY
PEAK_HR: 93
PEAK_HR: 105
PEAK_RPP: 13020
STAGE_CATEGORIES: 1
PEAK_HR: 107
COMMENTS: 2 MINUTE RECOVERY
STAGE_CATEGORIES: RECOVERY 1

## 2019-08-28 ENCOUNTER — APPOINTMENT (OUTPATIENT)
Dept: CARDIOLOGY | Age: 59
End: 2019-08-28
Attending: INTERNAL MEDICINE

## 2019-08-28 LAB
LV EF: 64 %
STRESS POST EXERCISE DUR MIN: 1 MIN
STRESS POST EXERCISE DUR SEC: 0 SEC
STRESS TARGET HR: 163 BPM

## 2019-08-28 PROCEDURE — X1094 NO CHARGE VISIT: HCPCS

## 2019-08-28 NOTE — TELEPHONE ENCOUNTER
Scheduled for:  [facility] [phone]  Provider Name: Dr. Poncho Pate  Date:  10/15/19  Location:  [facility]  Sedation:  MAC  Time:   9675 (pt is aware to arrive at 1415)   Prep:  Nothing to eat after midnight   Nothing to drink after 1100 the day of the procedure  Meds/Allergie

## 2019-08-28 NOTE — TELEPHONE ENCOUNTER
I am closing this TE since it is duplicate.  Please seen the other TE on 8/26/19 for scheduling information

## 2019-08-29 ENCOUNTER — TELEPHONE (OUTPATIENT)
Dept: CARDIOLOGY | Age: 59
End: 2019-08-29

## 2019-08-30 ENCOUNTER — OFFICE VISIT (OUTPATIENT)
Dept: PULMONOLOGY | Facility: CLINIC | Age: 59
End: 2019-08-30
Payer: COMMERCIAL

## 2019-08-30 VITALS
BODY MASS INDEX: 43.4 KG/M2 | HEART RATE: 96 BPM | WEIGHT: 293 LBS | DIASTOLIC BLOOD PRESSURE: 82 MMHG | OXYGEN SATURATION: 98 % | HEIGHT: 69 IN | SYSTOLIC BLOOD PRESSURE: 144 MMHG | RESPIRATION RATE: 18 BRPM

## 2019-08-30 DIAGNOSIS — G47.33 OSA (OBSTRUCTIVE SLEEP APNEA): Primary | ICD-10-CM

## 2019-08-30 PROCEDURE — 99244 OFF/OP CNSLTJ NEW/EST MOD 40: CPT | Performed by: INTERNAL MEDICINE

## 2019-08-30 NOTE — PROGRESS NOTES
[address], [place]    Report of Consultation    Rocky Lizama Patient Status:  Outpatient    1960 MRN [id number]   Location [address], [place] Attending No att. providers found   Hosp Day # 0 PCP Elaine Kincaid CERVIX W/UPPER ADJACENT VAGINA; W/BIOPSY(S), CERVIX     • D & C      for extended period   • D & C      for extended period   • FOOT SURGERY Left     Had right foot done recently.    • KNEE ARTHROSCOPY Right    • LAPAROSCOPY,PELVIC,BIOPSY  1990    for infer non-pitting edema , no calf tenderness   A,A,Ox 3  No focal deficit     Results:     Laboratory Data:  Lab Results   Component Value Date    WBC 6.4 08/15/2019    HGB 12.8 08/15/2019    HCT 40.0 08/15/2019    .0 08/15/2019    CREATSERUM 0.95 08/15/2 .     Will f/u with her sleep study   F/u in 3 months prn    Addendum :   Pt to start on auto cpap for her Moderate EVIE   Pt with underline asthma / was stable in my office     Pt stable from pulmonary point of view for now to have bariatric surgery   Pt s

## 2019-09-03 ENCOUNTER — OFFICE VISIT (OUTPATIENT)
Dept: NUTRITION/OBESITY MEDICINE | Facility: HOSPITAL | Age: 59
End: 2019-09-03
Attending: SINGLE SPECIALTY
Payer: COMMERCIAL

## 2019-09-03 DIAGNOSIS — E66.01 MORBID OBESITY DUE TO EXCESS CALORIES (HCC): Primary | ICD-10-CM

## 2019-09-03 PROBLEM — R94.39 ABNORMAL NUCLEAR STRESS TEST: Status: ACTIVE | Noted: 2019-09-03

## 2019-09-03 PROBLEM — R06.09 EXERTIONAL DYSPNEA: Status: ACTIVE | Noted: 2019-09-03

## 2019-09-03 PROBLEM — R94.31 ABNORMAL EKG: Status: ACTIVE | Noted: 2019-09-03

## 2019-09-03 PROCEDURE — 96136 PSYCL/NRPSYC TST PHY/QHP 1ST: CPT | Performed by: OTHER

## 2019-09-03 PROCEDURE — 96130 PSYCL TST EVAL PHYS/QHP 1ST: CPT | Performed by: OTHER

## 2019-09-03 NOTE — PROGRESS NOTES
Psychological Evaluation    Patient Name: Linda Rock  Visit Date:  9/3/2019  :   1960    Reason for Referral:    Wayne Courtney is a 63year old   female who was referred for a psychological evaluation prior to being scheduled for [facility] Parkinson's disease. Her current medications include: Ergocalciferol, Topiramate, Nortriptyline, Gabapentin, Carbidopa, Ergocalciferol, Hydrocodone, Levothyroxine, Methotrexate, and Tramadol.   Regarding a familial history of medical issues, Michael Peterson reported open in her presentation of personal information. She was oriented as to time, person, and place. There appeared to be no abnormalities in her sensorium or mental capacity. Her overall affect and mood fell within the normal range.   Also, her speech and t servings of vegetables/potatoes a day, uses 2% or whole milk, uses regular cheese, eats beef, pork, or dark meat chicken more than 2 times a week, eats fried foods, eats chips, cooks with olive oil, eats sweets, eats regular ice cream, and eats high sodium changes to her lifestyle in an effort to prepare for the surgery it appears that she is still struggling with her eating habits, as evidenced by her performance on the REAP.   Consequently, while Nick Anger appears to be a good candidate to proceed with the surg

## 2019-09-04 ENCOUNTER — TELEPHONE (OUTPATIENT)
Dept: RHEUMATOLOGY | Facility: CLINIC | Age: 59
End: 2019-09-04

## 2019-09-04 ENCOUNTER — TELEPHONE (OUTPATIENT)
Dept: CARDIOLOGY | Age: 59
End: 2019-09-04

## 2019-09-04 ENCOUNTER — OFFICE VISIT (OUTPATIENT)
Dept: RHEUMATOLOGY | Facility: CLINIC | Age: 59
End: 2019-09-04
Payer: COMMERCIAL

## 2019-09-04 VITALS
HEIGHT: 69 IN | DIASTOLIC BLOOD PRESSURE: 82 MMHG | SYSTOLIC BLOOD PRESSURE: 126 MMHG | WEIGHT: 293 LBS | RESPIRATION RATE: 18 BRPM | HEART RATE: 102 BPM | BODY MASS INDEX: 43.4 KG/M2

## 2019-09-04 DIAGNOSIS — Z51.81 ENCOUNTER FOR THERAPEUTIC DRUG MONITORING: ICD-10-CM

## 2019-09-04 DIAGNOSIS — M06.9 RHEUMATOID ARTHRITIS INVOLVING MULTIPLE SITES, UNSPECIFIED RHEUMATOID FACTOR PRESENCE: Primary | ICD-10-CM

## 2019-09-04 DIAGNOSIS — R06.09 DYSPNEA ON EXERTION: ICD-10-CM

## 2019-09-04 DIAGNOSIS — R94.31 ABNORMAL EKG: ICD-10-CM

## 2019-09-04 DIAGNOSIS — R94.39 ABNORMAL CARDIOVASCULAR STRESS TEST: Primary | ICD-10-CM

## 2019-09-04 DIAGNOSIS — Z01.810 PRE-OPERATIVE CARDIOVASCULAR EXAMINATION: ICD-10-CM

## 2019-09-04 PROCEDURE — 99214 OFFICE O/P EST MOD 30 MIN: CPT | Performed by: INTERNAL MEDICINE

## 2019-09-04 RX ORDER — FOLIC ACID 1 MG/1
1 TABLET ORAL
Qty: 90 TABLET | Refills: 3 | Status: SHIPPED | OUTPATIENT
Start: 2019-09-04 | End: 2020-11-08

## 2019-09-04 NOTE — PATIENT INSTRUCTIONS
1. Cont. methotrexate - 8 tablets a week and folic acid 1mg a day - skip methotrexate week of surgery -   2. Cont. xlejanz 11mg a day - hold 1 week for surgery time - 3 days before and 3 days after -  3. Return to clinic in 3-4 months.    4. .Check labs in

## 2019-09-04 NOTE — TELEPHONE ENCOUNTER
Pt. Is wondering if she needs to renew the [address] [place] [address] Blvd card or if it's automatically renewed. I am not actually sure - when this needs to be renewed for her.

## 2019-09-04 NOTE — TELEPHONE ENCOUNTER
Pt contacted and advised to contact [employer] to activate another co-pay card. She can also Hershall Gins co-pay card and apply on line for card. Pt verbalized understanding.

## 2019-09-04 NOTE — PROGRESS NOTES
Tayla Dye is a 63year old female who presents for Patient presents with: Follow - Up  Rheumatoid Arthritis  Lab Results  Joint Pain  . HPI:     She is a pleasant 65year old who has elevated RF >300 and foot pain - c/w seropositive  RA .  I  Last has to use a walked and get into work. She cna't get an MRI of her lumbar -   Valium doesn't help her. semarialuisa needs conscious sedation b/c of her parkinson's. phsyical therapy aqua therapy is not helping her.    She's takint tramadol twice a day -   [employer] Todd on Monday. She is better in her other joitns. She sees parkinson's clinci in July - and notcied she takes less of the medicatoin while being on [place]. 8/27/2018  She's doing ok. The [place] is helping overall in her hands .  She still has week.   She was told she would be off diclfoenac for gastric sleeve surgery - this helps with everything not just lower back. She has 5/10 pain. Her back and hands hurt right now.          Wt Readings from Last 2 Encounters:  09/04/19 : (!) 333 lb (151 XELJANZ XR 11 MG Oral Tablet 24 Hr TAKE ONE TABLET (11 MG) BY MOUTH ONCE DAILY. MAY BE TAKEN WITH OR WITHOUT FOOD. SWALLOW TABLET WHOLE. DO NOT CRUSH, SPLIT OR CHEW.  STORE AT Disp: 30 tablet Rfl: 11   TIZANIDINE HCL 4 MG Oral Cap TAKE 1 TO 2 CAPSULES BY • Infertility, female    • [facility] ([facility]    • Rheumatoid arthritis ([facility]    • Thyroid disease 2016   • Tibia/fibula fracture     OPEN REDUCTION INTERNAL FIXATION   • Urinary incontinence 2/12/95    after childbirth   • Viral conjunctivitis 2008 or ankle tenderness- doing better -   No joint tendneress at this time. lower back midline tendnesrs and b/l gluteal tendnerss   No tenderness sin hands. Squeeze test imporved.    Left hip not tender  Right hip not tender   No lower back tendnerss to t (SGOT)      15 - 37 U/L 20 19   ALKALINE PHOSPHATASE      46 - 118 U/L 120 (H)    Total Bilirubin      0.1 - 2.0 mg/dL 0.3    TOTAL PROTEIN      6.4 - 8.2 g/dL 7.3    Albumin      3.4 - 5.0 g/dL 3.4 3.5   Globulin      2.8 - 4.4 g/dL 3.9    A/G Ratio metatarsal there is a result of the previous fracture but the  distal shaft of the fifth metatarsal is parallel with the distal shaft of  the fourth metatarsal and alignment is near-anatomic.   There is subtle bone marrow edema in the base of the third meta as detailed. Findings have progressed since October, 2017. Notable levels as follows:     2. L3-L4:  Severe multifactorial spinal canal stenosis, which is exacerbated by a medially directed 7-8 mm synovial cyst arising from the right facet joint.   Mild b b/l 7/22/2015 by ortho  Diclofenac gel is really helping her. -   3. Hx of parkinson's -   4.  Left thigh welakness -  - prednisone burst - helped a little bit for the left thigh -   - emg left thigh - not advisde for left hihg.   - physical thearpy - helpe

## 2019-09-06 NOTE — PROGRESS NOTES
This writer spoke with Mrs. Culp [facility] therapist, Dr. Jarrod Roman, to discuss her progress in therapy thus far.   Dr. Dashawn Frank related that she is planning on increasing therapy sessions with Mrs. Teresa Marsh both prior to and following surgery, as she indicate

## 2019-09-09 ENCOUNTER — LAB REQUISITION (OUTPATIENT)
Dept: LAB | Facility: HOSPITAL | Age: 59
End: 2019-09-09
Payer: COMMERCIAL

## 2019-09-09 DIAGNOSIS — Z01.89 ENCOUNTER FOR OTHER SPECIFIED SPECIAL EXAMINATIONS: ICD-10-CM

## 2019-09-09 DIAGNOSIS — M48.062 LUMBAR STENOSIS WITH NEUROGENIC CLAUDICATION: ICD-10-CM

## 2019-09-09 PROCEDURE — 88304 TISSUE EXAM BY PATHOLOGIST: CPT | Performed by: NEUROLOGICAL SURGERY

## 2019-09-09 RX ORDER — NORTRIPTYLINE HYDROCHLORIDE 25 MG/1
25 CAPSULE ORAL NIGHTLY
Qty: 90 CAPSULE | Refills: 2 | Status: SHIPPED | OUTPATIENT
Start: 2019-09-09 | End: 2019-12-11

## 2019-09-09 RX ORDER — GABAPENTIN 800 MG/1
800 TABLET ORAL 3 TIMES DAILY
Qty: 270 TABLET | Refills: 2 | Status: SHIPPED | OUTPATIENT
Start: 2019-09-09 | End: 2020-05-11

## 2019-09-09 NOTE — TELEPHONE ENCOUNTER
Refill request per patient for gabapentin and nortriptyline. LOV 5/3/19. NOV none. Gabapentin increased to  800 mg TID on 10/1/18. Monthly refill since then. Nortriptyline 25 mg HS started 4/17/19. Monthly refill since then.      Pend Gabapentin 800

## 2019-09-11 ENCOUNTER — OFFICE VISIT (OUTPATIENT)
Dept: SURGERY | Facility: CLINIC | Age: 59
End: 2019-09-11
Payer: COMMERCIAL

## 2019-09-11 ENCOUNTER — LAB ENCOUNTER (OUTPATIENT)
Dept: LAB | Facility: HOSPITAL | Age: 59
End: 2019-09-11
Attending: INTERNAL MEDICINE
Payer: COMMERCIAL

## 2019-09-11 VITALS
SYSTOLIC BLOOD PRESSURE: 150 MMHG | HEIGHT: 69 IN | WEIGHT: 293 LBS | DIASTOLIC BLOOD PRESSURE: 70 MMHG | BODY MASS INDEX: 43.4 KG/M2

## 2019-09-11 DIAGNOSIS — R94.31 ABNORMAL EKG: ICD-10-CM

## 2019-09-11 DIAGNOSIS — R53.82 CHRONIC FATIGUE: ICD-10-CM

## 2019-09-11 DIAGNOSIS — E78.5 HYPERLIPIDEMIA, UNSPECIFIED HYPERLIPIDEMIA TYPE: Primary | ICD-10-CM

## 2019-09-11 DIAGNOSIS — Z51.81 ENCOUNTER FOR THERAPEUTIC DRUG MONITORING: ICD-10-CM

## 2019-09-11 DIAGNOSIS — E66.01 MORBID OBESITY WITH BMI OF 45.0-49.9, ADULT (HCC): ICD-10-CM

## 2019-09-11 DIAGNOSIS — R94.39 ABNORMAL CARDIOVASCULAR STRESS TEST: ICD-10-CM

## 2019-09-11 DIAGNOSIS — Z01.810 PRE-OPERATIVE CARDIOVASCULAR EXAMINATION: ICD-10-CM

## 2019-09-11 DIAGNOSIS — R06.00 DYSPNEA ON EXERTION: ICD-10-CM

## 2019-09-11 DIAGNOSIS — Z01.818 PRE-OP EXAMINATION: ICD-10-CM

## 2019-09-11 LAB
ABSOLUTE IMMATURE GRANULOCYTES (OFFPRE24): NORMAL
ANION GAP SERPL CALC-SCNC: 8 MMOL/L (ref 0–18)
ANION GAP SERPL CALC-SCNC: NORMAL MMOL/L
BASO+EOS+MONOS # BLD: NORMAL 10*3/UL
BASO+EOS+MONOS NFR BLD: NORMAL %
BASOPHILS # BLD AUTO: 0.05 X10(3) UL (ref 0–0.2)
BASOPHILS # BLD: NORMAL 10*3/UL
BASOPHILS NFR BLD AUTO: 0.5 %
BASOPHILS NFR BLD: NORMAL %
BUN BLD-MCNC: 20 MG/DL (ref 7–18)
BUN SERPL-MCNC: 20 MG/DL
BUN/CREAT SERPL: 22.5 (ref 10–20)
BUN/CREAT SERPL: NORMAL
CALCIUM BLD-MCNC: 8.7 MG/DL (ref 8.5–10.1)
CALCIUM SERPL-MCNC: 8.7 MG/DL
CHLORIDE SERPL-SCNC: 110 MMOL/L
CHLORIDE SERPL-SCNC: 110 MMOL/L (ref 98–112)
CO2 SERPL-SCNC: 25 MMOL/L (ref 21–32)
CO2 SERPL-SCNC: NORMAL MMOL/L
CREAT BLD-MCNC: 0.89 MG/DL (ref 0.55–1.02)
CREAT SERPL-MCNC: 0.89 MG/DL
DEPRECATED RDW RBC AUTO: 55 FL (ref 35.1–46.3)
DIFFERENTIAL METHOD BLD: NORMAL
EOSINOPHIL # BLD AUTO: 0.17 X10(3) UL (ref 0–0.7)
EOSINOPHIL # BLD: NORMAL 10*3/UL
EOSINOPHIL NFR BLD AUTO: 1.7 %
EOSINOPHIL NFR BLD: NORMAL %
ERYTHROCYTE [DISTWIDTH] IN BLOOD BY AUTOMATED COUNT: 15.9 % (ref 11–15)
ERYTHROCYTE [DISTWIDTH] IN BLOOD: NORMAL %
GLUCOSE BLD-MCNC: 84 MG/DL (ref 70–99)
GLUCOSE SERPL-MCNC: 84 MG/DL
HCT VFR BLD AUTO: 38.6 % (ref 35–48)
HCT VFR BLD CALC: 38.6 %
HGB BLD-MCNC: 12.1 G/DL
HGB BLD-MCNC: 12.1 G/DL (ref 12–16)
IMM GRANULOCYTES # BLD AUTO: 0.04 X10(3) UL (ref 0–1)
IMM GRANULOCYTES NFR BLD: 0.4 %
IMMATURE GRANULOCYTES (OFFPRE25): NORMAL
LENGTH OF FAST TIME PATIENT: NORMAL H
LYMPHOCYTES # BLD AUTO: 1.54 X10(3) UL (ref 1–4)
LYMPHOCYTES # BLD: NORMAL 10*3/UL
LYMPHOCYTES NFR BLD AUTO: 15.7 %
LYMPHOCYTES NFR BLD: NORMAL %
MCH RBC QN AUTO: 29.9 PG (ref 26–34)
MCH RBC QN AUTO: NORMAL PG
MCHC RBC AUTO-ENTMCNC: 31.3 G/DL (ref 31–37)
MCHC RBC AUTO-ENTMCNC: NORMAL G/DL
MCV RBC AUTO: 95.3 FL (ref 80–100)
MCV RBC AUTO: NORMAL FL
MONOCYTES # BLD AUTO: 0.94 X10(3) UL (ref 0.1–1)
MONOCYTES # BLD: NORMAL 10*3/UL
MONOCYTES NFR BLD AUTO: 9.6 %
MONOCYTES NFR BLD: NORMAL %
MPV (OFFPRE2): NORMAL
NEUTROPHILS # BLD AUTO: 7.08 X10 (3) UL (ref 1.5–7.7)
NEUTROPHILS # BLD AUTO: 7.08 X10(3) UL (ref 1.5–7.7)
NEUTROPHILS # BLD: NORMAL 10*3/UL
NEUTROPHILS NFR BLD AUTO: 72.1 %
NEUTROPHILS NFR BLD: NORMAL %
NRBC BLD MANUAL-RTO: NORMAL %
OSMOLALITY SERPL CALC.SUM OF ELEC: 298 MOSM/KG (ref 275–295)
PATIENT FASTING: NO
PLAT MORPH BLD: NORMAL
PLATELET # BLD AUTO: 230 10(3)UL (ref 150–450)
PLATELET # BLD: 230 10*3/UL
POTASSIUM SERPL-SCNC: 4.4 MMOL/L
POTASSIUM SERPL-SCNC: 4.4 MMOL/L (ref 3.5–5.1)
RBC # BLD AUTO: 4.05 X10(6)UL (ref 3.8–5.3)
RBC # BLD: 4.05 10*6/UL
RBC MORPH BLD: NORMAL
SODIUM SERPL-SCNC: 143 MMOL/L
SODIUM SERPL-SCNC: 143 MMOL/L (ref 136–145)
WBC # BLD AUTO: 9.8 X10(3) UL (ref 4–11)
WBC # BLD: 9.8 10*3/UL
WBC MORPH BLD: NORMAL

## 2019-09-11 PROCEDURE — 99214 OFFICE O/P EST MOD 30 MIN: CPT | Performed by: INTERNAL MEDICINE

## 2019-09-11 PROCEDURE — 36415 COLL VENOUS BLD VENIPUNCTURE: CPT

## 2019-09-11 PROCEDURE — 85025 COMPLETE CBC W/AUTO DIFF WBC: CPT

## 2019-09-11 PROCEDURE — 80048 BASIC METABOLIC PNL TOTAL CA: CPT

## 2019-09-11 RX ORDER — METHOCARBAMOL 750 MG/1
750 TABLET, FILM COATED ORAL 3 TIMES DAILY
COMMUNITY
Start: 2019-09-09 | End: 2019-11-06 | Stop reason: ALTCHOICE

## 2019-09-11 NOTE — PROGRESS NOTES
[address], [address] [place] [address] Josh Tequila, [address] [address] [address]  [address]  Dept: [phone]     Date:  10/16/18    Patient:  Beryle Chain  :      1960  MRN:      [id number]    Chief Compla Diclofenac Sodium 1 % Transdermal Gel Apply 4 g topically 4 (four) times daily. Disp: 553 g Rfl: 3   folic acid 1 MG Oral Tab Take 1 tablet (1 mg total) by mouth once daily.  Disp: 90 tablet Rfl: 3   methotrexate 2.5 MG Oral Tab Take 8 tablets (20 mg tota Aero Soln Inhale 2 puffs into the lungs every 6 (six) hours as needed for Wheezing.  Disp: 1 Inhaler Rfl: 0   Hydrocod Polst-CPM Polst ER (TUSSIONEX PENNKINETIC ER) 10-8 MG/5ML Oral Suspension Extended Release Take 5 mL by mouth 2 (two) times daily as neede week: Not on file        Minutes per session: Not on file      Stress: Not on file    Relationships      Social connections:        Talks on phone: Not on file        Gets together: Not on file        Attends Scientologist service: Not on file        Active me History:    Family History   Problem Relation Age of Onset   • Heart Disorder Father 54        heart attack-cause of death.    • Heart Disorder Mother    • Stroke Mother    • Cancer Mother         AML-cause of death   • Other (leukemia) Mother    • Cancer P Psychiatric/Behavioral: Negative. Physical Exam:   Physical Exam   Constitutional: She is oriented to person, place, and time. She appears well-developed and well-nourished. No distress. HENT:   Head: Normocephalic and atraumatic.    Neck: Neck s guidance she is receiving from seeing Jerry Elliott and will continue to see her. Wayne Sherwin is also apprehensive about surgery because she would have to stop NSAIDS.  She states that NSAIDS help control her pain the best.     DYSLIPIDEMIA: Stable on the above prescr

## 2019-09-12 ENCOUNTER — TELEPHONE (OUTPATIENT)
Dept: CARDIOLOGY | Age: 59
End: 2019-09-12

## 2019-09-12 ENCOUNTER — CLINICAL ABSTRACT (OUTPATIENT)
Dept: CARDIOLOGY | Age: 59
End: 2019-09-12

## 2019-09-13 RX ORDER — TOPIRAMATE 25 MG/1
25 TABLET ORAL EVERY EVENING
Qty: 30 TABLET | Refills: 0 | Status: SHIPPED | OUTPATIENT
Start: 2019-09-13 | End: 2019-10-11

## 2019-09-14 ENCOUNTER — NURSE ONLY (OUTPATIENT)
Dept: ALLERGY | Facility: CLINIC | Age: 59
End: 2019-09-14
Payer: COMMERCIAL

## 2019-09-14 DIAGNOSIS — J30.89 ENVIRONMENTAL AND SEASONAL ALLERGIES: ICD-10-CM

## 2019-09-14 PROCEDURE — 95117 IMMUNOTHERAPY INJECTIONS: CPT | Performed by: ALLERGY & IMMUNOLOGY

## 2019-09-17 ENCOUNTER — ANESTHESIA (OUTPATIENT)
Dept: INTERVENTIONAL RADIOLOGY/VASCULAR | Facility: HOSPITAL | Age: 59
End: 2019-09-17
Payer: COMMERCIAL

## 2019-09-17 ENCOUNTER — HOSPITAL ENCOUNTER (OUTPATIENT)
Dept: INTERVENTIONAL RADIOLOGY/VASCULAR | Facility: HOSPITAL | Age: 59
Discharge: HOME | End: 2019-09-17
Attending: INTERNAL MEDICINE | Admitting: INTERNAL MEDICINE
Payer: COMMERCIAL

## 2019-09-17 VITALS
TEMPERATURE: 98 F | OXYGEN SATURATION: 93 % | WEIGHT: 293 LBS | RESPIRATION RATE: 34 BRPM | DIASTOLIC BLOOD PRESSURE: 56 MMHG | SYSTOLIC BLOOD PRESSURE: 99 MMHG | BODY MASS INDEX: 49 KG/M2 | HEART RATE: 97 BPM

## 2019-09-17 DIAGNOSIS — R06.00 DOE (DYSPNEA ON EXERTION): ICD-10-CM

## 2019-09-17 DIAGNOSIS — Z01.818 PRE-OP EVALUATION: ICD-10-CM

## 2019-09-17 DIAGNOSIS — R94.31 ABNORMAL EKG: ICD-10-CM

## 2019-09-17 DIAGNOSIS — R94.39 ABNORMAL STRESS TEST: ICD-10-CM

## 2019-09-17 PROCEDURE — 4A023N7 MEASUREMENT OF CARDIAC SAMPLING AND PRESSURE, LEFT HEART, PERCUTANEOUS APPROACH: ICD-10-PCS | Performed by: INTERNAL MEDICINE

## 2019-09-17 PROCEDURE — B2151ZZ FLUOROSCOPY OF LEFT HEART USING LOW OSMOLAR CONTRAST: ICD-10-PCS | Performed by: INTERNAL MEDICINE

## 2019-09-17 PROCEDURE — 36415 COLL VENOUS BLD VENIPUNCTURE: CPT

## 2019-09-17 PROCEDURE — 93458 L HRT ARTERY/VENTRICLE ANGIO: CPT | Performed by: INTERNAL MEDICINE

## 2019-09-17 PROCEDURE — 93458 L HRT ARTERY/VENTRICLE ANGIO: CPT

## 2019-09-17 PROCEDURE — 99152 MOD SED SAME PHYS/QHP 5/>YRS: CPT | Performed by: INTERNAL MEDICINE

## 2019-09-17 PROCEDURE — B2111ZZ FLUOROSCOPY OF MULTIPLE CORONARY ARTERIES USING LOW OSMOLAR CONTRAST: ICD-10-PCS | Performed by: INTERNAL MEDICINE

## 2019-09-17 RX ORDER — MIDAZOLAM HYDROCHLORIDE 1 MG/ML
INJECTION INTRAMUSCULAR; INTRAVENOUS
Status: DISCONTINUED
Start: 2019-09-17 | End: 2019-09-17 | Stop reason: WASHOUT

## 2019-09-17 RX ORDER — SODIUM CHLORIDE 9 MG/ML
INJECTION, SOLUTION INTRAVENOUS
Status: DISCONTINUED
Start: 2019-09-17 | End: 2019-09-17

## 2019-09-17 RX ORDER — SODIUM CHLORIDE, SODIUM LACTATE, POTASSIUM CHLORIDE, CALCIUM CHLORIDE 600; 310; 30; 20 MG/100ML; MG/100ML; MG/100ML; MG/100ML
INJECTION, SOLUTION INTRAVENOUS CONTINUOUS
Status: DISCONTINUED | OUTPATIENT
Start: 2019-09-17 | End: 2019-09-17

## 2019-09-17 RX ORDER — NALOXONE HYDROCHLORIDE 0.4 MG/ML
80 INJECTION, SOLUTION INTRAMUSCULAR; INTRAVENOUS; SUBCUTANEOUS AS NEEDED
Status: DISCONTINUED | OUTPATIENT
Start: 2019-09-17 | End: 2019-09-17

## 2019-09-17 RX ORDER — ONDANSETRON 2 MG/ML
INJECTION INTRAMUSCULAR; INTRAVENOUS AS NEEDED
Status: DISCONTINUED | OUTPATIENT
Start: 2019-09-17 | End: 2019-09-17 | Stop reason: SURG

## 2019-09-17 RX ORDER — HEPARIN SODIUM 1000 [USP'U]/ML
INJECTION, SOLUTION INTRAVENOUS; SUBCUTANEOUS
Status: DISCONTINUED
Start: 2019-09-17 | End: 2019-09-17 | Stop reason: WASHOUT

## 2019-09-17 RX ORDER — PROCHLORPERAZINE EDISYLATE 5 MG/ML
5 INJECTION INTRAMUSCULAR; INTRAVENOUS ONCE AS NEEDED
Status: DISCONTINUED | OUTPATIENT
Start: 2019-09-17 | End: 2019-09-17

## 2019-09-17 RX ORDER — DEXAMETHASONE SODIUM PHOSPHATE 4 MG/ML
VIAL (ML) INJECTION AS NEEDED
Status: DISCONTINUED | OUTPATIENT
Start: 2019-09-17 | End: 2019-09-17 | Stop reason: SURG

## 2019-09-17 RX ORDER — LIDOCAINE HYDROCHLORIDE 20 MG/ML
INJECTION, SOLUTION EPIDURAL; INFILTRATION; INTRACAUDAL; PERINEURAL
Status: COMPLETED
Start: 2019-09-17 | End: 2019-09-17

## 2019-09-17 RX ORDER — SODIUM CHLORIDE 9 MG/ML
INJECTION, SOLUTION INTRAVENOUS
Status: COMPLETED | OUTPATIENT
Start: 2019-09-17 | End: 2019-09-17

## 2019-09-17 RX ORDER — LIDOCAINE HYDROCHLORIDE 20 MG/ML
INJECTION, SOLUTION EPIDURAL; INFILTRATION; INTRACAUDAL; PERINEURAL
Status: DISCONTINUED
Start: 2019-09-17 | End: 2019-09-17 | Stop reason: WASHOUT

## 2019-09-17 RX ORDER — LIDOCAINE HYDROCHLORIDE 40 MG/ML
SOLUTION TOPICAL AS NEEDED
Status: DISCONTINUED | OUTPATIENT
Start: 2019-09-17 | End: 2019-09-17 | Stop reason: SURG

## 2019-09-17 RX ORDER — HALOPERIDOL 5 MG/ML
0.25 INJECTION INTRAMUSCULAR ONCE AS NEEDED
Status: DISCONTINUED | OUTPATIENT
Start: 2019-09-17 | End: 2019-09-17

## 2019-09-17 RX ORDER — ONDANSETRON 2 MG/ML
4 INJECTION INTRAMUSCULAR; INTRAVENOUS ONCE AS NEEDED
Status: DISCONTINUED | OUTPATIENT
Start: 2019-09-17 | End: 2019-09-17

## 2019-09-17 RX ADMIN — ONDANSETRON 4 MG: 2 INJECTION INTRAMUSCULAR; INTRAVENOUS at 10:35:00

## 2019-09-17 RX ADMIN — DEXAMETHASONE SODIUM PHOSPHATE 4 MG: 4 MG/ML VIAL (ML) INJECTION at 10:35:00

## 2019-09-17 RX ADMIN — LIDOCAINE HYDROCHLORIDE 4 ML: 40 SOLUTION TOPICAL at 10:24:00

## 2019-09-17 RX ADMIN — SODIUM CHLORIDE: 9 INJECTION, SOLUTION INTRAVENOUS at 10:24:00

## 2019-09-17 NOTE — ANESTHESIA PROCEDURE NOTES
Airway  Date/Time: 9/17/2019 10:26 AM  Urgency: elective    Airway not difficult    General Information and Staff    Patient location during procedure: OR  Anesthesiologist: Romain Shahid MD  Performed: anesthesiologist     Indications and Patient Conditi

## 2019-09-17 NOTE — PROCEDURES
[facility]  [facility] Cardiac Cath Procedure Note  Aure Gunnarluis Patient Status:  Outpatient in a Bed    1960 MRN [id number]   Location [facility] Attending Milena Huff, [address] [facility] [address] Day # 0 PCP Ngozi Mullen the exam 30 minutes.       Kylee Gutierrez MD  09/17/19

## 2019-09-17 NOTE — IVS NOTE
Pt was able to eat and drink, no nausea or vomiting.  Discharge instructions given, Pt to follow up with Dr. Albin RUSSO or nurse in 1 week

## 2019-09-17 NOTE — ANESTHESIA PREPROCEDURE EVALUATION
Anesthesia PreOp Note    HPI:     Tayla Dye is a 63year old female who presents for preoperative consultation requested by: * No surgeons listed *    Date of Surgery: 9/17/2019    * No procedures listed *  Indication: * No pre-op diagnosis entered * trochanteric bursitis, left 11/21/2017   • High cholesterol    • History of blood transfusion as child no reaction   • Infertility, female    • Parkinson disease ([facility]    • Rheumatoid arthritis ([facility]    • Thyroid disease 2016   • Tibia/fibula fracture MONTELUKAST SODIUM 10 MG Oral Tab TAKE 1 TABLET BY MOUTH EVERY DAY AT NIGHT Disp: 90 tablet Rfl: 1 9/16/2019 at Unknown time   LEVOTHYROXINE SODIUM 150 MCG Oral Tab TAKE 1 TABLET (150 MCG TOTAL) BY MOUTH BEFORE BREAKFAST.  Disp: 90 tablet Rfl: 1 9/17/2019 (four) times daily. Disp: 396 g Rfl: 3    folic acid 1 MG Oral Tab Take 1 tablet (1 mg total) by mouth once daily. Disp: 90 tablet Rfl: 3 8/30/2019   ergocalciferol 49048 units Oral Cap Take 1 capsule (50,000 Units total) by mouth once a week.  Disp: 12 cap Food insecurity:        Worry: Not on file        Inability: Not on file      Transportation needs:        Medical: Not on file        Non-medical: Not on file    Tobacco Use      Smoking status: Never Smoker      Smokeless tobacco: Never Used    Substance 09/11/2019    HGB 12.1 09/11/2019    HCT 38.6 09/11/2019    MCV 95.3 09/11/2019    MCH 29.9 09/11/2019    MCHC 31.3 09/11/2019    RDW 15.9 (H) 09/11/2019    .0 09/11/2019     Lab Results   Component Value Date     09/11/2019    K 4.4 09/11/201

## 2019-09-17 NOTE — ANESTHESIA POSTPROCEDURE EVALUATION
Patient: Lake Ramirez    Procedure Summary     Date:  09/17/19 Room / Location:  [place] [address].    Anesthesia Start:  2955 Anesthesia Stop:  5712    Procedure:  CATH LEFT HEART CATH W/INTERVENTION Diagnosis:       Abnormal stres

## 2019-09-18 ENCOUNTER — TELEPHONE (OUTPATIENT)
Dept: CARDIOLOGY | Age: 59
End: 2019-09-18

## 2019-09-18 ENCOUNTER — TELEPHONE (OUTPATIENT)
Dept: RHEUMATOLOGY | Facility: CLINIC | Age: 59
End: 2019-09-18

## 2019-09-18 NOTE — TELEPHONE ENCOUNTER
Pt is requesting a call back from the nurse to discuss medication on when pt can return back to using her medication she was off due to her surgery    Please advise

## 2019-09-18 NOTE — TELEPHONE ENCOUNTER
Pt contacted and aware she can restart diclofenac per provider's instructions below. Pt verbalized understand and agreeable with plan.

## 2019-09-18 NOTE — TELEPHONE ENCOUNTER
Pt reports having back surgery (over a week ago) and angiogram yesterday. She did discuss with Dr. Leela Marr when to stop and start other medication but not when to start back on diclofenac sodium.  She report they went through her groin for the angiogram, when can

## 2019-09-20 RX ORDER — LEVOTHYROXINE SODIUM 0.15 MG/1
150 TABLET ORAL
COMMUNITY
Start: 2019-05-28

## 2019-09-20 RX ORDER — VIT C/B6/B5/MAGNESIUM/HERB 173 50-5-6-5MG
CAPSULE ORAL
COMMUNITY

## 2019-09-20 RX ORDER — TRIAMCINOLONE ACETONIDE 1 MG/G
OINTMENT TOPICAL
COMMUNITY
Start: 2016-06-14

## 2019-09-20 RX ORDER — ERGOCALCIFEROL 1.25 MG/1
50000 CAPSULE ORAL
COMMUNITY
Start: 2015-08-31

## 2019-09-20 RX ORDER — METHOCARBAMOL 750 MG/1
750 TABLET, FILM COATED ORAL
COMMUNITY
Start: 2019-09-09

## 2019-09-20 RX ORDER — HYDROCODONE BITARTRATE AND ACETAMINOPHEN 10; 325 MG/1; MG/1
TABLET ORAL
Refills: 0 | COMMUNITY
Start: 2019-09-09

## 2019-09-24 ENCOUNTER — OFFICE VISIT (OUTPATIENT)
Dept: CARDIOLOGY | Age: 59
End: 2019-09-24

## 2019-09-24 VITALS
HEART RATE: 94 BPM | OXYGEN SATURATION: 95 % | SYSTOLIC BLOOD PRESSURE: 120 MMHG | BODY MASS INDEX: 43.4 KG/M2 | DIASTOLIC BLOOD PRESSURE: 60 MMHG | WEIGHT: 293 LBS | HEIGHT: 69 IN

## 2019-09-24 DIAGNOSIS — R94.39 ABNORMAL NUCLEAR STRESS TEST: Primary | ICD-10-CM

## 2019-09-24 PROBLEM — M70.62 GREATER TROCHANTERIC BURSITIS, LEFT: Status: RESOLVED | Noted: 2019-07-08 | Resolved: 2019-09-24

## 2019-09-24 PROBLEM — Z01.810 PREOP CARDIOVASCULAR EXAM: Status: RESOLVED | Noted: 2019-07-12 | Resolved: 2019-09-24

## 2019-09-24 PROBLEM — R63.2 INCREASED APPETITE: Status: RESOLVED | Noted: 2019-07-08 | Resolved: 2019-09-24

## 2019-09-24 PROBLEM — J30.1 ALLERGIC RHINITIS DUE TO POLLEN: Status: RESOLVED | Noted: 2019-07-08 | Resolved: 2019-09-24

## 2019-09-24 PROBLEM — G89.29 CHRONIC BACK PAIN: Status: RESOLVED | Noted: 2019-07-08 | Resolved: 2019-09-24

## 2019-09-24 PROBLEM — M54.9 CHRONIC BACK PAIN: Status: RESOLVED | Noted: 2019-07-08 | Resolved: 2019-09-24

## 2019-09-24 PROCEDURE — 99213 OFFICE O/P EST LOW 20 MIN: CPT | Performed by: NURSE PRACTITIONER

## 2019-09-24 RX ORDER — CLINDAMYCIN HYDROCHLORIDE 300 MG/1
300 CAPSULE ORAL 3 TIMES DAILY
COMMUNITY

## 2019-09-25 ENCOUNTER — OFFICE VISIT (OUTPATIENT)
Dept: SURGERY | Facility: CLINIC | Age: 59
End: 2019-09-25
Payer: COMMERCIAL

## 2019-09-25 ENCOUNTER — TELEPHONE (OUTPATIENT)
Dept: SURGERY | Facility: CLINIC | Age: 59
End: 2019-09-25

## 2019-09-25 ENCOUNTER — TELEPHONE (OUTPATIENT)
Dept: PHYSICAL THERAPY | Facility: HOSPITAL | Age: 59
End: 2019-09-25

## 2019-09-25 VITALS
WEIGHT: 293 LBS | DIASTOLIC BLOOD PRESSURE: 78 MMHG | OXYGEN SATURATION: 99 % | BODY MASS INDEX: 42.42 KG/M2 | HEART RATE: 88 BPM | HEIGHT: 69.5 IN | SYSTOLIC BLOOD PRESSURE: 135 MMHG | RESPIRATION RATE: 16 BRPM

## 2019-09-25 DIAGNOSIS — R94.39 ABNORMAL CARDIOVASCULAR STRESS TEST: ICD-10-CM

## 2019-09-25 DIAGNOSIS — E66.01 MORBID OBESITY WITH BMI OF 45.0-49.9, ADULT (HCC): ICD-10-CM

## 2019-09-25 DIAGNOSIS — Z01.810 PRE-OPERATIVE CARDIOVASCULAR EXAMINATION: ICD-10-CM

## 2019-09-25 DIAGNOSIS — J45.909 ASTHMA, UNSPECIFIED ASTHMA SEVERITY, UNSPECIFIED WHETHER COMPLICATED, UNSPECIFIED WHETHER PERSISTENT: ICD-10-CM

## 2019-09-25 DIAGNOSIS — G20 PARKINSON DISEASE (HCC): ICD-10-CM

## 2019-09-25 DIAGNOSIS — E78.5 HYPERLIPIDEMIA, UNSPECIFIED HYPERLIPIDEMIA TYPE: Primary | ICD-10-CM

## 2019-09-25 DIAGNOSIS — R94.31 ABNORMAL EKG: ICD-10-CM

## 2019-09-25 DIAGNOSIS — R06.09 DYSPNEA ON EXERTION: ICD-10-CM

## 2019-09-25 NOTE — PROGRESS NOTES
[address] [address]  Dept: [phone]    9/25/2019    Bariatric Patient Follow-up Evaluation    Chief Complaint:  Morbid obesity     History of Present Relation Age of Onset   • Heart Disorder Father 54        heart attack-cause of death.    • Heart Disorder Mother    • Stroke Mother    • Cancer Mother         AML-cause of death   • Other (leukemia) Mother    • Cancer Paternal Aunt         breast cancer-ca MG Oral Tab, Take 8 tablets (20 mg total) by mouth every 7 days. , Disp: 40 tablet, Rfl: 5  •  ergocalciferol 23234 units Oral Cap, Take 1 capsule (50,000 Units total) by mouth once a week., Disp: 12 capsule, Rfl: 0  •  DICLOFENAC SODIUM 75 MG Oral Tab EC, needed. , Disp: 115 mL, Rfl: 0  •  Turmeric 500 MG Oral Cap, Take by mouth 2 (two) times daily. , Disp: , Rfl:   •  HYDROcodone-acetaminophen  MG Oral Tab, Take 1 tablet by mouth every 6 (six) hours as needed.   , Disp: , Rfl:   •  Albuterol Sulfate (2. motion  Psych: alert and oriented, normal affect  Skin: warm, dry  Back: small incision, no drainage or erythema    Assessment: 63year old female with chronic morbid obesity, BMI 49, hyperlipidemia who would benefit from significant and sustained weight l

## 2019-09-25 NOTE — TELEPHONE ENCOUNTER
Left message with Dr. Estela Gavin' office to please sent cardiac clearance note and yesterday's office visit note to our office ASAP.

## 2019-09-27 ENCOUNTER — OFFICE VISIT (OUTPATIENT)
Dept: SLEEP CENTER | Age: 59
End: 2019-09-27
Attending: INTERNAL MEDICINE
Payer: COMMERCIAL

## 2019-09-27 DIAGNOSIS — Z76.89 SLEEP CONCERN: Primary | ICD-10-CM

## 2019-09-27 PROCEDURE — 95810 POLYSOM 6/> YRS 4/> PARAM: CPT

## 2019-09-27 PROCEDURE — 95811 POLYSOM 6/>YRS CPAP 4/> PARM: CPT

## 2019-09-30 ENCOUNTER — OFFICE VISIT (OUTPATIENT)
Dept: SURGERY | Facility: CLINIC | Age: 59
End: 2019-09-30
Payer: COMMERCIAL

## 2019-09-30 VITALS — HEIGHT: 69.5 IN | WEIGHT: 293 LBS | BODY MASS INDEX: 42.42 KG/M2

## 2019-09-30 DIAGNOSIS — E66.01 CLASS 3 SEVERE OBESITY DUE TO EXCESS CALORIES WITHOUT SERIOUS COMORBIDITY WITH BODY MASS INDEX (BMI) OF 45.0 TO 49.9 IN ADULT (HCC): Primary | ICD-10-CM

## 2019-09-30 PROCEDURE — 97803 MED NUTRITION INDIV SUBSEQ: CPT | Performed by: DIETITIAN, REGISTERED

## 2019-09-30 NOTE — PROCEDURES
[address] [facility]  Accredited by the [place] of [employer] (AASM)    PATIENT'S NAME: Heidi Almanzar   ATTENDING PHYSICIAN: Phong Cabral. Ambar Martinez MD   REFERRING PHYSICIAN: Phong Cabral.  Ambar Martinez MD   PATIENT ACCOUNT #: [de-identified] LOCATION: [facility] arousal index was 8.6 events per hour, and the spontaneous arousal index is 12.5 events per hour, for a combined arousal index of 21.1 events per hour. There were no significant periodic limb movements. The lowest desaturation was to 87%.   The average he

## 2019-09-30 NOTE — PROGRESS NOTES
[address]  [address] [address]  Dept: [phone]  Loc: [phone]    09/30/19      Bariatric Follow-up Nutrition Session    Kathi File is a 63year old female.      Ass 68 (H) 04/27/2019     (H) 04/27/2019    VLDL 31 (H) 04/27/2019    NONHDLC 139 (H) 04/27/2019    CALCNONHDL 135 (H) 05/28/2016        Vitamins/Minerals:  Lab Results   Component Value Date    B12 647 07/18/2019    VITB12 432 05/28/2016     Lab Result BREAKFAST., Disp: 90 tablet, Rfl: 1  •  CALCITRIOL 0.25 MCG Oral Cap, TAKE 1 CAPSULE (0.25 MCG TOTAL) BY MOUTH DAILY. , Disp: 90 capsule, Rfl: 1  •  Rasagiline Mesylate (AZILECT) 1 MG Oral Tab, Take 1 tablet by mouth daily. , Disp: , Rfl:   •  traMADol HCl 5 , Rfl:     Height:  Ht Readings from Last 1 Encounters:  09/30/19 : 5' 9.5\" (1.765 m)    Weight:  Wt Readings from Last 6 Encounters:  09/30/19 : (!) 332 lb 9.6 oz  09/25/19 : (!) 332 lb 9.6 oz  09/16/19 : (!) 330 lb  09/11/19 : (!) 335 lb  09/04/19 : Zev Anand Keep carbs under 100g per day  4.  Cont working w/     Monitor/Evaluate     Anthropometric measurements, Food/fluid intake/choices, Food intolerances, Activity level, Vitamin/mineral supplementation, Reinforce goals, Calorie/protein intake and Other:

## 2019-10-07 ENCOUNTER — PATIENT MESSAGE (OUTPATIENT)
Dept: PULMONOLOGY | Facility: CLINIC | Age: 59
End: 2019-10-07

## 2019-10-07 ENCOUNTER — OFFICE VISIT (OUTPATIENT)
Dept: SURGERY | Facility: CLINIC | Age: 59
End: 2019-10-07
Payer: COMMERCIAL

## 2019-10-07 ENCOUNTER — LAB ENCOUNTER (OUTPATIENT)
Dept: LAB | Facility: HOSPITAL | Age: 59
End: 2019-10-07
Attending: INTERNAL MEDICINE
Payer: COMMERCIAL

## 2019-10-07 ENCOUNTER — NURSE ONLY (OUTPATIENT)
Dept: ALLERGY | Facility: CLINIC | Age: 59
End: 2019-10-07
Payer: COMMERCIAL

## 2019-10-07 VITALS — WEIGHT: 293 LBS | BODY MASS INDEX: 42.42 KG/M2 | HEIGHT: 69.5 IN

## 2019-10-07 DIAGNOSIS — G47.33 OSA (OBSTRUCTIVE SLEEP APNEA): Primary | ICD-10-CM

## 2019-10-07 DIAGNOSIS — E66.01 CLASS 3 SEVERE OBESITY DUE TO EXCESS CALORIES WITH SERIOUS COMORBIDITY AND BODY MASS INDEX (BMI) OF 45.0 TO 49.9 IN ADULT (HCC): Primary | ICD-10-CM

## 2019-10-07 DIAGNOSIS — Z51.81 ENCOUNTER FOR THERAPEUTIC DRUG MONITORING: ICD-10-CM

## 2019-10-07 DIAGNOSIS — Z23 NEEDS FLU SHOT: Primary | ICD-10-CM

## 2019-10-07 DIAGNOSIS — J30.89 ENVIRONMENTAL AND SEASONAL ALLERGIES: ICD-10-CM

## 2019-10-07 DIAGNOSIS — M06.9 RHEUMATOID ARTHRITIS INVOLVING MULTIPLE SITES, UNSPECIFIED RHEUMATOID FACTOR PRESENCE: ICD-10-CM

## 2019-10-07 PROCEDURE — 82565 ASSAY OF CREATININE: CPT

## 2019-10-07 PROCEDURE — 86140 C-REACTIVE PROTEIN: CPT

## 2019-10-07 PROCEDURE — 84460 ALANINE AMINO (ALT) (SGPT): CPT

## 2019-10-07 PROCEDURE — 95117 IMMUNOTHERAPY INJECTIONS: CPT | Performed by: ALLERGY & IMMUNOLOGY

## 2019-10-07 PROCEDURE — 84450 TRANSFERASE (AST) (SGOT): CPT

## 2019-10-07 PROCEDURE — 90471 IMMUNIZATION ADMIN: CPT | Performed by: ALLERGY & IMMUNOLOGY

## 2019-10-07 PROCEDURE — 85025 COMPLETE CBC W/AUTO DIFF WBC: CPT

## 2019-10-07 PROCEDURE — 97803 MED NUTRITION INDIV SUBSEQ: CPT | Performed by: DIETITIAN, REGISTERED

## 2019-10-07 PROCEDURE — 95165 ANTIGEN THERAPY SERVICES: CPT | Performed by: ALLERGY & IMMUNOLOGY

## 2019-10-07 PROCEDURE — 90686 IIV4 VACC NO PRSV 0.5 ML IM: CPT | Performed by: ALLERGY & IMMUNOLOGY

## 2019-10-07 PROCEDURE — 85652 RBC SED RATE AUTOMATED: CPT

## 2019-10-07 PROCEDURE — 36415 COLL VENOUS BLD VENIPUNCTURE: CPT

## 2019-10-07 PROCEDURE — 82040 ASSAY OF SERUM ALBUMIN: CPT

## 2019-10-07 NOTE — PROGRESS NOTES
[address]  [address] [address]  Dept: [phone]  Loc: [phone]    10/07/19      Bariatric Follow-up Nutrition Session    Fanny Rao is a 63year old female.      Ass 68 (H) 04/27/2019     (H) 04/27/2019    VLDL 31 (H) 04/27/2019    NONHDLC 139 (H) 04/27/2019    CALCNONHDL 135 (H) 05/28/2016        Vitamins/Minerals:  Lab Results   Component Value Date    B12 647 07/18/2019    VITB12 432 05/28/2016     Lab Result BREAKFAST., Disp: 90 tablet, Rfl: 1  •  CALCITRIOL 0.25 MCG Oral Cap, TAKE 1 CAPSULE (0.25 MCG TOTAL) BY MOUTH DAILY. , Disp: 90 capsule, Rfl: 1  •  Rasagiline Mesylate (AZILECT) 1 MG Oral Tab, Take 1 tablet by mouth daily. , Disp: , Rfl:   •  traMADol HCl 5 , Rfl:     Height:  Ht Readings from Last 1 Encounters:  10/07/19 : 5' 9.5\" (1.765 m)    Weight:  Wt Readings from Last 6 Encounters:  10/07/19 : (!) 334 lb 11.2 oz (151.8 kg)  09/30/19 : (!) 332 lb 9.6 oz (150.9 kg)  09/25/19 : (!) 332 lb 9.6 oz (150.9 k concepts? Yes, and to monitor goals  Patient understands protein requirements? Yes  Patient understand fluid requirements (amount and method of intake)? Yes  Patient understands post-operative diet? Yes  Patient keeping consistent food records?  Yes  Patien

## 2019-10-10 NOTE — PROGRESS NOTES
Thank you for the update. Sleep apnea is not a contraindication to outpatient EGD or colonoscopy examination. Ms. Jhonny Ritchie did well during the colonoscopy examination of June 2019.     She should plan on proceeding with next week's exam.

## 2019-10-11 ENCOUNTER — PATIENT MESSAGE (OUTPATIENT)
Dept: RHEUMATOLOGY | Facility: CLINIC | Age: 59
End: 2019-10-11

## 2019-10-11 ENCOUNTER — TELEPHONE (OUTPATIENT)
Dept: GASTROENTEROLOGY | Facility: CLINIC | Age: 59
End: 2019-10-11

## 2019-10-11 ENCOUNTER — TELEPHONE (OUTPATIENT)
Dept: PULMONOLOGY | Facility: CLINIC | Age: 59
End: 2019-10-11

## 2019-10-11 RX ORDER — TOPIRAMATE 25 MG/1
25 TABLET ORAL EVERY EVENING
Qty: 30 TABLET | Refills: 0 | Status: SHIPPED | OUTPATIENT
Start: 2019-10-11 | End: 2019-11-13

## 2019-10-11 NOTE — TELEPHONE ENCOUNTER
Dr. Roxana Phelan: Please advise on clearance for gastric sleeve surgery. Recently had CPAP titration study.  See patient email from 10/7/19.      Pulmo RNs: I have ordered a DME external order for autoCPAP as instructed by Dr. Roxana Phelan and faxed with pertinent [employer] Yaakov

## 2019-10-11 NOTE — TELEPHONE ENCOUNTER
From: Melissa Johnson  To: Mora Nicholson MD  Sent: 10/11/2019 11:51 AM CDT  Subject: Test Results Question    I have only received 3 of be the 6 test results I usually get & was wondering why I haven't received the others.   Thank you,  Maged Patino

## 2019-10-11 NOTE — TELEPHONE ENCOUNTER
From: Juani Burnett  To: Patricio Iyer MD  Sent: 10/11/2019 12:24 PM CDT  Subject: Test Results Question    I have a question about CBC W/ DIFFERENTIAL resulted on 10/7/19 at 7:15 PM.    Is there anything I need to do about the abnormal test resul

## 2019-10-11 NOTE — TELEPHONE ENCOUNTER
From: Ryan Wyatt  To: Avtar Trent MD  Sent: 10/11/2019 12:20 PM CDT  Subject: Test Results Question    I have a question about C-REACTIVE PROTEIN resulted on 10/7/19 at 7:48 PM.    Is there anything I need to about the abnormal test result.  Trisha Padgett

## 2019-10-11 NOTE — TELEPHONE ENCOUNTER
I spoke to the pt and confirmed her arrival time as 2:15 on 10/15/19.  She verbalizes understanding    Future Appointments   Date Time Provider Joel Mann   10/15/2019  3:15 PM RANDA, PROCEDURE ECWMOGIPROC None

## 2019-10-11 NOTE — TELEPHONE ENCOUNTER
From: Mauro João  To: Ajay Klein MD  Sent: 10/11/2019 12:23 PM CDT  Subject: Test Results Question    I have a question about SED RATE, MELQUIADES (AUTOMATED) resulted on 10/7/19 at 7:49 PM.    Is there anything I need to do about this abnor

## 2019-10-13 RX ORDER — LEVOTHYROXINE SODIUM 0.15 MG/1
150 TABLET ORAL
Qty: 90 TABLET | Refills: 1 | Status: SHIPPED | OUTPATIENT
Start: 2019-10-13 | End: 2020-06-01

## 2019-10-13 NOTE — TELEPHONE ENCOUNTER
Refill passed per [facility] protocol.   Hypothyroid Medications  Protocol Criteria:  Appointment scheduled in the past 12 months or the next 3 months  TSH resulted in the past 12 months that is normal  Recent Outpatient Visits            6 days ago Ne F/U & MED REVIEW    In 3 months Genaro Brown MD [facility], [address], [place] 4 mo f/u        Lab Results   Component Value Date    TSH 2.790 04/22/2019

## 2019-10-14 NOTE — TELEPHONE ENCOUNTER
Medication last filled 9/4/2019 for 7 month supply by Dr. Jaime Holland. Contacted pharmacy to clarify. Per pharmacist Heide rico to disregard request, script from 9/4 was received.

## 2019-10-15 ENCOUNTER — TELEPHONE (OUTPATIENT)
Dept: GASTROENTEROLOGY | Facility: CLINIC | Age: 59
End: 2019-10-15

## 2019-10-15 ENCOUNTER — HOSPITAL ENCOUNTER (OUTPATIENT)
Facility: HOSPITAL | Age: 59
Setting detail: HOSPITAL OUTPATIENT SURGERY
Discharge: HOME | End: 2019-10-15
Attending: INTERNAL MEDICINE | Admitting: INTERNAL MEDICINE
Payer: COMMERCIAL

## 2019-10-15 ENCOUNTER — ANESTHESIA (OUTPATIENT)
Dept: ENDOSCOPY | Facility: HOSPITAL | Age: 59
End: 2019-10-15
Payer: COMMERCIAL

## 2019-10-15 ENCOUNTER — ANESTHESIA EVENT (OUTPATIENT)
Dept: ENDOSCOPY | Facility: HOSPITAL | Age: 59
End: 2019-10-15
Payer: COMMERCIAL

## 2019-10-15 VITALS
HEART RATE: 79 BPM | WEIGHT: 293 LBS | BODY MASS INDEX: 43.4 KG/M2 | HEIGHT: 69 IN | OXYGEN SATURATION: 97 % | SYSTOLIC BLOOD PRESSURE: 135 MMHG | RESPIRATION RATE: 20 BRPM | DIASTOLIC BLOOD PRESSURE: 75 MMHG | TEMPERATURE: 98 F

## 2019-10-15 DIAGNOSIS — Z01.818 PRE-OP EXAMINATION: ICD-10-CM

## 2019-10-15 DIAGNOSIS — K21.9 GASTROESOPHAGEAL REFLUX DISEASE, ESOPHAGITIS PRESENCE NOT SPECIFIED: ICD-10-CM

## 2019-10-15 PROCEDURE — 0DB48ZX EXCISION OF ESOPHAGOGASTRIC JUNCTION, VIA NATURAL OR ARTIFICIAL OPENING ENDOSCOPIC, DIAGNOSTIC: ICD-10-PCS | Performed by: INTERNAL MEDICINE

## 2019-10-15 PROCEDURE — 0DB68ZX EXCISION OF STOMACH, VIA NATURAL OR ARTIFICIAL OPENING ENDOSCOPIC, DIAGNOSTIC: ICD-10-PCS | Performed by: INTERNAL MEDICINE

## 2019-10-15 PROCEDURE — 43239 EGD BIOPSY SINGLE/MULTIPLE: CPT | Performed by: INTERNAL MEDICINE

## 2019-10-15 RX ORDER — NALOXONE HYDROCHLORIDE 0.4 MG/ML
80 INJECTION, SOLUTION INTRAMUSCULAR; INTRAVENOUS; SUBCUTANEOUS AS NEEDED
Status: DISCONTINUED | OUTPATIENT
Start: 2019-10-15 | End: 2019-10-15

## 2019-10-15 RX ORDER — SODIUM CHLORIDE, SODIUM LACTATE, POTASSIUM CHLORIDE, CALCIUM CHLORIDE 600; 310; 30; 20 MG/100ML; MG/100ML; MG/100ML; MG/100ML
INJECTION, SOLUTION INTRAVENOUS CONTINUOUS
Status: DISCONTINUED | OUTPATIENT
Start: 2019-10-15 | End: 2019-10-15

## 2019-10-15 NOTE — OPERATIVE REPORT
EGD PROCEDURE REPORT    DATE OF PROCEDURE:  10/15/2019    PCP: Cliff White MD     PREOPERATIVE DIAGNOSIS:  GERD symptoms, dysphagia     POSTOPERATIVE DIAGNOSIS:  See impression. SURGEON:  Dago Rg M.D.     [facility] Corporation[facility]asha [facility] anesthesia

## 2019-10-15 NOTE — TELEPHONE ENCOUNTER
I spoke to the pt and she was wondering how long she can drink fluids prior to her Endoscopy. I advised the pt she has to stop drinking fluids 3 hours prior to procedure time.  She verbalizes understanding and will stop drinking clear liquids at 12:15 pm

## 2019-10-15 NOTE — H&P
History & Physical Examination    Patient Name: Fanny Rao  MRN: [id number]  CSN: [id number]  YOB: 1960    Diagnosis: GERD symptoms, dysphagia    Present Illness:     63year old woman with a very extensive allergy history and rheumatoid (0.25 MCG TOTAL) BY MOUTH DAILY. , Disp: 90 capsule, Rfl: 1, Taking  Rasagiline Mesylate (AZILECT) 1 MG Oral Tab, Take 1 tablet by mouth daily. , Disp: , Rfl: , Taking  traMADol HCl 50 MG Oral Tab, Take 1 tablet (50 mg total) by mouth 2 (two) times daily as capsule, Rfl: 3, Taking  Hydrocod Polst-CPM Polst ER (TUSSIONEX PENNKINETIC ER) 10-8 MG/5ML Oral Suspension Extended Release, Take 5 mL by mouth 2 (two) times daily as needed. , Disp: 115 mL, Rfl: 0, Taking  HYDROcodone-acetaminophen  MG Oral Tab, [facility] Onset   • Heart Disorder Father 54        heart attack-cause of death.    • Heart Disorder Mother    • Stroke Mother    • Cancer Mother         AML-cause of death   • Other (leukemia) Mother    • Cancer Paternal Aunt         breast cancer-cause of death at

## 2019-10-15 NOTE — ANESTHESIA POSTPROCEDURE EVALUATION
Patient: Anika Forte    Procedure Summary     Date:  10/15/19 Room / Location:  [address] [place] [address] ENDOSCOPY 05 / [address] [place] [address] ENDOSCOPY    Anesthesia Start:  0268 Anesthesia Stop:  5980    Procedure:  ESOPHAGOGASTRODUODENOSCOPY (EGD) (N/A ) Diagnosis:       Gastroesophageal

## 2019-10-15 NOTE — ANESTHESIA PREPROCEDURE EVALUATION
Anesthesia PreOp Note    HPI:     Poncho Byrd is a 63year old female who presents for preoperative consultation requested by: Danelle Adrian MD    Date of Surgery: 10/15/2019    Procedure(s):  ESOPHAGOGASTRODUODENOSCOPY (EGD)  Indication: G thyroid    • Extrapyramidal syndrome 2013    MEDICATION MGMT.  W/ PARKINSONS FEATURES   • Greater trochanteric bursitis, left 11/21/2017   • High cholesterol    • History of blood transfusion as child no reaction   • Infertility, female    • Parkinson disea Rfl: 5, 10/15/2019  PRAVASTATIN SODIUM 40 MG Oral Tab, TAKE 1 TABLET (40 MG TOTAL) BY MOUTH ONCE DAILY. , Disp: 90 tablet, Rfl: 1, 10/14/2019  Multiple Vitamins-Minerals (MULTIVITAMIN ADULT OR), Take by mouth., Disp: , Rfl: , 10/10/2019  MONTELUKAST SODIUM 10/10/2019  LEVOTHYROXINE SODIUM 150 MCG Oral Tab, TAKE 1 TABLET (150 MCG TOTAL) BY MOUTH BEFORE BREAKFAST., Disp: 90 tablet, Rfl: 1, 10/15/2019  TOPIRAMATE 25 MG Oral Tab, TAKE 1 TABLET (25 MG TOTAL) BY MOUTH EVERY EVENING., Disp: 30 tablet, Rfl: 0, 10/14 on file    Social Needs      Financial resource strain: Not on file      Food insecurity:        Worry: Not on file        Inability: Not on file      Transportation needs:        Medical: Not on file        Non-medical: Not on file    Tobacco Use      Smo WBC 7.4 10/07/2019    RBC 4.32 10/07/2019    HGB 12.9 10/07/2019    HCT 40.7 10/07/2019    MCV 94.2 10/07/2019    MCH 29.9 10/07/2019    MCHC 31.7 10/07/2019    RDW 15.9 (H) 10/07/2019    .0 10/07/2019     Lab Results   Component Value Date    NA

## 2019-10-21 ENCOUNTER — APPOINTMENT (OUTPATIENT)
Dept: LAB | Facility: HOSPITAL | Age: 59
End: 2019-10-21
Attending: INTERNAL MEDICINE
Payer: COMMERCIAL

## 2019-10-21 ENCOUNTER — OFFICE VISIT (OUTPATIENT)
Dept: INTERNAL MEDICINE CLINIC | Facility: CLINIC | Age: 59
End: 2019-10-21
Payer: COMMERCIAL

## 2019-10-21 VITALS
TEMPERATURE: 99 F | DIASTOLIC BLOOD PRESSURE: 76 MMHG | BODY MASS INDEX: 43.4 KG/M2 | SYSTOLIC BLOOD PRESSURE: 124 MMHG | RESPIRATION RATE: 20 BRPM | HEIGHT: 69 IN | HEART RATE: 86 BPM | WEIGHT: 293 LBS

## 2019-10-21 DIAGNOSIS — M48.062 LUMBAR STENOSIS WITH NEUROGENIC CLAUDICATION: ICD-10-CM

## 2019-10-21 DIAGNOSIS — G20 PARKINSON DISEASE (HCC): ICD-10-CM

## 2019-10-21 DIAGNOSIS — E55.9 VITAMIN D DEFICIENCY: Primary | ICD-10-CM

## 2019-10-21 DIAGNOSIS — G47.33 OSA (OBSTRUCTIVE SLEEP APNEA): ICD-10-CM

## 2019-10-21 DIAGNOSIS — M06.9 RHEUMATOID ARTHRITIS, INVOLVING UNSPECIFIED SITE, UNSPECIFIED RHEUMATOID FACTOR PRESENCE: ICD-10-CM

## 2019-10-21 DIAGNOSIS — E66.01 MORBID OBESITY WITH BMI OF 45.0-49.9, ADULT (HCC): Primary | ICD-10-CM

## 2019-10-21 DIAGNOSIS — E55.9 VITAMIN D DEFICIENCY: ICD-10-CM

## 2019-10-21 PROCEDURE — 99214 OFFICE O/P EST MOD 30 MIN: CPT | Performed by: INTERNAL MEDICINE

## 2019-10-21 PROCEDURE — 82306 VITAMIN D 25 HYDROXY: CPT

## 2019-10-21 PROCEDURE — 36415 COLL VENOUS BLD VENIPUNCTURE: CPT

## 2019-10-21 RX ORDER — ERGOCALCIFEROL 1.25 MG/1
CAPSULE ORAL
Qty: 12 CAPSULE | Refills: 0 | OUTPATIENT
Start: 2019-10-21

## 2019-10-21 RX ORDER — CLINDAMYCIN HYDROCHLORIDE 300 MG/1
CAPSULE ORAL
Refills: 0 | COMMUNITY
Start: 2019-09-23 | End: 2019-10-21 | Stop reason: ALTCHOICE

## 2019-10-21 NOTE — TELEPHONE ENCOUNTER
Requested Prescriptions     Pending Prescriptions Disp Refills   • ERGOCALCIFEROL 14299 units Oral Cap [Pharmacy Med Name: VITAMIN D2 1.25MG(50,000 UNIT)] 12 capsule 0     Sig: TAKE ONE CAPSULE BY MOUTH ONE TIME PER WEEK     Last filled: 7/31/2019 #12 cap

## 2019-10-21 NOTE — TELEPHONE ENCOUNTER
Called and spoke with patient, informed per Dr. Suma Sebastian she needs to repeat Vit D blood test for recheck levels in order to refill medication. Blood test is order. Patient verbalized understanding.

## 2019-10-23 ENCOUNTER — OFFICE VISIT (OUTPATIENT)
Dept: SURGERY | Facility: CLINIC | Age: 59
End: 2019-10-23
Payer: COMMERCIAL

## 2019-10-23 VITALS
OXYGEN SATURATION: 100 % | HEART RATE: 87 BPM | RESPIRATION RATE: 16 BRPM | BODY MASS INDEX: 42.42 KG/M2 | HEIGHT: 69.5 IN | SYSTOLIC BLOOD PRESSURE: 138 MMHG | DIASTOLIC BLOOD PRESSURE: 81 MMHG | WEIGHT: 293 LBS

## 2019-10-23 DIAGNOSIS — E66.01 MORBID OBESITY WITH BMI OF 45.0-49.9, ADULT (HCC): ICD-10-CM

## 2019-10-23 DIAGNOSIS — Z01.818 PRE-OP EXAMINATION: ICD-10-CM

## 2019-10-23 DIAGNOSIS — E78.5 HYPERLIPIDEMIA, UNSPECIFIED HYPERLIPIDEMIA TYPE: Primary | ICD-10-CM

## 2019-10-23 DIAGNOSIS — R63.2 INCREASED APPETITE: ICD-10-CM

## 2019-10-23 DIAGNOSIS — G20 PARKINSON DISEASE (HCC): ICD-10-CM

## 2019-10-23 DIAGNOSIS — Z51.81 ENCOUNTER FOR THERAPEUTIC DRUG MONITORING: ICD-10-CM

## 2019-10-23 DIAGNOSIS — M06.9 RHEUMATOID ARTHRITIS, INVOLVING UNSPECIFIED SITE, UNSPECIFIED RHEUMATOID FACTOR PRESENCE: ICD-10-CM

## 2019-10-23 NOTE — PROGRESS NOTES
[address] [address]  Dept: [phone]    10/23/2019   Bariatric Patient Follow-up Evaluation    Chief Complaint:  Morbid obesity     History of Present for infertility   • Coy 1878    for infertility   • OTHER SURGICAL HISTORY Right 12/3/2015   • OTHER SURGICAL HISTORY  12/3/2015   • THYROIDECTOMY      Total        Family History:    Family History   Problem Relation Age of Onset , Disp: , Rfl:   •  Nortriptyline HCl 25 MG Oral Cap, Take 1 capsule (25 mg total) by mouth nightly., Disp: 90 capsule, Rfl: 2  •  gabapentin 800 MG Oral Tab, Take 1 tablet (800 mg total) by mouth 3 (three) times daily. , Disp: 270 tablet, Rfl: 2  •  Diclof 60 capsule, Rfl: 3  •  Albuterol Sulfate HFA (PROAIR HFA) 108 (90 Base) MCG/ACT Inhalation Aero Soln, Inhale 2 puffs into the lungs every 6 (six) hours as needed for Wheezing., Disp: 1 Inhaler, Rfl: 0  •  Hydrocod Polst-CPM Polst ER (Irene Avendano ausculation bilaterally, no wheezing  Heart: regular rate and rhythm, no murmur detected  Abdomen: soft, obese, non-tender, non-distended, no hernia/mass/organomegaly, small laparoscopic scar at umbilicus  Extremities: normal strength, normal ROM, walks wi can be gained quickly). Patient has opted for sleeve gastrectomy.   Risks of the surgery including but not limited to bleeding, infection, VTE, leak, ulceration, hernias, failure to achieve desired weight loss, acid reflux, and damage to adjacent structu

## 2019-10-24 ENCOUNTER — OFFICE VISIT (OUTPATIENT)
Dept: SURGERY | Facility: CLINIC | Age: 59
End: 2019-10-24
Payer: COMMERCIAL

## 2019-10-24 VITALS — HEIGHT: 69.5 IN | WEIGHT: 293 LBS | BODY MASS INDEX: 42.42 KG/M2

## 2019-10-24 DIAGNOSIS — E66.01 CLASS 3 SEVERE OBESITY DUE TO EXCESS CALORIES WITH SERIOUS COMORBIDITY AND BODY MASS INDEX (BMI) OF 45.0 TO 49.9 IN ADULT (HCC): Primary | ICD-10-CM

## 2019-10-24 RX ORDER — ERGOCALCIFEROL 1.25 MG/1
50000 CAPSULE ORAL WEEKLY
Qty: 12 CAPSULE | Refills: 0 | Status: SHIPPED | OUTPATIENT
Start: 2019-10-24 | End: 2019-11-06 | Stop reason: ALTCHOICE

## 2019-10-24 NOTE — PROGRESS NOTES
[address]  [address] [address]  Dept: [phone]  Loc: [phone]    10/24/19      Bariatric Follow-up Nutrition Session    Linda Rock is a 63year old female.      Ass 68 (H) 04/27/2019     (H) 04/27/2019    VLDL 31 (H) 04/27/2019    NONHDLC 139 (H) 04/27/2019    CALCNONHDL 135 (H) 05/28/2016        Vitamins/Minerals:  Lab Results   Component Value Date    B12 647 07/18/2019    VITB12 432 05/28/2016     Lab Result Multiple Vitamins-Minerals (MULTIVITAMIN ADULT OR), Take by mouth., Disp: , Rfl:   •  MONTELUKAST SODIUM 10 MG Oral Tab, TAKE 1 TABLET BY MOUTH EVERY DAY AT NIGHT, Disp: 90 tablet, Rfl: 1  •  CALCITRIOL 0.25 MCG Oral Cap, TAKE 1 CAPSULE (0.25 MCG TOTAL) BY BI-FLEX ADV DOUBLE ST OR), Take by mouth daily. , Disp: , Rfl:   •  omega-3 fatty acids (FISH OIL) 1000 MG Oral Cap, Take 1,000 mg by mouth daily. , Disp: , Rfl:     Height:  Ht Readings from Last 1 Encounters:  10/24/19 : 5' 9.5\" (1.765 m)    Weight:   Wt instead of having a large amount at one meal. Pt verbalized understanding, will f/u in two weeks for the liquid protein diet session.     Nutrition Diagnosis     Nutrition Diagnosis: Morbid obesity: Improvement shown     Intervention     Recommendations/goa

## 2019-10-31 ENCOUNTER — OFFICE VISIT (OUTPATIENT)
Dept: SURGERY | Facility: CLINIC | Age: 59
End: 2019-10-31
Payer: COMMERCIAL

## 2019-10-31 VITALS — BODY MASS INDEX: 42.42 KG/M2 | WEIGHT: 293 LBS | HEIGHT: 69.5 IN

## 2019-10-31 DIAGNOSIS — E66.01 CLASS 3 SEVERE OBESITY DUE TO EXCESS CALORIES WITH SERIOUS COMORBIDITY AND BODY MASS INDEX (BMI) OF 45.0 TO 49.9 IN ADULT (HCC): Primary | ICD-10-CM

## 2019-10-31 PROCEDURE — 97803 MED NUTRITION INDIV SUBSEQ: CPT | Performed by: DIETITIAN, REGISTERED

## 2019-10-31 NOTE — PROGRESS NOTES
HPI:    Patient ID: Farnaz Ellis is a 63year old female. HPI  Patient is here for follow-up on chronic medical issues and for evaluation for possible gastric surgery. She was last seen here on April 11.   Since that time she was seen by the pulmonar transfusion as child no reaction   • Infertility, female    • Parkinson disease ([facility]    • Rheumatoid arthritis ([facility]    • Sleep apnea    • Thyroid disease 2016   • Tibia/fibula fracture     OPEN REDUCTION INTERNAL FIXATION   • Urinary incontinence 2/12/95 palpitations. Gastrointestinal: Positive for constipation. Negative for nausea, vomiting, abdominal pain and diarrhea. Genitourinary: Negative for dysuria, hematuria, vaginal discharge, menstrual problem and sexual dysfunction.    Musculoskeletal: Posit mouth daily. , Disp: , Rfl:   traMADol HCl 50 MG Oral Tab, Take 1 tablet (50 mg total) by mouth 2 (two) times daily as needed for Pain., Disp: 60 tablet, Rfl: 0  ADVAIR DISKUS 100-50 MCG/DOSE Inhalation Aerosol Powder, Breath Activated, TAKE 1 PUFF BY MOUTH HIVES  Diptheria-Tetanus T*    HIVES  Seasonal                Runny nose     PHYSICAL EXAM:   /76 (BP Location: Right arm, Patient Position: Sitting, Cuff Size: large)   Pulse 86   Temp 98.8 °F (37.1 °C) (Oral)   Resp 20   Ht 5' 9\" (1.753 m) At this point in time, I see no medical contraindication to proceeding with the surgery. 2. L3-4, L4-5 central stenosis  Patient did well following the L3-4 decompression on September 9. She did have some recurrent symptoms more recently.   Continue wi

## 2019-10-31 NOTE — PROGRESS NOTES
[address] [address]  Dept: [phone]  Loc: [phone]    10/31/19    Bariatric Liquid Protein Nutrition Session    Shona Lobato is a 63year old female    As Date    HGB 12.9 10/07/2019    HGB 12.1 09/11/2019    HGB 12.8 08/15/2019      Lab Results   Component Value Date    .0 10/07/2019    .0 09/11/2019    .0 08/15/2019        Diabetes:    HGBA1C:    Lab Results   Component Value Date    A mouth every 7 days. , Disp: 40 tablet, Rfl: 5  •  DICLOFENAC SODIUM 75 MG Oral Tab EC, TAKE 1 TABLET (75 MG TOTAL) BY MOUTH 2 (TWO) TIMES DAILY. , Disp: 60 tablet, Rfl: 5  •  PRAVASTATIN SODIUM 40 MG Oral Tab, TAKE 1 TABLET (40 MG TOTAL) BY MOUTH ONCE DAILY. Take 3 mL (2.5 mg total) by nebulization every 4 (four) hours as needed for Wheezing., Disp: 3 Box, Rfl: 0  •  carbidopa-levodopa (SINEMET)  MG Oral Tab, Take by mouth 3 (three) times daily.  Take 5 tablets by oral route every day , Disp: , Rfl: 0  • scheduled for 12/2/2019  Reviewed ERAS requirements. Provided CHG bath and carb loading handouts.   Recommendations/goals: Begin LPD 11/18/2019, taper off of food over the weekend  Keep it going goals: Continue keeping food records, prioritizing protein, l

## 2019-11-04 ENCOUNTER — NURSE ONLY (OUTPATIENT)
Dept: ALLERGY | Facility: CLINIC | Age: 59
End: 2019-11-04
Payer: COMMERCIAL

## 2019-11-04 DIAGNOSIS — J30.89 ENVIRONMENTAL AND SEASONAL ALLERGIES: ICD-10-CM

## 2019-11-04 PROCEDURE — 95117 IMMUNOTHERAPY INJECTIONS: CPT | Performed by: ALLERGY & IMMUNOLOGY

## 2019-11-06 ENCOUNTER — OFFICE VISIT (OUTPATIENT)
Dept: SURGERY | Facility: CLINIC | Age: 59
End: 2019-11-06
Payer: COMMERCIAL

## 2019-11-06 VITALS
WEIGHT: 293 LBS | DIASTOLIC BLOOD PRESSURE: 80 MMHG | SYSTOLIC BLOOD PRESSURE: 140 MMHG | BODY MASS INDEX: 43.4 KG/M2 | HEIGHT: 69 IN

## 2019-11-06 DIAGNOSIS — E66.01 MORBID OBESITY WITH BMI OF 45.0-49.9, ADULT (HCC): ICD-10-CM

## 2019-11-06 DIAGNOSIS — M54.41 CHRONIC BILATERAL LOW BACK PAIN WITH BILATERAL SCIATICA: ICD-10-CM

## 2019-11-06 DIAGNOSIS — G89.29 CHRONIC BILATERAL LOW BACK PAIN WITH BILATERAL SCIATICA: ICD-10-CM

## 2019-11-06 DIAGNOSIS — R53.82 CHRONIC FATIGUE: ICD-10-CM

## 2019-11-06 DIAGNOSIS — M54.42 CHRONIC BILATERAL LOW BACK PAIN WITH BILATERAL SCIATICA: ICD-10-CM

## 2019-11-06 DIAGNOSIS — E78.5 HYPERLIPIDEMIA, UNSPECIFIED HYPERLIPIDEMIA TYPE: Primary | ICD-10-CM

## 2019-11-06 DIAGNOSIS — Z51.81 ENCOUNTER FOR THERAPEUTIC DRUG MONITORING: ICD-10-CM

## 2019-11-06 PROCEDURE — 99214 OFFICE O/P EST MOD 30 MIN: CPT | Performed by: INTERNAL MEDICINE

## 2019-11-06 NOTE — PROGRESS NOTES
[address] [place] [address] Cynthia Ramachandran. Alberto Olsenjcieleandro [address], [address]  Dept: [phone]       Patient:  Jerrell Gibson  :      1960  MRN:      [id number]    Chief Complaint:  Patient pre (150 MCG TOTAL) BY MOUTH BEFORE BREAKFAST. 90 tablet 1   • TOPIRAMATE 25 MG Oral Tab TAKE 1 TABLET (25 MG TOTAL) BY MOUTH EVERY EVENING. 30 tablet 0   • methocarbamol 750 MG Oral Tab Take 750 mg by mouth 3 (three) times daily.        • Nortriptyline HCl 25 into the lungs every 6 (six) hours as needed for Wheezing. 1 Inhaler 0   • Hydrocod Polst-CPM Polst ER (TUSSIONEX PENNKINETIC ER) 10-8 MG/5ML Oral Suspension Extended Release Take 5 mL by mouth 2 (two) times daily as needed.  115 mL 0   • Turmeric 500 MG Or Talks on phone: Not on file        Gets together: Not on file        Attends Zoroastrian service: Not on file        Active member of club or organization: Not on file        Attends meetings of clubs or organizations: Not on file        Relationship status: History   Problem Relation Age of Onset   • Heart Disorder Father 54        heart attack-cause of death.    • Heart Disorder Mother    • Stroke Mother    • Cancer Mother         AML-cause of death   • Other (leukemia) Mother    • Cancer Paternal Aunt Physical Exam:   Physical Exam   Constitutional: She is oriented to person, place, and time. She appears well-developed and well-nourished. No distress. HENT:   Head: Normocephalic and atraumatic. Neck: Neck supple.    Pulmonary/Chest: Effort norm motivation and guidance she is receiving from seeing Catarina Long and will continue to see her. Mignon Mortimer is also apprehensive about surgery because she would have to stop NSAIDS.  She states that NSAIDS help control her pain the best.     DYSLIPIDEMIA: Stable on t Nortriptyline HCl 25 MG Oral Cap Take 1 capsule (25 mg total) by mouth nightly. 11/6/2019: Open capsule and mix with apple sauce    • gabapentin 800 MG Oral Tab Take 1 tablet (800 mg total) by mouth 3 (three) times daily.  11/6/2019: Continue before and aft DAILY. MAY BE TAKEN WITH OR WITHOUT FOOD. SWALLOW TABLET WHOLE. DO NOT CRUSH, SPLIT OR CHEW.  STORE AT 11/6/2019: Continue before and after bariatric surgery   • [DISCONTINUED] TIZANIDINE HCL 4 MG Oral Cap TAKE 1 TO 2 CAPSULES BY MOUTH AT BEDTIME    • Ulises

## 2019-11-06 NOTE — PATIENT INSTRUCTIONS
Outpatient Encounter Medications as of 11/6/2019   Medication Sig Note   • [DISCONTINUED] ergocalciferol 50008 units Oral Cap Take 1 capsule (50,000 Units total) by mouth once a week.     • LEVOTHYROXINE SODIUM 150 MCG Oral Tab TAKE 1 TABLET (150 MCG TOTAL) MG Oral Tab Take 1 tablet by mouth daily. 11/6/2019: Continue before and after bariatric surgery   • traMADol HCl 50 MG Oral Tab Take 1 tablet (50 mg total) by mouth 2 (two) times daily as needed for Pain.  11/6/2019: Discontinue after bariatric surgery   • (OSTEO BI-FLEX ADV DOUBLE ST OR) Take by mouth daily. • [DISCONTINUED] omega-3 fatty acids (FISH OIL) 1000 MG Oral Cap Take 1,000 mg by mouth daily.  8/5/2014: Sig received from external source: take 1 twice daily     No facility-administered encounter

## 2019-11-09 RX ORDER — FLUTICASONE PROPIONATE AND SALMETEROL 50; 100 UG/1; UG/1
POWDER RESPIRATORY (INHALATION)
Qty: 3 EACH | Refills: 1 | Status: SHIPPED | OUTPATIENT
Start: 2019-11-09 | End: 2020-10-06

## 2019-11-09 NOTE — TELEPHONE ENCOUNTER
Pt spoken with at time of original call. Pt states that she is completely out of Advair and needs a refill. Pt denies any acute asthma flare at this time. Pt last seen in Allergy 8/24/2019 for .  . .    Asthma, extrinsic, moderate persistent, uncomplic

## 2019-11-09 NOTE — TELEPHONE ENCOUNTER
Pt contacted, informed that Advair refill was sent to her [facility] pharmacy in [address] scheduled her 6 month f/u appt for 2/3/2020.

## 2019-11-12 ENCOUNTER — TELEPHONE (OUTPATIENT)
Dept: SURGERY | Facility: CLINIC | Age: 59
End: 2019-11-12

## 2019-11-12 ENCOUNTER — TELEPHONE (OUTPATIENT)
Dept: CARDIOLOGY | Age: 59
End: 2019-11-12

## 2019-11-12 NOTE — TELEPHONE ENCOUNTER
Spoke with medical records at [place] [address] to request final cardiac clearance note for surgery. 3rd attempt.    Also faxed request note to [phone]

## 2019-11-12 NOTE — TELEPHONE ENCOUNTER
Hi Dr Oziel Lew,    Pt needs cardiac clearance for bariatric surgery and she has been pended for 12/2/19. Rico Freire had seen pt on 9/24/19.   Pls send note as soon as you are able as we will need this for insurance submittal.    Thank you,    Hai Montano

## 2019-11-13 RX ORDER — TOPIRAMATE 25 MG/1
25 TABLET ORAL EVERY EVENING
Qty: 30 TABLET | Refills: 0 | Status: SHIPPED | OUTPATIENT
Start: 2019-11-13 | End: 2019-12-11

## 2019-11-18 RX ORDER — METHOTREXATE 2.5 MG/1
20 TABLET ORAL
Qty: 104 TABLET | Refills: 0 | Status: SHIPPED | OUTPATIENT
Start: 2019-11-18 | End: 2019-11-23

## 2019-11-18 NOTE — TELEPHONE ENCOUNTER
LOV: 9/4/19  Last Refilled:#40, 5rfs 9/4/19  Labs:AST 20  ALT 8  10/7/19    Future Appointments   Date Time Provider Joel Mackenzie   11/21/2019  3:30 PM Ria Main [place]   1/2/2020  2:30 PM Gabriela Saucedo, RD [facility]   1/15/2020  4:30 PM Nikki Galindo MD [address] [place] [address] 150   1/20/2020  4:40 PM Quynh Choi MD [facility] [place] 150   2/3/2020  4:30 PM Jeri Asif [place] [facility]   2/10/2020  4:00 PM Arianna Ulloa MD [facility]       Please advise.

## 2019-11-22 RX ORDER — CALCITRIOL 0.25 UG/1
0.25 CAPSULE, LIQUID FILLED ORAL DAILY
Qty: 90 CAPSULE | Refills: 1 | Status: SHIPPED | OUTPATIENT
Start: 2019-11-22 | End: 2020-06-08

## 2019-11-22 NOTE — TELEPHONE ENCOUNTER
Review pended refill request as it does not fall under a protocol.     Last Rx: 5/28/19 #90 x 1  Requested Prescriptions     Pending Prescriptions Disp Refills   • CALCITRIOL 0.25 MCG Oral Cap [Pharmacy Med Name: CALCITRIOL 0.25 MCG CAPSULE] 90 capsule 1

## 2019-11-25 ENCOUNTER — NURSE ONLY (OUTPATIENT)
Dept: ALLERGY | Facility: CLINIC | Age: 59
End: 2019-11-25
Payer: COMMERCIAL

## 2019-11-25 ENCOUNTER — LAB ENCOUNTER (OUTPATIENT)
Dept: LAB | Facility: HOSPITAL | Age: 59
End: 2019-11-25
Attending: SINGLE SPECIALTY
Payer: COMMERCIAL

## 2019-11-25 DIAGNOSIS — J30.89 ENVIRONMENTAL AND SEASONAL ALLERGIES: ICD-10-CM

## 2019-11-25 DIAGNOSIS — Z01.818 PRE-OP TESTING: ICD-10-CM

## 2019-11-25 PROCEDURE — 86901 BLOOD TYPING SEROLOGIC RH(D): CPT

## 2019-11-25 PROCEDURE — 36415 COLL VENOUS BLD VENIPUNCTURE: CPT

## 2019-11-25 PROCEDURE — 95117 IMMUNOTHERAPY INJECTIONS: CPT | Performed by: ALLERGY & IMMUNOLOGY

## 2019-11-25 PROCEDURE — 86850 RBC ANTIBODY SCREEN: CPT

## 2019-11-25 PROCEDURE — 86900 BLOOD TYPING SEROLOGIC ABO: CPT

## 2019-11-27 RX ORDER — MONTELUKAST SODIUM 10 MG/1
TABLET ORAL
Qty: 90 TABLET | Refills: 0 | Status: SHIPPED | OUTPATIENT
Start: 2019-11-27 | End: 2020-01-13

## 2019-11-27 NOTE — TELEPHONE ENCOUNTER
Received refill request for Singulair 10 MG- 1 tablet by mouth daily. LOV 12-3-18 and per Dr. Karis Myers progress notes to follow up in  6 months. No appointment scheduled as of today 11/27/19.     Refill pended and forwarded to Dr. Tosin Agosto for further direct

## 2019-12-02 ENCOUNTER — ANESTHESIA EVENT (OUTPATIENT)
Dept: SURGERY | Facility: HOSPITAL | Age: 59
DRG: 621 | End: 2019-12-02
Payer: COMMERCIAL

## 2019-12-02 ENCOUNTER — HOSPITAL ENCOUNTER (INPATIENT)
Facility: HOSPITAL | Age: 59
LOS: 1 days | Discharge: HOME | DRG: 621 | End: 2019-12-03
Attending: SINGLE SPECIALTY | Admitting: SINGLE SPECIALTY
Payer: COMMERCIAL

## 2019-12-02 ENCOUNTER — ANESTHESIA (OUTPATIENT)
Dept: SURGERY | Facility: HOSPITAL | Age: 59
DRG: 621 | End: 2019-12-02
Payer: COMMERCIAL

## 2019-12-02 DIAGNOSIS — Z01.818 PRE-OP TESTING: Primary | ICD-10-CM

## 2019-12-02 DIAGNOSIS — E78.5 HYPERLIPIDEMIA, UNSPECIFIED HYPERLIPIDEMIA TYPE: ICD-10-CM

## 2019-12-02 DIAGNOSIS — E66.01 MORBID OBESITY WITH BMI OF 45.0-49.9, ADULT (HCC): ICD-10-CM

## 2019-12-02 PROBLEM — G47.33 OSA (OBSTRUCTIVE SLEEP APNEA): Chronic | Status: ACTIVE | Noted: 2019-12-02

## 2019-12-02 PROCEDURE — 99232 SBSQ HOSP IP/OBS MODERATE 35: CPT | Performed by: HOSPITALIST

## 2019-12-02 PROCEDURE — 0DB64Z3 EXCISION OF STOMACH, PERCUTANEOUS ENDOSCOPIC APPROACH, VERTICAL: ICD-10-PCS | Performed by: SINGLE SPECIALTY

## 2019-12-02 RX ORDER — PROCHLORPERAZINE EDISYLATE 5 MG/ML
5 INJECTION INTRAMUSCULAR; INTRAVENOUS ONCE AS NEEDED
Status: DISCONTINUED | OUTPATIENT
Start: 2019-12-02 | End: 2019-12-02 | Stop reason: HOSPADM

## 2019-12-02 RX ORDER — HYDROCODONE BITARTRATE AND ACETAMINOPHEN 5; 325 MG/1; MG/1
1 TABLET ORAL AS NEEDED
Status: DISCONTINUED | OUTPATIENT
Start: 2019-12-02 | End: 2019-12-02 | Stop reason: HOSPADM

## 2019-12-02 RX ORDER — ROCURONIUM BROMIDE 10 MG/ML
INJECTION, SOLUTION INTRAVENOUS AS NEEDED
Status: DISCONTINUED | OUTPATIENT
Start: 2019-12-02 | End: 2019-12-02 | Stop reason: SURG

## 2019-12-02 RX ORDER — CLINDAMYCIN PHOSPHATE 900 MG/50ML
900 INJECTION INTRAVENOUS ONCE
Status: DISCONTINUED | OUTPATIENT
Start: 2019-12-02 | End: 2019-12-02 | Stop reason: HOSPADM

## 2019-12-02 RX ORDER — CLINDAMYCIN PHOSPHATE 150 MG/ML
INJECTION, SOLUTION INTRAVENOUS AS NEEDED
Status: DISCONTINUED | OUTPATIENT
Start: 2019-12-02 | End: 2019-12-02 | Stop reason: SURG

## 2019-12-02 RX ORDER — LIDOCAINE HYDROCHLORIDE 10 MG/ML
INJECTION, SOLUTION EPIDURAL; INFILTRATION; INTRACAUDAL; PERINEURAL AS NEEDED
Status: DISCONTINUED | OUTPATIENT
Start: 2019-12-02 | End: 2019-12-02 | Stop reason: SURG

## 2019-12-02 RX ORDER — HYDROMORPHONE HYDROCHLORIDE 1 MG/ML
0.2 INJECTION, SOLUTION INTRAMUSCULAR; INTRAVENOUS; SUBCUTANEOUS EVERY 5 MIN PRN
Status: DISCONTINUED | OUTPATIENT
Start: 2019-12-02 | End: 2019-12-02 | Stop reason: HOSPADM

## 2019-12-02 RX ORDER — MORPHINE SULFATE 2 MG/ML
1 INJECTION, SOLUTION INTRAMUSCULAR; INTRAVENOUS EVERY 2 HOUR PRN
Status: DISCONTINUED | OUTPATIENT
Start: 2019-12-02 | End: 2019-12-03

## 2019-12-02 RX ORDER — SODIUM CHLORIDE 0.9 % (FLUSH) 0.9 %
10 SYRINGE (ML) INJECTION AS NEEDED
Status: DISCONTINUED | OUTPATIENT
Start: 2019-12-02 | End: 2019-12-03

## 2019-12-02 RX ORDER — MORPHINE SULFATE 10 MG/ML
6 INJECTION, SOLUTION INTRAMUSCULAR; INTRAVENOUS EVERY 10 MIN PRN
Status: DISCONTINUED | OUTPATIENT
Start: 2019-12-02 | End: 2019-12-02 | Stop reason: HOSPADM

## 2019-12-02 RX ORDER — EPHEDRINE SULFATE 50 MG/ML
INJECTION, SOLUTION INTRAVENOUS AS NEEDED
Status: DISCONTINUED | OUTPATIENT
Start: 2019-12-02 | End: 2019-12-02 | Stop reason: SURG

## 2019-12-02 RX ORDER — HYDROMORPHONE HYDROCHLORIDE 1 MG/ML
0.4 INJECTION, SOLUTION INTRAMUSCULAR; INTRAVENOUS; SUBCUTANEOUS EVERY 5 MIN PRN
Status: DISCONTINUED | OUTPATIENT
Start: 2019-12-02 | End: 2019-12-02 | Stop reason: HOSPADM

## 2019-12-02 RX ORDER — MORPHINE SULFATE 2 MG/ML
2 INJECTION, SOLUTION INTRAMUSCULAR; INTRAVENOUS EVERY 10 MIN PRN
Status: DISCONTINUED | OUTPATIENT
Start: 2019-12-02 | End: 2019-12-02 | Stop reason: HOSPADM

## 2019-12-02 RX ORDER — SODIUM CHLORIDE, SODIUM LACTATE, POTASSIUM CHLORIDE, CALCIUM CHLORIDE 600; 310; 30; 20 MG/100ML; MG/100ML; MG/100ML; MG/100ML
INJECTION, SOLUTION INTRAVENOUS CONTINUOUS
Status: DISCONTINUED | OUTPATIENT
Start: 2019-12-02 | End: 2019-12-02

## 2019-12-02 RX ORDER — CARBIDOPA AND LEVODOPA 25; 100 MG/1; MG/1
1.5 TABLET, EXTENDED RELEASE ORAL 2 TIMES DAILY
Status: DISCONTINUED | OUTPATIENT
Start: 2019-12-02 | End: 2019-12-02

## 2019-12-02 RX ORDER — KETOROLAC TROMETHAMINE 15 MG/ML
15 INJECTION, SOLUTION INTRAMUSCULAR; INTRAVENOUS EVERY 6 HOURS
Status: DISCONTINUED | OUTPATIENT
Start: 2019-12-02 | End: 2019-12-03

## 2019-12-02 RX ORDER — NORTRIPTYLINE HYDROCHLORIDE 25 MG/1
25 CAPSULE ORAL NIGHTLY
Status: DISCONTINUED | OUTPATIENT
Start: 2019-12-02 | End: 2019-12-03

## 2019-12-02 RX ORDER — MORPHINE SULFATE 2 MG/ML
2 INJECTION, SOLUTION INTRAMUSCULAR; INTRAVENOUS EVERY 2 HOUR PRN
Status: DISCONTINUED | OUTPATIENT
Start: 2019-12-02 | End: 2019-12-03

## 2019-12-02 RX ORDER — PRAVASTATIN SODIUM 20 MG
20 TABLET ORAL NIGHTLY
Status: DISCONTINUED | OUTPATIENT
Start: 2019-12-03 | End: 2019-12-03

## 2019-12-02 RX ORDER — FAMOTIDINE 20 MG/1
20 TABLET ORAL ONCE
Status: COMPLETED | OUTPATIENT
Start: 2019-12-02 | End: 2019-12-02

## 2019-12-02 RX ORDER — GABAPENTIN 300 MG/1
300 CAPSULE ORAL ONCE
Status: DISCONTINUED | OUTPATIENT
Start: 2019-12-02 | End: 2019-12-02 | Stop reason: HOSPADM

## 2019-12-02 RX ORDER — HYDROCODONE BITARTRATE AND ACETAMINOPHEN 5; 325 MG/1; MG/1
2 TABLET ORAL AS NEEDED
Status: DISCONTINUED | OUTPATIENT
Start: 2019-12-02 | End: 2019-12-02 | Stop reason: HOSPADM

## 2019-12-02 RX ORDER — ALBUTEROL SULFATE 90 UG/1
2 AEROSOL, METERED RESPIRATORY (INHALATION) EVERY 6 HOURS PRN
Status: DISCONTINUED | OUTPATIENT
Start: 2019-12-02 | End: 2019-12-03

## 2019-12-02 RX ORDER — PANTOPRAZOLE SODIUM 40 MG/1
40 TABLET, DELAYED RELEASE ORAL
Status: DISCONTINUED | OUTPATIENT
Start: 2019-12-02 | End: 2019-12-03

## 2019-12-02 RX ORDER — ACETAMINOPHEN 10 MG/ML
1000 INJECTION, SOLUTION INTRAVENOUS EVERY 6 HOURS
Status: COMPLETED | OUTPATIENT
Start: 2019-12-02 | End: 2019-12-03

## 2019-12-02 RX ORDER — SODIUM CHLORIDE, SODIUM LACTATE, POTASSIUM CHLORIDE, CALCIUM CHLORIDE 600; 310; 30; 20 MG/100ML; MG/100ML; MG/100ML; MG/100ML
INJECTION, SOLUTION INTRAVENOUS CONTINUOUS
Status: DISCONTINUED | OUTPATIENT
Start: 2019-12-02 | End: 2019-12-02 | Stop reason: HOSPADM

## 2019-12-02 RX ORDER — HYDROMORPHONE HYDROCHLORIDE 1 MG/ML
0.6 INJECTION, SOLUTION INTRAMUSCULAR; INTRAVENOUS; SUBCUTANEOUS EVERY 5 MIN PRN
Status: DISCONTINUED | OUTPATIENT
Start: 2019-12-02 | End: 2019-12-02 | Stop reason: HOSPADM

## 2019-12-02 RX ORDER — CELECOXIB 200 MG/1
400 CAPSULE ORAL ONCE
Status: COMPLETED | OUTPATIENT
Start: 2019-12-02 | End: 2019-12-02

## 2019-12-02 RX ORDER — HEPARIN SODIUM 5000 [USP'U]/ML
5000 INJECTION, SOLUTION INTRAVENOUS; SUBCUTANEOUS ONCE
Status: COMPLETED | OUTPATIENT
Start: 2019-12-02 | End: 2019-12-02

## 2019-12-02 RX ORDER — MAGNESIUM HYDROXIDE 1200 MG/15ML
LIQUID ORAL CONTINUOUS PRN
Status: COMPLETED | OUTPATIENT
Start: 2019-12-02 | End: 2019-12-02

## 2019-12-02 RX ORDER — TOPIRAMATE 25 MG/1
25 TABLET ORAL EVERY EVENING
Status: DISCONTINUED | OUTPATIENT
Start: 2019-12-03 | End: 2019-12-03

## 2019-12-02 RX ORDER — BUPIVACAINE HYDROCHLORIDE 2.5 MG/ML
INJECTION, SOLUTION EPIDURAL; INFILTRATION; INTRACAUDAL AS NEEDED
Status: DISCONTINUED | OUTPATIENT
Start: 2019-12-02 | End: 2019-12-02 | Stop reason: HOSPADM

## 2019-12-02 RX ORDER — ONDANSETRON 2 MG/ML
4 INJECTION INTRAMUSCULAR; INTRAVENOUS EVERY 6 HOURS PRN
Status: DISCONTINUED | OUTPATIENT
Start: 2019-12-02 | End: 2019-12-03

## 2019-12-02 RX ORDER — ONDANSETRON 2 MG/ML
INJECTION INTRAMUSCULAR; INTRAVENOUS AS NEEDED
Status: DISCONTINUED | OUTPATIENT
Start: 2019-12-02 | End: 2019-12-02 | Stop reason: SURG

## 2019-12-02 RX ORDER — FOLIC ACID 1 MG/1
1 TABLET ORAL DAILY
Status: DISCONTINUED | OUTPATIENT
Start: 2019-12-03 | End: 2019-12-03

## 2019-12-02 RX ORDER — RASAGILINE 0.5 MG/1
1 TABLET ORAL DAILY
Status: DISCONTINUED | OUTPATIENT
Start: 2019-12-03 | End: 2019-12-03

## 2019-12-02 RX ORDER — NALOXONE HYDROCHLORIDE 0.4 MG/ML
80 INJECTION, SOLUTION INTRAMUSCULAR; INTRAVENOUS; SUBCUTANEOUS AS NEEDED
Status: DISCONTINUED | OUTPATIENT
Start: 2019-12-02 | End: 2019-12-02 | Stop reason: HOSPADM

## 2019-12-02 RX ORDER — GLYCOPYRROLATE 0.2 MG/ML
INJECTION INTRAMUSCULAR; INTRAVENOUS AS NEEDED
Status: DISCONTINUED | OUTPATIENT
Start: 2019-12-02 | End: 2019-12-02 | Stop reason: SURG

## 2019-12-02 RX ORDER — METOCLOPRAMIDE 10 MG/1
10 TABLET ORAL ONCE
Status: COMPLETED | OUTPATIENT
Start: 2019-12-02 | End: 2019-12-02

## 2019-12-02 RX ORDER — SODIUM CHLORIDE, SODIUM LACTATE, POTASSIUM CHLORIDE, CALCIUM CHLORIDE 600; 310; 30; 20 MG/100ML; MG/100ML; MG/100ML; MG/100ML
INJECTION, SOLUTION INTRAVENOUS CONTINUOUS
Status: DISCONTINUED | OUTPATIENT
Start: 2019-12-02 | End: 2019-12-03

## 2019-12-02 RX ORDER — CALCITRIOL 0.25 UG/1
0.25 CAPSULE, LIQUID FILLED ORAL DAILY
Status: DISCONTINUED | OUTPATIENT
Start: 2019-12-03 | End: 2019-12-03

## 2019-12-02 RX ORDER — GABAPENTIN 400 MG/1
800 CAPSULE ORAL 3 TIMES DAILY
Status: DISCONTINUED | OUTPATIENT
Start: 2019-12-03 | End: 2019-12-02

## 2019-12-02 RX ORDER — PHENYLEPHRINE HCL 10 MG/ML
VIAL (ML) INJECTION AS NEEDED
Status: DISCONTINUED | OUTPATIENT
Start: 2019-12-02 | End: 2019-12-02 | Stop reason: SURG

## 2019-12-02 RX ORDER — KETOROLAC TROMETHAMINE 30 MG/ML
30 INJECTION, SOLUTION INTRAMUSCULAR; INTRAVENOUS EVERY 6 HOURS
Status: DISCONTINUED | OUTPATIENT
Start: 2019-12-02 | End: 2019-12-03

## 2019-12-02 RX ORDER — MONTELUKAST SODIUM 10 MG/1
10 TABLET ORAL NIGHTLY
Status: DISCONTINUED | OUTPATIENT
Start: 2019-12-03 | End: 2019-12-03

## 2019-12-02 RX ORDER — ALBUTEROL SULFATE 2.5 MG/3ML
2.5 SOLUTION RESPIRATORY (INHALATION) EVERY 4 HOURS PRN
Status: DISCONTINUED | OUTPATIENT
Start: 2019-12-02 | End: 2019-12-03

## 2019-12-02 RX ORDER — ONDANSETRON 2 MG/ML
4 INJECTION INTRAMUSCULAR; INTRAVENOUS ONCE AS NEEDED
Status: DISCONTINUED | OUTPATIENT
Start: 2019-12-02 | End: 2019-12-02 | Stop reason: HOSPADM

## 2019-12-02 RX ORDER — DEXAMETHASONE SODIUM PHOSPHATE 4 MG/ML
VIAL (ML) INJECTION AS NEEDED
Status: DISCONTINUED | OUTPATIENT
Start: 2019-12-02 | End: 2019-12-02 | Stop reason: SURG

## 2019-12-02 RX ORDER — MORPHINE SULFATE 4 MG/ML
4 INJECTION, SOLUTION INTRAMUSCULAR; INTRAVENOUS EVERY 10 MIN PRN
Status: DISCONTINUED | OUTPATIENT
Start: 2019-12-02 | End: 2019-12-02 | Stop reason: HOSPADM

## 2019-12-02 RX ORDER — MORPHINE SULFATE 4 MG/ML
4 INJECTION, SOLUTION INTRAMUSCULAR; INTRAVENOUS EVERY 2 HOUR PRN
Status: DISCONTINUED | OUTPATIENT
Start: 2019-12-02 | End: 2019-12-03

## 2019-12-02 RX ORDER — ENOXAPARIN SODIUM 100 MG/ML
30 INJECTION SUBCUTANEOUS EVERY 12 HOURS
Status: DISCONTINUED | OUTPATIENT
Start: 2019-12-02 | End: 2019-12-03

## 2019-12-02 RX ORDER — LEVOTHYROXINE SODIUM 0.15 MG/1
150 TABLET ORAL
Status: DISCONTINUED | OUTPATIENT
Start: 2019-12-03 | End: 2019-12-03

## 2019-12-02 RX ORDER — ACETAMINOPHEN 500 MG
1000 TABLET ORAL ONCE
Status: COMPLETED | OUTPATIENT
Start: 2019-12-02 | End: 2019-12-02

## 2019-12-02 RX ORDER — GABAPENTIN 400 MG/1
800 CAPSULE ORAL 3 TIMES DAILY
Status: DISCONTINUED | OUTPATIENT
Start: 2019-12-02 | End: 2019-12-03

## 2019-12-02 RX ADMIN — ROCURONIUM BROMIDE 5 MG: 10 INJECTION, SOLUTION INTRAVENOUS at 08:27:00

## 2019-12-02 RX ADMIN — EPHEDRINE SULFATE 5 MG: 50 INJECTION, SOLUTION INTRAVENOUS at 08:13:00

## 2019-12-02 RX ADMIN — CLINDAMYCIN PHOSPHATE 900 MG: 150 INJECTION, SOLUTION INTRAVENOUS at 07:44:00

## 2019-12-02 RX ADMIN — DEXAMETHASONE SODIUM PHOSPHATE 4 MG: 4 MG/ML VIAL (ML) INJECTION at 07:56:00

## 2019-12-02 RX ADMIN — ONDANSETRON 4 MG: 2 INJECTION INTRAMUSCULAR; INTRAVENOUS at 08:38:00

## 2019-12-02 RX ADMIN — PHENYLEPHRINE HCL 100 MCG: 10 MG/ML VIAL (ML) INJECTION at 08:16:00

## 2019-12-02 RX ADMIN — SODIUM CHLORIDE, SODIUM LACTATE, POTASSIUM CHLORIDE, CALCIUM CHLORIDE: 600; 310; 30; 20 INJECTION, SOLUTION INTRAVENOUS at 08:39:00

## 2019-12-02 RX ADMIN — ROCURONIUM BROMIDE 35 MG: 10 INJECTION, SOLUTION INTRAVENOUS at 07:49:00

## 2019-12-02 RX ADMIN — SODIUM CHLORIDE, SODIUM LACTATE, POTASSIUM CHLORIDE, CALCIUM CHLORIDE: 600; 310; 30; 20 INJECTION, SOLUTION INTRAVENOUS at 08:42:00

## 2019-12-02 RX ADMIN — PHENYLEPHRINE HCL 100 MCG: 10 MG/ML VIAL (ML) INJECTION at 08:13:00

## 2019-12-02 RX ADMIN — LIDOCAINE HYDROCHLORIDE 50 MG: 10 INJECTION, SOLUTION EPIDURAL; INFILTRATION; INTRACAUDAL; PERINEURAL at 07:49:00

## 2019-12-02 RX ADMIN — GLYCOPYRROLATE 0.1 MG: 0.2 INJECTION INTRAMUSCULAR; INTRAVENOUS at 07:42:00

## 2019-12-02 NOTE — PROGRESS NOTES
Inpatient Bariatric Nutrition Note      Priya Abebe is a 63year old female.     Procedure: Sleeve gastrectomy  Surgery Date: 12/2/2019  Medical Diagnosis: Morbid obesity    Height:  Ht Readings from Last 1 Encounters:  12/02/19 : 5' 9\" (1.753 m) mg, Intravenous, Q6H  •  Ketorolac Tromethamine (TORADOL) injection 15 mg, 15 mg, Intravenous, Q6H **OR** ketorolac tromethamine (TORADOL) 30 MG/ML injection 30 mg, 30 mg, Intravenous, Q6H  •  HYDROcodone-acetaminophen 7.5-325 MG/15ML solution 10 mL, 10 mL Recommendations/Goals: Reviewed bariatric diet stages, fluid intake goals and chart, continue bariatric diet stage 1 upon discharge from hospital, Follow discharge instructions , Progress through bariatric diet stages as directed and as tolerated, Star

## 2019-12-02 NOTE — PLAN OF CARE
Problem: PAIN - ADULT  Goal: Verbalizes/displays adequate comfort level or patient's stated pain goal  Description  INTERVENTIONS:  - Encourage pt to monitor pain and request assistance  - Assess pain using appropriate pain scale  - Administer analgesics development  - Assess and document skin integrity  - Monitor for areas of redness and/or skin breakdown  - Initiate interventions, skin care algorithm/standards of care as needed  Outcome: Progressing  Goal: Incision(s), wounds(s) or drain site(s) healing

## 2019-12-02 NOTE — ANESTHESIA PREPROCEDURE EVALUATION
Anesthesia PreOp Note    HPI:     Laila Dennis is a 63year old female who presents for preoperative consultation requested by:  Luz Marina Villafana MD    Date of Surgery: 12/2/2019    Procedure(s):  BARIATRIC SLEEVE GASTRECTOMY LAPAROSCOPIC  Indication: morb • Chronic cough    • Disorder of thyroid    • Extrapyramidal syndrome 2013    MEDICATION MGMT.  W/ PARKINSONS FEATURES   • Greater trochanteric bursitis, left 11/21/2017   • High cholesterol    • History of blood transfusion [facility]    [facility]   • Infertility, f 25 MG Oral Cap, Take 1 capsule (25 mg total) by mouth nightly., Disp: 90 capsule, Rfl: 2, 12/1/2019 at 2100  gabapentin 800 MG Oral Tab, Take 1 tablet (800 mg total) by mouth 3 (three) times daily. , Disp: 270 tablet, Rfl: 2, 78/9/0895 at 7706  folic acid 1 Drew Pérez MD, Last Rate: 20 mL/hr at 12/02/19 3644  gabapentin (NEURONTIN) cap 300 mg, 300 mg, Oral, Once, Drew Pérez MD  clindamycin (CLEOCIN) in D5W 900mg/50ml premix, 900 mg, Intravenous, Once, Drew Pérez MD    No current Epic-ordered outpatient club or organization: Not on file        Attends meetings of clubs or organizations: Not on file        Relationship status: Not on file      Intimate partner violence:        Fear of current or ex partner: Not on file        Emotionally abused: Not on rikki (!) 147.4 kg (325 lb) (!) 143.3 kg (316 lb)   Height: 1.753 m (5' 9\") 1.753 m (5' 9\")        Anesthesia Evaluation     Patient summary reviewed and Nursing notes reviewed    Airway   Mallampati: III  Dental      Pulmonary     breath sounds clear to auscu

## 2019-12-02 NOTE — ANESTHESIA POSTPROCEDURE EVALUATION
Patient: Bari Davis    Procedure Summary     Date:  12/02/19 Room / Location:  [address] [place] [address] MAIN OR 01 / [address] [facility] [place] [place]    Anesthesia Start:  8554 Anesthesia Stop:  5791    Procedure:  BARIATRIC SLEEVE GASTRECTOMY LAPAROSCOPIC (N/A ) Diagnosis:  (morbid obesity

## 2019-12-02 NOTE — ANESTHESIA PROCEDURE NOTES
Airway  Urgency: Elective      General Information and Staff    Patient location during procedure: OR  Anesthesiologist: Melissa Medina MD  Resident/CRNA: Valeriy Collazo CRNA  Performed: CRNA     Indications and Patient Condition  Indications for air

## 2019-12-02 NOTE — OPERATIVE REPORT
Eddie Lozano / Gwen Trent / Felisha Hutton / Bella Cho / Lisa Conner / Janine Crawford - [phone]  [facility] Service [phone]        Date: 12/2/2019  Preop Diagnosis: Morbid Obesity secondary to excess calories   Postop D operating room and placed under general anesthesia. Preoperative antibiotics and heparin were given and was secured to the operating room table. Prepped and draped in a sterile fashion.   An Optiview technique was used to insert an 11 mm left paramedian t twisting or corkscrewing of the sleeve along its path. We completed our sleeve with a total of 2 black loads and 2 green loads, taking care to ensure that we did not leave a significant posterior fundic pouch.   The final seam guard was cut sharply, comple

## 2019-12-02 NOTE — H&P
Abdoul Mckeon / Akanksha Escobar / Chaparrita Peters / Aaron Omalley / Serena Glasgow / Shekhar Gonzalez - [phone]  Answering Service [phone]      Thad Flor Patient Status:  Surgery Admit - Inpt    1960 MRN [id number] MEDICATION MGMT.  W/ PARKINSONS FEATURES   • Greater trochanteric bursitis, left 11/21/2017   • High cholesterol    • History of blood transfusion [address] [place] [address]   • Infertility, female    • Parkinson disease ([facility]    • Parkinson's disease ([facility]    • Rheumatoi Rfl: 2, 12/1/2019 at 2100  gabapentin 800 MG Oral Tab, Take 1 tablet (800 mg total) by mouth 3 (three) times daily. , Disp: 270 tablet, Rfl: 2, 44/7/8578 at 6357  folic acid 1 MG Oral Tab, Take 1 tablet (1 mg total) by mouth once daily. , Disp: 90 tablet, Rf HIVES  Seasonal                Runny nose   Social History    Tobacco Use      Smoking status: Never Smoker      Smokeless tobacco: Never Used    Alcohol use:  Yes      Alcohol/week: 2.0 - 3.0 standard drinks      Types: 2 - 3 Glasses of wine per week obstruction, bleeding, and sepsis. Any of these could require further surgery. Other risks include DVT, PE, pneumonia, wound dehiscence, hernia, wound infection, the need for dilatations, and the inability to lose appropriate weight and keep it off.      We

## 2019-12-02 NOTE — PROGRESS NOTES
[facility]    Progress Note    Gauri Schwartz Patient Status:  Inpatient    1960 MRN [id number]   Location [facility] 4W/SW/SE Attending Abisai Sanchez MD   Hosp Day # 0 PCP Seb Souza MD        Subjective:     Liv Ray MEDS, MONITOR. Hyperlipidemia  CONT HOME MEDS,. Rheumatoid arthritis, adult ([facility]  HOLD MTX AND Darwin Redo. [facility]  CONT HOME MEDS,     hypothyroidism  CONT HOME MEDS.        EVIE (obstructive sleep apnea)  CONT CPAP, EVIE PROT

## 2019-12-02 NOTE — RESPIRATORY THERAPY NOTE
EVIE ASSESSMENT:    Pt does have a previous diagnosis of EVIE. Pt does routinely use a CPAP device at home.  This  CPAP INITIATION:    Pt to be placed on CPAP: yes-TONIGHT

## 2019-12-03 VITALS
RESPIRATION RATE: 16 BRPM | WEIGHT: 293 LBS | BODY MASS INDEX: 43.4 KG/M2 | HEIGHT: 69 IN | HEART RATE: 72 BPM | OXYGEN SATURATION: 93 % | TEMPERATURE: 98 F | SYSTOLIC BLOOD PRESSURE: 126 MMHG | DIASTOLIC BLOOD PRESSURE: 60 MMHG

## 2019-12-03 PROCEDURE — 99239 HOSP IP/OBS DSCHRG MGMT >30: CPT | Performed by: HOSPITALIST

## 2019-12-03 RX ORDER — PANTOPRAZOLE SODIUM 40 MG/1
40 TABLET, DELAYED RELEASE ORAL
Qty: 90 TABLET | Refills: 0 | Status: SHIPPED | OUTPATIENT
Start: 2019-12-04 | End: 2020-02-17

## 2019-12-03 NOTE — PLAN OF CARE
Patient cleared for discharge today home. Tolerating PO intake, voiding freely. Up with 1 assist with walker, uses cane/walker at home. Multiple falls at home due to Parkinsons'/Loss of Balance per patient. IVF fluids, patient has Zelphia Christy.  Understands diet appropriate and evaluate response  - Consider cultural and social influences on pain and pain management  - Manage/alleviate anxiety  - Utilize distraction and/or relaxation techniques  - Monitor for opioid side effects  - Notify MD/LIP if interventions un document risk factors for pressure ulcer development  - Assess and document skin integrity  - Assess and document dressing/incision, wound bed, drain sites and surrounding tissue  - Implement wound care per orders  - Initiate isolation precautions as appro

## 2019-12-03 NOTE — PAYOR COMM NOTE
--------------  ADMISSION REVIEW     Payor: [employer] [facility]  Subscriber #:  [id number]  Authorization Number: [id number]    Admit date: 12/2/19  Admit time: 4740       Admitting Physician: Helga Laboy MD  Attending Physician:  Salinas Cisneros MD  Primary Care detected  Abdomen: soft, obese, non-tender, non-distended  Extremities: normal strength, normal ROM  Neuro: no focal deficits, cranial nerves grossly intact  Psych: alert and oriented, normal affect  Skin: warm, dry    Data Review: CBC:   Lab Results   Com EPHEDrine Sulfate injection     Date Action Dose Route User    12/2/2019 0813 Given 5 mg Intravenous Chan Raman CRNA      fentaNYL citrate (SUBLIMAZE) 0.05 MG/ML injection     Date Action Dose Route User    12/2/2019 0807 Given 50 mcg Intraven Given 40 mg Oral Larwence Jose RN    12/2/2019 1050 Given 40 mg Oral Iona Morning, RN      phenylephrine HCl (DOLORES-SYNEPHRINE) 10 MG/ML injection     Date Action Dose Route User    12/2/2019 0816 Given 100 mcg Intravenous Benita Medrano CRNA    12/

## 2019-12-03 NOTE — PLAN OF CARE
NELLA IS A/O X4, V/S STABLE, ON RA. TELE #66 PRESENTING WITH NSR. AMBULATES WITH STANDBY ASST AND WALKER, HX PARKINSONS AND TREMORS. PAIN MANAGED WITH SCHED TORADOL AND TYLENOL. IV FLUIDS RUNNING. TOLERATING CLEAR LIQ BARIATRIC DIET.  PLANS TO D/C HOME, Implement non-pharmacological measures as appropriate and evaluate response  - Consider cultural and social influences on pain and pain management  - Manage/alleviate anxiety  - Utilize distraction and/or relaxation techniques  - Monitor for opioid side ef and document skin integrity  - Monitor for areas of redness and/or skin breakdown  - Initiate interventions, skin care algorithm/standards of care as needed  12/3/2019 0434 by Mark Garcia RN  Outcome: Progressing  12/3/2019 0434 by Mark Garcia, post-hospital services based on physician/LIP order or complex needs related to functional status, cognitive ability or social support system  12/3/2019 0434 by Mark Garcia, RN  Outcome: Progressing  12/3/2019 0434 by Mark Garcia, RN  Outcome: Pr

## 2019-12-03 NOTE — DISCHARGE SUMMARY
[facility]    Discharge Summary    [address] [facility] Patient Status:  Inpatient    1960 MRN [id number]   Location [facility] 4W/SW/SE Attending Tiffani Obrien MD   Hosp Day # 1 PCP Nia May MD     Date of Admission: 1    hypothyroidism  CONT HOME MEDS.        EVIE (obstructive sleep apnea)  CONT CPAP, EVIE PROTOCOL.      Complications: none     Consultants     Provider Role Specialty    Mariah Vigil MD Consulting Physician  HOSPITALIST        Surgical Procedures DO NOT CRUSH, SPLIT OR CHEW. STORE AT    carbidopa-levodopa (SINEMET)  MG Oral Tab  Take 1 tablet by mouth 3 (three) times daily.  1 1/2 twice in am and 1 afternoon and 1 evening     Diclofenac Sodium 1 % Transdermal Gel  Apply 4 g topically 4 (four) Aers  Commonly known as:  PROAIR HFA      Inhale 2 puffs into the lungs every 6 (six) hours as needed for Wheezing. Quantity:  1 Inhaler  Refills:  0     AZILECT 1 MG Tabs  Generic drug:  rasagiline mesylate      Take 1 tablet by mouth daily.    Refills: tablet  Refills:  0     XELJANZ XR 11 MG Tb24  Generic drug:  Tofacitinib Citrate      TAKE ONE TABLET (11 MG) BY MOUTH ONCE DAILY. MAY BE TAKEN WITH OR WITHOUT FOOD. SWALLOW TABLET WHOLE. DO NOT CRUSH, SPLIT OR CHEW.  STORE AT   Quantity:  30 tablet  Refil Full Liquid Diet upon discharge from the hospital. Stay on this diet for 1 week before advancing to Stage III, Pureed Diet. Follow this diet for 1 week and then advance to Stage IV, Soft Diet.  If you experience abdominal discomfort while advancing your die In some cases, fainting can also occur. So avoid foods that cause this. · Take vitamin supplements as directed by your doctor. Activity  · Keep in mind that recovery takes several weeks. It is normal to feel tired. Rest as needed.   · Walk as often as y you need medical attention. · Ask your doctor when you can start driving again. Don't drive if you are taking pain medication.     When to Call Your Doctor    Call your doctor right away if you have any of the following:  · Cloudy or smelly drainage from t

## 2019-12-03 NOTE — PROGRESS NOTES
Jessie Hernandez / Neto Churchill / Zandra Lobato / Anabel Maldonado / Christian Griffin / Ellen Clarke - [phone]  Answering Service [phone]      Progress Note    Vahid Ham Patient Status:  Inpatient    1960 MRN [id number]  MG per tab 1.5 tablet, 1.5 tablet, Oral, BID  Fluticasone Furoate-Vilanterol (BREO ELLIPTA) 100-25 MCG/INH inhaler 1 puff, 1 puff, Inhalation, Daily  albuterol sulfate (VENTOLIN) (2.5 MG/3ML) 0.083% nebulizer solution 2.5 mg, 2.5 mg, Nebulization, Q

## 2019-12-04 ENCOUNTER — TELEPHONE (OUTPATIENT)
Dept: SURGERY | Facility: CLINIC | Age: 59
End: 2019-12-04

## 2019-12-06 ENCOUNTER — TELEPHONE (OUTPATIENT)
Dept: SURGERY | Facility: CLINIC | Age: 59
End: 2019-12-06

## 2019-12-11 ENCOUNTER — OFFICE VISIT (OUTPATIENT)
Dept: INTERNAL MEDICINE CLINIC | Facility: CLINIC | Age: 59
End: 2019-12-11
Payer: COMMERCIAL

## 2019-12-11 VITALS
BODY MASS INDEX: 43.4 KG/M2 | WEIGHT: 293 LBS | SYSTOLIC BLOOD PRESSURE: 124 MMHG | RESPIRATION RATE: 20 BRPM | HEIGHT: 69 IN | DIASTOLIC BLOOD PRESSURE: 78 MMHG | TEMPERATURE: 98 F | HEART RATE: 88 BPM

## 2019-12-11 DIAGNOSIS — Z98.84 S/P LAPAROSCOPIC SLEEVE GASTRECTOMY: Primary | ICD-10-CM

## 2019-12-11 DIAGNOSIS — M48.062 LUMBAR STENOSIS WITH NEUROGENIC CLAUDICATION: ICD-10-CM

## 2019-12-11 DIAGNOSIS — G20 PARKINSON DISEASE (HCC): ICD-10-CM

## 2019-12-11 DIAGNOSIS — E66.01 MORBID OBESITY WITH BMI OF 45.0-49.9, ADULT (HCC): ICD-10-CM

## 2019-12-11 PROCEDURE — 99213 OFFICE O/P EST LOW 20 MIN: CPT | Performed by: INTERNAL MEDICINE

## 2019-12-11 NOTE — PROGRESS NOTES
HPI:    Patient ID: Debra Ha is a 63year old female. HPI  Patient is here for follow-up on recent hospitalization. She was last seen here on October 21 for preoperative evaluation.   On December 2 she underwent gastric sleeve surgery under the • Rheumatoid arthritis ([facility]    • Sleep apnea     cpap   • Thyroid disease 2016   • Tibia/fibula fracture     OPEN REDUCTION INTERNAL FIXATION   • Urinary incontinence 2/12/95    after childbirth   • Viral conjunctivitis 2008      Past Surgical History: Cardiovascular: Negative for chest pain and palpitations. Gastrointestinal: Positive for abdominal pain. Negative for nausea, vomiting, diarrhea and constipation.    Genitourinary: Negative for dysuria, hematuria, vaginal discharge, menstrual problem an TAKE ONE TABLET (11 MG) BY MOUTH ONCE DAILY. MAY BE TAKEN WITH OR WITHOUT FOOD. SWALLOW TABLET WHOLE. DO NOT CRUSH, SPLIT OR CHEW.  STORE AT 30 tablet 11   • Albuterol Sulfate HFA (PROAIR HFA) 108 (90 Base) MCG/ACT Inhalation Aero Soln Inhale 2 puffs into t Encounters:  12/11/19 : (!) 306 lb 6.4 oz (139 kg)  12/02/19 : (!) 316 lb (143.3 kg)  11/06/19 : (!) 329 lb (149.2 kg)  10/31/19 : (!) 330 lb 3.2 oz (149.8 kg)  10/24/19 : (!) 332 lb (150.6 kg)  10/23/19 : (!) 332 lb (150.6 kg)            ASSESSMENT/PLAN:

## 2019-12-18 ENCOUNTER — DOCUMENTATION ONLY (OUTPATIENT)
Dept: SURGERY | Facility: CLINIC | Age: 59
End: 2019-12-18

## 2019-12-18 ENCOUNTER — OFFICE VISIT (OUTPATIENT)
Dept: SURGERY | Facility: CLINIC | Age: 59
End: 2019-12-18
Payer: COMMERCIAL

## 2019-12-18 VITALS
WEIGHT: 293 LBS | HEART RATE: 100 BPM | DIASTOLIC BLOOD PRESSURE: 90 MMHG | HEIGHT: 69.5 IN | BODY MASS INDEX: 42.42 KG/M2 | RESPIRATION RATE: 16 BRPM | SYSTOLIC BLOOD PRESSURE: 123 MMHG

## 2019-12-18 DIAGNOSIS — E66.01 MORBID (SEVERE) OBESITY DUE TO EXCESS CALORIES (HCC): ICD-10-CM

## 2019-12-18 DIAGNOSIS — E78.5 HYPERLIPIDEMIA, UNSPECIFIED HYPERLIPIDEMIA TYPE: ICD-10-CM

## 2019-12-18 DIAGNOSIS — G47.33 OSA (OBSTRUCTIVE SLEEP APNEA): Primary | Chronic | ICD-10-CM

## 2019-12-18 NOTE — PROGRESS NOTES
Saw patient in office for first post-op visit. All post-op instructions reviewed. *Covered fluid intake of 64 oz/day and to call if not meeting at 30-40 oz/day. *Monitor incision for any redness, drainage, swelling and/or increased pain in abd.  Keep area

## 2019-12-18 NOTE — PROGRESS NOTES
Provided/reviewed Bariatric HELP card; instructed pt keep in wallet and present to MD in ED/UC/IC in the unlikely event she ends up there; pt agreed and verbalized understanding.   Reviewed post-op signs and symptoms of concern, contact info, importance of

## 2019-12-18 NOTE — PROGRESS NOTES
[address], [facility]  [address] Pooja Finch [address] [address]  Dept: [phone]    12/18/2019   Bariatric Patient Follow-up Evaluation    Chief Complaint:  Morbid obesity, 12/2/19 sleeve, preop Damaris Patel MD at [address] [place] [address] ENDOSCOPY   • FOOT SURGERY Left     Had right foot done recently.    • KNEE ARTHROSCOPY Right    • LAP SLEEVE GASTRECTOMY  12/02/2019   • LAPAROSCOPY,PELVIC,BIOPSY  1990    for infertility   • LAPAROSCOPY,PELVIC,BIOPSY  1990    for infertili Oral Tab, TAKE 1 TABLET BY MOUTH EVERY DAY AT NIGHT, Disp: 90 tablet, Rfl: 0  •  CALCITRIOL 0.25 MCG Oral Cap, TAKE 1 CAPSULE (0.25 MCG TOTAL) BY MOUTH DAILY. , Disp: 90 capsule, Rfl: 1  •  ADVAIR DISKUS 100-50 MCG/DOSE Inhalation Aerosol Powder, Breath Act Comment:Reports side effect from DPT vaccine.   Penicillins             HIVES  Diptheria-Tetanus T*    HIVES  Seasonal                Runny nose    ROS:      Constitutional: negative  HEENT: negative  Respiratory: negative  Cardiovascular: negative  Amauri Weeks

## 2019-12-21 ENCOUNTER — NURSE ONLY (OUTPATIENT)
Dept: ALLERGY | Facility: CLINIC | Age: 59
End: 2019-12-21
Payer: COMMERCIAL

## 2019-12-21 DIAGNOSIS — J30.89 ENVIRONMENTAL AND SEASONAL ALLERGIES: ICD-10-CM

## 2019-12-21 PROCEDURE — 95117 IMMUNOTHERAPY INJECTIONS: CPT | Performed by: ALLERGY & IMMUNOLOGY

## 2019-12-30 ENCOUNTER — LAB ENCOUNTER (OUTPATIENT)
Dept: LAB | Facility: HOSPITAL | Age: 59
End: 2019-12-30
Attending: INTERNAL MEDICINE
Payer: COMMERCIAL

## 2019-12-30 DIAGNOSIS — M06.9 RHEUMATOID ARTHRITIS INVOLVING MULTIPLE SITES, UNSPECIFIED RHEUMATOID FACTOR PRESENCE: ICD-10-CM

## 2019-12-30 DIAGNOSIS — Z51.81 ENCOUNTER FOR THERAPEUTIC DRUG MONITORING: ICD-10-CM

## 2019-12-30 LAB
ALBUMIN SERPL-MCNC: 3.5 G/DL (ref 3.4–5)
ALT SERPL-CCNC: 8 U/L (ref 13–56)
AST SERPL-CCNC: 19 U/L (ref 15–37)
BASOPHILS # BLD AUTO: 0.04 X10(3) UL (ref 0–0.2)
BASOPHILS NFR BLD AUTO: 0.6 %
CREAT BLD-MCNC: 0.78 MG/DL (ref 0.55–1.02)
CRP SERPL-MCNC: 1.59 MG/DL (ref ?–0.3)
DEPRECATED RDW RBC AUTO: 53.9 FL (ref 35.1–46.3)
EOSINOPHIL # BLD AUTO: 0.08 X10(3) UL (ref 0–0.7)
EOSINOPHIL NFR BLD AUTO: 1.2 %
ERYTHROCYTE [DISTWIDTH] IN BLOOD BY AUTOMATED COUNT: 15.9 % (ref 11–15)
ERYTHROCYTE [SEDIMENTATION RATE] IN BLOOD: 26 MM/HR (ref 0–30)
HCT VFR BLD AUTO: 41.8 % (ref 35–48)
HGB BLD-MCNC: 13.3 G/DL (ref 12–16)
IMM GRANULOCYTES # BLD AUTO: 0.02 X10(3) UL (ref 0–1)
IMM GRANULOCYTES NFR BLD: 0.3 %
LYMPHOCYTES # BLD AUTO: 1.6 X10(3) UL (ref 1–4)
LYMPHOCYTES NFR BLD AUTO: 23.7 %
MCH RBC QN AUTO: 29.7 PG (ref 26–34)
MCHC RBC AUTO-ENTMCNC: 31.8 G/DL (ref 31–37)
MCV RBC AUTO: 93.3 FL (ref 80–100)
MONOCYTES # BLD AUTO: 0.59 X10(3) UL (ref 0.1–1)
MONOCYTES NFR BLD AUTO: 8.7 %
NEUTROPHILS # BLD AUTO: 4.42 X10 (3) UL (ref 1.5–7.7)
NEUTROPHILS # BLD AUTO: 4.42 X10(3) UL (ref 1.5–7.7)
NEUTROPHILS NFR BLD AUTO: 65.5 %
PLATELET # BLD AUTO: 222 10(3)UL (ref 150–450)
RBC # BLD AUTO: 4.48 X10(6)UL (ref 3.8–5.3)
WBC # BLD AUTO: 6.8 X10(3) UL (ref 4–11)

## 2019-12-30 PROCEDURE — 84450 TRANSFERASE (AST) (SGOT): CPT

## 2019-12-30 PROCEDURE — 85652 RBC SED RATE AUTOMATED: CPT

## 2019-12-30 PROCEDURE — 36415 COLL VENOUS BLD VENIPUNCTURE: CPT

## 2019-12-30 PROCEDURE — 82040 ASSAY OF SERUM ALBUMIN: CPT

## 2019-12-30 PROCEDURE — 82565 ASSAY OF CREATININE: CPT

## 2019-12-30 PROCEDURE — 84460 ALANINE AMINO (ALT) (SGPT): CPT

## 2019-12-30 PROCEDURE — 86140 C-REACTIVE PROTEIN: CPT

## 2019-12-30 PROCEDURE — 85025 COMPLETE CBC W/AUTO DIFF WBC: CPT

## 2020-01-02 ENCOUNTER — TELEPHONE (OUTPATIENT)
Dept: SURGERY | Facility: CLINIC | Age: 60
End: 2020-01-02

## 2020-01-02 ENCOUNTER — OFFICE VISIT (OUTPATIENT)
Dept: SURGERY | Facility: CLINIC | Age: 60
End: 2020-01-02
Payer: COMMERCIAL

## 2020-01-02 VITALS — WEIGHT: 293 LBS | HEIGHT: 69 IN | BODY MASS INDEX: 43.4 KG/M2

## 2020-01-02 DIAGNOSIS — E66.01 CLASS 3 SEVERE OBESITY DUE TO EXCESS CALORIES WITHOUT SERIOUS COMORBIDITY WITH BODY MASS INDEX (BMI) OF 40.0 TO 44.9 IN ADULT (HCC): Primary | ICD-10-CM

## 2020-01-02 PROCEDURE — 97803 MED NUTRITION INDIV SUBSEQ: CPT | Performed by: DIETITIAN, REGISTERED

## 2020-01-02 NOTE — PROGRESS NOTES
[address] [address]  Dept: [phone]  Loc: [phone]    01/02/20      Bariatric Follow-up Nutrition Session    Jyoti Horan is a 63year old female.      A HDL 68 (H) 04/27/2019     (H) 04/27/2019    VLDL 31 (H) 04/27/2019    NONHDLC 139 (H) 04/27/2019    CALCNONHDL 135 (H) 05/28/2016        Vitamins/Minerals:  Lab Results   Component Value Date    B12 647 07/18/2019    VITB12 432 05/28/2016     Lab Re (AZILECT) 1 MG Oral Tab, Take 1 tablet by mouth daily. , Disp: , Rfl:   •  XELJANZ XR 11 MG Oral Tablet 24 Hr, TAKE ONE TABLET (11 MG) BY MOUTH ONCE DAILY. MAY BE TAKEN WITH OR WITHOUT FOOD. SWALLOW TABLET WHOLE. DO NOT CRUSH, SPLIT OR CHEW.  STORE AT, Disp: loss 1 mos s/p VSG at 9.5% EWL. Just started MV's, appears to be tolerating well. Kcal intake is appropriate for post-op stage and pt is meeting protein and fluid goals w/ no issues.  Reviewed kcal/pro/carb goals for the first year post-op, pt verbalized un

## 2020-01-03 NOTE — TELEPHONE ENCOUNTER
1/2/20  @ 11:11am Spoke to Valier at [facility], #[phone], FYK#[id number].  They verified that patient has following benefits for below services:   DX E66.01     [facility] (WSV#[id number])   Rendering Alburtis Nail

## 2020-01-06 RX ORDER — DICLOFENAC SODIUM 75 MG/1
TABLET, DELAYED RELEASE ORAL
Qty: 60 TABLET | Refills: 1 | Status: SHIPPED | OUTPATIENT
Start: 2020-01-06 | End: 2020-05-13

## 2020-01-06 NOTE — TELEPHONE ENCOUNTER
Last filled: 7/8/19 #60 tab with 5 refills  *Dicontinued after bariatric surgery*  LOV: 9/4/19  Future Appointments   Date Time Provider Joel Mann   1/15/2020  4:30 PM Justine Mccabe MD 2014 [facility] SYSTEM OF [facility]   1/20/2020  4:40 PM Natalie Portillo MD [facility] SYSTEM OF [facility]   1/30/2020  3:30 PM Harwood Dandy, PsyD [employer]   2/3/2020  4:30 PM Jennifer Briones MD [facility]   2/10/2020  4:00 PM Karen Collins MD [address] [facility] SYSTEM OF [facility]   2/27/2020  8:00 AM Suellen Mendez, RD 85 [facility] OF [facility]   3/11/2020  8:00 AM Natalie Portillo MD [facility]     Labs:   Component      Latest Ref Rng & Units 12/30/2019   WBC      4.0 - 11.0 x10(3) uL 6.8   RBC      3.80 - 5.30 x10(6)uL 4.48   Hemoglobin      12.0 - 16.0 g/dL 13.3   Hematocrit      35.0 - 48.0 % 41.8   MCV      80.0 - 100.0 fL 93.3   MCH      26.0 - 34.0 pg 29.7   MCHC      31.0 - 37.0 g/dL 31.8   RDW-SD      35.1 - 46.3 fL 53.9 (H)   RDW      11.0 - 15.0 % 15.9 (H)   Platelet Count      218.8 - 450.0 10(3)uL 222.0   Prelim Neutrophil Abs      1.50 - 7.70 x10 (3) uL 4.42   Neutrophils Absolute      1.50 - 7.70 x10(3) uL 4.42   Lymphocytes Absolute      1.00 - 4.00 x10(3) uL 1.60   Monocytes Absolute      0.10 - 1.00 x10(3) uL 0.59   Eosinophils Absolute      0.00 - 0.70 x10(3) uL 0.08   Basophils Absolute      0.00 - 0.20 x10(3) uL 0.04   Immature Granulocyte Absolute      0.00 - 1.00 x10(3) uL 0.02   Neutrophils %      % 65.5   Lymphocytes %      % 23.7   Monocytes %      % 8.7   Eosinophils %      % 1.2   Basophils %      % 0.6   Immature Granulocyte %      % 0.3   CREATININE      0.55 - 1.02 mg/dL 0.78   eGFR NON-AFR. AMERICAN      >=60 84   eGFR       >=60 97   SED RATE      0 - 30 mm/Hr 26   C-REACTIVE PROTEIN      <0.30 mg/dL 1.59 (H)   AST (SGOT)      15 - 37 U/L 19   ALT (SGPT)      13 - 56 U/L 8 (L)   Albumin      3.4 - 5.0 g/dL 3.5       Summary:  1. Cont.  methotrexate - 8 tablets a week and folic acid 1mg a day - skip methotrexate week of surgery -   2. Cont. xlejanz 11mg a day - hold 1 week for surgery time - 3 days before and 3 days after -  3. Return to clinic in 3-4 months. 4. .Check labs in 3-4  months . 5. Tramadol 50mg every 12 hours as needed. 6. Take diclfoenac 75mg twice ad ay - take evening dose at 4pm - hold for surgery -   7. gabapnetin 800mg three times a day   8. Diclofenac gel 1 % for left knee. 9. Ask about brace for lower back post surgery - like a velcro brace at least b/c of involuntary movements - ask how that would affect recovery - with dr. Barrington Gill  - overall she's better on Tosin Issa MD  9/4/2019   10:20 AM  - [place]ed [place]  through [facility]  Please advise.

## 2020-01-13 RX ORDER — MONTELUKAST SODIUM 10 MG/1
TABLET ORAL
Qty: 90 TABLET | Refills: 0 | Status: SHIPPED | OUTPATIENT
Start: 2020-01-13 | End: 2020-07-06

## 2020-01-13 NOTE — TELEPHONE ENCOUNTER
Received refill request for Singulair 10 MG -1 tablet by mouth daily. LOV 8-24-19 for allergies and asthma. Patient has an appointment scheduled for 2/3/2020. Refill pended and forwarded to Dr. Manny Bosch for further directions.

## 2020-01-15 ENCOUNTER — OFFICE VISIT (OUTPATIENT)
Dept: RHEUMATOLOGY | Facility: CLINIC | Age: 60
End: 2020-01-15
Payer: COMMERCIAL

## 2020-01-15 VITALS
DIASTOLIC BLOOD PRESSURE: 71 MMHG | SYSTOLIC BLOOD PRESSURE: 114 MMHG | HEART RATE: 82 BPM | WEIGHT: 293 LBS | HEIGHT: 69 IN | BODY MASS INDEX: 43.4 KG/M2 | RESPIRATION RATE: 18 BRPM

## 2020-01-15 DIAGNOSIS — Z51.81 ENCOUNTER FOR THERAPEUTIC DRUG MONITORING: ICD-10-CM

## 2020-01-15 DIAGNOSIS — M06.9 RHEUMATOID ARTHRITIS INVOLVING MULTIPLE SITES, UNSPECIFIED RHEUMATOID FACTOR PRESENCE: Primary | ICD-10-CM

## 2020-01-15 PROCEDURE — 99214 OFFICE O/P EST MOD 30 MIN: CPT | Performed by: INTERNAL MEDICINE

## 2020-01-15 NOTE — PROGRESS NOTES
Laila Dennis is a 63year old female who presents for Patient presents with:  Rheumatoid Arthritis  Lab Results  Medication Follow-Up  Back Pain  .    HPI:     She is a pleasant 65year old who has elevated RF >300 and foot pain - c/w seropositive  RA bad.   She has to use a walked and get into work. She cna't get an MRI of her lumbar -   Valium doesn't help her. richard needs conscious sedation b/c of her parkinson's. phsyical therapy aqua therapy is not helping her.    She's takint tramadol twice a day sees Dr. Rosanne Vasquez on Monday. She is better in her other joitns. She sees parkinson's clinci in July - and notcied she takes less of the medicatoin while being on [place]. 8/27/2018  She's doing ok. The [place] is helping overall in her hands .  She s surgery next week. She was told she would be off diclfoenac for gastric sleeve surgery - this helps with everything not just lower back. She has 5/10 pain. Her back and hands hurt right now.      1/15/2020 -   She got decompression sx of her back in 9 differently: Take 20 mg by mouth every 7 days. TAKES ON MONDAYS ) 40 tablet 5   • Rasagiline Mesylate (AZILECT) 1 MG Oral Tab Take 1 tablet by mouth daily. • XELJANZ XR 11 MG Oral Tablet 24 Hr TAKE ONE TABLET (11 MG) BY MOUTH ONCE DAILY.  MAY BE TAKEN W LAPAROSCOPIC N/A 12/2/2019    Performed by Masha Agosto MD at [address] [place] [address] MAIN OR   • COLONOSCOPY  12/16/2013   • COLONOSCOPY N/A 6/11/2019    Performed by Nikolas Cheema MD at [address] [place] [address] ENDOSCOPY   • COLPOSCOPY, CERVIX W/UPPER ADJACENT VAGINA; W/BIOPSY(S), C tendneress at this time. lower back midline tendnesrs and b/l gluteal tendnerss   No tenderness sin hands. Squeeze test imporved.    Left hip not tender  Right hip not tender   No lower back tendnerss to touch    Component      Latest Ref Rng & Units 1 (SGOT)      15 - 37 U/L 19    ALT (SGPT)      13 - 56 U/L 8 (L)    Albumin      3.4 - 5.0 g/dL 3.5      1/28/2005 - esr is 42mm/hr,   2/9/2013 - shana 1:40  8/18/2014 - MRI left foot -   1.  There is localized edema within the intermetatarsal muscle between t response.     Reviewed notes from 4/2008 - chronic plantar fasciitis  1/23/2013 - mri brain - unremarkable,   3/13/2012 - us venous doppler - right - normal   4/30/2009 - chest xray - unchange, mildly prominent pulmonary markings  9/27/2007 - b/l knee ortho Methotrexate - 20mg a week   On xeljanz since 2/2018 - doing better -   Off  leflunomide  - she noticed no improvement of her arthriits on leflunomide -   - no improvement on ssz  - d/w her about xlejanz 11mg a day --   She's ont able to use subq injection

## 2020-01-15 NOTE — PATIENT INSTRUCTIONS
1. Cont. methotrexate - 8 tablets a week and folic acid 1mg a day   2. Cont. xlejanz 11mg a day   3. Return to clinic in 4 months. 4. .Check labs in 4  months . 5. Diclofenac gel 1 % for left knee.

## 2020-01-20 ENCOUNTER — NURSE ONLY (OUTPATIENT)
Dept: ALLERGY | Facility: CLINIC | Age: 60
End: 2020-01-20
Payer: COMMERCIAL

## 2020-01-20 DIAGNOSIS — J30.89 ENVIRONMENTAL AND SEASONAL ALLERGIES: ICD-10-CM

## 2020-01-20 PROCEDURE — 95117 IMMUNOTHERAPY INJECTIONS: CPT | Performed by: ALLERGY & IMMUNOLOGY

## 2020-01-22 ENCOUNTER — OFFICE VISIT (OUTPATIENT)
Dept: SURGERY | Facility: CLINIC | Age: 60
End: 2020-01-22
Payer: COMMERCIAL

## 2020-01-22 VITALS
RESPIRATION RATE: 16 BRPM | BODY MASS INDEX: 42.42 KG/M2 | SYSTOLIC BLOOD PRESSURE: 148 MMHG | HEART RATE: 90 BPM | HEIGHT: 69.5 IN | DIASTOLIC BLOOD PRESSURE: 79 MMHG | WEIGHT: 293 LBS

## 2020-01-22 DIAGNOSIS — G47.33 OSA (OBSTRUCTIVE SLEEP APNEA): Chronic | ICD-10-CM

## 2020-01-22 DIAGNOSIS — E66.01 MORBID (SEVERE) OBESITY DUE TO EXCESS CALORIES (HCC): ICD-10-CM

## 2020-01-22 DIAGNOSIS — E78.5 HYPERLIPIDEMIA, UNSPECIFIED HYPERLIPIDEMIA TYPE: Primary | ICD-10-CM

## 2020-01-22 PROBLEM — Z98.84 S/P LAPAROSCOPIC SLEEVE GASTRECTOMY: Status: ACTIVE | Noted: 2020-01-22

## 2020-01-22 NOTE — PROGRESS NOTES
[address] [address]  Dept: [phone]    1/22/2020   Bariatric Patient Follow-up Evaluation    Chief Complaint:  Morbid obesity, 12/2/19 sleeve, preop W/UPPER ADJACENT VAGINA; W/BIOPSY(S), CERVIX     • D & C      for extended period   • D & C      for extended period   • ESOPHAGOGASTRODUODENOSCOPY (EGD) N/A 10/15/2019    Performed by Emerita Schumacher MD at [address] [place] [address] Transdermal Gel, Apply 4 g topically 4 (four) times daily. , Disp: 100 g, Rfl: 3  •  MONTELUKAST SODIUM 10 MG Oral Tab, TAKE 1 TABLET BY MOUTH EVERY DAY AT NIGHT, Disp: 90 tablet, Rfl: 0  •  Multiple Vitamins-Minerals (BARIATRIC FUSION) Oral Chew Tab, Chew Rfl: 1  •  gabapentin 800 MG Oral Tab, Take 1 tablet (800 mg total) by mouth 3 (three) times daily. (Patient not taking: Reported on 1/15/2020 ), Disp: 270 tablet, Rfl: 2  •  XELJANZ XR 11 MG Oral Tablet 24 Hr, TAKE ONE TABLET (11 MG) BY MOUTH ONCE DAILY. suggest hernia. Discussed with patient that if still present at next visit may order CT scan to check anyway.     Plan:       Cont portion controlled meals  Cont fluid and protein goals  Cont PPI for total of 3 months  Exercise - may increase activity, no

## 2020-02-03 ENCOUNTER — OFFICE VISIT (OUTPATIENT)
Dept: ALLERGY | Facility: CLINIC | Age: 60
End: 2020-02-03
Payer: COMMERCIAL

## 2020-02-03 VITALS
DIASTOLIC BLOOD PRESSURE: 65 MMHG | OXYGEN SATURATION: 95 % | RESPIRATION RATE: 17 BRPM | TEMPERATURE: 98 F | HEART RATE: 77 BPM | SYSTOLIC BLOOD PRESSURE: 113 MMHG

## 2020-02-03 DIAGNOSIS — J45.40 ASTHMA, EXTRINSIC, MODERATE PERSISTENT, UNCOMPLICATED: Primary | ICD-10-CM

## 2020-02-03 DIAGNOSIS — Z23 NEED FOR TDAP VACCINATION: ICD-10-CM

## 2020-02-03 DIAGNOSIS — J30.1 SEASONAL ALLERGIC RHINITIS DUE TO POLLEN: ICD-10-CM

## 2020-02-03 DIAGNOSIS — Z91.09 ALLERGY TO AMERICAN HOUSE DUST MITE: ICD-10-CM

## 2020-02-03 PROCEDURE — 99214 OFFICE O/P EST MOD 30 MIN: CPT | Performed by: ALLERGY & IMMUNOLOGY

## 2020-02-03 PROCEDURE — 90471 IMMUNIZATION ADMIN: CPT | Performed by: ALLERGY & IMMUNOLOGY

## 2020-02-03 PROCEDURE — 90715 TDAP VACCINE 7 YRS/> IM: CPT | Performed by: ALLERGY & IMMUNOLOGY

## 2020-02-03 NOTE — PROGRESS NOTES
Bari Davis is a 63year old female. HPI:   Patient presents with:  Asthma: Pt reports no asthma flare ups     Patient is a 13-year-old female who presents for follow-up with a chief complaint of asthma and allergies.     Patient last seen by me in by Acacia Reyna MD at [address] [place] [address] OR   • COLONOSCOPY  12/16/2013   • COLONOSCOPY N/A 6/11/2019    Performed by Cole Braun MD at [address] [place] [address] ENDOSCOPY   • COLPOSCOPY, CERVIX W/UPPER ADJACENT VAGINA; W/BIOPSY(S), CERVIX     • D & C      for extended michael CALCITRIOL 0.25 MCG Oral Cap TAKE 1 CAPSULE (0.25 MCG TOTAL) BY MOUTH DAILY.  90 capsule 1   • ADVAIR DISKUS 100-50 MCG/DOSE Inhalation Aerosol Powder, Breath Activated TAKE 1 PUFF BY MOUTH TWICE A DAY (Patient taking differently: Inhale 1 puff into the [employer] Runny nose      ROS:   Allergic/Immuno:  See hpi  Cardiovascular:  Negative for irregular heartbeat/palpitations, chest pain, edema  Constitutional:  Negative night sweats,weight loss, irritability and lethargy  ENMT:  Negative for ear drainage 10 mg once a day. May add Zyrtec 10 mg once a night at bedtime if refractory  Consider Flonase 2 sprays per nostril nasal congestion is prominent  Continue with immunotherapy at this time every 4 weeks.   Reviewed potential trial off of immunotherapy as pa

## 2020-02-03 NOTE — PATIENT INSTRUCTIONS
1. Asthma  Continue with current regimen including Advair 100/50  puff once a day. May increase up to twice a day if increasing symptoms.    Continue with Singulair, montelukast 10 mg once a day  Albuterol 2 puffs every 4-6 hours as needed  Reviewed signs

## 2020-02-10 ENCOUNTER — OFFICE VISIT (OUTPATIENT)
Dept: SURGERY | Facility: CLINIC | Age: 60
End: 2020-02-10
Payer: COMMERCIAL

## 2020-02-10 VITALS
HEART RATE: 70 BPM | BODY MASS INDEX: 43.34 KG/M2 | SYSTOLIC BLOOD PRESSURE: 117 MMHG | DIASTOLIC BLOOD PRESSURE: 80 MMHG | HEIGHT: 69 IN | WEIGHT: 292.63 LBS

## 2020-02-10 DIAGNOSIS — G47.33 OSA (OBSTRUCTIVE SLEEP APNEA): Primary | Chronic | ICD-10-CM

## 2020-02-10 DIAGNOSIS — R63.2 INCREASED APPETITE: ICD-10-CM

## 2020-02-10 DIAGNOSIS — Z98.84 S/P LAPAROSCOPIC SLEEVE GASTRECTOMY: ICD-10-CM

## 2020-02-10 DIAGNOSIS — E78.5 HYPERLIPIDEMIA, UNSPECIFIED HYPERLIPIDEMIA TYPE: ICD-10-CM

## 2020-02-10 DIAGNOSIS — R53.82 CHRONIC FATIGUE: ICD-10-CM

## 2020-02-10 DIAGNOSIS — E66.01 MORBID OBESITY WITH BMI OF 40.0-44.9, ADULT (HCC): ICD-10-CM

## 2020-02-10 PROCEDURE — 99214 OFFICE O/P EST MOD 30 MIN: CPT | Performed by: INTERNAL MEDICINE

## 2020-02-10 NOTE — PROGRESS NOTES
[address], [address]  Dept: [phone]    Patient:  Tierra Salter  :      1960  MRN:      [id number]    Referring Provider: Nia May, Rfl: 1  •  ADVAIR DISKUS 100-50 MCG/DOSE Inhalation Aerosol Powder, Breath Activated, TAKE 1 PUFF BY MOUTH TWICE A DAY (Patient taking differently: Inhale 1 puff into the lungs nightly.  TAKE 1 PUFF BY MOUTH TWICE A DAY ), Disp: 3 each, Rfl: 1  •  LEVOTHYRO Laterality Date   • BACK SURGERY     • BARIATRIC SLEEVE GASTRECTOMY LAPAROSCOPIC N/A 12/2/2019    Performed by Masha Agosto MD at [address] [place] [address] MAIN OR   • COLONOSCOPY  12/16/2013   • COLONOSCOPY N/A 6/11/2019    Performed by Nikolas Cheema MD at [address] [place] [address] bilaterally  Heart: S1, S2 normal, no murmur, click, rub or gallop, regular rate and rhythm  Abdomen: soft, obese, non tender  Extremities: extremities normal, atraumatic, no cyanosis or edema  Pulses: 2+ and symmetric  Neurologic: Grossly normal   ROS:

## 2020-02-15 ENCOUNTER — NURSE ONLY (OUTPATIENT)
Dept: ALLERGY | Facility: CLINIC | Age: 60
End: 2020-02-15
Payer: COMMERCIAL

## 2020-02-15 DIAGNOSIS — J30.89 ENVIRONMENTAL AND SEASONAL ALLERGIES: ICD-10-CM

## 2020-02-15 PROCEDURE — 95165 ANTIGEN THERAPY SERVICES: CPT | Performed by: ALLERGY & IMMUNOLOGY

## 2020-02-15 PROCEDURE — 95117 IMMUNOTHERAPY INJECTIONS: CPT | Performed by: ALLERGY & IMMUNOLOGY

## 2020-02-17 RX ORDER — PANTOPRAZOLE SODIUM 40 MG/1
TABLET, DELAYED RELEASE ORAL
Qty: 90 TABLET | Refills: 0 | Status: SHIPPED | OUTPATIENT
Start: 2020-02-17 | End: 2020-05-18

## 2020-02-17 NOTE — TELEPHONE ENCOUNTER
Last filled: 9/4/19 #40 tab with 5 refills   LOV: 1/15/2020  Future Appointments   Date Time Provider Joel Mann   2/26/2020  8:00 AM Masha Agosto MD [address] [facility] SYSTEM [facility]   2/27/2020  8:00 AM Germania Burroughs, [address] [place] [address] [address] [facility]   2/27/202

## 2020-02-17 NOTE — TELEPHONE ENCOUNTER
• methotrexate 2.5 MG Oral Tab Take 8 tablets (20 mg total) by mouth every 7 days. (Patient taking differently: Take 20 mg by mouth every 7 days.  TAKES ON MONDAYS ) 104 tablet 5

## 2020-02-26 ENCOUNTER — OFFICE VISIT (OUTPATIENT)
Dept: SURGERY | Facility: CLINIC | Age: 60
End: 2020-02-26
Payer: COMMERCIAL

## 2020-02-26 VITALS
RESPIRATION RATE: 16 BRPM | HEART RATE: 86 BPM | BODY MASS INDEX: 41.55 KG/M2 | HEIGHT: 69.5 IN | DIASTOLIC BLOOD PRESSURE: 70 MMHG | SYSTOLIC BLOOD PRESSURE: 118 MMHG | WEIGHT: 287 LBS

## 2020-02-26 DIAGNOSIS — E66.01 MORBID (SEVERE) OBESITY DUE TO EXCESS CALORIES (HCC): ICD-10-CM

## 2020-02-26 DIAGNOSIS — Z98.84 S/P LAPAROSCOPIC SLEEVE GASTRECTOMY: Primary | ICD-10-CM

## 2020-02-26 NOTE — PROGRESS NOTES
[address] [place] [address] Pooja Carrion  [address]  Dept: [phone]    2/26/2020   Bariatric Patient Follow-up Evaluation    Chief Complaint:  Morbid obesity, 12/2/19 sleeve, preop 10/15/2019    Performed by Chris Rodriguez MD at [address] [place] [address] ENDOSCOPY   • FOOT SURGERY Left     Had right foot done recently.    • KNEE ARTHROSCOPY Right    • LAP SLEEVE GASTRECTOMY  12/02/2019    Dr Jim Lujan, [address] 8 tablets (20 mg total) by mouth every 7 days. , Disp: 40 tablet, Rfl: 5  •  Diclofenac Sodium 1 % Transdermal Gel, Apply 4 g topically 4 (four) times daily. , Disp: 100 g, Rfl: 3  •  MONTELUKAST SODIUM 10 MG Oral Tab, TAKE 1 TABLET BY MOUTH EVERY DAY AT NIG in am and 1 afternoon and 1 evening , Disp: , Rfl: 0     Allergies: Other                   HIVES    Comment:Reports side effect from DPT vaccine.   Penicillins             HIVES  Seasonal                Runny nose    ROS:      Constitutional: negative

## 2020-02-27 ENCOUNTER — OFFICE VISIT (OUTPATIENT)
Dept: SURGERY | Facility: CLINIC | Age: 60
End: 2020-02-27
Payer: COMMERCIAL

## 2020-02-27 VITALS — HEIGHT: 69.5 IN | BODY MASS INDEX: 41.55 KG/M2 | WEIGHT: 287 LBS

## 2020-02-27 DIAGNOSIS — E66.01 CLASS 3 SEVERE OBESITY DUE TO EXCESS CALORIES WITH SERIOUS COMORBIDITY AND BODY MASS INDEX (BMI) OF 40.0 TO 44.9 IN ADULT (HCC): Primary | ICD-10-CM

## 2020-02-27 PROCEDURE — 97803 MED NUTRITION INDIV SUBSEQ: CPT | Performed by: DIETITIAN, REGISTERED

## 2020-02-27 NOTE — PROGRESS NOTES
[address]  [address] [address]  Dept: [phone]  Loc: [phone]    02/27/20      Bariatric Follow-up Nutrition Session    Chio Hunt is a 63year old female.      A HDL 68 (H) 04/27/2019     (H) 04/27/2019    VLDL 31 (H) 04/27/2019    NONHDLC 139 (H) 04/27/2019    CALCNONHDL 135 (H) 05/28/2016        Vitamins/Minerals:  Lab Results   Component Value Date    B12 647 07/18/2019    VITB12 432 05/28/2016     Lab Re Mesylate (AZILECT) 1 MG Oral Tab, Take 1 tablet by mouth daily. , Disp: , Rfl:   •  XELJANZ XR 11 MG Oral Tablet 24 Hr, TAKE ONE TABLET (11 MG) BY MOUTH ONCE DAILY. MAY BE TAKEN WITH OR WITHOUT FOOD. SWALLOW TABLET WHOLE. DO NOT CRUSH, SPLIT OR CHEW.  STORE reported  Vitamin/mineral supplements:  Bariatric Choice QID  Protein supplements:  Premier Protein     Activity Level: Trainer 2x per week    Other: Pt doing well 3 mos s/p VSG w/ 19.8% EWL.  Kcal + protein intake overall appears to be appropriate for post

## 2020-02-28 RX ORDER — TOFACITINIB 11 MG/1
TABLET, FILM COATED, EXTENDED RELEASE ORAL
Qty: 30 TABLET | Refills: 10 | Status: SHIPPED | OUTPATIENT
Start: 2020-02-28 | End: 2021-02-15

## 2020-02-28 NOTE — TELEPHONE ENCOUNTER
Requested Prescriptions     Pending Prescriptions Disp Refills   • XELJANZ XR 11 MG Oral Tablet 24 Hr [Pharmacy Med Name: Milka Estrin XR 11MG] 30 tablet 10     Sig: TAKE ONE TABLET BY MOUTH ONCE DAILY. MAY BE TAKEN WITH OR WITHOUT FOOD. SWALLOW TABLET WHOLE.  D Chloride      98 - 112 mmol/L  111   Carbon Dioxide, Total      21.0 - 32.0 mmol/L  24.0   ANION GAP      0 - 18 mmol/L  5   BUN      7 - 18 mg/dL  14   CREATININE      0.55 - 1.02 mg/dL 0.78 0.70   BUN/CREAT Ratio      10.0 - 20.0  20.0   CALCIUM      8

## 2020-03-12 ENCOUNTER — TELEPHONE (OUTPATIENT)
Dept: SURGERY | Facility: CLINIC | Age: 60
End: 2020-03-12

## 2020-03-12 NOTE — TELEPHONE ENCOUNTER
Appointment audit/chart review phone call per protocol.   Per records, patient appointments as follows:  Surgeon 2/26/20  Dietician 2/27/20  [address] [facility] 2/10/20  Requested a call back to inform office if pt has been hospitalized or visited any ED since linda

## 2020-03-16 ENCOUNTER — NURSE ONLY (OUTPATIENT)
Dept: ALLERGY | Facility: CLINIC | Age: 60
End: 2020-03-16
Payer: COMMERCIAL

## 2020-03-16 DIAGNOSIS — J30.89 ENVIRONMENTAL AND SEASONAL ALLERGIES: ICD-10-CM

## 2020-03-16 PROCEDURE — 95117 IMMUNOTHERAPY INJECTIONS: CPT | Performed by: ALLERGY & IMMUNOLOGY

## 2020-03-17 NOTE — TELEPHONE ENCOUNTER
Requested Prescriptions     Pending Prescriptions Disp Refills   • methotrexate 2.5 MG Oral Tab 96 tablet 2     Sig: Take 8 tablets (20 mg total) by mouth every 7 days.      *90 day supply : request for Authorization  Last filled: 2/17/20 #40 tab w/ 5 rf  L - 56 U/L 8 (L)   Albumin      3.4 - 5.0 g/dL 3.5     Summary:  1. Cont. methotrexate - 8 tablets a week and folic acid 1mg a day   2. Cont. xlejanz 11mg a day   3. Return to clinic in 4 months. 4. .Check labs in 4  months .    5. Diclofenac gel 1 % for l

## 2020-03-25 ENCOUNTER — TELEPHONE (OUTPATIENT)
Dept: ALLERGY | Facility: CLINIC | Age: 60
End: 2020-03-25

## 2020-03-25 NOTE — TELEPHONE ENCOUNTER
Spoke with patient. Informed patient regarding the need to schedule allergy shots and regarding the screening for COVID.  Also informed patient of injection hours on Mondays and Tuesdays will be 5:30 pm as the building will be closing at 6 pm. Patient Geoff Hummel

## 2020-03-25 NOTE — TELEPHONE ENCOUNTER
Called patient to inform her of some changes in Shot Hours. Monday and Tuesdays our ending hours are 530 pm for injections. Injections also need to be scheduled. Asked patient to call to schedule injection appointment.  They are scheduled in 10 kemal

## 2020-04-07 NOTE — TELEPHONE ENCOUNTER
OK to continue.
Spoke to patient. She wanted to know if she can continue using diclofenac gel since she has to hold diclofenac pills. Explained that it was okay to continue with gel.
Walking/Toileting/Standing

## 2020-04-13 ENCOUNTER — NURSE ONLY (OUTPATIENT)
Dept: ALLERGY | Facility: CLINIC | Age: 60
End: 2020-04-13
Payer: COMMERCIAL

## 2020-04-13 DIAGNOSIS — J30.89 ENVIRONMENTAL AND SEASONAL ALLERGIES: ICD-10-CM

## 2020-04-13 PROCEDURE — 95117 IMMUNOTHERAPY INJECTIONS: CPT | Performed by: ALLERGY & IMMUNOLOGY

## 2020-04-17 NOTE — H&P
[facility]    Cardiology Consultation    [facility] Patient Status:  Outpatient in a Bed    1960 MRN [id number]   Location [place] Attending No att. providers found   [facility] Day # 0 PCP Stefani Drummond Right    • LAP SLEEVE GASTRECTOMY  12/02/2019    Dr Jose Luis Manzanares, [facility]   • LAPAROSCOPIC GASTROSTOMY  09/09/2019   • [id number]    for infertility   • LAPAROSCOPY,PELVIC,BIOPSY  1990    for infertility   • OTHER SURGICAL HISTORY Ri No apparent distress. No respiratory or constitutional distress. HEENT: Normocephalic, anicteric sclera, neck supple. Neck: No JVD, carotids 2+, no bruits. Cardiac: Regular rate and rhythm.  S1, S2 normal. No murmur, pericardial rub, S3.  Lungs: Clear wi

## 2020-04-30 ENCOUNTER — TELEPHONE (OUTPATIENT)
Dept: CARDIOLOGY | Age: 60
End: 2020-04-30

## 2020-05-01 ENCOUNTER — APPOINTMENT (OUTPATIENT)
Dept: LAB | Facility: HOSPITAL | Age: 60
End: 2020-05-01
Attending: INTERNAL MEDICINE
Payer: COMMERCIAL

## 2020-05-01 DIAGNOSIS — M06.9 RHEUMATOID ARTHRITIS INVOLVING MULTIPLE SITES, UNSPECIFIED RHEUMATOID FACTOR PRESENCE: ICD-10-CM

## 2020-05-01 DIAGNOSIS — Z51.81 ENCOUNTER FOR THERAPEUTIC DRUG MONITORING: ICD-10-CM

## 2020-05-01 PROCEDURE — 84460 ALANINE AMINO (ALT) (SGPT): CPT

## 2020-05-01 PROCEDURE — 84450 TRANSFERASE (AST) (SGOT): CPT

## 2020-05-01 PROCEDURE — 36415 COLL VENOUS BLD VENIPUNCTURE: CPT

## 2020-05-01 PROCEDURE — 85652 RBC SED RATE AUTOMATED: CPT

## 2020-05-01 PROCEDURE — 85027 COMPLETE CBC AUTOMATED: CPT

## 2020-05-01 PROCEDURE — 82565 ASSAY OF CREATININE: CPT

## 2020-05-01 PROCEDURE — 86140 C-REACTIVE PROTEIN: CPT

## 2020-05-06 ENCOUNTER — PATIENT MESSAGE (OUTPATIENT)
Dept: RHEUMATOLOGY | Facility: CLINIC | Age: 60
End: 2020-05-06

## 2020-05-06 NOTE — TELEPHONE ENCOUNTER
Appt changed to video visit:    Future Appointments   Date Time Provider Joel Mann   5/13/2020  8:40 AM Evgeny Blount MD 2014 [facility]

## 2020-05-11 ENCOUNTER — TELEMEDICINE (OUTPATIENT)
Dept: INTERNAL MEDICINE CLINIC | Facility: CLINIC | Age: 60
End: 2020-05-11

## 2020-05-11 ENCOUNTER — NURSE ONLY (OUTPATIENT)
Dept: ALLERGY | Facility: CLINIC | Age: 60
End: 2020-05-11
Payer: COMMERCIAL

## 2020-05-11 DIAGNOSIS — E78.5 HYPERLIPIDEMIA LDL GOAL <130: ICD-10-CM

## 2020-05-11 DIAGNOSIS — G20 PARKINSON DISEASE (HCC): ICD-10-CM

## 2020-05-11 DIAGNOSIS — M48.062 LUMBAR STENOSIS WITH NEUROGENIC CLAUDICATION: ICD-10-CM

## 2020-05-11 DIAGNOSIS — E66.9 OBESITY, UNSPECIFIED CLASSIFICATION, UNSPECIFIED OBESITY TYPE, UNSPECIFIED WHETHER SERIOUS COMORBIDITY PRESENT: Primary | ICD-10-CM

## 2020-05-11 DIAGNOSIS — M06.9 RHEUMATOID ARTHRITIS INVOLVING MULTIPLE SITES, UNSPECIFIED RHEUMATOID FACTOR PRESENCE: ICD-10-CM

## 2020-05-11 DIAGNOSIS — J30.89 ENVIRONMENTAL AND SEASONAL ALLERGIES: ICD-10-CM

## 2020-05-11 DIAGNOSIS — J45.40 MODERATE PERSISTENT EXTRINSIC ASTHMA WITHOUT COMPLICATION: ICD-10-CM

## 2020-05-11 DIAGNOSIS — Z98.84 S/P LAPAROSCOPIC SLEEVE GASTRECTOMY: ICD-10-CM

## 2020-05-11 DIAGNOSIS — E03.9 HYPOTHYROIDISM, UNSPECIFIED TYPE: ICD-10-CM

## 2020-05-11 PROCEDURE — 95117 IMMUNOTHERAPY INJECTIONS: CPT | Performed by: ALLERGY & IMMUNOLOGY

## 2020-05-11 PROCEDURE — 99214 OFFICE O/P EST MOD 30 MIN: CPT | Performed by: INTERNAL MEDICINE

## 2020-05-11 NOTE — PROGRESS NOTES
HPI:    Patient ID: Devyn Marie is a 63year old female. Virtual/Telephone Check-In    Devyn Marie verbally consents to a Virtual/Telephone Check-In service on 05/11/20.   Patient understands and accepts financial responsibility for any deducti arthritis, adult ([facility]     Parkinson disease ([facility]     Post-surgical hypothyroidism     L3-4, L4-5 central stenosis     Greater trochanteric bursitis, left     Chronic back pain     Morbid obesity with BMI of 40.0-44.9, adult ([facility])     Adjustment disorder recently.    • KNEE ARTHROSCOPY Right    • LAP SLEEVE GASTRECTOMY  12/02/2019    Dr Jimena Hebert, [address]  09/09/2019   • [id number]    for infertility   • LAPAROSCOPY,PELVIC,BIOPSY  1990    for infertilit times daily.  100 g 3   • MONTELUKAST SODIUM 10 MG Oral Tab TAKE 1 TABLET BY MOUTH EVERY DAY AT NIGHT 90 tablet 0   • DICLOFENAC SODIUM 75 MG Oral Tab EC TAKE 1 TABLET BY MOUTH TWICE A DAY 60 tablet 1   • Multiple Vitamins-Minerals (BARIATRIC FUSION) Oral C Encounters:  02/27/20 : 287 lb (130.2 kg)  02/26/20 : 287 lb (130.2 kg)  02/10/20 : 292 lb 9.6 oz (132.7 kg)  01/22/20 : 295 lb (133.8 kg)  01/15/20 : (!) 302 lb (137 kg)  01/02/20 : (!) 302 lb (137 kg)            ASSESSMENT/PLAN:   1.  Obesity, unspecified Referrals:  None         Roger Washington MD

## 2020-05-12 RX ORDER — PANTOPRAZOLE SODIUM 40 MG/1
TABLET, DELAYED RELEASE ORAL
Qty: 90 TABLET | Refills: 0 | OUTPATIENT
Start: 2020-05-12

## 2020-05-13 ENCOUNTER — TELEMEDICINE (OUTPATIENT)
Dept: RHEUMATOLOGY | Facility: CLINIC | Age: 60
End: 2020-05-13

## 2020-05-13 DIAGNOSIS — G20 PARKINSON'S DISEASE (HCC): ICD-10-CM

## 2020-05-13 DIAGNOSIS — M06.9 RHEUMATOID ARTHRITIS INVOLVING MULTIPLE SITES, UNSPECIFIED RHEUMATOID FACTOR PRESENCE: Primary | ICD-10-CM

## 2020-05-13 DIAGNOSIS — Z51.81 ENCOUNTER FOR THERAPEUTIC DRUG MONITORING: ICD-10-CM

## 2020-05-13 PROCEDURE — 99214 OFFICE O/P EST MOD 30 MIN: CPT | Performed by: INTERNAL MEDICINE

## 2020-05-13 NOTE — PATIENT INSTRUCTIONS
1. Cont. methotrexate - 8 tablets a week and folic acid 1mg a day   2. Cont. xlejanz 11mg a day   3. Return to clinic in 4 months. - next time goto every 6 months -   4. .Check labs in 4  months . 5. Diclofenac gel 1 % for left knee.     6. Ok to restart

## 2020-05-13 NOTE — PROGRESS NOTES
This visit is conducted using Telemedicine with live, interactive video and audio. Patient understands and accepts financial responsibility for any deductible, co-insurance and/or co-pays associated with this service.     Thong Cannon is a 63 year o pain is much better 2/10 pain. And bariatric sx - for gastric sleeve. She's taking less parkinson's medication b/c she might have had more   Healing was ok - right sided area still hurts. Not on pain meds. She's off the diclofenac as well.    Back s Multiple Vitamins-Minerals (BARIATRIC FUSION) Oral Chew Tab Chew 1 tablet by mouth daily. • CALCITRIOL 0.25 MCG Oral Cap TAKE 1 CAPSULE (0.25 MCG TOTAL) BY MOUTH DAILY.  90 capsule 1   • ADVAIR DISKUS 100-50 MCG/DOSE Inhalation Aerosol Powder, Breath Ac conjunctivitis 2008      Past Surgical History:   Procedure Laterality Date   • BACK SURGERY     • BARIATRIC SLEEVE GASTRECTOMY LAPAROSCOPIC N/A 12/2/2019    Performed by Cherylene Delton, MD at [facility] OR   • COLONOSCOPY  12/16/2013   • COLONOSCOPY N/A 6/11/2 46.3 fL 53.6 (H)   Platelet Count      809.7 - 450.0 10(3)uL 259.0   CREATININE      0.55 - 1.02 mg/dL 0.82   eGFR NON-AFR.  AMERICAN      >=60 79   eGFR AFRICAN AMERICAN      >=60 91   SED RATE      0 - 30 mm/Hr 19   C-REACTIVE PROTEIN      <0.30 mg/dL 0.7 compatible with Cárdenas neuropathy. 4. Chronic healed fracture deformity of the fifth metatarsal.  5. Mild bone marrow edema in the base of the third metatarsal which could  be degenerative or related to chronic stress response.     Reviewed notes from 4/20 arthriits  -  Positive AMINAH 1:80, , pos.  CCP polyarthralgia with rash on feet - now positive ccp abs - very specific for -   Stable -   - stay on  Methotrexate - 20mg a week   On xeljanz since 2/2018 - doing better -   -was off  diclfoenac 75mg 1-2 ta ask dr. Ranulfo Gonsales     - overall she's better on Jorge Luis Dalton MD  5/13/2020   1:06 PM  - [place]ed [place]  through Epic       Please note that the following visit was completed using two-way, real-time interactive audio and/or vide

## 2020-05-18 ENCOUNTER — TELEPHONE (OUTPATIENT)
Dept: RHEUMATOLOGY | Facility: CLINIC | Age: 60
End: 2020-05-18

## 2020-05-18 ENCOUNTER — VIRTUAL PHONE E/M (OUTPATIENT)
Dept: SURGERY | Facility: CLINIC | Age: 60
End: 2020-05-18
Payer: COMMERCIAL

## 2020-05-18 VITALS — WEIGHT: 272 LBS | BODY MASS INDEX: 40.29 KG/M2 | HEIGHT: 69 IN

## 2020-05-18 DIAGNOSIS — E55.9 VITAMIN D DEFICIENCY: ICD-10-CM

## 2020-05-18 DIAGNOSIS — R63.2 INCREASED APPETITE: ICD-10-CM

## 2020-05-18 DIAGNOSIS — Z51.81 ENCOUNTER FOR THERAPEUTIC DRUG MONITORING: ICD-10-CM

## 2020-05-18 DIAGNOSIS — E66.01 MORBID OBESITY WITH BMI OF 40.0-44.9, ADULT (HCC): ICD-10-CM

## 2020-05-18 DIAGNOSIS — E78.2 MIXED HYPERLIPIDEMIA: Primary | ICD-10-CM

## 2020-05-18 DIAGNOSIS — R73.9 ELEVATED BLOOD SUGAR: ICD-10-CM

## 2020-05-18 DIAGNOSIS — Z98.84 S/P LAPAROSCOPIC SLEEVE GASTRECTOMY: ICD-10-CM

## 2020-05-18 PROCEDURE — 99214 OFFICE O/P EST MOD 30 MIN: CPT | Performed by: INTERNAL MEDICINE

## 2020-05-18 RX ORDER — DICLOFENAC SODIUM 75 MG/1
75 TABLET, DELAYED RELEASE ORAL 2 TIMES DAILY
Qty: 60 TABLET | Refills: 3 | Status: SHIPPED | OUTPATIENT
Start: 2020-05-18 | End: 2021-01-18

## 2020-05-18 RX ORDER — PANTOPRAZOLE SODIUM 40 MG/1
40 TABLET, DELAYED RELEASE ORAL
Qty: 90 TABLET | Refills: 2 | Status: SHIPPED | OUTPATIENT
Start: 2020-05-18 | End: 2021-02-08

## 2020-05-18 NOTE — PROGRESS NOTES
[address]  Dept: [phone]    Virtual Telephone Check-In    Sully Domínguez verbally consents to a Virtual/Telephone Check-In visit on 05/ Rfl: 10  •  Diclofenac Sodium 1 % Transdermal Gel, Apply 4 g topically 4 (four) times daily. , Disp: 100 g, Rfl: 3  •  MONTELUKAST SODIUM 10 MG Oral Tab, TAKE 1 TABLET BY MOUTH EVERY DAY AT NIGHT, Disp: 90 tablet, Rfl: 0  •  Multiple Vitamins-Minerals (STEFANY 12/2/2019    Performed by Ruth Cabello MD at [facility] MAIN OR   • COLONOSCOPY  12/16/2013   • COLONOSCOPY N/A 6/11/2019    Performed by Raeann Ellis MD at [facility] ENDOSCOPY   • COLPOSCOPY, CERVIX W/UPPER ADJACENT VAGINA; W/BIOPSY(S), CERVIX     • D & C cholesterol has been well controlled on her current medication.     Lab Results   Component Value Date/Time    CHOLEST 207 (H) 04/27/2019 11:41 AM     (H) 04/27/2019 11:41 AM    HDL 68 (H) 04/27/2019 11:41 AM    TRIG 153 (H) 04/27/2019 11:41 AM    VL Future          Standing Expiration Date: 5/18/2021      Lipid Panel          Standing Status: Future          Standing Expiration Date: 5/18/2021        Kai Frazier MD

## 2020-05-18 NOTE — TELEPHONE ENCOUNTER
Contacted pt and informed of provider message. Diclofenac instructions for use reviewed with pt. Pt verbalized understanding with no further questions.

## 2020-05-20 ENCOUNTER — TELEPHONE (OUTPATIENT)
Dept: SURGERY | Facility: CLINIC | Age: 60
End: 2020-05-20

## 2020-05-20 ENCOUNTER — OFFICE VISIT (OUTPATIENT)
Dept: SURGERY | Facility: CLINIC | Age: 60
End: 2020-05-20
Payer: COMMERCIAL

## 2020-05-20 ENCOUNTER — APPOINTMENT (OUTPATIENT)
Dept: LAB | Facility: HOSPITAL | Age: 60
End: 2020-05-20
Attending: INTERNAL MEDICINE
Payer: COMMERCIAL

## 2020-05-20 VITALS
SYSTOLIC BLOOD PRESSURE: 104 MMHG | HEIGHT: 69.5 IN | BODY MASS INDEX: 39.96 KG/M2 | HEART RATE: 74 BPM | OXYGEN SATURATION: 96 % | DIASTOLIC BLOOD PRESSURE: 64 MMHG | WEIGHT: 276 LBS

## 2020-05-20 DIAGNOSIS — R63.2 INCREASED APPETITE: ICD-10-CM

## 2020-05-20 DIAGNOSIS — E55.9 VITAMIN D DEFICIENCY: ICD-10-CM

## 2020-05-20 DIAGNOSIS — G47.33 OSA (OBSTRUCTIVE SLEEP APNEA): Chronic | ICD-10-CM

## 2020-05-20 DIAGNOSIS — Z51.81 ENCOUNTER FOR THERAPEUTIC DRUG MONITORING: ICD-10-CM

## 2020-05-20 DIAGNOSIS — R73.9 ELEVATED BLOOD SUGAR: ICD-10-CM

## 2020-05-20 DIAGNOSIS — Z98.84 S/P LAPAROSCOPIC SLEEVE GASTRECTOMY: ICD-10-CM

## 2020-05-20 DIAGNOSIS — E66.01 MORBID OBESITY WITH BMI OF 40.0-44.9, ADULT (HCC): ICD-10-CM

## 2020-05-20 DIAGNOSIS — E78.5 HYPERLIPIDEMIA, UNSPECIFIED HYPERLIPIDEMIA TYPE: Primary | ICD-10-CM

## 2020-05-20 DIAGNOSIS — E66.01 MORBID (SEVERE) OBESITY DUE TO EXCESS CALORIES (HCC): ICD-10-CM

## 2020-05-20 DIAGNOSIS — E78.2 MIXED HYPERLIPIDEMIA: ICD-10-CM

## 2020-05-20 PROCEDURE — 36415 COLL VENOUS BLD VENIPUNCTURE: CPT

## 2020-05-20 PROCEDURE — 84443 ASSAY THYROID STIM HORMONE: CPT

## 2020-05-20 PROCEDURE — 80053 COMPREHEN METABOLIC PANEL: CPT

## 2020-05-20 PROCEDURE — 82306 VITAMIN D 25 HYDROXY: CPT

## 2020-05-20 PROCEDURE — 82607 VITAMIN B-12: CPT

## 2020-05-20 PROCEDURE — 84425 ASSAY OF VITAMIN B-1: CPT

## 2020-05-20 PROCEDURE — 80061 LIPID PANEL: CPT

## 2020-05-20 PROCEDURE — 83036 HEMOGLOBIN GLYCOSYLATED A1C: CPT

## 2020-05-20 NOTE — PROGRESS NOTES
[address] [address]  Dept: [phone]    5/20/2020   Bariatric Patient Follow-up Evaluation    Chief Complaint:  Morbid obesity, 12/2/19 sleeve, preop CERVIX W/UPPER ADJACENT VAGINA; W/BIOPSY(S), CERVIX     • D & C      for extended period   • D & C      for extended period   • ESOPHAGOGASTRODUODENOSCOPY (EGD) N/A 10/15/2019    Performed by Theola Krabbe, MD at [address] [place] [address] Medications:   •  Pantoprazole Sodium 40 MG Oral Tab EC, Take 1 tablet (40 mg total) by mouth every morning before breakfast., Disp: 90 tablet, Rfl: 2  •  Diclofenac Sodium 75 MG Oral Tab EC, Take 1 tablet (75 mg total) by mouth 2 (two) times daily.  As nee Rfl: 0  •  carbidopa-levodopa (SINEMET)  MG Oral Tab, Take 1 tablet by mouth 3 (three) times daily. 1 1/2 twice in am and 1 afternoon and 1 evening , Disp: , Rfl: 0     Allergies:     Other                   HIVES    Comment:Reports side effect from D

## 2020-05-29 ENCOUNTER — PATIENT MESSAGE (OUTPATIENT)
Dept: INTERNAL MEDICINE CLINIC | Facility: CLINIC | Age: 60
End: 2020-05-29

## 2020-05-29 RX ORDER — ERGOCALCIFEROL 1.25 MG/1
50000 CAPSULE ORAL WEEKLY
Qty: 12 CAPSULE | Refills: 3 | Status: SHIPPED | OUTPATIENT
Start: 2020-05-29 | End: 2021-03-16 | Stop reason: DRUGHIGH

## 2020-05-29 NOTE — TELEPHONE ENCOUNTER
From: Chio Hunt  To: Leonard Kang MD  Sent: 5/29/2020 11:14 AM CDT  Subject: Test Results Question    I have a question about ASSAY, THYROID STIM HORMONE resulted on 5/20/20 at 9:50 AM.    Dr Santa Roy, Dr Bladimir Jimenez wanted me to ask you about this th

## 2020-06-01 RX ORDER — LEVOTHYROXINE SODIUM 137 UG/1
137 TABLET ORAL
Qty: 90 TABLET | Refills: 0 | Status: SHIPPED | OUTPATIENT
Start: 2020-06-01 | End: 2020-08-12

## 2020-06-01 NOTE — TELEPHONE ENCOUNTER
Please let patient know that we decreased her levothyroxine to 137 mcq daily and she should have a repeat lab draw in 8 weeks.     Labs will be placed in the computer and she will need to schedule this with Central Scheduling    MARCELLA Sr  Working wi

## 2020-06-04 DIAGNOSIS — Z98.84 S/P LAPAROSCOPIC SLEEVE GASTRECTOMY: Primary | ICD-10-CM

## 2020-06-04 DIAGNOSIS — E66.01 MORBID OBESITY WITH BMI OF 40.0-44.9, ADULT (HCC): ICD-10-CM

## 2020-06-08 ENCOUNTER — TELEMEDICINE (OUTPATIENT)
Dept: SURGERY | Facility: CLINIC | Age: 60
End: 2020-06-08

## 2020-06-08 ENCOUNTER — OFFICE VISIT (OUTPATIENT)
Dept: OBGYN CLINIC | Facility: CLINIC | Age: 60
End: 2020-06-08
Payer: COMMERCIAL

## 2020-06-08 VITALS
WEIGHT: 274.81 LBS | SYSTOLIC BLOOD PRESSURE: 107 MMHG | BODY MASS INDEX: 40 KG/M2 | HEART RATE: 84 BPM | DIASTOLIC BLOOD PRESSURE: 71 MMHG

## 2020-06-08 VITALS — BODY MASS INDEX: 39.21 KG/M2 | HEIGHT: 69.5 IN | WEIGHT: 270.81 LBS

## 2020-06-08 DIAGNOSIS — Z12.4 CERVICAL CANCER SCREENING: ICD-10-CM

## 2020-06-08 DIAGNOSIS — Z01.419 WELL WOMAN EXAM WITH ROUTINE GYNECOLOGICAL EXAM: Primary | ICD-10-CM

## 2020-06-08 DIAGNOSIS — Z12.31 ENCOUNTER FOR SCREENING MAMMOGRAM FOR MALIGNANT NEOPLASM OF BREAST: ICD-10-CM

## 2020-06-08 DIAGNOSIS — E66.01 CLASS 2 SEVERE OBESITY DUE TO EXCESS CALORIES WITH SERIOUS COMORBIDITY AND BODY MASS INDEX (BMI) OF 39.0 TO 39.9 IN ADULT (HCC): Primary | ICD-10-CM

## 2020-06-08 PROCEDURE — 97803 MED NUTRITION INDIV SUBSEQ: CPT | Performed by: DIETITIAN, REGISTERED

## 2020-06-08 PROCEDURE — 99396 PREV VISIT EST AGE 40-64: CPT | Performed by: CLINICAL NURSE SPECIALIST

## 2020-06-08 RX ORDER — CALCITRIOL 0.25 UG/1
0.25 CAPSULE, LIQUID FILLED ORAL DAILY
Qty: 90 CAPSULE | Refills: 1 | Status: SHIPPED | OUTPATIENT
Start: 2020-06-08 | End: 2020-11-27

## 2020-06-08 NOTE — PROGRESS NOTES
Chio Hunt is a 63year old female [id number] Patient's last menstrual period was 04/18/2015 (exact date). Patient presents with:  Gyn Exam: Annual exam   Last annual exam was last year. Last pap was 2015 and normal, HPV negative.  Denies hx of abnormal CERVIX     • D & C      for extended period   • D & C      for extended period   • ESOPHAGOGASTRODUODENOSCOPY (EGD) N/A 10/15/2019    Performed by Roula Worley MD at [address]     Had right foot done recently.    • TORIN Emotionally abused: Not on file        Physically abused: Not on file        Forced sexual activity: Not on file    Other Topics      Concerns:         Service: Not Asked        Blood Transfusions: Not Asked        Caffeine Concern:  Yes total) by mouth every 7 days. , Disp: 96 tablet, Rfl: 1  •  XELJANZ XR 11 MG Oral Tablet 24 Hr, TAKE ONE TABLET BY MOUTH ONCE DAILY. MAY BE TAKEN WITH OR WITHOUT FOOD. SWALLOW TABLET WHOLE. DO NOT CRUSH, SPLIT OR CHEW.  STORE AT ROOM TEMPERATURE., Disp: 30 t vision  Cardiovascular:  denies chest pain or palpitations  Respiratory:  denies shortness of breath  Gastrointestinal:  denies heartburn, abdominal pain, diarrhea or constipation  Genitourinary:  denies dysuria, incontinence, abnormal vaginal discharge, v exam    Encounter for screening mammogram for malignant neoplasm of breast  -     [facility] MAYDA 2D+3D SCREENING BILAT (CPT=77067/37027); Future    Cervical cancer screening      Pap with HPV done. ASSCP guidelines reviewed in detail. Annual exams encouraged.

## 2020-06-08 NOTE — PROGRESS NOTES
[address]  [address] [address]  Dept: [phone]  Loc: [phone]    06/08/20      Bariatric Follow-up Nutrition Session    Consultation conducted via telehealth.      Pt ve .0 12/03/2019       Diabetes:    Lab Results   Component Value Date     05/20/2020    A1C 5.6 05/20/2020       Lipid Panel:  Lab Results   Component Value Date    CHOLEST 273 (H) 05/20/2020    TRIG 168 (H) 05/20/2020    HDL 61 (H) 05/20/20 NIGHT, Disp: 90 tablet, Rfl: 0  •  Multiple Vitamins-Minerals (BARIATRIC FUSION) Oral Chew Tab, Chew 1 tablet by mouth daily. , Disp: , Rfl:   •  CALCITRIOL 0.25 MCG Oral Cap, TAKE 1 CAPSULE (0.25 MCG TOTAL) BY MOUTH DAILY. , Disp: 90 capsule, Rfl: 1  •  ADV or prepared meals- ravioli, pasta, Hello Fresh    After dinner: Small ice cream bar (150 kcals)     +Increased carb intake lately    +No more cookies after dinner    Total Calories: 1200+ kcal per day  Excessive in: simple sugars and other: carbs some days with pt: 30 minutes

## 2020-06-14 ENCOUNTER — TELEPHONE (OUTPATIENT)
Dept: INTERNAL MEDICINE CLINIC | Facility: CLINIC | Age: 60
End: 2020-06-14

## 2020-06-15 ENCOUNTER — NURSE ONLY (OUTPATIENT)
Dept: ALLERGY | Facility: CLINIC | Age: 60
End: 2020-06-15
Payer: COMMERCIAL

## 2020-06-15 DIAGNOSIS — J30.89 ENVIRONMENTAL AND SEASONAL ALLERGIES: ICD-10-CM

## 2020-06-15 PROCEDURE — 95117 IMMUNOTHERAPY INJECTIONS: CPT | Performed by: ALLERGY & IMMUNOLOGY

## 2020-06-15 NOTE — TELEPHONE ENCOUNTER
RN pls call pt and  triage thanks.      Future Appointments   Date Time Provider Joel Mann   6/15/2020  5:20 PM [facility] ALLERGY ECSCHALRGY MACARENA [place]   6/27/2020 10:20 AM [facility] RM2 [facility] EM [facility]   7/16/2020  3:30 PM Marko Daly PsyD [facility]

## 2020-06-15 NOTE — TELEPHONE ENCOUNTER
please see pt Mychart message below. Pt stated that she feels she is shaking more and has more join pain since she got on the Pravastatin. Is there  something different that she can take.  Please Advise     From: Kirill Dawson  To: Jaden Vigil

## 2020-06-15 NOTE — TELEPHONE ENCOUNTER
Please call the patient and just advise her to stop the pravastatin altogether. Then contact our office in a week or 2 with an update on how she is doing in regards to her tremors and pain.   I do not want a start anything now until she is clear of the med

## 2020-06-15 NOTE — TELEPHONE ENCOUNTER
From: Rod Mcgowan  To: Nayely Guy MD  Sent: 6/14/2020  9:13 PM CDT  Subject: Prescription Question    Dr. Maribel Mcintosh, since I restarted my pravastatin, my Parkinson's has gotten worse and my RA pain has returned in the evening.  Do I really need the

## 2020-06-27 ENCOUNTER — HOSPITAL ENCOUNTER (OUTPATIENT)
Dept: MAMMOGRAPHY | Facility: HOSPITAL | Age: 60
Discharge: HOME | End: 2020-06-27
Attending: CLINICAL NURSE SPECIALIST
Payer: COMMERCIAL

## 2020-06-27 DIAGNOSIS — Z12.31 ENCOUNTER FOR SCREENING MAMMOGRAM FOR MALIGNANT NEOPLASM OF BREAST: ICD-10-CM

## 2020-06-27 PROCEDURE — 77067 SCR MAMMO BI INCL CAD: CPT | Performed by: CLINICAL NURSE SPECIALIST

## 2020-06-27 PROCEDURE — 77063 BREAST TOMOSYNTHESIS BI: CPT | Performed by: CLINICAL NURSE SPECIALIST

## 2020-07-06 RX ORDER — MONTELUKAST SODIUM 10 MG/1
TABLET ORAL
Qty: 90 TABLET | Refills: 0 | Status: SHIPPED | OUTPATIENT
Start: 2020-07-06 | End: 2020-08-10

## 2020-07-06 NOTE — TELEPHONE ENCOUNTER
Refill requested for:    MONTELUKAST SODIUM 10 MG Oral Tab 90 tablet 0 1/13/2020    Sig:   TAKE 1 TABLET BY MOUTH EVERY DAY AT NIGHT         Last office visit: 2/3/20    Previously advised to follow up in:  6 months    F/U currently scheduled?  No, has rivera

## 2020-07-13 ENCOUNTER — NURSE ONLY (OUTPATIENT)
Dept: ALLERGY | Facility: CLINIC | Age: 60
End: 2020-07-13
Payer: COMMERCIAL

## 2020-07-13 DIAGNOSIS — J30.89 ENVIRONMENTAL AND SEASONAL ALLERGIES: ICD-10-CM

## 2020-07-13 PROCEDURE — 95117 IMMUNOTHERAPY INJECTIONS: CPT | Performed by: ALLERGY & IMMUNOLOGY

## 2020-07-13 PROCEDURE — 95165 ANTIGEN THERAPY SERVICES: CPT | Performed by: ALLERGY & IMMUNOLOGY

## 2020-07-23 ENCOUNTER — OFFICE VISIT (OUTPATIENT)
Dept: INTERNAL MEDICINE CLINIC | Facility: CLINIC | Age: 60
End: 2020-07-23
Payer: COMMERCIAL

## 2020-07-23 VITALS
WEIGHT: 270 LBS | HEART RATE: 79 BPM | SYSTOLIC BLOOD PRESSURE: 102 MMHG | HEIGHT: 69.5 IN | BODY MASS INDEX: 39.09 KG/M2 | DIASTOLIC BLOOD PRESSURE: 66 MMHG

## 2020-07-23 DIAGNOSIS — S70.01XS CONTUSION OF RIGHT HIP, SEQUELA: Primary | ICD-10-CM

## 2020-07-23 PROCEDURE — 3008F BODY MASS INDEX DOCD: CPT | Performed by: INTERNAL MEDICINE

## 2020-07-23 PROCEDURE — 3074F SYST BP LT 130 MM HG: CPT | Performed by: INTERNAL MEDICINE

## 2020-07-23 PROCEDURE — 3078F DIAST BP <80 MM HG: CPT | Performed by: INTERNAL MEDICINE

## 2020-07-23 PROCEDURE — 99213 OFFICE O/P EST LOW 20 MIN: CPT | Performed by: INTERNAL MEDICINE

## 2020-07-23 PROCEDURE — 99212 OFFICE O/P EST SF 10 MIN: CPT | Performed by: INTERNAL MEDICINE
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
## 2020-07-24 ENCOUNTER — OFFICE VISIT (OUTPATIENT)
Dept: SURGERY | Facility: CLINIC | Age: 60
End: 2020-07-24
Payer: COMMERCIAL

## 2020-07-24 VITALS
HEART RATE: 85 BPM | DIASTOLIC BLOOD PRESSURE: 66 MMHG | BODY MASS INDEX: 40.58 KG/M2 | SYSTOLIC BLOOD PRESSURE: 112 MMHG | HEIGHT: 69 IN | WEIGHT: 274 LBS | OXYGEN SATURATION: 97 %

## 2020-07-24 DIAGNOSIS — E66.01 MORBID OBESITY WITH BMI OF 40.0-44.9, ADULT (HCC): ICD-10-CM

## 2020-07-24 DIAGNOSIS — Z51.81 ENCOUNTER FOR THERAPEUTIC DRUG MONITORING: ICD-10-CM

## 2020-07-24 DIAGNOSIS — Z98.84 S/P LAPAROSCOPIC SLEEVE GASTRECTOMY: ICD-10-CM

## 2020-07-24 DIAGNOSIS — G47.33 OSA (OBSTRUCTIVE SLEEP APNEA): Chronic | ICD-10-CM

## 2020-07-24 DIAGNOSIS — E78.5 HYPERLIPIDEMIA, UNSPECIFIED HYPERLIPIDEMIA TYPE: Primary | ICD-10-CM

## 2020-07-24 DIAGNOSIS — R63.2 INCREASED APPETITE: ICD-10-CM

## 2020-07-24 PROCEDURE — 3008F BODY MASS INDEX DOCD: CPT | Performed by: INTERNAL MEDICINE

## 2020-07-24 PROCEDURE — 99214 OFFICE O/P EST MOD 30 MIN: CPT | Performed by: INTERNAL MEDICINE

## 2020-07-24 PROCEDURE — 3078F DIAST BP <80 MM HG: CPT | Performed by: INTERNAL MEDICINE

## 2020-07-24 PROCEDURE — 3074F SYST BP LT 130 MM HG: CPT | Performed by: INTERNAL MEDICINE

## 2020-07-24 NOTE — PROGRESS NOTES
[address]  Dept: [phone]    Patient:  Edelmira Tobias  :      1960  MRN:      [id number]    Referring Provider: Theo Richardson, total) by mouth every morning before breakfast., Disp: 90 tablet, Rfl: 2  •  Diclofenac Sodium 75 MG Oral Tab EC, Take 1 tablet (75 mg total) by mouth 2 (two) times daily.  As needed, Disp: 60 tablet, Rfl: 3  •  methotrexate 2.5 MG Oral Tab, Take 8 tablets Glasses of wine per week    Drug use: No    Surgical History:    Past Surgical History:   Procedure Laterality Date   • BACK SURGERY     • BARIATRIC SLEEVE GASTRECTOMY LAPAROSCOPIC N/A 12/2/2019    Performed by Hernando Lynn MD at [place] [address] bruit, no JVD, supple, symmetrical, trachea midline and thyroid not enlarged, symmetric, no tenderness/mass/nodules  Lungs: clear to auscultation bilaterally  Heart: S1, S2 normal, no murmur, click, rub or gallop, regular rate and rhythm  Abdomen: soft, ob PHENTERMINE: Since the patient would like to try phentermine, and is aware of the potential side effects (hypertension, palpitations, tachycardia, and anxiety), I will give Jose R Stanleying a prescription today to be used in conjunction with the above

## 2020-08-03 ENCOUNTER — PATIENT MESSAGE (OUTPATIENT)
Dept: INTERNAL MEDICINE CLINIC | Facility: CLINIC | Age: 60
End: 2020-08-03

## 2020-08-03 NOTE — TELEPHONE ENCOUNTER
From: Laila Dennis  To: Fiona Hilario MD  Sent: 8/3/2020 1:44 PM CDT  Subject: Non-Urgent Medical Question    I have a physical scheduled for the 12th of August. He mentioned that he wanted me to get some blood tests before the physical. I was wonde

## 2020-08-08 ENCOUNTER — LAB ENCOUNTER (OUTPATIENT)
Dept: LAB | Facility: HOSPITAL | Age: 60
End: 2020-08-08
Attending: NURSE PRACTITIONER
Payer: COMMERCIAL

## 2020-08-08 DIAGNOSIS — E78.2 MIXED HYPERLIPIDEMIA: ICD-10-CM

## 2020-08-08 DIAGNOSIS — E03.9 HYPOTHYROIDISM, UNSPECIFIED TYPE: ICD-10-CM

## 2020-08-08 LAB
CHOLEST SMN-MCNC: 197 MG/DL (ref ?–200)
HDLC SERPL-MCNC: 70 MG/DL (ref 40–59)
LDLC SERPL CALC-MCNC: 107 MG/DL (ref ?–100)
NONHDLC SERPL-MCNC: 127 MG/DL (ref ?–130)
PATIENT FASTING Y/N/NP: YES
T3FREE SERPL-MCNC: 1.2 PG/ML (ref 2.4–4.2)
T4 FREE SERPL-MCNC: 1.2 NG/DL (ref 0.8–1.7)
TRIGL SERPL-MCNC: 102 MG/DL (ref 30–149)
TSI SER-ACNC: 0.27 MIU/ML (ref 0.36–3.74)
VLDLC SERPL CALC-MCNC: 20 MG/DL (ref 0–30)

## 2020-08-08 PROCEDURE — 84439 ASSAY OF FREE THYROXINE: CPT

## 2020-08-08 PROCEDURE — 84443 ASSAY THYROID STIM HORMONE: CPT

## 2020-08-08 PROCEDURE — 84481 FREE ASSAY (FT-3): CPT

## 2020-08-08 PROCEDURE — 80061 LIPID PANEL: CPT

## 2020-08-08 PROCEDURE — 36415 COLL VENOUS BLD VENIPUNCTURE: CPT

## 2020-08-10 ENCOUNTER — NURSE ONLY (OUTPATIENT)
Dept: ALLERGY | Facility: CLINIC | Age: 60
End: 2020-08-10
Payer: COMMERCIAL

## 2020-08-10 ENCOUNTER — OFFICE VISIT (OUTPATIENT)
Dept: ALLERGY | Facility: CLINIC | Age: 60
End: 2020-08-10
Payer: COMMERCIAL

## 2020-08-10 VITALS
DIASTOLIC BLOOD PRESSURE: 55 MMHG | RESPIRATION RATE: 20 BRPM | TEMPERATURE: 99 F | OXYGEN SATURATION: 95 % | BODY MASS INDEX: 39.69 KG/M2 | SYSTOLIC BLOOD PRESSURE: 126 MMHG | HEIGHT: 69 IN | HEART RATE: 80 BPM | WEIGHT: 268 LBS

## 2020-08-10 DIAGNOSIS — J45.40 ASTHMA, EXTRINSIC, MODERATE PERSISTENT, UNCOMPLICATED: Primary | ICD-10-CM

## 2020-08-10 DIAGNOSIS — J30.1 SEASONAL ALLERGIC RHINITIS DUE TO POLLEN: ICD-10-CM

## 2020-08-10 DIAGNOSIS — Z91.09 ALLERGY TO AMERICAN HOUSE DUST MITE: ICD-10-CM

## 2020-08-10 DIAGNOSIS — J30.89 ENVIRONMENTAL AND SEASONAL ALLERGIES: ICD-10-CM

## 2020-08-10 PROCEDURE — 99214 OFFICE O/P EST MOD 30 MIN: CPT | Performed by: ALLERGY & IMMUNOLOGY

## 2020-08-10 PROCEDURE — 95117 IMMUNOTHERAPY INJECTIONS: CPT | Performed by: ALLERGY & IMMUNOLOGY

## 2020-08-10 PROCEDURE — 3074F SYST BP LT 130 MM HG: CPT | Performed by: ALLERGY & IMMUNOLOGY

## 2020-08-10 PROCEDURE — 3078F DIAST BP <80 MM HG: CPT | Performed by: ALLERGY & IMMUNOLOGY

## 2020-08-10 PROCEDURE — 3008F BODY MASS INDEX DOCD: CPT | Performed by: ALLERGY & IMMUNOLOGY

## 2020-08-10 RX ORDER — MONTELUKAST SODIUM 10 MG/1
10 TABLET ORAL NIGHTLY
Qty: 90 TABLET | Refills: 0 | Status: SHIPPED | OUTPATIENT
Start: 2020-08-10 | End: 2020-09-29

## 2020-08-10 NOTE — PATIENT INSTRUCTIONS
1. Asthma  Denies persistent symptoms more than 2 days/week. No ED visits or prednisone in the interim.   Unable to perform spirometry due to current coronavirus pandemic  Asthma control test is 25 out of 25  Pneumovax up-to-date    Recs: Continue with Adv

## 2020-08-10 NOTE — PROGRESS NOTES
Kamini Zamora is a 63year old female. HPI:   Patient presents with: Allergies: Pt presents for 6 month asthma f/u, denies current flares. Asthma: Pt presents for 6 month AR, AIT f/u.      Patient is a 26-year-old female who presents for follow-up • BACK SURGERY     • BARIATRIC SLEEVE GASTRECTOMY LAPAROSCOPIC N/A 12/2/2019    Performed by Javier Christine MD at [facility] OR   • COLONOSCOPY  12/16/2013   • COLONOSCOPY N/A 6/11/2019    Performed by Liza Jc MD at [address] total) by mouth nightly. 30 tablet 3   • CALCITRIOL 0.25 MCG Oral Cap TAKE 1 CAPSULE (0.25 MCG TOTAL) BY MOUTH DAILY.  90 capsule 1   • Levothyroxine Sodium 137 MCG Oral Tab Take 137 mcg by mouth before breakfast. 90 tablet 0   • ergocalciferol 1.25 MG (500 Allergies: Other                   HIVES    Comment:Reports side effect from DPT vaccine.   Penicillins             HIVES  Seasonal                Runny nose      ROS:   Allergic/Immuno:  See hpi  Cardiovascular:  Negative for irregular heartbeat/p day  Reviewed FDA warning with Singulair. Patient denies side effects  Albuterol 2 puffs every 4-6 hours as needed  Recommend a flu shot this fall    2.  Ar  Stable with current regimen including Singulair and maintenance dose immunotherapy every 4 weeks t

## 2020-08-12 ENCOUNTER — OFFICE VISIT (OUTPATIENT)
Dept: INTERNAL MEDICINE CLINIC | Facility: CLINIC | Age: 60
End: 2020-08-12
Payer: COMMERCIAL

## 2020-08-12 VITALS
HEART RATE: 81 BPM | DIASTOLIC BLOOD PRESSURE: 67 MMHG | WEIGHT: 269 LBS | HEIGHT: 69 IN | BODY MASS INDEX: 39.84 KG/M2 | SYSTOLIC BLOOD PRESSURE: 120 MMHG

## 2020-08-12 DIAGNOSIS — E03.9 HYPOTHYROIDISM, UNSPECIFIED TYPE: ICD-10-CM

## 2020-08-12 DIAGNOSIS — M06.9 RHEUMATOID ARTHRITIS INVOLVING MULTIPLE SITES, UNSPECIFIED RHEUMATOID FACTOR PRESENCE: ICD-10-CM

## 2020-08-12 DIAGNOSIS — Z00.00 ROUTINE PHYSICAL EXAMINATION: Primary | ICD-10-CM

## 2020-08-12 DIAGNOSIS — G47.33 OSA (OBSTRUCTIVE SLEEP APNEA): ICD-10-CM

## 2020-08-12 DIAGNOSIS — J45.40 MODERATE PERSISTENT EXTRINSIC ASTHMA WITHOUT COMPLICATION: ICD-10-CM

## 2020-08-12 DIAGNOSIS — G20 PARKINSON DISEASE (HCC): ICD-10-CM

## 2020-08-12 DIAGNOSIS — Z98.84 S/P LAPAROSCOPIC SLEEVE GASTRECTOMY: ICD-10-CM

## 2020-08-12 DIAGNOSIS — E66.9 OBESITY, UNSPECIFIED CLASSIFICATION, UNSPECIFIED OBESITY TYPE, UNSPECIFIED WHETHER SERIOUS COMORBIDITY PRESENT: ICD-10-CM

## 2020-08-12 DIAGNOSIS — E78.5 HYPERLIPIDEMIA LDL GOAL <130: ICD-10-CM

## 2020-08-12 PROCEDURE — 99396 PREV VISIT EST AGE 40-64: CPT | Performed by: INTERNAL MEDICINE

## 2020-08-12 PROCEDURE — 3008F BODY MASS INDEX DOCD: CPT | Performed by: INTERNAL MEDICINE

## 2020-08-12 PROCEDURE — 3078F DIAST BP <80 MM HG: CPT | Performed by: INTERNAL MEDICINE

## 2020-08-12 PROCEDURE — 3074F SYST BP LT 130 MM HG: CPT | Performed by: INTERNAL MEDICINE

## 2020-08-12 RX ORDER — LEVOTHYROXINE SODIUM 0.12 MG/1
125 TABLET ORAL
Qty: 90 TABLET | Refills: 1 | Status: SHIPPED | OUTPATIENT
Start: 2020-08-12 | End: 2020-08-29

## 2020-08-12 NOTE — PROGRESS NOTES
HPI:    Patient ID: Rod Mcgowan is a 63year old female. HPI  Patient is here requesting physical exam and follow-up on multiple chronic medical issues as listed below. Last seen here on May 11. That was following a sleeve gastrectomy.   Her sathish fatigue     Encounter for therapeutic drug monitoring     Increased appetite     Pre-op examination     Morbid (severe) obesity due to excess calories (HCC)     EVIE (obstructive sleep apnea)     S/P laparoscopic sleeve gastrectomy         HISTORY:  Past Me OTHER SURGICAL HISTORY  12/3/2015   • THYROIDECTOMY      Total       Family History   Problem Relation Age of Onset   • Heart Disorder Father 54        heart attack-cause of death.    • Heart Disorder Mother    • Stroke Mother    • Cancer Mother         AML Levothyroxine Sodium 137 MCG Oral Tab Take 137 mcg by mouth before breakfast. 90 tablet 0   • ergocalciferol 1.25 MG (42265 UT) Oral Cap Take 1 capsule (50,000 Units total) by mouth once a week.  12 capsule 3   • Pantoprazole Sodium 40 MG Oral Tab EC Take 1 Runny nose     PHYSICAL EXAM:   /67 (BP Location: Left arm, Patient Position: Sitting, Cuff Size: large)   Pulse 81   Ht 5' 9\" (1.753 m)   Wt 269 lb (122 kg)   LMP 04/18/2015 (Exact Date)   BMI 39.72 kg/m²      Physical Exam    Constitutional: type  Recent TSH was suppressed. We will decrease the dose of the levothyroxine from 137 down to 125 mcg a day. Recheck TSH in 3 months. - ASSAY, THYROID STIM HORMONE; Future    3. Hyperlipidemia LDL goal <130  Recent lipid profile looks good.   Continue

## 2020-08-17 ENCOUNTER — TELEPHONE (OUTPATIENT)
Dept: RHEUMATOLOGY | Facility: CLINIC | Age: 60
End: 2020-08-17

## 2020-08-29 RX ORDER — LEVOTHYROXINE SODIUM 137 UG/1
TABLET ORAL
Qty: 90 TABLET | Refills: 0 | OUTPATIENT
Start: 2020-08-29

## 2020-09-03 ENCOUNTER — OFFICE VISIT (OUTPATIENT)
Dept: SURGERY | Facility: CLINIC | Age: 60
End: 2020-09-03
Payer: COMMERCIAL

## 2020-09-03 VITALS — HEIGHT: 69 IN | WEIGHT: 267.5 LBS | BODY MASS INDEX: 39.62 KG/M2

## 2020-09-03 DIAGNOSIS — E66.01 CLASS 2 SEVERE OBESITY DUE TO EXCESS CALORIES WITH SERIOUS COMORBIDITY AND BODY MASS INDEX (BMI) OF 39.0 TO 39.9 IN ADULT (HCC): Primary | ICD-10-CM

## 2020-09-03 PROCEDURE — 3008F BODY MASS INDEX DOCD: CPT | Performed by: DIETITIAN, REGISTERED

## 2020-09-03 PROCEDURE — 97803 MED NUTRITION INDIV SUBSEQ: CPT | Performed by: DIETITIAN, REGISTERED

## 2020-09-03 NOTE — PROGRESS NOTES
[address]  [address] [address]  Dept: [phone]  Loc: [phone]    09/03/20      Bariatric Follow-up Nutrition Session    Sully Domínguez is a 63year old female.      A 08/08/2020    HDL 70 (H) 08/08/2020     (H) 08/08/2020    VLDL 20 08/08/2020    NONHDLC 127 08/08/2020    CALCNONHDL 135 (H) 05/28/2016        Vitamins/Minerals:  Lab Results   Component Value Date    B12 513 05/20/2020    VITB12 432 05/28/2016 tablet by mouth daily. , Disp: , Rfl:   •  ADVAIR DISKUS 100-50 MCG/DOSE Inhalation Aerosol Powder, Breath Activated, TAKE 1 PUFF BY MOUTH TWICE A DAY (Patient taking differently: Inhale 1 puff into the lungs nightly.  TAKE 1 PUFF BY MOUTH TWICE A DAY ), Dis carbs some days  Inadequate in:  fruits, vegetables and fiber     Patient has made some modifications and adjustments to diet: yes, is sticking w/ moderate portions and prioritizing protein, however, has been eating more carbs and sugar lately  Food intole

## 2020-09-05 ENCOUNTER — LAB ENCOUNTER (OUTPATIENT)
Dept: LAB | Facility: HOSPITAL | Age: 60
End: 2020-09-05
Attending: INTERNAL MEDICINE
Payer: COMMERCIAL

## 2020-09-05 DIAGNOSIS — Z51.81 ENCOUNTER FOR THERAPEUTIC DRUG MONITORING: ICD-10-CM

## 2020-09-05 DIAGNOSIS — M06.9 RHEUMATOID ARTHRITIS INVOLVING MULTIPLE SITES, UNSPECIFIED RHEUMATOID FACTOR PRESENCE: ICD-10-CM

## 2020-09-05 LAB
ALT SERPL-CCNC: 10 U/L (ref 13–56)
AST SERPL-CCNC: 20 U/L (ref 15–37)
CREAT BLD-MCNC: 0.98 MG/DL (ref 0.55–1.02)
CRP SERPL-MCNC: 0.48 MG/DL (ref ?–0.3)
DEPRECATED RDW RBC AUTO: 54.2 FL (ref 35.1–46.3)
ERYTHROCYTE [DISTWIDTH] IN BLOOD BY AUTOMATED COUNT: 15.1 % (ref 11–15)
ERYTHROCYTE [SEDIMENTATION RATE] IN BLOOD: 13 MM/HR (ref 0–30)
HCT VFR BLD AUTO: 40.4 % (ref 35–48)
HGB BLD-MCNC: 13.1 G/DL (ref 12–16)
MCH RBC QN AUTO: 31.6 PG (ref 26–34)
MCHC RBC AUTO-ENTMCNC: 32.4 G/DL (ref 31–37)
MCV RBC AUTO: 97.3 FL (ref 80–100)
PLATELET # BLD AUTO: 223 10(3)UL (ref 150–450)
RBC # BLD AUTO: 4.15 X10(6)UL (ref 3.8–5.3)
WBC # BLD AUTO: 6.9 X10(3) UL (ref 4–11)

## 2020-09-05 PROCEDURE — 84450 TRANSFERASE (AST) (SGOT): CPT

## 2020-09-05 PROCEDURE — 86140 C-REACTIVE PROTEIN: CPT

## 2020-09-05 PROCEDURE — 82565 ASSAY OF CREATININE: CPT

## 2020-09-05 PROCEDURE — 84460 ALANINE AMINO (ALT) (SGPT): CPT

## 2020-09-05 PROCEDURE — 85027 COMPLETE CBC AUTOMATED: CPT

## 2020-09-05 PROCEDURE — 36415 COLL VENOUS BLD VENIPUNCTURE: CPT

## 2020-09-05 PROCEDURE — 85652 RBC SED RATE AUTOMATED: CPT

## 2020-09-12 ENCOUNTER — NURSE ONLY (OUTPATIENT)
Dept: ALLERGY | Facility: CLINIC | Age: 60
End: 2020-09-12
Payer: COMMERCIAL

## 2020-09-12 ENCOUNTER — TELEPHONE (OUTPATIENT)
Dept: ALLERGY | Facility: CLINIC | Age: 60
End: 2020-09-12

## 2020-09-12 DIAGNOSIS — J30.89 ENVIRONMENTAL AND SEASONAL ALLERGIES: ICD-10-CM

## 2020-09-12 PROCEDURE — 95117 IMMUNOTHERAPY INJECTIONS: CPT | Performed by: ALLERGY & IMMUNOLOGY

## 2020-09-12 NOTE — TELEPHONE ENCOUNTER
Pt in office today for AIT. Pt would like to know if she can get her flu shot done with our office. RN advised her that we can, we just have not received our shipment of flu shots yet for this year.    Hopefully by next scheduled appointment we will have fl

## 2020-09-15 ENCOUNTER — OFFICE VISIT (OUTPATIENT)
Dept: RHEUMATOLOGY | Facility: CLINIC | Age: 60
End: 2020-09-15
Payer: COMMERCIAL

## 2020-09-15 VITALS
HEIGHT: 69 IN | HEART RATE: 91 BPM | RESPIRATION RATE: 16 BRPM | BODY MASS INDEX: 39.55 KG/M2 | DIASTOLIC BLOOD PRESSURE: 68 MMHG | WEIGHT: 267 LBS | SYSTOLIC BLOOD PRESSURE: 106 MMHG

## 2020-09-15 DIAGNOSIS — M06.9 RHEUMATOID ARTHRITIS INVOLVING MULTIPLE SITES, UNSPECIFIED RHEUMATOID FACTOR PRESENCE: Primary | ICD-10-CM

## 2020-09-15 DIAGNOSIS — Z51.81 ENCOUNTER FOR THERAPEUTIC DRUG MONITORING: ICD-10-CM

## 2020-09-15 PROCEDURE — 3074F SYST BP LT 130 MM HG: CPT | Performed by: INTERNAL MEDICINE

## 2020-09-15 PROCEDURE — 3008F BODY MASS INDEX DOCD: CPT | Performed by: INTERNAL MEDICINE

## 2020-09-15 PROCEDURE — 99214 OFFICE O/P EST MOD 30 MIN: CPT | Performed by: INTERNAL MEDICINE

## 2020-09-15 PROCEDURE — 3078F DIAST BP <80 MM HG: CPT | Performed by: INTERNAL MEDICINE

## 2020-09-15 NOTE — PROGRESS NOTES
Carly Foote is a 63year old female who presents for Patient presents with:  Rheumatoid Arthritis  Lab Results  Medication Follow-Up  Back Pain  .    HPI:     She is a pleasant 65year old who has elevated RF >300 and foot pain - c/w seropositive ok - right sided area still hurts. Not on pain meds. She's off the diclofenac as well. Back sx was first and then the sleeve was done. Not taking tramadol or gabapentin at this time. B/c she's better.    She is using the diclofenac gel 1 %   She has 12 capsule 3   • Pantoprazole Sodium 40 MG Oral Tab EC Take 1 tablet (40 mg total) by mouth every morning before breakfast. 90 tablet 2   • Diclofenac Sodium 75 MG Oral Tab EC Take 1 tablet (75 mg total) by mouth 2 (two) times daily.  As needed 60 tablet 3 11/21/2017   • High cholesterol    • History of blood transfusion [address] [place] [address]   • Infertility, female    • Parkinson disease ([facility]    • Parkinson's disease ([facility]    • Rheumatoid arthritis ([facility]    • S/P laparoscopic sleeve gastrectomy 01/22/2020    for [place] BMI 39.43 kg/m²   HEENT:clear sclera PERRLA, pleasant, no acute distress, no CAD, no neck tendnerness, good ROM,   No rashes  CVS: RRR, no murmurs  RS: CTAB, no crackles, no rhonchi  ABD: Soft Non tender,   Joint exam:   Not tender in 1-5th mcps , no syn medial bowing deformity  of the fifth metatarsal there is a result of the previous fracture but the  distal shaft of the fifth metatarsal is parallel with the distal shaft of  the fourth metatarsal and alignment is near-anatomic.   There is subtle bone vandana degenerative changes of the lumbar spine as detailed. Findings have progressed since October, 2017. Notable levels as follows:     2.  L3-L4:  Severe multifactorial spinal canal stenosis, which is exacerbated by a medially directed 7-8 mm synovial cyst ar injections b/l 7/22/2015 by ortho  Diclofenac gel is really helping her. -   3. Hx of parkinson's -   4.  Lower back pain classic for spinal stenosis - f/u physiatry - braod based disc bulgd in L3-4   S/p L2-3-4 minimally invasive laminectomy - 9/9/2019 -

## 2020-09-15 NOTE — PATIENT INSTRUCTIONS
1. Cont. methotrexate - 8 tablets a week and folic acid 1mg a day   2. Cont. xlejanz 11mg a day   3. Return to clinic in  6 months -   4. .Check labs in 4  months . 5. Diclofenac gel 1 % for left knee. 6. Cont.   diclofenac 75mg twice a day  - - can t

## 2020-09-21 RX ORDER — PRAVASTATIN SODIUM 20 MG
TABLET ORAL
Qty: 90 TABLET | Refills: 1 | Status: SHIPPED | OUTPATIENT
Start: 2020-09-21 | End: 2021-01-11

## 2020-09-24 NOTE — TELEPHONE ENCOUNTER
Last filled: 3/17/2020 #96 tab with 1 refill   LOV: 9/15/2020  Future Appointments   Date Time Provider Joel Mann   9/28/2020  9:45 AM Kassie Dejesus MD [address] [place] [address]   10/10/2020  9:00 AM [facility] ALLERGY ECSCHATORRESGY MACARENA [place]   10/15/2020  3:3

## 2020-09-28 ENCOUNTER — OFFICE VISIT (OUTPATIENT)
Dept: SURGERY | Facility: CLINIC | Age: 60
End: 2020-09-28
Payer: COMMERCIAL

## 2020-09-28 VITALS
BODY MASS INDEX: 39.69 KG/M2 | SYSTOLIC BLOOD PRESSURE: 110 MMHG | WEIGHT: 268 LBS | DIASTOLIC BLOOD PRESSURE: 70 MMHG | HEIGHT: 69 IN

## 2020-09-28 DIAGNOSIS — Z51.81 ENCOUNTER FOR THERAPEUTIC DRUG MONITORING: ICD-10-CM

## 2020-09-28 DIAGNOSIS — E78.2 MIXED HYPERLIPIDEMIA: ICD-10-CM

## 2020-09-28 DIAGNOSIS — R63.2 INCREASED APPETITE: ICD-10-CM

## 2020-09-28 DIAGNOSIS — G47.33 OSA (OBSTRUCTIVE SLEEP APNEA): Primary | ICD-10-CM

## 2020-09-28 DIAGNOSIS — Z98.84 S/P LAPAROSCOPIC SLEEVE GASTRECTOMY: ICD-10-CM

## 2020-09-28 DIAGNOSIS — E66.9 OBESITY (BMI 30-39.9): ICD-10-CM

## 2020-09-28 PROBLEM — E66.01 MORBID OBESITY WITH BMI OF 40.0-44.9, ADULT (HCC): Status: RESOLVED | Noted: 2018-01-23 | Resolved: 2020-09-28

## 2020-09-28 PROBLEM — E66.01 MORBID (SEVERE) OBESITY DUE TO EXCESS CALORIES (HCC): Status: RESOLVED | Noted: 2019-12-02 | Resolved: 2020-09-28

## 2020-09-28 PROCEDURE — 3074F SYST BP LT 130 MM HG: CPT | Performed by: INTERNAL MEDICINE

## 2020-09-28 PROCEDURE — 3078F DIAST BP <80 MM HG: CPT | Performed by: INTERNAL MEDICINE

## 2020-09-28 PROCEDURE — 99214 OFFICE O/P EST MOD 30 MIN: CPT | Performed by: INTERNAL MEDICINE

## 2020-09-28 PROCEDURE — 3008F BODY MASS INDEX DOCD: CPT | Performed by: INTERNAL MEDICINE

## 2020-09-28 RX ORDER — PHENTERMINE HYDROCHLORIDE 8 MG/1
TABLET ORAL
COMMUNITY
Start: 2020-09-19 | End: 2020-10-16

## 2020-09-28 RX ORDER — TOPIRAMATE 25 MG/1
25 TABLET ORAL
Qty: 30 TABLET | Refills: 2 | Status: SHIPPED | OUTPATIENT
Start: 2020-09-28 | End: 2020-12-21

## 2020-09-28 NOTE — PROGRESS NOTES
[address]  Dept: [phone]    Patient:  Adelso Nicole  :      1960  MRN:      [id number]    Referring Provider: Shannon Vaca, mouth once a week., Disp: 12 capsule, Rfl: 3  •  Pantoprazole Sodium 40 MG Oral Tab EC, Take 1 tablet (40 mg total) by mouth every morning before breakfast., Disp: 90 tablet, Rfl: 2  •  Diclofenac Sodium 75 MG Oral Tab EC, Take 1 tablet (75 mg total) by mo Glasses of wine per week    Drug use: No    Surgical History:    Past Surgical History:   Procedure Laterality Date   • BACK SURGERY     • BARIATRIC SLEEVE GASTRECTOMY LAPAROSCOPIC N/A 12/2/2019    Performed by Estrella Gomez MD at [place] [address] tenderness/mass/nodules  Lungs: clear to auscultation bilaterally  Heart: S1, S2 normal, no murmur, click, rub or gallop, regular rate and rhythm  Abdomen: soft, obese, non tender  Extremities: extremities normal, atraumatic, no cyanosis or edema  Pulses: Mckay Garcia MD

## 2020-09-29 RX ORDER — MONTELUKAST SODIUM 10 MG/1
10 TABLET ORAL NIGHTLY
Qty: 90 TABLET | Refills: 1 | Status: SHIPPED | OUTPATIENT
Start: 2020-09-29 | End: 2021-03-15

## 2020-10-06 ENCOUNTER — TELEPHONE (OUTPATIENT)
Dept: ALLERGY | Facility: CLINIC | Age: 60
End: 2020-10-06

## 2020-10-10 ENCOUNTER — NURSE ONLY (OUTPATIENT)
Dept: ALLERGY | Facility: CLINIC | Age: 60
End: 2020-10-10
Payer: COMMERCIAL

## 2020-10-10 DIAGNOSIS — Z23 NEEDS FLU SHOT: ICD-10-CM

## 2020-10-10 DIAGNOSIS — J30.89 ENVIRONMENTAL AND SEASONAL ALLERGIES: ICD-10-CM

## 2020-10-10 PROCEDURE — 90471 IMMUNIZATION ADMIN: CPT | Performed by: ALLERGY & IMMUNOLOGY

## 2020-10-10 PROCEDURE — 90686 IIV4 VACC NO PRSV 0.5 ML IM: CPT | Performed by: ALLERGY & IMMUNOLOGY

## 2020-10-10 PROCEDURE — 95117 IMMUNOTHERAPY INJECTIONS: CPT | Performed by: ALLERGY & IMMUNOLOGY

## 2020-10-16 RX ORDER — PHENTERMINE HYDROCHLORIDE 8 MG/1
1 TABLET ORAL DAILY
Qty: 30 TABLET | Refills: 0 | Status: SHIPPED | OUTPATIENT
Start: 2020-10-16 | End: 2020-11-19

## 2020-10-27 ENCOUNTER — PATIENT MESSAGE (OUTPATIENT)
Dept: INTERNAL MEDICINE CLINIC | Facility: CLINIC | Age: 60
End: 2020-10-27

## 2020-10-27 DIAGNOSIS — M21.371 RIGHT FOOT DROP: Primary | ICD-10-CM

## 2020-10-27 NOTE — TELEPHONE ENCOUNTER
From: Kamini Zamora  To: Roger Washington MD  Sent: 10/27/2020 10:20 AM CDT  Subject: Prescription Question    I am trying to get a new brace for my right foot to improve my balance. Due to my 80 lb weight loss, my old one is too loose.  However, they ne

## 2020-10-30 NOTE — TELEPHONE ENCOUNTER
I have created and pended the order for the AFO.   Please fax to the proper person as per the patient's request

## 2020-11-05 NOTE — TELEPHONE ENCOUNTER
Patient called back and states order was not received by Kemar Chapman at [facility] . Order faxed again to [phone]     Spoke to Falguni Dalal at [facility] in [facility].

## 2020-11-07 ENCOUNTER — NURSE ONLY (OUTPATIENT)
Dept: ALLERGY | Facility: CLINIC | Age: 60
End: 2020-11-07
Payer: COMMERCIAL

## 2020-11-07 DIAGNOSIS — J30.89 ENVIRONMENTAL AND SEASONAL ALLERGIES: ICD-10-CM

## 2020-11-07 PROCEDURE — 95117 IMMUNOTHERAPY INJECTIONS: CPT | Performed by: ALLERGY & IMMUNOLOGY

## 2020-11-08 RX ORDER — FOLIC ACID 1 MG/1
1 TABLET ORAL
Qty: 90 TABLET | Refills: 3 | Status: SHIPPED | OUTPATIENT
Start: 2020-11-08 | End: 2021-11-09

## 2020-11-11 NOTE — TELEPHONE ENCOUNTER
-- DO NOT REPLY / DO NOT REPLY ALL --  -- Message is from the [facility]--    Patient is requesting a medication refill - medication is on active medication list    Patient is currently OUT of the requested medication.    Was Medication Pended?  Yes.    Rx Name and Dose:  fluticasone-salmeterol (ADVAIR DISKUS) 500-50 MCG/DOSE inhaler    Duration: 90 days    Pharmacy  [id number] [place], [place] - [address] [facility]    Patient confirmed the above pharmacy as correct?  Yes    Caller Information       Type Contact Phone    11/11/2020 12:22 PM CST Phone (Incoming) TemitopeKen morales (Self) [phone] (M)          Alternative phone number:     Turnaround time given to caller:   \"This message will be sent to [state Provider's name]. The clinical team will fulfill your request as soon as they review your message.\"   Called [facility] for authorization of approval of left hip steroid injection cpt codes 39546, [id number], . Talked to James Noriega.      who states no authorization is required. Reference # [id number]. Will inform Nursing.

## 2020-11-14 ENCOUNTER — LAB ENCOUNTER (OUTPATIENT)
Dept: LAB | Facility: HOSPITAL | Age: 60
End: 2020-11-14
Attending: INTERNAL MEDICINE
Payer: COMMERCIAL

## 2020-11-14 DIAGNOSIS — M06.9 RHEUMATOID ARTHRITIS INVOLVING MULTIPLE SITES (HCC): ICD-10-CM

## 2020-11-14 DIAGNOSIS — E03.9 HYPOTHYROIDISM, UNSPECIFIED TYPE: ICD-10-CM

## 2020-11-14 DIAGNOSIS — Z51.81 ENCOUNTER FOR THERAPEUTIC DRUG MONITORING: ICD-10-CM

## 2020-11-14 PROCEDURE — 36415 COLL VENOUS BLD VENIPUNCTURE: CPT

## 2020-11-14 PROCEDURE — 84450 TRANSFERASE (AST) (SGOT): CPT

## 2020-11-14 PROCEDURE — 85027 COMPLETE CBC AUTOMATED: CPT

## 2020-11-14 PROCEDURE — 85652 RBC SED RATE AUTOMATED: CPT

## 2020-11-14 PROCEDURE — 86140 C-REACTIVE PROTEIN: CPT

## 2020-11-14 PROCEDURE — 84443 ASSAY THYROID STIM HORMONE: CPT

## 2020-11-14 PROCEDURE — 84460 ALANINE AMINO (ALT) (SGPT): CPT

## 2020-11-14 PROCEDURE — 82565 ASSAY OF CREATININE: CPT

## 2020-11-19 RX ORDER — PHENTERMINE HYDROCHLORIDE 8 MG/1
TABLET ORAL
Qty: 30 TABLET | Refills: 0 | Status: SHIPPED | OUTPATIENT
Start: 2020-11-19 | End: 2020-12-21

## 2020-11-24 ENCOUNTER — MED REC SCAN ONLY (OUTPATIENT)
Dept: INTERNAL MEDICINE CLINIC | Facility: CLINIC | Age: 60
End: 2020-11-24

## 2020-11-27 RX ORDER — CALCITRIOL 0.25 UG/1
CAPSULE, LIQUID FILLED ORAL
Qty: 90 CAPSULE | Refills: 1 | Status: SHIPPED | OUTPATIENT
Start: 2020-11-27 | End: 2021-03-16

## 2020-12-03 ENCOUNTER — OFFICE VISIT (OUTPATIENT)
Dept: SURGERY | Facility: CLINIC | Age: 60
End: 2020-12-03
Payer: COMMERCIAL

## 2020-12-03 VITALS — HEIGHT: 69 IN | WEIGHT: 261.31 LBS | BODY MASS INDEX: 38.7 KG/M2

## 2020-12-03 DIAGNOSIS — E66.01 CLASS 2 SEVERE OBESITY DUE TO EXCESS CALORIES WITH SERIOUS COMORBIDITY AND BODY MASS INDEX (BMI) OF 38.0 TO 38.9 IN ADULT (HCC): Primary | ICD-10-CM

## 2020-12-03 PROCEDURE — 3008F BODY MASS INDEX DOCD: CPT | Performed by: DIETITIAN, REGISTERED

## 2020-12-03 PROCEDURE — 97803 MED NUTRITION INDIV SUBSEQ: CPT | Performed by: DIETITIAN, REGISTERED

## 2020-12-03 NOTE — PROGRESS NOTES
[address] [address]  Dept: [phone]  Loc: [phone]    12/03/20      Bariatric Follow-up Nutrition Session    Kat Baez is a 62year old female.      A HDL 70 (H) 08/08/2020     (H) 08/08/2020    VLDL 20 08/08/2020    NONHDLC 127 08/08/2020    CALCNONHDL 135 (H) 05/28/2016        Vitamins/Minerals:  Lab Results   Component Value Date    B12 513 05/20/2020    VITB12 432 05/28/2016     Lab Results 3  •  XELJANZ XR 11 MG Oral Tablet 24 Hr, TAKE ONE TABLET BY MOUTH ONCE DAILY. MAY BE TAKEN WITH OR WITHOUT FOOD. SWALLOW TABLET WHOLE. DO NOT CRUSH, SPLIT OR CHEW.  STORE AT ROOM TEMPERATURE., Disp: 30 tablet, Rfl: 10  •  Diclofenac Sodium 1 % Transdermal Total Calories: 1000+ kcal per day  Excessive in: simple sugars and other: carbs some days  Inadequate in:  fruits, vegetables and fiber     Patient has made some modifications and adjustments to diet: yes, is sticking w/ moderate portions and prioriti spent with pt: 30 minutes

## 2020-12-05 ENCOUNTER — NURSE ONLY (OUTPATIENT)
Dept: ALLERGY | Facility: CLINIC | Age: 60
End: 2020-12-05
Payer: COMMERCIAL

## 2020-12-05 DIAGNOSIS — J30.89 ENVIRONMENTAL AND SEASONAL ALLERGIES: ICD-10-CM

## 2020-12-05 PROCEDURE — 95165 ANTIGEN THERAPY SERVICES: CPT | Performed by: ALLERGY & IMMUNOLOGY

## 2020-12-05 PROCEDURE — 95117 IMMUNOTHERAPY INJECTIONS: CPT | Performed by: ALLERGY & IMMUNOLOGY

## 2020-12-16 ENCOUNTER — TELEPHONE (OUTPATIENT)
Dept: SURGERY | Facility: CLINIC | Age: 60
End: 2020-12-16

## 2020-12-21 ENCOUNTER — LAB ENCOUNTER (OUTPATIENT)
Dept: LAB | Facility: HOSPITAL | Age: 60
End: 2020-12-21
Attending: INTERNAL MEDICINE
Payer: COMMERCIAL

## 2020-12-21 ENCOUNTER — OFFICE VISIT (OUTPATIENT)
Dept: SURGERY | Facility: CLINIC | Age: 60
End: 2020-12-21
Payer: COMMERCIAL

## 2020-12-21 VITALS
WEIGHT: 261 LBS | HEIGHT: 69 IN | SYSTOLIC BLOOD PRESSURE: 110 MMHG | DIASTOLIC BLOOD PRESSURE: 70 MMHG | BODY MASS INDEX: 38.66 KG/M2

## 2020-12-21 DIAGNOSIS — Z98.84 S/P LAPAROSCOPIC SLEEVE GASTRECTOMY: ICD-10-CM

## 2020-12-21 DIAGNOSIS — Z51.81 ENCOUNTER FOR THERAPEUTIC DRUG MONITORING: ICD-10-CM

## 2020-12-21 DIAGNOSIS — G47.33 OSA (OBSTRUCTIVE SLEEP APNEA): ICD-10-CM

## 2020-12-21 DIAGNOSIS — R63.2 INCREASED APPETITE: ICD-10-CM

## 2020-12-21 DIAGNOSIS — E66.9 OBESITY (BMI 30-39.9): ICD-10-CM

## 2020-12-21 DIAGNOSIS — G47.33 OSA (OBSTRUCTIVE SLEEP APNEA): Primary | ICD-10-CM

## 2020-12-21 DIAGNOSIS — E55.9 VITAMIN D DEFICIENCY: ICD-10-CM

## 2020-12-21 PROCEDURE — 3008F BODY MASS INDEX DOCD: CPT | Performed by: INTERNAL MEDICINE

## 2020-12-21 PROCEDURE — 36415 COLL VENOUS BLD VENIPUNCTURE: CPT

## 2020-12-21 PROCEDURE — 84425 ASSAY OF VITAMIN B-1: CPT

## 2020-12-21 PROCEDURE — 99214 OFFICE O/P EST MOD 30 MIN: CPT | Performed by: INTERNAL MEDICINE

## 2020-12-21 PROCEDURE — 3078F DIAST BP <80 MM HG: CPT | Performed by: INTERNAL MEDICINE

## 2020-12-21 PROCEDURE — 82306 VITAMIN D 25 HYDROXY: CPT

## 2020-12-21 PROCEDURE — 3074F SYST BP LT 130 MM HG: CPT | Performed by: INTERNAL MEDICINE

## 2020-12-21 PROCEDURE — 82397 CHEMILUMINESCENT ASSAY: CPT

## 2020-12-21 RX ORDER — PHENTERMINE HYDROCHLORIDE 8 MG/1
1 TABLET ORAL
Qty: 60 TABLET | Refills: 2 | Status: SHIPPED | OUTPATIENT
Start: 2020-12-21 | End: 2021-03-22

## 2020-12-21 RX ORDER — TOPIRAMATE 25 MG/1
25 TABLET ORAL 2 TIMES DAILY
Qty: 180 TABLET | Refills: 1 | Status: SHIPPED | OUTPATIENT
Start: 2020-12-21 | End: 2021-03-22

## 2020-12-21 NOTE — PROGRESS NOTES
[address]  Dept: [phone]    Patient:  Rod Mcgowan  :      1960  MRN:      [id number]    Referring Provider: Nayely Guy, Disp: 96 tablet, Rfl: 1  •  PRAVASTATIN SODIUM 20 MG Oral Tab, TAKE 1 TABLET BY MOUTH EVERY DAY AT NIGHT, Disp: 90 tablet, Rfl: 1  •  Levothyroxine Sodium 125 MCG Oral Tab, Take 1 tablet (125 mcg total) by mouth before breakfast., Disp: 90 tablet, Rfl: 1 3 Glasses of wine per week    Drug use: No    Surgical History:    Past Surgical History:   Procedure Laterality Date   • BACK SURGERY     • BARIATRIC SLEEVE GASTRECTOMY LAPAROSCOPIC N/A 12/2/2019    Performed by Jaime Mata MD at [facility] [address] enlarged, symmetric, no tenderness/mass/nodules  Lungs: clear to auscultation bilaterally  Heart: S1, S2 normal, no murmur, click, rub or gallop, regular rate and rhythm  Abdomen: soft, obese, non tender  Extremities: extremities normal, atraumatic, no cya

## 2020-12-22 RX ORDER — TOPIRAMATE 25 MG/1
TABLET ORAL
Qty: 90 TABLET | Refills: 0 | OUTPATIENT
Start: 2020-12-22

## 2020-12-29 ENCOUNTER — PATIENT MESSAGE (OUTPATIENT)
Dept: INTERNAL MEDICINE CLINIC | Facility: CLINIC | Age: 60
End: 2020-12-29

## 2020-12-30 ENCOUNTER — OFFICE VISIT (OUTPATIENT)
Dept: SURGERY | Facility: CLINIC | Age: 60
End: 2020-12-30
Payer: COMMERCIAL

## 2020-12-30 VITALS
WEIGHT: 257.63 LBS | BODY MASS INDEX: 38.16 KG/M2 | DIASTOLIC BLOOD PRESSURE: 78 MMHG | HEART RATE: 93 BPM | SYSTOLIC BLOOD PRESSURE: 128 MMHG | RESPIRATION RATE: 18 BRPM | OXYGEN SATURATION: 96 % | HEIGHT: 69 IN

## 2020-12-30 DIAGNOSIS — G47.33 OSA (OBSTRUCTIVE SLEEP APNEA): Chronic | ICD-10-CM

## 2020-12-30 DIAGNOSIS — Z98.84 S/P LAPAROSCOPIC SLEEVE GASTRECTOMY: ICD-10-CM

## 2020-12-30 DIAGNOSIS — E78.5 HYPERLIPIDEMIA, UNSPECIFIED HYPERLIPIDEMIA TYPE: Primary | ICD-10-CM

## 2020-12-30 DIAGNOSIS — G20 PARKINSON DISEASE (HCC): ICD-10-CM

## 2020-12-30 NOTE — PROGRESS NOTES
[address] [place], [place]  [address] [address]  Dept: [phone]    12/30/2020   Bariatric Patient Follow-up Evaluation    Chief Complaint:  Morbid obesity, 12/2/19 sleeve, preop Brittany Washington MD at [facility] ENDOSCOPY   • FOOT SURGERY Left     Had right foot done recently.    • KNEE ARTHROSCOPY Right    • LAP SLEEVE GASTRECTOMY  12/02/2019    Dr Nickolas Mccray, [address]  09/09/2019   • LAPAROSCOPY, 1 tablet (25 mg total) by mouth 2 (two) times daily. , Disp: 180 tablet, Rfl: 1  •  CALCITRIOL 0.25 MCG Oral Cap, TAKE 1 CAPSULE BY MOUTH EVERY DAY, Disp: 90 capsule, Rfl: 1  •  folic acid 1 MG Oral Tab, Take 1 tablet (1 mg total) by mouth once daily. , Disp Disp: 1 Inhaler, Rfl: 0  •  Albuterol Sulfate (2.5 MG/3ML) 0.083% Inhalation Nebu Soln, Take 3 mL (2.5 mg total) by nebulization every 4 (four) hours as needed for Wheezing., Disp: 3 Box, Rfl: 0  •  carbidopa-levodopa (SINEMET)  MG Oral Tab, Take 1 t   Labs - ordered by [facility], vit levels ok  Follow-up annually with Dr. Ken Mar for continued surveillance and with me as needed    Nicolette Mustafa, 12/30/20, 1:19 PM

## 2020-12-30 NOTE — TELEPHONE ENCOUNTER
From: Taylor Pate  To: Silvina Ramon MD  Sent: 12/29/2020 5:40 PM CST  Subject: Non-Urgent Medical Question    I assume with my asthma and rheumatoid arthritis and Parkinson's that I would be considered a high risk person.  I know high risk people a

## 2021-01-04 ENCOUNTER — NURSE ONLY (OUTPATIENT)
Dept: ALLERGY | Facility: CLINIC | Age: 61
End: 2021-01-04
Payer: COMMERCIAL

## 2021-01-04 DIAGNOSIS — J30.89 ENVIRONMENTAL AND SEASONAL ALLERGIES: ICD-10-CM

## 2021-01-04 PROCEDURE — 95117 IMMUNOTHERAPY INJECTIONS: CPT | Performed by: ALLERGY & IMMUNOLOGY

## 2021-01-11 RX ORDER — PRAVASTATIN SODIUM 20 MG
TABLET ORAL
Qty: 90 TABLET | Refills: 1 | Status: SHIPPED | OUTPATIENT
Start: 2021-01-11 | End: 2021-09-13

## 2021-01-11 RX ORDER — FLUTICASONE PROPIONATE AND SALMETEROL 50; 100 UG/1; UG/1
POWDER RESPIRATORY (INHALATION)
Qty: 1 EACH | Refills: 0 | Status: SHIPPED | OUTPATIENT
Start: 2021-01-11 | End: 2021-03-20

## 2021-01-11 NOTE — TELEPHONE ENCOUNTER
Refill requested for    Name from pharmacy: [facility]         Will file in chart as: ADVAIR DISKUS 100-50 MCG/DOSE Inhalation Aerosol Powder, Breath Activated    Sig: INHALE 1 PUFF BY MOUTH TWICE A DAY    Disp:  Not specified (Pharmacy requested

## 2021-01-11 NOTE — TELEPHONE ENCOUNTER
Advair refilled x1 month. Patient due for 6-month follow-up in February 2021.   Please call to schedule

## 2021-01-11 NOTE — TELEPHONE ENCOUNTER
Follow up scheduled for 1/30 as she is getting her AIT injections that day as well. Patient aware of RX refill as well. RN told her to come a little bit early so we can get her checked in for her AIT injections as well prior to her appointment.

## 2021-01-18 ENCOUNTER — TELEPHONE (OUTPATIENT)
Dept: NEUROLOGY | Facility: CLINIC | Age: 61
End: 2021-01-18

## 2021-01-18 RX ORDER — DICLOFENAC SODIUM 75 MG/1
75 TABLET, DELAYED RELEASE ORAL 2 TIMES DAILY
Qty: 60 TABLET | Refills: 3 | Status: SHIPPED | OUTPATIENT
Start: 2021-01-18 | End: 2021-11-08

## 2021-01-18 NOTE — TELEPHONE ENCOUNTER
Requested Prescriptions     Pending Prescriptions Disp Refills   • DICLOFENAC SODIUM 75 MG Oral Tab EC [Pharmacy Med Name: DICLOFENAC SOD EC 75 MG TAB] 60 tablet 3     Sig: TAKE 1 TABLET (75 MG TOTAL) BY MOUTH 2 (TWO) TIMES DAILY.  AS NEEDED     LF: 5/18/20 for her.      - overall she's better on Angelica Mendenhall MD  9/15/2020   8:57 AM  - [place]ed [place]  through Epic

## 2021-01-19 NOTE — TELEPHONE ENCOUNTER
Patient has appt on 02/02/21. Patient asking if she can restart gabapentin. Patient has not been seen since 05/03/19. Advised patient to not restart gabapentin. Medications will be discussed at [facility].

## 2021-01-30 ENCOUNTER — NURSE ONLY (OUTPATIENT)
Dept: ALLERGY | Facility: CLINIC | Age: 61
End: 2021-01-30
Payer: COMMERCIAL

## 2021-01-30 ENCOUNTER — OFFICE VISIT (OUTPATIENT)
Dept: ALLERGY | Facility: CLINIC | Age: 61
End: 2021-01-30
Payer: COMMERCIAL

## 2021-01-30 VITALS — HEART RATE: 82 BPM | SYSTOLIC BLOOD PRESSURE: 101 MMHG | DIASTOLIC BLOOD PRESSURE: 68 MMHG | OXYGEN SATURATION: 100 %

## 2021-01-30 DIAGNOSIS — J30.1 SEASONAL ALLERGIC RHINITIS DUE TO POLLEN: ICD-10-CM

## 2021-01-30 DIAGNOSIS — Z23 NEED FOR SHINGLES VACCINE: ICD-10-CM

## 2021-01-30 DIAGNOSIS — Z91.09 ALLERGY TO AMERICAN HOUSE DUST MITE: ICD-10-CM

## 2021-01-30 DIAGNOSIS — J45.40 ASTHMA, EXTRINSIC, MODERATE PERSISTENT, UNCOMPLICATED: Primary | ICD-10-CM

## 2021-01-30 DIAGNOSIS — J30.89 ENVIRONMENTAL AND SEASONAL ALLERGIES: ICD-10-CM

## 2021-01-30 PROCEDURE — 95117 IMMUNOTHERAPY INJECTIONS: CPT | Performed by: ALLERGY & IMMUNOLOGY

## 2021-01-30 PROCEDURE — 99214 OFFICE O/P EST MOD 30 MIN: CPT | Performed by: ALLERGY & IMMUNOLOGY

## 2021-01-30 PROCEDURE — 3078F DIAST BP <80 MM HG: CPT | Performed by: ALLERGY & IMMUNOLOGY

## 2021-01-30 PROCEDURE — 3074F SYST BP LT 130 MM HG: CPT | Performed by: ALLERGY & IMMUNOLOGY

## 2021-01-30 RX ORDER — ALBUTEROL SULFATE 2.5 MG/3ML
2.5 SOLUTION RESPIRATORY (INHALATION) EVERY 4 HOURS PRN
Qty: 1 BOX | Refills: 0 | Status: SHIPPED | OUTPATIENT
Start: 2021-01-30 | End: 2021-02-10

## 2021-01-30 RX ORDER — ALBUTEROL SULFATE 90 UG/1
2 AEROSOL, METERED RESPIRATORY (INHALATION) EVERY 6 HOURS PRN
Qty: 1 INHALER | Refills: 0 | Status: SHIPPED | OUTPATIENT
Start: 2021-01-30 | End: 2021-09-13

## 2021-01-30 NOTE — PROGRESS NOTES
Gauri Schwartz is a 62year old female. HPI:   Patient presents with:  Asthma: Pt reports no asthma flares, no ER visits or prednisone usage. Pt feels well controlled.  No emergency inhaler usage  Allergies: Pt reports her allergies have been well con 2/12/95    after childbirth   • Viral conjunctivitis 2008      Past Surgical History:   Procedure Laterality Date   • BACK SURGERY     • BARIATRIC SLEEVE GASTRECTOMY LAPAROSCOPIC N/A 12/2/2019    Performed by Shirley Painting MD at [facility] OR   • COLONOSCOPY Wheezing. 1 Inhaler 0   • albuterol sulfate (2.5 MG/3ML) 0.083% Inhalation Nebu Soln Take 3 mL (2.5 mg total) by nebulization every 4 (four) hours as needed for Wheezing.  1 Box 0   • DICLOFENAC SODIUM 75 MG Oral Tab EC TAKE 1 TABLET (75 MG TOTAL) BY MOUTH • Albuterol Sulfate (2.5 MG/3ML) 0.083% Inhalation Nebu Soln Take 3 mL (2.5 mg total) by nebulization every 4 (four) hours as needed for Wheezing.  (Patient not taking: Reported on 1/30/2021 ) 3 Box 0       Allergies:    Penicillins             HIVES  Sea with Advair 100/50  1 puff once a day.   May increase up to twice a day if symptomatic more than 2 days/week  Albuterol every 4-6 hours as needed if having active coughing wheezing or shortness of breath  Flu vaccine and Pneumovax 23 up-to-date  Reviewed si

## 2021-02-02 ENCOUNTER — OFFICE VISIT (OUTPATIENT)
Dept: NEUROLOGY | Facility: CLINIC | Age: 61
End: 2021-02-02
Payer: COMMERCIAL

## 2021-02-02 ENCOUNTER — TELEPHONE (OUTPATIENT)
Dept: NEUROLOGY | Facility: CLINIC | Age: 61
End: 2021-02-02

## 2021-02-02 VITALS — WEIGHT: 249 LBS | BODY MASS INDEX: 36.88 KG/M2 | HEIGHT: 69 IN

## 2021-02-02 DIAGNOSIS — M54.16 LUMBAR RADICULITIS: Primary | ICD-10-CM

## 2021-02-02 PROCEDURE — 3008F BODY MASS INDEX DOCD: CPT | Performed by: PHYSICAL MEDICINE & REHABILITATION

## 2021-02-02 PROCEDURE — 99214 OFFICE O/P EST MOD 30 MIN: CPT | Performed by: PHYSICAL MEDICINE & REHABILITATION

## 2021-02-02 RX ORDER — HYDROCODONE BITARTRATE AND ACETAMINOPHEN 10; 325 MG/1; MG/1
1 TABLET ORAL 2 TIMES DAILY PRN
Qty: 14 TABLET | Refills: 0 | Status: SHIPPED | OUTPATIENT
Start: 2021-02-02 | End: 2021-02-11

## 2021-02-02 RX ORDER — METHYLPREDNISOLONE 4 MG/1
TABLET ORAL
Qty: 1 PACKAGE | Refills: 0 | Status: SHIPPED | OUTPATIENT
Start: 2021-02-02 | End: 2021-02-10 | Stop reason: ALTCHOICE

## 2021-02-02 NOTE — PROGRESS NOTES
Progress note    C/C: low back and leg pain 50-year-old female with    HPI: History of Parkinson's disease, obesity now status post gastric sleeve surgery 7 months ago presents for follow-up.   She has recurrent lower back pain, on the left side, radiating Dosepak. Norco 10/325 twice daily as needed severe pain, 14 tablets prescribed. MRI of the lumbar spine with and without contrast given history of previous surgery.  She has quite a bit of Parkinsonian movements and I am not sure how well she would be abl

## 2021-02-02 NOTE — PATIENT INSTRUCTIONS
Stop the diclofenac while taking the medrol dosepak.     If Doctor has ordered an injection or MRI and if you do not hear from us within 3 business days please call the office or send a message through [address] and ask if the injection/MRI has been approved

## 2021-02-02 NOTE — TELEPHONE ENCOUNTER
[employer] for authorization of approval for MRI L-spine w/wo cpt code 92089. Authorization is not required. Transaction ID: [id number]. Pt. informed authorization is not required. She will call to schedule appt.
Abdomen soft, nontender, nondistended, bowel sounds present in all 4 quadrants.

## 2021-02-04 ENCOUNTER — TELEPHONE (OUTPATIENT)
Dept: NEUROLOGY | Facility: CLINIC | Age: 61
End: 2021-02-04

## 2021-02-04 NOTE — TELEPHONE ENCOUNTER
Ramírez Bauman calling from [place] regarding patient MRI procedure that is scheduled for 02/11/2021. She stated that she needs a History and Physical (H&P) prior to MRI being done. Please advise?

## 2021-02-04 NOTE — PAT NURSING NOTE
Dr. Andrés Salamanca office notified pt. Needs history and physical done for MRI on 2/11. States they will FU with  And pt.

## 2021-02-08 ENCOUNTER — TELEPHONE (OUTPATIENT)
Dept: NEUROLOGY | Facility: CLINIC | Age: 61
End: 2021-02-08

## 2021-02-08 ENCOUNTER — TELEPHONE (OUTPATIENT)
Dept: INTERNAL MEDICINE CLINIC | Facility: CLINIC | Age: 61
End: 2021-02-08

## 2021-02-08 ENCOUNTER — LAB ENCOUNTER (OUTPATIENT)
Dept: LAB | Facility: HOSPITAL | Age: 61
End: 2021-02-08
Attending: PHYSICAL MEDICINE & REHABILITATION
Payer: COMMERCIAL

## 2021-02-08 DIAGNOSIS — Z01.818 PREOP EXAMINATION: ICD-10-CM

## 2021-02-08 DIAGNOSIS — Z11.59 ENCOUNTER FOR SCREENING FOR OTHER VIRAL DISEASES: ICD-10-CM

## 2021-02-08 LAB — SARS-COV-2 RNA RESP QL NAA+PROBE: NOT DETECTED

## 2021-02-08 RX ORDER — PANTOPRAZOLE SODIUM 40 MG/1
TABLET, DELAYED RELEASE ORAL
Qty: 90 TABLET | Refills: 2 | Status: SHIPPED | OUTPATIENT
Start: 2021-02-08 | End: 2021-10-29

## 2021-02-08 NOTE — TELEPHONE ENCOUNTER
Patient, states that she is having an MRI with sedation on Thursday 2-11-21. Patient, states that she was told by Dr. Louie Nguyen that she needs medical clearance from Dr. Ashkan Vasques. Pt would like too see Dr. Ashkan Vasques asap. No available appointments. Pt states that she is scheduled to have her covid test today at noon. Pt would like to know if she should reschedule that if the doctor can't see her.

## 2021-02-08 NOTE — TELEPHONE ENCOUNTER
S/w Karen and informed her that the patient should be reaching out to Dr. Atul Morris to have the H&P done or she will need to reschedule if it cannot be done prior to MRI.

## 2021-02-09 NOTE — TELEPHONE ENCOUNTER
If possible, get the patient set up with nurse practitioner Jn Paula. She can do the visit. Then the nurse practitioner and I will review everything and I will sign off on it.

## 2021-02-10 ENCOUNTER — OFFICE VISIT (OUTPATIENT)
Dept: INTERNAL MEDICINE CLINIC | Facility: CLINIC | Age: 61
End: 2021-02-10
Payer: COMMERCIAL

## 2021-02-10 VITALS
RESPIRATION RATE: 20 BRPM | WEIGHT: 251.69 LBS | DIASTOLIC BLOOD PRESSURE: 67 MMHG | SYSTOLIC BLOOD PRESSURE: 107 MMHG | HEART RATE: 108 BPM | BODY MASS INDEX: 37.28 KG/M2 | HEIGHT: 69 IN

## 2021-02-10 DIAGNOSIS — Z01.818 PRE-OPERATIVE CLEARANCE: Primary | ICD-10-CM

## 2021-02-10 PROCEDURE — 3078F DIAST BP <80 MM HG: CPT | Performed by: NURSE PRACTITIONER

## 2021-02-10 PROCEDURE — 99214 OFFICE O/P EST MOD 30 MIN: CPT | Performed by: NURSE PRACTITIONER

## 2021-02-10 PROCEDURE — 3008F BODY MASS INDEX DOCD: CPT | Performed by: NURSE PRACTITIONER

## 2021-02-10 PROCEDURE — 3074F SYST BP LT 130 MM HG: CPT | Performed by: NURSE PRACTITIONER

## 2021-02-10 NOTE — PROGRESS NOTES
HPI:    Patient ID: Laila Dennis is a 62year old female. HPI Pre-op Clearance  Lumbar radiculitis. Here for Physical Exam to have MRI under sedation.    67-year-old female who saw Dr. Radha Adams for bilateral leg pain, left leg pain is constant aching She had a laminectomy in 2019 and tolerated anesthesia well. Her physical exam was unremarkable today. Her last labs were reviewed. No further testing needed for minimal sedation. I am clearing her to have sedation for MRI imaging.       Past Medical Hi file      Years of education: Not on file      Highest education level: Not on file    Tobacco Use      Smoking status: Never Smoker      Smokeless tobacco: Never Used    Substance and Sexual Activity      Alcohol use:  Yes        Alcohol/week: 2.0 - 3.0 st Pain. 14 tablet 0   • Albuterol Sulfate HFA (PROAIR HFA) 108 (90 Base) MCG/ACT Inhalation Aero Soln Inhale 2 puffs into the lungs every 6 (six) hours as needed for Wheezing.  1 Inhaler 0   • albuterol sulfate (2.5 MG/3ML) 0.083% Inhalation Nebu Soln Take 3 4 (four) hours as needed for Wheezing. 3 Box 0   • carbidopa-levodopa (SINEMET)  MG Oral Tab Take 1 tablet by mouth 3 (three) times daily.  1 1/2 twice in am and 1 afternoon and 1 evening   0     Allergies:  Penicillins             HIVES  Seasonal lb (112.9 kg)    Body mass index is 37.17 kg/m². (2)           ASSESSMENT/PLAN:     Problem List Items Addressed This Visit        Unprioritized    Pre-operative clearance - Primary     A/P Patient has a history of a allergic rhinitis, asthma, hyperlipidemi

## 2021-02-10 NOTE — ASSESSMENT & PLAN NOTE
A/P Patient has a history of a allergic rhinitis, asthma controlled, hyperlipidemia, rheumatoid arthritis, parkinson's disease, postsurgical hypothyroidism, L3-4 L4-5 central stenosis, chronic back pain, mixed anxiety and depression disorder chronic fatigu

## 2021-02-11 ENCOUNTER — ANESTHESIA (OUTPATIENT)
Dept: MRI IMAGING | Facility: HOSPITAL | Age: 61
End: 2021-02-11
Payer: COMMERCIAL

## 2021-02-11 ENCOUNTER — HOSPITAL ENCOUNTER (OUTPATIENT)
Dept: MRI IMAGING | Facility: HOSPITAL | Age: 61
Discharge: HOME | End: 2021-02-11
Attending: PHYSICAL MEDICINE & REHABILITATION
Payer: COMMERCIAL

## 2021-02-11 ENCOUNTER — ANESTHESIA EVENT (OUTPATIENT)
Dept: MRI IMAGING | Facility: HOSPITAL | Age: 61
End: 2021-02-11
Payer: COMMERCIAL

## 2021-02-11 VITALS
WEIGHT: 250 LBS | HEIGHT: 69 IN | OXYGEN SATURATION: 97 % | BODY MASS INDEX: 37.03 KG/M2 | RESPIRATION RATE: 16 BRPM | DIASTOLIC BLOOD PRESSURE: 58 MMHG | TEMPERATURE: 98 F | HEART RATE: 85 BPM | SYSTOLIC BLOOD PRESSURE: 109 MMHG

## 2021-02-11 DIAGNOSIS — M54.16 LUMBAR RADICULITIS: ICD-10-CM

## 2021-02-11 PROCEDURE — 72158 MRI LUMBAR SPINE W/O & W/DYE: CPT | Performed by: PHYSICAL MEDICINE & REHABILITATION

## 2021-02-11 PROCEDURE — A9575 INJ GADOTERATE MEGLUMI 0.1ML: HCPCS | Performed by: PHYSICAL MEDICINE & REHABILITATION

## 2021-02-11 RX ORDER — FAMOTIDINE 20 MG/1
20 TABLET ORAL ONCE
Status: DISCONTINUED | OUTPATIENT
Start: 2021-02-11 | End: 2021-02-13

## 2021-02-11 RX ORDER — ONDANSETRON 2 MG/ML
4 INJECTION INTRAMUSCULAR; INTRAVENOUS ONCE AS NEEDED
Status: CANCELLED | OUTPATIENT
Start: 2021-02-11 | End: 2021-02-11

## 2021-02-11 RX ORDER — SODIUM CHLORIDE, SODIUM LACTATE, POTASSIUM CHLORIDE, CALCIUM CHLORIDE 600; 310; 30; 20 MG/100ML; MG/100ML; MG/100ML; MG/100ML
INJECTION, SOLUTION INTRAVENOUS CONTINUOUS
Status: CANCELLED | OUTPATIENT
Start: 2021-02-11

## 2021-02-11 RX ORDER — HYDROCODONE BITARTRATE AND ACETAMINOPHEN 10; 325 MG/1; MG/1
1 TABLET ORAL 2 TIMES DAILY PRN
Qty: 14 TABLET | Refills: 0 | Status: SHIPPED | OUTPATIENT
Start: 2021-02-11 | End: 2021-07-07 | Stop reason: ALTCHOICE

## 2021-02-11 RX ORDER — HYDROCODONE BITARTRATE AND ACETAMINOPHEN 5; 325 MG/1; MG/1
1 TABLET ORAL AS NEEDED
Status: CANCELLED | OUTPATIENT
Start: 2021-02-11

## 2021-02-11 RX ORDER — ONDANSETRON 2 MG/ML
INJECTION INTRAMUSCULAR; INTRAVENOUS AS NEEDED
Status: DISCONTINUED | OUTPATIENT
Start: 2021-02-11 | End: 2021-02-11 | Stop reason: SURG

## 2021-02-11 RX ORDER — NALOXONE HYDROCHLORIDE 0.4 MG/ML
80 INJECTION, SOLUTION INTRAMUSCULAR; INTRAVENOUS; SUBCUTANEOUS AS NEEDED
Status: CANCELLED | OUTPATIENT
Start: 2021-02-11 | End: 2021-02-11

## 2021-02-11 RX ORDER — ACETAMINOPHEN 500 MG
1000 TABLET ORAL ONCE
Status: DISCONTINUED | OUTPATIENT
Start: 2021-02-11 | End: 2021-02-13

## 2021-02-11 RX ORDER — METOCLOPRAMIDE 10 MG/1
10 TABLET ORAL ONCE
Status: DISCONTINUED | OUTPATIENT
Start: 2021-02-11 | End: 2021-02-13

## 2021-02-11 RX ORDER — LIDOCAINE HYDROCHLORIDE 10 MG/ML
INJECTION, SOLUTION EPIDURAL; INFILTRATION; INTRACAUDAL; PERINEURAL AS NEEDED
Status: DISCONTINUED | OUTPATIENT
Start: 2021-02-11 | End: 2021-02-11 | Stop reason: SURG

## 2021-02-11 RX ORDER — HYDROCODONE BITARTRATE AND ACETAMINOPHEN 5; 325 MG/1; MG/1
2 TABLET ORAL AS NEEDED
Status: CANCELLED | OUTPATIENT
Start: 2021-02-11

## 2021-02-11 RX ORDER — SODIUM CHLORIDE, SODIUM LACTATE, POTASSIUM CHLORIDE, CALCIUM CHLORIDE 600; 310; 30; 20 MG/100ML; MG/100ML; MG/100ML; MG/100ML
INJECTION, SOLUTION INTRAVENOUS CONTINUOUS
Status: DISCONTINUED | OUTPATIENT
Start: 2021-02-11 | End: 2021-02-13

## 2021-02-11 RX ORDER — HYDROMORPHONE HYDROCHLORIDE 1 MG/ML
0.2 INJECTION, SOLUTION INTRAMUSCULAR; INTRAVENOUS; SUBCUTANEOUS EVERY 5 MIN PRN
Status: CANCELLED | OUTPATIENT
Start: 2021-02-11

## 2021-02-11 RX ORDER — MORPHINE SULFATE 4 MG/ML
2 INJECTION, SOLUTION INTRAMUSCULAR; INTRAVENOUS EVERY 10 MIN PRN
Status: CANCELLED | OUTPATIENT
Start: 2021-02-11

## 2021-02-11 RX ADMIN — LIDOCAINE HYDROCHLORIDE 50 MG: 10 INJECTION, SOLUTION EPIDURAL; INFILTRATION; INTRACAUDAL; PERINEURAL at 14:36:00

## 2021-02-11 RX ADMIN — ONDANSETRON 4 MG: 2 INJECTION INTRAMUSCULAR; INTRAVENOUS at 15:23:00

## 2021-02-11 RX ADMIN — SODIUM CHLORIDE, SODIUM LACTATE, POTASSIUM CHLORIDE, CALCIUM CHLORIDE: 600; 310; 30; 20 INJECTION, SOLUTION INTRAVENOUS at 13:59:00

## 2021-02-11 NOTE — ANESTHESIA PREPROCEDURE EVALUATION
Anesthesia PreOp Note    HPI:     Kamini Zamora is a 62year old female who presents for preoperative consultation requested by: * No surgeons listed *    Date of Surgery: 2/11/2021    * No procedures listed *  Indication: * No pre-op diagnosis entered trochanteric bursitis, left 11/21/2017   • High cholesterol    • History of blood transfusion [address] [place] [address]   • Infertility, female    • Parkinson disease ([facility]    • [facility]    • [facility]    • S/P laparoscopic sleeve gastr NIGHT, Disp: 90 tablet, Rfl: 1    •  topiramate 25 MG Oral Tab, Take 1 tablet (25 mg total) by mouth 2 (two) times daily. , Disp: 180 tablet, Rfl: 1    •  CALCITRIOL 0.25 MCG Oral Cap, TAKE 1 CAPSULE BY MOUTH EVERY DAY, Disp: 90 capsule, Rfl: 1    •  folic total) by nebulization every 4 (four) hours as needed for Wheezing., Disp: 3 Box, Rfl: 0    No current Epic-ordered facility-administered medications on file.          Penicillins             HIVES  Seasonal                Runny nose    Family History   Pro on file        Emotionally abused: Not on file        Physically abused: Not on file        Forced sexual activity: Not on file    Other Topics      Concerns:         Service: Not Asked        Blood Transfusions: Not Asked        Caffeine Concern: discussed the anesthetic plan, major risks and alternatives with the patient and answered all questions. The patient desires to proceed with surgery and anesthesia as planned.    Informed Consent Plan and Risks Discussed With:  Patient      I have informed

## 2021-02-11 NOTE — TELEPHONE ENCOUNTER
Medication request: Norco 10-325mg Take 1 tablet by mouth 2 (two) times daily as needed for Pain.     LOV: 02/02/21  NOV: 02/15/21    [place] 02/02/21 #14 r-0

## 2021-02-11 NOTE — ANESTHESIA POSTPROCEDURE EVALUATION
Patient: Dhruv Romano    Procedure Summary     Date: 02/11/21 Room / Location: [facility] MRI; [address] [facility] Anesthesia Care Unit    Anesthesia Start: 7285 Anesthesia Stop: 1206    Procedure: MRI SPINE LUMBAR (W+WO) (CPT=72158) Diagnos

## 2021-02-11 NOTE — ANESTHESIA PROCEDURE NOTES
Airway  Urgency: Elective    Airway not difficult    General Information and Staff    Patient location during procedure: OR  Anesthesiologist: Vladimir Rodriguez DO  Performed: anesthesiologist     Indications and Patient Condition  Indications for airway josey

## 2021-02-12 ENCOUNTER — OFFICE VISIT (OUTPATIENT)
Dept: INTERNAL MEDICINE CLINIC | Facility: CLINIC | Age: 61
End: 2021-02-12
Payer: COMMERCIAL

## 2021-02-12 ENCOUNTER — TELEPHONE (OUTPATIENT)
Dept: NEUROLOGY | Facility: CLINIC | Age: 61
End: 2021-02-12

## 2021-02-12 VITALS
DIASTOLIC BLOOD PRESSURE: 71 MMHG | SYSTOLIC BLOOD PRESSURE: 125 MMHG | HEIGHT: 69 IN | BODY MASS INDEX: 37.2 KG/M2 | WEIGHT: 251.13 LBS | HEART RATE: 92 BPM

## 2021-02-12 DIAGNOSIS — G47.33 OSA (OBSTRUCTIVE SLEEP APNEA): ICD-10-CM

## 2021-02-12 DIAGNOSIS — Z23 NEED FOR VACCINATION: ICD-10-CM

## 2021-02-12 DIAGNOSIS — G20 PARKINSON DISEASE (HCC): ICD-10-CM

## 2021-02-12 DIAGNOSIS — M48.061 SPINAL STENOSIS OF LUMBAR REGION, UNSPECIFIED WHETHER NEUROGENIC CLAUDICATION PRESENT: ICD-10-CM

## 2021-02-12 DIAGNOSIS — E78.5 HYPERLIPIDEMIA LDL GOAL <130: ICD-10-CM

## 2021-02-12 DIAGNOSIS — E03.9 HYPOTHYROIDISM, UNSPECIFIED TYPE: Primary | ICD-10-CM

## 2021-02-12 DIAGNOSIS — Z98.84 S/P LAPAROSCOPIC SLEEVE GASTRECTOMY: ICD-10-CM

## 2021-02-12 PROCEDURE — 3008F BODY MASS INDEX DOCD: CPT | Performed by: INTERNAL MEDICINE

## 2021-02-12 PROCEDURE — 3074F SYST BP LT 130 MM HG: CPT | Performed by: INTERNAL MEDICINE

## 2021-02-12 PROCEDURE — 99213 OFFICE O/P EST LOW 20 MIN: CPT | Performed by: INTERNAL MEDICINE

## 2021-02-12 PROCEDURE — 3078F DIAST BP <80 MM HG: CPT | Performed by: INTERNAL MEDICINE

## 2021-02-12 NOTE — PROGRESS NOTES
HPI:    Patient ID: Debra Ha is a 62year old female. HPI  Patient is here for follow-up on chronic medical issues as listed below. I last saw her in August.  At that time we decreased decrease the dose of the Synthroid medication.   Follow-up transfusion 1973    [facility]   • Infertility, female    • Parkinson disease ([facility] [address]    • Parkinson's disease ([facility]    • Rheumatoid arthritis ([facility]    • S/P laparoscopic sleeve gastrectomy 01/22/2020    for weight loss   • Sleep apnea     cpap   • Thyroid disease wine per week    Drug use: No         Review of Systems         Current Outpatient Medications   Medication Sig Dispense Refill   • HYDROcodone-acetaminophen  MG Oral Tab Take 1 tablet by mouth 2 (two) times daily as needed for Pain.  14 tablet 0   • daily.     • Rasagiline Mesylate (AZILECT) 1 MG Oral Tab Take 1 tablet by mouth daily. • Albuterol Sulfate (2.5 MG/3ML) 0.083% Inhalation Nebu Soln Take 3 mL (2.5 mg total) by nebulization every 4 (four) hours as needed for Wheezing.  3 Box 0   • carbid of pravastatin if needed. Keep working on diet and exercise and further weight loss. - LIPID PANEL; Future    3. EVIE (obstructive sleep apnea)  Stable. Continue with CPAP. 4. Need for vaccination  Discussed possible shingles vaccination.   We will hol

## 2021-02-12 NOTE — TELEPHONE ENCOUNTER
Informed patient of imaging results. Patient has f/u 02/15/21. Patient states she does not think the medrol dosepak had any effect on her pain.  She did mention the pain has improved slightly but she thinks this is because all she has done the last two w

## 2021-02-12 NOTE — TELEPHONE ENCOUNTER
----- Message from Emily Birmingham DO sent at 2/12/2021  9:02 AM CST -----  Please let the patient know she has a disc herniation at one of the lower levels of the lower back, just below her previous level of surgery.  Along with arthritis in that same area

## 2021-02-15 ENCOUNTER — TELEMEDICINE (OUTPATIENT)
Dept: NEUROLOGY | Facility: CLINIC | Age: 61
End: 2021-02-15
Payer: COMMERCIAL

## 2021-02-15 ENCOUNTER — TELEPHONE (OUTPATIENT)
Dept: RHEUMATOLOGY | Facility: CLINIC | Age: 61
End: 2021-02-15

## 2021-02-15 ENCOUNTER — TELEPHONE (OUTPATIENT)
Dept: NEUROLOGY | Facility: CLINIC | Age: 61
End: 2021-02-15

## 2021-02-15 ENCOUNTER — LAB ENCOUNTER (OUTPATIENT)
Dept: LAB | Facility: HOSPITAL | Age: 61
End: 2021-02-15
Attending: INTERNAL MEDICINE
Payer: COMMERCIAL

## 2021-02-15 DIAGNOSIS — Z51.81 ENCOUNTER FOR THERAPEUTIC DRUG MONITORING: Primary | ICD-10-CM

## 2021-02-15 DIAGNOSIS — E78.5 HYPERLIPIDEMIA LDL GOAL <130: ICD-10-CM

## 2021-02-15 DIAGNOSIS — E03.9 HYPOTHYROIDISM, UNSPECIFIED TYPE: ICD-10-CM

## 2021-02-15 DIAGNOSIS — M51.26 HERNIATED NUCLEUS PULPOSUS, L4-5: Primary | ICD-10-CM

## 2021-02-15 DIAGNOSIS — Z51.81 ENCOUNTER FOR THERAPEUTIC DRUG MONITORING: ICD-10-CM

## 2021-02-15 LAB
ALBUMIN SERPL-MCNC: 3.6 G/DL (ref 3.4–5)
ALT SERPL-CCNC: 9 U/L
AST SERPL-CCNC: 19 U/L (ref 15–37)
BASOPHILS # BLD AUTO: 0.05 X10(3) UL (ref 0–0.2)
BASOPHILS NFR BLD AUTO: 0.7 %
CHOLEST SMN-MCNC: 199 MG/DL (ref ?–200)
CREAT BLD-MCNC: 1.08 MG/DL
CRP SERPL-MCNC: 0.43 MG/DL (ref ?–0.3)
DEPRECATED RDW RBC AUTO: 53 FL (ref 35.1–46.3)
EOSINOPHIL # BLD AUTO: 0.11 X10(3) UL (ref 0–0.7)
EOSINOPHIL NFR BLD AUTO: 1.5 %
ERYTHROCYTE [DISTWIDTH] IN BLOOD BY AUTOMATED COUNT: 14.8 % (ref 11–15)
ERYTHROCYTE [SEDIMENTATION RATE] IN BLOOD: 12 MM/HR
HCT VFR BLD AUTO: 41.3 %
HDLC SERPL-MCNC: 71 MG/DL (ref 40–59)
HGB BLD-MCNC: 13.3 G/DL
IMM GRANULOCYTES # BLD AUTO: 0.01 X10(3) UL (ref 0–1)
IMM GRANULOCYTES NFR BLD: 0.1 %
LDLC SERPL CALC-MCNC: 101 MG/DL (ref ?–100)
LYMPHOCYTES # BLD AUTO: 1.04 X10(3) UL (ref 1–4)
LYMPHOCYTES NFR BLD AUTO: 14.1 %
MCH RBC QN AUTO: 31.4 PG (ref 26–34)
MCHC RBC AUTO-ENTMCNC: 32.2 G/DL (ref 31–37)
MCV RBC AUTO: 97.4 FL
MONOCYTES # BLD AUTO: 0.51 X10(3) UL (ref 0.1–1)
MONOCYTES NFR BLD AUTO: 6.9 %
NEUTROPHILS # BLD AUTO: 5.66 X10 (3) UL (ref 1.5–7.7)
NEUTROPHILS # BLD AUTO: 5.66 X10(3) UL (ref 1.5–7.7)
NEUTROPHILS NFR BLD AUTO: 76.7 %
NONHDLC SERPL-MCNC: 128 MG/DL (ref ?–130)
PATIENT FASTING Y/N/NP: YES
PLATELET # BLD AUTO: 213 10(3)UL (ref 150–450)
RBC # BLD AUTO: 4.24 X10(6)UL
TRIGL SERPL-MCNC: 133 MG/DL (ref 30–149)
TSI SER-ACNC: 0.45 MIU/ML (ref 0.36–3.74)
VLDLC SERPL CALC-MCNC: 27 MG/DL (ref 0–30)
WBC # BLD AUTO: 7.4 X10(3) UL (ref 4–11)

## 2021-02-15 PROCEDURE — 85652 RBC SED RATE AUTOMATED: CPT

## 2021-02-15 PROCEDURE — 85025 COMPLETE CBC W/AUTO DIFF WBC: CPT

## 2021-02-15 PROCEDURE — 84460 ALANINE AMINO (ALT) (SGPT): CPT

## 2021-02-15 PROCEDURE — 86140 C-REACTIVE PROTEIN: CPT

## 2021-02-15 PROCEDURE — 36415 COLL VENOUS BLD VENIPUNCTURE: CPT

## 2021-02-15 PROCEDURE — 99214 OFFICE O/P EST MOD 30 MIN: CPT | Performed by: PHYSICAL MEDICINE & REHABILITATION

## 2021-02-15 PROCEDURE — 84450 TRANSFERASE (AST) (SGOT): CPT

## 2021-02-15 PROCEDURE — 84443 ASSAY THYROID STIM HORMONE: CPT

## 2021-02-15 PROCEDURE — 82565 ASSAY OF CREATININE: CPT

## 2021-02-15 PROCEDURE — 80061 LIPID PANEL: CPT

## 2021-02-15 PROCEDURE — 82040 ASSAY OF SERUM ALBUMIN: CPT

## 2021-02-15 RX ORDER — TOFACITINIB 11 MG/1
11 TABLET, FILM COATED, EXTENDED RELEASE ORAL DAILY
Qty: 30 TABLET | Refills: 10 | Status: ON HOLD | OUTPATIENT
Start: 2021-02-15 | End: 2021-04-24

## 2021-02-15 NOTE — TELEPHONE ENCOUNTER
Yoly Level from [facility] requesting labs for patient. States patient is currently there but does not know which labs she needed. ,

## 2021-02-15 NOTE — TELEPHONE ENCOUNTER
[employer] for authorization of approval for Bilateral L5 transforaminal epidural steroid injection under fluoroscopy  cpt codes E361563, 05028. Authorization is not required. Transaction ID: [id number]. Will inform Nursing.

## 2021-02-15 NOTE — TELEPHONE ENCOUNTER
LOV: 9/15/20  Last Refilled:#30, 10rfs  2/28/20  Labs:AST in process   Future Appointments   Date Time Provider Joel Mann   2/15/2021  2:00 PM Alon Maki [facility] [place] [facility]   2/18/2021  4:00 PM Duy AlonzoyD [facility] [place]   2/20/2021 11:10 AM EC ALLERGY ECSCHALRGY [facility] [place]   3/15/2021  8:40 AM Tierra Titus MD [address] [place] [address] EC H   3/18/2021  8:00 AM Awais Disla, [address] [place] [address] [address] [facility]   3/22/2021  9:00 AM Nate Romano MD [address] [facility]   5/12/2021  8:00 AM Rito Burdick MD [facility]       Please advise.

## 2021-02-15 NOTE — PROGRESS NOTES
Telehealth outside of [address] Verbal Consent   I conducted a telehealth visit with Fletcher Hull today, 02/15/21, which was completed using two-way, real-time interactive audio and video communication.  This has been done in good deandre to pr n/t. Underwent MRI of the lumbar spine w/wo contrast under MAC due to Parkinsonian movements.     Pertinent allergies: PCN    Physical exam:  Virtual - no vitals    No apparent distress  Mild limitation in lumbar extension with provoked back and leg pain  N

## 2021-02-15 NOTE — TELEPHONE ENCOUNTER
Patient has been scheduled for a  Bilateral L5 transforaminal epidural steroid injection under fluoroscopy MAC on 2/24 at the [facility]. Medications and allergies reviewed.  Patient informed we will need verbal or written authorization from patients Primary Care

## 2021-02-20 ENCOUNTER — NURSE ONLY (OUTPATIENT)
Dept: ALLERGY | Facility: CLINIC | Age: 61
End: 2021-02-20
Payer: COMMERCIAL

## 2021-02-20 DIAGNOSIS — J30.89 ENVIRONMENTAL AND SEASONAL ALLERGIES: ICD-10-CM

## 2021-02-20 PROCEDURE — 95117 IMMUNOTHERAPY INJECTIONS: CPT | Performed by: ALLERGY & IMMUNOLOGY

## 2021-02-21 ENCOUNTER — LAB REQUISITION (OUTPATIENT)
Dept: LAB | Facility: HOSPITAL | Age: 61
End: 2021-02-21
Payer: COMMERCIAL

## 2021-02-21 DIAGNOSIS — Z01.818 ENCOUNTER FOR OTHER PREPROCEDURAL EXAMINATION: ICD-10-CM

## 2021-02-21 LAB — SARS-COV-2 RNA RESP QL NAA+PROBE: NOT DETECTED

## 2021-02-24 ENCOUNTER — OFFICE VISIT (OUTPATIENT)
Dept: SURGERY | Facility: CLINIC | Age: 61
End: 2021-02-24

## 2021-02-24 DIAGNOSIS — M51.26 HERNIATED NUCLEUS PULPOSUS, L4-5: Primary | ICD-10-CM

## 2021-02-24 PROCEDURE — 64483 NJX AA&/STRD TFRM EPI L/S 1: CPT | Performed by: PHYSICAL MEDICINE & REHABILITATION

## 2021-02-24 RX ORDER — METHOTREXATE 2.5 MG/1
TABLET ORAL
Qty: 96 TABLET | Refills: 1 | Status: ON HOLD | OUTPATIENT
Start: 2021-02-24 | End: 2021-04-24

## 2021-02-24 NOTE — TELEPHONE ENCOUNTER
Requested Prescriptions     Pending Prescriptions Disp Refills   • METHOTREXATE SODIUM 2.5 MG Oral Tab [Pharmacy Med Name: METHOTREXATE 2.5 MG TABLET] 96 tablet 1     Sig: TAKE 8 TABLETS (20 MG TOTAL) BY MOUTH EVERY 7 DAYS.      LF: 9/24/20 #96 TAB W/ 1 RF mg/dL 0.43 (H)   SED RATE      0 - 30 mm/Hr 12       Summary:  1. Cont. methotrexate - 8 tablets a week and folic acid 1mg a day   2. Cont. xlejanz 11mg a day   3. Return to clinic in  6 months -   4. .Check labs in 4  months .    5. Diclofenac gel 1 % for

## 2021-02-24 NOTE — PROCEDURES
[facility]    BILATERAL LUMBAR TRANSFORAMINAL   NAME:  Adelso Nicole    MR #:    [id number] :  1960     PHYSICIAN:  Rc Hicks        Operative Report    DATE OF PROCEDURE: 2021   PREOPERATIVE DIAGNOSES: 1.  Reagan Adler intervertebral foramen. At this point in time, the patient's skin was anesthetized with 1 to 2 cc of 1% PF lidocaine without epinephrine. Then, a 5 inch, 22-gauge spinal needle was inserted and directed towards the left L5-S1 intervertebral foramen.   Whe

## 2021-02-25 ENCOUNTER — PATIENT MESSAGE (OUTPATIENT)
Dept: NEUROLOGY | Facility: CLINIC | Age: 61
End: 2021-02-25

## 2021-02-25 NOTE — TELEPHONE ENCOUNTER
From: Edelmira Tobias  To: Petrona Hodges DO  Sent: 2/25/2021 9:39 AM CST  Subject: Non-Urgent Medical Question    I have had and had nagging headaches since the injections yesterday. Should I be concerned?      Also second question, could you tell me if

## 2021-02-25 NOTE — TELEPHONE ENCOUNTER
Patient is notified. State she will call with an update in 2 weeks either in MyChart of per phone call. If pain is increased she will schedule a follow up appointment.
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
## 2021-02-25 NOTE — TELEPHONE ENCOUNTER
Spoke to patient state she been having headache since the injection. After taking Norco 10am pain been decreased to a 4-5/10. Notified patient that at times a headache happens the day after.      Also second question, could you tell me if the disc that's co

## 2021-03-09 ENCOUNTER — PATIENT MESSAGE (OUTPATIENT)
Dept: NEUROLOGY | Facility: CLINIC | Age: 61
End: 2021-03-09

## 2021-03-09 DIAGNOSIS — M51.26 HERNIATED NUCLEUS PULPOSUS, L4-5: Primary | ICD-10-CM

## 2021-03-10 NOTE — TELEPHONE ENCOUNTER
Would recommend we repeat injection. Does not need to be seen for follow up beforehand unless she wants to discuss further.

## 2021-03-10 NOTE — TELEPHONE ENCOUNTER
From: Janet Erazo  To: Tammy Barrios DO  Sent: 3/9/2021 8:35 PM CST  Subject: Visit Follow-up Question    The back injections that I had on February 24th made my constant leg pain better but it's still very painful to walk or do stairs.  The only way

## 2021-03-10 NOTE — TELEPHONE ENCOUNTER
S/w patient in regards of post injection f/u 30% improvement from this injection, with a pain rate when sitting 0-10. But when she is walking/standing 8-10.   Taking Norco as needed but does not like her Norco.  Patient states she has seen her Parkinson sp

## 2021-03-11 ENCOUNTER — TELEPHONE (OUTPATIENT)
Dept: NEUROLOGY | Facility: CLINIC | Age: 61
End: 2021-03-11

## 2021-03-11 NOTE — TELEPHONE ENCOUNTER
S/w patient in regards of Dr. Charlie Zuniga recommendation, patient would like to proceed with the repeat of Bilateral TFESI please place order.

## 2021-03-11 NOTE — TELEPHONE ENCOUNTER
Patient has been scheduled for a  Bilateral L5 transforaminal epidural steroid injection under fluoroscopy, MAC for Parkinsonian movements  on 3/17/21 at the [facility]. Patient informed she will need a .  Patient informed not to eat or drink anything after

## 2021-03-11 NOTE — TELEPHONE ENCOUNTER
Bilateral L5 transforaminal epidural steroid injection under fluoroscopy, MAC for Parkinsonian movements CPT CODE: 57808-87 DX: M51.26-APPROVED     [employer] online for authorization of approval for above. Authorization is not required.    Transaction ID: 25

## 2021-03-14 ENCOUNTER — LAB REQUISITION (OUTPATIENT)
Dept: LAB | Facility: HOSPITAL | Age: 61
End: 2021-03-14
Payer: COMMERCIAL

## 2021-03-14 DIAGNOSIS — Z01.818 ENCOUNTER FOR OTHER PREPROCEDURAL EXAMINATION: ICD-10-CM

## 2021-03-14 LAB — SARS-COV-2 RNA RESP QL NAA+PROBE: NOT DETECTED

## 2021-03-15 ENCOUNTER — OFFICE VISIT (OUTPATIENT)
Dept: RHEUMATOLOGY | Facility: CLINIC | Age: 61
End: 2021-03-15
Payer: COMMERCIAL

## 2021-03-15 VITALS
BODY MASS INDEX: 36.51 KG/M2 | HEIGHT: 69 IN | WEIGHT: 246.5 LBS | HEART RATE: 87 BPM | RESPIRATION RATE: 16 BRPM | SYSTOLIC BLOOD PRESSURE: 107 MMHG | DIASTOLIC BLOOD PRESSURE: 74 MMHG

## 2021-03-15 DIAGNOSIS — M06.9 RHEUMATOID ARTHRITIS, INVOLVING UNSPECIFIED SITE, UNSPECIFIED WHETHER RHEUMATOID FACTOR PRESENT (HCC): Primary | ICD-10-CM

## 2021-03-15 DIAGNOSIS — Z51.81 THERAPEUTIC DRUG MONITORING: ICD-10-CM

## 2021-03-15 PROCEDURE — 99214 OFFICE O/P EST MOD 30 MIN: CPT | Performed by: INTERNAL MEDICINE

## 2021-03-15 PROCEDURE — 3074F SYST BP LT 130 MM HG: CPT | Performed by: INTERNAL MEDICINE

## 2021-03-15 PROCEDURE — 3008F BODY MASS INDEX DOCD: CPT | Performed by: INTERNAL MEDICINE

## 2021-03-15 PROCEDURE — 3078F DIAST BP <80 MM HG: CPT | Performed by: INTERNAL MEDICINE

## 2021-03-15 RX ORDER — LEVOTHYROXINE SODIUM 0.12 MG/1
125 TABLET ORAL
Qty: 90 TABLET | Refills: 1 | Status: SHIPPED | OUTPATIENT
Start: 2021-03-15 | End: 2021-11-22

## 2021-03-15 RX ORDER — MONTELUKAST SODIUM 10 MG/1
TABLET ORAL
Qty: 90 TABLET | Refills: 1 | Status: SHIPPED | OUTPATIENT
Start: 2021-03-15 | End: 2021-10-11

## 2021-03-15 NOTE — TELEPHONE ENCOUNTER
Refill requested for    Name from pharmacy: MONTELUKAST SOD 10 MG TABLET         Will file in chart as: MONTELUKAST SODIUM 10 MG Oral Tab    Sig: TAKE 1 TABLET BY MOUTH EVERY DAY AT NIGHT    Disp:  90 tablet    Refills:  1    Start: 3/14/2021    Class: Nor

## 2021-03-15 NOTE — PROGRESS NOTES
Zhou Mcghee is a 62year old female who presents for Patient presents with: Follow - Up: 6 month follow up on Rheumatoid arthritis   . HPI:     She is a pleasant 65year old who has elevated RF >300 and foot pain - c/w seropositive  RA.       Marina Hernandez on pain meds. She's off the diclofenac as well. Back sx was first and then the sleeve was done. Not taking tramadol or gabapentin at this time. B/c she's better. She is using the diclofenac gel 1 %   She has lost 40 pounds already!      5/13/2020  V BREAKFAST. 90 tablet 1   • METHOTREXATE SODIUM 2.5 MG Oral Tab TAKE 8 TABLETS (20 MG TOTAL) BY MOUTH EVERY 7 DAYS. 96 tablet 1   • Tofacitinib Citrate ER (XELJANZ XR) 11 MG Oral Tablet 24 Hr Take 11 mg by mouth daily.  30 tablet 10   • HYDROcodone-acetamino (90263 UT) Oral Cap Take 1 capsule (50,000 Units total) by mouth once a week.  (Patient not taking: Reported on 3/15/2021 ) 12 capsule 3      Past Medical History:   Diagnosis Date   • Acute torn meniscus of knee 2010    ARTHROSCOPY OF KNEE   • Amenorrhea 4 Systems:  Fatigue  Constitutional:No fever, no change in weight or appetitie  Derm: get rashes on legs - on bottom of her legss after itching even after lotion, x 1-2 months. Joint/Muscluskeltal: see HPI,   All other ROS are negative.      EXAM:   / <130 mg/dL 128   Patient Fasting? Yes   CREATININE      0.55 - 1.02 mg/dL 1.08 (H)   eGFR NON-AFR.  AMERICAN      >=60 56 (L)   eGFR       >=60 65   TSH      0.358 - 3.740 mIU/mL 0.453   Albumin      3.4 - 5.0 g/dL 3.6   ALT (SGPT) talofibular ligament. 3. Abductor digit minimi muscle atrophy compatible with Cárdenas neuropathy.   4. Chronic healed fracture deformity of the fifth metatarsal.  5. Mild bone marrow edema in the base of the third metatarsal which could  be degenerative or for Patient presents with: Follow - Up: 6 month follow up on Rheumatoid arthritis     1. Seropositive Rheumatoid arthriits  -  Positive AMINAH 1:80, , pos.  CCP polyarthralgia with rash on feet - now positive ccp abs - very specific for -   Stable - doin .   5. Diclofenac gel 1 % for left knee. 6. Cont. diclofenac 75mg twice a day  - - can taper to 75mg   aday   7. norco as needed.  .     - overall she's been stable on Violet Shipman MD  3/15/2021   8:48 AM  - Reviewed [place]- information

## 2021-03-16 RX ORDER — CALCITRIOL 0.25 UG/1
CAPSULE, LIQUID FILLED ORAL
Qty: 90 CAPSULE | Refills: 1 | Status: SHIPPED | OUTPATIENT
Start: 2021-03-16 | End: 2021-11-22

## 2021-03-17 ENCOUNTER — OFFICE VISIT (OUTPATIENT)
Dept: SURGERY | Facility: CLINIC | Age: 61
End: 2021-03-17

## 2021-03-17 DIAGNOSIS — M51.26 HERNIATED NUCLEUS PULPOSUS, L4-5: Primary | ICD-10-CM

## 2021-03-17 PROCEDURE — 64483 NJX AA&/STRD TFRM EPI L/S 1: CPT | Performed by: PHYSICAL MEDICINE & REHABILITATION

## 2021-03-18 ENCOUNTER — TELEMEDICINE (OUTPATIENT)
Dept: SURGERY | Facility: CLINIC | Age: 61
End: 2021-03-18

## 2021-03-18 VITALS — WEIGHT: 246.5 LBS | BODY MASS INDEX: 36.51 KG/M2 | HEIGHT: 69 IN

## 2021-03-18 DIAGNOSIS — E66.01 CLASS 2 SEVERE OBESITY DUE TO EXCESS CALORIES WITH SERIOUS COMORBIDITY AND BODY MASS INDEX (BMI) OF 36.0 TO 36.9 IN ADULT (HCC): Primary | ICD-10-CM

## 2021-03-18 PROCEDURE — 97803 MED NUTRITION INDIV SUBSEQ: CPT | Performed by: DIETITIAN, REGISTERED

## 2021-03-18 PROCEDURE — 3008F BODY MASS INDEX DOCD: CPT | Performed by: DIETITIAN, REGISTERED

## 2021-03-18 NOTE — PROGRESS NOTES
Lenka [address] [address]  Dept: [phone]  Loc: [phone]    03/18/21      Bariatric Follow-up Nutrition Session    Consultation conducted via telehealth.      Pt ve .0 11/14/2020    .0 09/05/2020       Diabetes:    Lab Results   Component Value Date     05/20/2020    A1C 5.6 05/20/2020       Lipid Panel:  Lab Results   Component Value Date    CHOLEST 199 02/15/2021    TRIG 133 02/15/2021    HDL 71 DICLOFENAC SODIUM 75 MG Oral Tab EC, TAKE 1 TABLET (75 MG TOTAL) BY MOUTH 2 (TWO) TIMES DAILY.  AS NEEDED, Disp: 60 tablet, Rfl: 3  •  ADVAIR DISKUS 100-50 MCG/DOSE Inhalation Aerosol Powder, Breath Activated, INHALE 1 PUFF BY MOUTH TWICE A DAY, Disp: 1 eac chops    After dinner: Ice cream     +feels she is eating a lot of bread/crackers    Total Calories: 1300+ kcal per day  Excessive in: simple sugars and other: carbs some days  Inadequate in:  fruits, vegetables and fiber     Patient has made some modifica intake/choices, Food intolerances, Activity level, Vitamin/mineral supplementation, Reinforce goals, Calorie/protein intake    Jose Luis Lombardo MS RD LDN  Face-to-face time spent with pt: 30 minutes

## 2021-03-20 ENCOUNTER — NURSE ONLY (OUTPATIENT)
Dept: ALLERGY | Facility: CLINIC | Age: 61
End: 2021-03-20
Payer: COMMERCIAL

## 2021-03-20 DIAGNOSIS — J30.89 ENVIRONMENTAL AND SEASONAL ALLERGIES: ICD-10-CM

## 2021-03-20 PROCEDURE — 95117 IMMUNOTHERAPY INJECTIONS: CPT | Performed by: ALLERGY & IMMUNOLOGY

## 2021-03-20 RX ORDER — FLUTICASONE PROPIONATE AND SALMETEROL 50; 100 UG/1; UG/1
POWDER RESPIRATORY (INHALATION)
Qty: 3 EACH | Refills: 1 | Status: SHIPPED | OUTPATIENT
Start: 2021-03-20

## 2021-03-20 NOTE — TELEPHONE ENCOUNTER
Refill requested for Advair diskus    Last office visit: 1/30/2021     Previously advised to follow up in Follow-up in 6 months or sooner if needed    F/U currently scheduled? AIT is scheduled. Follow up is not scheduled at this time.      ACTION: Routed to

## 2021-03-22 ENCOUNTER — OFFICE VISIT (OUTPATIENT)
Dept: SURGERY | Facility: CLINIC | Age: 61
End: 2021-03-22
Payer: COMMERCIAL

## 2021-03-22 VITALS
SYSTOLIC BLOOD PRESSURE: 130 MMHG | DIASTOLIC BLOOD PRESSURE: 80 MMHG | BODY MASS INDEX: 36.58 KG/M2 | WEIGHT: 247 LBS | HEIGHT: 69 IN

## 2021-03-22 DIAGNOSIS — R63.2 INCREASED APPETITE: ICD-10-CM

## 2021-03-22 DIAGNOSIS — E66.9 OBESITY (BMI 30-39.9): ICD-10-CM

## 2021-03-22 DIAGNOSIS — Z98.84 S/P LAPAROSCOPIC SLEEVE GASTRECTOMY: Primary | ICD-10-CM

## 2021-03-22 PROCEDURE — 3008F BODY MASS INDEX DOCD: CPT | Performed by: INTERNAL MEDICINE

## 2021-03-22 PROCEDURE — 99214 OFFICE O/P EST MOD 30 MIN: CPT | Performed by: INTERNAL MEDICINE

## 2021-03-22 PROCEDURE — 3075F SYST BP GE 130 - 139MM HG: CPT | Performed by: INTERNAL MEDICINE

## 2021-03-22 PROCEDURE — 3079F DIAST BP 80-89 MM HG: CPT | Performed by: INTERNAL MEDICINE

## 2021-03-22 RX ORDER — TOPIRAMATE 25 MG/1
25 TABLET ORAL 2 TIMES DAILY
Qty: 180 TABLET | Refills: 1 | Status: SHIPPED | OUTPATIENT
Start: 2021-03-22 | End: 2021-06-20

## 2021-03-22 RX ORDER — PHENTERMINE HYDROCHLORIDE 8 MG/1
1 TABLET ORAL
Qty: 60 TABLET | Refills: 2 | Status: ON HOLD | OUTPATIENT
Start: 2021-03-22 | End: 2021-04-24

## 2021-03-22 NOTE — PROGRESS NOTES
[address]  Dept: [phone]    Patient:  Rohith Wells  :      1960  MRN:      [id number]    Referring Provider: Mauro Clancy, XR) 11 MG Oral Tablet 24 Hr, Take 11 mg by mouth daily. , Disp: 30 tablet, Rfl: 10  •  HYDROcodone-acetaminophen  MG Oral Tab, Take 1 tablet by mouth 2 (two) times daily as needed for Pain., Disp: 14 tablet, Rfl: 0  •  PANTOPRAZOLE SODIUM 40 MG Oral T • COLONOSCOPY  12/16/2013   • COLONOSCOPY N/A 6/11/2019    Performed by Solitario Trent MD at [address] [place] [address] ENDOSCOPY   • COLPOSCOPY, CERVIX W/UPPER ADJACENT VAGINA; W/BIOPSY(S), CERVIX     • D & C      for extended period   • D & C      for extended per symmetric  Neurologic: Grossly normal       ROS:    Constitutional: negative  Respiratory: negative  Cardiovascular: negative  Gastrointestinal: negative  Musculoskeletal:negative  Neurological: negative  Behavioral/Psych: negative  Endocrine: negative  Al

## 2021-03-26 ENCOUNTER — PATIENT MESSAGE (OUTPATIENT)
Dept: NEUROLOGY | Facility: CLINIC | Age: 61
End: 2021-03-26

## 2021-03-26 NOTE — TELEPHONE ENCOUNTER
From: Vahid Ham  To: Ashley Paula DO  Sent: 3/26/2021 8:04 AM CDT  Subject: Visit Follow-up Question    Dr Lizzette Romano wanted me to follow up a week after my injections.  I am still in pain it's gone from about a nine to a six so it's improved but I s

## 2021-03-31 ENCOUNTER — IMMUNIZATION (OUTPATIENT)
Dept: LAB | Facility: HOSPITAL | Age: 61
End: 2021-03-31
Attending: HOSPITALIST
Payer: COMMERCIAL

## 2021-03-31 DIAGNOSIS — Z23 NEED FOR VACCINATION: Primary | ICD-10-CM

## 2021-03-31 PROCEDURE — 0011A SARSCOV2 VAC 100MCG/0.5ML IM: CPT

## 2021-04-10 ENCOUNTER — OFFICE VISIT (OUTPATIENT)
Dept: INTERNAL MEDICINE CLINIC | Facility: CLINIC | Age: 61
End: 2021-04-10
Payer: COMMERCIAL

## 2021-04-10 ENCOUNTER — LAB ENCOUNTER (OUTPATIENT)
Dept: LAB | Facility: HOSPITAL | Age: 61
End: 2021-04-10
Attending: INTERNAL MEDICINE
Payer: COMMERCIAL

## 2021-04-10 ENCOUNTER — HOSPITAL ENCOUNTER (OUTPATIENT)
Dept: GENERAL RADIOLOGY | Facility: HOSPITAL | Age: 61
Discharge: HOME | End: 2021-04-10
Attending: INTERNAL MEDICINE
Payer: COMMERCIAL

## 2021-04-10 VITALS
DIASTOLIC BLOOD PRESSURE: 63 MMHG | RESPIRATION RATE: 20 BRPM | WEIGHT: 246 LBS | HEART RATE: 84 BPM | HEIGHT: 69 IN | SYSTOLIC BLOOD PRESSURE: 102 MMHG | BODY MASS INDEX: 36.43 KG/M2

## 2021-04-10 DIAGNOSIS — G20 PARKINSON DISEASE (HCC): ICD-10-CM

## 2021-04-10 DIAGNOSIS — Z01.818 PRE-OP EXAMINATION: Primary | ICD-10-CM

## 2021-04-10 DIAGNOSIS — Z01.818 PRE-OP EXAMINATION: ICD-10-CM

## 2021-04-10 DIAGNOSIS — Z98.84 S/P LAPAROSCOPIC SLEEVE GASTRECTOMY: ICD-10-CM

## 2021-04-10 DIAGNOSIS — E03.9 HYPOTHYROIDISM, UNSPECIFIED TYPE: ICD-10-CM

## 2021-04-10 DIAGNOSIS — G47.33 OSA (OBSTRUCTIVE SLEEP APNEA): ICD-10-CM

## 2021-04-10 DIAGNOSIS — M48.061 SPINAL STENOSIS OF LUMBAR REGION, UNSPECIFIED WHETHER NEUROGENIC CLAUDICATION PRESENT: ICD-10-CM

## 2021-04-10 PROCEDURE — 99214 OFFICE O/P EST MOD 30 MIN: CPT | Performed by: INTERNAL MEDICINE

## 2021-04-10 PROCEDURE — 85025 COMPLETE CBC W/AUTO DIFF WBC: CPT

## 2021-04-10 PROCEDURE — 93005 ELECTROCARDIOGRAM TRACING: CPT

## 2021-04-10 PROCEDURE — 87641 MR-STAPH DNA AMP PROBE: CPT

## 2021-04-10 PROCEDURE — 71046 X-RAY EXAM CHEST 2 VIEWS: CPT | Performed by: INTERNAL MEDICINE

## 2021-04-10 PROCEDURE — 3078F DIAST BP <80 MM HG: CPT | Performed by: INTERNAL MEDICINE

## 2021-04-10 PROCEDURE — 3008F BODY MASS INDEX DOCD: CPT | Performed by: INTERNAL MEDICINE

## 2021-04-10 PROCEDURE — 80053 COMPREHEN METABOLIC PANEL: CPT

## 2021-04-10 PROCEDURE — 36415 COLL VENOUS BLD VENIPUNCTURE: CPT

## 2021-04-10 PROCEDURE — 85610 PROTHROMBIN TIME: CPT

## 2021-04-10 PROCEDURE — 3074F SYST BP LT 130 MM HG: CPT | Performed by: INTERNAL MEDICINE

## 2021-04-10 PROCEDURE — 93010 ELECTROCARDIOGRAM REPORT: CPT | Performed by: INTERNAL MEDICINE

## 2021-04-10 PROCEDURE — 87086 URINE CULTURE/COLONY COUNT: CPT | Performed by: INTERNAL MEDICINE

## 2021-04-10 PROCEDURE — 81001 URINALYSIS AUTO W/SCOPE: CPT | Performed by: INTERNAL MEDICINE

## 2021-04-10 RX ORDER — ERGOCALCIFEROL (VITAMIN D2) 1250 MCG
CAPSULE ORAL
COMMUNITY
End: 2021-05-05

## 2021-04-10 NOTE — PROGRESS NOTES
HPI:    Patient ID: Jyoti Horan is a 62year old female. HPI  Patient is here for preoperative evaluation prior to undergoing lumbar spine surgery under the guidance of Dr. Gume Feng on April 21.   Entry in the EMR states right L4-5 transforaminal laparoscopic sleeve gastrectomy     Obesity (BMI 30-39. 9)         HISTORY:  Past Medical History:   Diagnosis Date   • Acute torn meniscus of knee 2010    ARTHROSCOPY OF KNEE   • Amenorrhea 4/2016   • Asthma    • Chicken pox    • Chronic cough    • Disorde Cancer Mother         AML-cause of death   • Other (leukemia) Mother    • Cancer Paternal Aunt         breast cancer-cause of death at age [de-identified]   • Breast Cancer Paternal Aunt [de-identified]        approx age   • Heart Disorder Brother    • Ovarian Cancer Neg       Soci METHOTREXATE SODIUM 2.5 MG Oral Tab TAKE 8 TABLETS (20 MG TOTAL) BY MOUTH EVERY 7 DAYS. 96 tablet 1   • Tofacitinib Citrate ER (XELJANZ XR) 11 MG Oral Tablet 24 Hr Take 11 mg by mouth daily.  30 tablet 10   • HYDROcodone-acetaminophen  MG Oral Tab Jacinta Jeronimo white  Eyes:      Conjunctiva/sclera: Conjunctivae normal.   Cardiovascular:      Rate and Rhythm: Normal rate. Heart sounds: Normal heart sounds. No murmur heard. No friction rub. No gallop.     Pulmonary:      Effort: Pulmonary effort is normal. TIME (PT); Future  - XR CHEST PA + LAT CHEST (CPT=71046); Future  - EKG 12-LEAD    2. Spinal stenosis of lumbar region, unspecified whether neurogenic claudication present  Surgery as above. 3. Parkinson disease ([facility]  Somewhat controlled.   Patient has

## 2021-04-17 ENCOUNTER — NURSE ONLY (OUTPATIENT)
Dept: ALLERGY | Facility: CLINIC | Age: 61
End: 2021-04-17
Payer: COMMERCIAL

## 2021-04-17 DIAGNOSIS — J30.89 ENVIRONMENTAL AND SEASONAL ALLERGIES: ICD-10-CM

## 2021-04-17 PROCEDURE — 95165 ANTIGEN THERAPY SERVICES: CPT | Performed by: ALLERGY & IMMUNOLOGY

## 2021-04-17 PROCEDURE — 95117 IMMUNOTHERAPY INJECTIONS: CPT | Performed by: ALLERGY & IMMUNOLOGY

## 2021-04-18 ENCOUNTER — LAB ENCOUNTER (OUTPATIENT)
Dept: LAB | Facility: HOSPITAL | Age: 61
End: 2021-04-18
Attending: NEUROLOGICAL SURGERY
Payer: COMMERCIAL

## 2021-04-18 DIAGNOSIS — Z01.818 PREOP TESTING: ICD-10-CM

## 2021-04-18 PROCEDURE — 86901 BLOOD TYPING SEROLOGIC RH(D): CPT

## 2021-04-18 PROCEDURE — 86900 BLOOD TYPING SEROLOGIC ABO: CPT

## 2021-04-18 PROCEDURE — 86850 RBC ANTIBODY SCREEN: CPT

## 2021-04-18 PROCEDURE — 36415 COLL VENOUS BLD VENIPUNCTURE: CPT

## 2021-04-21 ENCOUNTER — ANESTHESIA (OUTPATIENT)
Dept: SURGERY | Facility: HOSPITAL | Age: 61
DRG: 455 | End: 2021-04-21
Payer: COMMERCIAL

## 2021-04-21 ENCOUNTER — ANESTHESIA EVENT (OUTPATIENT)
Dept: SURGERY | Facility: HOSPITAL | Age: 61
DRG: 455 | End: 2021-04-21
Payer: COMMERCIAL

## 2021-04-21 ENCOUNTER — HOSPITAL ENCOUNTER (INPATIENT)
Facility: HOSPITAL | Age: 61
LOS: 4 days | Discharge: NURSING, REHAB OR LONG-TERM CARE | DRG: 455 | End: 2021-04-25
Attending: NEUROLOGICAL SURGERY | Admitting: NEUROLOGICAL SURGERY
Payer: COMMERCIAL

## 2021-04-21 ENCOUNTER — APPOINTMENT (OUTPATIENT)
Dept: GENERAL RADIOLOGY | Facility: HOSPITAL | Age: 61
DRG: 455 | End: 2021-04-21
Attending: NEUROLOGICAL SURGERY
Payer: COMMERCIAL

## 2021-04-21 DIAGNOSIS — Z01.818 PREOP TESTING: Primary | ICD-10-CM

## 2021-04-21 PROBLEM — M48.061 LUMBAR SPINAL STENOSIS: Status: ACTIVE | Noted: 2017-10-30

## 2021-04-21 PROCEDURE — 0ST20ZZ RESECTION OF LUMBAR VERTEBRAL DISC, OPEN APPROACH: ICD-10-PCS | Performed by: NEUROLOGICAL SURGERY

## 2021-04-21 PROCEDURE — 0SG00AJ FUSION OF LUMBAR VERTEBRAL JOINT WITH INTERBODY FUSION DEVICE, POSTERIOR APPROACH, ANTERIOR COLUMN, OPEN APPROACH: ICD-10-PCS | Performed by: NEUROLOGICAL SURGERY

## 2021-04-21 PROCEDURE — 99232 SBSQ HOSP IP/OBS MODERATE 35: CPT | Performed by: HOSPITALIST

## 2021-04-21 PROCEDURE — 01NB0ZZ RELEASE LUMBAR NERVE, OPEN APPROACH: ICD-10-PCS | Performed by: NEUROLOGICAL SURGERY

## 2021-04-21 PROCEDURE — 76000 FLUOROSCOPY <1 HR PHYS/QHP: CPT | Performed by: NEUROLOGICAL SURGERY

## 2021-04-21 PROCEDURE — 0SG0071 FUSION OF LUMBAR VERTEBRAL JOINT WITH AUTOLOGOUS TISSUE SUBSTITUTE, POSTERIOR APPROACH, POSTERIOR COLUMN, OPEN APPROACH: ICD-10-PCS | Performed by: NEUROLOGICAL SURGERY

## 2021-04-21 DEVICE — RELINE MAS MOD SCREW 6.5X35 2C: Type: IMPLANTABLE DEVICE | Site: BACK | Status: FUNCTIONAL

## 2021-04-21 DEVICE — BONE GRAFT KIT 7510100 INFUSE X SMALL
Type: IMPLANTABLE DEVICE | Site: BACK | Status: FUNCTIONAL
Brand: INFUSE® BONE GRAFT

## 2021-04-21 DEVICE — RELINE MAS TI ROD 5.5X25 LRDTC: Type: IMPLANTABLE DEVICE | Site: BACK | Status: FUNCTIONAL

## 2021-04-21 DEVICE — OSTEOCEL PRO LARGE BULK BUY: Type: IMPLANTABLE DEVICE | Site: BACK | Status: FUNCTIONAL

## 2021-04-21 DEVICE — 8.11-MAS MIDLINE ALLOGRAFT WID: Type: IMPLANTABLE DEVICE | Site: BACK | Status: FUNCTIONAL

## 2021-04-21 DEVICE — RELINE LCK SCRW 5.5 OPEN TULIP: Type: IMPLANTABLE DEVICE | Site: BACK | Status: FUNCTIONAL

## 2021-04-21 DEVICE — RELINE MAS MOD REDUCTION EXT: Type: IMPLANTABLE DEVICE | Site: BACK | Status: FUNCTIONAL

## 2021-04-21 RX ORDER — METOCLOPRAMIDE 10 MG/1
10 TABLET ORAL ONCE
Status: COMPLETED | OUTPATIENT
Start: 2021-04-21 | End: 2021-04-21

## 2021-04-21 RX ORDER — MIDAZOLAM HYDROCHLORIDE 1 MG/ML
INJECTION INTRAMUSCULAR; INTRAVENOUS AS NEEDED
Status: DISCONTINUED | OUTPATIENT
Start: 2021-04-21 | End: 2021-04-21 | Stop reason: SURG

## 2021-04-21 RX ORDER — ROCURONIUM BROMIDE 10 MG/ML
INJECTION, SOLUTION INTRAVENOUS AS NEEDED
Status: DISCONTINUED | OUTPATIENT
Start: 2021-04-21 | End: 2021-04-21 | Stop reason: SURG

## 2021-04-21 RX ORDER — VANCOMYCIN HYDROCHLORIDE
15 EVERY 12 HOURS
Status: DISCONTINUED | OUTPATIENT
Start: 2021-04-22 | End: 2021-04-23

## 2021-04-21 RX ORDER — MORPHINE SULFATE 2 MG/ML
1 INJECTION, SOLUTION INTRAMUSCULAR; INTRAVENOUS EVERY 2 HOUR PRN
Status: DISCONTINUED | OUTPATIENT
Start: 2021-04-21 | End: 2021-04-25

## 2021-04-21 RX ORDER — FOLIC ACID 1 MG/1
1 TABLET ORAL
Status: DISCONTINUED | OUTPATIENT
Start: 2021-04-22 | End: 2021-04-25

## 2021-04-21 RX ORDER — HYDROCODONE BITARTRATE AND ACETAMINOPHEN 5; 325 MG/1; MG/1
1 TABLET ORAL AS NEEDED
Status: DISCONTINUED | OUTPATIENT
Start: 2021-04-21 | End: 2021-04-21 | Stop reason: HOSPADM

## 2021-04-21 RX ORDER — BUPIVACAINE HYDROCHLORIDE AND EPINEPHRINE 5; 5 MG/ML; UG/ML
INJECTION, SOLUTION PERINEURAL AS NEEDED
Status: DISCONTINUED | OUTPATIENT
Start: 2021-04-21 | End: 2021-04-21 | Stop reason: HOSPADM

## 2021-04-21 RX ORDER — HYDROCODONE BITARTRATE AND ACETAMINOPHEN 5; 325 MG/1; MG/1
2 TABLET ORAL AS NEEDED
Status: DISCONTINUED | OUTPATIENT
Start: 2021-04-21 | End: 2021-04-21 | Stop reason: HOSPADM

## 2021-04-21 RX ORDER — SODIUM CHLORIDE, SODIUM LACTATE, POTASSIUM CHLORIDE, CALCIUM CHLORIDE 600; 310; 30; 20 MG/100ML; MG/100ML; MG/100ML; MG/100ML
INJECTION, SOLUTION INTRAVENOUS CONTINUOUS
Status: DISCONTINUED | OUTPATIENT
Start: 2021-04-21 | End: 2021-04-21 | Stop reason: HOSPADM

## 2021-04-21 RX ORDER — PRAVASTATIN SODIUM 20 MG
20 TABLET ORAL NIGHTLY
Status: DISCONTINUED | OUTPATIENT
Start: 2021-04-21 | End: 2021-04-25

## 2021-04-21 RX ORDER — ONDANSETRON 2 MG/ML
4 INJECTION INTRAMUSCULAR; INTRAVENOUS ONCE AS NEEDED
Status: DISCONTINUED | OUTPATIENT
Start: 2021-04-21 | End: 2021-04-21 | Stop reason: HOSPADM

## 2021-04-21 RX ORDER — TOPIRAMATE 25 MG/1
25 TABLET ORAL 2 TIMES DAILY
Status: DISCONTINUED | OUTPATIENT
Start: 2021-04-21 | End: 2021-04-25

## 2021-04-21 RX ORDER — MORPHINE SULFATE 4 MG/ML
4 INJECTION, SOLUTION INTRAMUSCULAR; INTRAVENOUS EVERY 10 MIN PRN
Status: DISCONTINUED | OUTPATIENT
Start: 2021-04-21 | End: 2021-04-21 | Stop reason: HOSPADM

## 2021-04-21 RX ORDER — ACETAMINOPHEN 500 MG
1000 TABLET ORAL ONCE
Status: DISCONTINUED | OUTPATIENT
Start: 2021-04-21 | End: 2021-04-21 | Stop reason: HOSPADM

## 2021-04-21 RX ORDER — LEVOTHYROXINE SODIUM 0.12 MG/1
125 TABLET ORAL
Status: DISCONTINUED | OUTPATIENT
Start: 2021-04-22 | End: 2021-04-25

## 2021-04-21 RX ORDER — ONDANSETRON 2 MG/ML
INJECTION INTRAMUSCULAR; INTRAVENOUS AS NEEDED
Status: DISCONTINUED | OUTPATIENT
Start: 2021-04-21 | End: 2021-04-21 | Stop reason: SURG

## 2021-04-21 RX ORDER — ONDANSETRON 2 MG/ML
4 INJECTION INTRAMUSCULAR; INTRAVENOUS EVERY 4 HOURS PRN
Status: ACTIVE | OUTPATIENT
Start: 2021-04-21 | End: 2021-04-22

## 2021-04-21 RX ORDER — HYDROMORPHONE HYDROCHLORIDE 1 MG/ML
0.2 INJECTION, SOLUTION INTRAMUSCULAR; INTRAVENOUS; SUBCUTANEOUS EVERY 5 MIN PRN
Status: DISCONTINUED | OUTPATIENT
Start: 2021-04-21 | End: 2021-04-21 | Stop reason: HOSPADM

## 2021-04-21 RX ORDER — DIPHENHYDRAMINE HCL 25 MG
25 CAPSULE ORAL EVERY 4 HOURS PRN
Status: DISCONTINUED | OUTPATIENT
Start: 2021-04-21 | End: 2021-04-25

## 2021-04-21 RX ORDER — CALCITRIOL 0.25 UG/1
0.25 CAPSULE, LIQUID FILLED ORAL DAILY
Status: DISCONTINUED | OUTPATIENT
Start: 2021-04-22 | End: 2021-04-25

## 2021-04-21 RX ORDER — MORPHINE SULFATE 2 MG/ML
0.5 INJECTION, SOLUTION INTRAMUSCULAR; INTRAVENOUS EVERY 2 HOUR PRN
Status: DISCONTINUED | OUTPATIENT
Start: 2021-04-21 | End: 2021-04-25

## 2021-04-21 RX ORDER — DEXAMETHASONE SODIUM PHOSPHATE 4 MG/ML
VIAL (ML) INJECTION AS NEEDED
Status: DISCONTINUED | OUTPATIENT
Start: 2021-04-21 | End: 2021-04-21 | Stop reason: SURG

## 2021-04-21 RX ORDER — HYDROMORPHONE HYDROCHLORIDE 1 MG/ML
0.4 INJECTION, SOLUTION INTRAMUSCULAR; INTRAVENOUS; SUBCUTANEOUS EVERY 5 MIN PRN
Status: DISCONTINUED | OUTPATIENT
Start: 2021-04-21 | End: 2021-04-21 | Stop reason: HOSPADM

## 2021-04-21 RX ORDER — PROCHLORPERAZINE EDISYLATE 5 MG/ML
5 INJECTION INTRAMUSCULAR; INTRAVENOUS ONCE AS NEEDED
Status: DISCONTINUED | OUTPATIENT
Start: 2021-04-21 | End: 2021-04-21 | Stop reason: HOSPADM

## 2021-04-21 RX ORDER — SODIUM CHLORIDE, SODIUM LACTATE, POTASSIUM CHLORIDE, CALCIUM CHLORIDE 600; 310; 30; 20 MG/100ML; MG/100ML; MG/100ML; MG/100ML
INJECTION, SOLUTION INTRAVENOUS CONTINUOUS
Status: DISCONTINUED | OUTPATIENT
Start: 2021-04-21 | End: 2021-04-24

## 2021-04-21 RX ORDER — METHOCARBAMOL 750 MG/1
750 TABLET, FILM COATED ORAL 3 TIMES DAILY PRN
Status: DISCONTINUED | OUTPATIENT
Start: 2021-04-21 | End: 2021-04-25

## 2021-04-21 RX ORDER — FAMOTIDINE 20 MG/1
20 TABLET ORAL ONCE
Status: DISCONTINUED | OUTPATIENT
Start: 2021-04-21 | End: 2021-04-21 | Stop reason: HOSPADM

## 2021-04-21 RX ORDER — DIPHENHYDRAMINE HYDROCHLORIDE 50 MG/ML
25 INJECTION INTRAMUSCULAR; INTRAVENOUS EVERY 4 HOURS PRN
Status: DISCONTINUED | OUTPATIENT
Start: 2021-04-21 | End: 2021-04-25

## 2021-04-21 RX ORDER — SENNOSIDES 8.6 MG
17.2 TABLET ORAL NIGHTLY
Status: DISCONTINUED | OUTPATIENT
Start: 2021-04-21 | End: 2021-04-25

## 2021-04-21 RX ORDER — NALOXONE HYDROCHLORIDE 0.4 MG/ML
80 INJECTION, SOLUTION INTRAMUSCULAR; INTRAVENOUS; SUBCUTANEOUS AS NEEDED
Status: DISCONTINUED | OUTPATIENT
Start: 2021-04-21 | End: 2021-04-21 | Stop reason: HOSPADM

## 2021-04-21 RX ORDER — HALOPERIDOL 5 MG/ML
0.25 INJECTION INTRAMUSCULAR ONCE AS NEEDED
Status: DISCONTINUED | OUTPATIENT
Start: 2021-04-21 | End: 2021-04-21 | Stop reason: HOSPADM

## 2021-04-21 RX ORDER — ACETAMINOPHEN 325 MG/1
650 TABLET ORAL EVERY 4 HOURS PRN
Status: DISCONTINUED | OUTPATIENT
Start: 2021-04-21 | End: 2021-04-25

## 2021-04-21 RX ORDER — MORPHINE SULFATE 4 MG/ML
2 INJECTION, SOLUTION INTRAMUSCULAR; INTRAVENOUS EVERY 10 MIN PRN
Status: DISCONTINUED | OUTPATIENT
Start: 2021-04-21 | End: 2021-04-21 | Stop reason: HOSPADM

## 2021-04-21 RX ORDER — HYDROCODONE BITARTRATE AND ACETAMINOPHEN 10; 325 MG/1; MG/1
1 TABLET ORAL EVERY 4 HOURS PRN
Status: DISCONTINUED | OUTPATIENT
Start: 2021-04-21 | End: 2021-04-25

## 2021-04-21 RX ORDER — MORPHINE SULFATE 2 MG/ML
2 INJECTION, SOLUTION INTRAMUSCULAR; INTRAVENOUS EVERY 2 HOUR PRN
Status: DISCONTINUED | OUTPATIENT
Start: 2021-04-21 | End: 2021-04-25

## 2021-04-21 RX ORDER — GLYCOPYRROLATE 0.2 MG/ML
INJECTION, SOLUTION INTRAMUSCULAR; INTRAVENOUS AS NEEDED
Status: DISCONTINUED | OUTPATIENT
Start: 2021-04-21 | End: 2021-04-21 | Stop reason: SURG

## 2021-04-21 RX ORDER — PHENYLEPHRINE HCL 10 MG/ML
VIAL (ML) INJECTION AS NEEDED
Status: DISCONTINUED | OUTPATIENT
Start: 2021-04-21 | End: 2021-04-21 | Stop reason: SURG

## 2021-04-21 RX ORDER — ALBUTEROL SULFATE 2.5 MG/3ML
2.5 SOLUTION RESPIRATORY (INHALATION) EVERY 4 HOURS PRN
Status: DISCONTINUED | OUTPATIENT
Start: 2021-04-21 | End: 2021-04-25

## 2021-04-21 RX ORDER — HYDROMORPHONE HYDROCHLORIDE 1 MG/ML
0.6 INJECTION, SOLUTION INTRAMUSCULAR; INTRAVENOUS; SUBCUTANEOUS EVERY 5 MIN PRN
Status: DISCONTINUED | OUTPATIENT
Start: 2021-04-21 | End: 2021-04-21 | Stop reason: HOSPADM

## 2021-04-21 RX ORDER — RASAGILINE 0.5 MG/1
1 TABLET ORAL DAILY
Status: DISCONTINUED | OUTPATIENT
Start: 2021-04-22 | End: 2021-04-25

## 2021-04-21 RX ORDER — MONTELUKAST SODIUM 10 MG/1
10 TABLET ORAL NIGHTLY
Status: DISCONTINUED | OUTPATIENT
Start: 2021-04-21 | End: 2021-04-25

## 2021-04-21 RX ORDER — LIDOCAINE HYDROCHLORIDE 10 MG/ML
INJECTION, SOLUTION EPIDURAL; INFILTRATION; INTRACAUDAL; PERINEURAL AS NEEDED
Status: DISCONTINUED | OUTPATIENT
Start: 2021-04-21 | End: 2021-04-21 | Stop reason: SURG

## 2021-04-21 RX ORDER — HYDROCODONE BITARTRATE AND ACETAMINOPHEN 10; 325 MG/1; MG/1
2 TABLET ORAL EVERY 4 HOURS PRN
Status: DISCONTINUED | OUTPATIENT
Start: 2021-04-21 | End: 2021-04-25

## 2021-04-21 RX ORDER — ALBUTEROL SULFATE 90 UG/1
2 AEROSOL, METERED RESPIRATORY (INHALATION) EVERY 6 HOURS PRN
Status: DISCONTINUED | OUTPATIENT
Start: 2021-04-21 | End: 2021-04-25

## 2021-04-21 RX ORDER — SODIUM CHLORIDE 9 MG/ML
INJECTION, SOLUTION INTRAVENOUS CONTINUOUS
Status: DISCONTINUED | OUTPATIENT
Start: 2021-04-21 | End: 2021-04-24

## 2021-04-21 RX ORDER — PANTOPRAZOLE SODIUM 40 MG/1
40 TABLET, DELAYED RELEASE ORAL
Status: DISCONTINUED | OUTPATIENT
Start: 2021-04-22 | End: 2021-04-25

## 2021-04-21 RX ORDER — MORPHINE SULFATE 10 MG/ML
6 INJECTION, SOLUTION INTRAMUSCULAR; INTRAVENOUS EVERY 10 MIN PRN
Status: DISCONTINUED | OUTPATIENT
Start: 2021-04-21 | End: 2021-04-21 | Stop reason: HOSPADM

## 2021-04-21 RX ORDER — VANCOMYCIN HYDROCHLORIDE
15 ONCE
Status: COMPLETED | OUTPATIENT
Start: 2021-04-21 | End: 2021-04-21

## 2021-04-21 RX ADMIN — SODIUM CHLORIDE, SODIUM LACTATE, POTASSIUM CHLORIDE, CALCIUM CHLORIDE: 600; 310; 30; 20 INJECTION, SOLUTION INTRAVENOUS at 16:50:00

## 2021-04-21 RX ADMIN — GLYCOPYRROLATE 0.2 MG: 0.2 INJECTION, SOLUTION INTRAMUSCULAR; INTRAVENOUS at 14:44:00

## 2021-04-21 RX ADMIN — LIDOCAINE HYDROCHLORIDE 50 MG: 10 INJECTION, SOLUTION EPIDURAL; INFILTRATION; INTRACAUDAL; PERINEURAL at 14:44:00

## 2021-04-21 RX ADMIN — MIDAZOLAM HYDROCHLORIDE 2 MG: 1 INJECTION INTRAMUSCULAR; INTRAVENOUS at 14:42:00

## 2021-04-21 RX ADMIN — SODIUM CHLORIDE, SODIUM LACTATE, POTASSIUM CHLORIDE, CALCIUM CHLORIDE: 600; 310; 30; 20 INJECTION, SOLUTION INTRAVENOUS at 14:40:00

## 2021-04-21 RX ADMIN — ROCURONIUM BROMIDE 10 MG: 10 INJECTION, SOLUTION INTRAVENOUS at 14:44:00

## 2021-04-21 RX ADMIN — PHENYLEPHRINE HCL 100 MCG: 10 MG/ML VIAL (ML) INJECTION at 15:21:00

## 2021-04-21 RX ADMIN — SODIUM CHLORIDE, SODIUM LACTATE, POTASSIUM CHLORIDE, CALCIUM CHLORIDE: 600; 310; 30; 20 INJECTION, SOLUTION INTRAVENOUS at 16:49:00

## 2021-04-21 RX ADMIN — DEXAMETHASONE SODIUM PHOSPHATE 4 MG: 4 MG/ML VIAL (ML) INJECTION at 14:44:00

## 2021-04-21 RX ADMIN — PHENYLEPHRINE HCL 100 MCG: 10 MG/ML VIAL (ML) INJECTION at 15:19:00

## 2021-04-21 RX ADMIN — ONDANSETRON 4 MG: 2 INJECTION INTRAMUSCULAR; INTRAVENOUS at 14:44:00

## 2021-04-21 NOTE — BRIEF OP NOTE
[address] BRIEF OPERATIVE NOTE     Darien Skinner Location: OR   [facility] [id number] N [id number]   Admission Date 4/21/2021 Operation Date 4/21/2021     Operating Physician Antonia Stover MD        Preoperative Diagnosis:

## 2021-04-21 NOTE — ANESTHESIA PROCEDURE NOTES
Peripheral IV  Inserted by: Jackelyn Porter MD    Placement  Needle size: 20 G  Laterality: left  Location: hand  Local anesthetic: none  Site prep: alcohol  Technique: anatomical landmarks  Attempts: 1

## 2021-04-21 NOTE — INTERVAL H&P NOTE
Pre-op Diagnosis: lumbar spinal stenosis    The above referenced H&P was reviewed by Dusty Lees MD on 4/21/2021, the patient was examined and no significant changes have occurred in the patient's condition since the H&P was performed.   I discussed with reji

## 2021-04-21 NOTE — PROGRESS NOTES
[facility]    Progress Note    Thong Cannon Patient Status:  Inpatient    1960 MRN [id number]   Location [address] [facility] Attending Stephanie Ng MD   Hosp Day # 0 PCP Christiane Lerma MD     HPI/Subj 142 04/10/2021    K 4.7 04/10/2021     04/10/2021    CO2 29.0 04/10/2021    GLU 84 04/10/2021    CA 9.2 04/10/2021    ALB 3.4 04/10/2021    ALKPHO 79 04/10/2021    BILT 0.5 04/10/2021    TP 7.2 04/10/2021    AST 21 04/10/2021    ALT 9 (L) 04/10/2021

## 2021-04-21 NOTE — ANESTHESIA PROCEDURE NOTES
Airway  Date/Time: 4/21/2021 2:48 PM  Urgency: Elective    Airway not difficult    General Information and Staff    Patient location during procedure: OR  Anesthesiologist: Deborah Holbrook MD  Resident/CRNA: Silvano Howell CRNA  Performed: CRNA and anest

## 2021-04-21 NOTE — PLAN OF CARE
NEUROLOGICAL SURGERY & SPINE SURGERY      4/21/2021  2:38 PM     PRE-OPERATIVE CONSULTATION    Darien Skinner was again informed of the nature of the problem, planned treatment, indications and alternatives.   We again reviewed the expected be

## 2021-04-21 NOTE — ANESTHESIA PREPROCEDURE EVALUATION
Anesthesia PreOp Note    HPI:     Janet Erazo is a 62year old female who presents for preoperative consultation requested by: Tamir Kurtz MD    Date of Surgery: 4/21/2021    Procedure(s):  right L4-5 transforaminal lumbar interbody fusion  Indicati ARTHROSCOPY OF KNEE   • Amenorrhea 4/2016   • Asthma    • Back problem    • Chicken pox    • Chronic cough    • Disorder of thyroid    • Extrapyramidal syndrome 2013    MEDICATION MGMT.  W/ PARKINSONS FEATURES   • Greater trochanteric bursitis, left 11/21/2 Inhalation Aerosol Powder, Breath Activated, INHALE 1 PUFF BY MOUTH TWICE A DAY, Disp: 3 each, Rfl: 1, 4/20/2021 at Unknown time  CALCITRIOL 0.25 MCG Oral Cap, TAKE 1 CAPSULE BY MOUTH EVERY DAY, Disp: 90 capsule, Rfl: 1, 4/21/2021 at Unknown time  [facility] Oral Tab, Take 1 tablet by mouth 4 (four) times daily.  1  in am and 3/4 11AM and 3/4 afternoon and 1 evening , Disp: , Rfl: 0, 4/21/2021 at Unknown time  Albuterol Sulfate HFA (PROAIR HFA) 108 (90 Base) MCG/ACT Inhalation Aero Soln, Inhale 2 puffs into the Activity      Alcohol use:  Yes        Alcohol/week: 2.0 - 3.0 standard drinks        Types: 2 - 3 Glasses of wine per week      Drug use: No      Sexual activity: Yes        Partners: Male    Other Topics      Concerns:         Service: Not Asked 04/10/2021    MCH 31.7 04/10/2021    MCHC 32.5 04/10/2021    RDW 15.1 (H) 04/10/2021    .0 04/10/2021     Lab Results   Component Value Date     04/10/2021    K 4.7 04/10/2021     04/10/2021    CO2 29.0 04/10/2021    BUN 16 04/10/2021

## 2021-04-21 NOTE — ANESTHESIA POSTPROCEDURE EVALUATION
Patient: Chio Hunt    Procedure Summary     Date: 04/21/21 Room / Location: [address] [place] [address] MAIN OR 09 / [address] [place] [address] MAIN OR    Anesthesia Start: 1440 Anesthesia Stop: 5294    Procedure: right L4-5 transforaminal lumbar interbody fusion (Right Spine Lumbar) Diagnosis:

## 2021-04-22 PROCEDURE — 99233 SBSQ HOSP IP/OBS HIGH 50: CPT | Performed by: HOSPITALIST

## 2021-04-22 NOTE — PLAN OF CARE
Problem: Patient Centered Care  Goal: Patient preferences are identified and integrated in the patient's plan of care  Description: Interventions:  - What would you like us to know as we care for you?  To keep me and my family updated   - Provide timely, Progressing  Pain has been well controlled with norco     Problem: SAFETY ADULT - FALL  Goal: Free from fall injury  Description: INTERVENTIONS:  - Assess pt frequently for physical needs  - Identify cognitive and physical deficits and behaviors that affec standing, transferring and ambulating w/ or w/o assistive devices  - Assist with transfers and ambulation using safe patient handling equipment as needed  - Ensure adequate protection for wounds/incisions during mobilization  - Obtain PT/OT consults as nee

## 2021-04-22 NOTE — CONSULTS
Physician Medicine & Rehabilitation Consult Note         SUBJECTIVE:    Chief Complaint: To assess rehabilitation needs following Mobility and ADL dysfunction s/p Lumbar spinal stenosis as per request of Dr. Juan Quiros. Tristin Pereira     History of Present Illness: [facility]   • Infertility, female    • Parkinson disease ([address]    • Parkinson's disease ([address]    • Rheumatoid arthritis ([address]    • S/P laparoscopic sleeve gastrectomy 01/22/2020    for weight loss   • Sleep apnea     cpap   • Thyroid disease 2016   • Tibia/fi HYDROcodone-acetaminophen, morphINE sulfate **OR** morphINE sulfate **OR** morphINE sulfate, methocarbamol      Penicillins             HIVES  Seasonal                Runny nose     Family History   Problem Relation Age of Onset   • Heart Disorder Father 5 problems dysphagia chest pain or shortness of breath or abdominal pain no nausea vomiting or constipation or diarrhea. No significant weight changes or appetite changes or sleep problems. No joint aches or muscle cramps.     OBJECTIVE:     04/22/21  6804 testing.     Patient Active Problem List:     Allergic rhinitis     Asthma     Hyperlipidemia     Asthma, extrinsic     Allergic rhinitis due to pollen     Allergic rhinitis due to other allergen     Rheumatoid arthritis, adult ([facility]     Parkinson disease ( functional impairments requiring at least minimum assistance.  Physical Therapy , Speech Therapy  and Occupational Therapy   · This patient should be able to tolerate at least 3 hours per day of skilled therapy, at least 5 days a week, in one or more of the

## 2021-04-22 NOTE — PROGRESS NOTES
[facility]    Progress Note    Priya Abebe Patient Status:  Inpatient    1960 MRN [id number]   Location [facility] 4W/SW/SE Attending Estrellita Wu MD   Hosp Day # 1 PCP James Ferraro MD       Subjective:   Inez Morales Oral Nightly   • Pantoprazole Sodium  40 mg Oral Before breakfast   • Pravastatin Sodium  20 mg Oral Nightly   • rasagiline mesylate  1 mg Oral Daily   • topiramate  25 mg Oral BID   • Senna  17.2 mg Oral Nightly   • vancomycin  15 mg/kg (Adjusted) Margarita Dukes this exam.   Dictated by (CST): Jacinda Zepeda MD on 4/21/2021 at 5:54 PM     Finalized by (CST): Jacinda Zepeda MD on 4/21/2021 at 5:54 PM                   Lab Results   Component Value Date    PIPPA 91.4 07/18/2019    CRP 0.43 (H) 02/15/2021    CRP <0.29 11/1

## 2021-04-22 NOTE — OCCUPATIONAL THERAPY NOTE
OCCUPATIONAL THERAPY EVALUATION - INPATIENT      Room Number: 404/404-A  Evaluation Date: 4/22/2021  Type of Evaluation: Initial  Presenting Problem:  (lumbar spinal stenosis)    Physician Order: IP Consult to Occupational Therapy  Reason for Therapy: ADL/ techniques;ADL training;Functional transfer training; Endurance training;Patient/Family education;Patient/Family training;Equipment eval/education; Compensatory technique education       OCCUPATIONAL THERAPY MEDICAL/SOCIAL HISTORY     Problem List   Rhea Baum Coy Dooley    for infertility   • Coy Dooley    for infertility   • OTHER SURGICAL HISTORY Right 12/3/2015   • OTHER SURGICAL HISTORY  12/3/2015   • THYROIDECTOMY      Total        HOME SITUATION  Type of Home: Andrzej Rausch TRANSFER ASSESSMENT  Supine to Sit : Not tested  Sit to Stand: Minimum assistance  Toilet Transfer: min A from standard height, use of grab bars   Shower Transfer: NT  Chair Transfer: min A, cues for hand placement    Bedroom Mobility: min-mod a for balanc

## 2021-04-22 NOTE — PROGRESS NOTES
[facility] Dosing Service    Initial Pharmacokinetic Consult for Vancomycin Dosing     Asa Nissen is a 62year old patient who is being treated for surgical prophylaxis.   Pharmacy has been asked to dose Vancomycin by Dr. Alvaro Patel:  Noam Tobias

## 2021-04-22 NOTE — PLAN OF CARE
Pt weaned to room air and O2 sats stable. Tele monitor in place. CPAP at HS. Duran in place- remove in AM per protocol. Gel ice packs. Hemovac in place- monitoring output. Pain controlled with prn medication.     Problem: Patient Centered Care  Goal: Fernanda assessment  - Modify environment to reduce risk of injury  - Provide assistive devices as appropriate  - Consider OT/PT consult to assist with strengthening/mobility  - Encourage toileting schedule  Outcome: Progressing     Problem: [address] or hip dislocation precautions)  Outcome: Progressing

## 2021-04-22 NOTE — PHYSICAL THERAPY NOTE
PHYSICAL THERAPY EVALUATION - INPATIENT     Room Number: 404/404-A  Evaluation Date: 4/22/2021  Type of Evaluation: Initial   Physician Order: PT Eval and Treat    Presenting Problem:  (lumbar spine)  Reason for Therapy: Mobility Dysfunction and Discharge arthritis, adult ([facility]    Parkinson disease ([facility]    EVIE (obstructive sleep apnea)    Preop testing      Past Medical History  Past Medical History:   Diagnosis Date   • Acute torn meniscus of knee 2010    ARTHROSCOPY OF KNEE   • Amenorrhea 4/2016   • Asth Glasses    Prior Level of [place]: mod ind, limited house hold distances and \"crawled\" up the stairs at home    SUBJECTIVE  \"I haven't been able to stand upright in forever! \"    PHYSICAL THERAPY EXAMINATION     OBJECTIVE  Precautions: Spine  Fall dyskinesia    Bed Mobility:MIN a    Transfers: MOD a    Exercise/Education Provided:  Bed mobility  Gait training  Transfer training    Patient End of Session: Up in chair    CURRENT GOALS    Goals to be met by: 4/29  Patient Goal Patient's self-stated goa

## 2021-04-22 NOTE — OPERATIVE REPORT
[facility]    PATIENT'S NAME: Tanisha Diaz LUISITO   ATTENDING PHYSICIAN: Delfino Riggs MD   OPERATING PHYSICIAN: Jose Antonio Moeller MD   PATIENT ACCOUNT#:   [id number]    LOCATION:  [address] [place] [address] #:   [id number]       DATE OF BIRTH: Electrophysiologic monitoring was initiated after electrodes were applied. The patient was positioned on open frame Kulwinder table in a prone position. All pressure points were adequately padded.   Fluoroscopy was utilized to identify the incision site, wh We decorticated the L4 and L5 endplates with a rasp for arthrodesis purposes. We then placed an extra-small BMP pouch, followed by close to 9 mL of Osteocel Pro into the intervertebral space.   This was also supplemented by some morselized autograft that h MD  d: 04/21/2021 17:13:27  t: 04/22/2021 08:38:49  [facility] [id number]  TB/    cc: MD Miguel Disla MD

## 2021-04-22 NOTE — PROGRESS NOTES
[facility]    Neurosurgery Progress Note    Kamini Zamora Patient Status:  Inpatient    1960 MRN [id number]   Location [facility] 4W/SW/SE Attending June No MD   Hosp Day # 1 PCP MD Rosario Cleaning diphenhydrAMINE HCl (BENADRYL) IV PUSH injection 25 mg, 25 mg, Intravenous, Q4H PRN  •  acetaminophen (TYLENOL) tab 650 mg, 650 mg, Oral, Q4H PRN **OR** HYDROcodone-acetaminophen (NORCO)  MG per tab 1 tablet, 1 tablet, Oral, Q4H PRN **OR** HYDROcodon arthritis, adult ([facility]    Parkinson disease ([facility]    EVIE (obstructive sleep apnea)    Preop testing    POD#1, s/p L4-5 TLIF, doing well. C/e drain  C/e Abx  Mobilize with PTOT today      All questions and concerns were addressed.  We appreciate the opportu

## 2021-04-23 PROCEDURE — 99232 SBSQ HOSP IP/OBS MODERATE 35: CPT | Performed by: HOSPITALIST

## 2021-04-23 NOTE — PHYSICAL THERAPY NOTE
PHYSICAL THERAPY TREATMENT NOTE - INPATIENT     Room Number: 337/687-X       Presenting Problem:  (lumbar spine)    Problem List  Principal Problem:    Lumbar spinal stenosis  Active Problems:    Hyperlipidemia    Asthma, extrinsic    Rheumatoid arthritis, +  Dynamic Sitting: Fair           Static Standing: Poor +  Dynamic Standing: Poor    ACTIVITY TOLERANCE  Pulse: 74  Heart Rate Source: Monitor  Resp: 18  BP: 127/89  BP Location: Right arm  BP Method: Automatic  Patient Position: Sitting    O2 WALK  Oxyge rolling     Goal #2  Current Status Min/Mod A   Goal #3 Patient is able to ambulate 300 feet with assist device: LRAD at assistance level: minimum assistance   Goal #3   Current Status Min A  ft   Goal #4    Goal #4   Current Status    Goal #5 Fernanda

## 2021-04-23 NOTE — CM/SW NOTE
OMID self referred to pt's chart after therapy recommended acute rehab. SW to discuss discharge planning with pt. SW called and spoke with pt over the phone. SW confirmed pt's address. Pt lives in a 2 level house with spouse and children.  There is/are 2

## 2021-04-23 NOTE — PAYOR COMM NOTE
--------------  CONTINUED STAY REVIEW    Payor: [employer]  Subscriber #:  [id number]  Authorization Number: [id number]    Admit date: 4/21/21  Admit time:  2:03 PM    Admitting Physician: Augie Garcia MD  Attending Physician:  Golden Saucedo MD  [facility] Dose Route User    4/23/2021 3438 Given 2 tablet Oral Cristina Ian, RN    4/22/2021 1853 Given 2 tablet Oral Achilles Uzma, RN    4/22/2021 1257 Given 2 tablet Oral Achilles Uzma, RN      Levothyroxine Sodium tab 125 mcg     Date Action Dose Route

## 2021-04-23 NOTE — PROGRESS NOTES
[facility]    Progress Note    Edelmira Jatinder Patient Status:  Inpatient    1960 MRN [id number]   Location [facility] 4W/SW/SE Attending Fercho Campbell MD   Hosp Day # 2 PCP Theo Richardson MD       Subjective:   Trish Bedoya Oral Nightly   • rasagiline mesylate  1 mg Oral Daily   • topiramate  25 mg Oral BID   • Senna  17.2 mg Oral Nightly       Current PRN Inpatient Meds:      albuterol sulfate, Albuterol Sulfate HFA, diphenhydrAMINE **OR** diphenhydrAMINE HCl, acetaminophen images as well as a 1 page-dose summary image are stored with this exam.   Dictated by (CST): Carin Aguilera MD on 4/21/2021 at 5:54 PM     Finalized by (CST): Carin Aguilera MD on 4/21/2021 at 5:54 PM                   Lab Results   Component Value Date    FE

## 2021-04-23 NOTE — PROGRESS NOTES
[facility]    Neurosurgery Progress Note    Jose R Riding Patient Status:  Inpatient    1960 MRN [id number]   Location [facility] 4W/SW/SE Attending Christiano Romero MD   Hosp Day # 2 PCP MD Beka Ellis **OR** diphenhydrAMINE HCl (BENADRYL) IV PUSH injection 25 mg, 25 mg, Intravenous, Q4H PRN  •  acetaminophen (TYLENOL) tab 650 mg, 650 mg, Oral, Q4H PRN **OR** HYDROcodone-acetaminophen (NORCO)  MG per tab 1 tablet, 1 tablet, Oral, Q4H PRN **OR** HYD to call our office ([phone]) with any issues.     Rainer Ta  4/23/2021  7:01 AM

## 2021-04-24 PROCEDURE — 99232 SBSQ HOSP IP/OBS MODERATE 35: CPT | Performed by: HOSPITALIST

## 2021-04-24 RX ORDER — BISACODYL 10 MG
10 SUPPOSITORY, RECTAL RECTAL
Refills: 0 | Status: SHIPPED | COMMUNITY
Start: 2021-04-24 | End: 2021-09-09

## 2021-04-24 RX ORDER — METHOCARBAMOL 750 MG/1
750 TABLET, FILM COATED ORAL 3 TIMES DAILY PRN
Refills: 0 | Status: SHIPPED | COMMUNITY
Start: 2021-04-24 | End: 2021-09-13

## 2021-04-24 RX ORDER — BISACODYL 10 MG
10 SUPPOSITORY, RECTAL RECTAL
Status: DISCONTINUED | OUTPATIENT
Start: 2021-04-24 | End: 2021-04-25

## 2021-04-24 RX ORDER — POLYETHYLENE GLYCOL 3350 17 G/17G
17 POWDER, FOR SOLUTION ORAL DAILY PRN
Status: DISCONTINUED | OUTPATIENT
Start: 2021-04-24 | End: 2021-04-25

## 2021-04-24 RX ORDER — POLYETHYLENE GLYCOL 3350 17 G/17G
17 POWDER, FOR SOLUTION ORAL DAILY PRN
Refills: 0 | Status: SHIPPED | COMMUNITY
Start: 2021-04-24

## 2021-04-24 NOTE — PLAN OF CARE
Pt A+Ox4, VSS, saturating well on room air, pain controlled with PRN norco. Up with x1 assist and walker. Surgical dressing to lower back in place, CDI. Up in chair. Plan is to discharge to [facility], however pt needs to have a BM prior to discharge.  Refugio Womack pain with activity and pre-medicate as appropriate  Outcome: Progressing     Problem: SAFETY ADULT - FALL  Goal: Free from fall injury  Description: INTERVENTIONS:  - Assess pt frequently for physical needs  - Identify cognitive and physical deficits and b protection for wounds/incisions during mobilization  - Obtain PT/OT consults as needed  - Advance activity as appropriate  - Communicate ordered activity level and limitations with patient/family  Outcome: Progressing  Goal: Maintain proper alignment of af

## 2021-04-24 NOTE — DISCHARGE SUMMARY
[facility]    Discharge Summary    Debra Labor Patient Status:  Inpatient    1960 MRN [id number]   Location [facility] 4W/SW/SE Attending Letitia Diaz MD   Hosp Day # 3 PCP Rogerio Quinn MD     Date of Admissi by (CST): Carin Aguilera MD on 4/21/2021 at 5:54 PM            LABS :     Lab Results   Component Value Date    WBC 7.6 04/24/2021    HGB 11.1 (L) 04/24/2021    HCT 34.5 (L) 04/24/2021    .0 04/24/2021    CREATSERUM 0.77 04/24/2021    BUN 10 04/24/202 Refills: 0            CONTINUE taking these medications        Instructions Prescription details   Advair Diskus 100-50 MCG/DOSE Aepb  Generic drug: fluticasone-salmeterol      INHALE 1 PUFF BY MOUTH TWICE A DAY   Quantity: 3 each  Refills: 1     albuterol Quantity: 90 tablet  Refills: 1     Montelukast Sodium 10 MG Tabs  Commonly known as: SINGULAIR      TAKE 1 TABLET BY MOUTH EVERY DAY AT NIGHT   Quantity: 90 tablet  Refills: 1     Pantoprazole Sodium 40 MG Tbec  Commonly known as: PROTONIX      TAKE 1 TAB tolerated. Brace:   Wear brace at all times except when in shower. Anticipate wearing the brace for 6-12 weeks post-op. A bone growth stimulator might be given to you post-operatively. The device is    to be worn as directed for 6 months post-op.

## 2021-04-24 NOTE — PLAN OF CARE
Patient is alert and oriented x4. Patient is voiding often and pain has been managed with norco. Patient has been able to tolerate diet well. Patient has fall precautions in place and call light within reach.  Patient verbalizes understanding to call for as for opioid side effects  - Notify MD/LIP if interventions unsuccessful or patient reports new pain  - Anticipate increased pain with activity and pre-medicate as appropriate  Outcome: Progressing     Problem: SAFETY ADULT - FALL  Goal: Free from fall injur

## 2021-04-24 NOTE — CM/SW NOTE
SW informed pt has a discharge order. Per Jorge Luis Sterling @ [address] ([phone]) pt will need to have a bowel movement prior to discharge. PCS for medicare in AVS, on will call per Indra Fernandez. Plan:  [facility]OR[facility] GARRISON pending pt BM.

## 2021-04-24 NOTE — CM/SW NOTE
04/24/21 1000   Discharge disposition   Expected discharge disposition [facility] [address]   Name of [address]   Patient is Discharged to a [address] [place] [address] Yes   Discharge transportation [employer]  (Will call for Sat/

## 2021-04-24 NOTE — PHYSICAL THERAPY NOTE
PHYSICAL THERAPY TREATMENT NOTE - INPATIENT     Room Number: 316/029-S       Presenting Problem: lumbar spine    Problem List  Principal Problem:    Lumbar spinal stenosis  Active Problems:    Hyperlipidemia    Asthma, extrinsic    Rheumatoid arthritis, ad INPATIENT SHORT FORM - BASIC MOBILITY  How much difficulty does the patient currently have. ..  -   Turning over in bed (including adjusting bedclothes, sheets and blankets)?: A Lot   -   Sitting down on and standing up from a chair with arms (e.g., wheelch Status In progress   Goal #6    Goal #6  Current Status

## 2021-04-24 NOTE — PLAN OF CARE
Patient is alert and oriented X 4 on room air and resting in bed. Patient is on CPAP at night . Tele in place with a call for tachycardia  when patient was ambulating. Patient voids freely. Norco, tylenol and  morphine  For pain management.  Patient is up w pain and pain management  - Manage/alleviate anxiety  - Utilize distraction and/or relaxation techniques  - Monitor for opioid side effects  - Notify MD/LIP if interventions unsuccessful or patient reports new pain  - Anticipate increased pain with activit INTERVENTIONS:  - Assess patient stability and activity tolerance for standing, transferring and ambulating w/ or w/o assistive devices  - Assist with transfers and ambulation using safe patient handling equipment as needed  - Ensure adequate protection fo

## 2021-04-25 VITALS
TEMPERATURE: 99 F | HEART RATE: 83 BPM | HEIGHT: 69 IN | SYSTOLIC BLOOD PRESSURE: 122 MMHG | BODY MASS INDEX: 36.14 KG/M2 | OXYGEN SATURATION: 98 % | WEIGHT: 244 LBS | RESPIRATION RATE: 18 BRPM | DIASTOLIC BLOOD PRESSURE: 64 MMHG

## 2021-04-25 PROCEDURE — 99239 HOSP IP/OBS DSCHRG MGMT >30: CPT | Performed by: HOSPITALIST

## 2021-04-25 NOTE — PLAN OF CARE
POD# 4. Alert & oriented x4. Lumbar dressing c/d/I. Ice gel pack to lower back. Pain managed with Norco & robaxin prn. CMS intact. Generalized tremors noted. OOB with assist using walker. Fall precautions in place. Call light in reach & bed alarm on.   Pass new pain  - Anticipate increased pain with activity and pre-medicate as appropriate  Outcome: Progressing     Problem: SAFETY ADULT - FALL  Goal: Free from fall injury  Description: INTERVENTIONS:  - Assess pt frequently for physical needs  - Identify cogn needed  - Ensure adequate protection for wounds/incisions during mobilization  - Obtain PT/OT consults as needed  - Advance activity as appropriate  - Communicate ordered activity level and limitations with patient/family  Outcome: Progressing  Goal: Maint

## 2021-04-25 NOTE — CM/SW NOTE
RN informed SW that pt had a BM today and is ready to discharge. RN inquired about options for 2nd dose of Brenita Theo, which is due today. SW spoke w/ Nimco Spencer from [facility], who stated pt will need to f/u with where she received the 1st dose.  MJ can help arrange tra

## 2021-04-25 NOTE — PHYSICAL THERAPY NOTE
PHYSICAL THERAPY TREATMENT NOTE - INPATIENT     Room Number: 998/124-M       Presenting Problem: lumbar spine    Problem List  Principal Problem:    Lumbar spinal stenosis  Active Problems:    Hyperlipidemia    Asthma, extrinsic    Rheumatoid arthritis, ad Ratin  Location: lumbar  Management Techniques: Activity promotion; Body mechanics;Breathing techniques;Relaxation;Repositioning    BALANCE Status Min A   Goal #2 Patient is able to demonstrate transfers Sit to/from Stand at assistance level: independent with walker - rolling      Goal #2  Current Status CGA with RW   Goal #3 Patient is able to ambulate 300 feet with assist device: LRAD at ass

## 2021-04-25 NOTE — PROGRESS NOTES
[facility]    Progress Note    Bari Davis Patient Status:  Inpatient    1960 MRN [id number]   Location [facility] 4W/SW/SE Attending Jasmin Zhou MD   Hosp Day # 4 PCP Bobbi Chau MD       Subjective:   Michael Peterson rasagiline mesylate  1 mg Oral Daily   • topiramate  25 mg Oral BID   • Senna  17.2 mg Oral Nightly       Current PRN Inpatient Meds:      PEG 3350, magnesium hydroxide, bisacodyl, albuterol sulfate, Albuterol Sulfate HFA, diphenhydrAMINE **OR** diphenhydr Dictated by (CST): Ashok Marino MD on 4/21/2021 at 5:54 PM     Finalized by (CST): Ashok Marino MD on 4/21/2021 at 5:54 PM                   Lab Results   Component Value Date    PIPPA 91.4 07/18/2019    CRP 0.43 (H) 02/15/2021    CRP <0.29 11/14/2020    CRP

## 2021-04-25 NOTE — PLAN OF CARE
Problem: Patient Centered Care  Goal: Patient preferences are identified and integrated in the patient's plan of care  Description: Interventions:  - What would you like us to know as we care for you? Lives with  at home.   Chronic tremors from Par Encourage pt to monitor pain and request assistance  - Assess pain using appropriate pain scale  - Administer analgesics based on type and severity of pain and evaluate response  - Implement non-pharmacological measures as appropriate and evaluate response responsible for managing their own health  - Refer to Case Management Department for coordinating discharge planning if the patient needs post-hospital services based on physician/LIP order or complex needs related to functional status, cognitive ability o

## 2021-04-26 NOTE — PAYOR COMM NOTE
--------------  DISCHARGE REVIEW    Payor: [employer] CHILE[employer]  Subscriber #:  [id number]  Authorization Number: [id number]    Admit date: 4/21/21  Admit time:   2:03 PM  Discharge Date: 4/25/2021  2:10 PM     Admitting Physician: Carmelo Stephenson MD  Attending Physici visit on 04/21/21. IMAGING STUDIES: SOME MAY NEED FOLLOW UP WITH PCP   XR CHEST PA + LAT CHEST (CPT=71046)    Result Date: 4/10/2021  CONCLUSION: No acute cardiopulmonary disease.     Dictated by (CST): Diane Barlow MD on 4/10/2021 at 1:16 PM     Fi 112 109   CO2 31.0  --  27.0 29.0   ALKPHO  --   --  65  --    AST  --   --  27  --    ALT  --   --  17  --    BILT  --   --  0.5  --    TP  --   --  6.2*  --      Lab Results   Component Value Date    PT 13.2 07/30/2016    INR 1.00 04/10/2021    INR 1.04 Tbec  Commonly known as: VOLTAREN      TAKE 1 TABLET (75 MG TOTAL) BY MOUTH 2 (TWO) TIMES DAILY.  AS NEEDED   Quantity: 60 tablet  Refills: 3     ergocalciferol 1.25 MG (40218 UT) Caps  Commonly known as: ERGOCALCIFEROL       Refills: 0     folic acid 1 MG baths until first post-op follow-up. Leave drain dressing until the next day. May remove then and shower. Activity:   Resume activity as tolerated. Walking as much as possible is highly encouraged.    Do not drive while taking narcotic pain med administered. 4/25  POD# 4. Alert & oriented x4. Lumbar dressing c/d/I. Ice gel pack to lower back. Pain managed with Norco & robaxin prn. CMS intact. Generalized tremors noted. OOB with assist using walker. Fall precautions in place.  Call light in taylor 125 mcg Oral Before breakfast   • Montelukast Sodium  10 mg Oral Nightly   • Pantoprazole Sodium  40 mg Oral Before breakfast   • Pravastatin Sodium  20 mg Oral Nightly   • rasagiline mesylate  1 mg Oral Daily   • topiramate  25 mg Oral BID   • Senna  17. 2 FLUOROSCOPY C-ARM TIME <1 HOUR  (CPT=76000)     Result Date: 4/21/2021  CONCLUSION: Fluoroscopic guidance as above.  132.9-seconds of fluoroscopy time were used.  2 fluoroscopic images as well as a 1 page-dose summary image are stored with this exam.   Dict

## 2021-04-28 ENCOUNTER — IMMUNIZATION (OUTPATIENT)
Dept: LAB | Facility: HOSPITAL | Age: 61
End: 2021-04-28
Attending: EMERGENCY MEDICINE
Payer: COMMERCIAL

## 2021-04-28 DIAGNOSIS — Z23 NEED FOR VACCINATION: Primary | ICD-10-CM

## 2021-04-28 PROCEDURE — 0012A SARSCOV2 VAC 100MCG/0.5ML IM: CPT

## 2021-05-05 RX ORDER — ERGOCALCIFEROL 1.25 MG/1
CAPSULE ORAL
Qty: 12 CAPSULE | Refills: 3 | Status: SHIPPED | OUTPATIENT
Start: 2021-05-05

## 2021-05-12 ENCOUNTER — OFFICE VISIT (OUTPATIENT)
Dept: INTERNAL MEDICINE CLINIC | Facility: CLINIC | Age: 61
End: 2021-05-12
Payer: COMMERCIAL

## 2021-05-12 VITALS
HEIGHT: 69 IN | HEART RATE: 88 BPM | BODY MASS INDEX: 35.7 KG/M2 | SYSTOLIC BLOOD PRESSURE: 96 MMHG | DIASTOLIC BLOOD PRESSURE: 58 MMHG | RESPIRATION RATE: 20 BRPM | WEIGHT: 241 LBS

## 2021-05-12 DIAGNOSIS — E03.9 HYPOTHYROIDISM, UNSPECIFIED TYPE: Primary | ICD-10-CM

## 2021-05-12 DIAGNOSIS — G47.33 OSA (OBSTRUCTIVE SLEEP APNEA): ICD-10-CM

## 2021-05-12 DIAGNOSIS — G20 PARKINSON DISEASE (HCC): ICD-10-CM

## 2021-05-12 DIAGNOSIS — M48.061 SPINAL STENOSIS OF LUMBAR REGION, UNSPECIFIED WHETHER NEUROGENIC CLAUDICATION PRESENT: ICD-10-CM

## 2021-05-12 DIAGNOSIS — E78.5 HYPERLIPIDEMIA LDL GOAL <130: ICD-10-CM

## 2021-05-12 DIAGNOSIS — Z98.84 S/P LAPAROSCOPIC SLEEVE GASTRECTOMY: ICD-10-CM

## 2021-05-12 PROCEDURE — 3074F SYST BP LT 130 MM HG: CPT | Performed by: INTERNAL MEDICINE

## 2021-05-12 PROCEDURE — 99213 OFFICE O/P EST LOW 20 MIN: CPT | Performed by: INTERNAL MEDICINE

## 2021-05-12 PROCEDURE — 3078F DIAST BP <80 MM HG: CPT | Performed by: INTERNAL MEDICINE

## 2021-05-12 PROCEDURE — 3008F BODY MASS INDEX DOCD: CPT | Performed by: INTERNAL MEDICINE

## 2021-05-12 RX ORDER — PHENTERMINE HYDROCHLORIDE 8 MG/1
TABLET ORAL
COMMUNITY
End: 2021-07-07 | Stop reason: DRUGHIGH

## 2021-05-12 RX ORDER — TOFACITINIB 11 MG/1
TABLET, FILM COATED, EXTENDED RELEASE ORAL
COMMUNITY
End: 2021-06-08

## 2021-05-12 NOTE — PROGRESS NOTES
HPI:    Patient ID: Darien Skinner is a 62year old female. HPI  Patient is here for follow-up on chronic medical issues and recent surgery. We saw her on April 10 for preoperative evaluation.   She was admitted to the hospital on April 21 for L4-5 l 80    [facility]   • Infertility, female    • Parkinson disease ([facility]    • Parkinson's disease ([facility]    • Rheumatoid arthritis ([facility]    • S/P laparoscopic sleeve gastrectomy 01/22/2020    for weight loss   • Sleep apnea     cpap   • Thyroid disease 2016   • [employer]          Current Outpatient Medications   Medication Sig Dispense Refill   • ERGOCALCIFEROL 1.25 MG (62038 UT) Oral Cap TAKE 1 CAPSULE BY MOUTH ONE TIME PER WEEK 12 capsule 3   • methocarbamol 750 MG Oral Tab Take 1 tablet (750 mg total) by mouth 3 tablet 3   • Multiple Vitamins-Minerals (BARIATRIC FUSION) Oral Chew Tab Chew 1 tablet by mouth daily. • Rasagiline Mesylate (AZILECT) 1 MG Oral Tab Take 1 tablet by mouth daily.      • Albuterol Sulfate (2.5 MG/3ML) 0.083% Inhalation Nebu Soln Take 3 m adenopathy. Skin:     General: Skin is warm and dry. Findings: No rash. Neurological:      Mental Status: She is alert. Comments: Chronic involuntary movements   Psychiatric:         Behavior: Behavior normal.         Thought Content:  Thought

## 2021-05-15 ENCOUNTER — NURSE ONLY (OUTPATIENT)
Dept: ALLERGY | Facility: CLINIC | Age: 61
End: 2021-05-15
Payer: COMMERCIAL

## 2021-05-15 DIAGNOSIS — J30.89 ENVIRONMENTAL AND SEASONAL ALLERGIES: ICD-10-CM

## 2021-05-15 PROCEDURE — 95117 IMMUNOTHERAPY INJECTIONS: CPT | Performed by: ALLERGY & IMMUNOLOGY

## 2021-06-02 ENCOUNTER — TELEPHONE (OUTPATIENT)
Dept: INTERNAL MEDICINE CLINIC | Facility: CLINIC | Age: 61
End: 2021-06-02

## 2021-06-02 DIAGNOSIS — N60.01 CYST OF RIGHT BREAST: ICD-10-CM

## 2021-06-02 DIAGNOSIS — Z12.31 ENCOUNTER FOR SCREENING MAMMOGRAM FOR BREAST CANCER: Primary | ICD-10-CM

## 2021-06-02 NOTE — TELEPHONE ENCOUNTER
Spoke to patient and ordered her mammogram..she usually has her OB order it but she said ok for us to do so. Advised to schedule soon. She agreed.

## 2021-06-03 ENCOUNTER — OFFICE VISIT (OUTPATIENT)
Dept: SURGERY | Facility: CLINIC | Age: 61
End: 2021-06-03
Payer: COMMERCIAL

## 2021-06-03 VITALS — WEIGHT: 241.88 LBS | BODY MASS INDEX: 35.82 KG/M2 | HEIGHT: 69 IN

## 2021-06-03 DIAGNOSIS — E66.01 CLASS 2 SEVERE OBESITY DUE TO EXCESS CALORIES WITH SERIOUS COMORBIDITY AND BODY MASS INDEX (BMI) OF 35.0 TO 35.9 IN ADULT (HCC): Primary | ICD-10-CM

## 2021-06-03 PROCEDURE — 3008F BODY MASS INDEX DOCD: CPT | Performed by: DIETITIAN, REGISTERED

## 2021-06-03 PROCEDURE — 97803 MED NUTRITION INDIV SUBSEQ: CPT | Performed by: DIETITIAN, REGISTERED

## 2021-06-03 NOTE — PROGRESS NOTES
[address] [address]  Dept: [phone]  Loc: [phone]    06/3/21      Bariatric Follow-up Nutrition Session    Janet Erazo is a 62year old female.      As HDL 71 (H) 02/15/2021     (H) 02/15/2021    VLDL 27 02/15/2021    NONHDLC 128 02/15/2021    CALCNONHDL 135 (H) 05/28/2016        Vitamins/Minerals:  Lab Results   Component Value Date    B12 513 05/20/2020    VITB12 432 05/28/2016     Lab Results BEFORE BREAKFAST., Disp: 90 tablet, Rfl: 1  •  Diclofenac Sodium 1 % External Gel, Apply 4 g topically 4 (four) times daily. , Disp: 100 g, Rfl: 3  •  HYDROcodone-acetaminophen  MG Oral Tab, Take 1 tablet by mouth 2 (two) times daily as needed for [employer] grams/day, 100-150g CHO per day  Fluid intake: 48-72 ounces/day    Diet history:       Breakfast      AM Snack       Lunch     PM Snack     Dinner   [employer] w/ egg life wraps, Lissy Annalee Berry Premnoam shake, eggs w/ feta, cereal  Honey goals below, will f/u at 2 years post-op or sooner, if needed. Nutrition Diagnosis     Nutrition Diagnosis: Morbid obesity: Improvement shown     Intervention     Recommendations/goals:    1.  Stay under 1200 kcal per day  2. 70g protein, stay under 130g

## 2021-06-08 ENCOUNTER — PATIENT MESSAGE (OUTPATIENT)
Dept: RHEUMATOLOGY | Facility: CLINIC | Age: 61
End: 2021-06-08

## 2021-06-08 RX ORDER — TOFACITINIB 11 MG/1
11 TABLET, FILM COATED, EXTENDED RELEASE ORAL DAILY
Qty: 30 TABLET | Refills: 10 | Status: SHIPPED | OUTPATIENT
Start: 2021-06-08

## 2021-06-08 NOTE — TELEPHONE ENCOUNTER
From: Sully Domínguez  To: Karis Keyes MD  Sent: 6/8/2021 2:04 PM CDT  Subject: Prescription Question    I just tried to refill my prescription for [place] through [facility] specialty pharmacy and I was told that it was canceled on 4/24 by my Dr office

## 2021-06-08 NOTE — TELEPHONE ENCOUNTER
Last filled:  2/15/21    Summary:  1. Cont. methotrexate - 8 tablets a week and folic acid 1mg a day   2. Cont. xlejanz 11mg a day   3. Return to clinic in  6 months -   4. .Check labs in 4  months . 5. Diclofenac gel 1 % for left knee. 6. Cont.   dic

## 2021-06-09 NOTE — TELEPHONE ENCOUNTER
Patient has scheduled her mammogram for 6/28/21 at 6:20 pm and her annual physical with OB is scheduled for 6/14/21.

## 2021-06-14 ENCOUNTER — NURSE ONLY (OUTPATIENT)
Dept: ALLERGY | Facility: CLINIC | Age: 61
End: 2021-06-14
Payer: COMMERCIAL

## 2021-06-14 ENCOUNTER — TELEPHONE (OUTPATIENT)
Dept: INTERNAL MEDICINE CLINIC | Facility: CLINIC | Age: 61
End: 2021-06-14

## 2021-06-14 ENCOUNTER — OFFICE VISIT (OUTPATIENT)
Dept: OBGYN CLINIC | Facility: CLINIC | Age: 61
End: 2021-06-14
Payer: COMMERCIAL

## 2021-06-14 VITALS
SYSTOLIC BLOOD PRESSURE: 122 MMHG | HEART RATE: 85 BPM | BODY MASS INDEX: 36 KG/M2 | WEIGHT: 243 LBS | DIASTOLIC BLOOD PRESSURE: 77 MMHG

## 2021-06-14 DIAGNOSIS — J30.89 ENVIRONMENTAL AND SEASONAL ALLERGIES: ICD-10-CM

## 2021-06-14 DIAGNOSIS — N60.01 BREAST CYST, RIGHT: ICD-10-CM

## 2021-06-14 DIAGNOSIS — Z01.419 WELL WOMAN EXAM WITH ROUTINE GYNECOLOGICAL EXAM: Primary | ICD-10-CM

## 2021-06-14 DIAGNOSIS — Z23 NEED FOR VACCINATION: Primary | ICD-10-CM

## 2021-06-14 PROCEDURE — 95117 IMMUNOTHERAPY INJECTIONS: CPT | Performed by: ALLERGY & IMMUNOLOGY

## 2021-06-14 PROCEDURE — 3078F DIAST BP <80 MM HG: CPT | Performed by: CLINICAL NURSE SPECIALIST

## 2021-06-14 PROCEDURE — 3074F SYST BP LT 130 MM HG: CPT | Performed by: CLINICAL NURSE SPECIALIST

## 2021-06-14 PROCEDURE — 99396 PREV VISIT EST AGE 40-64: CPT | Performed by: CLINICAL NURSE SPECIALIST

## 2021-06-14 NOTE — TELEPHONE ENCOUNTER
Patient is checking the status of medication due to stating it was cancelled and states almost out of medication.

## 2021-06-14 NOTE — TELEPHONE ENCOUNTER
Patient would like to receive her shingles vaccine, patient requested to be contacted via [employer] when order is ready. Please advise.

## 2021-06-14 NOTE — TELEPHONE ENCOUNTER
After input from [facility], we changed the mammo order to diagnositc due to presence of breast cyst.

## 2021-06-14 NOTE — PROGRESS NOTES
Kamini Zamora is a 62year old female [id number] Patient's last menstrual period was 04/18/2015 (exact date). Patient presents with:  Gyn Exam: ANNUAL EXAM  Last annual exam and pap was last year. Pap was normal, HPV negative. Denies any vaginal bleeding. Procedure: COLONOSCOPY;  Surgeon: Emerita Schumacher MD;  Location: [address] [place] [address] ENDOSCOPY   • COLPOSCOPY, CERVIX W/UPPER ADJACENT VAGINA; W/BIOPSY(S), CERVIX     • D & C      for extended period   • D & C      for extended period   • FOOT SURGERY Left     H with stairs.     Social Determinants of Health  Financial Resource Strain:       Difficulty of Paying Living Expenses:   Food Insecurity:       Worried About Running Out of Food in the Last Year:       Ran Out of Food in the Last Year:   Transportation Need bisacodyl 10 MG Rectal Suppos, Place 1 suppository (10 mg total) rectally daily as needed. , Disp: , Rfl: 0  •  magnesium hydroxide 400 MG/5ML Oral Suspension, Take 30 mL by mouth daily as needed for constipation. , Disp: , Rfl: 0  •  PEG 3350 17 g Oral Powd Take 1 tablet by mouth daily. , Disp: , Rfl:   •  Albuterol Sulfate (2.5 MG/3ML) 0.083% Inhalation Nebu Soln, Take 3 mL (2.5 mg total) by nebulization every 4 (four) hours as needed for Wheezing., Disp: 3 Box, Rfl: 0  •  carbidopa-levodopa (SINEMET)  edema, no cyanosis  Psychiatric:  Oriented to time, place, person and situation.  Appropriate mood and affect    Pelvic Exam:  External Genitalia: normal appearance, hair distribution, and no lesions  Urethral Meatus:  normal in size, location, without lesi

## 2021-06-16 NOTE — TELEPHONE ENCOUNTER
Spoke, with the patient and she scheduled her shingles vaccine on 6-21-21 at 5:00. Ok to book per encounter below.

## 2021-06-21 ENCOUNTER — NURSE ONLY (OUTPATIENT)
Dept: INTERNAL MEDICINE CLINIC | Facility: CLINIC | Age: 61
End: 2021-06-21
Payer: COMMERCIAL

## 2021-06-21 DIAGNOSIS — Z23 IMMUNIZATION DUE: ICD-10-CM

## 2021-06-21 PROCEDURE — 90750 HZV VACC RECOMBINANT IM: CPT | Performed by: INTERNAL MEDICINE

## 2021-06-21 PROCEDURE — 90471 IMMUNIZATION ADMIN: CPT | Performed by: INTERNAL MEDICINE

## 2021-06-21 NOTE — PROGRESS NOTES
Patient here for scheduled nurse visit for Shingrix vaccine ordered per Dr Maribel Mcintosh on 6/15/21. Patient name, date of birth and allergies verified. VIS given to patient.  Patient given Shingrix Vaccine left deltoid Lot# [id number] exp. 10/25/22, patient tolerated

## 2021-07-02 ENCOUNTER — HOSPITAL ENCOUNTER (OUTPATIENT)
Dept: ULTRASOUND IMAGING | Facility: HOSPITAL | Age: 61
Discharge: HOME | End: 2021-07-02
Attending: INTERNAL MEDICINE
Payer: COMMERCIAL

## 2021-07-02 ENCOUNTER — TELEPHONE (OUTPATIENT)
Dept: INTERNAL MEDICINE CLINIC | Facility: CLINIC | Age: 61
End: 2021-07-02

## 2021-07-02 ENCOUNTER — HOSPITAL ENCOUNTER (OUTPATIENT)
Dept: MAMMOGRAPHY | Facility: HOSPITAL | Age: 61
Discharge: HOME | End: 2021-07-02
Attending: INTERNAL MEDICINE
Payer: COMMERCIAL

## 2021-07-02 DIAGNOSIS — N60.19 FIBROCYSTIC BREAST DISEASE (FCBD), UNSPECIFIED LATERALITY: ICD-10-CM

## 2021-07-02 DIAGNOSIS — Z12.31 ENCOUNTER FOR SCREENING MAMMOGRAM FOR BREAST CANCER: ICD-10-CM

## 2021-07-02 DIAGNOSIS — N60.19 FIBROCYSTIC BREAST DISEASE (FCBD), UNSPECIFIED LATERALITY: Primary | ICD-10-CM

## 2021-07-02 DIAGNOSIS — N60.01 CYST OF RIGHT BREAST: ICD-10-CM

## 2021-07-02 PROCEDURE — 77066 DX MAMMO INCL CAD BI: CPT | Performed by: INTERNAL MEDICINE

## 2021-07-02 PROCEDURE — 77062 BREAST TOMOSYNTHESIS BI: CPT | Performed by: INTERNAL MEDICINE

## 2021-07-02 PROCEDURE — 76642 ULTRASOUND BREAST LIMITED: CPT | Performed by: INTERNAL MEDICINE

## 2021-07-02 NOTE — TELEPHONE ENCOUNTER
Patient is at the mammogram room trying to get her mammogram done but the order has the incorrect Dx code   Ama Steinberg is looking to get a new order generated with the correct Dx code since patient has been there since 7:30 AM

## 2021-07-07 ENCOUNTER — OFFICE VISIT (OUTPATIENT)
Dept: SURGERY | Facility: CLINIC | Age: 61
End: 2021-07-07
Payer: COMMERCIAL

## 2021-07-07 VITALS
SYSTOLIC BLOOD PRESSURE: 108 MMHG | BODY MASS INDEX: 36.09 KG/M2 | WEIGHT: 243.69 LBS | OXYGEN SATURATION: 98 % | HEART RATE: 88 BPM | HEIGHT: 69 IN | DIASTOLIC BLOOD PRESSURE: 63 MMHG

## 2021-07-07 DIAGNOSIS — E66.9 OBESITY (BMI 30-39.9): ICD-10-CM

## 2021-07-07 DIAGNOSIS — Z98.84 S/P LAPAROSCOPIC SLEEVE GASTRECTOMY: ICD-10-CM

## 2021-07-07 DIAGNOSIS — R63.2 INCREASED APPETITE: Primary | ICD-10-CM

## 2021-07-07 PROCEDURE — 3078F DIAST BP <80 MM HG: CPT | Performed by: INTERNAL MEDICINE

## 2021-07-07 PROCEDURE — 99214 OFFICE O/P EST MOD 30 MIN: CPT | Performed by: INTERNAL MEDICINE

## 2021-07-07 PROCEDURE — 3008F BODY MASS INDEX DOCD: CPT | Performed by: INTERNAL MEDICINE

## 2021-07-07 PROCEDURE — 3074F SYST BP LT 130 MM HG: CPT | Performed by: INTERNAL MEDICINE

## 2021-07-07 RX ORDER — PHENTERMINE HYDROCHLORIDE 37.5 MG/1
TABLET ORAL
Qty: 30 TABLET | Refills: 2 | Status: SHIPPED | OUTPATIENT
Start: 2021-07-07 | End: 2021-08-23 | Stop reason: DRUGHIGH

## 2021-07-07 NOTE — PROGRESS NOTES
[address]  Dept: [phone]    Patient:  Philip Young  :      1960  MRN:      [id number]    Referring Provider: Cesilia Maldonado, Oral Suspension, Take 30 mL by mouth daily as needed for constipation. , Disp: , Rfl: 0  •  PEG 3350 17 g Oral Powd Pack, Take 17 g by mouth daily as needed. , Disp: , Rfl: 0  •  ADVAIR DISKUS 100-50 MCG/DOSE Inhalation Aerosol Powder, Breath Activated, INHA 0    Allergies:  Penicillins and Seasonal     Social History:  Social History    Tobacco Use      Smoking status: Never Smoker      Smokeless tobacco: Never Used    Vaping Use      Vaping Use: Never used    Alcohol use:  Yes      Alcohol/week: 2.0 - 3.0 sta conjunctivae/corneas clear. PERRL, EOM's intact. Fundi benign.   Neck: no adenopathy, no carotid bruit, no JVD, supple, symmetrical, trachea midline and thyroid not enlarged, symmetric, no tenderness/mass/nodules  Lungs: clear to auscultation bilaterally  H due      Omero Dodd MD

## 2021-07-12 ENCOUNTER — LAB ENCOUNTER (OUTPATIENT)
Dept: LAB | Facility: HOSPITAL | Age: 61
End: 2021-07-12
Attending: INTERNAL MEDICINE
Payer: COMMERCIAL

## 2021-07-12 DIAGNOSIS — Z51.81 THERAPEUTIC DRUG MONITORING: ICD-10-CM

## 2021-07-12 DIAGNOSIS — M06.9 RHEUMATOID ARTHRITIS, INVOLVING UNSPECIFIED SITE, UNSPECIFIED WHETHER RHEUMATOID FACTOR PRESENT (HCC): ICD-10-CM

## 2021-07-12 LAB
ALT SERPL-CCNC: 9 U/L
AST SERPL-CCNC: 19 U/L (ref 15–37)
CREAT BLD-MCNC: 0.88 MG/DL
CRP SERPL-MCNC: 0.34 MG/DL (ref ?–0.3)
DEPRECATED RDW RBC AUTO: 47.6 FL (ref 35.1–46.3)
ERYTHROCYTE [DISTWIDTH] IN BLOOD BY AUTOMATED COUNT: 13.7 % (ref 11–15)
ERYTHROCYTE [SEDIMENTATION RATE] IN BLOOD: 27 MM/HR
HCT VFR BLD AUTO: 42.2 %
HGB BLD-MCNC: 13.4 G/DL
MCH RBC QN AUTO: 29.7 PG (ref 26–34)
MCHC RBC AUTO-ENTMCNC: 31.8 G/DL (ref 31–37)
MCV RBC AUTO: 93.6 FL
PLATELET # BLD AUTO: 234 10(3)UL (ref 150–450)
RBC # BLD AUTO: 4.51 X10(6)UL
WBC # BLD AUTO: 5.9 X10(3) UL (ref 4–11)

## 2021-07-12 PROCEDURE — 36415 COLL VENOUS BLD VENIPUNCTURE: CPT

## 2021-07-12 PROCEDURE — 86140 C-REACTIVE PROTEIN: CPT

## 2021-07-12 PROCEDURE — 85652 RBC SED RATE AUTOMATED: CPT

## 2021-07-12 PROCEDURE — 84460 ALANINE AMINO (ALT) (SGPT): CPT

## 2021-07-12 PROCEDURE — 84450 TRANSFERASE (AST) (SGOT): CPT

## 2021-07-12 PROCEDURE — 82565 ASSAY OF CREATININE: CPT

## 2021-07-12 PROCEDURE — 85027 COMPLETE CBC AUTOMATED: CPT

## 2021-07-17 ENCOUNTER — OFFICE VISIT (OUTPATIENT)
Dept: ALLERGY | Facility: CLINIC | Age: 61
End: 2021-07-17
Payer: COMMERCIAL

## 2021-07-17 ENCOUNTER — NURSE ONLY (OUTPATIENT)
Dept: ALLERGY | Facility: CLINIC | Age: 61
End: 2021-07-17
Payer: COMMERCIAL

## 2021-07-17 VITALS
SYSTOLIC BLOOD PRESSURE: 120 MMHG | OXYGEN SATURATION: 98 % | HEART RATE: 98 BPM | RESPIRATION RATE: 20 BRPM | DIASTOLIC BLOOD PRESSURE: 77 MMHG

## 2021-07-17 DIAGNOSIS — J45.40 ASTHMA, EXTRINSIC, MODERATE PERSISTENT, UNCOMPLICATED: Primary | ICD-10-CM

## 2021-07-17 DIAGNOSIS — J30.89 ENVIRONMENTAL AND SEASONAL ALLERGIES: ICD-10-CM

## 2021-07-17 DIAGNOSIS — J30.1 SEASONAL ALLERGIC RHINITIS DUE TO POLLEN: ICD-10-CM

## 2021-07-17 DIAGNOSIS — Z91.09 ALLERGY TO AMERICAN HOUSE DUST MITE: ICD-10-CM

## 2021-07-17 PROCEDURE — 99214 OFFICE O/P EST MOD 30 MIN: CPT | Performed by: ALLERGY & IMMUNOLOGY

## 2021-07-17 PROCEDURE — 3074F SYST BP LT 130 MM HG: CPT | Performed by: ALLERGY & IMMUNOLOGY

## 2021-07-17 PROCEDURE — 95117 IMMUNOTHERAPY INJECTIONS: CPT | Performed by: ALLERGY & IMMUNOLOGY

## 2021-07-17 PROCEDURE — 3078F DIAST BP <80 MM HG: CPT | Performed by: ALLERGY & IMMUNOLOGY

## 2021-07-17 RX ORDER — HYDROCODONE BITARTRATE AND ACETAMINOPHEN 5; 325 MG/1; MG/1
1 TABLET ORAL EVERY 6 HOURS PRN
COMMUNITY
Start: 2021-05-05 | End: 2021-09-09

## 2021-07-17 RX ORDER — HYDROCODONE POLISTIREX AND CHLORPHENIRAMINE POLISTIREX 10; 8 MG/5ML; MG/5ML
5 SUSPENSION, EXTENDED RELEASE ORAL 2 TIMES DAILY PRN
Qty: 115 ML | Refills: 0 | Status: SHIPPED | OUTPATIENT
Start: 2021-07-17

## 2021-07-17 RX ORDER — ALBUTEROL SULFATE 2.5 MG/3ML
2.5 SOLUTION RESPIRATORY (INHALATION) EVERY 4 HOURS PRN
Qty: 75 ML | Refills: 0 | Status: SHIPPED | OUTPATIENT
Start: 2021-07-17 | End: 2021-09-13

## 2021-07-17 RX ORDER — ALBUTEROL SULFATE 90 UG/1
2 AEROSOL, METERED RESPIRATORY (INHALATION) EVERY 6 HOURS PRN
Qty: 1 EACH | Refills: 0 | Status: SHIPPED | OUTPATIENT
Start: 2021-07-17

## 2021-07-17 NOTE — PROGRESS NOTES
Priya Abebe is a 62year old female. HPI:   Patient presents with:  Asthma: Patient here for routine follow up. Reports recent coughing in the evening before bed. Reports it is a dry cough.          Patient is a 60-year-old female who presents for REDUCTION INTERNAL FIXATION   • Urinary incontinence 2/12/95    after childbirth   • Viral conjunctivitis 2008   • Visual impairment     glasses      Past Surgical History:   Procedure Laterality Date   • BACK SURGERY     • BACK SURGERY  04/21/2021   • COL Wheezing. 1 each 0   • albuterol sulfate (2.5 MG/3ML) 0.083% Inhalation Nebu Soln Take 3 mL (2.5 mg total) by nebulization every 4 (four) hours as needed for Wheezing.  75 mL 0   • Hydrocod Polst-CPM Polst ER (TUSSIONEX PENNKINETIC ER) 10-8 MG/5ML Oral Susp Vitamins-Minerals (BARIATRIC FUSION) Oral Chew Tab Chew 1 tablet by mouth daily. • Rasagiline Mesylate (AZILECT) 1 MG Oral Tab Take 1 tablet by mouth daily.      • Albuterol Sulfate (2.5 MG/3ML) 0.083% Inhalation Nebu Soln Take 3 mL (2.5 mg total) by ne noted  Respiratory: normal to inspection lungs are clear to auscultation bilaterally normal respiratory effort   Cardiovascular: regular rate and rhythm no murmurs, gallups, or rubs  Abdomen: soft non-tender non-distended  Skin/Hair: no unusual rashes pres Soln 75 mL 0     Sig: Take 3 mL (2.5 mg total) by nebulization every 4 (four) hours as needed for Wheezing.    • Hydrocod Polst-CPM Polst ER (TUSSIONEX PENNKINETIC ER) 10-8 MG/5ML Oral Suspension Extended Release 115 mL 0     Sig: Take 5 mL by mouth 2 (two)

## 2021-07-17 NOTE — PATIENT INSTRUCTIONS
1. Asthma  Recent dry cough over the preceding few weeks. Patient is yet to try albuterol. No wheezing no shortness of breath no hemoptysis. No ACE inhibitor is no beta-blockers. Recs: Trial of albuterol 2 puffs every 4-6 hours.   If albuterol is help

## 2021-07-23 ENCOUNTER — TELEPHONE (OUTPATIENT)
Dept: INTERNAL MEDICINE CLINIC | Facility: CLINIC | Age: 61
End: 2021-07-23

## 2021-07-23 NOTE — TELEPHONE ENCOUNTER
[facility]  calling due to the patient is concerned about her medication change for the medication below by her bariatric doctor, please call patient back to discuss.      •  Phentermine HCl 37.5 MG Oral Tab, Take half tablet every morning, Disp

## 2021-07-23 NOTE — TELEPHONE ENCOUNTER
Left detailed message for patient (hipaa confirmed), patient to reach out to Bariatric Dr. Jj Begun he made the changes; otherwise if there is any other questions or concerns, call our office back.

## 2021-07-24 ENCOUNTER — HOSPITAL ENCOUNTER (OUTPATIENT)
Dept: GENERAL RADIOLOGY | Facility: HOSPITAL | Age: 61
Discharge: HOME | End: 2021-07-24
Attending: NEUROLOGICAL SURGERY
Payer: COMMERCIAL

## 2021-07-24 DIAGNOSIS — M47.26 OTHER SPONDYLOSIS WITH RADICULOPATHY, LUMBAR REGION: ICD-10-CM

## 2021-07-24 DIAGNOSIS — M48.062 SPINAL STENOSIS OF LUMBAR REGION WITH NEUROGENIC CLAUDICATION: ICD-10-CM

## 2021-07-24 PROCEDURE — 72100 X-RAY EXAM L-S SPINE 2/3 VWS: CPT | Performed by: NEUROLOGICAL SURGERY

## 2021-08-05 RX ORDER — ALBUTEROL SULFATE 90 UG/1
2 AEROSOL, METERED RESPIRATORY (INHALATION) EVERY 6 HOURS PRN
Qty: 1 EACH | Refills: 0 | OUTPATIENT
Start: 2021-08-05

## 2021-08-05 NOTE — TELEPHONE ENCOUNTER
Refill request denied as patient did not request albuterol inhaler nor needs at this time. Asthma action plan completed.

## 2021-08-05 NOTE — TELEPHONE ENCOUNTER
Patient last seen in Allergy 7/17/2021 for . . .    Asthma, extrinsic, moderate persistent, uncomplicated  (primary encounter diagnosis)  Seasonal allergic rhinitis due to pollen  Allergy to [employer] house dust mite    Refill request received for . . .

## 2021-08-07 ENCOUNTER — NURSE ONLY (OUTPATIENT)
Dept: ALLERGY | Facility: CLINIC | Age: 61
End: 2021-08-07
Payer: COMMERCIAL

## 2021-08-07 DIAGNOSIS — J30.89 ENVIRONMENTAL AND SEASONAL ALLERGIES: ICD-10-CM

## 2021-08-07 PROCEDURE — 95165 ANTIGEN THERAPY SERVICES: CPT | Performed by: ALLERGY & IMMUNOLOGY

## 2021-08-07 PROCEDURE — 95117 IMMUNOTHERAPY INJECTIONS: CPT | Performed by: ALLERGY & IMMUNOLOGY

## 2021-08-09 RX ORDER — ALBUTEROL SULFATE 90 UG/1
2 AEROSOL, METERED RESPIRATORY (INHALATION) EVERY 6 HOURS PRN
Qty: 1 EACH | Refills: 0 | OUTPATIENT
Start: 2021-08-09

## 2021-08-09 NOTE — TELEPHONE ENCOUNTER
Refill requested for Requested Prescriptions     Albuterol Sulfate HFA (PROAIR HFA) 108 (90 Base) MCG/ACT Inhalation Aero Soln         Warning: The previous medication was prescribed with free-text dispense value of 1 Inhaler.  Please review the discrete di

## 2021-08-09 NOTE — TELEPHONE ENCOUNTER
Refill request received     Medication Quantity Refills Start End   Albuterol Sulfate HFA (PROAIR HFA) 108 (90 Base) MCG/ACT Inhalation Aero Soln 1 each 0 7/17/2021    Sig:   Inhale 2 puffs into the lungs every 6 (six) hours as needed for Wheezing.      Rou

## 2021-08-23 DIAGNOSIS — R63.2 INCREASED APPETITE: ICD-10-CM

## 2021-08-23 DIAGNOSIS — E66.9 OBESITY (BMI 30-39.9): ICD-10-CM

## 2021-08-23 RX ORDER — PHENTERMINE HYDROCHLORIDE 8 MG/1
1 TABLET ORAL
Qty: 60 TABLET | Refills: 2 | Status: SHIPPED | OUTPATIENT
Start: 2021-08-23 | End: 2021-09-22

## 2021-08-30 ENCOUNTER — NURSE ONLY (OUTPATIENT)
Dept: ALLERGY | Facility: CLINIC | Age: 61
End: 2021-08-30
Payer: COMMERCIAL

## 2021-08-30 DIAGNOSIS — J30.89 ENVIRONMENTAL AND SEASONAL ALLERGIES: ICD-10-CM

## 2021-08-30 PROCEDURE — 95165 ANTIGEN THERAPY SERVICES: CPT | Performed by: ALLERGY & IMMUNOLOGY

## 2021-08-30 PROCEDURE — 95117 IMMUNOTHERAPY INJECTIONS: CPT | Performed by: ALLERGY & IMMUNOLOGY

## 2021-09-03 ENCOUNTER — ORDER TRANSCRIPTION (OUTPATIENT)
Dept: ADMINISTRATIVE | Facility: HOSPITAL | Age: 61
End: 2021-09-03

## 2021-09-03 DIAGNOSIS — Z11.59 SPECIAL SCREENING EXAMINATION FOR VIRAL DISEASE: Primary | ICD-10-CM

## 2021-09-08 ENCOUNTER — TELEPHONE (OUTPATIENT)
Dept: INTERNAL MEDICINE CLINIC | Facility: CLINIC | Age: 61
End: 2021-09-08

## 2021-09-08 NOTE — TELEPHONE ENCOUNTER
Need Clearance to get CT Scan of R Foot done on 9/15/2021, under Anesthesia as the pt hs Parkinsons Disease, she is not able to stay still for the test. Pts PCP is Dr Nigel Mcfarland, but he is out of the office Appt. sched for surgery clearance with [facility]

## 2021-09-09 ENCOUNTER — LAB ENCOUNTER (OUTPATIENT)
Dept: LAB | Facility: HOSPITAL | Age: 61
End: 2021-09-09
Attending: NURSE PRACTITIONER
Payer: COMMERCIAL

## 2021-09-09 ENCOUNTER — OFFICE VISIT (OUTPATIENT)
Dept: INTERNAL MEDICINE CLINIC | Facility: CLINIC | Age: 61
End: 2021-09-09
Payer: COMMERCIAL

## 2021-09-09 ENCOUNTER — TELEPHONE (OUTPATIENT)
Dept: RHEUMATOLOGY | Facility: CLINIC | Age: 61
End: 2021-09-09

## 2021-09-09 VITALS
SYSTOLIC BLOOD PRESSURE: 112 MMHG | BODY MASS INDEX: 35.87 KG/M2 | DIASTOLIC BLOOD PRESSURE: 75 MMHG | WEIGHT: 242.19 LBS | HEART RATE: 80 BPM | HEIGHT: 69 IN

## 2021-09-09 DIAGNOSIS — Z01.818 PREOP TESTING: ICD-10-CM

## 2021-09-09 DIAGNOSIS — Z01.818 PRE-OP EXAM: ICD-10-CM

## 2021-09-09 DIAGNOSIS — Z01.818 PRE-OP EXAM: Primary | ICD-10-CM

## 2021-09-09 LAB
ALBUMIN SERPL-MCNC: 3.6 G/DL (ref 3.4–5)
ALBUMIN/GLOB SERPL: 1 {RATIO} (ref 1–2)
ALP LIVER SERPL-CCNC: 91 U/L
ALT SERPL-CCNC: 11 U/L
ANION GAP SERPL CALC-SCNC: 7 MMOL/L (ref 0–18)
AST SERPL-CCNC: 26 U/L (ref 15–37)
BASOPHILS # BLD AUTO: 0.03 X10(3) UL (ref 0–0.2)
BASOPHILS NFR BLD AUTO: 0.5 %
BILIRUB SERPL-MCNC: 0.4 MG/DL (ref 0.1–2)
BUN BLD-MCNC: 23 MG/DL (ref 7–18)
BUN/CREAT SERPL: 25.3 (ref 10–20)
CALCIUM BLD-MCNC: 9.2 MG/DL (ref 8.5–10.1)
CHLORIDE SERPL-SCNC: 109 MMOL/L (ref 98–112)
CO2 SERPL-SCNC: 23 MMOL/L (ref 21–32)
CREAT BLD-MCNC: 0.91 MG/DL
DEPRECATED RDW RBC AUTO: 54.2 FL (ref 35.1–46.3)
EOSINOPHIL # BLD AUTO: 0.17 X10(3) UL (ref 0–0.7)
EOSINOPHIL NFR BLD AUTO: 3.1 %
ERYTHROCYTE [DISTWIDTH] IN BLOOD BY AUTOMATED COUNT: 16.2 % (ref 11–15)
GLOBULIN PLAS-MCNC: 3.6 G/DL (ref 2.8–4.4)
GLUCOSE BLD-MCNC: 90 MG/DL (ref 70–99)
HCT VFR BLD AUTO: 40.4 %
HGB BLD-MCNC: 12.9 G/DL
IMM GRANULOCYTES # BLD AUTO: 0.01 X10(3) UL (ref 0–1)
IMM GRANULOCYTES NFR BLD: 0.2 %
LYMPHOCYTES # BLD AUTO: 2.04 X10(3) UL (ref 1–4)
LYMPHOCYTES NFR BLD AUTO: 36.8 %
M PROTEIN MFR SERPL ELPH: 7.2 G/DL (ref 6.4–8.2)
MCH RBC QN AUTO: 29.3 PG (ref 26–34)
MCHC RBC AUTO-ENTMCNC: 31.9 G/DL (ref 31–37)
MCV RBC AUTO: 91.6 FL
MONOCYTES # BLD AUTO: 0.41 X10(3) UL (ref 0.1–1)
MONOCYTES NFR BLD AUTO: 7.4 %
NEUTROPHILS # BLD AUTO: 2.88 X10 (3) UL (ref 1.5–7.7)
NEUTROPHILS # BLD AUTO: 2.88 X10(3) UL (ref 1.5–7.7)
NEUTROPHILS NFR BLD AUTO: 52 %
OSMOLALITY SERPL CALC.SUM OF ELEC: 291 MOSM/KG (ref 275–295)
PATIENT FASTING Y/N/NP: NO
PLATELET # BLD AUTO: 207 10(3)UL (ref 150–450)
POTASSIUM SERPL-SCNC: 4.3 MMOL/L (ref 3.5–5.1)
RBC # BLD AUTO: 4.41 X10(6)UL
SODIUM SERPL-SCNC: 139 MMOL/L (ref 136–145)
WBC # BLD AUTO: 5.5 X10(3) UL (ref 4–11)

## 2021-09-09 PROCEDURE — 85025 COMPLETE CBC W/AUTO DIFF WBC: CPT

## 2021-09-09 PROCEDURE — 3078F DIAST BP <80 MM HG: CPT | Performed by: NURSE PRACTITIONER

## 2021-09-09 PROCEDURE — 36415 COLL VENOUS BLD VENIPUNCTURE: CPT

## 2021-09-09 PROCEDURE — 3074F SYST BP LT 130 MM HG: CPT | Performed by: NURSE PRACTITIONER

## 2021-09-09 PROCEDURE — 93005 ELECTROCARDIOGRAM TRACING: CPT

## 2021-09-09 PROCEDURE — 93010 ELECTROCARDIOGRAM REPORT: CPT | Performed by: NURSE PRACTITIONER

## 2021-09-09 PROCEDURE — 99214 OFFICE O/P EST MOD 30 MIN: CPT | Performed by: NURSE PRACTITIONER

## 2021-09-09 PROCEDURE — 80053 COMPREHEN METABOLIC PANEL: CPT

## 2021-09-09 PROCEDURE — 3008F BODY MASS INDEX DOCD: CPT | Performed by: NURSE PRACTITIONER

## 2021-09-09 NOTE — PROGRESS NOTES
HPI:    Patient ID: Kamini Zamora is a 62year old female.     Immunization History  Administered            Date(s) Administered    Covid-19 Vaccine Moderna 100 mcg/0.5 ml                          03/31/2021 04/28/2021      FLULAVAL 6 months & older 0 left 11/21/2017   • High cholesterol    • History of blood transfusion [address] [place] [address]   • Infertility, female    • Parkinson disease ([facility]    • Parkinson's disease ([facility]    • Rheumatoid arthritis ([facility]    • S/P laparoscopic sleeve gastrectomy 01/22/2020    fo Concern: Yes          S2 glasses tea daily        Exercise: No         Review of Systems   Constitutional: Negative for chills, fatigue and fever. HENT: Negative for ear pain, hearing loss, sinus pain, sore throat and trouble swallowing.     Eyes: Negativ UT) Oral Cap TAKE 1 CAPSULE BY MOUTH ONE TIME PER WEEK 12 capsule 3   • PEG 3350 17 g Oral Powd Pack Take 17 g by mouth daily as needed.   0   • ADVAIR DISKUS 100-50 MCG/DOSE Inhalation Aerosol Powder, Breath Activated INHALE 1 PUFF BY MOUTH TWICE A DAY 3 e PHYSICAL EXAM:   Physical Exam  Vitals and nursing note reviewed. Constitutional:       Appearance: Normal appearance. She is well-developed. HENT:      Head: Normocephalic.       Right Ear: Tympanic membrane normal.      Left Ear: Tympanic membrane n Ht 5' 9\" (1.753 m)   Wt 242 lb 3.2 oz (109.9 kg)   LMP 04/18/2015 (Exact Date)   BMI 35.77 kg/m²   Wt Readings from Last 2 Encounters:  09/09/21 : 242 lb 3.2 oz (109.9 kg)  07/07/21 : 243 lb 11.2 oz (110.5 kg)    Body mass index is 35.77 kg/m². (2)

## 2021-09-09 NOTE — TELEPHONE ENCOUNTER
PA renewal request for Bony Palacios from [employer]. Completed and faxed back with supporting documentation .

## 2021-09-09 NOTE — ASSESSMENT & PLAN NOTE
A/P Patient has a history of a allergic rhinitis, asthma, hyperlipidemia, rheumatoid arthritis, parkinson's disease, postsurgical hypothyroidism, L3-4 L4-5 central stenosis, chronic back pain, mixed anxiety and depression disorder chronic fatigue, history

## 2021-09-12 ENCOUNTER — PATIENT MESSAGE (OUTPATIENT)
Dept: RHEUMATOLOGY | Facility: CLINIC | Age: 61
End: 2021-09-12

## 2021-09-12 ENCOUNTER — LAB ENCOUNTER (OUTPATIENT)
Dept: LAB | Facility: HOSPITAL | Age: 61
End: 2021-09-12
Attending: PODIATRIST
Payer: COMMERCIAL

## 2021-09-12 DIAGNOSIS — Z11.59 SPECIAL SCREENING EXAMINATION FOR VIRAL DISEASE: ICD-10-CM

## 2021-09-13 RX ORDER — PRAVASTATIN SODIUM 20 MG
20 TABLET ORAL NIGHTLY
Qty: 90 TABLET | Refills: 1 | Status: SHIPPED | OUTPATIENT
Start: 2021-09-13

## 2021-09-13 RX ORDER — CHLORAL HYDRATE 500 MG
1000 CAPSULE ORAL DAILY
COMMUNITY

## 2021-09-13 NOTE — TELEPHONE ENCOUNTER
From: Janet Erazo  To: Alon Gomez MD  Sent: 9/12/2021 11:25 PM CDT  Subject: New methotrexate prescription needed    I tried to refill my methotrexate prescription and they said I couldn't veronica I needed a new one.  Could you please send a new

## 2021-09-13 NOTE — TELEPHONE ENCOUNTER
Refill passed per [facility], [facility] protocol.    Requested Prescriptions   Pending Prescriptions Disp Refills    PRAVASTATIN 20 MG Oral Tab [Pharmacy Med Name: PRAVASTATIN SODIUM 20 MG TAB] 90 tablet 1     Sig: TAKE 1 TABLET BY MOUTH EVERY DAY AT NIGHT  [facility], [facility] physical and second Shingles vaccine shot

## 2021-09-13 NOTE — TELEPHONE ENCOUNTER
Last filled: 2/24/21 #96 tab with 1 refill   LOV: 3/15/21  Future Appointments   Date Time Provider Joel Mann   9/15/2021  9:45 AM [facility]CH[facility] LAB Violet Queen   9/15/2021 10:45 AM [facility] CT MAIN RM1 [facility] CT Violet Queen   9/22/2021  8:40 AM [place]

## 2021-09-14 ENCOUNTER — ANESTHESIA EVENT (OUTPATIENT)
Dept: CT IMAGING | Facility: HOSPITAL | Age: 61
End: 2021-09-14
Payer: COMMERCIAL

## 2021-09-14 LAB — SARS-COV-2 RNA RESP QL NAA+PROBE: NOT DETECTED

## 2021-09-15 ENCOUNTER — NURSE ONLY (OUTPATIENT)
Dept: LAB | Facility: HOSPITAL | Age: 61
End: 2021-09-15
Attending: PODIATRIST
Payer: COMMERCIAL

## 2021-09-15 ENCOUNTER — HOSPITAL ENCOUNTER (OUTPATIENT)
Dept: CT IMAGING | Facility: HOSPITAL | Age: 61
Discharge: HOME | End: 2021-09-15
Attending: PODIATRIST
Payer: COMMERCIAL

## 2021-09-15 ENCOUNTER — ANESTHESIA (OUTPATIENT)
Dept: CT IMAGING | Facility: HOSPITAL | Age: 61
End: 2021-09-15
Payer: COMMERCIAL

## 2021-09-15 VITALS
BODY MASS INDEX: 35.84 KG/M2 | OXYGEN SATURATION: 98 % | HEART RATE: 87 BPM | SYSTOLIC BLOOD PRESSURE: 133 MMHG | TEMPERATURE: 98 F | HEIGHT: 69 IN | WEIGHT: 242 LBS | RESPIRATION RATE: 15 BRPM | DIASTOLIC BLOOD PRESSURE: 70 MMHG

## 2021-09-15 DIAGNOSIS — M25.571 SINUS TARSITIS OF RIGHT FOOT: ICD-10-CM

## 2021-09-15 DIAGNOSIS — M21.6X1 CAVOVARUS DEFORMITY OF FOOT, ACQUIRED, RIGHT: ICD-10-CM

## 2021-09-15 PROCEDURE — 73700 CT LOWER EXTREMITY W/O DYE: CPT | Performed by: PODIATRIST

## 2021-09-15 PROCEDURE — 76377 3D RENDER W/INTRP POSTPROCES: CPT | Performed by: PODIATRIST

## 2021-09-15 RX ORDER — SODIUM CHLORIDE 9 MG/ML
INJECTION, SOLUTION INTRAVENOUS CONTINUOUS
Status: DISCONTINUED | OUTPATIENT
Start: 2021-09-15 | End: 2021-09-17

## 2021-09-15 NOTE — TELEPHONE ENCOUNTER
PA approved through 09/09/2022. PA# [id number] Fernandez [id number]    Order sent to pharmacy on 6/8/21 with 10 refills.

## 2021-09-15 NOTE — IMAGING NOTE
Patient here accompanied by  Ayala Ott for CT scan of R ankle to be done with anesthesia d/t ongoing tremors from Parkinson's Disease. Pre-procedure vitals stable and IV placed without difficulty by [facility].  Pt interviewed by Dr Nitza White, [address] [place] [address]

## 2021-09-22 ENCOUNTER — OFFICE VISIT (OUTPATIENT)
Dept: RHEUMATOLOGY | Facility: CLINIC | Age: 61
End: 2021-09-22
Payer: COMMERCIAL

## 2021-09-22 VITALS
SYSTOLIC BLOOD PRESSURE: 99 MMHG | HEART RATE: 91 BPM | HEIGHT: 69 IN | BODY MASS INDEX: 35.84 KG/M2 | WEIGHT: 242 LBS | RESPIRATION RATE: 16 BRPM | DIASTOLIC BLOOD PRESSURE: 62 MMHG

## 2021-09-22 DIAGNOSIS — Z51.81 THERAPEUTIC DRUG MONITORING: ICD-10-CM

## 2021-09-22 DIAGNOSIS — M06.9 RHEUMATOID ARTHRITIS, INVOLVING UNSPECIFIED SITE, UNSPECIFIED WHETHER RHEUMATOID FACTOR PRESENT (HCC): Primary | ICD-10-CM

## 2021-09-22 PROCEDURE — 3074F SYST BP LT 130 MM HG: CPT | Performed by: INTERNAL MEDICINE

## 2021-09-22 PROCEDURE — 90686 IIV4 VACC NO PRSV 0.5 ML IM: CPT | Performed by: INTERNAL MEDICINE

## 2021-09-22 PROCEDURE — 3078F DIAST BP <80 MM HG: CPT | Performed by: INTERNAL MEDICINE

## 2021-09-22 PROCEDURE — 90471 IMMUNIZATION ADMIN: CPT | Performed by: INTERNAL MEDICINE

## 2021-09-22 PROCEDURE — 99214 OFFICE O/P EST MOD 30 MIN: CPT | Performed by: INTERNAL MEDICINE

## 2021-09-22 PROCEDURE — 3008F BODY MASS INDEX DOCD: CPT | Performed by: INTERNAL MEDICINE

## 2021-09-22 RX ORDER — PREDNISONE 2.5 MG
2.5 TABLET ORAL DAILY
Qty: 14 TABLET | Refills: 0 | Status: SHIPPED | OUTPATIENT
Start: 2021-09-22 | End: 2021-11-13 | Stop reason: ALTCHOICE

## 2021-09-22 NOTE — PROGRESS NOTES
Carolina Phelan is a 62year old female who presents for Patient presents with:  Rheumatoid Arthritis  Lab Results  Medication Follow-Up  . HPI:     She is a pleasant 65year old who has elevated RF >300 and foot pain - c/w seropositive  RA.       Claudia Lopez on pain meds. She's off the diclofenac as well. Back sx was first and then the sleeve was done. Not taking tramadol or gabapentin at this time. B/c she's better. She is using the diclofenac gel 1 %   She has lost 40 pounds already!      5/13/2020  V Her back ffeels great. The 2nd round of physical therapy she felt great. Wt Readings from Last 2 Encounters:  09/22/21 : 242 lb (109.8 kg)  09/13/21 : 242 lb (109.8 kg)    Body mass index is 35.74 kg/m².       Current Outpatient Medications   Medica Tab EC TAKE 1 TABLET BY MOUTH EVERY DAY IN THE MORNING BEFORE BREAKFAST 90 tablet 2   • DICLOFENAC SODIUM 75 MG Oral Tab EC TAKE 1 TABLET (75 MG TOTAL) BY MOUTH 2 (TWO) TIMES DAILY.  AS NEEDED (Patient taking differently: Take 75 mg by mouth 2 (two) times d Viviana Feldman MD;  Location: [address] [place] [address] ENDOSCOPY   • COLPOSCOPY, CERVIX W/UPPER ADJACENT VAGINA; W/BIOPSY(S), CERVIX     • D & C      for extended period   • D & C      for extended period   • FOOT SURGERY Left     Had right foot done recently.    • GASTRIC B 1.50 - 7.70 x10 (3) uL 2.88    Neutrophils Absolute      1.50 - 7.70 x10(3) uL 2.88    Lymphocytes Absolute      1.00 - 4.00 x10(3) uL 2.04    Monocytes Absolute      0.10 - 1.00 x10(3) uL 0.41    Eosinophils Absolute      0.00 - 0.70 x10(3) uL 0.17    Ba malleolus. There is a calcaneal spur at the insertion of the plantar  fascia.  There is minimal increased marrow signal within  the base of the second-fifth metatarsals likely  representing a change related to positioning     8/18/2014 - MRI right foot  Th Fabian Knight    7/22/2015 - b/l knee xray   Bilateral moderate 2 compartmental knee osteoarthritis, more  advanced on the right.  Changes have pro\    5/16/2019 - mri left knee     Tricompartmental osteoarthritis, severe in the medial compartment with a complex on leflunomide -   - no improvement on ssz   She's ont able to use subq injections b/c of her really bad parkinson's not able to hold down a pen in one place for very long b/c of severe hand tremors. Elevated inflammatory markers are better on xeljanz.

## 2021-09-22 NOTE — PATIENT INSTRUCTIONS
1. Cont. methotrexate - 8 tablets a week and folic acid 1mg a day   2. Cont. xlejanz 11mg a day   3. Return to clinic in  6 months -   4. .Check labs in 4  months . 5. Diclofenac gel 1 % for left knee. 6. Cont. diclofenac 75mg twice a day  - -   7.

## 2021-09-25 ENCOUNTER — NURSE ONLY (OUTPATIENT)
Dept: ALLERGY | Facility: CLINIC | Age: 61
End: 2021-09-25
Payer: COMMERCIAL

## 2021-09-25 DIAGNOSIS — J30.89 ENVIRONMENTAL AND SEASONAL ALLERGIES: ICD-10-CM

## 2021-09-25 PROCEDURE — 95117 IMMUNOTHERAPY INJECTIONS: CPT | Performed by: ALLERGY & IMMUNOLOGY

## 2021-10-11 ENCOUNTER — OFFICE VISIT (OUTPATIENT)
Dept: SURGERY | Facility: CLINIC | Age: 61
End: 2021-10-11
Payer: COMMERCIAL

## 2021-10-11 VITALS
SYSTOLIC BLOOD PRESSURE: 110 MMHG | HEIGHT: 69 IN | DIASTOLIC BLOOD PRESSURE: 62 MMHG | WEIGHT: 246.63 LBS | HEART RATE: 91 BPM | OXYGEN SATURATION: 98 % | BODY MASS INDEX: 36.53 KG/M2

## 2021-10-11 DIAGNOSIS — E78.5 HYPERLIPIDEMIA, UNSPECIFIED HYPERLIPIDEMIA TYPE: ICD-10-CM

## 2021-10-11 DIAGNOSIS — Z98.84 S/P LAPAROSCOPIC SLEEVE GASTRECTOMY: ICD-10-CM

## 2021-10-11 DIAGNOSIS — E66.9 OBESITY (BMI 30-39.9): ICD-10-CM

## 2021-10-11 DIAGNOSIS — G47.33 OSA (OBSTRUCTIVE SLEEP APNEA): Primary | ICD-10-CM

## 2021-10-11 DIAGNOSIS — R63.2 INCREASED APPETITE: ICD-10-CM

## 2021-10-11 DIAGNOSIS — Z51.81 ENCOUNTER FOR THERAPEUTIC DRUG MONITORING: ICD-10-CM

## 2021-10-11 DIAGNOSIS — E55.9 VITAMIN D DEFICIENCY: ICD-10-CM

## 2021-10-11 PROCEDURE — 3074F SYST BP LT 130 MM HG: CPT | Performed by: INTERNAL MEDICINE

## 2021-10-11 PROCEDURE — 99214 OFFICE O/P EST MOD 30 MIN: CPT | Performed by: INTERNAL MEDICINE

## 2021-10-11 PROCEDURE — 3008F BODY MASS INDEX DOCD: CPT | Performed by: INTERNAL MEDICINE

## 2021-10-11 PROCEDURE — 3078F DIAST BP <80 MM HG: CPT | Performed by: INTERNAL MEDICINE

## 2021-10-11 RX ORDER — MONTELUKAST SODIUM 10 MG/1
TABLET ORAL
Qty: 90 TABLET | Refills: 1 | Status: SHIPPED | OUTPATIENT
Start: 2021-10-11

## 2021-10-11 RX ORDER — PHENTERMINE HYDROCHLORIDE 8 MG/1
1 TABLET ORAL 3 TIMES DAILY
Qty: 90 TABLET | Refills: 2 | Status: SHIPPED | OUTPATIENT
Start: 2021-10-11 | End: 2021-10-21

## 2021-10-11 RX ORDER — TOPIRAMATE 50 MG/1
50 TABLET, FILM COATED ORAL 2 TIMES DAILY
Qty: 180 TABLET | Refills: 1 | Status: SHIPPED | OUTPATIENT
Start: 2021-10-11 | End: 2022-01-09

## 2021-10-11 NOTE — PROGRESS NOTES
[address]  Dept: [phone]    Patient:  Bari Davis  :      1960  MRN:      [id number]    Referring Provider: Bobbi Chau, mouth daily.  OSTEO BI-FLEX, Disp: , Rfl:   •  methotrexate 2.5 MG Oral Tab, Take 8 tabs weekly by mouth, Disp: 96 tablet, Rfl: 1  •  Albuterol Sulfate HFA (PROAIR HFA) 108 (90 Base) MCG/ACT Inhalation Aero Soln, Inhale 2 puffs into the lungs every 6 (six) rasagiline mesylate 1 MG Oral Tab, Take 1 tablet by mouth daily. , Disp: , Rfl:   •  Albuterol Sulfate (2.5 MG/3ML) 0.083% Inhalation Nebu Soln, Take 3 mL (2.5 mg total) by nebulization every 4 (four) hours as needed for Wheezing., Disp: 3 Box, Rfl: 0  •  c Paternal Aunt [de-identified]        approx age   • Heart Disorder Brother    • Ovarian Cancer Neg        Physical Exam:  Vital signs: /62   Pulse 91   Ht 5' 9\" (1.753 m)   Wt 246 lb 9.6 oz (111.9 kg)   LMP 04/18/2015 (Exact Date)   SpO2 98%   BMI 36.42 kg/m² deficiency    Plan     S/p VSG on 12/02/2019  Doing well       Continue current meds    Patient was instructed to continue wearing their CPaP as recommended. DYSLIPIDEMIA: Stable on the above prescribed meal plan and medication. Liver function stable.

## 2021-10-11 NOTE — TELEPHONE ENCOUNTER
Refill requested for   Name from pharmacy: MONTELUKAST SOD 10 MG TABLET          Will file in chart as: MONTELUKAST 10 MG Oral Tab    Sig: TAKE 1 TABLET BY MOUTH EVERY DAY AT NIGHT    Disp:  90 tablet    Refills:  1    Start: 10/11/2021    Class: Normal

## 2021-10-23 ENCOUNTER — NURSE ONLY (OUTPATIENT)
Dept: ALLERGY | Facility: CLINIC | Age: 61
End: 2021-10-23
Payer: COMMERCIAL

## 2021-10-23 DIAGNOSIS — J30.89 ENVIRONMENTAL AND SEASONAL ALLERGIES: ICD-10-CM

## 2021-10-23 PROCEDURE — 95117 IMMUNOTHERAPY INJECTIONS: CPT | Performed by: ALLERGY & IMMUNOLOGY

## 2021-10-25 ENCOUNTER — OFFICE VISIT (OUTPATIENT)
Dept: DERMATOLOGY CLINIC | Facility: CLINIC | Age: 61
End: 2021-10-25
Payer: COMMERCIAL

## 2021-10-25 DIAGNOSIS — L53.9 ERYTHEMA: ICD-10-CM

## 2021-10-25 DIAGNOSIS — D23.30 BENIGN NEOPLASM OF SKIN OF FACE: ICD-10-CM

## 2021-10-25 DIAGNOSIS — D23.60 BENIGN NEOPLASM OF SKIN OF UPPER LIMB, INCLUDING SHOULDER, UNSPECIFIED LATERALITY: ICD-10-CM

## 2021-10-25 DIAGNOSIS — D23.5 BENIGN NEOPLASM OF SKIN OF TRUNK, EXCEPT SCROTUM: ICD-10-CM

## 2021-10-25 DIAGNOSIS — D23.4 BENIGN NEOPLASM OF SCALP AND SKIN OF NECK: ICD-10-CM

## 2021-10-25 DIAGNOSIS — D22.9 MULTIPLE NEVI: ICD-10-CM

## 2021-10-25 DIAGNOSIS — L82.1 SEBORRHEIC KERATOSES: Primary | ICD-10-CM

## 2021-10-25 PROCEDURE — 99203 OFFICE O/P NEW LOW 30 MIN: CPT | Performed by: DERMATOLOGY

## 2021-10-29 RX ORDER — PANTOPRAZOLE SODIUM 40 MG/1
TABLET, DELAYED RELEASE ORAL
Qty: 90 TABLET | Refills: 2 | Status: SHIPPED | OUTPATIENT
Start: 2021-10-29

## 2021-11-01 ENCOUNTER — LAB ENCOUNTER (OUTPATIENT)
Dept: LAB | Facility: HOSPITAL | Age: 61
End: 2021-11-01
Attending: INTERNAL MEDICINE
Payer: COMMERCIAL

## 2021-11-01 DIAGNOSIS — R63.2 INCREASED APPETITE: ICD-10-CM

## 2021-11-01 DIAGNOSIS — M06.9 RHEUMATOID ARTHRITIS, INVOLVING UNSPECIFIED SITE, UNSPECIFIED WHETHER RHEUMATOID FACTOR PRESENT (HCC): ICD-10-CM

## 2021-11-01 DIAGNOSIS — E55.9 VITAMIN D DEFICIENCY: ICD-10-CM

## 2021-11-01 DIAGNOSIS — E78.5 HYPERLIPIDEMIA, UNSPECIFIED HYPERLIPIDEMIA TYPE: ICD-10-CM

## 2021-11-01 DIAGNOSIS — Z51.81 THERAPEUTIC DRUG MONITORING: ICD-10-CM

## 2021-11-01 DIAGNOSIS — Z98.84 S/P LAPAROSCOPIC SLEEVE GASTRECTOMY: ICD-10-CM

## 2021-11-01 DIAGNOSIS — E66.9 OBESITY (BMI 30-39.9): ICD-10-CM

## 2021-11-01 DIAGNOSIS — Z51.81 ENCOUNTER FOR THERAPEUTIC DRUG MONITORING: ICD-10-CM

## 2021-11-01 DIAGNOSIS — G47.33 OSA (OBSTRUCTIVE SLEEP APNEA): ICD-10-CM

## 2021-11-01 PROCEDURE — 85652 RBC SED RATE AUTOMATED: CPT

## 2021-11-01 PROCEDURE — 84425 ASSAY OF VITAMIN B-1: CPT

## 2021-11-01 PROCEDURE — 84450 TRANSFERASE (AST) (SGOT): CPT

## 2021-11-01 PROCEDURE — 84460 ALANINE AMINO (ALT) (SGPT): CPT

## 2021-11-01 PROCEDURE — 82565 ASSAY OF CREATININE: CPT

## 2021-11-01 PROCEDURE — 85027 COMPLETE CBC AUTOMATED: CPT

## 2021-11-01 PROCEDURE — 82607 VITAMIN B-12: CPT

## 2021-11-01 PROCEDURE — 36415 COLL VENOUS BLD VENIPUNCTURE: CPT

## 2021-11-01 PROCEDURE — 86140 C-REACTIVE PROTEIN: CPT

## 2021-11-01 PROCEDURE — 82306 VITAMIN D 25 HYDROXY: CPT

## 2021-11-05 ENCOUNTER — IMMUNIZATION (OUTPATIENT)
Dept: LAB | Facility: HOSPITAL | Age: 61
End: 2021-11-05
Attending: EMERGENCY MEDICINE
Payer: COMMERCIAL

## 2021-11-05 DIAGNOSIS — Z23 NEED FOR VACCINATION: Primary | ICD-10-CM

## 2021-11-05 PROCEDURE — 0064A SARSCOV2 VAC 50MCG/0.25ML IM: CPT | Performed by: NURSE PRACTITIONER

## 2021-11-07 NOTE — PROGRESS NOTES
Philip Young is a 62year old female. HPI:     CC:  Patient presents with:  Lesion: LOV 10/5/11. pt presenting today with lesions of concern to R cheek and L arm for 6 months. Denies pain or itching. No change in size or color.  Denies family and pers HISTORY  12/3/2015   • THYROIDECTOMY      Total       Family History   Problem Relation Age of Onset   • Heart Disorder Father 54        heart attack-cause of death.    • Heart Disorder Mother    • Stroke Mother    • Cancer Mother         AML-cause of death needed.) 100 g 3   • DICLOFENAC SODIUM 75 MG Oral Tab EC TAKE 1 TABLET (75 MG TOTAL) BY MOUTH 2 (TWO) TIMES DAILY.  AS NEEDED (Patient taking differently: Take 75 mg by mouth 2 (two) times daily.) 60 tablet 3   • folic acid 1 MG Oral Tab Take 1 tablet (1 mg Acute torn meniscus of knee 2010    ARTHROSCOPY OF KNEE   • Amenorrhea 4/2016   • Asthma    • Back problem    • Chicken pox    • Chronic cough    • Disorder of thyroid    • Extrapyramidal syndrome 2013    MEDICATION MGMT.  W/ PARKINSONS FEATURES   • Greater Never Used    Vaping Use      Vaping Use: Never used    Substance and Sexual Activity      Alcohol use:  Yes        Alcohol/week: 2.0 - 3.0 standard drinks        Types: 2 - 3 Glasses of wine per week      Drug use: No      Sexual activity: Yes        Ronny Not on file      Attends Club or Organization Meetings: Not on file      Marital Status: Not on file  Intimate Partner Violence:       Fear of Current or Ex-Partner: Not on file      Emotionally Abused: Not on file      Physically Abused: Not on file      chest,/ breasts, axillae,  abdomen, arms, legs, palms. Multiple light to medium brown, well marginated, uniformly pigmented, macules and papules 6 mm and less scattered on exam. pigmented lesions examined with dermoscopy benign-appearing patterns. protection, self exams reviewed. Followup as noted RTC routine checkup 6 mos - one year or p.r.n.     Encounter Times Including precharting, reviewing chart, prior notes obtaining history: 10 minutes, medical exam :10 minutes, notes on body map, plan, coun

## 2021-11-08 RX ORDER — DICLOFENAC SODIUM 75 MG/1
75 TABLET, DELAYED RELEASE ORAL 2 TIMES DAILY
Qty: 60 TABLET | Refills: 5 | Status: SHIPPED | OUTPATIENT
Start: 2021-11-08 | End: 2022-04-26

## 2021-11-08 NOTE — TELEPHONE ENCOUNTER
Last filled: 1/18/21 #60 tab with 3 refills   LOV: 9/22/21  Future Appointments   Date Time Provider Joel Mackenzie   11/13/2021  8:00 AM Raheem Mcmahon MD [facility]   11/16/2021  3:30 PM Clearance [place] Autumn [facility] [place]   11/20/2021 10:50 AM EC ALLERGY [facility]   1/19/2022  9:30 AM Kang Jackson MD [facility]   3/23/2022  8:40 AM Clark Campo MD [facility]

## 2021-11-09 NOTE — TELEPHONE ENCOUNTER
Please review; protocol failed/no protocol    Requested Prescriptions   Pending Prescriptions Disp Refills    FOLIC ACID 1 MG Oral Tab [Pharmacy Med Name: FOLIC ACID 1 MG TABLET] 90 tablet 3     Sig: TAKE 1 TABLET BY MOUTH EVERY DAY        There is no refi

## 2021-11-10 RX ORDER — FOLIC ACID 1 MG/1
1 TABLET ORAL DAILY
Qty: 90 TABLET | Refills: 3 | Status: SHIPPED | OUTPATIENT
Start: 2021-11-10

## 2021-11-13 ENCOUNTER — HOSPITAL ENCOUNTER (OUTPATIENT)
Dept: GENERAL RADIOLOGY | Facility: HOSPITAL | Age: 61
Discharge: HOME | End: 2021-11-13
Attending: NEUROLOGICAL SURGERY
Payer: COMMERCIAL

## 2021-11-13 ENCOUNTER — OFFICE VISIT (OUTPATIENT)
Dept: INTERNAL MEDICINE CLINIC | Facility: CLINIC | Age: 61
End: 2021-11-13
Payer: COMMERCIAL

## 2021-11-13 ENCOUNTER — LAB ENCOUNTER (OUTPATIENT)
Dept: LAB | Facility: HOSPITAL | Age: 61
End: 2021-11-13
Attending: INTERNAL MEDICINE
Payer: COMMERCIAL

## 2021-11-13 VITALS
TEMPERATURE: 98 F | SYSTOLIC BLOOD PRESSURE: 124 MMHG | BODY MASS INDEX: 35.92 KG/M2 | DIASTOLIC BLOOD PRESSURE: 68 MMHG | HEIGHT: 69 IN | RESPIRATION RATE: 20 BRPM | HEART RATE: 90 BPM | WEIGHT: 242.5 LBS

## 2021-11-13 DIAGNOSIS — G20 PARKINSON DISEASE (HCC): ICD-10-CM

## 2021-11-13 DIAGNOSIS — Z98.1 S/P LUMBAR FUSION: ICD-10-CM

## 2021-11-13 DIAGNOSIS — E03.9 HYPOTHYROIDISM, UNSPECIFIED TYPE: ICD-10-CM

## 2021-11-13 DIAGNOSIS — M48.062 SPINAL STENOSIS OF LUMBAR REGION WITH NEUROGENIC CLAUDICATION: ICD-10-CM

## 2021-11-13 DIAGNOSIS — M06.9 RHEUMATOID ARTHRITIS, INVOLVING UNSPECIFIED SITE, UNSPECIFIED WHETHER RHEUMATOID FACTOR PRESENT (HCC): ICD-10-CM

## 2021-11-13 DIAGNOSIS — E78.5 HYPERLIPIDEMIA LDL GOAL <130: ICD-10-CM

## 2021-11-13 DIAGNOSIS — J45.40 MODERATE PERSISTENT EXTRINSIC ASTHMA WITHOUT COMPLICATION: ICD-10-CM

## 2021-11-13 DIAGNOSIS — Z00.00 ROUTINE PHYSICAL EXAMINATION: ICD-10-CM

## 2021-11-13 DIAGNOSIS — Z23 NEED FOR VACCINATION: ICD-10-CM

## 2021-11-13 DIAGNOSIS — G47.33 OSA (OBSTRUCTIVE SLEEP APNEA): ICD-10-CM

## 2021-11-13 DIAGNOSIS — F43.23 ADJUSTMENT DISORDER WITH MIXED ANXIETY AND DEPRESSED MOOD: ICD-10-CM

## 2021-11-13 DIAGNOSIS — Z00.00 ROUTINE PHYSICAL EXAMINATION: Primary | ICD-10-CM

## 2021-11-13 DIAGNOSIS — Z98.84 S/P LAPAROSCOPIC SLEEVE GASTRECTOMY: ICD-10-CM

## 2021-11-13 PROCEDURE — 99396 PREV VISIT EST AGE 40-64: CPT | Performed by: INTERNAL MEDICINE

## 2021-11-13 PROCEDURE — 90750 HZV VACC RECOMBINANT IM: CPT | Performed by: INTERNAL MEDICINE

## 2021-11-13 PROCEDURE — 80053 COMPREHEN METABOLIC PANEL: CPT

## 2021-11-13 PROCEDURE — 84443 ASSAY THYROID STIM HORMONE: CPT

## 2021-11-13 PROCEDURE — 3078F DIAST BP <80 MM HG: CPT | Performed by: INTERNAL MEDICINE

## 2021-11-13 PROCEDURE — 3074F SYST BP LT 130 MM HG: CPT | Performed by: INTERNAL MEDICINE

## 2021-11-13 PROCEDURE — 80061 LIPID PANEL: CPT

## 2021-11-13 PROCEDURE — 36415 COLL VENOUS BLD VENIPUNCTURE: CPT

## 2021-11-13 PROCEDURE — 72100 X-RAY EXAM L-S SPINE 2/3 VWS: CPT | Performed by: NEUROLOGICAL SURGERY

## 2021-11-13 PROCEDURE — 3008F BODY MASS INDEX DOCD: CPT | Performed by: INTERNAL MEDICINE

## 2021-11-13 PROCEDURE — 90471 IMMUNIZATION ADMIN: CPT | Performed by: INTERNAL MEDICINE

## 2021-11-13 NOTE — PROGRESS NOTES
HPI:    Patient ID: Kamini Zamora is a 62year old female. HPI  Patient is here requesting a physical exam.  Last seen in the office on May 12. No changes made at that time. That was after she had the L4-5 lumbar fusion on April 21.   Since her vis Allergic rhinitis due to pollen     Allergic rhinitis due to other allergen     Rheumatoid arthritis, adult (HCC)     Parkinson disease ([facility]     Post-surgical hypothyroidism     Lumbar spinal stenosis     Greater trochanteric bursitis, left     Chronic ba KNEE ARTHROSCOPY Right    • LAP SLEEVE GASTRECTOMY  12/02/2019    Dr Jim Lujan, [facility]   • [address]    for infertility   • [address]    for infertility   • OTHER SURGICAL HISTORY Right 12/3/2015   • OTHER Oral Tab Take 8 tabs weekly by mouth 96 tablet 1   • Albuterol Sulfate HFA (PROAIR HFA) 108 (90 Base) MCG/ACT Inhalation Aero Soln Inhale 2 puffs into the lungs every 6 (six) hours as needed for Wheezing.  1 each 0   • Hydrocod Polst-CPM Polst ER (TUSSIONEX kg)   LMP 04/18/2015 (Exact Date)   BMI 35.81 kg/m²      Physical Exam  Constitutional:       Appearance: She is well-developed. She is obese.    HENT:      Right Ear: Tympanic membrane and ear canal normal.      Left Ear: Tympanic membrane and ear canal no obesity in the chronic Parkinson's movements. Overall she is done quite well over the past year. She had a number of surgeries which all been pretty successful. She is clearly in much better physical condition now than she was a year or 2 ago.   Keep wor the shingles shot today.   - ZOSTER VACC RECOMBINANT IM NJX         Meds This Visit:  Requested Prescriptions      No prescriptions requested or ordered in this encounter       Imaging & Referrals:  None         Seb Souza MD

## 2021-11-18 ENCOUNTER — OFFICE VISIT (OUTPATIENT)
Dept: SLEEP CENTER | Age: 61
End: 2021-11-18
Attending: INTERNAL MEDICINE
Payer: COMMERCIAL

## 2021-11-18 DIAGNOSIS — G47.33 OSA (OBSTRUCTIVE SLEEP APNEA): ICD-10-CM

## 2021-11-18 PROCEDURE — 95806 SLEEP STUDY UNATT&RESP EFFT: CPT

## 2021-11-19 DIAGNOSIS — R63.2 INCREASED APPETITE: ICD-10-CM

## 2021-11-19 DIAGNOSIS — E66.9 OBESITY (BMI 30-39.9): ICD-10-CM

## 2021-11-20 ENCOUNTER — NURSE ONLY (OUTPATIENT)
Dept: ALLERGY | Facility: CLINIC | Age: 61
End: 2021-11-20
Payer: COMMERCIAL

## 2021-11-20 DIAGNOSIS — J30.89 ENVIRONMENTAL AND SEASONAL ALLERGIES: ICD-10-CM

## 2021-11-20 PROCEDURE — 95117 IMMUNOTHERAPY INJECTIONS: CPT | Performed by: ALLERGY & IMMUNOLOGY

## 2021-11-21 NOTE — PROCEDURES
[facility]  Accredited by the [facility] of Sleep Medicine ([facility]ASM)    PATIENT'S NAME: Sanjuana Uriostegiu   ATTENDING PHYSICIAN: Connie Joshua MD   REFERRING PHYSICIAN: Connie Joshua MD   PATIENT ACCOUNT #: [de-identified] LOCATION breathing. Mild snoring was appreciated. RECOMMENDATIONS:    1. Clinical correlation is required. 2.   Weight loss. 3.   Avoid alcohol. 4.   Avoid sedating drug. 5.   Patient should not drive if at all sleepy.     Please do not hesitate to conta

## 2021-11-22 RX ORDER — LEVOTHYROXINE SODIUM 0.12 MG/1
125 TABLET ORAL
Qty: 90 TABLET | Refills: 1 | Status: SHIPPED | OUTPATIENT
Start: 2021-11-22

## 2021-11-22 NOTE — TELEPHONE ENCOUNTER
Refill passed per [facility], [facility] protocol.   Requested Prescriptions   Pending Prescriptions Disp Refills    CALCITRIOL 0.25 MCG Oral Cap [Pharmacy Med Name: CALCITRIOL 0.25 MCG CAPSULE] 90 capsule 1     Sig: TAKE 1 CAPSULE BY MOUTH EVERY DAY        There

## 2021-11-22 NOTE — TELEPHONE ENCOUNTER
Please review. Protocol Failed / No Protocol.     Requested Prescriptions   Pending Prescriptions Disp Refills    CALCITRIOL 0.25 MCG Oral Cap [Pharmacy Med Name: CALCITRIOL 0.25 MCG CAPSULE] 90 capsule 1     Sig: TAKE 1 CAPSULE BY MOUTH EVERY DAY        T

## 2021-11-23 RX ORDER — CALCITRIOL 0.25 UG/1
0.25 CAPSULE, LIQUID FILLED ORAL DAILY
Qty: 90 CAPSULE | Refills: 1 | Status: SHIPPED | OUTPATIENT
Start: 2021-11-23

## 2021-12-02 ENCOUNTER — PATIENT MESSAGE (OUTPATIENT)
Dept: INTERNAL MEDICINE CLINIC | Facility: CLINIC | Age: 61
End: 2021-12-02

## 2021-12-02 ENCOUNTER — TELEPHONE (OUTPATIENT)
Dept: INTERNAL MEDICINE CLINIC | Facility: CLINIC | Age: 61
End: 2021-12-02

## 2021-12-02 RX ORDER — TOPIRAMATE 25 MG/1
TABLET ORAL
Qty: 180 TABLET | Refills: 1 | OUTPATIENT
Start: 2021-12-02

## 2021-12-03 NOTE — TELEPHONE ENCOUNTER
----- [facility] 12/2/2021  3:33 PM CST -----  Regarding: Throat problem  I had a problem last night swallowing a large vitamin. It got stuck in my throat and I couldn't dislodge it.  Now today it still feels like there's somethin

## 2021-12-03 NOTE — TELEPHONE ENCOUNTER
From: Kat Baez  To: Taiwo Espinal MD  Sent: 12/2/2021 3:33 PM CST  Subject: Throat problem    I had a problem last night swallowing a large vitamin. It got stuck in my throat and I couldn't dislodge it.  Now today it still feels like there's some

## 2021-12-03 NOTE — TELEPHONE ENCOUNTER
Pt issue has resolved. Pt can swallow, drink and breathing without any blockage.      Please reply to pool: IVETH Carter'

## 2021-12-18 ENCOUNTER — TELEPHONE (OUTPATIENT)
Dept: ALLERGY | Facility: CLINIC | Age: 61
End: 2021-12-18

## 2021-12-18 ENCOUNTER — NURSE ONLY (OUTPATIENT)
Dept: ALLERGY | Facility: CLINIC | Age: 61
End: 2021-12-18
Payer: COMMERCIAL

## 2021-12-18 DIAGNOSIS — J30.89 ENVIRONMENTAL AND SEASONAL ALLERGIES: ICD-10-CM

## 2021-12-18 PROCEDURE — 95117 IMMUNOTHERAPY INJECTIONS: CPT | Performed by: ALLERGY & IMMUNOLOGY

## 2021-12-18 NOTE — TELEPHONE ENCOUNTER
Consent for care everywhere signed for [facility] and [facility].   Sent to scan into this encounter.

## 2021-12-20 ENCOUNTER — ORDER TRANSCRIPTION (OUTPATIENT)
Dept: PHYSICAL THERAPY | Facility: HOSPITAL | Age: 61
End: 2021-12-20

## 2021-12-20 DIAGNOSIS — G20 IDIOPATHIC PARKINSON'S DISEASE (HCC): Primary | ICD-10-CM

## 2022-01-08 ENCOUNTER — NURSE ONLY (OUTPATIENT)
Dept: ALLERGY | Facility: CLINIC | Age: 62
End: 2022-01-08
Payer: COMMERCIAL

## 2022-01-08 DIAGNOSIS — J30.89 ENVIRONMENTAL AND SEASONAL ALLERGIES: ICD-10-CM

## 2022-01-08 PROCEDURE — 95165 ANTIGEN THERAPY SERVICES: CPT | Performed by: ALLERGY & IMMUNOLOGY

## 2022-01-08 PROCEDURE — 95117 IMMUNOTHERAPY INJECTIONS: CPT | Performed by: ALLERGY & IMMUNOLOGY

## 2022-01-11 ENCOUNTER — TELEPHONE (OUTPATIENT)
Dept: PHYSICAL THERAPY | Facility: HOSPITAL | Age: 62
End: 2022-01-11

## 2022-01-12 ENCOUNTER — OFFICE VISIT (OUTPATIENT)
Dept: PHYSICAL THERAPY | Facility: HOSPITAL | Age: 62
End: 2022-01-12
Attending: Other
Payer: COMMERCIAL

## 2022-01-12 PROCEDURE — 97162 PT EVAL MOD COMPLEX 30 MIN: CPT

## 2022-01-12 PROCEDURE — 97110 THERAPEUTIC EXERCISES: CPT

## 2022-01-12 NOTE — PROGRESS NOTES
NEUROLOGICAL PHYSICAL THERAPY EVALUATION:   Referring Physician: Dr. Josefa Sainz  Diagnosis: Parkinson's Disease, difficulty walking, imbalance      Date of Onset: per HPI Date of Service: 1/12/2022     PATIENT SUMMARY   Severo Kub is a 62year old y/o f Diagnosis Date   • Acute torn meniscus of knee 2010    ARTHROSCOPY OF KNEE   • Amenorrhea 4/2016   • Asthma    • Back problem    • Chicken pox    • Chronic cough    • Disorder of thyroid    • Extrapyramidal syndrome 2013    MEDICATION MGMT.  W/ PARKINSONS pleasant 61 y/o; NAD. Resting tremor. Presents with rollator.    Sensation: light touch sensation grossly intact BUE and BLE with exception of incisional site at R ankle (tingling with light touch)       Strength:   Strength (-/5)    R L   Hip       Flexion HEP  2. Pt will improve BBS by at least 5 points to reflect reduced risk of falls in home. 3. Pt will improve ankle EVR strength on the right to 3/5 in order to improve ankle stability during gait.    4. Pt will demonstrate upright standing posture with C

## 2022-01-12 NOTE — PATIENT INSTRUCTIONS
Do these exercises everyday    1. Sit in chair. Move right foot up and out to side at ankle. Hold for 5 seconds, then relax. Do this 10 times. 2. Sit in chair. Move right foot down and out to side at ankle. Hold 5 seconds, then relax. Do this 10 times.

## 2022-01-17 ENCOUNTER — OFFICE VISIT (OUTPATIENT)
Dept: PHYSICAL THERAPY | Facility: HOSPITAL | Age: 62
End: 2022-01-17
Attending: Other
Payer: COMMERCIAL

## 2022-01-17 DIAGNOSIS — G20 IDIOPATHIC PARKINSON'S DISEASE (HCC): ICD-10-CM

## 2022-01-17 PROCEDURE — 97140 MANUAL THERAPY 1/> REGIONS: CPT

## 2022-01-17 PROCEDURE — 97110 THERAPEUTIC EXERCISES: CPT

## 2022-01-17 NOTE — PROGRESS NOTES
Diagnosis: Parkinson's Disease, difficulty walking, imbalance   Visit (# authorized):   12 per POC ([employer] PPO)                      Referring Physician: Addie Palomo    Precautions: at risk for falls     Medication changes since last visit?:  No    Subjective: Pt Treatment Time: 40 min

## 2022-01-19 ENCOUNTER — OFFICE VISIT (OUTPATIENT)
Dept: SURGERY | Facility: CLINIC | Age: 62
End: 2022-01-19
Payer: COMMERCIAL

## 2022-01-19 ENCOUNTER — OFFICE VISIT (OUTPATIENT)
Dept: PHYSICAL THERAPY | Facility: HOSPITAL | Age: 62
End: 2022-01-19
Attending: Other
Payer: COMMERCIAL

## 2022-01-19 VITALS
BODY MASS INDEX: 36.7 KG/M2 | DIASTOLIC BLOOD PRESSURE: 63 MMHG | HEART RATE: 88 BPM | HEIGHT: 69 IN | SYSTOLIC BLOOD PRESSURE: 117 MMHG | OXYGEN SATURATION: 100 % | WEIGHT: 247.81 LBS

## 2022-01-19 DIAGNOSIS — Z98.84 S/P LAPAROSCOPIC SLEEVE GASTRECTOMY: ICD-10-CM

## 2022-01-19 DIAGNOSIS — E66.9 OBESITY (BMI 30-39.9): ICD-10-CM

## 2022-01-19 DIAGNOSIS — Z51.81 ENCOUNTER FOR THERAPEUTIC DRUG MONITORING: ICD-10-CM

## 2022-01-19 DIAGNOSIS — R63.2 INCREASED APPETITE: Primary | ICD-10-CM

## 2022-01-19 DIAGNOSIS — K59.01 SLOW TRANSIT CONSTIPATION: ICD-10-CM

## 2022-01-19 PROBLEM — G47.33 OSA (OBSTRUCTIVE SLEEP APNEA): Chronic | Status: RESOLVED | Noted: 2019-12-02 | Resolved: 2022-01-19

## 2022-01-19 PROCEDURE — 99214 OFFICE O/P EST MOD 30 MIN: CPT | Performed by: INTERNAL MEDICINE

## 2022-01-19 PROCEDURE — 3078F DIAST BP <80 MM HG: CPT | Performed by: INTERNAL MEDICINE

## 2022-01-19 PROCEDURE — 3008F BODY MASS INDEX DOCD: CPT | Performed by: INTERNAL MEDICINE

## 2022-01-19 PROCEDURE — 3074F SYST BP LT 130 MM HG: CPT | Performed by: INTERNAL MEDICINE

## 2022-01-19 PROCEDURE — 97140 MANUAL THERAPY 1/> REGIONS: CPT

## 2022-01-19 PROCEDURE — 97110 THERAPEUTIC EXERCISES: CPT

## 2022-01-19 RX ORDER — LINACLOTIDE 145 UG/1
1 CAPSULE, GELATIN COATED ORAL DAILY
Qty: 30 CAPSULE | Refills: 2 | Status: SHIPPED | OUTPATIENT
Start: 2022-01-19 | End: 2022-02-18

## 2022-01-19 RX ORDER — PHENTERMINE HYDROCHLORIDE 37.5 MG/1
TABLET ORAL
Qty: 30 TABLET | Refills: 2 | Status: SHIPPED | OUTPATIENT
Start: 2022-01-19

## 2022-01-19 RX ORDER — PHENTERMINE HYDROCHLORIDE 37.5 MG/1
TABLET ORAL
Qty: 30 TABLET | Refills: 2 | Status: SHIPPED | OUTPATIENT
Start: 2022-01-19 | End: 2022-01-19

## 2022-01-19 NOTE — PROGRESS NOTES
[address] [place] [address]  Dept: [phone]    Patient:  Levada Plan  :      1960  MRN:      [id number]    Referring Provider: Flakita Tsai, daily., Disp: , Rfl:   •  MONTELUKAST 10 MG Oral Tab, TAKE 1 TABLET BY MOUTH EVERY DAY AT NIGHT, Disp: 90 tablet, Rfl: 1  •  pravastatin 20 MG Oral Tab, Take 1 tablet (20 mg total) by mouth nightly., Disp: 90 tablet, Rfl: 1  •  omega-3 fatty acids 1000 MG carbidopa-levodopa (SINEMET)  MG Oral Tab, Take 1 tablet by mouth 4 (four) times daily.  1  in am and 3/4 11AM and 3/4 afternoon and 1 evening , Disp: , Rfl: 0    Allergies:  Penicillins and Seasonal     Social History:  Social History    Tobacco Use kg/m²   General appearance: alert, appears stated age, cooperative and morbidly obese  Head: Normocephalic, without obvious abnormality, atraumatic  Eyes: conjunctivae/corneas clear. PERRL, EOM's intact. Fundi benign.   Neck: no adenopathy, no carotid bruit  (H) 11/13/2021 09:12 AM    HDL 74 (H) 11/13/2021 09:12 AM    TRIG 81 11/13/2021 09:12 AM    VLDL 14 11/13/2021 09:12 AM       Lomaira: BID  Will switch to Phentermine 37.5 mg (half tablet)    Has constipation: will start [address]

## 2022-01-19 NOTE — PROGRESS NOTES
Diagnosis: Parkinson's Disease, difficulty walking, imbalance   Visit (# authorized):   12 per POC ([employer] PPO)                      Referring Physician: Luis Angel Sotomayor    Precautions: at risk for falls     Medication changes since last visit?:  No    Subjective: No

## 2022-01-24 ENCOUNTER — OFFICE VISIT (OUTPATIENT)
Dept: PHYSICAL THERAPY | Facility: HOSPITAL | Age: 62
End: 2022-01-24
Attending: Other
Payer: COMMERCIAL

## 2022-01-24 DIAGNOSIS — G20 IDIOPATHIC PARKINSON'S DISEASE (HCC): ICD-10-CM

## 2022-01-24 PROCEDURE — 97110 THERAPEUTIC EXERCISES: CPT

## 2022-01-24 PROCEDURE — 97140 MANUAL THERAPY 1/> REGIONS: CPT

## 2022-01-25 NOTE — PROGRESS NOTES
Diagnosis: Parkinson's Disease, difficulty walking, imbalance   Visit (# authorized):   12 per POC ([employer] PPO)                      Referring Physician: Yudy Betts    Precautions: at risk for falls     Medication changes since last visit?:  No    Subjective: Fel

## 2022-01-26 ENCOUNTER — OFFICE VISIT (OUTPATIENT)
Dept: PHYSICAL THERAPY | Facility: HOSPITAL | Age: 62
End: 2022-01-26
Attending: Other
Payer: COMMERCIAL

## 2022-01-26 DIAGNOSIS — G20 IDIOPATHIC PARKINSON'S DISEASE (HCC): ICD-10-CM

## 2022-01-26 PROCEDURE — 97112 NEUROMUSCULAR REEDUCATION: CPT

## 2022-01-27 NOTE — PROGRESS NOTES
Diagnosis: Parkinson's Disease, difficulty walking, imbalance   Visit (# authorized):   12 per POC ([employer] PPO)                      Referring Physician: Adriel Perkins    Precautions: at risk for falls     Medication changes since last visit?:  No    Subjective: Had

## 2022-01-31 ENCOUNTER — OFFICE VISIT (OUTPATIENT)
Dept: PHYSICAL THERAPY | Facility: HOSPITAL | Age: 62
End: 2022-01-31
Attending: Other
Payer: COMMERCIAL

## 2022-01-31 DIAGNOSIS — G20 IDIOPATHIC PARKINSON'S DISEASE (HCC): ICD-10-CM

## 2022-01-31 PROCEDURE — 97140 MANUAL THERAPY 1/> REGIONS: CPT

## 2022-01-31 PROCEDURE — 97110 THERAPEUTIC EXERCISES: CPT

## 2022-01-31 NOTE — PROGRESS NOTES
Diagnosis: Parkinson's Disease, difficulty walking, imbalance   Visit (# authorized):   12 per POC ([employer] PPO)                      Referring Physician: Kary Brown    Precautions: at risk for falls     Medication changes since last visit?:  No    Subjective:  Bismark Sims

## 2022-02-02 ENCOUNTER — TELEPHONE (OUTPATIENT)
Dept: INTERNAL MEDICINE CLINIC | Facility: CLINIC | Age: 62
End: 2022-02-02

## 2022-02-02 ENCOUNTER — APPOINTMENT (OUTPATIENT)
Dept: PHYSICAL THERAPY | Facility: HOSPITAL | Age: 62
End: 2022-02-02
Attending: Other
Payer: COMMERCIAL

## 2022-02-02 NOTE — TELEPHONE ENCOUNTER
Spoke with pt and reminded to schedule follow-up appointment with surgeon. Pt thankful for reminder.

## 2022-02-05 ENCOUNTER — NURSE ONLY (OUTPATIENT)
Dept: ALLERGY | Facility: CLINIC | Age: 62
End: 2022-02-05
Payer: COMMERCIAL

## 2022-02-05 DIAGNOSIS — J30.89 ENVIRONMENTAL AND SEASONAL ALLERGIES: ICD-10-CM

## 2022-02-05 PROCEDURE — 95117 IMMUNOTHERAPY INJECTIONS: CPT | Performed by: ALLERGY & IMMUNOLOGY

## 2022-02-07 ENCOUNTER — OFFICE VISIT (OUTPATIENT)
Dept: PHYSICAL THERAPY | Facility: HOSPITAL | Age: 62
End: 2022-02-07
Attending: Other
Payer: COMMERCIAL

## 2022-02-07 DIAGNOSIS — G20 IDIOPATHIC PARKINSON'S DISEASE (HCC): ICD-10-CM

## 2022-02-07 PROCEDURE — 97110 THERAPEUTIC EXERCISES: CPT

## 2022-02-07 PROCEDURE — 97140 MANUAL THERAPY 1/> REGIONS: CPT

## 2022-02-08 ENCOUNTER — TELEPHONE (OUTPATIENT)
Dept: PHYSICAL THERAPY | Facility: HOSPITAL | Age: 62
End: 2022-02-08

## 2022-02-08 NOTE — PROGRESS NOTES
Diagnosis: Parkinson's Disease, difficulty walking, imbalance   Visit (# authorized):   12 per POC ([employer] PPO)                      Referring Physician: Mat Skelton    Precautions: at risk for falls     Medication changes since last visit?:  No    Subjective: No new information to report. Objective:    Date  1/17 1/19 1/24 1/26 1/31 2/7     Visit Number  2 3 4 5 6 7     NMR    x       Ther EX x x x  x x     Ther Act           Gait Training           CRM            Manual x x x  x x       Additional Treatment Information:    Therapeutic Exercise (30 mins):   Nu-step level 4 x10 mins  R foot on dynadisc:     DF<>PF x30   INV<>EVR  x30    CW and CCW circles x10 ea     ABC's R foot/ankle   Seated hip ABD isometrics against belt 3-5 sec x15      Manual therapy (8 mins):   R ankle joint mobilizations    General distraction gr III-IV   R calcaneal mobilizations - figure 8's, medial   R fibular mobilizations (prox and distal)   *all mobs followed by PROM     Patient Education:  Encouraged continued performance of HEP. Assessment:  Tolerated session well without adverse response. Continued improvement in accessory motion of foot/ankle with manual intervention.      Plan: in future sessions:continue standing and sitting balance, wt shifts/step outs, foam?     Charges: 2 TE, 1 manual             Total Timed Treatment: 38 min  Total Treatment Time: 40 min

## 2022-02-09 ENCOUNTER — OFFICE VISIT (OUTPATIENT)
Dept: PHYSICAL THERAPY | Facility: HOSPITAL | Age: 62
End: 2022-02-09
Attending: Other
Payer: COMMERCIAL

## 2022-02-09 DIAGNOSIS — G20 IDIOPATHIC PARKINSON'S DISEASE (HCC): ICD-10-CM

## 2022-02-09 PROCEDURE — 97112 NEUROMUSCULAR REEDUCATION: CPT

## 2022-02-09 NOTE — PROGRESS NOTES
Diagnosis: Parkinson's Disease, difficulty walking, imbalance   Visit (# authorized):   12 per POC ([employer] PPO)                      Referring Physician: Barbara Peers    Precautions: at risk for falls     Medication changes since last visit?:  No    Subjective: No new information to report. Objective:    Date  1/17 1/19 1/24 1/26 1/31 2/7 2/9    Visit Number  2 3 4 5 6 7 8    NMR    x   x    Ther EX x x x  x x     Ther Act           Gait Training           CRM            Manual x x x  x x       Additional Treatment Information:    NMR (38 mins):   Standing hips against bar (Ant wt shift) to reduce retropulsion alt UE lifts focus on maintaining hips on bar  - discussed incorporating at home such as when brushing teeth or in kitchen- rationale for exercise explained. Seated EOB with upper trunk rotation and lg amp reach across midline  Dual motor sit<>stand with UE movement 3#, 2x10  From EOB    In // bars 1 UE support   Resisted lg amp step green TB x15 B    Fwd step to target with metronome 55 bpm  Seated ant wt shift with fly 3# x10-->standing ant wt shift with fly 3# x10   Standing unsupported PNF lifts with trunk rotation 2x5 B 3#  Standing unsupported on thin foam engaged in UE task -2 rest breaks - VC's cues for proper posture/body position     Assessment:  Tolerated session well without adverse response. Fatigues with standing tasks requiring rest. Knee flexion in standing increases with increased time/fatigue. Postural stability with seated tasks appears improved.      Plan: in future sessions:continue standing and sitting balance, backwards step out, continue thin foam    Charges: 3 NMR            Total Timed Treatment: 38 min  Total Treatment Time: 42 min

## 2022-02-14 ENCOUNTER — OFFICE VISIT (OUTPATIENT)
Dept: PHYSICAL THERAPY | Facility: HOSPITAL | Age: 62
End: 2022-02-14
Attending: Other
Payer: COMMERCIAL

## 2022-02-14 DIAGNOSIS — G20 IDIOPATHIC PARKINSON'S DISEASE (HCC): ICD-10-CM

## 2022-02-14 PROCEDURE — 97110 THERAPEUTIC EXERCISES: CPT

## 2022-02-14 RX ORDER — METHOTREXATE 2.5 MG/1
TABLET ORAL
Qty: 96 TABLET | Refills: 1 | Status: SHIPPED | OUTPATIENT
Start: 2022-02-14

## 2022-02-14 NOTE — TELEPHONE ENCOUNTER
Last filled: 9/13/21 #96 tab with 1 refill   LOV:  9/22/21  Future Appointments   Date Time Provider Joel Mann   3/23/2022  8:40 AM Eddie Ventura MD [facility]

## 2022-02-15 NOTE — PROGRESS NOTES
Diagnosis: Parkinson's Disease, difficulty walking, imbalance   Visit (# authorized):   12 per POC ([employer] PPO)                      Referring Physician: Boom Epstein    Precautions: at risk for falls     Medication changes since last visit?:  No    Subjective: No new information to report. Objective:    Date  1/17 1/19 1/24 1/26 1/31 2/7 2/9 2/14   Visit Number  2 3 4 5 6 7 8 9   NMR    x   x    Ther EX x x x  x x  x   Ther Act           Gait Training           CRM            Manual x x x  x x       Additional Treatment Information:    Therapeutic Exercise (30 mins):   Nu-step level 5 x10 mins  Shuttle -wobble disc   B press 2x10 (2 blue, silver)    SL press 2 x15 B (blue, yellow)   EVR R only yellow TB 3x10   PF seated Green TB x15 B   Ankle DF blue band x15     Patient education: HEP updated and issued-see pt instructions. Assessment:  Tolerated session well without adverse response. Appears more fatigued today. Pt with improving R ankle strength and NM control per observation.       Plan: in future sessions:continue standing and sitting balance, backwards step out, continue thin foam    Charges: 3 TE            Total Timed Treatment: 38 min  Total Treatment Time: 38 min

## 2022-02-15 NOTE — PATIENT INSTRUCTIONS
Do these exercises everyday    1. Sit in chair. Loop yellow band around both feet near ball of foot. Move right out to side at ankle. Do this 10 times, twice. 2.  Sit in chair. Loop green band around bottom of right foot near ball of foot. Point foot down like pushing a gas pedal then bring foot back up. Control the movement in both directions. Also try to keep foot straight up and down (not curved in or out). 3. Stand with walker in front and bed behind you. Start by holding on with both hands. Turn head and shoulders to look behind you letting go with the hand in the direction you are looking. Return to the center and pause again holding on with both hands. Repeat to the other direction. Do this until you have done this 5 times each way. 4. Sit on edge of bed with arms crossed, feet firmly on floor. Turn head and shoulders to the right, looking over your shoulder. Return to the center, pause and repeat to the left. Do 10 times each direction. 5. Sit on edge of bed with arms crossed and feet firmly on the floor. Sit with upright posture. Close your eyes and balance for 30 seconds. Correct yourself if you feel like you are leaning in any direction. 6. Stand at countertop and lean slightly forward so belly or hips are touching the countertop. Alternate lifting one hand at a time until you have done 15 on each arm. Make sure as you lift, you are not leaning away from the counter. For added practice consider being in this position (belly against sink) when brushing your teeth.

## 2022-02-16 ENCOUNTER — OFFICE VISIT (OUTPATIENT)
Dept: PHYSICAL THERAPY | Facility: HOSPITAL | Age: 62
End: 2022-02-16
Attending: Other
Payer: COMMERCIAL

## 2022-02-16 DIAGNOSIS — G20 IDIOPATHIC PARKINSON'S DISEASE (HCC): ICD-10-CM

## 2022-02-16 PROCEDURE — 97112 NEUROMUSCULAR REEDUCATION: CPT

## 2022-02-16 NOTE — PROGRESS NOTES
Diagnosis: Parkinson's Disease, difficulty walking, imbalance   Visit (# authorized):   12 per POC ([employer] PPO)                      Referring Physician: Boy Lambert    Precautions: at risk for falls     Medication changes since last visit?:  No    Subjective: Is wearing AFO again- has bandaged foot to protect skin from further irritation. Objective:    Date  1/17 1/19 1/24 1/26 1/31 2/7 2/9 2/14 2/16   Visit Number  2 3 4 5 6 7 8 9 10   NMR    x   x  x   Ther EX x x x  x x  x    Ther Act            Gait Training            CRM             Manual x x x  x x        Additional Treatment Information:    NMR (39 mins):   In // bars: BUE support   lg Fwd step to target resisted swing green TB 2x15 B   lg Side step over wide obstacle 2x10 B   Standing on thin foam    Pt selected RODY - upper trunk rotation x5 B    Dual motor sit<>stand with UE movement 3#, 2x10  From EOB    Seated EOB    Feet on floor PNF lifts with trunk rotation 4# x10 B -->feet off of floor x10 B    Feet off of floor   Foam    Pt selected RODY EO 30 seconds   Romberg EO 30 seconds   Tap ups 4 in step x10 B -SBA   Standing anterior wt shift - standing ant wt shift with fly 3# 2x10   Standing wide RODY: chest pass rebounder 7# ball x15 -SBA    Patient education: Encouraged periodic skin checks with AFO use to limit risk of developing wound. Assessment:  Tolerated session well without adverse response. Postural stability appears much improved. Doing well overall.      Plan: update HEP as appropriate next session in future sessions:continue standing and sitting balance, backwards step out, continue thin foam    Charges: 3 NMR            Total Timed Treatment: 39 min  Total Treatment Time: 39 min

## 2022-02-21 RX ORDER — MONTELUKAST SODIUM 10 MG/1
TABLET ORAL
Qty: 90 TABLET | Refills: 0 | Status: SHIPPED | OUTPATIENT
Start: 2022-02-21

## 2022-02-22 NOTE — TELEPHONE ENCOUNTER
Med Name Advair Diskus 100-50 1 puff twice daily    Date of last office visit 7/17/21    Return to office date 3/14/22    Date med last refilled 3/20/21    Dose reviewed from [address] [place] [address]. Routing to MD due to Very High drug interaction with rasagline mesylate 1mg (active Rx reviewed from [address] on file).

## 2022-02-23 ENCOUNTER — LAB ENCOUNTER (OUTPATIENT)
Dept: LAB | Facility: HOSPITAL | Age: 62
End: 2022-02-23
Attending: INTERNAL MEDICINE
Payer: COMMERCIAL

## 2022-02-23 ENCOUNTER — OFFICE VISIT (OUTPATIENT)
Dept: PHYSICAL THERAPY | Facility: HOSPITAL | Age: 62
End: 2022-02-23
Attending: Other
Payer: COMMERCIAL

## 2022-02-23 DIAGNOSIS — M06.9 RHEUMATOID ARTHRITIS, INVOLVING UNSPECIFIED SITE, UNSPECIFIED WHETHER RHEUMATOID FACTOR PRESENT (HCC): ICD-10-CM

## 2022-02-23 DIAGNOSIS — Z51.81 THERAPEUTIC DRUG MONITORING: ICD-10-CM

## 2022-02-23 DIAGNOSIS — G20 IDIOPATHIC PARKINSON'S DISEASE (HCC): ICD-10-CM

## 2022-02-23 LAB
ALT SERPL-CCNC: 9 U/L
AST SERPL-CCNC: 21 U/L (ref 15–37)
CREAT BLD-MCNC: 1.18 MG/DL
CRP SERPL-MCNC: <0.29 MG/DL (ref ?–0.3)
DEPRECATED RDW RBC AUTO: 55.1 FL (ref 35.1–46.3)
ERYTHROCYTE [DISTWIDTH] IN BLOOD BY AUTOMATED COUNT: 14.8 % (ref 11–15)
ERYTHROCYTE [SEDIMENTATION RATE] IN BLOOD: 22 MM/HR
HCT VFR BLD AUTO: 41.6 %
HGB BLD-MCNC: 13.4 G/DL
MCH RBC QN AUTO: 32.3 PG (ref 26–34)
MCHC RBC AUTO-ENTMCNC: 32.2 G/DL (ref 31–37)
MCV RBC AUTO: 100.2 FL
PLATELET # BLD AUTO: 232 10(3)UL (ref 150–450)
RBC # BLD AUTO: 4.15 X10(6)UL
WBC # BLD AUTO: 5.2 X10(3) UL (ref 4–11)

## 2022-02-23 PROCEDURE — 82565 ASSAY OF CREATININE: CPT

## 2022-02-23 PROCEDURE — 97110 THERAPEUTIC EXERCISES: CPT

## 2022-02-23 PROCEDURE — 97112 NEUROMUSCULAR REEDUCATION: CPT

## 2022-02-23 PROCEDURE — 85027 COMPLETE CBC AUTOMATED: CPT

## 2022-02-23 PROCEDURE — 36415 COLL VENOUS BLD VENIPUNCTURE: CPT

## 2022-02-23 PROCEDURE — 85652 RBC SED RATE AUTOMATED: CPT

## 2022-02-23 PROCEDURE — 84460 ALANINE AMINO (ALT) (SGPT): CPT

## 2022-02-23 PROCEDURE — 86140 C-REACTIVE PROTEIN: CPT

## 2022-02-23 PROCEDURE — 84450 TRANSFERASE (AST) (SGOT): CPT

## 2022-02-23 NOTE — PATIENT INSTRUCTIONS
Do these exercises everyday    1. Sit in chair. Loop yellow band around both feet near ball of foot. Move right out to side at ankle. Do this 10 times, twice. 2.  Sit in chair. Loop green band around bottom of right foot near ball of foot. Point foot down like pushing a gas pedal then bring foot back up. Control the movement in both directions. Also try to keep foot straight up and down (not curved in or out). 3. Stand with walker in front and bed behind you (backs of legs should not touch the bed). Lift one arm and turn to look at hand. Lower arm and then repeat with the other hand. Do this 10 times with each arm. 4. Stand with walker in front and bed behind you (backs of legs should not touch the bed). Place feet together so they are touching. Balance with eyes open for 15 seconds, return hands to walker and repeat for an addition 15 seconds. 5. Stand with walker in front and bed behind you. Backs of legs should not touch the bed. Cross arms and balance with eyes closed for 30 seconds. 6.  Stand at countertop and hold on with one hand. With the leg further away from counter take a step forward then back to the starting position. Do this 10 times. Turn and hold on with the other hand, making steps with the other leg 10 times.
lives c daughter  NO smoking  no ETOH

## 2022-02-23 NOTE — PROGRESS NOTES
Diagnosis: Parkinson's Disease, difficulty walking, imbalance   Visit (# authorized):   12 per POC ([employer] PPO)                      Referring Physician: Paula Serna    Precautions: at risk for falls     Medication changes since last visit?:  No    Subjective: No new information to report. Objective:    Date  1/17 1/19 1/24 1/26 1/31 2/7 2/9 2/14 2/16 2/23      Visit Number  2 3 4 5 6 7 8 9 10 11       NMR    x   x  x x      Ther EX x x x  x x  x  x      Ther Act                Gait Training                CRM                 Manual x x x  x x            Additional Treatment Information:    Therapeutic Exercise (8 mins):   Nu-step level 4x8 mins     NMR (30 mins):   Seated EC 30 sec -->standing pt selected RODY EC 30 seconds   Upper trunk rotation unsupported standing x5 B  Standing unsupported- pt selected RODY- alt UE lifts, turn to look at hand x10 B   Romberg EO walker in front, arms at sides 15 sec x2 bouts   1 UE support lg amp fwd step x10 B   Dual motor sit<>stand with UE movement 5#, 2x10  From EOB    Standing unsupported punch alt with trunk rotation 2# x45 sec   Standing unsupported BUE shoulder flex OH with upright posture x5    Patient education: HEP updated and issued- see pt instructions. Assessment:  Postural stability clearly improved in standing. Moshe's understanding of progressed exercises.       Plan: reassess     Charges: 2 NMR, 1 TE            Total Timed Treatment: 38 min  Total Treatment Time: 45 min

## 2022-02-28 ENCOUNTER — OFFICE VISIT (OUTPATIENT)
Dept: PHYSICAL THERAPY | Facility: HOSPITAL | Age: 62
End: 2022-02-28
Attending: Other
Payer: COMMERCIAL

## 2022-02-28 DIAGNOSIS — G20 IDIOPATHIC PARKINSON'S DISEASE (HCC): ICD-10-CM

## 2022-02-28 PROCEDURE — 97112 NEUROMUSCULAR REEDUCATION: CPT

## 2022-02-28 PROCEDURE — 97110 THERAPEUTIC EXERCISES: CPT

## 2022-02-28 NOTE — PROGRESS NOTES
Progress Summary    Referring Physician: Denisha Mae    Diagnosis: Parkinson's Disease, difficulty walking, imbalance      Pt has attended 12 visits in Physical Therapy. Subjective: Pt reports improvement in balance since starting PT but does continue to feel off balance when performing unsupported sitting ADL's in bathroom (posterior LOB). No falls since she has started. Feels as if her R ankle has improved but foot continues turn inward resulting in difficulty but does feel as if she is more comfortable using AFO. Notes shuffling and FoG which she would also like to continue to work on. FoG more pronounced through more narrow walkways and doorways. Lost HEP paperwork provided last session therefore has not tried new exercises provided last time. Assessment: Pt with significant improvement in BBS and is no longer considered at risk of falls based on this measure. Pt demonstrates more stability in static standing and sitting with less LOB particularly in posterior direction. However pt's standing posture predisposes patient to posterior LOB as COM is posterior to RDOY, not centered. This will be an area of concentration moving forward in both sitting and standing as pt continues to report posterior LOB particularly with sitting tasks. Improvement in TUG is also noted. Her gait speed is below what is considered normal for community ambulators posing safety concerns. Of note her gait pattern looked quite good in the clinic today with reduced shuffle and festination. The level of gait impairment does fluctuate significantly session to session however and shuffling gait continues to be an area of concern. Goals have been updated to reflect progress. Velma Munoz would benefit from continued skilled PT intervention in order to further progress gait and balance to reduce risk of future falls and maintain current level of independence.       Objective:   *values from last testing documented in parenthesis below    Strength: Strength (-/5)    R L   Hip     Flexion 4+ (4) 4 (3-)   Ankle     EVR 2 (1+) 4   Fibularis longus (PF/EVR) Trace (unchanged) NT   Fibularis tertius (DF/EVR) 2- (1+) NT       Postural Control:  Patel Balance Scale     Item Description Score (0 worst-4 best)    ___4___1. Sitting to standing  ___4___2. Standing unsupported   ___4___3. Sitting unsupported   ___4___4. Standing to sitting   ___4___5. Transfers   ___4___6. Standing with eyes closed   ___4___7. Standing with feet together   ___4___8. Reaching forward with outstretched arm   ___4___9. Retrieving object from floor   ___4__10. Turning to look behind   ___3__11. Turning 360 degrees   ___2__12. Placing alternate foot on stool   ___3__13. Standing with one foot in front   ___1__14. Standing on one foot     Total __49 (37)___/56 (less than 46/56 = fall risk)      Gait speed: SSWS: 0.77 m/s rollator   FWS: 1.32 m/s rollator     Timed Up and Go (AD, time): 12.8 seconds rollator (14.9 seconds rollator)    TUGco.5 seconds, no counting errors     Seated EOB feet off of floor- unsupported EO -mimic engagement in UE ADL's (brushing teeth etc)   Seated EOB on dynadisc -feet off floor- unsupported EO- for max hold       Goals:   Goals to be met within POC or 90 days:  1. Pt to be independent in HEP goal is ongoing   2. Pt will improve BBS by at least 5 points to reflect reduced risk of falls in home. Goal met   3. Pt will improve ankle EVR strength on the right to 3/5 in order to improve ankle stability during gait. In progress, progress made   4. Pt will demonstrate upright standing posture with COM over RODY for at least 2 minutes in order to reduce risk of LOB posteriorly. In progress, progress made   New goal () Pt will improve quality of walking with reduced shuffle/festination of gait in order to improve SSWS to >0.8 m/s to refect improved safety with functional, community ambulation. New goal ():  Pt will improve dynamic seated balance in order to perform ADL's without posterior LOB. Charges: 1 TE, 2 NMR     Total timed treatment: 40 mins    Total treatment time: 40 mins       Rehab Potential: good    Plan: Continue skilled Physical Therapy 2 x/week or a total of 10 visits over a 90 day period. Treatment will include: Balance Training, Gait Training,  Therapeutic Exercises; Neuromuscular Re-education; Therapeutic Activity; Patient education; Home exercise program instruction; Manual Therapy. Patient was advised of these findings, precautions, and treatment options and has agreed to actively participate in planning and for this course of care. Thank you for your referral. If you have any questions, please contact me at Dept: [phone]. Sincerely,    Obie [place] PT, NCS    Electronically signed by therapist: Obie [place] PT    Physician's certification required: Yes  Please co-sign or sign and return this letter via fax as soon as possible to [phone]. I certify the need for these services furnished under this plan of treatment and while under my care.     X___________________________________________________ Date____________________    Certification From: 5/12/4390  To:5/29/2022

## 2022-03-02 ENCOUNTER — OFFICE VISIT (OUTPATIENT)
Dept: PHYSICAL THERAPY | Facility: HOSPITAL | Age: 62
End: 2022-03-02
Attending: Other
Payer: COMMERCIAL

## 2022-03-02 DIAGNOSIS — G20 IDIOPATHIC PARKINSON'S DISEASE (HCC): ICD-10-CM

## 2022-03-02 PROCEDURE — 97110 THERAPEUTIC EXERCISES: CPT

## 2022-03-02 PROCEDURE — 97112 NEUROMUSCULAR REEDUCATION: CPT

## 2022-03-02 PROCEDURE — 97140 MANUAL THERAPY 1/> REGIONS: CPT

## 2022-03-02 NOTE — PROGRESS NOTES
Diagnosis: Parkinson's Disease, difficulty walking, imbalance   Visit (# authorized):   22 per POC ([employer] PPO)                      Referring Physician: Josefa Sainz    Precautions: at risk for falls     Medication changes since last visit?:  No    Subjective: No new information to report. Objective:    Date  1/17 1/19 1/24 1/26 1/31 2/7 2/9 2/14 2/16 2/23 2/28 3/2    Visit Number  2 3 4 5 6 7 8 9 10 11  12 (PN) 13    NMR    x   x  x x x x    Ther EX x x x  x x  x  x x x    Ther Act                Gait Training                CRM                 Manual x x x  x x      x      Additional Treatment Information:    NMR (20 mins):   Seated EOB on dynadisc - feet off floor, unsupported - engaged in UE reaching task   Seated EOB on foam beam to mimic edge of bathtub, feet on floor- unsupported    Rows green TB   B UE PNF   Engaged in UE reaching task    Fwd flex with cane assist to neutral,upright sitting x15- demo with return demo     Manual therapy (8 mins):   R ankle joint mobilizations    General distraction gr III-IV   R calcaneal mobilizations - figure 8's, medial   R fibular mobilizations (prox)   *all mobs followed by PROM    Therapeutic Exercise (12 mins):   R foot on dynadis:     DF<>PF x30   INV<>EVR  x30    CW and CCW circles x10 ea   Ankle EVR red band 2x10 -sec set performed B at same time   Ball between heels: seated B heel raise x15     Patient education: encouraged continued performance of HEP. Assessment:  Challenged by seated balance task simulating sitting on edge of tub.       Plan: in future sessions: turning in // bars with rubber bands, resisted swing, backwards step     Charges: 1 NMR, 1 TE, manual 1             Total Timed Treatment: 40 min  Total Treatment Time: 40 min

## 2022-03-07 ENCOUNTER — OFFICE VISIT (OUTPATIENT)
Dept: PHYSICAL THERAPY | Facility: HOSPITAL | Age: 62
End: 2022-03-07
Attending: Other
Payer: COMMERCIAL

## 2022-03-07 ENCOUNTER — NURSE ONLY (OUTPATIENT)
Dept: ALLERGY | Facility: CLINIC | Age: 62
End: 2022-03-07
Payer: COMMERCIAL

## 2022-03-07 DIAGNOSIS — J30.89 ENVIRONMENTAL AND SEASONAL ALLERGIES: ICD-10-CM

## 2022-03-07 PROCEDURE — 95117 IMMUNOTHERAPY INJECTIONS: CPT | Performed by: ALLERGY & IMMUNOLOGY

## 2022-03-07 PROCEDURE — 97110 THERAPEUTIC EXERCISES: CPT

## 2022-03-08 NOTE — PROGRESS NOTES
Diagnosis: Parkinson's Disease, difficulty walking, imbalance   Visit (# authorized):   22 per POC ([employer] PPO)                      Referring Physician: Yudy Betts    Precautions: at risk for falls     Medication changes since last visit?:  No    Subjective: No new information to report.      Objective:    Date  1/17 1/19 1/24 1/26 1/31 2/7 2/9 2/14 2/16 2/23 2/28 3/2 3/7   Visit Number  2 3 4 5 6 7 8 9 10 11  12 (PN) 13 14   NMR    x   x  x x x x    Ther EX x x x  x x  x  x x x x   Ther Act                Gait Training                CRM                 Manual x x x  x x      x      Additional Treatment Information:    Therapeutic Exercise (30 mins):   Nu-step level 4 x10 mins  Shuttle -   B press 2x15 (2 blue, silver, yellow)    SL press 2 x20 B (blue, silver)-wobble disc   Ball between heels: seated B heel raise x20   Active hold-relax stretch of inverters of R ankle 4 bouts   Slow reversals INV/EVR R ankle -multiple bouts   EVR R only yellow TB x20   R foot on dynadisc:     DF<>PF x30   INV<>EVR  x30    CW and CCW circles x10 ea  Hip ABD isometrics with belt 5 sec hold x20     Assessment:  Tolerated session well without adverse response      Plan: in future sessions: turning in // bars with rubber bands, resisted swing, backwards step     Charges: 3 TE           Total Timed Treatment: 38 min  Total Treatment Time: 39 min

## 2022-03-09 ENCOUNTER — OFFICE VISIT (OUTPATIENT)
Dept: PHYSICAL THERAPY | Facility: HOSPITAL | Age: 62
End: 2022-03-09
Attending: INTERNAL MEDICINE
Payer: COMMERCIAL

## 2022-03-09 PROCEDURE — 97112 NEUROMUSCULAR REEDUCATION: CPT

## 2022-03-10 NOTE — PROGRESS NOTES
Diagnosis: Parkinson's Disease, difficulty walking, imbalance   Visit (# authorized):   22 per POC ([employer] PPO)                      Referring Physician: No ref. provider found    Precautions: at risk for falls     Medication changes since last visit?:  No    Subjective: Sore after last session in BLE. Objective:    Date  1/31 2/7 2/9 2/14 2/16 2/23 2/28 3/2 3/7 3/9      Visit Number  6 7 8 9 10 11  12 (PN) 13 14 15      NMR   x  x x x x  x      Ther EX x x  x  x x x x       Ther Act                Gait Training                CRM                 Manual x x      x          Additional Treatment Information:    NMR (38 mins):    In // bars:    Fitness slider \"swing\" (lg amp)to target with opp UE movement x15 B, 1 UE support    Fitness slider hip ABD x15, lg amp BUE support x15 B  Seated EOB on foam beam to mimic edge of bathtub, feet on floor- unsupported   Seated cat<>cow x10   Lean backs <>sit upright x10    Engaged in UE reaching task   Seated on dynadisc feet on floor,unsupported   Wt shift/pelvic tilts R/L x20    Lg amp trunk rotation to place/ cones on mat (6) performed B   Seated on EOB: feet on floor unsupported A<>P pelvic tilts with pause in neutral x20 ea   Dual motor sit<>stand with coordinated UE movements 3#      Assessment:  Tolerated session well without adverse response but appears more fatigued today.      Plan: in future sessions: turning in // bars with rubber bands, resisted swing, backwards step     Charges: 3 NMR           Total Timed Treatment: 38 min  Total Treatment Time: 39 min

## 2022-03-14 ENCOUNTER — OFFICE VISIT (OUTPATIENT)
Dept: PHYSICAL THERAPY | Facility: HOSPITAL | Age: 62
End: 2022-03-14
Attending: Other
Payer: COMMERCIAL

## 2022-03-14 ENCOUNTER — OFFICE VISIT (OUTPATIENT)
Dept: ALLERGY | Facility: CLINIC | Age: 62
End: 2022-03-14
Payer: COMMERCIAL

## 2022-03-14 VITALS
DIASTOLIC BLOOD PRESSURE: 71 MMHG | BODY MASS INDEX: 35.55 KG/M2 | OXYGEN SATURATION: 100 % | SYSTOLIC BLOOD PRESSURE: 110 MMHG | HEIGHT: 69 IN | WEIGHT: 240 LBS | HEART RATE: 86 BPM

## 2022-03-14 DIAGNOSIS — J30.1 SEASONAL ALLERGIC RHINITIS DUE TO POLLEN: ICD-10-CM

## 2022-03-14 DIAGNOSIS — Z91.09 ALLERGY TO AMERICAN HOUSE DUST MITE: ICD-10-CM

## 2022-03-14 DIAGNOSIS — J30.89 ENVIRONMENTAL AND SEASONAL ALLERGIES: ICD-10-CM

## 2022-03-14 DIAGNOSIS — Z23 NEEDS FLU SHOT: ICD-10-CM

## 2022-03-14 DIAGNOSIS — E03.8 OTHER SPECIFIED HYPOTHYROIDISM: ICD-10-CM

## 2022-03-14 DIAGNOSIS — Z92.29 COVID-19 VACCINE SERIES COMPLETED: ICD-10-CM

## 2022-03-14 DIAGNOSIS — J45.40 ASTHMA, EXTRINSIC, MODERATE PERSISTENT, UNCOMPLICATED: Primary | ICD-10-CM

## 2022-03-14 PROCEDURE — 97112 NEUROMUSCULAR REEDUCATION: CPT

## 2022-03-14 PROCEDURE — 3074F SYST BP LT 130 MM HG: CPT | Performed by: ALLERGY & IMMUNOLOGY

## 2022-03-14 PROCEDURE — 97110 THERAPEUTIC EXERCISES: CPT

## 2022-03-14 PROCEDURE — 3078F DIAST BP <80 MM HG: CPT | Performed by: ALLERGY & IMMUNOLOGY

## 2022-03-14 PROCEDURE — 99214 OFFICE O/P EST MOD 30 MIN: CPT | Performed by: ALLERGY & IMMUNOLOGY

## 2022-03-14 PROCEDURE — 3008F BODY MASS INDEX DOCD: CPT | Performed by: ALLERGY & IMMUNOLOGY

## 2022-03-14 NOTE — PROGRESS NOTES
Diagnosis: Parkinson's Disease, difficulty walking, imbalance   Visit (# authorized):   22 per POC ([employer] PPO)                      Referring Physician: Boom Epstein    Precautions: at risk for falls     Medication changes since last visit?:  No    Subjective: No new information to report. Notes an overall improvement in balance. Objective:    Date  1/31 2/7 2/9 2/14 2/16 2/23 2/28 3/2 3/7 3/9 3/14     Visit Number  6 7 8 9 10 11  12 (PN) 13 14 15 16     NMR   x  x x x x  x x     Ther EX x x  x  x x x x  x     Ther Act                Gait Training                CRM                 Manual x x      x          Additional Treatment Information:    Therapeutic Exercise (10 mins):   Nu-step level 4 -10 mins     NMR (20 mins):  Hoolying:   Pelvic tilts A/P and R/L focus on \"feeling\" ea position  Seated EOB on dynadisc    A/P pelvic tilts    R/L pelvic tilts    CW and CCW pelvic \"clocks\" x10 ea   Seated EOB:    CW and CCW pelvic \"clocks\" x10 ea    Pelvic neutral maintenance    - B shoulder flex OH x15     - PNF lifts with trunk rotation x8 B      Assessment:  Tolerated session well without adverse response.      Plan: in future sessions:lean backs, turning in // bars with rubber bands, resisted swing, backwards step     Charges: 1 TE 1 NMR           Total Timed Treatment: 30 min  Total Treatment Time: 30 min
PAIN

## 2022-03-15 NOTE — PATIENT INSTRUCTIONS
#1 asthma  No ED visits or prednisone in the interim. Denies symptoms more than 2 days/week with current Advair and Singulair. Reviewed signs and symptoms of persistent asthma including the rules of 2  Albuterol 2 puffs every 4-6 hours if having active coughing wheezing shortness of breath  Continue Advair and Singulair.     #2 allergic rhinitis currently on maintenance dose immunotherapy to underlying environmental allergens including trees ragweed weeds dust mite cats and dogs  Patient also on Singulair, montelukast.    May add Flonase if increasing allergy symptoms  Consider retesting to environmental allergens as patient has been on her second course of immunotherapy to evaluate for allergic triggers and response to immunotherapy  Reviewed avoidance measures    #3 COVID vaccinations up-to-date    #4 flu vaccine up-to-date    #5 pneumonia vaccines up-to-date      Follow-up in 6 months or sooner if needed

## 2022-03-16 ENCOUNTER — OFFICE VISIT (OUTPATIENT)
Dept: PHYSICAL THERAPY | Facility: HOSPITAL | Age: 62
End: 2022-03-16
Attending: INTERNAL MEDICINE
Payer: COMMERCIAL

## 2022-03-16 PROCEDURE — 97110 THERAPEUTIC EXERCISES: CPT

## 2022-03-16 PROCEDURE — 97112 NEUROMUSCULAR REEDUCATION: CPT

## 2022-03-16 NOTE — PROGRESS NOTES
Diagnosis: Parkinson's Disease, difficulty walking, imbalance   Visit (# authorized):   22 per POC ([employer] PPO)                      Referring Physician: Phylicia Ramirez    Precautions: at risk for falls     Medication changes since last visit?:  No    Subjective: No new information to report. Objective:    Date  1/31 2/7 2/9 2/14 2/16 2/23 2/28 3/2 3/7 3/9 3/14 3/16    Visit Number  6 7 8 9 10 11  12 (PN) 13 14 15 16 17    NMR   x  x x x x  x x x    Ther EX x x  x  x x x x  x x    Ther Act                Gait Training                CRM                 Manual x x      x          Additional Treatment Information:    Therapeutic Exercise (15 mins):   Nu-step level 4 -10 mins   Foot/ankle ABC's R only upper and lower case  R ankle stretch into EVR passive. Manual therapy (5 mins):   R ankle joint mobilizations    General distraction gr III-IV   R calcaneal mobilizations - figure 8's, medial   R fibular mobilizations (prox)   *all mobs followed by PROM    NMR (20 mins):  In // bars:    Resisted swing red TB x20 B    Foam, SBA    Pt selected RODY 30 seconds-->arm's T upper trunk rotation x5 B    Romberg EO 30 seconds    Cone tap 1 UE support x15 B (short cone)- R foot assisted into foot flat   Fitness slider     EXT x15 B    ABD x10 B       Assessment:  Tolerated session well without adverse response. Pt with significant instability R ankle without AFO.      Plan: in future sessions:lean backs, turning in // bars with rubber bands,backwards step     Charges: 1 TE, 2 NMR         Total Timed Treatment: 40 min  Total Treatment Time: 40 min

## 2022-03-21 ENCOUNTER — OFFICE VISIT (OUTPATIENT)
Dept: PHYSICAL THERAPY | Facility: HOSPITAL | Age: 62
End: 2022-03-21
Attending: Other
Payer: COMMERCIAL

## 2022-03-21 PROCEDURE — 97112 NEUROMUSCULAR REEDUCATION: CPT

## 2022-03-21 RX ORDER — TOFACITINIB 11 MG/1
TABLET, FILM COATED, EXTENDED RELEASE ORAL
Qty: 30 TABLET | Refills: 10 | Status: SHIPPED | OUTPATIENT
Start: 2022-03-21

## 2022-03-21 NOTE — PROGRESS NOTES
Diagnosis: Parkinson's Disease, difficulty walking, imbalance   Visit (# authorized):   22 per POC ([employer] PPO)                      Referring Physician: Demetrius Badillo    Precautions: at risk for falls     Medication changes since last visit?:  No    Subjective: No new information to report. Objective:    Date  1/31 2/7 2/9 2/14 2/16 2/23 2/28 3/2 3/7 3/9 3/14 3/16 3/21   Visit Number  6 7 8 9 10 11  12 (PN) 13 14 15 16 17 18   NMR   x  x x x x  x x x x   Ther EX x x  x  x x x x  x x    Ther Act                Gait Training                CRM                 Manual x x      x          Additional Treatment Information:      NMR (38 mins):  Seated EOB, feet on floor    Lean backs x10 -->arms crossed x10    Lateral lean x10 B   Seated EOB foam beam, wedge to promote postural lean R    Lean back 2x10    B shoulder flex OH, maintenance of upright posture and balance 2x10 7#   Upper trunk rotation to tap cones with hand 2x10 B alt   Seated on elevated narrow surface    Engaged in reaching task in and slightly out of RODY  Standing unsupported UE PNF 2# ball D2 flex<>ext x10 B (eyes and head follow)  Step stance with arm swing 30 sec ea foot in front   Standing pt selected RODY rebounder 2# ball x15     Assessment:  Tolerated session well without adverse response. Seated balance appears to be improving but postural stability declines with fatigue.      Plan: in future sessions:lean backs, turning in // bars with rubber bands,backwards step     Charges: 3 NMR             Total Timed Treatment:38 min  Total Treatment Time:38 min

## 2022-03-23 ENCOUNTER — LAB ENCOUNTER (OUTPATIENT)
Dept: LAB | Facility: HOSPITAL | Age: 62
End: 2022-03-23
Attending: INTERNAL MEDICINE
Payer: COMMERCIAL

## 2022-03-23 ENCOUNTER — TELEPHONE (OUTPATIENT)
Dept: ALLERGY | Facility: CLINIC | Age: 62
End: 2022-03-23

## 2022-03-23 ENCOUNTER — OFFICE VISIT (OUTPATIENT)
Dept: RHEUMATOLOGY | Facility: CLINIC | Age: 62
End: 2022-03-23
Payer: COMMERCIAL

## 2022-03-23 ENCOUNTER — OFFICE VISIT (OUTPATIENT)
Dept: PHYSICAL THERAPY | Facility: HOSPITAL | Age: 62
End: 2022-03-23
Attending: Other
Payer: COMMERCIAL

## 2022-03-23 VITALS
DIASTOLIC BLOOD PRESSURE: 74 MMHG | RESPIRATION RATE: 16 BRPM | BODY MASS INDEX: 35.55 KG/M2 | WEIGHT: 240 LBS | HEIGHT: 69 IN | HEART RATE: 86 BPM | SYSTOLIC BLOOD PRESSURE: 117 MMHG

## 2022-03-23 DIAGNOSIS — M06.9 RHEUMATOID ARTHRITIS, INVOLVING UNSPECIFIED SITE, UNSPECIFIED WHETHER RHEUMATOID FACTOR PRESENT (HCC): Primary | ICD-10-CM

## 2022-03-23 DIAGNOSIS — Z51.81 THERAPEUTIC DRUG MONITORING: ICD-10-CM

## 2022-03-23 DIAGNOSIS — E03.8 OTHER SPECIFIED HYPOTHYROIDISM: ICD-10-CM

## 2022-03-23 DIAGNOSIS — R79.89 ELEVATED SERUM CREATININE: ICD-10-CM

## 2022-03-23 DIAGNOSIS — M06.9 RHEUMATOID ARTHRITIS, INVOLVING UNSPECIFIED SITE, UNSPECIFIED WHETHER RHEUMATOID FACTOR PRESENT (HCC): ICD-10-CM

## 2022-03-23 LAB
ALT SERPL-CCNC: 16 U/L
AST SERPL-CCNC: 22 U/L (ref 15–37)
CREAT BLD-MCNC: 1.08 MG/DL
CRP SERPL-MCNC: <0.29 MG/DL (ref ?–0.3)
DEPRECATED RDW RBC AUTO: 51.5 FL (ref 35.1–46.3)
ERYTHROCYTE [DISTWIDTH] IN BLOOD BY AUTOMATED COUNT: 14.2 % (ref 11–15)
ERYTHROCYTE [SEDIMENTATION RATE] IN BLOOD: 12 MM/HR
HCT VFR BLD AUTO: 40.1 %
HGB BLD-MCNC: 12.8 G/DL
MCH RBC QN AUTO: 31.6 PG (ref 26–34)
MCHC RBC AUTO-ENTMCNC: 31.9 G/DL (ref 31–37)
MCV RBC AUTO: 99 FL
PLATELET # BLD AUTO: 220 10(3)UL (ref 150–450)
RBC # BLD AUTO: 4.05 X10(6)UL
TSI SER-ACNC: 1.66 MIU/ML (ref 0.36–3.74)
WBC # BLD AUTO: 6.7 X10(3) UL (ref 4–11)

## 2022-03-23 PROCEDURE — 3078F DIAST BP <80 MM HG: CPT | Performed by: INTERNAL MEDICINE

## 2022-03-23 PROCEDURE — 84460 ALANINE AMINO (ALT) (SGPT): CPT

## 2022-03-23 PROCEDURE — 99214 OFFICE O/P EST MOD 30 MIN: CPT | Performed by: INTERNAL MEDICINE

## 2022-03-23 PROCEDURE — 85652 RBC SED RATE AUTOMATED: CPT

## 2022-03-23 PROCEDURE — 84450 TRANSFERASE (AST) (SGOT): CPT

## 2022-03-23 PROCEDURE — 86140 C-REACTIVE PROTEIN: CPT

## 2022-03-23 PROCEDURE — 3074F SYST BP LT 130 MM HG: CPT | Performed by: INTERNAL MEDICINE

## 2022-03-23 PROCEDURE — 84443 ASSAY THYROID STIM HORMONE: CPT

## 2022-03-23 PROCEDURE — 3008F BODY MASS INDEX DOCD: CPT | Performed by: INTERNAL MEDICINE

## 2022-03-23 PROCEDURE — 82565 ASSAY OF CREATININE: CPT

## 2022-03-23 PROCEDURE — 36415 COLL VENOUS BLD VENIPUNCTURE: CPT

## 2022-03-23 PROCEDURE — 85027 COMPLETE CBC AUTOMATED: CPT

## 2022-03-23 PROCEDURE — 97112 NEUROMUSCULAR REEDUCATION: CPT

## 2022-03-23 NOTE — PATIENT INSTRUCTIONS
Summary:  1. Cont. methotrexate - 8 tablets a week and folic acid 1mg a day   2. Cont. xlejanz 11mg a day   3. Return to clinic in  6 months -   4. .Check labs in 4  months . 5. Diclofenac gel 1 % for left knee. 6. Cut down   diclofenac 75mg to once a day  7. Tylenol as needed for the left hand  . 8.  Check creatinine today

## 2022-03-23 NOTE — TELEPHONE ENCOUNTER
----- Message from Mukesh Gibson MD sent at 3/23/2022 12:58 PM CDT -----   Please call patient with normal TSH level of 1.66

## 2022-03-23 NOTE — PROGRESS NOTES
Diagnosis: Parkinson's Disease, difficulty walking, imbalance   Visit (# authorized):   22 per POC ([employer] PPO)                      Referring Physician: Boy Lambert    Precautions: at risk for falls     Medication changes since last visit?:  No    Subjective: No new information to report. Objective:    Date  1/31 2/7 2/9 2/14 2/16 2/23 2/28 3/2 3/7 3/9 3/14 3/16 3/21 3/23     Visit Number  6 7 8 9 10 11  12 (PN) 13 14 15 16 17 18 19     NMR   x  x x x x  x x x x x     Ther EX x x  x  x x x x  x x       Ther Act                   Gait Training                   CRM                    Manual x x      x             Additional Treatment Information:      NMR (38 mins):  In // bars:    Resisted swing green TB BUE support x15 B-->with coordinated UE movement, 1 UE support x15 B green TB   Step stance wt shift with coordinated UE movement -demo with return demo x20 B   Step stance with arm swing 45 sec ea foot in front    Lateral step resisted x15 B green TB, BUE support -->with coordinated UE movement, 1 UE support- green TB x15 B    Arm's T, pt selected RODY on foam -upper trunk rotations x10 B- SBA, CGA for balance checks. Patient Education:  POC discussed with patient. Assessment:  Tolerated session well without adverse response. Seated rest breaks intermittently provided 2/2 fatigue.      Plan: in future sessions: continue lean backs,backwards step with UE movement, foam/bosu     Charges: 3 NMR             Total Timed Treatment:38 min  Total Treatment Time:38 min

## 2022-03-28 RX ORDER — LEVOTHYROXINE SODIUM 0.12 MG/1
TABLET ORAL
Qty: 90 TABLET | Refills: 1 | Status: SHIPPED | OUTPATIENT
Start: 2022-03-28

## 2022-03-28 RX ORDER — CALCITRIOL 0.25 UG/1
0.25 CAPSULE, LIQUID FILLED ORAL DAILY
Qty: 90 CAPSULE | Refills: 1 | Status: SHIPPED | OUTPATIENT
Start: 2022-03-28

## 2022-03-28 NOTE — TELEPHONE ENCOUNTER
Refill passed per [place] [facility] [place], [facility] protocol. Requested Prescriptions   Pending Prescriptions Disp Refills    LEVOTHYROXINE 125 MCG Oral Tab [Pharmacy Med Name: LEVOTHYROXINE 125 MCG TABLET] 90 tablet 1     Sig: TAKE 1 TABLET BY MOUTH BEFORE BREAKFAST.         Thyroid Medication Protocol Passed - 3/27/2022 11:43 PM        Passed - TSH in past 12 months        Passed - Last TSH value is normal     Lab Results   Component Value Date    TSH 1.660 03/23/2022                 Passed - Appointment in past 12 or next 3 months           CALCITRIOL 0.25 MCG Oral Cap [Pharmacy Med Name: CALCITRIOL 0.25 MCG CAPSULE] 90 capsule 1     Sig: TAKE 1 CAPSULE BY MOUTH EVERY DAY        There is no refill protocol information for this order             Recent Outpatient Visits              5 days ago     [address] [facility] Mir Marquez    Office Visit    5 days ago Rheumatoid arthritis, involving unspecified site, unspecified whether rheumatoid factor present [facility])    [facility] [facility] Alina Mel[facility]    Office Visit    1 week ago     [address] [facility] [place]    Office Visit    1 week ago     [address] [facility] [place], [address] [place] [address] Leona Visit    2 weeks ago Asthma, extrinsic, moderate persistent, uncomplicated    [facility], [address] [facility], Dorene Trejo MD    Office Visit             Future Appointments         Provider Department Appt Notes    In 2 days Trevon Ribeiro, [address] [place] PPO  no c/p, no auth, 150v pcy    In 5 days [address] [facility]     In 1 week Trevon Ribeiro, [address] [place] PPO  no c/p, no auth, 150v pcy    In 1 week Zeinab Doran, [address] [place] PPO  no c/p, no auth, 150v pcy    In 2 weeks Zeinab Doran, [address] [place] PPO  no c/p, no auth, 150v pcy    In 4 weeks Degeorge, Sury Silence, [address] [place] PPO  no c/p, no auth, 150v pcy    In 1 month Degeorge, Sury Silence, [address] [place] PPO  no c/p, no Sylvester Hurry, 150v pcy    In 1 month Degeorge, Sury Silence, [address] [place] PPO  no c/p, no Elim Hurry, 150v pcy    In 1 month Alex Keith MD [address] [facility] [place], [address] [facility] follow up    In 1 month Degeorge, Sury Silence, [address] [place] PPO  no c/p, no Sylvester Hurry, 150v pcy    In 1 month Degeorge, Sury Silence, [address] [place] PPO  no c/p, no auth, 150v pcy    In 1 month Degeorge, Sury Silence, [address] [place] PPO  no c/p, no Sylvester Hurry, 150v pcy    In 1 month Degeorge, Sury Silence, [address] [place] PPO  no c/p, no auth, 150v pcy    In 2 months Degeorge, Sury Silence, [address] [place] PPO  no c/p, no Sylvester Hurry, 150v pcy    In 2 months Selina El, [address] POST OP F/U (BODY COMP?)  [employer]BS PPO    In 2 months Degeorge, Sury Silence, [address] [place] PPO  no c/p, no Elim Hurry, 150v pcy    In 2 months Karli Lance  [address], [place] Follow up    In 2 months Degeorge, Sury Silence, [address] [place] PPO  no c/p, no Elim Hurry, 150v pcy    In 3 months Degeorge, Sury Silence, [address] [place] PPO  no c/p, no auth, 150v pcy    In 3 months Degeorge, Sury Silence, [address] [place] PPO  no c/p, no auth, 150v pcy    In 5 months Varsha Kaur MD [place] [facility] [place] [facility], [address] 6 mon f/u

## 2022-03-28 NOTE — TELEPHONE ENCOUNTER
Please review; protocol failed/no protocol    Requested Prescriptions   Pending Prescriptions Disp Refills    CALCITRIOL 0.25 MCG Oral Cap [Pharmacy Med Name: CALCITRIOL 0.25 MCG CAPSULE] 90 capsule 1     Sig: TAKE 1 CAPSULE BY MOUTH EVERY DAY        There is no refill protocol information for this order       Signed Prescriptions Disp Refills    LEVOTHYROXINE 125 MCG Oral Tab 90 tablet 1     Sig: TAKE 1 TABLET BY MOUTH BEFORE BREAKFAST.         Thyroid Medication Protocol Passed - 3/27/2022 11:43 PM        Passed - TSH in past 12 months        Passed - Last TSH value is normal     Lab Results   Component Value Date    TSH 1.660 03/23/2022                 Passed - Appointment in past 12 or next 3 months               Recent Outpatient Visits              5 days ago     [place] , [place]    Office Visit    5 days ago Rheumatoid arthritis, involving unspecified site, unspecified whether rheumatoid factor present [facility])    [place] [facility] [place] [facility] for Rohit Campbell MD    Office Visit    1 week ago     [address] [facility] [place]    Office Visit    1 week ago     [address] [facility] [address] [place] [address] Visit    2 weeks ago Asthma, extrinsic, moderate persistent, uncomplicated    [address] [facility], [address] [facility], Talha Bright MD    Office Visit             Future Appointments         Provider Department Appt Notes    In 2 days Degeorge, [place], [address] [place] PPO  no c/p, no auth, 150v pcy    In 5 days [address] [facility]     In 1 week Mily Drones, [address] [place] PPO  no c/p, no auth, 150v pcy    In 1 week Degeorge, [place], [address] [place] PPO  no c/p, no auth, 150v pcy    In 2 weeks Degeorge, [place], [address] [place] PPO  no c/p, no [place], [address] pcy    In 4 weeks Degeorge, Karen Pacifica, [address] [place] PPO  no c/p, no auth, 150v pcy    In 1 month Degeorge, Karen Janna, [address] [place] PPO  no c/p, no Leeta Jack, 150v pcy    In 1 month Degeorge, Karen Janna, [address] [place] PPO  no c/p, no Leeta Jack, 150v pcy    In 1 month Ute Koenig MD [facility], [facility], [address] [facility] follow up    In 1 month Degeorge, Karen Pacifica, [address] [place] PPO  no c/p, no Leeta Jack, 150v pcy    In 1 month Degeorge, Karen Janna, [address] [place] PPO  no c/p, no Leeta Jack, 150v pcy    In 1 month Degeorge, Karen Janna, [address] [place] PPO  no c/p, no Leeta Jack, 150v pcy    In 1 month Degeorge, Karen Janna, [address] [place] PPO  no c/p, no auth, 150v pcy    In 2 months Degeorge, Karen Pacifica, [address] [place] PPO  no c/p, no Leeta Jack, 150v pcy    In 2 months David Vazquez, [address] [facility] [facility] POST OP F/U (BODY COMP?)  [employer]BS PPO    In 2 months Degeorge, Karen Pacifica, [address] [place] PPO  no c/p, no Leeta Jack, 150v pcy    In 2 months Angelina Gore MD [address] [facility] [facility], [place] Follow up    In 2 months Degeorge, Karen Janna, [address] [place] PPO  no c/p, no Leeta Jack, 150v pcy    In 3 months Degeorge, Karen Pacifica, [address] [place] PPO  no c/p, no auth, 150v pcy    In 3 months Degeorge, Karen Pacifica, [address] PPO  no c/p, no auth, 150v pcy    In 5 months Stewart Bowie MD [facility], [address] 6 mon f/u

## 2022-03-30 ENCOUNTER — OFFICE VISIT (OUTPATIENT)
Dept: PHYSICAL THERAPY | Facility: HOSPITAL | Age: 62
End: 2022-03-30
Attending: Other
Payer: COMMERCIAL

## 2022-03-30 PROCEDURE — 97112 NEUROMUSCULAR REEDUCATION: CPT

## 2022-03-30 NOTE — PROGRESS NOTES
Diagnosis: Parkinson's Disease, difficulty walking, imbalance   Visit (# authorized):   22 per POC ([employer] PPO)                      Referring Physician: Moinque Luke    Precautions: at risk for falls     Medication changes since last visit?:  No    Subjective: No new information to report. Objective:    Date  2/16 2/23 2/28 3/2 3/7 3/9 3/14 3/16 3/21 3/23 3/30    Visit Number  10 11  12 (PN) 13 14 15 16 17 18 19 20    NMR x x x x  x x x x x x    Ther EX  x x x x  x x       Ther Act               Gait Training               CRM                Manual    x             Additional Treatment Information:      NMR (38 mins):  Seated EOB foam beam   Engaged in reaching task in and slightly out of RODY   Body blade BUE shoulder flex OH x10    Trunk rotations to taps on side x5 B 7# ball    PNF chops x5 B with trunk rotation 7# ball    Lean back x10    7# ball chest pass    2# bar to volley ball in and out of RODY    Boxing with cross body punches to promote ant wt shift --->with dual cog task   Rebounder 2#    Step stance x15 ea foot in front    Fwd step throw x10 B       Assessment:  Tolerated session well without adverse response. Reports fatigue at end of session. Postural stability seated appears to be improving with less frequent LOB in posterior direction. Static posture also improved but inconsistent at this time.      Plan: in future sessions: continue lean backs,backwards step with UE movement, foam/bosu     Charges: 3 NMR             Total Timed Treatment:38 min  Total Treatment Time:38 min

## 2022-03-31 RX ORDER — LINACLOTIDE 145 UG/1
CAPSULE, GELATIN COATED ORAL
Qty: 30 CAPSULE | Refills: 2 | Status: SHIPPED | OUTPATIENT
Start: 2022-03-31

## 2022-04-02 ENCOUNTER — NURSE ONLY (OUTPATIENT)
Dept: ALLERGY | Facility: CLINIC | Age: 62
End: 2022-04-02
Payer: COMMERCIAL

## 2022-04-02 DIAGNOSIS — J30.89 ENVIRONMENTAL AND SEASONAL ALLERGIES: ICD-10-CM

## 2022-04-02 PROCEDURE — 95117 IMMUNOTHERAPY INJECTIONS: CPT | Performed by: ALLERGY & IMMUNOLOGY

## 2022-04-02 NOTE — TELEPHONE ENCOUNTER
Patient presents today for Allergy Injections. Patient states that Dr. Osiel Paula ran TSH to be sure that taking Levothyroxine with Sinemet every morning. Patient was concerned that taking Levothyroxine at the same time with Sinemet would cause the Levothyroxine to not be fully absorbed as per her pharmacist.     Patient informed would inquire of above with Dr. Osiel Paula. Patient informed that most likely she will need to call her PCP, physician that prescribed Levothyroxine, for her answer.

## 2022-04-02 NOTE — TELEPHONE ENCOUNTER
Call reviewed and noted. Patient is concerned about absorption with taking the 2 medications together I would recommend  prior least 6 hours.   Patient may also touch base with PCP

## 2022-04-02 NOTE — TELEPHONE ENCOUNTER
Patient in office given Dr. Alicia Kellogg advice as below . Mona Townsend MD  to [facility] Allergy Clinical Staff    Juani Novak 11:21 AM  Note  Call reviewed and noted. Patient is concerned about absorption with taking the 2 medications together I would recommend  prior least 6 hours. Patient may also touch base with PCP        Patient states she will touch base with Dr. Najma Ochoa and verbalizes understanding of above.

## 2022-04-04 ENCOUNTER — OFFICE VISIT (OUTPATIENT)
Dept: PHYSICAL THERAPY | Facility: HOSPITAL | Age: 62
End: 2022-04-04
Attending: Other
Payer: COMMERCIAL

## 2022-04-04 PROCEDURE — 97112 NEUROMUSCULAR REEDUCATION: CPT

## 2022-04-06 ENCOUNTER — OFFICE VISIT (OUTPATIENT)
Dept: PHYSICAL THERAPY | Facility: HOSPITAL | Age: 62
End: 2022-04-06
Attending: Other
Payer: COMMERCIAL

## 2022-04-06 PROCEDURE — 97112 NEUROMUSCULAR REEDUCATION: CPT

## 2022-04-06 PROCEDURE — 97110 THERAPEUTIC EXERCISES: CPT

## 2022-04-11 ENCOUNTER — OFFICE VISIT (OUTPATIENT)
Dept: PHYSICAL THERAPY | Facility: HOSPITAL | Age: 62
End: 2022-04-11
Attending: Other
Payer: COMMERCIAL

## 2022-04-11 PROCEDURE — 97116 GAIT TRAINING THERAPY: CPT

## 2022-04-11 NOTE — PROGRESS NOTES
Diagnosis: Parkinson's Disease, difficulty walking, imbalance   Visit (# authorized):   30 per POC ([employer] PPO)                      Referring Physician: Mat Skelton    Precautions: at risk for falls     Medication changes since last visit?:  No    Subjective: No new information to report. Objective:    Date  3/9 3/14 3/16 3/21 3/23 3/30 4/4 4/6 4/11      Visit Number  41 35 08 56 23 11 54 13 (PN) 23      NMR x x x x x x x x       Ther EX  x x     x       Ther Act               Gait Training         x      CRM                Manual                 Additional Treatment Information:    Gait training (40 mins)  TDM with harness    Laser for visual feedback, music playing intermittent cues for upright posture and increased B step length. 5 min bouts 0.5 mph x2 with rest within  and after bouts 2/2 fatigue. 2 lg laps around clinic with rollator: laser for visual feedback- seated rest after ea lap  Figure 8 (turns with gait) CW and CCW rollator- VC's for increased step length and robina     Assessment:  Good carryover of gait from TDM training with improvement in robina and step length. Limited by fatigue primarily.      Plan:update HEP next session    Charges: 3 GT            Total Timed Treatment:40 min  Total Treatment Time:40 min

## 2022-04-12 ENCOUNTER — IMMUNIZATION (OUTPATIENT)
Dept: LAB | Age: 62
End: 2022-04-12
Attending: EMERGENCY MEDICINE
Payer: COMMERCIAL

## 2022-04-12 DIAGNOSIS — Z23 NEED FOR VACCINATION: Primary | ICD-10-CM

## 2022-04-12 PROCEDURE — 0064A SARSCOV2 VAC 50MCG/0.25ML IM: CPT

## 2022-04-20 ENCOUNTER — OFFICE VISIT (OUTPATIENT)
Dept: PHYSICAL THERAPY | Facility: HOSPITAL | Age: 62
End: 2022-04-20
Attending: Other
Payer: COMMERCIAL

## 2022-04-20 PROCEDURE — 97112 NEUROMUSCULAR REEDUCATION: CPT

## 2022-04-20 NOTE — PROGRESS NOTES
Diagnosis: Parkinson's Disease, difficulty walking, imbalance   Visit (# authorized):   30 per POC ([employer] PPO)                      Referring Physician: Ngozi Dominguez    Precautions: at risk for falls     Medication changes since last visit?:  No    Subjective: No new information to report. Objective:    Date  3/9 3/14 3/16 3/21 3/23 3/30 4/4 4/6 4/11 4/20     Visit Number  15 16 17 18 19 20 21 22 (PN) 23 24     NMR x x x x x x x x  x     Ther EX  x x     x       Ther Act               Gait Training         x      CRM                Manual                 Additional Treatment Information:    NMR (43 mins):  Romberg EO 30 sec   Step stance EO 30 sec ea foot in front--> with arm swing 30 sec ea foot in front   Romberg EC 30 sec   Lg amp side step with UE movement -1 UE support x10 B   Lg amp backwards step with UE movement -1 UE support x10 B   Sitting unsupported: alt march with coordinated UE \"Swing\" x10 B   Sitting unsupported: UE \"X''s with trunk rotation x10   Seated on foam beam, unsupported    Chest pass 4# ball x20    Boxing: cross with trunk rotation 2x30 sec    Boxing: upper cut, anterior reach 2x30 sec     Patient education: HEP updated and issued-see pt instructions. Assessment:  Demo's understanding of material taught today and progressed exercises. Doing well overall.      Plan:in future sessions: TDM training, narrow walkway, turns??, seated receiving punches/perturbations     Charges: 3 NMR            Total Timed Treatment:43 min  Total Treatment Time:45 min

## 2022-04-20 NOTE — PATIENT INSTRUCTIONS
Do these exercises everyday    1. Sit in chair. Loop yellow band around both feet near ball of foot. Move right out to side at ankle. Do this 10 times, twice. 2.  Sit in chair. Loop green band around bottom of right foot near ball of foot. Point foot down like pushing a gas pedal then bring foot back up. Control the movement in both directions. Also try to keep foot straight up and down (not curved in or out). 3. Stand with bed next to you, walker on other side for safety. Take a step forward with one foot. Keep feet in this position and balance with eyes open for 30 seconds. Practice with each foot in front. Repeat but with swinging the arms nice and big. 4. Stand with feet together, walker in front and bed behind you. Backs of legs should not touch the bed. Cross arms and balance with eyes closed for 30 seconds. 5. Stand at countertop and hold on with one hand, facing the countertop. Take a large side step moving arm on the same side of the leg you are moving. Do this 10 times on each side. 6.  Stand at countertop and hold on with one hand. With the leg further away from counter take a step forward then back to the starting position. Do this 10 times. Turn and hold on with the other hand, making steps with the other leg 10 times. 7. Stand at countertop and hold on with one hand. With the leg further away from counter take a step backward then back to the starting position. Do this 10 times. Turn and hold on with the other hand, making steps with the other leg 10 times. 8. Sit up tall without back support. Keep upright posture (do not lean back) as you march one knee up and lift the opposite arm. Do this 10 times on each leg. 9. Sit up tall without back support. Keep upright posture (do not lean back). Interlace hands and rotate body as you move your arms to \"paint\" a large X in front of you.  Paint 10 X's in total.

## 2022-04-25 ENCOUNTER — OFFICE VISIT (OUTPATIENT)
Dept: PHYSICAL THERAPY | Facility: HOSPITAL | Age: 62
End: 2022-04-25
Attending: Other
Payer: COMMERCIAL

## 2022-04-25 PROCEDURE — 97116 GAIT TRAINING THERAPY: CPT

## 2022-04-25 NOTE — PROGRESS NOTES
Diagnosis: Parkinson's Disease, difficulty walking, imbalance   Visit (# authorized):   30 per POC ([employer] PPO)                      Referring Physician: Octavia Hummel    Precautions: at risk for falls     Medication changes since last visit?:  No    Subjective: No new information to report. Objective:    Date  3/9 3/14 3/16 3/21 3/23 3/30 4/4 4/6 4/11 4/20 4/25    Visit Number  62 12 19 16 41 84 23 74 (PN) 23 24 25    NMR x x x x x x x x  x     Ther EX  x x     x       Ther Act               Gait Training         x  x    CRM                Manual                 Additional Treatment Information:    Gait training (38 mins)  TDM with harness    Laser for visual feedback, music playing intermittent cues for upright posture and increased B step length. 5 min bouts: 0.4-0.5 mph, 0.3 mph x2 with rest within and after bouts 2/2 fatigue. 1 lg lap around clinic with rollator: focus on upright posture    Assessment:  Improved quality of gait when focusing on upright posture with increased heel strike and foot clearance.       Plan:in future sessions: TDM training, narrow walkway, turns??, seated receiving punches/perturbations     Charges: 3 GT          Total Timed Treatment:38min  Total Treatment Time:38 min

## 2022-04-26 RX ORDER — ERGOCALCIFEROL 1.25 MG/1
CAPSULE ORAL
Qty: 12 CAPSULE | Refills: 3 | OUTPATIENT
Start: 2022-04-26

## 2022-04-26 RX ORDER — MONTELUKAST SODIUM 10 MG/1
TABLET ORAL
Qty: 90 TABLET | Refills: 0 | Status: SHIPPED | OUTPATIENT
Start: 2022-04-26

## 2022-04-26 RX ORDER — DICLOFENAC SODIUM 75 MG/1
75 TABLET, DELAYED RELEASE ORAL 2 TIMES DAILY
Qty: 60 TABLET | Refills: 5 | Status: SHIPPED | OUTPATIENT
Start: 2022-04-26

## 2022-04-26 RX ORDER — PHENTERMINE HYDROCHLORIDE 37.5 MG/1
TABLET ORAL
Qty: 30 TABLET | Refills: 2 | Status: SHIPPED | OUTPATIENT
Start: 2022-04-26

## 2022-04-26 NOTE — TELEPHONE ENCOUNTER
Last filled: 11/8/21 #60 tab with 5 refills  LOV: 3/23/22  Future Appointments   Date Time Provider Joel Mann   4/27/2022  6:45 PM Gladystine Lie, PT [facility] CARE SYSTEM OF [facility] CARE SYSTEM OF THE [facility]   4/30/2022  9:20 AM EC ALLERGY ECSCHALRGY [facility]   5/2/2022  6:45 PM Gladystine Lie, PT CF PT [facility] CARE SYSTEM OF THE [facility]   5/4/2022  5:15 PM Gladystine Lie, PT CF PT [facility] CARE [facility] THE [facility]   5/6/2022  3:00 PM Clearance Duy SaxenayD LOMGPain LOMG [facility]   5/9/2022  6:45 PM Gladystine Lie, PT [facility] CARE SYSTEM OF [facility] PT [facility] SYSTEM OF THE [facility]   5/16/2022  4:40 PM Raheem Mcmahon MD [facility] CARE SYSTEM OF THE [facility]   5/16/2022  6:45 PM Gladystine Lie, PT CF PT [facility] CARE [facility]   5/18/2022  6:45 PM Gladystine Lie, PT CF PT [facility] CARE [facility]   5/23/2022  6:00 PM Gladystine Lie, PT [facility] CARE SYSTEM OF [facility] PT [facility] SYSTEM OF THE [facility]   5/24/2022  5:50 PM EC ALLERGY ECSCHALRGY [facility]   5/25/2022  6:45 PM Gladystine Lie, PT CF PT [facility] CARE [facility] OF [facility]   6/1/2022  6:45 PM Gladystine Lie, PT CF PT [facility] CARE [facility]   6/7/2022  9:30 AM Myla Andrews RD [address] SYSTEM OF THE [facility]   6/15/2022  6:45 PM Gladystine Lie, PT CF PT [facility] SYSTEM OF THE [facility]   6/16/2022  3:30 PM Kang Jackson MD [address] SYSTEM OF [facility]   6/16/2022  4:20 PM Nya Mario, APRN ECCFHOBGYN [facility]   6/20/2022  6:45 PM Degeorge Mercy Alias, [facility] SYSTEM OF [facility] SYSTEM OF THE [facility]   6/27/2022  6:45 PM Degeorge, Mercy Alias, PT [facility] CARE SYSTEM OF [facility] SYSTEM OF THE [facility]   6/29/2022  6:45 PM Gladystine Lie, [facility] SYSTEM OF [facility]   9/23/2022  8:40 AM Clark Campo MD [facility]     Labs:   Component      Latest Ref Rng & Units 3/23/2022   WBC      4.0 - 11.0 x10(3) uL 6.7   RBC      3.80 - 5.30 x10(6)uL 4.05   Hemoglobin      12.0 - 16.0 g/dL 12.8   Hematocrit      35.0 - 48.0 % 40.1   MCV      80.0 - 100.0 fL 99.0   MCH      26.0 - 34.0 pg 31.6   MCHC      31.0 - 37.0 g/dL 31.9   RDW      11.0 - 15.0 % 14.2   RDW-SD      35.1 - 46.3 fL 51.5 (H)   Platelet Count      145.4 - 450.0 10(3)uL 220.0   CREATININE      0.55 - 1.02 mg/dL 1.08 (H)   eGFR NON-AFR.  AMERICAN      >=60 56 (L)   eGFR       >=60 64   SED RATE      0 - 30 mm/Hr 12   C-REACTIVE PROTEIN      <0.30 mg/dL <0.29   AST (SGOT)      15 - 37 U/L 22   ALT (SGPT)      13 - 56 U/L 16

## 2022-04-27 ENCOUNTER — OFFICE VISIT (OUTPATIENT)
Dept: PHYSICAL THERAPY | Facility: HOSPITAL | Age: 62
End: 2022-04-27
Attending: Other
Payer: COMMERCIAL

## 2022-04-27 PROCEDURE — 97112 NEUROMUSCULAR REEDUCATION: CPT

## 2022-04-27 NOTE — PROGRESS NOTES
Diagnosis: Parkinson's Disease, difficulty walking, imbalance   Visit (# authorized):   30 per POC ([employer] PPO)                      Referring Physician: Josefa Sainz    Precautions: at risk for falls     Medication changes since last visit?:  No    Subjective: No new information to report. Objective:    Date  3/9 3/14 3/16 3/21 3/23 3/30 4/4 4/6 4/11 4/20 4/25 4/27   Visit Number  09 11 85 70 95 05 65 71 (PN) 23 24 25 26   NMR x x x x x x x x  x  x   Ther EX  x x     x       Ther Act               Gait Training         x  x    CRM                Manual                 Additional Treatment Information:    NMR (38 mins)  Seated boxing on foam beam unsupported    Pt holding boxing mitts maintaining balance against perturbations -45 sec bout x2   Standing boxing unsupported -focus on upright posture   Pt holding boxing mitts maintaining balance against perturbations -30 sec bout x2    Cross punch (reach out of RODY) 2x15 B   Ambulation through narrow walkways/doorways 2 bouts >150 ft ea, multiple doorways   Tap ups 4 in step x10 B   Ambulation weaving around cones (spaced closer together) with rollator (8 cones) 4 bouts     Assessment:  Good postural stability throughout. Improving maintenance of upright posture with COM over RODY. Plan:in future sessions: TDM training,continue narrow walkway, turns? ?    Charges: 3 NMR         Total Timed Treatment:38min  Total Treatment Time:38 min

## 2022-04-30 ENCOUNTER — NURSE ONLY (OUTPATIENT)
Dept: ALLERGY | Facility: CLINIC | Age: 62
End: 2022-04-30
Payer: COMMERCIAL

## 2022-04-30 DIAGNOSIS — J30.89 ENVIRONMENTAL AND SEASONAL ALLERGIES: ICD-10-CM

## 2022-05-02 ENCOUNTER — OFFICE VISIT (OUTPATIENT)
Dept: PHYSICAL THERAPY | Facility: HOSPITAL | Age: 62
End: 2022-05-02
Attending: Other
Payer: COMMERCIAL

## 2022-05-02 PROCEDURE — 97116 GAIT TRAINING THERAPY: CPT

## 2022-05-02 NOTE — PROGRESS NOTES
Diagnosis: Parkinson's Disease, difficulty walking, imbalance   Visit (# authorized):   30 per POC ([employer] PPO)                      Referring Physician: Courtney Francisco    Precautions: at risk for falls     Medication changes since last visit?:  No    Subjective: No new information to report. Objective:    Date  4/4 4/6 4/11 4/20 4/25 4/27 5/2     Visit Number  21 22 (PN) 23 24 25 26 27     NMR x x  x  x      Ther EX  x          Ther Act            Gait Training   x  x  x     CRM             Manual              Additional Treatment Information:    Gait training (38 mins)  TDM with harness    Laser for visual feedback, music playing intermittent cues for upright posture and increased B step length. 6 min bouts: 0.4-0.5 mph; 0.3-0.4 mph with rest within and after bouts 2/2 fatigue. Backwards ambulation 3 mins 0.4-0.5 mph  2 lg laps around clinic with rollator: focus on upright posture     Assessment:  Improving quality of gait which degrades with fatigue. Plan:in future sessions: TDM training,continue narrow walkway, turns? ?    Charges: 3 GT        Total Timed Treatment:38min  Total Treatment Time:38 min

## 2022-05-04 ENCOUNTER — OFFICE VISIT (OUTPATIENT)
Dept: PHYSICAL THERAPY | Facility: HOSPITAL | Age: 62
End: 2022-05-04
Attending: Other
Payer: COMMERCIAL

## 2022-05-04 PROCEDURE — 97112 NEUROMUSCULAR REEDUCATION: CPT

## 2022-05-04 NOTE — PROGRESS NOTES
Diagnosis: Parkinson's Disease, difficulty walking, imbalance   Visit (# authorized):   30 per POC ([facility] PPO)                      Referring Physician: Josefa Sainz    Precautions: at risk for falls     Medication changes since last visit?:  No    Subjective: No new information to report. Objective:    Date  4/4 4/6 4/11 4/20 4/25 4/27 5/2 5/4    Visit Number  21 22 (PN) 23 24 25 26 27 28    NMR x x  x  x  x    Ther EX  x          Ther Act            Gait Training   x  x  x     CRM             Manual              Additional Treatment Information:    NMR (40 mins)  Ambulation weaving around cones (spaced closer together) with rollator (10 cones) 4 bouts   Ambulation through narrow walkways/doorways 2 bouts >100 ft ea, multiple doorways   Standing in // bars: boxing    Fwd step with jab x15 B, SBA    Wide RODY with alt cross (trunk rotation) 30 sec x2 bouts    Fwd step with cross x15 B, SBA  Seated boxing on foam beam unsupported    Pt holding boxing mitts maintaining balance against perturbations -45 sec bout x2     Assessment:  Good postural stability throughout. Improving quality of movements. Plan:in future sessions: TDM training,continue narrow walkway, turns? ?    Charges: 3 NMR       Total Timed Treatment:40 min  Total Treatment Time:40 min

## 2022-05-09 ENCOUNTER — OFFICE VISIT (OUTPATIENT)
Dept: PHYSICAL THERAPY | Facility: HOSPITAL | Age: 62
End: 2022-05-09
Attending: Other
Payer: COMMERCIAL

## 2022-05-09 PROCEDURE — 97116 GAIT TRAINING THERAPY: CPT

## 2022-05-09 NOTE — PROGRESS NOTES
Diagnosis: Parkinson's Disease, difficulty walking, imbalance   Visit (# authorized):   30 per POC ([employer] PPO)                      Referring Physician: Mat Skelton    Precautions: at risk for falls     Medication changes since last visit?:  No    Subjective: No new information to report. Objective:    Date  4/4 4/6 4/11 4/20 4/25 4/27 5/2 5/4 5/9   Visit Number  21 22 (PN) 23 24 25 26 27 28 29   NMR x x  x  x  x    Ther EX  x          Ther Act            Gait Training   x  x  x  x   CRM             Manual              Additional Treatment Information:    Gait training (38 mins)  TDM with harness    Laser for visual feedback, music playing intermittent cues for upright posture and increased B step length. 6 min bout: 0.4-0.5 mph; 8 min bout 0.5-0.6 mph with rest within and after bouts 2/2 fatigue. 2 lg laps around clinic with rollator: through narrow walkways   Backwards walking 20-25 ft x2 bouts, CGA for balance checks, SBA    Patient education: POC discussed. Assessment:  Much improved quality of gait with increased B step length and improved normalcy of robina. Intermittent shuffle/fesination noted but pt able to self correct within 10 steps.      Plan:reassess     Charges: 3 GT       Total Timed Treatment:38 min  Total Treatment Time:40 min

## 2022-05-16 ENCOUNTER — OFFICE VISIT (OUTPATIENT)
Dept: PHYSICAL THERAPY | Facility: HOSPITAL | Age: 62
End: 2022-05-16
Attending: Other
Payer: COMMERCIAL

## 2022-05-16 ENCOUNTER — OFFICE VISIT (OUTPATIENT)
Dept: INTERNAL MEDICINE CLINIC | Facility: CLINIC | Age: 62
End: 2022-05-16
Payer: COMMERCIAL

## 2022-05-16 VITALS
DIASTOLIC BLOOD PRESSURE: 60 MMHG | TEMPERATURE: 98 F | RESPIRATION RATE: 20 BRPM | HEART RATE: 90 BPM | SYSTOLIC BLOOD PRESSURE: 104 MMHG | WEIGHT: 246 LBS | HEIGHT: 69 IN | BODY MASS INDEX: 36.43 KG/M2

## 2022-05-16 DIAGNOSIS — G20 PARKINSON DISEASE (HCC): ICD-10-CM

## 2022-05-16 DIAGNOSIS — M06.9 RHEUMATOID ARTHRITIS, INVOLVING UNSPECIFIED SITE, UNSPECIFIED WHETHER RHEUMATOID FACTOR PRESENT (HCC): ICD-10-CM

## 2022-05-16 DIAGNOSIS — E66.01 CLASS 2 SEVERE OBESITY WITH SERIOUS COMORBIDITY AND BODY MASS INDEX (BMI) OF 36.0 TO 36.9 IN ADULT, UNSPECIFIED OBESITY TYPE (HCC): ICD-10-CM

## 2022-05-16 DIAGNOSIS — E03.9 HYPOTHYROIDISM, UNSPECIFIED TYPE: Primary | ICD-10-CM

## 2022-05-16 DIAGNOSIS — J45.40 MODERATE PERSISTENT EXTRINSIC ASTHMA WITHOUT COMPLICATION: ICD-10-CM

## 2022-05-16 DIAGNOSIS — M76.60 ACHILLES TENDON PAIN: ICD-10-CM

## 2022-05-16 DIAGNOSIS — Z12.31 ENCOUNTER FOR SCREENING MAMMOGRAM FOR BREAST CANCER: ICD-10-CM

## 2022-05-16 DIAGNOSIS — G47.33 OSA (OBSTRUCTIVE SLEEP APNEA): ICD-10-CM

## 2022-05-16 DIAGNOSIS — Z98.1 S/P LUMBAR FUSION: ICD-10-CM

## 2022-05-16 DIAGNOSIS — Z98.84 S/P LAPAROSCOPIC SLEEVE GASTRECTOMY: ICD-10-CM

## 2022-05-16 PROCEDURE — 3008F BODY MASS INDEX DOCD: CPT | Performed by: INTERNAL MEDICINE

## 2022-05-16 PROCEDURE — 99214 OFFICE O/P EST MOD 30 MIN: CPT | Performed by: INTERNAL MEDICINE

## 2022-05-16 PROCEDURE — 3078F DIAST BP <80 MM HG: CPT | Performed by: INTERNAL MEDICINE

## 2022-05-16 PROCEDURE — 97112 NEUROMUSCULAR REEDUCATION: CPT

## 2022-05-16 PROCEDURE — 97110 THERAPEUTIC EXERCISES: CPT

## 2022-05-16 PROCEDURE — 3074F SYST BP LT 130 MM HG: CPT | Performed by: INTERNAL MEDICINE

## 2022-05-16 RX ORDER — TOPIRAMATE 50 MG/1
50 TABLET, FILM COATED ORAL 2 TIMES DAILY
COMMUNITY
Start: 2022-04-26

## 2022-05-16 RX ORDER — ERGOCALCIFEROL 1.25 MG/1
50000 CAPSULE ORAL WEEKLY
COMMUNITY
Start: 2022-04-21

## 2022-05-18 ENCOUNTER — OFFICE VISIT (OUTPATIENT)
Dept: PHYSICAL THERAPY | Facility: HOSPITAL | Age: 62
End: 2022-05-18
Attending: Other
Payer: COMMERCIAL

## 2022-05-18 PROCEDURE — 97116 GAIT TRAINING THERAPY: CPT

## 2022-05-18 NOTE — PROGRESS NOTES
Diagnosis: Parkinson's Disease, difficulty walking, imbalance   Visit (# authorized):   38 per POC ([employer] PPO)                      Referring Physician: Francisco Olivo    Precautions: at risk for falls     Medication changes since last visit?:  No    Subjective: No new information to report. Objective:    Date  4/4 4/6 4/11 4/20 4/25 4/27 5/2 5/4 5/9 5/16 5/18      Visit Number  21 22 (PN) 23 24 25 26 27 28 29 30 (PN) 31      NMR x x  x  x  x  x       Ther EX  x        x       Ther Act                 Gait Training   x  x  x  x  x      CRM                  Manual                   Additional Treatment Information:    Gait training (38 mins)  TDM with harness    Music playing, engaged in conversation intermittent cues for upright posture and increased B step length. 8 min bout: 0.6 mph; 10 min bout 0.6 mph with rest after ea bout 2/2 fatigue. 1 lg lap around clinic with rollator, navigating cluttered environment     Assessment:  Pt's gait much improved with increased time walked today as well. Able to walker longer duration with conversation through does demonstrate 1-2 episodes in which she stopped walking when talking.      Plan:in future sessions: rebounder, foam, stepping over obstacle 1 foot    Charges: 3 GT       Total Timed Treatment:38 min  Total Treatment Time:38 min

## 2022-05-21 ENCOUNTER — NURSE ONLY (OUTPATIENT)
Dept: ALLERGY | Facility: CLINIC | Age: 62
End: 2022-05-21
Payer: COMMERCIAL

## 2022-05-21 DIAGNOSIS — J30.89 ENVIRONMENTAL AND SEASONAL ALLERGIES: ICD-10-CM

## 2022-05-23 ENCOUNTER — APPOINTMENT (OUTPATIENT)
Dept: PHYSICAL THERAPY | Facility: HOSPITAL | Age: 62
End: 2022-05-23
Attending: Other
Payer: COMMERCIAL

## 2022-05-23 ENCOUNTER — TELEPHONE (OUTPATIENT)
Dept: PHYSICAL THERAPY | Facility: HOSPITAL | Age: 62
End: 2022-05-23

## 2022-05-25 ENCOUNTER — OFFICE VISIT (OUTPATIENT)
Dept: PHYSICAL THERAPY | Facility: HOSPITAL | Age: 62
End: 2022-05-25
Attending: Other
Payer: COMMERCIAL

## 2022-05-25 PROCEDURE — 97116 GAIT TRAINING THERAPY: CPT

## 2022-05-25 NOTE — PROGRESS NOTES
Diagnosis: Parkinson's Disease, difficulty walking, imbalance   Visit (# authorized):   38 per POC ([employer] PPO)                      Referring Physician: Paul Carmichael    Precautions: at risk for falls     Medication changes since last visit?:  No    Subjective: Feeling tired today. Saw neurologist Monday- told she is taking her PD meds incorrectly. Objective:    Date  4/4 4/6 4/11 4/20 4/25 4/27 5/2 5/4 5/9 5/16 5/18 5/25     Visit Number  21 22 (PN) 23 24 25 26 27 28 29 30 (PN) 31 32     NMR x x  x  x  x  x       Ther EX  x        x       Ther Act                 Gait Training   x  x  x  x  x x     CRM                  Manual                   Additional Treatment Information:    Gait training (40 mins)  TDM with harness    Music playing, engaged in conversation intermittent cues for upright posture and increased B step length. 2x6 min bout: 0.5-0.6 mph; 4 min bout 0.4 mph with rest after ea bout 2/2 fatigue. 1 lg lap around clinic with rollator, navigating cluttered environment with 1 seated rest break during    Assessment:  Pt with increased difficulty walking today compared to previous session. Fatiguing quickly.  Recovers well with extended seated rest.     Plan:in future sessions: rebounder, foam, stepping over obstacle 1 foot    Charges: 3 GT       Total Timed Treatment:40 min  Total Treatment Time:45 min

## 2022-05-26 RX ORDER — ERGOCALCIFEROL 1.25 MG/1
CAPSULE ORAL
Qty: 12 CAPSULE | Refills: 3 | Status: SHIPPED | OUTPATIENT
Start: 2022-05-26

## 2022-05-26 RX ORDER — TOPIRAMATE 50 MG/1
TABLET, FILM COATED ORAL
Qty: 180 TABLET | Refills: 1 | Status: SHIPPED | OUTPATIENT
Start: 2022-05-26

## 2022-05-26 RX ORDER — PANTOPRAZOLE SODIUM 40 MG/1
TABLET, DELAYED RELEASE ORAL
Qty: 90 TABLET | Refills: 2 | Status: SHIPPED | OUTPATIENT
Start: 2022-05-26

## 2022-06-01 ENCOUNTER — OFFICE VISIT (OUTPATIENT)
Dept: PHYSICAL THERAPY | Facility: HOSPITAL | Age: 62
End: 2022-06-01
Attending: Other
Payer: COMMERCIAL

## 2022-06-01 PROCEDURE — 97116 GAIT TRAINING THERAPY: CPT

## 2022-06-01 NOTE — PROGRESS NOTES
Diagnosis: Parkinson's Disease, difficulty walking, imbalance   Visit (# authorized):   38 per POC ([employer] PPO)                      Referring Physician: Boom Epstein    Precautions: at risk for falls     Medication changes since last visit?:  No    Subjective: Feeling tired today. Reports hx of fall due to FoG, walking without rollator at the time. Objective:    Date  4/4 4/6 4/11 4/20 4/25 4/27 5/2 5/4 5/9 5/16 5/18 5/25 6/1    Visit Number  21 22 (PN) 23 24 25 26 27 28 29 30 (PN) 31 32 33    NMR x x  x  x  x  x       Ther EX  x        x       Ther Act                 Gait Training   x  x  x  x  x x x    CRM                  Manual                   Additional Treatment Information:    Gait training (40 mins)  TDM with harness    Music playing, engaged in conversation intermittent cues for upright posture and increased B step length. x8 min, x9 min bouts: 0.6-0.7 mph   Backwards ambulation  0.3 mph 4 mins     1 lg lap around clinic with rollator, navigating cluttered environment     Assessment:  Pt with very good quality of gait today on treadmill and overground.      Plan:in future sessions: rebounder, foam, stepping over obstacle 1 foot    Charges: 3 GT       Total Timed Treatment:40 min  Total Treatment Time:40 min

## 2022-06-02 DIAGNOSIS — Z98.84 S/P LAPAROSCOPIC SLEEVE GASTRECTOMY: Primary | ICD-10-CM

## 2022-06-02 DIAGNOSIS — E66.9 OBESITY (BMI 30-39.9): ICD-10-CM

## 2022-06-07 ENCOUNTER — OFFICE VISIT (OUTPATIENT)
Dept: SURGERY | Facility: CLINIC | Age: 62
End: 2022-06-07
Payer: COMMERCIAL

## 2022-06-07 VITALS — BODY MASS INDEX: 36.26 KG/M2 | WEIGHT: 244.81 LBS | HEIGHT: 69 IN

## 2022-06-07 DIAGNOSIS — Z98.84 S/P LAPAROSCOPIC SLEEVE GASTRECTOMY: ICD-10-CM

## 2022-06-07 DIAGNOSIS — E66.9 OBESITY (BMI 30-39.9): ICD-10-CM

## 2022-06-07 DIAGNOSIS — R63.2 INCREASED APPETITE: ICD-10-CM

## 2022-06-07 PROCEDURE — 3008F BODY MASS INDEX DOCD: CPT

## 2022-06-07 PROCEDURE — 97803 MED NUTRITION INDIV SUBSEQ: CPT

## 2022-06-07 NOTE — PATIENT INSTRUCTIONS
Recommendations/goals:    1. Keep a food record, My [employer] or My Net Diary, select macros dashboard. 2.  Continue to consume 4-6 meals/snacks. Aim for 79-99 grams per day. Keep the carbohydrates at 120 grams per day or less per day. Increase fruit and vegetables. 3.  Aim for 64 oz of water per day. (Make your own Kathleen Clock using decaf iced tea with True Lemon). 4.  Continue the strength training. 5.  Continue to walk within limits.

## 2022-06-13 RX ORDER — METHOTREXATE 2.5 MG/1
TABLET ORAL
Qty: 96 TABLET | Refills: 1 | Status: SHIPPED | OUTPATIENT
Start: 2022-06-13

## 2022-06-13 RX ORDER — MONTELUKAST SODIUM 10 MG/1
TABLET ORAL
Qty: 90 TABLET | Refills: 0 | Status: SHIPPED | OUTPATIENT
Start: 2022-06-13

## 2022-06-13 NOTE — TELEPHONE ENCOUNTER
Last filled: 2/14/22 #96 tab with 1 refill   LOV: 3/23/22  Future Appointments   Date Time Provider Joel Coheni   6/13/2022  3:00 PM Fallon Nicholson PsyD L[facility]   6/15/2022  6:45 PM Shalonda Crain, [facility] SYSTEM [facility] SYSTEM OF [facility]   6/16/2022  3:30 PM Glenna Gupta MD [address] [facility]   6/16/2022  4:20 PM Neomia Dill, APRN ECCFHOBGYN [facility]   6/18/2022 10:50 AM EC ALLERGY ECSCHALRGY [facility]   6/20/2022  6:45 PM Shalonda Crain, [facility] SYSTEM OF THE [facility]   6/27/2022  6:45 PM Shalonda Breann, [facility] CARE SYSTEM OF THE [facility] SYSTEM OF THE [facility]   6/29/2022  6:45 PM Shalonda Breann, [facility] CARE SYSTEM OF [facility] SYSTEM OF [facility]   7/6/2022  6:45 PM Shalonda Breann, [facility] CARE SYSTEM OF THE [facility] SYSTEM OF [facility]   7/12/2022  8:00 AM [facility] RM[facility]   7/16/2022 10:00 AM EC ALLERGY ECSCHALRGY [facility]   9/14/2022  4:30 PM Mike Blanco, RD [address] [facility]   9/23/2022  8:40 AM Ted Reynoso MD [facility] SYSTEM OF [facility]   11/14/2022  9:40 AM Dorene Todd MD ECCFHIM EC [facility]     Labs:   Component      Latest Ref Rng & Units 3/23/2022   WBC      4.0 - 11.0 x10(3) uL 6.7   RBC      3.80 - 5.30 x10(6)uL 4.05   Hemoglobin      12.0 - 16.0 g/dL 12.8   Hematocrit      35.0 - 48.0 % 40.1   MCV      80.0 - 100.0 fL 99.0   MCH      26.0 - 34.0 pg 31.6   MCHC      31.0 - 37.0 g/dL 31.9   RDW      11.0 - 15.0 % 14.2   RDW-SD      35.1 - 46.3 fL 51.5 (H)   Platelet Count      822.3 - 450.0 10(3)uL 220.0   CREATININE      0.55 - 1.02 mg/dL 1.08 (H)   eGFR NON-AFR.  AMERICAN      >=60 56 (L)   eGFR       >=60 64   SED RATE      0 - 30 mm/Hr 12   C-REACTIVE PROTEIN      <0.30 mg/dL <0.29   AST (SGOT)      15 - 37 U/L 22   ALT (SGPT)      13 - 56 U/L 16

## 2022-06-15 ENCOUNTER — OFFICE VISIT (OUTPATIENT)
Dept: PHYSICAL THERAPY | Facility: HOSPITAL | Age: 62
End: 2022-06-15
Attending: Other
Payer: COMMERCIAL

## 2022-06-15 PROCEDURE — 97116 GAIT TRAINING THERAPY: CPT

## 2022-06-15 NOTE — PROGRESS NOTES
Diagnosis: Parkinson's Disease, difficulty walking, imbalance   Visit (# authorized):   38 per POC ([employer] PPO)                      Referring Physician: Luis Angel Sotomayor    Precautions: at risk for falls     Medication changes since last visit?:  No    Subjective: No new information to report. Objective:    Date  4/4 4/6 4/11 4/20 4/25 4/27 5/2 5/4 5/9 5/16 5/18 5/25 6/1 6/15   Visit Number  21 22 (PN) 23 24 25 26 27 28 29 30 (PN) 31 32 33 34   NMR x x  x  x  x  x       Ther EX  x        x       Ther Act                 Gait Training   x  x  x  x  x x x x   CRM                  Manual                   Additional Treatment Information:    Gait training (40 mins)  TDM with harness    Music playing, engaged in conversation intermittent cues for upright posture and increased B step length 2 x10 min min bouts: 0.4-0.7 mph   Backwards ambulation  0.3 mph 3 mins     1 lg lap around clinic with rollator, navigating cluttered environment     Assessment:  Rest breaks as needed in session. Pt with improved gait pattern at end of session after TDM. Also able to tolerate more walking today- endurance/tolerance to activity appears to be improving.      Plan:in future sessions: rebounder, foam, stepping over obstacle 1 foot    Charges: 3 GT       Total Timed Treatment:40 min  Total Treatment Time:40 min

## 2022-06-16 ENCOUNTER — OFFICE VISIT (OUTPATIENT)
Dept: SURGERY | Facility: CLINIC | Age: 62
End: 2022-06-16
Payer: COMMERCIAL

## 2022-06-16 ENCOUNTER — OFFICE VISIT (OUTPATIENT)
Dept: OBGYN CLINIC | Facility: CLINIC | Age: 62
End: 2022-06-16
Payer: COMMERCIAL

## 2022-06-16 VITALS
OXYGEN SATURATION: 98 % | WEIGHT: 242.19 LBS | BODY MASS INDEX: 35.87 KG/M2 | HEIGHT: 69 IN | DIASTOLIC BLOOD PRESSURE: 68 MMHG | SYSTOLIC BLOOD PRESSURE: 121 MMHG | HEART RATE: 89 BPM | RESPIRATION RATE: 16 BRPM

## 2022-06-16 VITALS
BODY MASS INDEX: 36 KG/M2 | HEART RATE: 76 BPM | DIASTOLIC BLOOD PRESSURE: 76 MMHG | WEIGHT: 242.81 LBS | SYSTOLIC BLOOD PRESSURE: 123 MMHG

## 2022-06-16 DIAGNOSIS — Z78.0 POSTMENOPAUSAL: ICD-10-CM

## 2022-06-16 DIAGNOSIS — E78.5 HYPERLIPIDEMIA, UNSPECIFIED HYPERLIPIDEMIA TYPE: ICD-10-CM

## 2022-06-16 DIAGNOSIS — E66.9 OBESITY (BMI 30-39.9): ICD-10-CM

## 2022-06-16 DIAGNOSIS — Z98.84 S/P LAPAROSCOPIC SLEEVE GASTRECTOMY: ICD-10-CM

## 2022-06-16 DIAGNOSIS — Z51.81 ENCOUNTER FOR THERAPEUTIC DRUG MONITORING: ICD-10-CM

## 2022-06-16 DIAGNOSIS — R32 URINARY INCONTINENCE, UNSPECIFIED TYPE: ICD-10-CM

## 2022-06-16 DIAGNOSIS — Z01.419 ENCOUNTER FOR ANNUAL ROUTINE GYNECOLOGICAL EXAMINATION: Primary | ICD-10-CM

## 2022-06-16 DIAGNOSIS — R63.2 INCREASED APPETITE: Primary | ICD-10-CM

## 2022-06-16 PROCEDURE — 3074F SYST BP LT 130 MM HG: CPT | Performed by: NURSE PRACTITIONER

## 2022-06-16 PROCEDURE — 3074F SYST BP LT 130 MM HG: CPT | Performed by: INTERNAL MEDICINE

## 2022-06-16 PROCEDURE — 99396 PREV VISIT EST AGE 40-64: CPT | Performed by: NURSE PRACTITIONER

## 2022-06-16 PROCEDURE — 3078F DIAST BP <80 MM HG: CPT | Performed by: INTERNAL MEDICINE

## 2022-06-16 PROCEDURE — 3078F DIAST BP <80 MM HG: CPT | Performed by: NURSE PRACTITIONER

## 2022-06-16 PROCEDURE — 99214 OFFICE O/P EST MOD 30 MIN: CPT | Performed by: INTERNAL MEDICINE

## 2022-06-16 PROCEDURE — 3008F BODY MASS INDEX DOCD: CPT | Performed by: INTERNAL MEDICINE

## 2022-06-16 RX ORDER — PHENTERMINE HYDROCHLORIDE 37.5 MG/1
18.75 TABLET ORAL
Qty: 30 TABLET | Refills: 2 | Status: SHIPPED | OUTPATIENT
Start: 2022-06-16

## 2022-06-16 RX ORDER — TOPIRAMATE 50 MG/1
50 TABLET, FILM COATED ORAL 2 TIMES DAILY
Qty: 180 TABLET | Refills: 1 | Status: SHIPPED | OUTPATIENT
Start: 2022-06-16 | End: 2022-09-14

## 2022-06-18 ENCOUNTER — NURSE ONLY (OUTPATIENT)
Dept: ALLERGY | Facility: CLINIC | Age: 62
End: 2022-06-18
Payer: COMMERCIAL

## 2022-06-18 DIAGNOSIS — J30.89 ENVIRONMENTAL AND SEASONAL ALLERGIES: ICD-10-CM

## 2022-06-18 PROCEDURE — 95117 IMMUNOTHERAPY INJECTIONS: CPT | Performed by: ALLERGY & IMMUNOLOGY

## 2022-06-20 ENCOUNTER — OFFICE VISIT (OUTPATIENT)
Dept: PHYSICAL THERAPY | Facility: HOSPITAL | Age: 62
End: 2022-06-20
Attending: Other
Payer: COMMERCIAL

## 2022-06-20 DIAGNOSIS — K59.01 SLOW TRANSIT CONSTIPATION: ICD-10-CM

## 2022-06-20 PROCEDURE — 97116 GAIT TRAINING THERAPY: CPT

## 2022-06-20 RX ORDER — LINACLOTIDE 145 UG/1
CAPSULE, GELATIN COATED ORAL
Qty: 30 CAPSULE | Refills: 2 | Status: SHIPPED | OUTPATIENT
Start: 2022-06-20

## 2022-06-20 NOTE — PROGRESS NOTES
Diagnosis: Parkinson's Disease, difficulty walking, imbalance   Visit (# authorized):   38 per POC ([employer] PPO)                      Referring Physician: Bethany Armstrong    Precautions: at risk for falls     Medication changes since last visit?:  No    Subjective: No new information to report. Objective:    Date  4/25 4/27 5/2 5/4 5/9 5/16 5/18 5/25 6/1 6/15 6/20     Visit Number  25 26 27 28 29 30 (PN) 31 32 33 34 35     NMR  x  x  x          Ther EX      x          Ther Act                Gait Training x  x  x  x x x x x     CRM                 Manual                  Additional Treatment Information:    Gait training (40 mins)  TDM with harness    Music playing, engaged in conversation intermittent cues for upright posture and increased B step length 2 x10 min min bouts: 0.6-0.7 mph   Backwards ambulation  0.3 mph 3 mins     1 lg lap around clinic with rollator, navigating cluttered environment     Assessment:  Rest breaks as needed in session. Pt with improved gait pattern at end of session after TDM- continues to demo good carryover of gait from treadmill.      Plan:in future sessions: rebounder, foam, stepping over obstacle 1 foot    Charges: 3 GT       Total Timed Treatment:40 min  Total Treatment Time:40 min

## 2022-06-27 ENCOUNTER — OFFICE VISIT (OUTPATIENT)
Dept: PHYSICAL THERAPY | Facility: HOSPITAL | Age: 62
End: 2022-06-27
Attending: Other
Payer: COMMERCIAL

## 2022-06-27 PROCEDURE — 97112 NEUROMUSCULAR REEDUCATION: CPT

## 2022-06-27 NOTE — PROGRESS NOTES
Diagnosis: Parkinson's Disease, difficulty walking, imbalance   Visit (# authorized):   38 per POC ([employer] PPO)                      Referring Physician: Adam Do    Precautions: at risk for falls     Medication changes since last visit?:  No    Subjective: No new information to report. Objective:    Date  4/25 4/27 5/2 5/4 5/9 5/16 5/18 5/25 6/1 6/15 6/20 6/27    Visit Number  25 26 27 28 29 30 (PN) 31 32 33 34 35 36    NMR  x  x  x      x    Ther EX      x          Ther Act                Gait Training x  x  x  x x x x x     CRM                 Manual                  Additional Treatment Information:    NMR (38 mins)  In // bars: Fwd step with UE \"push\" to foam x15 B -mirroring, SBA   Fwd step with UE  \"push\" from foam x15 B -mirroring, SBA    Obstacle negotiation A/P no UE support thin stick x10 B- SBA-CGA for balance checks    Obstacle negotiation A/P 1 foot 1/2 foam roll x15 B - SBA-CGA for balance checks    Seated on foam beam to mimic tub edge-   Boxing pt taking punches (perturbations) in various directions 45 sec x3 bouts    Static sitting balance with feet on dynadisc---> alt UE lifts, turn head to look at hand x10 B     Seated on dynadisc: posterior wt shift with correction and L wt shift with correction - multiple bouts of each     Patient education: POC discussed with patient. Assessment:  Rest breaks as needed in session. Generally good postural stability with standing activities despite being challenging for patient. Challenged by seated balance tasks likely due to fatigue.      Plan:in future sessions: rebounder    Charges: 3 NMR      Total Timed Treatment:38 min  Total Treatment Time:40 min

## 2022-06-29 ENCOUNTER — OFFICE VISIT (OUTPATIENT)
Dept: PHYSICAL THERAPY | Facility: HOSPITAL | Age: 62
End: 2022-06-29
Attending: Other
Payer: COMMERCIAL

## 2022-06-29 PROCEDURE — 97116 GAIT TRAINING THERAPY: CPT

## 2022-06-29 PROCEDURE — 97112 NEUROMUSCULAR REEDUCATION: CPT

## 2022-06-30 RX ORDER — PHENTERMINE HYDROCHLORIDE 37.5 MG/1
TABLET ORAL
Qty: 45 TABLET | Refills: 2 | Status: SHIPPED | OUTPATIENT
Start: 2022-06-30

## 2022-06-30 NOTE — PROGRESS NOTES
Diagnosis: Parkinson's Disease, difficulty walking, imbalance   Visit (# authorized):   38 per POC ([employer] PPO)                      Referring Physician: Octavia Hummel    Precautions: at risk for falls     Medication changes since last visit?:  No    Subjective: No new information to report. Objective:  Patient arrived late to session  Date  4/25 4/27 5/2 5/4 5/9 5/16 5/18 5/25 6/1 6/15 6/20 6/27 6/29   Visit Number  25 26 27 28 29 30 (PN) 31 32 33 34 35 36 37   NMR  x  x  x      x x   Ther EX      x          Ther Act                Gait Training x  x  x  x x x x x  x   CRM                 Manual                  Additional Treatment Information:    Gait training (15 mins)  TDM with harness    Music playing, engaged in conversation intermittent cues for upright posture and increased B step length  X5, x2 mins bouts: 0.4-0.5 mph    NMR (15 mins)  Seated on foam beam to mimic tub [id number] for balance    Boxing: cross with trunk rotation/wt shifts  45 sec x3 bouts    Static sitting balance with feet on Reebonzadisc playing catch -7# ball x15      Assessment:  Rest breaks increased today. Pt with significant difficulty walking with increased shuffle and reduced B step length. Appears fatigued.       Plan:reassess     Charges: 1 NMR, 1 GT       Total Timed Treatment:30 min  Total Treatment Time:30 min

## 2022-07-06 ENCOUNTER — OFFICE VISIT (OUTPATIENT)
Dept: PHYSICAL THERAPY | Facility: HOSPITAL | Age: 62
End: 2022-07-06
Attending: Other
Payer: COMMERCIAL

## 2022-07-06 PROCEDURE — 97110 THERAPEUTIC EXERCISES: CPT

## 2022-07-06 PROCEDURE — 97112 NEUROMUSCULAR REEDUCATION: CPT

## 2022-07-06 NOTE — PROGRESS NOTES
Discharge Summary    Referring Physician: Josefa Ruffin    Diagnosis:  Parkinson's Disease, difficulty walking, imbalance     Pt has attended 38 visits in Physical Therapy. Subjective: Pt reports improvement in walking and balance with walking more so than balance as of late as focus in therapy switched to more treadmill training. Continues to experience imbalance when sitting on tub edge to perform ADL's. Feels as if she can sit for approximately 30 seconds before losing her balance. Continues to work with her . Assessment: Pt demo's improvement in functional gait speed as evidenced by TUG however no change in SSWS is noted when measured. Quality of gait is overall improved with reduced shuffle, improved robina, and less FoG per observation over last several sessions. Postural stability with gait tasks also appears to have improved. She continues to have difficulty maintaining sitting balance on a narrower surface for more than a brief period before losing balance in posterior direction. Her sitting posture is overall improved though inconsistent at this time. Ankle strength also appears to be improving despite no specific intervention for this as of late. She was encouraged to continue [facility] in order to facilitate further progression. She is to be discharged to [facility] at this time as progress in therapy appears to have plateaued. Encouraged to return to PT with new order should change in functional status occur. Objective:   Values from last testing documented in parenthesis   Gait speed: SSWS: 0.71 m/s rollator (unchanged)  FWS: 0.93 m/s (0.9 m/s)    Timed Up and Go (AD, time): 10 seconds (14.4 seconds) rollator; 9.3 seconds (11.3 seconds) no AD-supervision    Seated EOB on foam beam to mimic seated edge of tub engaged in UE reaching task -5-6 LOB.       Strength (-/5)    R L   Hip     Flexion 4+ (unchanged) 4   (unchanged)   Ankle     EVR 3- (2+) NT   Fibularis longus (PF/EVR) 2 (1+) NT   Fibularis tertius (DF/EVR) 2+ (unchanged) NT     Romberg EC 12 seconds   Step stance EC for max hold ea foot in front  HEP reviewed verbally with patient with discussion how to self progress and rationale for exercises. Goals:   Goals to be met within POC or 90 days:  1. Pt to be independent in HEP Goal met    2. Pt will improve BBS by at least 5 points to reflect reduced risk of falls in home. Goal met 2/28  3. Pt will improve ankle EVR strength on the right to 3/5 in order to improve ankle stability during gait. Goal not met, progress made   4. Pt will demonstrate upright standing posture with COM over RODY for at least 2 minutes in order to reduce risk of LOB posteriorly. Goal met 4/7   New goal (2/28) Pt will improve quality of walking with reduced shuffle/festination of gait in order to improve SSWS to >0.8 m/s to refect improved safety with functional, community ambulation. Goal not met  New goal (2/28): Pt will improve dynamic seated balance in order to perform ADL's without posterior LOB. Goal not met, progress made      Charges: 1 NMR, 1 TE     Total timed treatment: 30 mins  Total treatment time: 30 mins      Plan: Discharge    Patient was advised of these findings, precautions, and treatment options and has agreed to actively participate in planning and for this course of care. Thank you for your referral. If you have any questions, please contact me at Dept: [phone].     Sincerely,    Gene Beckman PT, NCS    Electronically signed by therapist: Gene Beckman, BLAINE

## 2022-07-12 ENCOUNTER — NURSE ONLY (OUTPATIENT)
Dept: ALLERGY | Facility: CLINIC | Age: 62
End: 2022-07-12
Payer: COMMERCIAL

## 2022-07-12 ENCOUNTER — HOSPITAL ENCOUNTER (OUTPATIENT)
Dept: MAMMOGRAPHY | Facility: HOSPITAL | Age: 62
Discharge: HOME | End: 2022-07-12
Attending: INTERNAL MEDICINE
Payer: COMMERCIAL

## 2022-07-12 DIAGNOSIS — Z12.31 ENCOUNTER FOR SCREENING MAMMOGRAM FOR BREAST CANCER: ICD-10-CM

## 2022-07-12 DIAGNOSIS — J30.89 ENVIRONMENTAL AND SEASONAL ALLERGIES: ICD-10-CM

## 2022-07-12 PROCEDURE — 77067 SCR MAMMO BI INCL CAD: CPT | Performed by: INTERNAL MEDICINE

## 2022-07-12 PROCEDURE — 77063 BREAST TOMOSYNTHESIS BI: CPT | Performed by: INTERNAL MEDICINE

## 2022-07-12 PROCEDURE — 95117 IMMUNOTHERAPY INJECTIONS: CPT | Performed by: ALLERGY & IMMUNOLOGY

## 2022-07-15 NOTE — TELEPHONE ENCOUNTER
LOV: 3/23/22  Future Appointments   Date Time Provider Joel Mann   8/6/2022 11:10 AM EC ALLERGY ECSCHALRGY EC [facility]   8/18/2022  3:00 PM Dolores Dyer PsyD [facility] L[facility]   8/29/2022  5:30 PM EC ALLERGY ECSCHALRGY EC [facility]   9/14/2022  4:30 PM Saniya Fox, RD [address] [facility] OF THE [facility]   9/21/2022  2:00 PM Karin Lynn MD [address] [facility] OF THE [facility]   9/23/2022  8:40 AM Bryan Flores MD [address] [place] [address] OF [facility]   9/24/2022 11:10 AM EC ALLERGY ECSCHALRGY EC [facility]   11/14/2022  9:40 AM Wood Bernal MD [facility]     Labs:   Component      Latest Ref Rng & Units 3/23/2022   WBC      4.0 - 11.0 x10(3) uL 6.7   RBC      3.80 - 5.30 x10(6)uL 4.05   Hemoglobin      12.0 - 16.0 g/dL 12.8   Hematocrit      35.0 - 48.0 % 40.1   MCV      80.0 - 100.0 fL 99.0   MCH      26.0 - 34.0 pg 31.6   MCHC      31.0 - 37.0 g/dL 31.9   RDW      11.0 - 15.0 % 14.2   RDW-SD      35.1 - 46.3 fL 51.5 (H)   Platelet Count      682.3 - 450.0 10(3)uL 220.0   CREATININE      0.55 - 1.02 mg/dL 1.08 (H)   eGFR NON-AFR.  AMERICAN      >=60 56 (L)   eGFR       >=60 64   SED RATE      0 - 30 mm/Hr 12   C-REACTIVE PROTEIN      <0.30 mg/dL <0.29   AST (SGOT)      15 - 37 U/L 22   ALT (SGPT)      13 - 56 U/L 16   TSH      0.358 - 3.740 mIU/mL 1.660

## 2022-07-28 ENCOUNTER — TELEPHONE (OUTPATIENT)
Dept: RHEUMATOLOGY | Facility: CLINIC | Age: 62
End: 2022-07-28

## 2022-07-29 NOTE — TELEPHONE ENCOUNTER
Fax received from [address] [place] [address] Leona approved   07/28/2022 - 07/28/2023  PA # [id number]    Patient has plenty of refills.

## 2022-08-06 ENCOUNTER — NURSE ONLY (OUTPATIENT)
Dept: ALLERGY | Facility: CLINIC | Age: 62
End: 2022-08-06
Payer: COMMERCIAL

## 2022-08-06 DIAGNOSIS — J30.89 ENVIRONMENTAL AND SEASONAL ALLERGIES: ICD-10-CM

## 2022-08-06 PROCEDURE — 95117 IMMUNOTHERAPY INJECTIONS: CPT | Performed by: ALLERGY & IMMUNOLOGY

## 2022-08-29 ENCOUNTER — NURSE ONLY (OUTPATIENT)
Dept: ALLERGY | Facility: CLINIC | Age: 62
End: 2022-08-29
Payer: COMMERCIAL

## 2022-08-29 DIAGNOSIS — J30.89 ENVIRONMENTAL AND SEASONAL ALLERGIES: ICD-10-CM

## 2022-08-30 RX ORDER — LEVOTHYROXINE SODIUM 0.12 MG/1
TABLET ORAL
Qty: 90 TABLET | Refills: 1 | Status: SHIPPED | OUTPATIENT
Start: 2022-08-30

## 2022-08-30 RX ORDER — PRAVASTATIN SODIUM 20 MG
20 TABLET ORAL NIGHTLY
Qty: 90 TABLET | Refills: 1 | Status: SHIPPED | OUTPATIENT
Start: 2022-08-30 | End: 2023-03-28

## 2022-08-30 RX ORDER — FOLIC ACID 1 MG/1
1 TABLET ORAL DAILY
Qty: 90 TABLET | Refills: 1 | Status: SHIPPED | OUTPATIENT
Start: 2022-08-30

## 2022-08-30 NOTE — TELEPHONE ENCOUNTER
MACK Moore (on call-on behalf of Dr Celena Wright ) ,thanks        Refill passed per [facility] protocol.      Requested Prescriptions   Pending Prescriptions Disp Refills    FOLIC ACID 1 MG Oral Tab [Pharmacy Med Name: FOLIC ACID 1 MG TABLET] 90 tablet 3     Sig: TAKE 1 TABLET BY MOUTH EVERY DAY        There is no refill protocol information for this order        LEVOTHYROXINE 125 MCG Oral Tab [Pharmacy Med Name: LEVOTHYROXINE 125 MCG TABLET] 90 tablet 1     Sig: TAKE 1 TABLET BY MOUTH EVERY DAY BEFORE BREAKFAST        Thyroid Medication Protocol Passed - 8/30/2022 10:32 AM        Passed - TSH in past 12 months        Passed - Last TSH value is normal     Lab Results   Component Value Date    TSH 1.660 03/23/2022                 Passed - In person appointment or virtual visit in the past 12 mos or appointment in next 3 mos       Recent Outpatient Visits              Yesterday Environmental and seasonal allergies    [facility], [address]    Nurse Only    3 weeks ago Environmental and seasonal allergies    [facility], [address]    Nurse Only    1 month ago Environmental and seasonal allergies    [facility], [address] Only    1 month ago     [address] [facility], [facility] [place], [address] Visit    2 months ago     [facility] [facility], [facility] [place], [address] Visit     Future Appointments         Provider Department Appt Notes    In 2 weeks Belinda Lamb, [address] [facility], [address] [facility] [address]     In 3 weeks Deana Weston, [address] [facility], [address], [address]     In 3 weeks [employer], Vallery Bonus, [address] [facility], [address] 6 mon f/u    In 3 weeks [address]     In 1 month [address]     In 2 months Mejia Sheriff MD [place] [employer] [facility], [address] [facility] [place] Yearly physical    In 2 months [address], [place]     In 3 months [address], [address]                       Future Appointments         Provider Department Appt Notes    In 2 weeks Mike Blanco, [address] [facility], [address], [place]     In 3 weeks Glenna Gupta, [address], [address], [place]     In 3 weeks [employer], [facility], [address] [facility], [address], [place] 6 mon f/u    In 3 weeks [address], [place]     In 1 month [address], [place]     In 2 months Dorene Todd MD [place] [place], [facility], [address] Yearly physical    In 2 months [address], [place]     In 3 months [address], [address]              Recent Outpatient Visits              Yesterday Environmental and seasonal allergies    [facility], [facility], [address]    Nurse Only    3 weeks ago Environmental and seasonal allergies    [facility] [place], [facility], [address] Only    1 month ago Environmental and seasonal allergies    [facility], [facility], [address] Only    1 month ago     [address] Visit    2 months ago     [address] [place], [place]    Office Visit

## 2022-08-30 NOTE — TELEPHONE ENCOUNTER
Refill passed per [address]adillo protocol.     Requested Prescriptions   Pending Prescriptions Disp Refills    PRAVASTATIN 20 MG Oral Tab [Pharmacy Med Name: PRAVASTATIN SODIUM 20 MG TAB] 90 tablet 1     Sig: TAKE 1 TABLET BY MOUTH EVERY DAY AT NIGHT        Cholesterol Medication Protocol Passed - 8/30/2022 12:10 AM        Passed - ALT in past 12 months        Passed - LDL in past 12 months        Passed - Last ALT < 80       Lab Results   Component Value Date    ALT 16 03/23/2022             Passed - Last LDL < 130     Lab Results   Component Value Date     (H) 11/13/2021               Passed - In person appointment or virtual visit in the past 12 mos or appointment in next 3 mos       Recent Outpatient Visits              Yesterday Environmental and seasonal allergies    [address]gadillo, [address] [facility] [place]    Nurse Only    3 weeks ago Environmental and seasonal allergies    [address], [address] [facility] [place], [address] Only    1 month ago Environmental and seasonal allergies    [address] Delgadillo, [address] [facility], [address] Only    1 month ago     [facility] [facility], [facility] [place], [address] Visit    2 months ago     [address] [facility], [facility] [place], [address] Visit     Future Appointments         Provider Department Appt Notes    In 2 weeks Michelle Andrews, [address], [address] [facility] [address]     In 3 weeks Albertina Omalley, [address], [address], [place]     In 3 weeks Silverio Magaña, Ousmane Brood, [address] [facility], [address] [facility] 6 mon f/u    In 3 weeks [address]     In 1 month [address]     In 2 months Milagro Barros MD [address] Yearly physical    In 2 months [address]     In 3 months [address] [facility] Oscar [address]                      Recent Outpatient Visits              Yesterday Environmental and seasonal allergies    [facility], [facility], [address]    Nurse Only    3 weeks ago Environmental and seasonal allergies    [facility], [facility], [address] Only    1 month ago Environmental and seasonal allergies    [facility], [facility], [address] Only    1 month ago     [facility] Sharon Santos, [facility] Visit    2 months ago     [address]    Office Visit            Future Appointments         Provider Department Appt Notes    In 2 weeks Mil Davenport, [address], [address], [place]     In 3 weeks Odette Michelle, [address], [address], [place]     In 3 weeks [employer], Adele Laundry, [address] [place] [address], [address] [place] [address] 6 mon f/u    In 3 weeks [address]     In 1 month [address]     In 2 months Theodor Brittle, MD [place] [facility] [place], [facility], [address] Yearly physical    In 2 months [address]     In 3 months [address]

## 2022-09-01 ENCOUNTER — NURSE TRIAGE (OUTPATIENT)
Dept: INTERNAL MEDICINE CLINIC | Facility: CLINIC | Age: 62
End: 2022-09-01

## 2022-09-01 NOTE — TELEPHONE ENCOUNTER
----- Message from Luann Ray RN sent at 9/1/2022  2:23 PM CDT -----  Regarding: FW: Left Achilles tendon      ----- Message -----  From: Luis Antonio Bernal  Sent: 8/31/2022   8:56 PM CDT  To: Anne Rn Triage  Subject: Left Achilles tendon                             pushed off on our way backMy left achilles tendon ln that I mentioned in my last visit is not getting any better. It's actually probably worse. I have iced it. I have tried anti-inflammatory cream and I had my massage therapist try to work it out and it's still very sore. It is becoming difficult to walk, which is challenging enough already. You mentioned contacting you if it didn't improve. Is there anything else you can suggest or what should I do now?  Is this something I should see you for or do I need to see a specialist?  Thank you,  Bonny Aguirre

## 2022-09-02 ENCOUNTER — OFFICE VISIT (OUTPATIENT)
Dept: INTERNAL MEDICINE CLINIC | Facility: CLINIC | Age: 62
End: 2022-09-02
Payer: COMMERCIAL

## 2022-09-02 VITALS
BODY MASS INDEX: 35.99 KG/M2 | DIASTOLIC BLOOD PRESSURE: 77 MMHG | HEART RATE: 102 BPM | HEIGHT: 69 IN | SYSTOLIC BLOOD PRESSURE: 115 MMHG | WEIGHT: 243 LBS

## 2022-09-02 DIAGNOSIS — M67.972 DISORDER OF LEFT ACHILLES TENDON: Primary | ICD-10-CM

## 2022-09-02 PROCEDURE — 99213 OFFICE O/P EST LOW 20 MIN: CPT | Performed by: INTERNAL MEDICINE

## 2022-09-02 PROCEDURE — 3078F DIAST BP <80 MM HG: CPT | Performed by: INTERNAL MEDICINE

## 2022-09-02 PROCEDURE — 3074F SYST BP LT 130 MM HG: CPT | Performed by: INTERNAL MEDICINE

## 2022-09-02 PROCEDURE — 3008F BODY MASS INDEX DOCD: CPT | Performed by: INTERNAL MEDICINE

## 2022-09-09 ENCOUNTER — TELEPHONE (OUTPATIENT)
Dept: INTERNAL MEDICINE CLINIC | Facility: CLINIC | Age: 62
End: 2022-09-09

## 2022-09-09 NOTE — TELEPHONE ENCOUNTER
Patient is requesting PT referral to be faxed to Edgar Cedeno in [facility]. Fax # [phone].

## 2022-09-14 ENCOUNTER — OFFICE VISIT (OUTPATIENT)
Dept: SURGERY | Facility: CLINIC | Age: 62
End: 2022-09-14
Payer: COMMERCIAL

## 2022-09-14 VITALS — WEIGHT: 239.5 LBS | HEIGHT: 69 IN | BODY MASS INDEX: 35.47 KG/M2

## 2022-09-14 DIAGNOSIS — G20 PARKINSON DISEASE (HCC): ICD-10-CM

## 2022-09-14 DIAGNOSIS — Z98.84 S/P LAPAROSCOPIC SLEEVE GASTRECTOMY: ICD-10-CM

## 2022-09-14 DIAGNOSIS — E66.9 OBESITY (BMI 30-39.9): ICD-10-CM

## 2022-09-14 DIAGNOSIS — E78.5 HYPERLIPIDEMIA, UNSPECIFIED HYPERLIPIDEMIA TYPE: ICD-10-CM

## 2022-09-14 PROCEDURE — 3008F BODY MASS INDEX DOCD: CPT

## 2022-09-14 PROCEDURE — 97803 MED NUTRITION INDIV SUBSEQ: CPT

## 2022-09-14 NOTE — PATIENT INSTRUCTIONS
Recommendations/goals:    1. Keep a complete food record, My Fitness Pal or My Net Diary, select macros dashboard. 2.  Continue to consume 4-6 meals/snacks. Continue to aim for 79-99 grams per day of protein. Keep the carbohydrates at 120 grams per day or less per day. Increase fruit and vegetables. 3.  Continue to drink 64 oz of water per day. (decaf green tea )  4. Continue the strength training. 5.  Continue to walk within limits.

## 2022-09-21 ENCOUNTER — TELEMEDICINE (OUTPATIENT)
Dept: SURGERY | Facility: CLINIC | Age: 62
End: 2022-09-21

## 2022-09-21 ENCOUNTER — LAB ENCOUNTER (OUTPATIENT)
Dept: LAB | Facility: HOSPITAL | Age: 62
End: 2022-09-21
Attending: INTERNAL MEDICINE

## 2022-09-21 VITALS — HEIGHT: 69 IN | WEIGHT: 239 LBS | BODY MASS INDEX: 35.4 KG/M2

## 2022-09-21 DIAGNOSIS — E78.5 HYPERLIPIDEMIA, UNSPECIFIED HYPERLIPIDEMIA TYPE: Primary | ICD-10-CM

## 2022-09-21 DIAGNOSIS — Z98.84 S/P LAPAROSCOPIC SLEEVE GASTRECTOMY: ICD-10-CM

## 2022-09-21 DIAGNOSIS — M06.9 RHEUMATOID ARTHRITIS, INVOLVING UNSPECIFIED SITE, UNSPECIFIED WHETHER RHEUMATOID FACTOR PRESENT (HCC): ICD-10-CM

## 2022-09-21 DIAGNOSIS — R63.2 INCREASED APPETITE: ICD-10-CM

## 2022-09-21 DIAGNOSIS — E66.9 OBESITY (BMI 30-39.9): ICD-10-CM

## 2022-09-21 DIAGNOSIS — Z51.81 THERAPEUTIC DRUG MONITORING: ICD-10-CM

## 2022-09-21 DIAGNOSIS — K59.01 SLOW TRANSIT CONSTIPATION: ICD-10-CM

## 2022-09-21 LAB
ALT SERPL-CCNC: 11 U/L
AST SERPL-CCNC: 20 U/L (ref 15–37)
CREAT BLD-MCNC: 1.07 MG/DL
CRP SERPL-MCNC: <0.29 MG/DL (ref ?–0.3)
DEPRECATED RDW RBC AUTO: 53.1 FL (ref 35.1–46.3)
ERYTHROCYTE [DISTWIDTH] IN BLOOD BY AUTOMATED COUNT: 14.6 % (ref 11–15)
ERYTHROCYTE [SEDIMENTATION RATE] IN BLOOD: 25 MM/HR
GFR SERPLBLD BASED ON 1.73 SQ M-ARVRAT: 59 ML/MIN/1.73M2 (ref 60–?)
HCT VFR BLD AUTO: 39.3 %
HGB BLD-MCNC: 12.6 G/DL
MCH RBC QN AUTO: 31.6 PG (ref 26–34)
MCHC RBC AUTO-ENTMCNC: 32.1 G/DL (ref 31–37)
MCV RBC AUTO: 98.5 FL
PLATELET # BLD AUTO: 247 10(3)UL (ref 150–450)
RBC # BLD AUTO: 3.99 X10(6)UL
WBC # BLD AUTO: 4.6 X10(3) UL (ref 4–11)

## 2022-09-21 PROCEDURE — 99214 OFFICE O/P EST MOD 30 MIN: CPT | Performed by: INTERNAL MEDICINE

## 2022-09-21 PROCEDURE — 84460 ALANINE AMINO (ALT) (SGPT): CPT

## 2022-09-21 PROCEDURE — 82565 ASSAY OF CREATININE: CPT

## 2022-09-21 PROCEDURE — 85027 COMPLETE CBC AUTOMATED: CPT

## 2022-09-21 PROCEDURE — 3008F BODY MASS INDEX DOCD: CPT | Performed by: INTERNAL MEDICINE

## 2022-09-21 PROCEDURE — 36415 COLL VENOUS BLD VENIPUNCTURE: CPT

## 2022-09-21 PROCEDURE — 85652 RBC SED RATE AUTOMATED: CPT

## 2022-09-21 PROCEDURE — 86140 C-REACTIVE PROTEIN: CPT

## 2022-09-21 PROCEDURE — 84450 TRANSFERASE (AST) (SGOT): CPT

## 2022-09-21 RX ORDER — PHENTERMINE HYDROCHLORIDE 37.5 MG/1
TABLET ORAL
Qty: 45 TABLET | Refills: 2 | Status: SHIPPED | OUTPATIENT
Start: 2022-09-21

## 2022-09-23 ENCOUNTER — OFFICE VISIT (OUTPATIENT)
Dept: RHEUMATOLOGY | Facility: CLINIC | Age: 62
End: 2022-09-23

## 2022-09-23 VITALS
RESPIRATION RATE: 16 BRPM | SYSTOLIC BLOOD PRESSURE: 106 MMHG | HEART RATE: 105 BPM | HEIGHT: 69 IN | BODY MASS INDEX: 35.4 KG/M2 | WEIGHT: 239 LBS | DIASTOLIC BLOOD PRESSURE: 67 MMHG

## 2022-09-23 DIAGNOSIS — Z51.81 THERAPEUTIC DRUG MONITORING: ICD-10-CM

## 2022-09-23 DIAGNOSIS — M06.9 RHEUMATOID ARTHRITIS, INVOLVING UNSPECIFIED SITE, UNSPECIFIED WHETHER RHEUMATOID FACTOR PRESENT (HCC): Primary | ICD-10-CM

## 2022-09-23 PROCEDURE — 3008F BODY MASS INDEX DOCD: CPT | Performed by: INTERNAL MEDICINE

## 2022-09-23 PROCEDURE — 99214 OFFICE O/P EST MOD 30 MIN: CPT | Performed by: INTERNAL MEDICINE

## 2022-09-23 PROCEDURE — 3078F DIAST BP <80 MM HG: CPT | Performed by: INTERNAL MEDICINE

## 2022-09-23 PROCEDURE — 3074F SYST BP LT 130 MM HG: CPT | Performed by: INTERNAL MEDICINE

## 2022-09-23 NOTE — PATIENT INSTRUCTIONS
1. Cont. methotrexate - 8 tablets a week and folic acid 1mg a day   2. Cont. xlejanz 11mg a day   3. Return to clinic in  6 months -   4. .Check labs in 4  months . 5. Diclofenac gel 1 % for left knee. 6.  Try to Cut down   diclofenac 75mg to once a day see how it goes. Or can cut diclofenac to 50mg twice a day   7. Tylenol  ES two tablets instead in the morning.

## 2022-09-24 ENCOUNTER — NURSE ONLY (OUTPATIENT)
Dept: ALLERGY | Facility: CLINIC | Age: 62
End: 2022-09-24

## 2022-09-24 DIAGNOSIS — J30.89 ENVIRONMENTAL AND SEASONAL ALLERGIES: ICD-10-CM

## 2022-09-24 PROCEDURE — 95165 ANTIGEN THERAPY SERVICES: CPT | Performed by: ALLERGY & IMMUNOLOGY

## 2022-09-24 PROCEDURE — 95117 IMMUNOTHERAPY INJECTIONS: CPT | Performed by: ALLERGY & IMMUNOLOGY

## 2022-09-29 ENCOUNTER — PATIENT MESSAGE (OUTPATIENT)
Dept: RHEUMATOLOGY | Facility: CLINIC | Age: 62
End: 2022-09-29

## 2022-09-29 NOTE — TELEPHONE ENCOUNTER
From: Luis Antonio Bernal  To: Noah Alvarez MD  Sent: 9/29/2022 7:41 AM CDT  Subject: Reducing Diclofenac    Dr. German Langston, I reduced my diclofen tablets to one a day and I am now aching all over. My pain is gone from a 1 or 2 to a 7 or 8all over. And I now have a sharp pain in my left thigh. Is there something else I can do besides reducing the diclofenac and taking the aspirin? Also, are you recommending I get the new COVID booster that just came out?

## 2022-10-17 ENCOUNTER — PATIENT MESSAGE (OUTPATIENT)
Facility: CLINIC | Age: 62
End: 2022-10-17

## 2022-10-17 RX ORDER — DICLOFENAC SODIUM 75 MG/1
75 TABLET, DELAYED RELEASE ORAL 2 TIMES DAILY
Qty: 60 TABLET | Refills: 3 | Status: SHIPPED | OUTPATIENT
Start: 2022-10-17

## 2022-10-18 NOTE — TELEPHONE ENCOUNTER
From: Francisco Gather  To: Ronan Quesada MD  Sent: 10/17/2022 11:15 PM CDT  Subject: Combining flu shot and Covid shot appointments    I have a Covid shot appointment at the [facility] facility on Friday at 5:10 and I have a flu shot appointment on Sunday at 2:00 p.m. I'm wondering if there's any way I can get my flu shot At the same time as my covid shot on Friday so l don't have to come in twice and I can free up that Sunday appointment for someone else.

## 2022-10-22 ENCOUNTER — NURSE ONLY (OUTPATIENT)
Dept: ALLERGY | Facility: CLINIC | Age: 62
End: 2022-10-22
Payer: COMMERCIAL

## 2022-10-22 DIAGNOSIS — J30.89 ENVIRONMENTAL AND SEASONAL ALLERGIES: ICD-10-CM

## 2022-10-22 PROCEDURE — 95117 IMMUNOTHERAPY INJECTIONS: CPT | Performed by: ALLERGY & IMMUNOLOGY

## 2022-10-23 ENCOUNTER — IMMUNIZATION (OUTPATIENT)
Dept: LAB | Age: 62
End: 2022-10-23
Attending: EMERGENCY MEDICINE
Payer: COMMERCIAL

## 2022-10-23 DIAGNOSIS — Z23 NEED FOR VACCINATION: Primary | ICD-10-CM

## 2022-10-23 PROCEDURE — 0134A SARSCOV2 VAC BVL 50MCG/0.5ML: CPT

## 2022-10-23 PROCEDURE — 90686 IIV4 VACC NO PRSV 0.5 ML IM: CPT

## 2022-10-23 PROCEDURE — 90471 IMMUNIZATION ADMIN: CPT

## 2022-10-27 ENCOUNTER — MED REC SCAN ONLY (OUTPATIENT)
Dept: INTERNAL MEDICINE CLINIC | Facility: CLINIC | Age: 62
End: 2022-10-27

## 2022-10-31 RX ORDER — CALCITRIOL 0.25 UG/1
CAPSULE, LIQUID FILLED ORAL
Qty: 90 CAPSULE | Refills: 1 | Status: SHIPPED | OUTPATIENT
Start: 2022-10-31

## 2022-11-14 ENCOUNTER — OFFICE VISIT (OUTPATIENT)
Facility: CLINIC | Age: 62
End: 2022-11-14
Payer: COMMERCIAL

## 2022-11-14 VITALS
DIASTOLIC BLOOD PRESSURE: 78 MMHG | BODY MASS INDEX: 35.37 KG/M2 | HEART RATE: 98 BPM | WEIGHT: 238.81 LBS | HEIGHT: 69 IN | SYSTOLIC BLOOD PRESSURE: 132 MMHG

## 2022-11-14 DIAGNOSIS — M67.972 DISORDER OF LEFT ACHILLES TENDON: ICD-10-CM

## 2022-11-14 DIAGNOSIS — J45.40 MODERATE PERSISTENT EXTRINSIC ASTHMA WITHOUT COMPLICATION: ICD-10-CM

## 2022-11-14 DIAGNOSIS — G47.33 OSA (OBSTRUCTIVE SLEEP APNEA): ICD-10-CM

## 2022-11-14 DIAGNOSIS — Z98.1 S/P LUMBAR FUSION: ICD-10-CM

## 2022-11-14 DIAGNOSIS — Z00.00 ROUTINE PHYSICAL EXAMINATION: Primary | ICD-10-CM

## 2022-11-14 DIAGNOSIS — G20 PARKINSON DISEASE (HCC): ICD-10-CM

## 2022-11-14 DIAGNOSIS — Z98.84 S/P LAPAROSCOPIC SLEEVE GASTRECTOMY: ICD-10-CM

## 2022-11-14 DIAGNOSIS — E03.9 HYPOTHYROIDISM, UNSPECIFIED TYPE: ICD-10-CM

## 2022-11-14 DIAGNOSIS — M06.9 RHEUMATOID ARTHRITIS, INVOLVING UNSPECIFIED SITE, UNSPECIFIED WHETHER RHEUMATOID FACTOR PRESENT (HCC): ICD-10-CM

## 2022-11-14 PROCEDURE — 3075F SYST BP GE 130 - 139MM HG: CPT | Performed by: INTERNAL MEDICINE

## 2022-11-14 PROCEDURE — 3078F DIAST BP <80 MM HG: CPT | Performed by: INTERNAL MEDICINE

## 2022-11-14 PROCEDURE — 99396 PREV VISIT EST AGE 40-64: CPT | Performed by: INTERNAL MEDICINE

## 2022-11-14 PROCEDURE — 3008F BODY MASS INDEX DOCD: CPT | Performed by: INTERNAL MEDICINE

## 2022-11-14 RX ORDER — TOPIRAMATE 50 MG/1
50 TABLET, FILM COATED ORAL 2 TIMES DAILY
COMMUNITY
Start: 2022-10-08

## 2022-11-16 ENCOUNTER — MED REC SCAN ONLY (OUTPATIENT)
Dept: INTERNAL MEDICINE CLINIC | Facility: CLINIC | Age: 62
End: 2022-11-16

## 2022-11-18 NOTE — TELEPHONE ENCOUNTER
Patient would like a call,she is on a full liquid diet. wants to know if her apple,cranberry juice has to be sugar free or could she have regular ones. she is diluting it. please advice Statement Selected

## 2022-11-19 ENCOUNTER — NURSE ONLY (OUTPATIENT)
Dept: ALLERGY | Facility: CLINIC | Age: 62
End: 2022-11-19
Payer: COMMERCIAL

## 2022-11-19 DIAGNOSIS — J30.89 ENVIRONMENTAL AND SEASONAL ALLERGIES: ICD-10-CM

## 2022-11-19 PROCEDURE — 95117 IMMUNOTHERAPY INJECTIONS: CPT | Performed by: ALLERGY & IMMUNOLOGY

## 2022-11-21 ENCOUNTER — TELEPHONE (OUTPATIENT)
Dept: INTERNAL MEDICINE CLINIC | Facility: CLINIC | Age: 62
End: 2022-11-21

## 2022-11-26 ENCOUNTER — LAB ENCOUNTER (OUTPATIENT)
Dept: LAB | Facility: REFERENCE LAB | Age: 62
End: 2022-11-26
Attending: INTERNAL MEDICINE
Payer: COMMERCIAL

## 2022-11-26 DIAGNOSIS — Z00.00 ROUTINE PHYSICAL EXAMINATION: ICD-10-CM

## 2022-11-26 LAB
ALBUMIN SERPL-MCNC: 3.5 G/DL (ref 3.4–5)
ALBUMIN/GLOB SERPL: 1 {RATIO} (ref 1–2)
ALP LIVER SERPL-CCNC: 72 U/L
ALT SERPL-CCNC: 9 U/L
ANION GAP SERPL CALC-SCNC: 3 MMOL/L (ref 0–18)
AST SERPL-CCNC: 20 U/L (ref 15–37)
BILIRUB SERPL-MCNC: 0.4 MG/DL (ref 0.1–2)
BUN BLD-MCNC: 19 MG/DL (ref 7–18)
BUN/CREAT SERPL: 17.3 (ref 10–20)
CALCIUM BLD-MCNC: 9.3 MG/DL (ref 8.5–10.1)
CHLORIDE SERPL-SCNC: 111 MMOL/L (ref 98–112)
CHOLEST SERPL-MCNC: 188 MG/DL (ref ?–200)
CO2 SERPL-SCNC: 28 MMOL/L (ref 21–32)
CREAT BLD-MCNC: 1.1 MG/DL
FASTING PATIENT LIPID ANSWER: YES
FASTING STATUS PATIENT QL REPORTED: YES
GFR SERPLBLD BASED ON 1.73 SQ M-ARVRAT: 57 ML/MIN/1.73M2 (ref 60–?)
GLOBULIN PLAS-MCNC: 3.4 G/DL (ref 2.8–4.4)
GLUCOSE BLD-MCNC: 81 MG/DL (ref 70–99)
HDLC SERPL-MCNC: 88 MG/DL (ref 40–59)
LDLC SERPL CALC-MCNC: 88 MG/DL (ref ?–100)
NONHDLC SERPL-MCNC: 100 MG/DL (ref ?–130)
OSMOLALITY SERPL CALC.SUM OF ELEC: 295 MOSM/KG (ref 275–295)
POTASSIUM SERPL-SCNC: 4.5 MMOL/L (ref 3.5–5.1)
PROT SERPL-MCNC: 6.9 G/DL (ref 6.4–8.2)
SODIUM SERPL-SCNC: 142 MMOL/L (ref 136–145)
TRIGL SERPL-MCNC: 65 MG/DL (ref 30–149)
TSI SER-ACNC: 1.26 MIU/ML (ref 0.36–3.74)
VIT B12 SERPL-MCNC: 1232 PG/ML (ref 193–986)
VIT D+METAB SERPL-MCNC: 75.6 NG/ML (ref 30–100)
VLDLC SERPL CALC-MCNC: 10 MG/DL (ref 0–30)

## 2022-11-26 PROCEDURE — 84443 ASSAY THYROID STIM HORMONE: CPT

## 2022-11-26 PROCEDURE — 82306 VITAMIN D 25 HYDROXY: CPT

## 2022-11-26 PROCEDURE — 80061 LIPID PANEL: CPT

## 2022-11-26 PROCEDURE — 82607 VITAMIN B-12: CPT

## 2022-11-26 PROCEDURE — 80053 COMPREHEN METABOLIC PANEL: CPT

## 2022-11-28 RX ORDER — METHOTREXATE 2.5 MG/1
TABLET ORAL
Qty: 96 TABLET | Refills: 1 | Status: SHIPPED | OUTPATIENT
Start: 2022-11-28

## 2022-11-28 RX ORDER — MONTELUKAST SODIUM 10 MG/1
TABLET ORAL
Qty: 90 TABLET | Refills: 0 | Status: SHIPPED | OUTPATIENT
Start: 2022-11-28

## 2022-11-29 NOTE — TELEPHONE ENCOUNTER
LOV: 9/23/2022  Future Appointments   Date Time Provider Joel Mann   12/14/2022  4:00 PM Michelle Andrews RD [facility] OF [facility]   12/15/2022  3:00 PM Akanksha Dash PsyD LOM[facility]iovany [facility]   12/20/2022  2:30 PM Brown Ng MD [facility]   12/22/2022 10:45 AM Raul Garcia MD [facility] OF [facility]   1/12/2023  8:30 AM Albertina Omalley MD [facility] OF THE [facility]   5/15/2023  8:00 AM Milagro Barros MD [facility]     Labs: Kidney function slightly decreased but stable     Component      Latest Ref Rng & Units 11/26/2022   Glucose      70 - 99 mg/dL 81   Sodium      136 - 145 mmol/L 142   Potassium      3.5 - 5.1 mmol/L 4.5   Chloride      98 - 112 mmol/L 111   Carbon Dioxide, Total      21.0 - 32.0 mmol/L 28.0   ANION GAP      0 - 18 mmol/L 3   BUN      7 - 18 mg/dL 19 (H)   CREATININE      0.55 - 1.02 mg/dL 1.10 (H)   BUN/CREATININE RATIO      10.0 - 20.0 17.3   CALCIUM      8.5 - 10.1 mg/dL 9.3   CALCULATED OSMOLALITY      275 - 295 mOsm/kg 295   eGFR-Cr      >=60 mL/min/1.73m2 57 (L)   ALT (SGPT)      13 - 56 U/L 9 (L)   AST (SGOT)      15 - 37 U/L 20   ALKALINE PHOSPHATASE      50 - 130 U/L 72   Total Bilirubin      0.1 - 2.0 mg/dL 0.4   PROTEIN, TOTAL      6.4 - 8.2 g/dL 6.9   Albumin      3.4 - 5.0 g/dL 3.5   Globulin      2.8 - 4.4 g/dL 3.4   A/G Ratio      1.0 - 2.0 1.0   Patient Fasting for CMP?        Yes

## 2022-12-14 ENCOUNTER — OFFICE VISIT (OUTPATIENT)
Dept: SURGERY | Facility: CLINIC | Age: 62
End: 2022-12-14
Payer: COMMERCIAL

## 2022-12-14 VITALS — WEIGHT: 240.38 LBS | HEIGHT: 69 IN | BODY MASS INDEX: 35.6 KG/M2

## 2022-12-14 DIAGNOSIS — R63.2 INCREASED APPETITE: ICD-10-CM

## 2022-12-14 DIAGNOSIS — E66.9 OBESITY (BMI 30-39.9): ICD-10-CM

## 2022-12-14 DIAGNOSIS — Z98.84 S/P LAPAROSCOPIC SLEEVE GASTRECTOMY: ICD-10-CM

## 2022-12-14 PROCEDURE — 3008F BODY MASS INDEX DOCD: CPT

## 2022-12-14 PROCEDURE — 97803 MED NUTRITION INDIV SUBSEQ: CPT

## 2022-12-14 NOTE — PATIENT INSTRUCTIONS
Recommendations/goals:    1. Keep a complete food record, My Fitness Pal or My Net Diary, select macros dashboard. Once per week. 2.  Continue to consume 4-6 meals/snacks. Continue to aim for 79-99 grams per day of protein. Keep the carbohydrates at 120 grams per day or less per day. Increase fruit and vegetables. Switching out zucchini noodle for regular noodle. (Try Noosa yogurt for sweet craving, combine sweet with protein). 3.  Continue to drink 64 oz of water per day, (decaf green tea ). 4.  Continue the strength training 2 times per week   5. Continue to bike 40 minutes per week (within limits).

## 2022-12-20 ENCOUNTER — NURSE ONLY (OUTPATIENT)
Dept: ALLERGY | Facility: CLINIC | Age: 62
End: 2022-12-20
Payer: COMMERCIAL

## 2022-12-20 ENCOUNTER — OFFICE VISIT (OUTPATIENT)
Dept: ALLERGY | Facility: CLINIC | Age: 62
End: 2022-12-20
Payer: COMMERCIAL

## 2022-12-20 VITALS
SYSTOLIC BLOOD PRESSURE: 115 MMHG | DIASTOLIC BLOOD PRESSURE: 70 MMHG | OXYGEN SATURATION: 100 % | HEART RATE: 101 BPM | RESPIRATION RATE: 18 BRPM

## 2022-12-20 DIAGNOSIS — J45.40 ASTHMA, EXTRINSIC, MODERATE PERSISTENT, UNCOMPLICATED: Primary | ICD-10-CM

## 2022-12-20 DIAGNOSIS — J30.89 ENVIRONMENTAL AND SEASONAL ALLERGIES: ICD-10-CM

## 2022-12-20 DIAGNOSIS — Z92.29 COVID-19 VACCINE SERIES COMPLETED: ICD-10-CM

## 2022-12-20 DIAGNOSIS — Z91.09 ALLERGY TO AMERICAN HOUSE DUST MITE: ICD-10-CM

## 2022-12-20 PROCEDURE — 99214 OFFICE O/P EST MOD 30 MIN: CPT | Performed by: ALLERGY & IMMUNOLOGY

## 2022-12-20 PROCEDURE — 3074F SYST BP LT 130 MM HG: CPT | Performed by: ALLERGY & IMMUNOLOGY

## 2022-12-20 PROCEDURE — 3078F DIAST BP <80 MM HG: CPT | Performed by: ALLERGY & IMMUNOLOGY

## 2022-12-20 PROCEDURE — 95117 IMMUNOTHERAPY INJECTIONS: CPT | Performed by: ALLERGY & IMMUNOLOGY

## 2022-12-22 ENCOUNTER — OFFICE VISIT (OUTPATIENT)
Dept: ORTHOPEDICS CLINIC | Facility: CLINIC | Age: 62
End: 2022-12-22
Payer: COMMERCIAL

## 2022-12-22 ENCOUNTER — HOSPITAL ENCOUNTER (OUTPATIENT)
Dept: GENERAL RADIOLOGY | Facility: HOSPITAL | Age: 62
Discharge: HOME | End: 2022-12-22
Attending: ORTHOPAEDIC SURGERY
Payer: COMMERCIAL

## 2022-12-22 VITALS — HEIGHT: 69 IN | WEIGHT: 234 LBS | BODY MASS INDEX: 34.66 KG/M2

## 2022-12-22 DIAGNOSIS — R52 PAIN: ICD-10-CM

## 2022-12-22 DIAGNOSIS — M67.874 ACHILLES TENDINOSIS OF LEFT ANKLE: Primary | ICD-10-CM

## 2022-12-22 PROCEDURE — 73610 X-RAY EXAM OF ANKLE: CPT | Performed by: ORTHOPAEDIC SURGERY

## 2022-12-22 PROCEDURE — 99244 OFF/OP CNSLTJ NEW/EST MOD 40: CPT | Performed by: ORTHOPAEDIC SURGERY

## 2022-12-22 PROCEDURE — 3008F BODY MASS INDEX DOCD: CPT | Performed by: ORTHOPAEDIC SURGERY

## 2022-12-22 RX ORDER — AMANTADINE HYDROCHLORIDE 100 MG/1
100 CAPSULE, GELATIN COATED ORAL DAILY
COMMUNITY
Start: 2022-11-28

## 2022-12-30 ENCOUNTER — MED REC SCAN ONLY (OUTPATIENT)
Dept: INTERNAL MEDICINE CLINIC | Facility: CLINIC | Age: 62
End: 2022-12-30

## 2023-01-12 ENCOUNTER — OFFICE VISIT (OUTPATIENT)
Dept: SURGERY | Facility: CLINIC | Age: 63
End: 2023-01-12
Payer: COMMERCIAL

## 2023-01-12 VITALS
HEART RATE: 86 BPM | SYSTOLIC BLOOD PRESSURE: 118 MMHG | OXYGEN SATURATION: 99 % | HEIGHT: 69 IN | DIASTOLIC BLOOD PRESSURE: 65 MMHG | BODY MASS INDEX: 36.04 KG/M2 | WEIGHT: 243.31 LBS

## 2023-01-12 DIAGNOSIS — Z98.84 S/P LAPAROSCOPIC SLEEVE GASTRECTOMY: ICD-10-CM

## 2023-01-12 DIAGNOSIS — E78.5 HYPERLIPIDEMIA, UNSPECIFIED HYPERLIPIDEMIA TYPE: Primary | ICD-10-CM

## 2023-01-12 DIAGNOSIS — E66.9 OBESITY (BMI 30-39.9): ICD-10-CM

## 2023-01-12 DIAGNOSIS — R63.2 INCREASED APPETITE: ICD-10-CM

## 2023-01-12 DIAGNOSIS — Z51.81 ENCOUNTER FOR THERAPEUTIC DRUG MONITORING: ICD-10-CM

## 2023-01-12 PROCEDURE — 3074F SYST BP LT 130 MM HG: CPT | Performed by: INTERNAL MEDICINE

## 2023-01-12 PROCEDURE — 3008F BODY MASS INDEX DOCD: CPT | Performed by: INTERNAL MEDICINE

## 2023-01-12 PROCEDURE — 99214 OFFICE O/P EST MOD 30 MIN: CPT | Performed by: INTERNAL MEDICINE

## 2023-01-12 PROCEDURE — 3078F DIAST BP <80 MM HG: CPT | Performed by: INTERNAL MEDICINE

## 2023-01-12 RX ORDER — PHENTERMINE HYDROCHLORIDE 37.5 MG/1
TABLET ORAL
Qty: 45 TABLET | Refills: 2 | Status: SHIPPED | OUTPATIENT
Start: 2023-01-12

## 2023-01-12 RX ORDER — TOPIRAMATE 50 MG/1
50 TABLET, FILM COATED ORAL 2 TIMES DAILY
Qty: 180 TABLET | Refills: 1 | Status: SHIPPED | OUTPATIENT
Start: 2023-01-12 | End: 2023-04-12

## 2023-01-17 ENCOUNTER — TELEPHONE (OUTPATIENT)
Dept: NEUROLOGY | Facility: CLINIC | Age: 63
End: 2023-01-17

## 2023-01-17 NOTE — TELEPHONE ENCOUNTER
Received Prescription - Detailed Written Order (Functional Capacity form [id number] to be completed and signed by physician to be fax at [phone].  Placed In 's folder by nurse station

## 2023-01-21 ENCOUNTER — NURSE ONLY (OUTPATIENT)
Dept: ALLERGY | Facility: CLINIC | Age: 63
End: 2023-01-21

## 2023-01-21 ENCOUNTER — LAB ENCOUNTER (OUTPATIENT)
Dept: LAB | Age: 63
End: 2023-01-21
Attending: INTERNAL MEDICINE
Payer: COMMERCIAL

## 2023-01-21 ENCOUNTER — EKG ENCOUNTER (OUTPATIENT)
Dept: LAB | Age: 63
End: 2023-01-21
Attending: INTERNAL MEDICINE
Payer: COMMERCIAL

## 2023-01-21 DIAGNOSIS — R63.2 INCREASED APPETITE: ICD-10-CM

## 2023-01-21 DIAGNOSIS — Z98.84 S/P LAPAROSCOPIC SLEEVE GASTRECTOMY: ICD-10-CM

## 2023-01-21 DIAGNOSIS — E78.5 HYPERLIPIDEMIA, UNSPECIFIED HYPERLIPIDEMIA TYPE: ICD-10-CM

## 2023-01-21 DIAGNOSIS — J30.89 ENVIRONMENTAL AND SEASONAL ALLERGIES: ICD-10-CM

## 2023-01-21 DIAGNOSIS — E66.9 OBESITY (BMI 30-39.9): ICD-10-CM

## 2023-01-21 DIAGNOSIS — Z51.81 ENCOUNTER FOR THERAPEUTIC DRUG MONITORING: ICD-10-CM

## 2023-01-21 DIAGNOSIS — M06.9 RHEUMATOID ARTHRITIS, INVOLVING UNSPECIFIED SITE, UNSPECIFIED WHETHER RHEUMATOID FACTOR PRESENT (HCC): ICD-10-CM

## 2023-01-21 DIAGNOSIS — Z51.81 THERAPEUTIC DRUG MONITORING: ICD-10-CM

## 2023-01-21 LAB
ALT SERPL-CCNC: 11 U/L
AST SERPL-CCNC: 16 U/L (ref 15–37)
ATRIAL RATE: 78 BPM
CREAT BLD-MCNC: 1.17 MG/DL
CRP SERPL-MCNC: <0.29 MG/DL (ref ?–0.3)
DEPRECATED RDW RBC AUTO: 52.2 FL (ref 35.1–46.3)
ERYTHROCYTE [DISTWIDTH] IN BLOOD BY AUTOMATED COUNT: 14.3 % (ref 11–15)
ERYTHROCYTE [SEDIMENTATION RATE] IN BLOOD: 9 MM/HR
GFR SERPLBLD BASED ON 1.73 SQ M-ARVRAT: 53 ML/MIN/1.73M2 (ref 60–?)
HCT VFR BLD AUTO: 42.4 %
HGB BLD-MCNC: 13.5 G/DL
MCH RBC QN AUTO: 31.8 PG (ref 26–34)
MCHC RBC AUTO-ENTMCNC: 31.8 G/DL (ref 31–37)
MCV RBC AUTO: 99.8 FL
P AXIS: 75 DEGREES
P-R INTERVAL: 200 MS
PLATELET # BLD AUTO: 232 10(3)UL (ref 150–450)
Q-T INTERVAL: 358 MS
QRS DURATION: 84 MS
QTC CALCULATION (BEZET): 408 MS
R AXIS: -17 DEGREES
RBC # BLD AUTO: 4.25 X10(6)UL
T AXIS: 31 DEGREES
VENTRICULAR RATE: 78 BPM
WBC # BLD AUTO: 5.2 X10(3) UL (ref 4–11)

## 2023-01-21 PROCEDURE — 85652 RBC SED RATE AUTOMATED: CPT

## 2023-01-21 PROCEDURE — 95117 IMMUNOTHERAPY INJECTIONS: CPT | Performed by: ALLERGY & IMMUNOLOGY

## 2023-01-21 PROCEDURE — 93005 ELECTROCARDIOGRAM TRACING: CPT

## 2023-01-21 PROCEDURE — 82565 ASSAY OF CREATININE: CPT

## 2023-01-21 PROCEDURE — 84460 ALANINE AMINO (ALT) (SGPT): CPT

## 2023-01-21 PROCEDURE — 84450 TRANSFERASE (AST) (SGOT): CPT

## 2023-01-21 PROCEDURE — 36415 COLL VENOUS BLD VENIPUNCTURE: CPT

## 2023-01-21 PROCEDURE — 85027 COMPLETE CBC AUTOMATED: CPT

## 2023-01-21 PROCEDURE — 84425 ASSAY OF VITAMIN B-1: CPT

## 2023-01-21 PROCEDURE — 93010 ELECTROCARDIOGRAM REPORT: CPT | Performed by: INTERNAL MEDICINE

## 2023-01-21 PROCEDURE — 86140 C-REACTIVE PROTEIN: CPT

## 2023-01-26 ENCOUNTER — MED REC SCAN ONLY (OUTPATIENT)
Dept: INTERNAL MEDICINE CLINIC | Facility: CLINIC | Age: 63
End: 2023-01-26

## 2023-01-28 LAB — VITAMIN B1 (THIAMINE), WHOLE B: 199 NMOL/L

## 2023-02-06 ENCOUNTER — HOSPITAL ENCOUNTER (OUTPATIENT)
Dept: GENERAL RADIOLOGY | Facility: HOSPITAL | Age: 63
Discharge: HOME | End: 2023-02-06
Attending: PHYSICIAN ASSISTANT
Payer: COMMERCIAL

## 2023-02-06 DIAGNOSIS — Z98.1 S/P LUMBAR SPINAL FUSION: ICD-10-CM

## 2023-02-06 DIAGNOSIS — M54.42 LOW BACK PAIN WITH LEFT-SIDED SCIATICA: ICD-10-CM

## 2023-02-06 PROCEDURE — 72110 X-RAY EXAM L-2 SPINE 4/>VWS: CPT | Performed by: PHYSICIAN ASSISTANT

## 2023-02-13 ENCOUNTER — NURSE ONLY (OUTPATIENT)
Dept: ALLERGY | Facility: CLINIC | Age: 63
End: 2023-02-13

## 2023-02-13 DIAGNOSIS — J30.89 ENVIRONMENTAL AND SEASONAL ALLERGIES: ICD-10-CM

## 2023-02-21 ENCOUNTER — TELEPHONE (OUTPATIENT)
Dept: ALLERGY | Facility: CLINIC | Age: 63
End: 2023-02-21

## 2023-03-10 DIAGNOSIS — E66.9 OBESITY (BMI 30-39.9): ICD-10-CM

## 2023-03-10 RX ORDER — SEMAGLUTIDE 0.5 MG/.5ML
0.5 INJECTION, SOLUTION SUBCUTANEOUS WEEKLY
Qty: 6 ML | Refills: 0 | Status: SHIPPED | OUTPATIENT
Start: 2023-03-10 | End: 2023-06-08

## 2023-03-13 ENCOUNTER — ORDER TRANSCRIPTION (OUTPATIENT)
Dept: ADMINISTRATIVE | Facility: HOSPITAL | Age: 63
End: 2023-03-13

## 2023-03-13 DIAGNOSIS — Z11.52 ENCOUNTER FOR SCREENING FOR COVID-19: Primary | ICD-10-CM

## 2023-03-14 ENCOUNTER — OFFICE VISIT (OUTPATIENT)
Dept: DERMATOLOGY CLINIC | Facility: CLINIC | Age: 63
End: 2023-03-14

## 2023-03-14 DIAGNOSIS — L30.9 DERMATITIS: Primary | ICD-10-CM

## 2023-03-14 PROCEDURE — 99213 OFFICE O/P EST LOW 20 MIN: CPT | Performed by: PHYSICIAN ASSISTANT

## 2023-03-14 RX ORDER — GABAPENTIN 300 MG/1
CAPSULE ORAL
COMMUNITY
Start: 2023-03-01

## 2023-03-14 RX ORDER — TIZANIDINE 4 MG/1
4 TABLET ORAL 3 TIMES DAILY
COMMUNITY
Start: 2023-03-01

## 2023-03-14 RX ORDER — NAPROXEN 500 MG/1
TABLET ORAL
COMMUNITY
Start: 2023-03-12

## 2023-03-14 RX ORDER — SEMAGLUTIDE 0.5 MG/.5ML
INJECTION, SOLUTION SUBCUTANEOUS
COMMUNITY
Start: 2023-02-02 | End: 2023-03-14

## 2023-03-16 ENCOUNTER — TELEPHONE (OUTPATIENT)
Dept: INTERNAL MEDICINE CLINIC | Facility: CLINIC | Age: 63
End: 2023-03-16

## 2023-03-16 NOTE — TELEPHONE ENCOUNTER
Patient calling to request pre-op appointment, needs medical clearance for MRI with sedation scheduled for 03/27/23, no appointments available until April.

## 2023-03-17 ENCOUNTER — OFFICE VISIT (OUTPATIENT)
Dept: INTERNAL MEDICINE CLINIC | Facility: CLINIC | Age: 63
End: 2023-03-17

## 2023-03-17 VITALS
DIASTOLIC BLOOD PRESSURE: 52 MMHG | WEIGHT: 243 LBS | RESPIRATION RATE: 20 BRPM | TEMPERATURE: 98 F | SYSTOLIC BLOOD PRESSURE: 100 MMHG | HEIGHT: 69 IN | BODY MASS INDEX: 35.99 KG/M2 | HEART RATE: 106 BPM

## 2023-03-17 DIAGNOSIS — Z98.1 S/P LUMBAR FUSION: Primary | ICD-10-CM

## 2023-03-17 DIAGNOSIS — G20 PARKINSON DISEASE (HCC): ICD-10-CM

## 2023-03-17 DIAGNOSIS — M67.972 DISORDER OF LEFT ACHILLES TENDON: ICD-10-CM

## 2023-03-17 PROCEDURE — 99214 OFFICE O/P EST MOD 30 MIN: CPT | Performed by: INTERNAL MEDICINE

## 2023-03-17 PROCEDURE — 3008F BODY MASS INDEX DOCD: CPT | Performed by: INTERNAL MEDICINE

## 2023-03-17 PROCEDURE — 3074F SYST BP LT 130 MM HG: CPT | Performed by: INTERNAL MEDICINE

## 2023-03-17 PROCEDURE — 3078F DIAST BP <80 MM HG: CPT | Performed by: INTERNAL MEDICINE

## 2023-03-17 NOTE — TELEPHONE ENCOUNTER
Future Appointments   Date Time Provider Joel Mann   3/17/2023  1:00 PM Shirley Marquez MD [facility] phone room and they added the 1pm slot since I was not able to do it yesterday.

## 2023-03-18 ENCOUNTER — NURSE ONLY (OUTPATIENT)
Dept: ALLERGY | Facility: CLINIC | Age: 63
End: 2023-03-18

## 2023-03-18 DIAGNOSIS — J30.89 ENVIRONMENTAL AND SEASONAL ALLERGIES: ICD-10-CM

## 2023-03-18 PROCEDURE — 95117 IMMUNOTHERAPY INJECTIONS: CPT | Performed by: ALLERGY & IMMUNOLOGY

## 2023-03-23 RX ORDER — TOFACITINIB 11 MG/1
1 TABLET, FILM COATED, EXTENDED RELEASE ORAL DAILY
Qty: 30 TABLET | Refills: 5 | Status: SHIPPED | OUTPATIENT
Start: 2023-03-23

## 2023-03-24 VITALS — HEIGHT: 69 IN | BODY MASS INDEX: 34.8 KG/M2 | WEIGHT: 235 LBS

## 2023-03-24 NOTE — TELEPHONE ENCOUNTER
Called and spoke with patient, name and  was verified.  Patient was scheduled for a F/U with Dr. Miguel Sanchez.      Future Appointments   Date Time Provider Joel Mann   4/3/2023  5:00 PM DG COVID RESOURCE DNGLAB EM - Down   2023 12:30 PM [address] [place] [address] MRI RM1 (1.5T WIDE) [address] [place] [address] [address]   4/15/2023 10:30 AM EC ALLERGY ECSCHALRGY EC [facility]   2023  3:00 PM Alexandra Mckenzie PsyD LOM[facility]ain [facility]   2023  1:30 PM Martell Delgado MD [address] [place] [address] EC [address]   5/15/2023  8:00 AM Barb Gupta MD [facility]   2023  8:30 AM Kwesi Eldridge MD [facility] [address]   2023  4:30 PM Bernice Mejia, [address] [facility] [address]

## 2023-03-27 ENCOUNTER — HOSPITAL ENCOUNTER (OUTPATIENT)
Dept: MRI IMAGING | Facility: HOSPITAL | Age: 63
Discharge: HOME | End: 2023-03-27
Attending: PHYSICIAN ASSISTANT
Payer: COMMERCIAL

## 2023-03-28 RX ORDER — PRAVASTATIN SODIUM 20 MG
TABLET ORAL
Qty: 90 TABLET | Refills: 3 | Status: SHIPPED | OUTPATIENT
Start: 2023-03-28

## 2023-03-28 NOTE — TELEPHONE ENCOUNTER
Refill passed per [address] protocol. Dr Shantell Roberson,    This past protocol, but pt has low alt of 11. Please advise if to proceed with refill. Thank you.     Please reply to pool: EM RN TRIAGE    Requested Prescriptions   Pending Prescriptions Disp Refills    PRAVASTATIN 20 MG Oral Tab [Pharmacy Med Name: PRAVASTATIN SODIUM 20 MG TAB] 90 tablet 1     Sig: TAKE 1 TABLET BY MOUTH EVERY DAY AT NIGHT       Cholesterol Medication Protocol Passed - 3/27/2023  4:50 PM        Passed - ALT in past 12 months        Passed - LDL in past 12 months        Passed - Last ALT < 80     Lab Results   Component Value Date    ALT 11 (L) 01/21/2023             Passed - Last LDL < 130     Lab Results   Component Value Date    LDL 88 11/26/2022             Passed - In person appointment or virtual visit in the past 12 mos or appointment in next 3 mos     Recent Outpatient Visits              1 week ago Environmental and seasonal allergies    [facility], [address] [place] [address]    Nurse Only    1 week ago S/P lumbar fusion    Yusuf Maldonado MD    Office Visit    2 weeks ago Dermatitis    [address], 148 [facility] Sintia Gastelum PA-C    Office Visit    1 month ago Environmental and seasonal allergies    [address], 148 [facility]    Nurse Only    2 months ago Environmental and seasonal allergies    [address], 148 [facility]    Nurse Only          Future Appointments         Provider Department Appt Notes    In 6 days 72 [facility], [address] qa dd Preprocedure 3/27/23    In 1 week [address] [place] [address] PACU; [address] [place] [address] MRI RM1 (1.5T WIDE) [facility] MRI QA-AP    In 2 weeks [address] [place] [address]     In 1 month Martell Delgado MD [address], [address] [facility] [address] [address]     In 1 month Stacy Feldman MD [address], 602 [facility] 6mo f/u    In 1 month Pablo Morel MD [address], [address], [facility] PPO  POST OP F/U    In 8 months Winnie Orellana, [address], [address], [facility]

## 2023-04-03 ENCOUNTER — LAB ENCOUNTER (OUTPATIENT)
Dept: LAB | Age: 63
End: 2023-04-03
Attending: PHYSICIAN ASSISTANT
Payer: COMMERCIAL

## 2023-04-03 DIAGNOSIS — Z11.52 ENCOUNTER FOR SCREENING FOR COVID-19: ICD-10-CM

## 2023-04-03 RX ORDER — ACETAMINOPHEN 500 MG
1000 TABLET ORAL ONCE
Status: CANCELLED | OUTPATIENT
Start: 2023-04-03 | End: 2023-04-03

## 2023-04-04 LAB — SARS-COV-2 RNA RESP QL NAA+PROBE: NOT DETECTED

## 2023-04-06 ENCOUNTER — ANESTHESIA (OUTPATIENT)
Dept: MRI IMAGING | Facility: HOSPITAL | Age: 63
End: 2023-04-06
Payer: COMMERCIAL

## 2023-04-06 ENCOUNTER — HOSPITAL ENCOUNTER (OUTPATIENT)
Dept: MRI IMAGING | Facility: HOSPITAL | Age: 63
Discharge: HOME | End: 2023-04-06
Attending: PHYSICIAN ASSISTANT
Payer: COMMERCIAL

## 2023-04-06 ENCOUNTER — ANESTHESIA EVENT (OUTPATIENT)
Dept: MRI IMAGING | Facility: HOSPITAL | Age: 63
End: 2023-04-06
Payer: COMMERCIAL

## 2023-04-06 VITALS
RESPIRATION RATE: 16 BRPM | OXYGEN SATURATION: 92 % | HEART RATE: 60 BPM | SYSTOLIC BLOOD PRESSURE: 110 MMHG | BODY MASS INDEX: 34.8 KG/M2 | HEIGHT: 69 IN | WEIGHT: 235 LBS | TEMPERATURE: 98 F | DIASTOLIC BLOOD PRESSURE: 58 MMHG

## 2023-04-06 DIAGNOSIS — Z98.1 S/P LUMBAR SPINAL FUSION: ICD-10-CM

## 2023-04-06 DIAGNOSIS — M54.42 ACUTE MIDLINE LOW BACK PAIN WITH LEFT-SIDED SCIATICA: ICD-10-CM

## 2023-04-06 PROCEDURE — A9575 INJ GADOTERATE MEGLUMI 0.1ML: HCPCS | Performed by: PHYSICIAN ASSISTANT

## 2023-04-06 PROCEDURE — 72158 MRI LUMBAR SPINE W/O & W/DYE: CPT | Performed by: PHYSICIAN ASSISTANT

## 2023-04-06 RX ORDER — METOCLOPRAMIDE 10 MG/1
10 TABLET ORAL ONCE
Status: COMPLETED | OUTPATIENT
Start: 2023-04-06 | End: 2023-04-06

## 2023-04-06 RX ORDER — MORPHINE SULFATE 10 MG/ML
6 INJECTION, SOLUTION INTRAMUSCULAR; INTRAVENOUS EVERY 10 MIN PRN
OUTPATIENT
Start: 2023-04-06

## 2023-04-06 RX ORDER — LIDOCAINE HYDROCHLORIDE 10 MG/ML
INJECTION, SOLUTION EPIDURAL; INFILTRATION; INTRACAUDAL; PERINEURAL AS NEEDED
Status: DISCONTINUED | OUTPATIENT
Start: 2023-04-06 | End: 2023-04-06 | Stop reason: SURG

## 2023-04-06 RX ORDER — HYDROMORPHONE HYDROCHLORIDE 1 MG/ML
0.4 INJECTION, SOLUTION INTRAMUSCULAR; INTRAVENOUS; SUBCUTANEOUS EVERY 5 MIN PRN
OUTPATIENT
Start: 2023-04-06

## 2023-04-06 RX ORDER — HYDROMORPHONE HYDROCHLORIDE 1 MG/ML
0.6 INJECTION, SOLUTION INTRAMUSCULAR; INTRAVENOUS; SUBCUTANEOUS EVERY 5 MIN PRN
OUTPATIENT
Start: 2023-04-06

## 2023-04-06 RX ORDER — FAMOTIDINE 20 MG/1
20 TABLET, FILM COATED ORAL ONCE
Status: DISCONTINUED | OUTPATIENT
Start: 2023-04-06 | End: 2023-04-08

## 2023-04-06 RX ORDER — HYDROMORPHONE HYDROCHLORIDE 1 MG/ML
0.2 INJECTION, SOLUTION INTRAMUSCULAR; INTRAVENOUS; SUBCUTANEOUS EVERY 5 MIN PRN
OUTPATIENT
Start: 2023-04-06

## 2023-04-06 RX ORDER — MORPHINE SULFATE 4 MG/ML
2 INJECTION, SOLUTION INTRAMUSCULAR; INTRAVENOUS EVERY 10 MIN PRN
OUTPATIENT
Start: 2023-04-06

## 2023-04-06 RX ORDER — NALOXONE HYDROCHLORIDE 0.4 MG/ML
80 INJECTION, SOLUTION INTRAMUSCULAR; INTRAVENOUS; SUBCUTANEOUS AS NEEDED
OUTPATIENT
Start: 2023-04-06 | End: 2023-04-06

## 2023-04-06 RX ORDER — GADOTERATE MEGLUMINE 376.9 MG/ML
20 INJECTION INTRAVENOUS
Status: COMPLETED | OUTPATIENT
Start: 2023-04-06 | End: 2023-04-06

## 2023-04-06 RX ORDER — SODIUM CHLORIDE, SODIUM LACTATE, POTASSIUM CHLORIDE, CALCIUM CHLORIDE 600; 310; 30; 20 MG/100ML; MG/100ML; MG/100ML; MG/100ML
INJECTION, SOLUTION INTRAVENOUS CONTINUOUS
OUTPATIENT
Start: 2023-04-06

## 2023-04-06 RX ORDER — SODIUM CHLORIDE, SODIUM LACTATE, POTASSIUM CHLORIDE, CALCIUM CHLORIDE 600; 310; 30; 20 MG/100ML; MG/100ML; MG/100ML; MG/100ML
INJECTION, SOLUTION INTRAVENOUS CONTINUOUS
Status: DISCONTINUED | OUTPATIENT
Start: 2023-04-06 | End: 2023-04-08

## 2023-04-06 RX ORDER — MORPHINE SULFATE 4 MG/ML
4 INJECTION, SOLUTION INTRAMUSCULAR; INTRAVENOUS EVERY 10 MIN PRN
OUTPATIENT
Start: 2023-04-06

## 2023-04-06 RX ADMIN — METOCLOPRAMIDE 10 MG: 10 TABLET ORAL at 13:01:00

## 2023-04-06 RX ADMIN — LIDOCAINE HYDROCHLORIDE 50 MG: 10 INJECTION, SOLUTION EPIDURAL; INFILTRATION; INTRACAUDAL; PERINEURAL at 13:45:00

## 2023-04-06 RX ADMIN — GADOTERATE MEGLUMINE 20 ML: 376.9 INJECTION INTRAVENOUS at 14:25:00

## 2023-04-06 RX ADMIN — SODIUM CHLORIDE, SODIUM LACTATE, POTASSIUM CHLORIDE, CALCIUM CHLORIDE: 600; 310; 30; 20 INJECTION, SOLUTION INTRAVENOUS at 13:18:00

## 2023-04-06 NOTE — ANESTHESIA PROCEDURE NOTES
Airway  Date/Time: 4/6/2023 1:47 PM  Urgency: elective    Airway not difficult    General Information and Staff    Patient location during procedure: imaging  Resident/CRNA: Cori Little CRNA  Performed: CRNA   Performed by: Cori Little CRNA  Authorized by: Ugo Zhao MD      Indications and Patient Condition  Indications for airway management: anesthesia  Spontaneous Ventilation: absent  Sedation level: deep  Preoxygenated: yes  Patient position: sniffing  Mask difficulty assessment: 0 - not attempted    Final Airway Details  Final airway type: supraglottic airway      Successful airway: classic  Size 4       Number of attempts at approach: 1  Number of other approaches attempted: 0

## 2023-04-06 NOTE — ANESTHESIA POSTPROCEDURE EVALUATION
Patient: Gamaliel Achayra    Procedure Summary     Date: 04/06/23 Room / Location: [facility] MRI; [facility] Anesthesia Care Unit    Anesthesia Start: 3685 Anesthesia Stop:     Procedure: MRI SPINE LUMBAR (W+WO) (MPQ=[id number]) Diagnosis:       Acute midline low back pain with left-sided sciatica      S/P lumbar spinal fusion    Scheduled Providers:  Anesthesiologist: Leslee Vega MD    Anesthesia Type: general ASA Status: 3          Anesthesia Type: general    Vitals Value Taken Time   /72 04/06/23 1444   Temp 97.0 04/06/23 1444   Pulse 75 04/06/23 1444   Resp 15 04/06/23 1444   SpO2 99 04/06/23 1444       [facility] AN Post Evaluation:   Patient Evaluated in PACU  Patient Participation: complete - patient participated  Level of Consciousness: awake and alert  Pain Score: 0  Pain Management: adequate  Airway Patency:patent  Dental exam unchanged from preop  Yes    Cardiovascular Status: acceptable  Respiratory Status: acceptable  Postoperative Hydration acceptable  Comments: [facility]      Melissa Briscoe CRNA  4/6/2023 2:44 PM

## 2023-04-10 RX ORDER — MONTELUKAST SODIUM 10 MG/1
TABLET ORAL
Qty: 90 TABLET | Refills: 0 | Status: SHIPPED | OUTPATIENT
Start: 2023-04-10

## 2023-04-10 RX ORDER — CALCITRIOL 0.25 UG/1
0.25 CAPSULE, LIQUID FILLED ORAL DAILY
Qty: 90 CAPSULE | Refills: 1 | Status: SHIPPED | OUTPATIENT
Start: 2023-04-10

## 2023-04-10 NOTE — TELEPHONE ENCOUNTER
Protocol failed or has No Protocol, please review  Component  Ref Range & Units 11/26/22  9:49 AM   Vitamin D, 25OH, Total  30.0 - 100.0 ng/mL 75.6        Requested Prescriptions   Pending Prescriptions Disp Refills    CALCITRIOL 0.25 MCG Oral Cap [Pharmacy Med Name: CALCITRIOL 0.25 MCG CAPSULE] 90 capsule 1     Sig: TAKE 1 CAPSULE BY MOUTH EVERY DAY       There is no refill protocol information for this order        Future Appointments         Provider Department Appt Notes    In 5 days 156 [facility]     In 3 weeks Mckenzie Cedeño MD [address], [address] [facility]     In 1 month Ann Marie Mcgrath MD [address], 602 [facility] [place] 6mo f/u    In 1 month Teri Hodges MD [address], [address], [place] [employer] PPO  POST OP F/U    In 8 months Anton Gandhi [address], 7400 [facility],[address] Floor, [facility]           Recent Outpatient Visits              3 weeks ago Environmental and seasonal allergies    [facility], [address] [place] [address]    Nurse Only    3 weeks ago S/P lumbar fusion    [address], 148 [facility] [place] Ann Marie Mcgrath MD    Office Visit    3 weeks ago Dermatitis    [address], [facility] [place] [facility]    Office Visit    1 month ago Environmental and seasonal allergies    [address], 148 [facility] [place]    Nurse Only    2 months ago Environmental and seasonal allergies    [address], 148 [facility] [address] Only

## 2023-04-13 ENCOUNTER — ORDER TRANSCRIPTION (OUTPATIENT)
Dept: PHYSICAL THERAPY | Facility: HOSPITAL | Age: 63
End: 2023-04-13

## 2023-04-13 DIAGNOSIS — G20 IDIOPATHIC PARKINSON'S DISEASE (HCC): Primary | ICD-10-CM

## 2023-04-15 ENCOUNTER — NURSE ONLY (OUTPATIENT)
Dept: ALLERGY | Facility: CLINIC | Age: 63
End: 2023-04-15

## 2023-04-15 DIAGNOSIS — J30.89 ENVIRONMENTAL AND SEASONAL ALLERGIES: ICD-10-CM

## 2023-04-15 PROCEDURE — 95117 IMMUNOTHERAPY INJECTIONS: CPT | Performed by: ALLERGY & IMMUNOLOGY

## 2023-04-19 ENCOUNTER — TELEPHONE (OUTPATIENT)
Dept: PHYSICAL THERAPY | Facility: HOSPITAL | Age: 63
End: 2023-04-19

## 2023-04-19 RX ORDER — FOLIC ACID 1 MG/1
1 TABLET ORAL DAILY
Qty: 90 TABLET | Refills: 3 | Status: SHIPPED | OUTPATIENT
Start: 2023-04-19

## 2023-04-19 NOTE — TELEPHONE ENCOUNTER
Please review. Protocol failed or has no protocol. Requested Prescriptions   Pending Prescriptions Disp Refills    folic acid 1 MG Oral Tab 90 tablet 1     Sig: Take 1 tablet (1 mg total) by mouth daily.        There is no refill protocol information for this order          Recent Outpatient Visits              4 days ago Environmental and seasonal allergies    [facility], [address] [place] [address]    Nurse Only    1 month ago Environmental and seasonal allergies    [facility], [address] [place] [address]    Nurse Only    1 month ago S/P lumbar fusion    Albertina Arellano [place] Mateus Ross MD    Office Visit    1 month ago Dermatitis    [address], 148 [facility]    Office Visit    2 months ago Environmental and seasonal allergies    [facility], [address] [place] [address]    Nurse Only            Future Appointments         Provider Department Appt Notes    In 2 weeks Eddie Ventura MD [address], [address], [facility]     In 3 weeks Mateus Ross MD [address], 801 [facility] 6mo f/u    In 3 weeks [address] [place] [address], 148 [facility]     In 1 month Celena Barrientos MD [address], [address], [place] [employer] PPO  POST OP F/U    In 1 month [facility], [facility], [address] PPO    In 1 month Verna Pat, [address] PPO    In 1 month Verna Pat, [address] PPO    In 2 months Verna Pat, [address] PPO    In 2 months Verna Pat, [address] PPO    In 2 months Verna Pat, [address] PPO    In 2 months Vernauma Draper, [address] [place] [address] [address] PPO    In 2 months Yael Cocking, [address] [place] PPO    In 2 months Yael Cocking, [address] [place] PPO    In 2 months Yael Cocking, [address] [place] PPO    In 2 months Yael Cocking, [address] [place] PPO    In 7 months Critical access hospital, [address], [address] [facility],[address] Floor, [place]

## 2023-04-24 ENCOUNTER — APPOINTMENT (OUTPATIENT)
Dept: PHYSICAL THERAPY | Facility: HOSPITAL | Age: 63
End: 2023-04-24
Attending: Other
Payer: COMMERCIAL

## 2023-04-26 ENCOUNTER — APPOINTMENT (OUTPATIENT)
Dept: PHYSICAL THERAPY | Facility: HOSPITAL | Age: 63
End: 2023-04-26
Attending: Other
Payer: COMMERCIAL

## 2023-05-01 ENCOUNTER — APPOINTMENT (OUTPATIENT)
Dept: PHYSICAL THERAPY | Facility: HOSPITAL | Age: 63
End: 2023-05-01
Attending: Other
Payer: COMMERCIAL

## 2023-05-03 ENCOUNTER — APPOINTMENT (OUTPATIENT)
Dept: PHYSICAL THERAPY | Facility: HOSPITAL | Age: 63
End: 2023-05-03
Attending: Other
Payer: COMMERCIAL

## 2023-05-05 ENCOUNTER — OFFICE VISIT (OUTPATIENT)
Dept: RHEUMATOLOGY | Facility: CLINIC | Age: 63
End: 2023-05-05

## 2023-05-05 VITALS
WEIGHT: 240.31 LBS | BODY MASS INDEX: 35.59 KG/M2 | DIASTOLIC BLOOD PRESSURE: 64 MMHG | SYSTOLIC BLOOD PRESSURE: 104 MMHG | RESPIRATION RATE: 20 BRPM | HEIGHT: 69 IN | HEART RATE: 101 BPM

## 2023-05-05 DIAGNOSIS — M81.0 AGE-RELATED OSTEOPOROSIS WITHOUT CURRENT PATHOLOGICAL FRACTURE: ICD-10-CM

## 2023-05-05 DIAGNOSIS — M06.9 RHEUMATOID ARTHRITIS, INVOLVING UNSPECIFIED SITE, UNSPECIFIED WHETHER RHEUMATOID FACTOR PRESENT (HCC): Primary | ICD-10-CM

## 2023-05-05 DIAGNOSIS — Z51.81 THERAPEUTIC DRUG MONITORING: ICD-10-CM

## 2023-05-05 DIAGNOSIS — M25.562 ACUTE PAIN OF LEFT KNEE: ICD-10-CM

## 2023-05-05 PROCEDURE — 99214 OFFICE O/P EST MOD 30 MIN: CPT | Performed by: INTERNAL MEDICINE

## 2023-05-05 PROCEDURE — 3078F DIAST BP <80 MM HG: CPT | Performed by: INTERNAL MEDICINE

## 2023-05-05 PROCEDURE — 3074F SYST BP LT 130 MM HG: CPT | Performed by: INTERNAL MEDICINE

## 2023-05-05 PROCEDURE — 3008F BODY MASS INDEX DOCD: CPT | Performed by: INTERNAL MEDICINE

## 2023-05-05 PROCEDURE — 20610 DRAIN/INJ JOINT/BURSA W/O US: CPT | Performed by: INTERNAL MEDICINE

## 2023-05-05 RX ORDER — TRIAMCINOLONE ACETONIDE 40 MG/ML
40 INJECTION, SUSPENSION INTRA-ARTICULAR; INTRAMUSCULAR ONCE
Status: COMPLETED | OUTPATIENT
Start: 2023-05-05 | End: 2023-05-05

## 2023-05-05 RX ADMIN — TRIAMCINOLONE ACETONIDE 40 MG: 40 INJECTION, SUSPENSION INTRA-ARTICULAR; INTRAMUSCULAR at 14:00:00

## 2023-05-05 NOTE — PATIENT INSTRUCTIONS
1. Skip . methotrexate - 8 tablets a week and folic acid 1mg a day once week before and one week after the surgery   2. Skip xlejanz 11mg a day  Before and one week after   Resume meds after healing from surgery -   3. Return to clinic in  6 months -   4. .Check labs in 4  months . 5. Diclofenac gel 1 % for left knee. 6.  Stop the diclofneac 50mg twice a day - 1 week before the surugery   7. Tylenol ES two tablets twice ad ay   8. Schedule bone density - DEXA scan - [phone]  9.  Rest left knee x 3 days

## 2023-05-08 ENCOUNTER — APPOINTMENT (OUTPATIENT)
Dept: PHYSICAL THERAPY | Facility: HOSPITAL | Age: 63
End: 2023-05-08
Attending: Other
Payer: COMMERCIAL

## 2023-05-10 ENCOUNTER — APPOINTMENT (OUTPATIENT)
Dept: PHYSICAL THERAPY | Facility: HOSPITAL | Age: 63
End: 2023-05-10
Attending: Other
Payer: COMMERCIAL

## 2023-05-15 ENCOUNTER — OFFICE VISIT (OUTPATIENT)
Facility: CLINIC | Age: 63
End: 2023-05-15

## 2023-05-15 ENCOUNTER — APPOINTMENT (OUTPATIENT)
Dept: PHYSICAL THERAPY | Facility: HOSPITAL | Age: 63
End: 2023-05-15
Attending: Other
Payer: COMMERCIAL

## 2023-05-15 ENCOUNTER — OFFICE VISIT (OUTPATIENT)
Dept: DERMATOLOGY CLINIC | Facility: CLINIC | Age: 63
End: 2023-05-15
Payer: COMMERCIAL

## 2023-05-15 ENCOUNTER — NURSE ONLY (OUTPATIENT)
Dept: ALLERGY | Facility: CLINIC | Age: 63
End: 2023-05-15

## 2023-05-15 VITALS
HEART RATE: 86 BPM | TEMPERATURE: 98 F | DIASTOLIC BLOOD PRESSURE: 60 MMHG | SYSTOLIC BLOOD PRESSURE: 116 MMHG | WEIGHT: 240 LBS | HEIGHT: 69 IN | RESPIRATION RATE: 20 BRPM | BODY MASS INDEX: 35.55 KG/M2

## 2023-05-15 DIAGNOSIS — M51.9 LUMBAR DISC DISEASE: ICD-10-CM

## 2023-05-15 DIAGNOSIS — Z01.818 PREOP EXAMINATION: Primary | ICD-10-CM

## 2023-05-15 DIAGNOSIS — J30.89 ENVIRONMENTAL AND SEASONAL ALLERGIES: ICD-10-CM

## 2023-05-15 DIAGNOSIS — M06.9 RHEUMATOID ARTHRITIS, INVOLVING UNSPECIFIED SITE, UNSPECIFIED WHETHER RHEUMATOID FACTOR PRESENT (HCC): ICD-10-CM

## 2023-05-15 DIAGNOSIS — G20 PARKINSON DISEASE (HCC): ICD-10-CM

## 2023-05-15 DIAGNOSIS — L89.139 PRESSURE INJURY OF SKIN OF RIGHT LOWER BACK, UNSPECIFIED INJURY STAGE: Primary | ICD-10-CM

## 2023-05-15 DIAGNOSIS — G47.33 OSA (OBSTRUCTIVE SLEEP APNEA): ICD-10-CM

## 2023-05-15 DIAGNOSIS — J45.40 MODERATE PERSISTENT EXTRINSIC ASTHMA WITHOUT COMPLICATION: ICD-10-CM

## 2023-05-15 PROCEDURE — 3078F DIAST BP <80 MM HG: CPT | Performed by: INTERNAL MEDICINE

## 2023-05-15 PROCEDURE — 99214 OFFICE O/P EST MOD 30 MIN: CPT | Performed by: INTERNAL MEDICINE

## 2023-05-15 PROCEDURE — 3074F SYST BP LT 130 MM HG: CPT | Performed by: INTERNAL MEDICINE

## 2023-05-15 PROCEDURE — 3008F BODY MASS INDEX DOCD: CPT | Performed by: INTERNAL MEDICINE

## 2023-05-15 RX ORDER — DOXYCYCLINE HYCLATE 100 MG/1
100 CAPSULE ORAL 2 TIMES DAILY
Qty: 28 CAPSULE | Refills: 0 | Status: SHIPPED | OUTPATIENT
Start: 2023-05-15

## 2023-05-15 RX ORDER — CURCUMIN 100 %
POWDER (GRAM) MISCELLANEOUS DAILY
COMMUNITY
End: 2023-05-15

## 2023-05-17 ENCOUNTER — HOSPITAL ENCOUNTER (OUTPATIENT)
Dept: GENERAL RADIOLOGY | Facility: HOSPITAL | Age: 63
Discharge: HOME | End: 2023-05-17
Attending: NEUROLOGICAL SURGERY
Payer: COMMERCIAL

## 2023-05-17 ENCOUNTER — LAB ENCOUNTER (OUTPATIENT)
Dept: LAB | Facility: HOSPITAL | Age: 63
End: 2023-05-17
Attending: NEUROLOGICAL SURGERY
Payer: COMMERCIAL

## 2023-05-17 ENCOUNTER — APPOINTMENT (OUTPATIENT)
Dept: PHYSICAL THERAPY | Facility: HOSPITAL | Age: 63
End: 2023-05-17
Attending: Other
Payer: COMMERCIAL

## 2023-05-17 ENCOUNTER — TELEPHONE (OUTPATIENT)
Dept: INTERNAL MEDICINE CLINIC | Facility: CLINIC | Age: 63
End: 2023-05-17

## 2023-05-17 DIAGNOSIS — Z01.818 PREOPERATIVE EXAMINATION, UNSPECIFIED: ICD-10-CM

## 2023-05-17 DIAGNOSIS — Z98.84 S/P LAPAROSCOPIC SLEEVE GASTRECTOMY: ICD-10-CM

## 2023-05-17 DIAGNOSIS — E78.5 HYPERLIPIDEMIA, UNSPECIFIED HYPERLIPIDEMIA TYPE: ICD-10-CM

## 2023-05-17 DIAGNOSIS — Z01.818 PRE-OPERATIVE CLEARANCE: ICD-10-CM

## 2023-05-17 DIAGNOSIS — M06.9 RHEUMATOID ARTHRITIS, INVOLVING UNSPECIFIED SITE, UNSPECIFIED WHETHER RHEUMATOID FACTOR PRESENT (HCC): ICD-10-CM

## 2023-05-17 DIAGNOSIS — Z01.818 PREOPERATIVE EXAMINATION, UNSPECIFIED: Primary | ICD-10-CM

## 2023-05-17 DIAGNOSIS — Z51.81 ENCOUNTER FOR THERAPEUTIC DRUG MONITORING: ICD-10-CM

## 2023-05-17 DIAGNOSIS — E66.9 OBESITY (BMI 30-39.9): ICD-10-CM

## 2023-05-17 DIAGNOSIS — Z51.81 THERAPEUTIC DRUG MONITORING: ICD-10-CM

## 2023-05-17 DIAGNOSIS — R63.2 INCREASED APPETITE: ICD-10-CM

## 2023-05-17 LAB
ALBUMIN SERPL-MCNC: 3.5 G/DL (ref 3.4–5)
ALBUMIN/GLOB SERPL: 1.1 {RATIO} (ref 1–2)
ALP LIVER SERPL-CCNC: 90 U/L
ALT SERPL-CCNC: 9 U/L
ANION GAP SERPL CALC-SCNC: 3 MMOL/L (ref 0–18)
ANTIBODY SCREEN: NEGATIVE
AST SERPL-CCNC: 19 U/L (ref 15–37)
BASOPHILS # BLD AUTO: 0.03 X10(3) UL (ref 0–0.2)
BASOPHILS NFR BLD AUTO: 0.4 %
BILIRUB SERPL-MCNC: 0.3 MG/DL (ref 0.1–2)
BILIRUB UR QL: NEGATIVE
BUN BLD-MCNC: 19 MG/DL (ref 7–18)
BUN/CREAT SERPL: 17.9 (ref 10–20)
CALCIUM BLD-MCNC: 9.2 MG/DL (ref 8.5–10.1)
CHLORIDE SERPL-SCNC: 116 MMOL/L (ref 98–112)
CO2 SERPL-SCNC: 25 MMOL/L (ref 21–32)
CREAT BLD-MCNC: 1.06 MG/DL
CRP SERPL-MCNC: <0.29 MG/DL (ref ?–0.3)
DEPRECATED RDW RBC AUTO: 54 FL (ref 35.1–46.3)
EOSINOPHIL # BLD AUTO: 0.11 X10(3) UL (ref 0–0.7)
EOSINOPHIL NFR BLD AUTO: 1.6 %
ERYTHROCYTE [DISTWIDTH] IN BLOOD BY AUTOMATED COUNT: 14.6 % (ref 11–15)
ERYTHROCYTE [SEDIMENTATION RATE] IN BLOOD: 15 MM/HR
FASTING STATUS PATIENT QL REPORTED: NO
GFR SERPLBLD BASED ON 1.73 SQ M-ARVRAT: 60 ML/MIN/1.73M2 (ref 60–?)
GLOBULIN PLAS-MCNC: 3.3 G/DL (ref 2.8–4.4)
GLUCOSE BLD-MCNC: 99 MG/DL (ref 70–99)
GLUCOSE UR-MCNC: NORMAL MG/DL
HCT VFR BLD AUTO: 42 %
HGB BLD-MCNC: 13.5 G/DL
HGB UR QL STRIP.AUTO: NEGATIVE
IMM GRANULOCYTES # BLD AUTO: 0.02 X10(3) UL (ref 0–1)
IMM GRANULOCYTES NFR BLD: 0.3 %
INR BLD: 0.98 (ref 0.85–1.16)
KETONES UR-MCNC: NEGATIVE MG/DL
LEUKOCYTE ESTERASE UR QL STRIP.AUTO: NEGATIVE
LYMPHOCYTES # BLD AUTO: 0.85 X10(3) UL (ref 1–4)
LYMPHOCYTES NFR BLD AUTO: 12.3 %
MCH RBC QN AUTO: 32.2 PG (ref 26–34)
MCHC RBC AUTO-ENTMCNC: 32.1 G/DL (ref 31–37)
MCV RBC AUTO: 100.2 FL
MONOCYTES # BLD AUTO: 0.64 X10(3) UL (ref 0.1–1)
MONOCYTES NFR BLD AUTO: 9.3 %
NEUTROPHILS # BLD AUTO: 5.25 X10 (3) UL (ref 1.5–7.7)
NEUTROPHILS # BLD AUTO: 5.25 X10(3) UL (ref 1.5–7.7)
NEUTROPHILS NFR BLD AUTO: 76.1 %
NITRITE UR QL STRIP.AUTO: NEGATIVE
OSMOLALITY SERPL CALC.SUM OF ELEC: 300 MOSM/KG (ref 275–295)
PH UR: 7 [PH] (ref 5–8)
PLATELET # BLD AUTO: 208 10(3)UL (ref 150–450)
POTASSIUM SERPL-SCNC: 4.8 MMOL/L (ref 3.5–5.1)
PROT SERPL-MCNC: 6.8 G/DL (ref 6.4–8.2)
PROT UR-MCNC: NEGATIVE MG/DL
PROTHROMBIN TIME: 12.9 SECONDS (ref 11.6–14.8)
RBC # BLD AUTO: 4.19 X10(6)UL
RH BLOOD TYPE: NEGATIVE
SODIUM SERPL-SCNC: 144 MMOL/L (ref 136–145)
SP GR UR STRIP: 1.01 (ref 1–1.03)
UROBILINOGEN UR STRIP-ACNC: NORMAL
WBC # BLD AUTO: 6.9 X10(3) UL (ref 4–11)

## 2023-05-17 PROCEDURE — 86480 TB TEST CELL IMMUN MEASURE: CPT

## 2023-05-17 PROCEDURE — 93005 ELECTROCARDIOGRAM TRACING: CPT

## 2023-05-17 PROCEDURE — 85025 COMPLETE CBC W/AUTO DIFF WBC: CPT

## 2023-05-17 PROCEDURE — 81001 URINALYSIS AUTO W/SCOPE: CPT

## 2023-05-17 PROCEDURE — 85652 RBC SED RATE AUTOMATED: CPT

## 2023-05-17 PROCEDURE — 93010 ELECTROCARDIOGRAM REPORT: CPT | Performed by: INTERNAL MEDICINE

## 2023-05-17 PROCEDURE — 86901 BLOOD TYPING SEROLOGIC RH(D): CPT

## 2023-05-17 PROCEDURE — 87641 MR-STAPH DNA AMP PROBE: CPT

## 2023-05-17 PROCEDURE — 80053 COMPREHEN METABOLIC PANEL: CPT

## 2023-05-17 PROCEDURE — 86900 BLOOD TYPING SEROLOGIC ABO: CPT

## 2023-05-17 PROCEDURE — 85610 PROTHROMBIN TIME: CPT

## 2023-05-17 PROCEDURE — 71046 X-RAY EXAM CHEST 2 VIEWS: CPT | Performed by: NEUROLOGICAL SURGERY

## 2023-05-17 PROCEDURE — 86140 C-REACTIVE PROTEIN: CPT

## 2023-05-17 PROCEDURE — 86850 RBC ANTIBODY SCREEN: CPT

## 2023-05-17 PROCEDURE — 36415 COLL VENOUS BLD VENIPUNCTURE: CPT

## 2023-05-17 NOTE — TELEPHONE ENCOUNTER
Spoke with [facility] Maximus BARRON , verified patient's Name and . She states pre-op testing requirements was sent to the office back on 4/10; re-faxed during this encounter. Tests needed include CBC, CMP, UA, PT/PTT, MRSA, EKG and Chest x-ray. All within 30 days before the surgery. Dr. Papito Art. [facility] Staff keep an eye out on faxed pre-op testing requirements. Thank you.

## 2023-05-17 NOTE — TELEPHONE ENCOUNTER
I am okay with using this last visit as a preoperative clearance visit.   However, we should contact the surgeons office and see if they want any preoperative testing done such as blood work, EKG, etc.

## 2023-05-17 NOTE — TELEPHONE ENCOUNTER
Spoke to patient. She said that she just saw Dr. Octavia Cervantes on 5/15. She has an upcoming surgery on 5/24 and forgot she was supposed to get a surgical clearance. She said that she did discuss the surgery with Dr. Octavia Cervantes at the appointment on 5/15 and wondered if she could use that appointment to get the surgical clearance. Rn relayed that a pre-operative appointment is normally required. We did schedule her for 5/19. Future Appointments   Date Time Provider Joel Mann   5/19/2023  9:00 AM MD [address] please advise if patient needs to keep 5/19 appointment or if you can use the 5/15 appointment for clearance. If so, please advise on any blood work/other testing that would need to be ordered.

## 2023-05-18 ENCOUNTER — TELEPHONE (OUTPATIENT)
Dept: PHYSICAL THERAPY | Facility: HOSPITAL | Age: 63
End: 2023-05-18

## 2023-05-18 LAB
ATRIAL RATE: 82 BPM
MRSA DNA SPEC QL NAA+PROBE: NEGATIVE
P AXIS: 67 DEGREES
P-R INTERVAL: 198 MS
Q-T INTERVAL: 374 MS
QRS DURATION: 90 MS
QTC CALCULATION (BEZET): 436 MS
R AXIS: -25 DEGREES
T AXIS: 30 DEGREES
VENTRICULAR RATE: 82 BPM

## 2023-05-18 NOTE — TELEPHONE ENCOUNTER
Dr. Miguel Mccoy - See labs and testing completed yesterday . Okay to cancel tomorrow's appt? Onsite Clinical - please call patient, if tomorrow's Pre-op appt no longer needed. Seems like we are still awaiting additional pre-op forms to be faxed to office? Best call back Ph [phone]    All labs and testing listed below were completed yesterday. Tomorrow, 9am appt with Dr. Christian Perez.

## 2023-05-18 NOTE — TELEPHONE ENCOUNTER
Future Appointments   Date Time Provider Joel Mann   5/19/2023  9:00 AM Alex Keith MD [facility]     Patient called states she has not heard back related to appointment. She will go to appointment as scheduled. She is aware Dr. Jayla Lao is not in the office today, but does check his messages periodically - she I aware messages have been sent for him to review and advise. Patient verbalized understanding. No further questions or concerns at this time.

## 2023-05-19 LAB
M TB IFN-G CD4+ T-CELLS BLD-ACNC: 0 IU/ML
M TB TUBERC IFN-G BLD QL: NEGATIVE
M TB TUBERC IGNF/MITOGEN IGNF CONTROL: >10 IU/ML
QFT TB1 AG MINUS NIL: 0.01 IU/ML
QFT TB2 AG MINUS NIL: 0.01 IU/ML

## 2023-05-19 NOTE — TELEPHONE ENCOUNTER
Spoke to patient and relayed Dr. Zoe Patel message below. Patient verbalized understanding and had no further questions. Canceled appointment. Dr. Randy Collet, please advise on clearance when you get a chance.

## 2023-05-22 ENCOUNTER — TELEPHONE (OUTPATIENT)
Dept: INTERNAL MEDICINE CLINIC | Facility: CLINIC | Age: 63
End: 2023-05-22

## 2023-05-22 ENCOUNTER — APPOINTMENT (OUTPATIENT)
Dept: PHYSICAL THERAPY | Facility: HOSPITAL | Age: 63
End: 2023-05-22
Attending: Other
Payer: COMMERCIAL

## 2023-05-22 RX ORDER — PANTOPRAZOLE SODIUM 40 MG/1
TABLET, DELAYED RELEASE ORAL
Qty: 90 TABLET | Refills: 2 | Status: SHIPPED | OUTPATIENT
Start: 2023-05-22

## 2023-05-22 NOTE — TELEPHONE ENCOUNTER
April-Dr. Duc Cristina is checking status on the medical clearance for this patient, surgery is on 5/24/2023, please fax to [phone]

## 2023-05-23 NOTE — TELEPHONE ENCOUNTER
Spoke with pt,  verified  Pt is looking for medical clearance, she is scheduled for surgery tomorrow morning at 10.   pls advise, thanks in advance. See telephone encounter 23.

## 2023-05-23 NOTE — TELEPHONE ENCOUNTER
Left message on Dr. Giancarlo Russo cell phone. [place] [phone] until 5pm.  Also see encounter dated 5/17/23.

## 2023-05-23 NOTE — TELEPHONE ENCOUNTER
Patient called again to follow up on clearance for procedure tomorrow. Direct message sent to provider.      Patient would like update once completed

## 2023-05-23 NOTE — TELEPHONE ENCOUNTER
Dr. Ashkan Cardenasinter updated note and it was faxed to Dr. Vale Harrison [phone], confirmation received. Patient notified, verbalized understanding.

## 2023-05-24 DIAGNOSIS — M48.062 SPINAL STENOSIS OF LUMBAR REGION WITH NEUROGENIC CLAUDICATION: Primary | ICD-10-CM

## 2023-05-24 RX ORDER — HYDROCODONE BITARTRATE AND ACETAMINOPHEN 5; 325 MG/1; MG/1
1 TABLET ORAL EVERY 6 HOURS PRN
Qty: 28 TABLET | Refills: 0 | Status: SHIPPED | OUTPATIENT
Start: 2023-05-24 | End: 2023-05-31

## 2023-05-24 RX ORDER — METHOCARBAMOL 750 MG/1
750 TABLET, FILM COATED ORAL 3 TIMES DAILY
Refills: 0 | Status: SHIPPED | COMMUNITY

## 2023-05-30 ENCOUNTER — TELEMEDICINE (OUTPATIENT)
Dept: SURGERY | Facility: CLINIC | Age: 63
End: 2023-05-30
Payer: COMMERCIAL

## 2023-05-30 VITALS — BODY MASS INDEX: 34.8 KG/M2 | WEIGHT: 235 LBS | HEIGHT: 69 IN

## 2023-05-30 DIAGNOSIS — Z98.84 S/P LAPAROSCOPIC SLEEVE GASTRECTOMY: ICD-10-CM

## 2023-05-30 DIAGNOSIS — Z51.81 ENCOUNTER FOR THERAPEUTIC DRUG MONITORING: ICD-10-CM

## 2023-05-30 DIAGNOSIS — E78.5 HYPERLIPIDEMIA, UNSPECIFIED HYPERLIPIDEMIA TYPE: ICD-10-CM

## 2023-05-30 DIAGNOSIS — E44.1 MILD PROTEIN-CALORIE MALNUTRITION (HCC): Primary | ICD-10-CM

## 2023-05-30 DIAGNOSIS — E66.9 OBESITY (BMI 30-39.9): ICD-10-CM

## 2023-05-30 PROCEDURE — 99214 OFFICE O/P EST MOD 30 MIN: CPT | Performed by: INTERNAL MEDICINE

## 2023-05-30 RX ORDER — TOPIRAMATE 50 MG/1
50 TABLET, FILM COATED ORAL 2 TIMES DAILY
Qty: 180 TABLET | Refills: 1 | Status: SHIPPED | OUTPATIENT
Start: 2023-05-30 | End: 2023-08-28

## 2023-05-30 RX ORDER — PHENTERMINE HYDROCHLORIDE 37.5 MG/1
TABLET ORAL
Qty: 45 TABLET | Refills: 2 | Status: SHIPPED | OUTPATIENT
Start: 2023-05-30

## 2023-06-01 RX ORDER — ERGOCALCIFEROL 1.25 MG/1
CAPSULE ORAL
Qty: 12 CAPSULE | Refills: 3 | Status: SHIPPED | OUTPATIENT
Start: 2023-06-01

## 2023-06-06 ENCOUNTER — TELEPHONE (OUTPATIENT)
Dept: PHYSICAL THERAPY | Facility: HOSPITAL | Age: 63
End: 2023-06-06

## 2023-06-07 ENCOUNTER — APPOINTMENT (OUTPATIENT)
Dept: PHYSICAL THERAPY | Facility: HOSPITAL | Age: 63
End: 2023-06-07
Attending: Other
Payer: COMMERCIAL

## 2023-06-09 ENCOUNTER — TELEPHONE (OUTPATIENT)
Dept: PHYSICAL THERAPY | Facility: HOSPITAL | Age: 63
End: 2023-06-09

## 2023-06-12 ENCOUNTER — OFFICE VISIT (OUTPATIENT)
Dept: PHYSICAL THERAPY | Facility: HOSPITAL | Age: 63
End: 2023-06-12
Attending: Other
Payer: COMMERCIAL

## 2023-06-12 PROCEDURE — 97162 PT EVAL MOD COMPLEX 30 MIN: CPT

## 2023-06-14 ENCOUNTER — OFFICE VISIT (OUTPATIENT)
Dept: PHYSICAL THERAPY | Facility: HOSPITAL | Age: 63
End: 2023-06-14
Attending: Other
Payer: COMMERCIAL

## 2023-06-14 PROCEDURE — 97112 NEUROMUSCULAR REEDUCATION: CPT

## 2023-06-14 NOTE — PATIENT INSTRUCTIONS
Do these exercises everyday    1. Stand next to countertop and hold on with 1 hand. With the foot closer to the countertop, take a comfortable step forward. Knees stay relatively straight. Shift weight forward, rocking forward and reaching up with arm. Chest stays up. Then rock back. Do this  Move deliberately. 2. Stand at [place] and hold on with one hand, facing the countertop. Take a large side step moving arm on the same side of the leg you are moving. Do this 10 times on each side. 3.  Stand at countertop and hold on with one hand. With the leg further away from counter take a step forward then back to the starting position. Do this 10 times. Turn and hold on with the other hand, making steps with the other leg 10 times. 4. Stand next to countertop or bed for safety and walker nearby. Take a step forward with one foot. Balance for 30 seconds with arms at sides. Then practice with the other foot in front. The further forward your front foot is will make it more challenging. The closer your feet are together width wise will also make it more challenging. Progress as this becomes easier. 5. Stand up tall at countertop. Hold on with both hands. Stand up tall with good posture. Tighten abdominal muscles but keep breathing. Hold these muscles tight as you lift one arm up, then the other. Relax everything, then repeat. Do 10 cycles.

## 2023-06-14 NOTE — PROGRESS NOTES
Diagnosis: imbalance, Parkinson's    Visit (# authorized):   12 per POC ([employer] PPO)                       Referring Physician: Mat Skelton    Precautions: at risk of falls     Medication changes since last visit?:  No    Subjective: No new information to report. Objective:    Date  6/14          Visit Number  2          NMR x          Ther EX           Ther Act           Gait Training           CRM            Manual             Additional Treatment Information:    NMR (40 mins):   Nu-step level 4 x10 mins, motor priming   1 UE support, lg amp fwd step x10 B   1 UE support, lg amp side step x10 B   1 UE support, step stand with wt shift A<>P x15- demo with return demo  Step stance with arm swing (lg amp) -+back pain, deferred  Step stance EO 30 sec ea foot in front - pt educated on self progression  Standing TA brace with UE lift alt x10 - demo with return demo, rationale for exercise explained    Patient Education:  HEP initiated and issued- see pt instructions. Rationale for exercises discussed as well as focus on quality of movements not speed to maximize benefit       Assessment:   Pt demonstrates and verbalizes understanding of material taught today. Mild back pain with activities but appears to recover with seated rest. Will continue to monitor back pain, if it becomes a limiting factor may benefit patient to pursue PT with orthopedic therapist to address back issues prior to continuing PT for gait/balance in order to maximize tolerance. Plan: in future sessions: forced pedaling, resisted swing, shuttle, mini squat with SB, foam    Charges: 3 NMR       Total Timed Treatment: 40 min  Total Treatment Time: 40 min      21st Century Cures Act Notice to Patient: Medical documents like this are made available to patients in the interest of transparency. However, be advised this is a medical document and it is intended as ckow-pk-ljiq communication between your medical providers.  This medical document may contain abbreviations, assessments, medical data, and results or other terms that are unfamiliar. Medical documents are intended to carry relevant information, facts as evident, and the clinical opinion of the practitioner. As such, this medical document may be written in language that appears blunt or direct. You are encouraged to contact your medical provider and/or [facility] Patient Experience if you have any questions about this medical document.

## 2023-06-17 ENCOUNTER — NURSE ONLY (OUTPATIENT)
Dept: ALLERGY | Facility: CLINIC | Age: 63
End: 2023-06-17

## 2023-06-17 DIAGNOSIS — J30.89 ENVIRONMENTAL AND SEASONAL ALLERGIES: ICD-10-CM

## 2023-06-17 PROCEDURE — 95117 IMMUNOTHERAPY INJECTIONS: CPT | Performed by: ALLERGY & IMMUNOLOGY

## 2023-06-19 ENCOUNTER — OFFICE VISIT (OUTPATIENT)
Dept: PHYSICAL THERAPY | Facility: HOSPITAL | Age: 63
End: 2023-06-19
Attending: Other
Payer: COMMERCIAL

## 2023-06-19 PROCEDURE — 97112 NEUROMUSCULAR REEDUCATION: CPT

## 2023-06-19 PROCEDURE — 97110 THERAPEUTIC EXERCISES: CPT

## 2023-06-19 NOTE — PROGRESS NOTES
Diagnosis: imbalance, Parkinson's    Visit (# authorized):   12 per POC ([employer] PPO)                       Referring Physician: Denisha Mae    Precautions: at risk of falls     Medication changes since last visit?:  No    Subjective: 2 falls over the weekend, uninjured- working in yard. Would like to review first exercise on HEP. Objective:    Date  6/14 6/19         Visit Number  2 3         NMR x x         Ther EX  x         Ther Act           Gait Training           CRM            Manual             Additional Treatment Information:    NMR (27 mins):   Nu-step level 3 x12 mins, motor priming   1 UE support, step stand with wt shift A<>P x15- demo with return demo-VC's initially for proper performance/mechanics   Seated unsupported, feet off floor    - engaged in UE task reaching slightly out of RODY   -B shoulder flex OH 5# focus on maintenance of upright posture x15      Therapeutic Exercise (12 mins):   Shuttle    B press (2 blue, silver) x15--> with wobble disc (deferred 2.2 limitations due to AFO's)-->dynadisc x15    TA brace with alt march x15     Patient Education:  Reviewed exercise as requested. Encouraged continued performance of HEP. Assessment:   No reports of back pain today with activities. Tolerated session well. Postural lean L in unsupported sitting, corrected with cues but unable to maintain indefinitely. Plan: in future sessions: forced pedaling, resisted swing, mini squat with SB, foam    Charges: 2 NMR, 1 TE       Total Timed Treatment: 39 min  Total Treatment Time: 39 min      21st Century Cures Act Notice to Patient: Medical documents like this are made available to patients in the interest of transparency. However, be advised this is a medical document and it is intended as oiwp-tt-sgot communication between your medical providers. This medical document may contain abbreviations, assessments, medical data, and results or other terms that are unfamiliar.  Medical documents are intended to carry relevant information, facts as evident, and the clinical opinion of the practitioner. As such, this medical document may be written in language that appears blunt or direct. You are encouraged to contact your medical provider and/or [facility] Patient Experience if you have any questions about this medical document.

## 2023-06-21 ENCOUNTER — OFFICE VISIT (OUTPATIENT)
Dept: PHYSICAL THERAPY | Facility: HOSPITAL | Age: 63
End: 2023-06-21
Attending: Other
Payer: COMMERCIAL

## 2023-06-21 PROCEDURE — 97112 NEUROMUSCULAR REEDUCATION: CPT

## 2023-06-21 NOTE — PROGRESS NOTES
Diagnosis: imbalance, Parkinson's    Visit (# authorized):   12 per POC ([employer] PPO)                       Referring Physician: Ngozi Dominguez    Precautions: at risk of falls     Medication changes since last visit?:  No    Subjective: No new information. Objective:    Date  6/14 6/19 6/21        Visit Number  2 3 4        NMR x x x        Ther EX  x         Ther Act           Gait Training           CRM            Manual             Additional Treatment Information:    NMR (38 mins):   Nu-step level 4 x12 mins, motor priming   Standing unsupported   Engaged in UE task with focus on upright posture (VC's provided) SBA   Holding 3# bar    -B shoulder flex to 90 degrees x10    -UE PNF D2 Flex x10 B -demo with return demo  Seated on dynadisc    Engaged in UE task focus on neutral sitting position    Fly's 2# DB x10 -demo with return demo   Seated unsupported, feet off floor    - alt UE lift with trunk ext x10 B    -EC 30 sec x2 bouts     Patient Education:  Encouraged continued performance of HEP. Assessment:   Pain in back today reported by patient in session but not formally rated. Pt able to complete all tasks with seated rest as needed. Plan: in future sessions: TDM forced pedaling, resisted swing, mini squat with SB, foam    Charges:3 NMR       Total Timed Treatment: 38 min  Total Treatment Time: 38 min      21st Century Cures Act Notice to Patient: Medical documents like this are made available to patients in the interest of transparency. However, be advised this is a medical document and it is intended as adwc-fh-tzbv communication between your medical providers. This medical document may contain abbreviations, assessments, medical data, and results or other terms that are unfamiliar. Medical documents are intended to carry relevant information, facts as evident, and the clinical opinion of the practitioner. As such, this medical document may be written in language that appears blunt or direct.  You are encouraged to contact your medical provider and/or [facility] Patient Experience if you have any questions about this medical document.

## 2023-06-22 ENCOUNTER — TELEMEDICINE (OUTPATIENT)
Dept: ALLERGY | Facility: CLINIC | Age: 63
End: 2023-06-22

## 2023-06-22 DIAGNOSIS — J45.40 ASTHMA, EXTRINSIC, MODERATE PERSISTENT, UNCOMPLICATED: Primary | ICD-10-CM

## 2023-06-22 DIAGNOSIS — J30.1 SEASONAL ALLERGIC RHINITIS DUE TO POLLEN: ICD-10-CM

## 2023-06-22 DIAGNOSIS — Z23 NEEDS FLU SHOT: ICD-10-CM

## 2023-06-22 DIAGNOSIS — Z92.29 COVID-19 VACCINE SERIES COMPLETED: ICD-10-CM

## 2023-06-22 DIAGNOSIS — Z91.09 ALLERGY TO AMERICAN HOUSE DUST MITE: ICD-10-CM

## 2023-06-22 PROCEDURE — 99214 OFFICE O/P EST MOD 30 MIN: CPT | Performed by: ALLERGY & IMMUNOLOGY

## 2023-06-22 NOTE — PATIENT INSTRUCTIONS
#1 asthma  No ED visits or prednisone in the interim. Stable at this time with current regimen including Advair 1 puff once a day. Reviewed Advair may be taken up to twice a day if increasing symptoms  Continue with singular 10 mg a day  Albuterol 2 puffs every 4-6 hours if having active coughing wheezing or shortness of breath  Reviewed signs and symptoms of persistent asthma including the rules of 2    #2 allergic rhinitis  Continue with current maintenance dose immunotherapy every 4 weeks. Reviewed potential trial off of immunotherapy as patient has completed 5+ years of immunotherapy. Patient to contact my office if interested in stopping immunotherapy or being retested continue with medications including Singulair. May add Zyrtec or Xyzal as an antihistamine if needed  May add Flonase 2 sprays per nostril once a day if having prominent nasal congestion postnasal drip    #3 COVID vaccines up-to-date.     #4 pneumonia vaccine up-to-date    #5 flu vaccine up-to-date

## 2023-06-26 ENCOUNTER — OFFICE VISIT (OUTPATIENT)
Dept: PHYSICAL THERAPY | Facility: HOSPITAL | Age: 63
End: 2023-06-26
Attending: Other
Payer: COMMERCIAL

## 2023-06-26 PROCEDURE — 97116 GAIT TRAINING THERAPY: CPT

## 2023-06-26 PROCEDURE — 97112 NEUROMUSCULAR REEDUCATION: CPT

## 2023-06-26 NOTE — PROGRESS NOTES
Diagnosis: imbalance, Parkinson's    Visit (# authorized):   12 per POC ([employer]BS PPO)                       Referring Physician: Paul Carmichael    Precautions: at risk of falls     Medication changes since last visit?:  No    Subjective: No new information. Objective:    Date  6/14 6/19 6/21 6/26       Visit Number  2 3 4 5       NMR x x x x       Ther EX  x         Ther Act           Gait Training    x       CRM            Manual             Additional Treatment Information:    Gait training (20 mins):   TDM with harness, no BWS- music for robina, laser for visual target to increase B step length - 2x4 min bouts - 0.5-0.6 mph intermittent VC's for increased step length- extended rest after ea bout    NMR (20 mins):   In // bars:   lg amp step 1 foot to target 1 UE support x15 B   Lg amp side step resisted yellow TB 1 UE support x15 B   1 UE support lg amp step over obstacle 1 foot R<>L x15 B  No UE support: mini trunk rotation \"punch\" 2x10-SBA  1 UE support: lg amp step backwards x10 B       Patient Education:  Encouraged continued performance of HEP. Assessment:  Pt able to complete all tasks with seated rest as needed. Good movement patterns overground after TDM. Limited on TDM 2/2 pain and fatigue. Plan: in future sessions: continue TDM,  forced pedaling, resisted swing, mini squat with SB, foam    Charges:2 NMR, 1 GT       Total Timed Treatment: 40 min  Total Treatment Time: 40 min      21st Century Cures Act Notice to Patient: Medical documents like this are made available to patients in the interest of transparency. However, be advised this is a medical document and it is intended as lono-ti-wlhp communication between your medical providers. This medical document may contain abbreviations, assessments, medical data, and results or other terms that are unfamiliar. Medical documents are intended to carry relevant information, facts as evident, and the clinical opinion of the practitioner.  As such, this medical document may be written in language that appears blunt or direct. You are encouraged to contact your medical provider and/or [facility] Patient Experience if you have any questions about this medical document.

## 2023-06-28 ENCOUNTER — HOSPITAL ENCOUNTER (OUTPATIENT)
Dept: BONE DENSITY | Facility: HOSPITAL | Age: 63
Discharge: HOME | End: 2023-06-28
Attending: INTERNAL MEDICINE
Payer: COMMERCIAL

## 2023-06-28 ENCOUNTER — OFFICE VISIT (OUTPATIENT)
Dept: PHYSICAL THERAPY | Facility: HOSPITAL | Age: 63
End: 2023-06-28
Attending: Other
Payer: COMMERCIAL

## 2023-06-28 DIAGNOSIS — M81.0 AGE-RELATED OSTEOPOROSIS WITHOUT CURRENT PATHOLOGICAL FRACTURE: ICD-10-CM

## 2023-06-28 PROCEDURE — 97112 NEUROMUSCULAR REEDUCATION: CPT

## 2023-06-28 PROCEDURE — 77080 DXA BONE DENSITY AXIAL: CPT | Performed by: INTERNAL MEDICINE

## 2023-06-29 ENCOUNTER — PATIENT MESSAGE (OUTPATIENT)
Dept: DERMATOLOGY CLINIC | Facility: CLINIC | Age: 63
End: 2023-06-29

## 2023-06-30 ENCOUNTER — OFFICE VISIT (OUTPATIENT)
Dept: OBGYN CLINIC | Facility: CLINIC | Age: 63
End: 2023-06-30

## 2023-06-30 VITALS
HEART RATE: 91 BPM | HEIGHT: 67 IN | SYSTOLIC BLOOD PRESSURE: 112 MMHG | WEIGHT: 235.19 LBS | BODY MASS INDEX: 36.91 KG/M2 | DIASTOLIC BLOOD PRESSURE: 74 MMHG

## 2023-06-30 DIAGNOSIS — Z12.31 SCREENING MAMMOGRAM FOR BREAST CANCER: ICD-10-CM

## 2023-06-30 DIAGNOSIS — Z01.419 WELL WOMAN EXAM WITH ROUTINE GYNECOLOGICAL EXAM: Primary | ICD-10-CM

## 2023-06-30 DIAGNOSIS — Z12.4 SCREENING FOR CERVICAL CANCER: ICD-10-CM

## 2023-06-30 PROCEDURE — 99396 PREV VISIT EST AGE 40-64: CPT | Performed by: NURSE PRACTITIONER

## 2023-06-30 PROCEDURE — 3074F SYST BP LT 130 MM HG: CPT | Performed by: NURSE PRACTITIONER

## 2023-06-30 PROCEDURE — 3008F BODY MASS INDEX DOCD: CPT | Performed by: NURSE PRACTITIONER

## 2023-06-30 PROCEDURE — 3078F DIAST BP <80 MM HG: CPT | Performed by: NURSE PRACTITIONER

## 2023-07-03 ENCOUNTER — OFFICE VISIT (OUTPATIENT)
Dept: DERMATOLOGY CLINIC | Facility: CLINIC | Age: 63
End: 2023-07-03

## 2023-07-03 ENCOUNTER — OFFICE VISIT (OUTPATIENT)
Dept: PHYSICAL THERAPY | Facility: HOSPITAL | Age: 63
End: 2023-07-03
Attending: Other
Payer: COMMERCIAL

## 2023-07-03 ENCOUNTER — TELEPHONE (OUTPATIENT)
Dept: PHYSICAL THERAPY | Facility: HOSPITAL | Age: 63
End: 2023-07-03

## 2023-07-03 DIAGNOSIS — L24.5 IRRITANT CONTACT DERMATITIS DUE TO OTHER CHEMICAL PRODUCTS: Primary | ICD-10-CM

## 2023-07-03 DIAGNOSIS — L30.9 DERMATITIS: ICD-10-CM

## 2023-07-03 LAB — HPV I/H RISK 1 DNA SPEC QL NAA+PROBE: NEGATIVE

## 2023-07-03 PROCEDURE — 97112 NEUROMUSCULAR REEDUCATION: CPT

## 2023-07-03 PROCEDURE — 88305 TISSUE EXAM BY PATHOLOGIST: CPT | Performed by: PHYSICIAN ASSISTANT

## 2023-07-03 RX ORDER — MOMETASONE FUROATE 1 MG/G
1 OINTMENT TOPICAL DAILY
Qty: 30 G | Refills: 0 | Status: SHIPPED | OUTPATIENT
Start: 2023-07-03

## 2023-07-03 RX ORDER — MONTELUKAST SODIUM 10 MG/1
TABLET ORAL
Qty: 90 TABLET | Refills: 0 | Status: SHIPPED | OUTPATIENT
Start: 2023-07-03

## 2023-07-05 ENCOUNTER — TELEPHONE (OUTPATIENT)
Dept: PHYSICAL THERAPY | Facility: HOSPITAL | Age: 63
End: 2023-07-05

## 2023-07-05 ENCOUNTER — APPOINTMENT (OUTPATIENT)
Dept: PHYSICAL THERAPY | Facility: HOSPITAL | Age: 63
End: 2023-07-05
Attending: Other
Payer: COMMERCIAL

## 2023-07-10 ENCOUNTER — APPOINTMENT (OUTPATIENT)
Dept: PHYSICAL THERAPY | Facility: HOSPITAL | Age: 63
End: 2023-07-10
Attending: Other
Payer: COMMERCIAL

## 2023-07-12 ENCOUNTER — APPOINTMENT (OUTPATIENT)
Dept: PHYSICAL THERAPY | Facility: HOSPITAL | Age: 63
End: 2023-07-12
Attending: Other
Payer: COMMERCIAL

## 2023-07-12 ENCOUNTER — TELEPHONE (OUTPATIENT)
Dept: PHYSICAL THERAPY | Facility: HOSPITAL | Age: 63
End: 2023-07-12

## 2023-07-14 RX ORDER — METHOTREXATE 2.5 MG/1
TABLET ORAL
Qty: 96 TABLET | Refills: 1 | Status: SHIPPED | OUTPATIENT
Start: 2023-07-14

## 2023-07-14 NOTE — TELEPHONE ENCOUNTER
Medication Quantity Refills Start End   METHOTREXATE 2.5 MG Oral Tab 96 tablet 1 11/28/2022    LOV: 5/5/23  Future Appointments   Date Time Provider Joel Mackenzie   7/15/2023 11:00 AM EC ALLERGY ECS[facility]ALRGY [facility]   7/17/2023  6:00 PM Bronson Radha, PT [facility] PT EM [facility]   8/2/2023  6:45 PM Bronson Radha, PT [facility] PT EM [facility] CARE SYSTEM OF [facility]   8/3/2023  3:00 PM Avis Purdy, [facility] LOMGBREANNAISCHIL LOMG [facility]   8/7/2023  6:45 PM Bronson Radha, PT CF PT EM [facility] CARE [facility]   8/9/2023  6:00 PM Bronson Radha, PT [facility] PT EM [facility] OF THE [facility]   8/12/2023 10:50 AM EC ALLERGY ECSCHALRGY EC [facility]   8/14/2023  6:45 PM Bronson Radha, PT [facility] PT EM [facility]   [facility]/17[facility]2023  [facility]   10/18/2023  3:45 PM Awais Hamilton MD [facility]   11/6/2023  8:40 AM Winifred Thornton MD [address] [place] [address]   11/15/2023  9:40 AM Fani Espinoza MD [facility]   12/13/2023  4:30 PM Beverly Nazario RD 85 [facility]     Labs:   Component      Latest Ref Rng 5/17/2023   SED RATE      0 - 30 mm/Hr 15    C-REACTIVE PROTEIN      <0.30 mg/dL <0.29        Summary:  1. Skip . methotrexate - 8 tablets a week and folic acid 1mg a day once week before and one week after the surgery   2. Skip xlejanz 11mg a day  Before and one week after   Resume meds after healing from surgery -   3. Return to clinic in  6 months -   4. .Check labs in 4  months . 5. Diclofenac gel 1 % for left knee. 6.  Stop the diclofneac 50mg twice a day - 1 week before the surugery   7. Tylenol ES two tablets twice ad ay   8. Schedule bone density - DEXA scan - [phone]  9.  Rest left knee x 3 days         - overall she's been stable on Mirela Sharp MD  5/5/2023   1:41 PM  - [place]ed [place]  through Epic

## 2023-07-15 ENCOUNTER — NURSE ONLY (OUTPATIENT)
Dept: ALLERGY | Facility: CLINIC | Age: 63
End: 2023-07-15

## 2023-07-15 DIAGNOSIS — J30.89 ENVIRONMENTAL AND SEASONAL ALLERGIES: Primary | ICD-10-CM

## 2023-07-15 PROCEDURE — 95117 IMMUNOTHERAPY INJECTIONS: CPT | Performed by: ALLERGY & IMMUNOLOGY

## 2023-07-15 PROCEDURE — 95165 ANTIGEN THERAPY SERVICES: CPT | Performed by: ALLERGY & IMMUNOLOGY

## 2023-07-17 ENCOUNTER — OFFICE VISIT (OUTPATIENT)
Dept: PHYSICAL THERAPY | Facility: HOSPITAL | Age: 63
End: 2023-07-17
Attending: Other
Payer: COMMERCIAL

## 2023-07-17 ENCOUNTER — TELEPHONE (OUTPATIENT)
Dept: RHEUMATOLOGY | Facility: CLINIC | Age: 63
End: 2023-07-17

## 2023-07-17 PROCEDURE — 97112 NEUROMUSCULAR REEDUCATION: CPT

## 2023-07-17 NOTE — TELEPHONE ENCOUNTER
Requested Prescriptions     Pending Prescriptions Disp Refills    DICLOFENAC 50 MG Oral Tab EC [Pharmacy Med Name: DICLOFENAC SOD EC 50 MG TAB] 60 tablet 3     Sig: TAKE 1 TABLET BY MOUTH TWICE A DAY        LF: 3/27/23 360 TAB W/ 3 RF  LOV: 5/5/23   Future Appointments   Date Time Provider Joel Mann   7/17/2023  6:00 PM Denzel Mccoy, PT [facility] CARE SYSTEM [facility] CARE SYSTEM OF [facility]   8/2/2023  6:45 PM Denzel Mccoy, PT [facility] CARE [facility]   8/3/2023  3:00 PM Luz Marina Leone, [facility] LOMGEZE LOMG Adriana   8/7/2023  6:45 PM Degeorge, Destiny Crimes, PT [facility] CARE SYSTEM OF THE [facility]   8/9/2023  6:00 PM Degeorge, Destiny Crimes, PT [facility]   8/12/2023 10:50 AM EC ALLERGY ECSCHALRGY EC [facility]   8/14/2023  6:45 PM Degeorge, Destiny Crimes, PT [facility]   8/17/2023  3:20 PM [facility]   10/18/2023  3:45 PM Cathy Norman MD 85 [facility]   11/6/2023  8:40 AM Lisa Farah MD [address] [place] [address] SYSTEM OF [facility]   11/15/2023  9:40 AM Naif Blood [facility]   12/13/2023  4:30 PM Lamont Gross RD 85 [facility]     Labs:   Component      Latest Ref Rng 5/17/2023   WBC      4.0 - 11.0 x10(3) uL 6.9    RBC      3.80 - 5.30 x10(6)uL 4.19    Hemoglobin      12.0 - 16.0 g/dL 13.5    Hematocrit      35.0 - 48.0 % 42.0    MCV      80.0 - 100.0 fL 100.2 (H)    MCH      26.0 - 34.0 pg 32.2    MCHC      31.0 - 37.0 g/dL 32.1    RDW-SD      35.1 - 46.3 fL 54.0 (H)    RDW      11.0 - 15.0 % 14.6    Platelet Count      195.2 - 450.0 10(3)uL 208.0    Prelim Neutrophil Abs      1.50 - 7.70 x10 (3) uL 5.25    Neutrophils Absolute      1.50 - 7.70 x10(3) uL 5.25    Lymphocytes Absolute      1.00 - 4.00 x10(3) uL 0.85 (L)    Monocytes Absolute      0.10 - 1.00 x10(3) uL 0.64    Eosinophils Absolute      0.00 - 0.70 x10(3) uL 0.11    Basophils Absolute      0.00 - 0.20 x10(3) uL 0.03    Immature Granulocyte Absolute      0.00 - 1.00 x10(3) uL 0.02    Neutrophils %      % 76.1    Lymphocytes %      % 12.3    Monocytes %      % 9.3    Eosinophils %      % 1.6    Basophils %      % 0.4    Immature Granulocyte %      % 0.3    Glucose      70 - 99 mg/dL 99    Sodium      136 - 145 mmol/L 144    Potassium      3.5 - 5.1 mmol/L 4.8    Chloride      98 - 112 mmol/L 116 (H)    Carbon Dioxide, Total      21.0 - 32.0 mmol/L 25.0    ANION GAP      0 - 18 mmol/L 3    BUN      7 - 18 mg/dL 19 (H)    CREATININE      0.55 - 1.02 mg/dL 1.06 (H)    BUN/CREATININE RATIO      10.0 - 20.0  17.9    CALCIUM      8.5 - 10.1 mg/dL 9.2    CALCULATED OSMOLALITY      275 - 295 mOsm/kg 300 (H)    eGFR-Cr      >=60 mL/min/1.73m2 60    ALT (SGPT)      13 - 56 U/L 9 (L)    AST (SGOT)      15 - 37 U/L 19    ALKALINE PHOSPHATASE      50 - 130 U/L 90    Total Bilirubin      0.1 - 2.0 mg/dL 0.3    PROTEIN, TOTAL      6.4 - 8.2 g/dL 6.8    Albumin      3.4 - 5.0 g/dL 3.5    Globulin      2.8 - 4.4 g/dL 3.3    A/G Ratio      1.0 - 2.0  1.1    Patient Fasting for CMP? No    Color Urine      Yellow  Light-Yellow    Clarity Urine      Clear  Turbid ! Spec Gravity      1.005 - 1.030  1.014    Glucose Urine      Normal mg/dL Normal    Bilirubin Urine      Negative  Negative    Ketones, UA      Negative mg/dL Negative    Blood Urine      Negative  Negative    PH Urine      5.0 - 8.0  7.0    Protein Urine      Negative, Trace mg/dL Negative    Urobilinogen Urine      Normal  Normal    Nitrite Urine      Negative  Negative    Leukocyte Esterase       Negative  Negative    WBC Urine      0 - 5 /HPF 1-5    RBC Urine      0 - 2 /HPF 0-2    Bacteria Urine      None Seen /HPF Rare ! SQUAM EPI CELLS UR      None Seen /HPF Few !     Quantiferon TB NIL      IU/mL 0.00    Quantiferon-TB1 Minus NIL      IU/mL 0.01    Quantiferon-TB2 Minus NIL      IU/mL 0.01    Quantiferon TB Mitogen minus NIL      IU/mL >10.00    Quantiferon TB Result      Negative  Negative    ABO BLOOD TYPE O    RH Factor Negative    SED RATE      0 - 30 mm/Hr 15    C-REACTIVE PROTEIN      <0.30 mg/dL <0.29    MRSA by PCR      Negative  Negative ANTIBODY SCREEN Negative       Legend:  (H) High  (L) Low  ! Abnormal    Summary:  1. Skip . methotrexate - 8 tablets a week and folic acid 1mg a day once week before and one week after the surgery   2. Skip xlejanz 11mg a day  Before and one week after   Resume meds after healing from surgery -   3. Return to clinic in  6 months -   4. .Check labs in 4  months . 5. Diclofenac gel 1 % for left knee. 6.  Stop the diclofneac 50mg twice a day - 1 week before the surugery   7. Tylenol ES two tablets twice ad ay   8. Schedule bone density - DEXA scan - [phone]  9.  Rest left knee x 3 days         - overall she's been stable on Diego Cuevas MD  5/5/2023   1:41 PM  - [place]ed [place]  through Epic

## 2023-07-17 NOTE — TELEPHONE ENCOUNTER
Prior Auth packet    PA STARTED:   PA number: [id number]  Faxed to: [phone] with office notes    5/5/23  1. Skip . methotrexate - 8 tablets a week and folic acid 1mg a day once week before and one week after the surgery   2. Skip xlejanz 11mg a day  Before and one week after   Resume meds after healing from surgery -   3. Return to clinic in  6 months -   4. .Check labs in 4  months . 5. Diclofenac gel 1 % for left knee. 6.  Stop the diclofneac 50mg twice a day - 1 week before the surugery   7. Tylenol ES two tablets twice ad ay   8. Schedule bone density - DEXA scan - [phone]  9. Rest left knee x 3 days           1. Cont. methotrexate - 8 tablets a week and folic acid 1mg a day   2. Cont. xlejanz 11mg a day   3. Return to clinic in  6 months -   4. .Check labs in 4  months . 5. Diclofenac gel 1 % for left knee. 6.  Try to Cut down   diclofenac 75mg to once a day see how it goes.    7. Tylenol ES two tablets twice ad ay         - overall she's been stable on Nga Lopez MD  9/23/2022   9:00 AM  - [place]  through [employer]

## 2023-07-17 NOTE — PROGRESS NOTES
Diagnosis: imbalance, Parkinson's    Visit (# authorized):   12 per POC ([employer]BS PPO)                       Referring Physician: Karina Mustafa    Precautions: at risk of falls     Medication changes since last visit?:  No    Subjective: No new information to report. Objective:    Date  6/14 6/19 6/21 6/26 6/28 7/3 7/17    Visit Number  2 3 4 5 6 7 8    NMR x x x x x x x    Ther EX  x         Ther Act           Gait Training    x       CRM            Manual             Additional Treatment Information:    NMR (40 mins):   Nu-step level 5, motor priming x10 mins   Forced pedaling 8 mins   Seated march with coordinated UE movement - x1 min-->with metronome 2x1 min  Seated dual motor task Knee EXT with opp UE punch ipsilateral 2x15 B, 3#  Standing SB behind back mini squat  x10 -->dual motor task (bicep curl 3#) x10  Seated lg amp reach across midline alt x15 B       Assessment:  Pt able to complete all tasks with seated rest as needed. No adverse response noted. Good coordination throughout. Plan: in future sessions:   continue forced pedaling, mini squat with SB- feet on foam, foam    Charges: 3 NMR       Total Timed Treatment: 40 min  Total Treatment Time: 43 min      21st Century Cures Act Notice to Patient: Medical documents like this are made available to patients in the interest of transparency. However, be advised this is a medical document and it is intended as csbg-to-uosp communication between your medical providers. This medical document may contain abbreviations, assessments, medical data, and results or other terms that are unfamiliar. Medical documents are intended to carry relevant information, facts as evident, and the clinical opinion of the practitioner. As such, this medical document may be written in language that appears blunt or direct. You are encouraged to contact your medical provider and/or [facility] Patient Experience if you have any questions about this medical document.

## 2023-07-20 NOTE — TELEPHONE ENCOUNTER
PA Approved    Prior authorization for: Xeljanz XR 11 mg    Medication form: tablets    Date received: 7/18/23    Approval #:[id number]    Approved dates: 7/18/23 to 7/18/2024    Pharmacy for medication: [facility]    QF-TB results:   Component      Latest Ref Rng 5/17/2023   Quantiferon TB NIL      IU/mL 0.00    Quantiferon-TB1 Minus NIL      IU/mL 0.01    Quantiferon-TB2 Minus NIL      IU/mL 0.01    Quantiferon TB Mitogen minus NIL      IU/mL >10.00    Quantiferon TB Result      Negative  Negative

## 2023-08-02 ENCOUNTER — TELEPHONE (OUTPATIENT)
Dept: PHYSICAL THERAPY | Facility: HOSPITAL | Age: 63
End: 2023-08-02

## 2023-08-02 ENCOUNTER — APPOINTMENT (OUTPATIENT)
Dept: PHYSICAL THERAPY | Facility: HOSPITAL | Age: 63
End: 2023-08-02
Attending: Other
Payer: COMMERCIAL

## 2023-08-07 ENCOUNTER — OFFICE VISIT (OUTPATIENT)
Dept: PHYSICAL THERAPY | Facility: HOSPITAL | Age: 63
End: 2023-08-07
Attending: Other
Payer: COMMERCIAL

## 2023-08-07 PROCEDURE — 97112 NEUROMUSCULAR REEDUCATION: CPT

## 2023-08-07 NOTE — PROGRESS NOTES
Diagnosis: imbalance, Parkinson's    Visit (# authorized):   12 per POC ([employer]BS PPO)                       Referring Physician: Mat Skelton    Precautions: at risk of falls     Medication changes since last visit?:  yes as below    Subjective: Saw neurologist who increased her sinemet dose for tremors. Now noticing increase dyskinesias. Has been only on this new dose for a few days. Notes less freezing of gait since starting PT. Objective:    Date  6/14 6/19 6/21 6/26 6/28 7/3 7/17 8/7   Visit Number  2 3 4 5 6 7 8 9   NMR x x x x x x x x   Ther EX  x         Ther Act           Gait Training    x       CRM            Manual             Additional Treatment Information:    NMR (38 mins):   Nu-step level 5, motor priming x10 mins   Forced pedaling 10 mins   Standing SB behind back mini squat  x10 -->dual motor task (bicep curl 3#) x10  Standing hip EXT with opp UE lift 2x10 B- demo with return demo   Standing unsupported: alt slow punch with mini trunk rotation x15 B   Step stance: wing opening (ant wt shift)<>closing (post wt shift) x15 ea foot in front   Seated march with opp UE lift 1 min--->with dual cog task (counting backwards) 1 min    Assessment:  Session tolerated well without adverse response. Plan: in future sessions:   continue forced pedaling, mini squat with SB- feet on foam, foam    Charges: 3 NMR       Total Timed Treatment: 38 min  Total Treatment Time: 38 min      21st Century Cures Act Notice to Patient: Medical documents like this are made available to patients in the interest of transparency. However, be advised this is a medical document and it is intended as mpse-za-bgnm communication between your medical providers. This medical document may contain abbreviations, assessments, medical data, and results or other terms that are unfamiliar. Medical documents are intended to carry relevant information, facts as evident, and the clinical opinion of the practitioner.  As such, this medical document may be written in language that appears blunt or direct. You are encouraged to contact your medical provider and/or [facility] Patient Experience if you have any questions about this medical document.

## 2023-08-08 ENCOUNTER — TELEPHONE (OUTPATIENT)
Dept: ALLERGY | Facility: CLINIC | Age: 63
End: 2023-08-08

## 2023-08-08 ENCOUNTER — TELEPHONE (OUTPATIENT)
Dept: PHYSICAL THERAPY | Facility: HOSPITAL | Age: 63
End: 2023-08-08

## 2023-08-08 NOTE — TELEPHONE ENCOUNTER
Patient calling to inform that she accidentally canceled her appointment for an allergy shot on 8/12/2023 and would like to reschedule for that same day, however, there are no available appointments, please advise

## 2023-08-09 ENCOUNTER — OFFICE VISIT (OUTPATIENT)
Dept: PHYSICAL THERAPY | Facility: HOSPITAL | Age: 63
End: 2023-08-09
Attending: Other
Payer: COMMERCIAL

## 2023-08-09 ENCOUNTER — TELEPHONE (OUTPATIENT)
Dept: PHYSICAL THERAPY | Facility: HOSPITAL | Age: 63
End: 2023-08-09

## 2023-08-09 PROCEDURE — 97112 NEUROMUSCULAR REEDUCATION: CPT

## 2023-08-09 NOTE — PROGRESS NOTES
Diagnosis: imbalance, Parkinson's    Visit (# authorized):   12 per POC ([employer]BS PPO)                       Referring Physician: Jerome Charles    Precautions: at risk of falls     Medication changes since last visit?:  No    Subjective: No new information to report    Objective:    Date  6/14 6/19 6/21 6/26 6/28 7/3 7/17 8/7 8/9   Visit Number  2 3 4 5 6 7 8 9 10   NMR x x x x x x x x x   Ther EX  x          Ther Act            Gait Training    x        CRM             Manual              Additional Treatment Information:    NMR (40 mins):   Nu-step level 5, motor priming x10 mins   Forced pedaling 10 mins   In // bars   Hands hover, standing on foam- mini squat, SBA-CGA x10   Hands on bars standing on foam- alt UE lifts turn head to look at hand x10    1 UE support: lg amp fwd step with UE \"push\" -ant wt shift x10 B    1 UE support: lg amp side step with ipsilateral UE lateral reach -x10 B   Hands on bars alt hip EXT with opp UE lift x10 B       Assessment:  Session tolerated well without adverse response. Plan: in future sessions:   continue forced pedaling, rebounder     Charges: 3 NMR       Total Timed Treatment: 40 min  Total Treatment Time: 40 min      21st Century Cures Act Notice to Patient: Medical documents like this are made available to patients in the interest of transparency. However, be advised this is a medical document and it is intended as tkab-tu-yiio communication between your medical providers. This medical document may contain abbreviations, assessments, medical data, and results or other terms that are unfamiliar. Medical documents are intended to carry relevant information, facts as evident, and the clinical opinion of the practitioner. As such, this medical document may be written in language that appears blunt or direct. You are encouraged to contact your medical provider and/or [facility] Patient Experience if you have any questions about this medical document.

## 2023-08-12 ENCOUNTER — NURSE ONLY (OUTPATIENT)
Dept: ALLERGY | Facility: CLINIC | Age: 63
End: 2023-08-12

## 2023-08-12 DIAGNOSIS — J30.89 ENVIRONMENTAL AND SEASONAL ALLERGIES: Primary | ICD-10-CM

## 2023-08-12 PROCEDURE — 95117 IMMUNOTHERAPY INJECTIONS: CPT | Performed by: ALLERGY & IMMUNOLOGY

## 2023-08-14 ENCOUNTER — OFFICE VISIT (OUTPATIENT)
Dept: DERMATOLOGY CLINIC | Facility: CLINIC | Age: 63
End: 2023-08-14

## 2023-08-14 ENCOUNTER — OFFICE VISIT (OUTPATIENT)
Dept: PHYSICAL THERAPY | Facility: HOSPITAL | Age: 63
End: 2023-08-14
Attending: Other
Payer: COMMERCIAL

## 2023-08-14 DIAGNOSIS — L82.1 SEBORRHEIC KERATOSIS: Primary | ICD-10-CM

## 2023-08-14 PROCEDURE — 99213 OFFICE O/P EST LOW 20 MIN: CPT | Performed by: PHYSICIAN ASSISTANT

## 2023-08-14 PROCEDURE — 97112 NEUROMUSCULAR REEDUCATION: CPT

## 2023-08-14 NOTE — PROGRESS NOTES
Diagnosis: imbalance, Parkinson's    Visit (# authorized):   12 per POC ([employer]BS PPO)                       Referring Physician: Paula Serna    Precautions: at risk of falls     Medication changes since last visit?:  No    Subjective: No new information to report    Objective:    Date  6/14 6/19 6/21 6/26 6/28 7/3 7/17 8/7 8/9 8/14   Visit Number  2 3 4 5 6 7 8 9 10 11   NMR x x x x x x x x x x   Ther EX  x           Ther Act             Gait Training    x         CRM              Manual               Additional Treatment Information:    NMR (40 mins):   Nu-step level 5, motor priming x10 mins   Forced pedaling 10 mins   Standing unsupported (pt selected RODY): SBA, rebounder 4# x20  Seated alt kick with opp punch (3#) x1 min  Seated alt march with opp bicep curl (3#) -->with dual cog task (ABC's)   Standing unsupported (pt selected RODY): SBA, flys (3#) x10     Patient education: POC briefly discussed. Assessment:  Session tolerated well without adverse response. Plan: reassess     Charges: 3 NMR       Total Timed Treatment: 40 min  Total Treatment Time: 40 min      21st Century Cures Act Notice to Patient: Medical documents like this are made available to patients in the interest of transparency. However, be advised this is a medical document and it is intended as uucg-vz-dhgv communication between your medical providers. This medical document may contain abbreviations, assessments, medical data, and results or other terms that are unfamiliar. Medical documents are intended to carry relevant information, facts as evident, and the clinical opinion of the practitioner. As such, this medical document may be written in language that appears blunt or direct. You are encouraged to contact your medical provider and/or [facility] Patient Experience if you have any questions about this medical document.

## 2023-08-14 NOTE — PROGRESS NOTES
HPI:    Patient ID: Severo Kub is a 65year old female. Patient presents for follow-up on ulcer to the left buttock. Patient has been applying mometasone and mupirocin BID and using DuoDerm bandaids. She did have this biopsied last visit which showed a keratosis. No draining or tenderness noted. Hx of allergies to medications noted. Review of Systems   Constitutional:  Negative for chills and fever. Musculoskeletal:  Negative for arthralgias and myalgias. Skin:  Positive for rash. Negative for color change and wound. Current Outpatient Medications   Medication Sig Dispense Refill    diclofenac 50 MG Oral Tab EC Take 1 tablet (50 mg total) by mouth 2 (two) times daily. 60 tablet 3    methotrexate 2.5 MG Oral Tab TAKE 8 TABS WEEKLY BY MOUTH 96 tablet 1    MONTELUKAST 10 MG Oral Tab TAKE 1 TABLET BY MOUTH EVERY DAY AT NIGHT 90 tablet 0    mupirocin 2 % External Ointment Apply 1 Application topically 3 (three) times daily. 30 g 0    mometasone 0.1 % External Ointment Apply 1 Application topically daily. 30 g 0    ERGOCALCIFEROL 1.25 MG (94243 UT) Oral Cap TAKE 1 CAPSULE BY MOUTH ONE TIME PER WEEK 12 capsule 3    Phentermine HCl 37.5 MG Oral Tab Take one full tablet in the morning and half tablet as needed. 45 tablet 2    topiramate 50 MG Oral Tab Take 1 tablet (50 mg total) by mouth 2 (two) times daily. 180 tablet 1    methocarbamol (ROBAXIN-750) 750 MG Oral Tab Take 1 tablet (750 mg total) by mouth 3 (three) times daily. 0    methocarbamol (ROBAXIN-750) 750 MG Oral Tab Take 1 tablet (750 mg total) by mouth 3 (three) times daily. 0    DICLOFENAC 1 % External Gel APPLY FOUR GRAMS DAILY FOUR TIMES A  g 4    PANTOPRAZOLE 40 MG Oral Tab EC TAKE 1 TABLET BY MOUTH EVERY DAY IN THE MORNING BEFORE BREAKFAST 90 tablet 2    Boswellia-Glucosamine-Vit D (OSTEO BI-FLEX ONE PER DAY OR) Take by mouth As Directed. folic acid 1 MG Oral Tab Take 1 tablet (1 mg total) by mouth daily.  [address] tablet 3    calcitriol 0.25 MCG Oral Cap Take 1 capsule (0.25 mcg total) by mouth daily. 90 capsule 1    PRAVASTATIN 20 MG Oral Tab TAKE 1 TABLET BY MOUTH EVERY DAY AT NIGHT 90 tablet 3    Tofacitinib Citrate ER (XELJANZ XR) 11 MG Oral Tablet 24 Hr Take 1 tablet by mouth daily. 30 tablet 5    tiZANidine 4 MG Oral Tab Take 1 tablet (4 mg total) by mouth 3 (three) times daily. gabapentin 300 MG Oral Cap TAKE 1 CAP BY MOUTH THREE TIMES PER DAY. INDICATIONS: NEUROPATHIC PAIN      levothyroxine 125 MCG Oral Tab Take 1 tablet (125 mcg total) by mouth before breakfast. 90 tablet 3    ADVAIR DISKUS 100-50 MCG/ACT Inhalation Aerosol Powder, Breath Activated Inhale 1 puff into the lungs 2 (two) times daily. 3 each 1    Amantadine HCl 100 MG Oral Cap Take 1 capsule (100 mg total) by mouth daily. omega-3 fatty acids 1000 MG Oral Cap Take 1,000 mg by mouth daily. TURMERIC OR Take by mouth 2 (two) times a day. Hydrocod Polst-CPM Polst ER (TUSSIONEX PENNKINETIC ER) 10-8 MG/5ML Oral Suspension Extended Release Take 5 mL by mouth 2 (two) times daily as needed. 115 mL 0    Multiple Vitamins-Minerals (BARIATRIC FUSION) Oral Chew Tab Chew 1 tablet by mouth in the morning, at noon, in the evening, and at bedtime. rasagiline mesylate 1 MG Oral Tab Take 1 tablet (1 mg total) by mouth daily. Albuterol Sulfate (2.5 MG/3ML) 0.083% Inhalation Nebu Soln Take 3 mL (2.5 mg total) by nebulization every 4 (four) hours as needed for Wheezing. 3 Box 0    carbidopa-levodopa (SINEMET)  MG Oral Tab Take 1 tablet by mouth 4 (four) times daily. 1  in am and 3/4 11AM and 3/4 afternoon and 1 evening  0    doxycycline 100 MG Oral Cap Take 1 capsule (100 mg total) by mouth 2 (two) times daily. (Patient not taking: Reported on 8/14/2023) 28 capsule 0    mupirocin 2 % External Ointment Apply 1 Application topically 3 (three) times daily.  (Patient not taking: Reported on 8/14/2023) 15 g 0    PATIENT SUPPLIED MEDICATION Take by mouth daily. OSTEO BI-FLEX (Patient not taking: Reported on 8/14/2023)       Allergies: Other                   HIVES    Comment:Reports side effect from DPT vaccine. Other reaction(s): Rash/Hives/Urticaria  Penicillins             HIVES  Seasonal                Runny nose   LMP 04/18/2015 (Exact Date)   There is no height or weight on file to calculate BMI. PHYSICAL EXAM:   Physical Exam  Constitutional:       General: She is not in acute distress. Appearance: Normal appearance. Skin:     General: Skin is warm and dry. Findings: Rash present. Comments: Healing well. No draining or tenderness noted. Slight erythema. But has been closing. Neurological:      Mental Status: She is alert and oriented to person, place, and time. ASSESSMENT/PLAN:   1. Seborrheic keratosis  -After discussion with patient, advised the following:  -Healing well  -Continue with duo derm use  -She can stop topicals for now.   -To call or follow-up with worsening symptoms or concerns.   -Pt was agreeable to plan and will comply with discussion above. No orders of the defined types were placed in this encounter.       Meds This Visit:  Requested Prescriptions      No prescriptions requested or ordered in this encounter       Imaging & Referrals:  None         #[id number]

## 2023-08-17 ENCOUNTER — HOSPITAL ENCOUNTER (OUTPATIENT)
Dept: MAMMOGRAPHY | Facility: HOSPITAL | Age: 63
Discharge: HOME | End: 2023-08-17
Attending: NURSE PRACTITIONER
Payer: COMMERCIAL

## 2023-08-17 DIAGNOSIS — Z12.31 SCREENING MAMMOGRAM FOR BREAST CANCER: ICD-10-CM

## 2023-08-17 PROCEDURE — 77063 BREAST TOMOSYNTHESIS BI: CPT | Performed by: NURSE PRACTITIONER

## 2023-08-17 PROCEDURE — 77067 SCR MAMMO BI INCL CAD: CPT | Performed by: NURSE PRACTITIONER

## 2023-08-21 DIAGNOSIS — M76.62 ACHILLES TENDINITIS, LEFT LEG: Primary | ICD-10-CM

## 2023-08-28 ENCOUNTER — OFFICE VISIT (OUTPATIENT)
Dept: PHYSICAL THERAPY | Facility: HOSPITAL | Age: 63
End: 2023-08-28
Attending: Other
Payer: COMMERCIAL

## 2023-08-28 PROCEDURE — 97112 NEUROMUSCULAR REEDUCATION: CPT

## 2023-09-10 DIAGNOSIS — E66.9 OBESITY (BMI 30-39.9): ICD-10-CM

## 2023-09-11 ENCOUNTER — NURSE ONLY (OUTPATIENT)
Dept: ALLERGY | Facility: CLINIC | Age: 63
End: 2023-09-11

## 2023-09-11 DIAGNOSIS — J30.89 ENVIRONMENTAL AND SEASONAL ALLERGIES: Primary | ICD-10-CM

## 2023-09-11 RX ORDER — PHENTERMINE HYDROCHLORIDE 37.5 MG/1
TABLET ORAL
Qty: 45 TABLET | Refills: 2 | Status: SHIPPED | OUTPATIENT
Start: 2023-09-11

## 2023-09-19 NOTE — PROCEDURES
Husam Aldana [facility].    BILATERAL LUMBAR TRANSFORAMINAL   NAME:  Hayden Monahan    MR #:    [id number] :  1960     PHYSICIAN:  Gil Peñaloza        Operative Report    DATE OF PROCEDURE: 3/17/2021   PREOPERATIVE DIAGNOSES: 1.  Mayo Phan was anesthetized with 1 to 2 cc of 1% PF lidocaine without epinephrine. Then, a 5 inch, 22-gauge spinal needle was inserted and directed towards the left L5-S1 intervertebral foramen.   When it felt to be in good position, AP fluoroscopy was used to advanc none

## 2023-09-20 ENCOUNTER — LAB ENCOUNTER (OUTPATIENT)
Dept: LAB | Age: 63
End: 2023-09-20
Attending: INTERNAL MEDICINE
Payer: COMMERCIAL

## 2023-09-20 DIAGNOSIS — Z51.81 THERAPEUTIC DRUG MONITORING: ICD-10-CM

## 2023-09-20 DIAGNOSIS — M06.9 RHEUMATOID ARTHRITIS, INVOLVING UNSPECIFIED SITE, UNSPECIFIED WHETHER RHEUMATOID FACTOR PRESENT (HCC): ICD-10-CM

## 2023-09-20 LAB
ALT SERPL-CCNC: 15 U/L
AST SERPL-CCNC: 18 U/L (ref 15–37)
CREAT BLD-MCNC: 1.03 MG/DL
CRP SERPL-MCNC: <0.29 MG/DL (ref ?–0.3)
DEPRECATED RDW RBC AUTO: 55.9 FL (ref 35.1–46.3)
EGFRCR SERPLBLD CKD-EPI 2021: 62 ML/MIN/1.73M2 (ref 60–?)
ERYTHROCYTE [DISTWIDTH] IN BLOOD BY AUTOMATED COUNT: 15.5 % (ref 11–15)
ERYTHROCYTE [SEDIMENTATION RATE] IN BLOOD: 19 MM/HR
HCT VFR BLD AUTO: 40.4 %
HGB BLD-MCNC: 13.1 G/DL
MCH RBC QN AUTO: 31.9 PG (ref 26–34)
MCHC RBC AUTO-ENTMCNC: 32.4 G/DL (ref 31–37)
MCV RBC AUTO: 98.3 FL
PLATELET # BLD AUTO: 237 10(3)UL (ref 150–450)
RBC # BLD AUTO: 4.11 X10(6)UL
WBC # BLD AUTO: 4.1 X10(3) UL (ref 4–11)

## 2023-09-20 PROCEDURE — 85652 RBC SED RATE AUTOMATED: CPT

## 2023-09-20 PROCEDURE — 82565 ASSAY OF CREATININE: CPT

## 2023-09-20 PROCEDURE — 84450 TRANSFERASE (AST) (SGOT): CPT

## 2023-09-20 PROCEDURE — 85027 COMPLETE CBC AUTOMATED: CPT

## 2023-09-20 PROCEDURE — 86140 C-REACTIVE PROTEIN: CPT

## 2023-09-20 PROCEDURE — 84460 ALANINE AMINO (ALT) (SGPT): CPT

## 2023-09-28 RX ORDER — MONTELUKAST SODIUM 10 MG/1
TABLET ORAL
Qty: 90 TABLET | Refills: 0 | Status: SHIPPED | OUTPATIENT
Start: 2023-09-28

## 2023-09-28 NOTE — TELEPHONE ENCOUNTER
Requested Prescriptions   Pending Prescriptions Disp Refills    MONTELUKAST 10 MG Oral Tab [Pharmacy Med Name: MONTELUKAST SOD 10 MG TABLET] 90 tablet 0     Sig: TAKE 1 TABLET BY MOUTH EVERY DAY AT NIGHT       Corticosteroids / Long-Acting Bronchodilators Passed - 9/28/2023 12:05 AM        Passed - Appt in past 6 mos or next 3 mos     Recent Outpatient Visits              2 weeks ago [facility] and [facility]    Nurse Only    1 month ago     [facility] [place] [place]    Office Visit    1 month ago [facility]rheic keratosis    [facility] [address]    Office Visit    1 month ago     [facility] [place] [address] [place] [address] Visit    1 month ago Environmental and seasonal allergies    [facility]    Nurse Only          Future Appointments         Provider Department Appt Notes    In 1 week 156 [facility], [facility]     In 2 weeks 8[facility] 08/16/2023    In 2 weeks Nate Romano MD [facility] Weight loss monitoring    In 1 month 156 [facility]     In 1 month MD Esteban MendozarFormatta, [address] [facility],[address] [facility], [place] 6 mo f/u    In 1 month MD Esteban [employer], Raphael [facility]ner 6 mnth follow up    In 2 months Melody Arroyo, [employer], [address] [facility],[address] [facility]

## 2023-10-02 NOTE — PATIENT INSTRUCTIONS
Please review assessment and plan.
Detail Level: Zone
General Sunscreen Counseling: A broad spectrum sunscreen with a SPF of 30 or higher. Sun protective clothing can be used in lieu of sunscreen but must be worn the entire time you are exposed to the sun's rays.

## 2023-10-07 ENCOUNTER — NURSE ONLY (OUTPATIENT)
Dept: ALLERGY | Facility: CLINIC | Age: 63
End: 2023-10-07

## 2023-10-07 DIAGNOSIS — J30.89 ENVIRONMENTAL AND SEASONAL ALLERGIES: Primary | ICD-10-CM

## 2023-10-07 PROCEDURE — 95117 IMMUNOTHERAPY INJECTIONS: CPT | Performed by: ALLERGY & IMMUNOLOGY

## 2023-10-09 RX ORDER — PANTOPRAZOLE SODIUM 40 MG/1
40 TABLET, DELAYED RELEASE ORAL
Qty: 90 TABLET | Refills: 2 | Status: SHIPPED | OUTPATIENT
Start: 2023-10-09

## 2023-10-12 ENCOUNTER — HOSPITAL ENCOUNTER (OUTPATIENT)
Dept: ULTRASOUND IMAGING | Facility: HOSPITAL | Age: 63
Discharge: HOME | End: 2023-10-12
Attending: PODIATRIST
Payer: COMMERCIAL

## 2023-10-12 DIAGNOSIS — M76.62 TENDONITIS, ACHILLES, LEFT: ICD-10-CM

## 2023-10-12 PROCEDURE — 76881 US COMPL JOINT R-T W/IMG: CPT | Performed by: PODIATRIST

## 2023-10-16 RX ORDER — TOFACITINIB 11 MG/1
1 TABLET, FILM COATED, EXTENDED RELEASE ORAL DAILY
Qty: 30 TABLET | Refills: 6 | Status: SHIPPED | OUTPATIENT
Start: 2023-10-16

## 2023-10-16 NOTE — TELEPHONE ENCOUNTER
LOV: 5/5/23  Future Appointments   Date Time Provider Joel Mann   10/18/2023  3:45 PM Angelina Gore MD [facility]   11/4/2023 11:00 AM EC ALLERGY [facility]CS[facility]AL[facility]Y [facility]   11/6/2023  8:40 AM Stewart Bowie MD [facility]   11/15/2023  9:40 AM tUe Koenig MD ECSCHIM [facility]   11/16/2023  3:00 PM Lisa Perkins [facility] LOMGBHISCHIL LOMG [place]   12/13/2023  4:30 PM Aamir Brand RD [facility]     Labs:    Component      Latest Ref Rng 9/20/2023   WBC      4.0 - 11.0 x10(3) uL 4.1    RBC      3.80 - 5.30 x10(6)uL 4.11    Hemoglobin      12.0 - 16.0 g/dL 13.1    Hematocrit      35.0 - 48.0 % 40.4    MCV      80.0 - 100.0 fL 98.3    MCH      26.0 - 34.0 pg 31.9    MCHC      31.0 - 37.0 g/dL 32.4    RDW      11.0 - 15.0 % 15.5 (H)    RDW-SD      35.1 - 46.3 fL 55.9 (H)    Platelet Count      156.0 - 450.0 10(3)uL 237.0    CREATININE      0.55 - 1.02 mg/dL 1.03 (H)    EGFR      >=60 mL/min/1.73m2 62    ALT (SGPT)      13 - 56 U/L 15    AST (SGOT)      15 - 37 U/L 18    C-REACTIVE PROTEIN      <0.30 mg/dL <0.29    SED RATE      0 - 30 mm/Hr 19       Legend:  (H) High

## 2023-10-18 ENCOUNTER — OFFICE VISIT (OUTPATIENT)
Dept: SURGERY | Facility: CLINIC | Age: 63
End: 2023-10-18
Payer: COMMERCIAL

## 2023-10-18 VITALS
OXYGEN SATURATION: 98 % | HEIGHT: 69 IN | DIASTOLIC BLOOD PRESSURE: 62 MMHG | SYSTOLIC BLOOD PRESSURE: 92 MMHG | BODY MASS INDEX: 33.13 KG/M2 | HEART RATE: 87 BPM | WEIGHT: 223.69 LBS

## 2023-10-18 DIAGNOSIS — E55.9 VITAMIN D DEFICIENCY: ICD-10-CM

## 2023-10-18 DIAGNOSIS — Z51.81 ENCOUNTER FOR THERAPEUTIC DRUG MONITORING: ICD-10-CM

## 2023-10-18 DIAGNOSIS — E66.9 OBESITY (BMI 30-39.9): ICD-10-CM

## 2023-10-18 DIAGNOSIS — E44.1 MILD PROTEIN-CALORIE MALNUTRITION (HCC): Primary | ICD-10-CM

## 2023-10-18 DIAGNOSIS — E78.5 HYPERLIPIDEMIA, UNSPECIFIED HYPERLIPIDEMIA TYPE: ICD-10-CM

## 2023-10-18 DIAGNOSIS — Z98.84 S/P LAPAROSCOPIC SLEEVE GASTRECTOMY: ICD-10-CM

## 2023-10-18 PROCEDURE — 99214 OFFICE O/P EST MOD 30 MIN: CPT | Performed by: INTERNAL MEDICINE

## 2023-10-18 PROCEDURE — 3078F DIAST BP <80 MM HG: CPT | Performed by: INTERNAL MEDICINE

## 2023-10-18 PROCEDURE — 3008F BODY MASS INDEX DOCD: CPT | Performed by: INTERNAL MEDICINE

## 2023-10-18 PROCEDURE — 3074F SYST BP LT 130 MM HG: CPT | Performed by: INTERNAL MEDICINE

## 2023-10-18 RX ORDER — TOPIRAMATE 50 MG/1
50 TABLET, FILM COATED ORAL 2 TIMES DAILY
Qty: 180 TABLET | Refills: 1 | Status: SHIPPED | OUTPATIENT
Start: 2023-10-18 | End: 2024-01-16

## 2023-10-18 RX ORDER — PHENTERMINE HYDROCHLORIDE 37.5 MG/1
TABLET ORAL
Qty: 45 TABLET | Refills: 2 | Status: SHIPPED | OUTPATIENT
Start: 2023-10-18

## 2023-11-04 ENCOUNTER — NURSE ONLY (OUTPATIENT)
Dept: ALLERGY | Facility: CLINIC | Age: 63
End: 2023-11-04
Payer: COMMERCIAL

## 2023-11-04 DIAGNOSIS — J30.89 ENVIRONMENTAL AND SEASONAL ALLERGIES: Primary | ICD-10-CM

## 2023-11-04 PROCEDURE — 95117 IMMUNOTHERAPY INJECTIONS: CPT | Performed by: ALLERGY & IMMUNOLOGY

## 2023-11-06 ENCOUNTER — OFFICE VISIT (OUTPATIENT)
Dept: RHEUMATOLOGY | Facility: CLINIC | Age: 63
End: 2023-11-06
Payer: COMMERCIAL

## 2023-11-06 VITALS
RESPIRATION RATE: 16 BRPM | DIASTOLIC BLOOD PRESSURE: 70 MMHG | HEIGHT: 69 IN | SYSTOLIC BLOOD PRESSURE: 116 MMHG | HEART RATE: 97 BPM | BODY MASS INDEX: 32.63 KG/M2 | WEIGHT: 220.31 LBS

## 2023-11-06 DIAGNOSIS — M25.562 CHRONIC PAIN OF LEFT KNEE: ICD-10-CM

## 2023-11-06 DIAGNOSIS — G89.29 CHRONIC PAIN OF LEFT KNEE: ICD-10-CM

## 2023-11-06 DIAGNOSIS — M06.9 RHEUMATOID ARTHRITIS, INVOLVING UNSPECIFIED SITE, UNSPECIFIED WHETHER RHEUMATOID FACTOR PRESENT (HCC): Primary | ICD-10-CM

## 2023-11-06 DIAGNOSIS — Z51.81 THERAPEUTIC DRUG MONITORING: ICD-10-CM

## 2023-11-06 PROCEDURE — 3008F BODY MASS INDEX DOCD: CPT | Performed by: INTERNAL MEDICINE

## 2023-11-06 PROCEDURE — 99214 OFFICE O/P EST MOD 30 MIN: CPT | Performed by: INTERNAL MEDICINE

## 2023-11-06 PROCEDURE — 20610 DRAIN/INJ JOINT/BURSA W/O US: CPT | Performed by: INTERNAL MEDICINE

## 2023-11-06 PROCEDURE — 3074F SYST BP LT 130 MM HG: CPT | Performed by: INTERNAL MEDICINE

## 2023-11-06 PROCEDURE — 3078F DIAST BP <80 MM HG: CPT | Performed by: INTERNAL MEDICINE

## 2023-11-06 RX ORDER — TRIAMCINOLONE ACETONIDE 40 MG/ML
40 INJECTION, SUSPENSION INTRA-ARTICULAR; INTRAMUSCULAR ONCE
Status: COMPLETED | OUTPATIENT
Start: 2023-11-06 | End: 2023-11-06

## 2023-11-15 ENCOUNTER — OFFICE VISIT (OUTPATIENT)
Dept: INTERNAL MEDICINE CLINIC | Facility: CLINIC | Age: 63
End: 2023-11-15
Payer: COMMERCIAL

## 2023-11-15 VITALS
RESPIRATION RATE: 20 BRPM | TEMPERATURE: 98 F | WEIGHT: 220 LBS | BODY MASS INDEX: 32.58 KG/M2 | OXYGEN SATURATION: 97 % | SYSTOLIC BLOOD PRESSURE: 118 MMHG | HEIGHT: 69 IN | HEART RATE: 92 BPM | DIASTOLIC BLOOD PRESSURE: 58 MMHG

## 2023-11-15 DIAGNOSIS — M51.9 LUMBAR DISC DISEASE: ICD-10-CM

## 2023-11-15 DIAGNOSIS — G47.33 OSA (OBSTRUCTIVE SLEEP APNEA): ICD-10-CM

## 2023-11-15 DIAGNOSIS — Z98.84 S/P LAPAROSCOPIC SLEEVE GASTRECTOMY: ICD-10-CM

## 2023-11-15 DIAGNOSIS — G20.B1 PARKINSON'S DISEASE WITH DYSKINESIA, UNSPECIFIED WHETHER MANIFESTATIONS FLUCTUATE: ICD-10-CM

## 2023-11-15 DIAGNOSIS — J45.40 MODERATE PERSISTENT EXTRINSIC ASTHMA WITHOUT COMPLICATION: ICD-10-CM

## 2023-11-15 DIAGNOSIS — M06.9 RHEUMATOID ARTHRITIS, INVOLVING UNSPECIFIED SITE, UNSPECIFIED WHETHER RHEUMATOID FACTOR PRESENT (HCC): ICD-10-CM

## 2023-11-15 DIAGNOSIS — Z23 FLU VACCINE NEED: ICD-10-CM

## 2023-11-15 DIAGNOSIS — E89.0 POST-SURGICAL HYPOTHYROIDISM: Primary | ICD-10-CM

## 2023-11-15 PROBLEM — E44.1 MILD PROTEIN-CALORIE MALNUTRITION (HCC): Status: RESOLVED | Noted: 2023-05-30 | Resolved: 2023-11-15

## 2023-11-15 PROCEDURE — 3008F BODY MASS INDEX DOCD: CPT | Performed by: INTERNAL MEDICINE

## 2023-11-15 PROCEDURE — 99214 OFFICE O/P EST MOD 30 MIN: CPT | Performed by: INTERNAL MEDICINE

## 2023-11-15 PROCEDURE — 90686 IIV4 VACC NO PRSV 0.5 ML IM: CPT | Performed by: INTERNAL MEDICINE

## 2023-11-15 PROCEDURE — 90471 IMMUNIZATION ADMIN: CPT | Performed by: INTERNAL MEDICINE

## 2023-11-15 PROCEDURE — 3078F DIAST BP <80 MM HG: CPT | Performed by: INTERNAL MEDICINE

## 2023-11-15 PROCEDURE — 3074F SYST BP LT 130 MM HG: CPT | Performed by: INTERNAL MEDICINE

## 2023-11-15 RX ORDER — METHOCARBAMOL 500 MG/1
TABLET, FILM COATED ORAL
COMMUNITY
Start: 2023-05-25 | End: 2023-11-15 | Stop reason: ALTCHOICE

## 2023-12-02 ENCOUNTER — NURSE ONLY (OUTPATIENT)
Dept: ALLERGY | Facility: CLINIC | Age: 63
End: 2023-12-02

## 2023-12-02 DIAGNOSIS — J30.89 ENVIRONMENTAL AND SEASONAL ALLERGIES: Primary | ICD-10-CM

## 2023-12-02 PROCEDURE — 95117 IMMUNOTHERAPY INJECTIONS: CPT | Performed by: ALLERGY & IMMUNOLOGY

## 2023-12-02 PROCEDURE — 95165 ANTIGEN THERAPY SERVICES: CPT | Performed by: ALLERGY & IMMUNOLOGY

## 2023-12-03 ENCOUNTER — PATIENT MESSAGE (OUTPATIENT)
Dept: ALLERGY | Facility: CLINIC | Age: 63
End: 2023-12-03

## 2023-12-06 RX ORDER — CALCITRIOL 0.25 UG/1
0.25 CAPSULE, LIQUID FILLED ORAL DAILY
Qty: 90 CAPSULE | Refills: 1 | Status: SHIPPED | OUTPATIENT
Start: 2023-12-06

## 2023-12-06 NOTE — TELEPHONE ENCOUNTER
Please review; protocol failed.     Requested Prescriptions   Pending Prescriptions Disp Refills    CALCITRIOL 0.25 MCG Oral Cap [Pharmacy Med Name: CALCITRIOL 0.25 MCG CAPSULE] 90 capsule 1     Sig: TAKE 1 CAPSULE BY MOUTH EVERY DAY       There is no refill protocol information for this order        Future Appointments         Provider Department Appt Notes    In 1 week Winnie Orellana, [address], [address], [place]     In 2 weeks [facility] ALLERGY [address], [facility]     In 1 month 156 [facility]     In 2 months Judith Pacheco MD [address], [facility] allergy shots also    In 3 months 156 [facility]     In 3 months Pablo Morel MD [address], [address], [place] [employer]  POST OP F/U    In 5 months Kayce Huertas MD [address], [address], [place] 6 mo f/u    In 5 months Stacy Feldman MD [address], [address] [facility] px last one unknown          Recent Outpatient Visits              4 days ago Environmental and seasonal allergies    [facility], [address] [place] [address]    Nurse Only    3 weeks ago Post-surgical hypothyroidism    [facility]    Office Visit    1 month ago Rheumatoid arthritis, involving unspecified site, unspecified whether rheumatoid factor present [facility])    Rubens To MD    Office Visit    1 month ago Environmental and seasonal allergies [J30.89]    [facility], [address] [place] [address]    Nurse Only    1 month ago Mild protein-calorie malnutrition [facility])    Janeth To MD    Office Visit

## 2023-12-09 ENCOUNTER — IMMUNIZATION (OUTPATIENT)
Dept: LAB | Age: 63
End: 2023-12-09
Attending: EMERGENCY MEDICINE
Payer: COMMERCIAL

## 2023-12-09 DIAGNOSIS — Z23 NEED FOR VACCINATION: Primary | ICD-10-CM

## 2023-12-09 PROCEDURE — 90480 ADMN SARSCOV2 VAC 1/ONLY CMP: CPT

## 2023-12-12 DIAGNOSIS — Z98.84 S/P LAPAROSCOPIC SLEEVE GASTRECTOMY: ICD-10-CM

## 2023-12-12 DIAGNOSIS — E44.1 MILD PROTEIN-CALORIE MALNUTRITION (HCC): Primary | ICD-10-CM

## 2023-12-13 ENCOUNTER — OFFICE VISIT (OUTPATIENT)
Dept: SURGERY | Facility: CLINIC | Age: 63
End: 2023-12-13
Payer: COMMERCIAL

## 2023-12-13 VITALS — WEIGHT: 216.63 LBS | HEIGHT: 69 IN | BODY MASS INDEX: 32.08 KG/M2

## 2023-12-13 DIAGNOSIS — E78.5 HYPERLIPIDEMIA, UNSPECIFIED HYPERLIPIDEMIA TYPE: ICD-10-CM

## 2023-12-13 DIAGNOSIS — E66.9 OBESITY (BMI 30-39.9): ICD-10-CM

## 2023-12-13 DIAGNOSIS — Z98.84 S/P LAPAROSCOPIC SLEEVE GASTRECTOMY: Primary | ICD-10-CM

## 2023-12-13 NOTE — PATIENT INSTRUCTIONS
Recommendations/goals:    1. Keep a complete food record, My Net Diary, select macros dashboard. Once per week. 2.  Continue to consume 4-6 meals/snacks. Continue to aim for 79-99 grams per day of protein or 20-25% of overall calories. Keep the carbohydrates at 120 grams per day or less per day. Increase fruit and vegetables. Switching out edamame spaghetti or Black bean spaghetti for regular noodle or snack on roasted edamame beans. (Try greek yogurt with fruit or nuts with 1 T chocolate chip for sweet craving, combine sweet with protein). 3.  Continue to drink 64 oz of water per day, (decaf green tea ). 4.  Continue the strength training 2 times per week   5. Continue to bike (within limits). 6. Add additional calcium citrate [id number] mg/day.

## 2023-12-15 ENCOUNTER — LAB ENCOUNTER (OUTPATIENT)
Dept: LAB | Facility: HOSPITAL | Age: 63
End: 2023-12-15
Attending: INTERNAL MEDICINE
Payer: COMMERCIAL

## 2023-12-15 ENCOUNTER — HOSPITAL ENCOUNTER (OUTPATIENT)
Dept: GENERAL RADIOLOGY | Facility: HOSPITAL | Age: 63
Discharge: HOME | End: 2023-12-15
Attending: INTERNAL MEDICINE
Payer: COMMERCIAL

## 2023-12-15 DIAGNOSIS — E66.9 OBESITY (BMI 30-39.9): ICD-10-CM

## 2023-12-15 DIAGNOSIS — Z51.81 ENCOUNTER FOR THERAPEUTIC DRUG MONITORING: ICD-10-CM

## 2023-12-15 DIAGNOSIS — E78.5 HYPERLIPIDEMIA, UNSPECIFIED HYPERLIPIDEMIA TYPE: ICD-10-CM

## 2023-12-15 DIAGNOSIS — E55.9 VITAMIN D DEFICIENCY: ICD-10-CM

## 2023-12-15 DIAGNOSIS — E44.1 MILD PROTEIN-CALORIE MALNUTRITION (HCC): ICD-10-CM

## 2023-12-15 DIAGNOSIS — M06.9 RHEUMATOID ARTHRITIS, INVOLVING UNSPECIFIED SITE, UNSPECIFIED WHETHER RHEUMATOID FACTOR PRESENT (HCC): ICD-10-CM

## 2023-12-15 DIAGNOSIS — M25.562 CHRONIC PAIN OF LEFT KNEE: ICD-10-CM

## 2023-12-15 DIAGNOSIS — Z51.81 THERAPEUTIC DRUG MONITORING: ICD-10-CM

## 2023-12-15 DIAGNOSIS — E89.0 POST-SURGICAL HYPOTHYROIDISM: ICD-10-CM

## 2023-12-15 DIAGNOSIS — Z98.84 S/P LAPAROSCOPIC SLEEVE GASTRECTOMY: ICD-10-CM

## 2023-12-15 DIAGNOSIS — G89.29 CHRONIC PAIN OF LEFT KNEE: ICD-10-CM

## 2023-12-15 LAB
ALBUMIN SERPL-MCNC: 4.3 G/DL (ref 3.2–4.8)
ALBUMIN/GLOB SERPL: 1.7 {RATIO} (ref 1–2)
ALP LIVER SERPL-CCNC: 98 U/L
ALT SERPL-CCNC: <7 U/L
ANION GAP SERPL CALC-SCNC: 7 MMOL/L (ref 0–18)
AST SERPL-CCNC: 21 U/L (ref ?–34)
BILIRUB SERPL-MCNC: 0.7 MG/DL (ref 0.2–1.1)
BUN BLD-MCNC: 21 MG/DL (ref 9–23)
BUN/CREAT SERPL: 19.1 (ref 10–20)
CALCIUM BLD-MCNC: 9.3 MG/DL (ref 8.7–10.4)
CHLORIDE SERPL-SCNC: 109 MMOL/L (ref 98–112)
CHOLEST SERPL-MCNC: 208 MG/DL (ref ?–200)
CO2 SERPL-SCNC: 25 MMOL/L (ref 21–32)
CREAT BLD-MCNC: 1.1 MG/DL
CRP SERPL-MCNC: <0.4 MG/DL (ref ?–1)
DEPRECATED RDW RBC AUTO: 53.1 FL (ref 35.1–46.3)
EGFRCR SERPLBLD CKD-EPI 2021: 57 ML/MIN/1.73M2 (ref 60–?)
ERYTHROCYTE [DISTWIDTH] IN BLOOD BY AUTOMATED COUNT: 14.8 % (ref 11–15)
ERYTHROCYTE [SEDIMENTATION RATE] IN BLOOD: 14 MM/HR
FASTING PATIENT LIPID ANSWER: YES
FASTING STATUS PATIENT QL REPORTED: YES
GLOBULIN PLAS-MCNC: 2.6 G/DL (ref 2.8–4.4)
GLUCOSE BLD-MCNC: 89 MG/DL (ref 70–99)
HCT VFR BLD AUTO: 43.3 %
HDLC SERPL-MCNC: 89 MG/DL (ref 40–59)
HGB BLD-MCNC: 14.2 G/DL
LDLC SERPL CALC-MCNC: 108 MG/DL (ref ?–100)
MCH RBC QN AUTO: 32.2 PG (ref 26–34)
MCHC RBC AUTO-ENTMCNC: 32.8 G/DL (ref 31–37)
MCV RBC AUTO: 98.2 FL
NONHDLC SERPL-MCNC: 119 MG/DL (ref ?–130)
OSMOLALITY SERPL CALC.SUM OF ELEC: 294 MOSM/KG (ref 275–295)
PLATELET # BLD AUTO: 225 10(3)UL (ref 150–450)
POTASSIUM SERPL-SCNC: 3.8 MMOL/L (ref 3.5–5.1)
PROT SERPL-MCNC: 6.9 G/DL (ref 5.7–8.2)
RBC # BLD AUTO: 4.41 X10(6)UL
SODIUM SERPL-SCNC: 141 MMOL/L (ref 136–145)
TRIGL SERPL-MCNC: 61 MG/DL (ref 30–149)
TSI SER-ACNC: 0.5 MIU/ML (ref 0.55–4.78)
VIT B12 SERPL-MCNC: 1298 PG/ML (ref 211–911)
VIT D+METAB SERPL-MCNC: 101 NG/ML (ref 30–100)
VLDLC SERPL CALC-MCNC: 10 MG/DL (ref 0–30)
WBC # BLD AUTO: 4.4 X10(3) UL (ref 4–11)

## 2023-12-15 PROCEDURE — 73560 X-RAY EXAM OF KNEE 1 OR 2: CPT | Performed by: INTERNAL MEDICINE

## 2023-12-15 PROCEDURE — 82306 VITAMIN D 25 HYDROXY: CPT | Performed by: INTERNAL MEDICINE

## 2023-12-15 PROCEDURE — 84425 ASSAY OF VITAMIN B-1: CPT | Performed by: INTERNAL MEDICINE

## 2023-12-15 PROCEDURE — 82607 VITAMIN B-12: CPT | Performed by: INTERNAL MEDICINE

## 2023-12-21 LAB — VITAMIN B1 WHOLE BLD: 148.5 NMOL/L

## 2023-12-26 RX ORDER — ALBUTEROL SULFATE 2.5 MG/3ML
2.5 SOLUTION RESPIRATORY (INHALATION) EVERY 4 HOURS PRN
Qty: 30 EACH | Refills: 0 | Status: SHIPPED | OUTPATIENT
Start: 2023-12-26

## 2023-12-26 RX ORDER — ALBUTEROL SULFATE 90 UG/1
AEROSOL, METERED RESPIRATORY (INHALATION)
Qty: 1 EACH | Refills: 0 | Status: SHIPPED | OUTPATIENT
Start: 2023-12-26

## 2023-12-26 RX ORDER — HYDROCODONE POLISTIREX AND CHLORPHENIRAMINE POLISTIREX 10; 8 MG/5ML; MG/5ML
5 SUSPENSION, EXTENDED RELEASE ORAL 2 TIMES DAILY PRN
Qty: 115 ML | Refills: 0 | Status: SHIPPED | OUTPATIENT
Start: 2023-12-26

## 2023-12-26 NOTE — TELEPHONE ENCOUNTER
Refill requested for Albuterol HFA   Albuterol Ampules  Tessinex     Last office visit: 2023    Previously advised to follow up in six months     F/U currently scheduled? Yes, 2024    ACTION: Routed to Dr. Clarissa Srivastava for review. Dr. Clarissa Srivastava advised Mucinex, and tussinex at bedtime. Requesting refills on albuterol HFA and albuterol ampules since hers at home are . Pt reports cough, no fevers. Pt reported to office for AIT. RN informed patient we are not able to proceed with AIT with a cough. Pt rescheduled for  which is her 35th day. Pt agreeable to plan of care.

## 2023-12-26 NOTE — TELEPHONE ENCOUNTER
LOV: 11/6/23  Last Refilled:#96, 1rf 7/14/23  Labs:AST 21  ALT 7 12/15/23  Future Appointments   Date Time Provider Joel Mann   1/6/2024 11:00 AM EC [facility]   1/10/2024  3:00 PM Viviana Florentino [facility] LOMGBHISCHIL LOMG [facility]   1/20/2024 11:00 AM EC ALLERGY [facility]AL[facility]   2/17/2024 11:00 AM Nemo Jeong MD [facility]   3/9/2024 11:00 AM EC ALLERGY [facility]   3/14/2024  9:00 AM Teri Whitaker MD [facility]   5/6/2024  8:40 AM Tanner Banks MD [address] [place] [address]   5/15/2024  8:00 AM Deon Snellen, MD [facility]   12/11/2024  4:30 PM Terence Rodrigues RD 85 [facility]     Summary:  1. Cont. . methotrexate - 8 tablets a week and folic acid 1mg a day   2. Cont. xlejanz 11mg a day    3. Return to clinic in  6 months -   4. .Check labs in 4  months - in January . 5. Diclofenac gel 1 % for left knee. 6. Cont. diclofneac 50mg once a day   7. Tylenol ES two tablets twice ad ay   8. Physical therapy left knee   9. Rest left knee x 3 days   10. Check left knee xray            - overall she's been stable on Geovani Garcia MD  11/6/2023   9:03 AM  - [place]ed [place]  through Epic   Please advise.

## 2023-12-27 RX ORDER — METHOTREXATE 2.5 MG/1
TABLET ORAL
Qty: 96 TABLET | Refills: 1 | Status: SHIPPED | OUTPATIENT
Start: 2023-12-27

## 2023-12-27 NOTE — TELEPHONE ENCOUNTER
RN called pt to notify her that Dr. Yohan Collins sent refills to pharmacy for her. Pt verifies understanding and will  medications either tonight or tomorrow.

## 2024-01-02 RX ORDER — MONTELUKAST SODIUM 10 MG/1
TABLET ORAL
Qty: 90 TABLET | Refills: 0 | Status: SHIPPED | OUTPATIENT
Start: 2024-01-02

## 2024-01-02 NOTE — TELEPHONE ENCOUNTER
Requested Prescriptions   Pending Prescriptions Disp Refills    MONTELUKAST 10 MG Oral Tab [Pharmacy Med Name: MONTELUKAST SOD 10 MG TABLET] 90 tablet 0     Sig: TAKE 1 TABLET BY MOUTH EVERY DAY AT NIGHT       Corticosteroids / Long-Acting Bronchodilators Passed - 1/1/2024  2:07 AM        Passed - Appt in past 6 mos or next 3 mos     Recent Outpatient Visits              2 weeks ago S/P laparoscopic sleeve gastrectomy [Z98.84]    [facility] Urmila Gan RD    Office Visit    1 month ago Environmental and seasonal allergies    [facility]    Nurse Only    1 month ago Post-surgical hypothyroidism    [facility] Dante Palafox MD    Office Visit    1 month ago Rheumatoid arthritis, involving unspecified site, unspecified whether rheumatoid factor present (HCC)    [facility] Chucho Butcher MD    Office Visit    1 month ago Environmental and seasonal allergies [J30.89]    [facility]    Nurse Only          Future Appointments         Provider Department Appt Notes    In 4 days EC ALLERGY [facility]     In 2 weeks EC ALLERGY [facility]     In 1 month Chico Velasco MD [facility] allergy shots also    In 2 months EC ALLERGY [facility]     In 2 months Harsha Thibodeaux MD [facility] [employer]  POST OP F/U    In 4 months Chucho Butcher MD [facility] 6 mo f/u    In 4 months Dante Palafox MD [facility] px last one unknown    In 11 months Urmila Gan RD [facility],  [place]                   Refilled per med refill protocol.

## 2024-01-04 ENCOUNTER — PATIENT MESSAGE (OUTPATIENT)
Dept: ALLERGY | Facility: CLINIC | Age: 64
End: 2024-01-04

## 2024-01-04 ENCOUNTER — HOSPITAL ENCOUNTER (OUTPATIENT)
Age: 64
Discharge: HOME | End: 2024-01-04
Payer: COMMERCIAL

## 2024-01-04 ENCOUNTER — APPOINTMENT (OUTPATIENT)
Dept: GENERAL RADIOLOGY | Age: 64
End: 2024-01-04
Attending: NURSE PRACTITIONER
Payer: COMMERCIAL

## 2024-01-04 VITALS
DIASTOLIC BLOOD PRESSURE: 67 MMHG | OXYGEN SATURATION: 100 % | HEART RATE: 89 BPM | TEMPERATURE: 98 F | RESPIRATION RATE: 20 BRPM | SYSTOLIC BLOOD PRESSURE: 123 MMHG

## 2024-01-04 DIAGNOSIS — U07.1 COVID-19: Primary | ICD-10-CM

## 2024-01-04 LAB
POCT INFLUENZA A: NEGATIVE
POCT INFLUENZA B: NEGATIVE
SARS-COV-2 RNA RESP QL NAA+PROBE: DETECTED

## 2024-01-04 PROCEDURE — 87502 INFLUENZA DNA AMP PROBE: CPT | Performed by: NURSE PRACTITIONER

## 2024-01-04 PROCEDURE — 99214 OFFICE O/P EST MOD 30 MIN: CPT

## 2024-01-04 PROCEDURE — 71046 X-RAY EXAM CHEST 2 VIEWS: CPT | Performed by: NURSE PRACTITIONER

## 2024-01-04 PROCEDURE — 99204 OFFICE O/P NEW MOD 45 MIN: CPT

## 2024-01-04 RX ORDER — FLUTICASONE PROPIONATE 50 MCG
2 SPRAY, SUSPENSION (ML) NASAL DAILY
Qty: 16 G | Refills: 0 | Status: SHIPPED | OUTPATIENT
Start: 2024-01-04 | End: 2024-02-03

## 2024-01-04 RX ORDER — BENZONATATE 100 MG/1
100 CAPSULE ORAL 3 TIMES DAILY PRN
Qty: 30 CAPSULE | Refills: 0 | Status: SHIPPED | OUTPATIENT
Start: 2024-01-04 | End: 2024-02-03

## 2024-01-04 NOTE — ED INITIAL ASSESSMENT (HPI)
Patient reports cough x 2 weeks.  States it is worsening in severity.  Was told by her allergist to come in for evaluation and to receive a cxr and respiratory viral testing.

## 2024-01-04 NOTE — TELEPHONE ENCOUNTER
TRIAGE)    Assessment)    Patient contacted via telephone.  She offers that she developed a lower respiratory infection the week prior to Lilly.      Patient is able to speak in complete sentences over the telephone.     She does cough while on the phone.     Patient's  was ill previous to patient.  He tested negative for Covid-19 and Influenza.      was not tested for RSV.     Patient has not tested for Covid-19 or Influenza.     She c/o productive cough producing yellow sputum.     Consistent cough, especially when in supine position.     She denies being febrile, denies current shortness of breath or tightness in chest.     Denies wheezing.     Meds)    She has been taking Tussionex and Albuterol Neb prior to bed on a nightly basis to be able to sleep in supine position.     She increased Advair 100-50 mcg/act when she became ill from 1 puff every day to 1 puff 2 times daily.     She denies taking an antihistamine.    Patient informed as per Dr. Velasco's 6/22/2023 office visit note that she take either Xyzal 5 mg daily or Zyrtec 10 mg daily.     Taking Singulair 10 mg daily     Mucinex as needed    Plan)    Patient informed that as cough as persisted almost 3 weeks, she should be seen by Urgent Care or PCP for screening for possible pneumonia/bronchitis.     Patient informed to take Albuterol neb or inhaler 2 puffs every 4-6 hours as needed.     Present to Urgent Care / ER if she feels she needs to take Albuterol more than every 4-6 hour or taking every 4-6 hour consistently.     Informed that Dr. Velasco will address and nurse will call back with his advice.     Patient informed may need to delay AIT injections until after evaluated and treated for at least 48 hours for cough/lower respiratory infection.     Patient asked that Dr. Velasco's recommendations be type out and sent via [employer].

## 2024-01-04 NOTE — TELEPHONE ENCOUNTER
From: Lindsay Trotter  To: Chico Velasco  Sent: 1/4/2024 11:56 AM CST  Subject: Appointment on 1/6 but I still have a cough     I had to reschedule my shot appointment from 12/26 to 1/6 because I had a cough & runny nose. I no longer have the runny nose but the cough persists. Can I still get my shots on Saturday? It is my 35th day so if I can't get the shots, I will be set back, correct?. Please advise.  Thanks,   Radha Trotter

## 2024-01-04 NOTE — TELEPHONE ENCOUNTER
Patient contacted via telephone.     As per patient's request, information sent to her via [employer].     Patient informed as per Dr. Velasco below . . .    Chico Velasco MD Physician Signed1:59 PM     Copy     Call reveiwed and noted. Agree with triage advise provided   Pt should be seen and examined. Advise to be seen by  to see if testing is needed for viral /flu/covid        Given [employer] information verbally, as well.     Mushtaq Jauregui,     Dr. Velasco does agree that you should take, in addition to your other medications, take a daily antihistamine.  Please take either Zyrtec 10 mg daily or Levocetirizine 5 mg daily. Both are available over-the-counter.     Dr. Velasco does ask that you present to Emergency Room or Urgent Care for evaluation and treatment.  He would like you to have respiratory viral testing and receive a chest x-ray to rule out pneumonia.      You will not be able to present for Allergy Injections on 1/6/2024 if you still are positive for cough.  You would need to be on antibiotics and possibly oral steroids for 48 hours prior to allergy injections.      Please continue current medications.         Regards,     Radha ANDREW RN    Patient verbalized understanding of information and verbalized understanding.

## 2024-01-04 NOTE — ED PROVIDER NOTES
Patient Seen in: [facility] [place]      History     Chief Complaint   Patient presents with    Cough/URI     Stated Complaint: sent by PCP    Subjective:   HPI    63-year-old female here for evaluation of cough x 2 weeks.  She reports in the last couple days it has been worse in severity, with more congestion.  She called her allergist today and recommended coming in for respiratory testing and a chest x-ray.  He told her to take Zyrtec daily.  Patient reports fatigue, loss of appetite and congestion.  She denies chest pain or shortness of breath, abdominal pain, vomiting or diarrhea.  She is drinking fluids well.  Patient has a significant medical history includes Parkinson's and rheumatoid arthritis and is on many medications.  Her  was sick in the last few weeks and tested negative for COVID.    Objective:   Past Medical History:   Diagnosis Date    Acute torn meniscus of knee 2010    ARTHROSCOPY OF KNEE    Amenorrhea 04/2016    Asthma     Back problem     Chicken pox     Chronic cough     Disorder of thyroid     Extrapyramidal syndrome 2013    MEDICATION MGMT. W/ PARKINSONS FEATURES    Greater trochanteric bursitis, left 11/21/2017    High cholesterol     History of blood transfusion 1973    CDH    Incontinence     Infertility, female     Parkinson disease     Parkinson's disease     Rheumatoid arthritis (HCC)     S/P laparoscopic sleeve gastrectomy 01/22/2020    for weight loss    Sleep apnea     cpap    Thyroid disease 2016    Tibia/fibula fracture     OPEN REDUCTION INTERNAL FIXATION    Urinary incontinence 02/12/1995    after childbirth    Viral conjunctivitis 2008    Visual impairment     glasses              No pertinent past surgical history.              No pertinent social history.            Review of Systems    Positive for stated complaint: sent by PCP  Other systems are as noted in HPI.  Constitutional and vital signs reviewed.      All other systems reviewed and negative except as  noted above.    Physical Exam     ED Triage Vitals [01/04/24 1640]   /67   Pulse 89   Resp 20   Temp 97.5 °F (36.4 °C)   Temp src Temporal   SpO2 100 %   O2 Device None (Room air)       Current:/67   Pulse 89   Temp 97.5 °F (36.4 °C) (Temporal)   Resp 20   LMP 04/18/2015 (Exact Date)   SpO2 100%         Physical Exam  Vitals and nursing note reviewed.   Constitutional:       General: She is not in acute distress.     Appearance: Normal appearance. She is ill-appearing (Fatigued). She is not toxic-appearing.   HENT:      Head: Normocephalic and atraumatic.      Right Ear: Tympanic membrane, ear canal and external ear normal.      Left Ear: Tympanic membrane, ear canal and external ear normal.      Nose: Nose normal.      Mouth/Throat:      Mouth: Mucous membranes are moist.   Eyes:      Pupils: Pupils are equal, round, and reactive to light.   Cardiovascular:      Rate and Rhythm: Normal rate.      Pulses: Normal pulses.   Pulmonary:      Effort: Pulmonary effort is normal. No respiratory distress.      Breath sounds: Normal breath sounds. No stridor. No wheezing, rhonchi or rales.   Musculoskeletal:         General: Normal range of motion.   Skin:     General: Skin is warm.   Neurological:      Mental Status: She is alert and oriented to person, place, and time.   Psychiatric:         Mood and Affect: Mood normal.         Behavior: Behavior normal.               ED Course     Labs Reviewed   RAPID SARS-COV-2 BY PCR - Abnormal; Notable for the following components:       Result Value    Rapid SARS-CoV-2 by PCR Detected (*)     All other components within normal limits   POCT FLU TEST - Normal    Narrative:     This assay is a rapid molecular in vitro test utilizing nucleic acid amplification of influenza A and B viral RNA.          ED Course as of 01/04/24 1840  ------------------------------------------------------------  Time: 01/04 1659  Value: Rapid SARS-CoV-2 by PCR(!)  Comment:  (Reviewed)  ------------------------------------------------------------  Time: 01/04 1730  Value: POCT Flu Test  Comment: (Reviewed)  ------------------------------------------------------------  Time: 01/04 1730  Value: XR CHEST PA + LAT CHEST ([id number]=[id number])  Comment: Impression  CONCLUSION:  No acute cardiopulmonary process.                MDM     63-year-old female here for evaluation of cough congestion fatigue x 2 weeks, worse in the last few days    On exam patient fatigued appearing lungs are clear with no wheezing stridor or crackles, good air movement.  No tachypnea or hypoxia noted.  100% on room air  Bilateral TM normal, pharynx clear with no erythema, airways patent.  Talking in full sentences in no respiratory distress    Rapid flu negative  Chest x-ray negative for any pneumonia or acute process  COVID positive    Discussed quarantine, masking, Tessalon Perles for cough, p.o. fluids, warm teas with honey, Tylenol Motrin as needed for pain fevers body aches.  Discussed Paxlovid medication, her medications that she takes daily are up-to-date on file. Discussed these interactions with Dr Brasher, attending at [place] today.  There is interaction with carbidopa levodopa and Tofacitinib. Advised to call her PCP and specialist to discuss if she should be on this. There is risk of increased extrapyrimidal symptoms and pt has had symptoms for 10 days so risk is greater than benefit at this time, I believe.  RX for tessalon pt [facility] on file with flonase.    PT agrees with plan of care.  All questions answered. ER precautions given.    Counseled: Patient, regarding diagnosis, regarding treatment plan, regarding diagnostic results, regarding prescription, I have discussed with the patient the results of tests, differential diagnosis, and warning signs and symptoms that should prompt immediate return. The patient understands these instructions and agrees to the follow-up plan provided. There is no barriers to  learning. Appropriate f/u given. Patient agrees to return for any concerns/ problems/complications.                                     Medical Decision Making      Disposition and Plan     Clinical Impression:  1. COVID-19         Disposition:  Discharge  1/4/2024  5:57 pm    Follow-up:  Chucho Butcher MD  [address] [place] [id number] [phone]    Call today      Dante Palafox MD  [address] [facility] [id number] [phone]    Call today            Medications Prescribed:  Discharge Medication List as of 1/4/2024  5:57 PM        START taking these medications    Details   fluticasone propionate 50 MCG/ACT Nasal Suspension 2 sprays by Nasal route daily., Normal, Disp-16 g, R-0      benzonatate 100 MG Oral Cap Take 1 capsule (100 mg total) by mouth 3 (three) times daily as needed for cough., Normal, Disp-30 capsule, R-0

## 2024-01-04 NOTE — DISCHARGE INSTRUCTIONS
FOLLOW UP WITH YOUR PRIMARY CARE PROVIDER and Parkinson's specialist this week. If they believe you should have Paxlovid they can prescribe but there is significant interaction between it and your Carbidopa and Tofacitinib.    -SELF QUARANTINE AND STAY AT HOME UNTIL YOUR SYMPTOMS RESOLVE   CDC recommends 5 days of Quarantine then 5 days of masking from positive test.  -WASH YOUR HANDS OFTEN, DISINFECT COMMON SURFACES AROUND HOME THAT OTHERS USE  -COVER YOUR COUGH AND WEAR A MASK!    -DRINK PLENTY OF WATER, GATORADE, EAT SMALL MEALS, WELL BALANCED FOODS  -TAKE TYLENOL 650MG-1000MG TABLET BY MOUTH EVERY 6 HOURS AS NEEDED FOR BODYACHES, PAIN/FEVER > 100.4  Self-Care at Home:  Runny Nose:  - Nasal Rinse ([place])  - Flonase nasal spray daily  - Antihistamine (Zyrtec 10 mg daily)  Cough:  - Tessalon perles for cough three times a day  - Warm tea w/honey  - Humidifier in bedroom at night  Sore Throat:  - Salt water gargle  - Tylenol or motrin every 6-8 hours as needed for pain    GO TO ER: worsening shortness of breath, chest pain, fever > 102 despite use of tylenol or motrin, vomiting and diarrhea and unable to keep down fluids

## 2024-01-04 NOTE — TELEPHONE ENCOUNTER
Call reveiwed and noted. Agree with triage advise provided   Pt should be seen and examined. Advise to be seen by UC to see if testing is needed for viral /flu/covid

## 2024-01-05 ENCOUNTER — TELEPHONE (OUTPATIENT)
Dept: INTERNAL MEDICINE CLINIC | Facility: CLINIC | Age: 64
End: 2024-01-05

## 2024-01-05 ENCOUNTER — PATIENT MESSAGE (OUTPATIENT)
Dept: RHEUMATOLOGY | Facility: CLINIC | Age: 64
End: 2024-01-05

## 2024-01-05 ENCOUNTER — TELEPHONE (OUTPATIENT)
Dept: RHEUMATOLOGY | Facility: CLINIC | Age: 64
End: 2024-01-05

## 2024-01-05 NOTE — TELEPHONE ENCOUNTER
Pt calling to ask to speak with Rheumatology, pt mentions she has covid and IC told her she needs to stop one of her medications that Dr Butcher prescribes for her if she wants to take paxlovid.  Discussed symptoms with pt and per pt they began last weekend \"it has been more than 5 days, maybe a week?\"    Pt denies: fever,  SOB, weakness, wheezing, chest pain.  Pt states cough is bothering her the most.and benzonatate is not working.   Please advise.   Also please advise if pt is still candidate for Paxlovid since  symptoms started over 5 days ago.  Pt states if she is a candidate and Dr Butcher is ok stopping her medications she is asking Dr Palafox to prescribe it.   Pt did contact Rheumatology and is waiting for response

## 2024-01-05 NOTE — TELEPHONE ENCOUNTER
Call patient.  In reviewing the information in the nurses note as well as from the urgent care visit, I do not think that Paxlovid is indicated at this time.  It has been too long since onset of symptoms for to be effective.  In addition there may be interaction with the medications.

## 2024-01-05 NOTE — TELEPHONE ENCOUNTER
Patient called stating that she was diagnosed with COVID yesterday and they would not prescribe her Paxlovid because it had a drug interaction with one of her other medications. While she was telling me what medication it had an interaction with, the phone call went out and I could not hear her anymore.

## 2024-01-05 NOTE — TELEPHONE ENCOUNTER
RN called patient. Patient's date of birth and full name both confirmed.   RN informed of provider's message as detailed .   Advised to call back if symptoms persist or do not improve.   If severe symptoms - ER . She verbalizes understanding of all information, and agreeable to plan.     All questions/concerns addressed.   She verbalizes understanding of all information, and agreeable to plan.   Denies additional questions.

## 2024-01-05 NOTE — TELEPHONE ENCOUNTER
Rheumatology- also see Internal Med encounter, unsure if pt is even a candidate for Paxlovid since symptoms started over 5 days ago.  Will await Dr Palafox's response

## 2024-01-05 NOTE — TELEPHONE ENCOUNTER
Here is the documentation from the RN in Dr KIMBERLY Palafox's office who pt called this morning regarding COVID and rheum med concerns.

## 2024-01-05 NOTE — TELEPHONE ENCOUNTER
Patient contacted.  She tested positive for Covid-19 on 1/4/2024.     1/6/2024 Allergy Injection appt for AIT cancelled.     Patient scheduled next AIT on day 10 after testing positive for Covid-19 on 1/15/2024.     Patient informed if she is still symptomatic on 1/15/2024, AIT injections will need to be pushed further back.     Patient verbalized understanding of information.

## 2024-01-06 ENCOUNTER — PATIENT MESSAGE (OUTPATIENT)
Dept: ALLERGY | Facility: CLINIC | Age: 64
End: 2024-01-06

## 2024-01-06 RX ORDER — FLUTICASONE PROPIONATE AND SALMETEROL 100; 50 UG/1; UG/1
1 POWDER RESPIRATORY (INHALATION) 2 TIMES DAILY
Qty: 3 EACH | Refills: 0 | Status: SHIPPED | OUTPATIENT
Start: 2024-01-06

## 2024-01-06 NOTE — TELEPHONE ENCOUNTER
From: Lindsay Trotter  To: Chico Velasco  Sent: 1/6/2024 10:08 AM CST  Subject: HELP. I'm almost out of my Advair    I'm almost out on my Advair. Can you send in that prescription for the generic Advair since my insurance won't cover the regular ASAP please? Sorry for the rest. I didn't realize it was running out because I'm taking the two a day now that I've got that covid  Thanks Radha

## 2024-01-06 NOTE — TELEPHONE ENCOUNTER
[employer] message sent to patient.  Advised patient to hold her methotrexate for 1 dose and her Xeljanz for 1 week to allow her body to heal.  Xeljanz has a potential to interact with Paxlovid.  Would defer Paxlovid to her PCP.  Discussed with patient that if her symptoms worsen to present to an immediate care or ED given her immunosuppressed state.

## 2024-01-08 RX ORDER — METHYLPREDNISOLONE 4 MG/1
TABLET ORAL
Qty: 1 EACH | Refills: 0 | Status: SHIPPED | OUTPATIENT
Start: 2024-01-08

## 2024-01-08 NOTE — TELEPHONE ENCOUNTER
Phoned pt; verified name and . Symptoms started ; tested COVID positive . Was not put on Paxlovid. Still has terrible cough; PCP may do chest xray. Consulted with Dr Butcher then called pt back. Directed pt to hold Methotrexate and Xeljanz for one week. Also sent in for Medrol dose flako.

## 2024-01-08 NOTE — TELEPHONE ENCOUNTER
Talked to greta to call pt. - will ask her to hold methotrexate and xeljanz x `1 week, add medrol flako to help with cold and with arthriits -

## 2024-01-11 ENCOUNTER — TELEPHONE (OUTPATIENT)
Dept: RHEUMATOLOGY | Facility: CLINIC | Age: 64
End: 2024-01-11

## 2024-01-11 DIAGNOSIS — M06.9 RHEUMATOID ARTHRITIS, INVOLVING UNSPECIFIED SITE, UNSPECIFIED WHETHER RHEUMATOID FACTOR PRESENT (HCC): Primary | ICD-10-CM

## 2024-01-11 NOTE — TELEPHONE ENCOUNTER
Gatito from [facility] called on the patients behalf requesting a refill on the following medication. He states patient is requesting a 90 day supply.     Tofacitinib Citrate ER (XELJANZ XR) 11 MG Oral Tablet 24 Hr

## 2024-01-12 RX ORDER — TOFACITINIB 11 MG/1
1 TABLET, FILM COATED, EXTENDED RELEASE ORAL DAILY
Qty: 90 TABLET | Refills: 1 | Status: SHIPPED | OUTPATIENT
Start: 2024-01-12

## 2024-01-12 NOTE — TELEPHONE ENCOUNTER
Patient is calling to inform that all prescriptions must be a 90 day supply for the insurance to cover any long term prescriptions.

## 2024-01-12 NOTE — TELEPHONE ENCOUNTER
Please see request below for 90 day supply.    LOV: 11/06/23  Future Appointments   Date Time Provider Department Center   1/15/2024  2:30 PM [facility]   1/20/2024 11:00 AM EC [facility]   1/24/2024  3:00 PM Alannah Streeter [facility]   2/17/2024 11:00 AM Chico Velasco MD [facility]   3/9/2024 11:00 AM EC [facility]   3/14/2024  9:00 AM Harsha Thibodeaux MD [address] [facility]   5/6/2024  8:40 AM Chucho Butcher MD [facility]   5/15/2024  8:00 AM Dante Palafox [facility]   7/11/2024  5:00 PM Shayna Conrad APRN [facility]   12/11/2024  4:30 PM Urmila Gan RD [address]     Labs:    Component      Latest Ref Rng 9/20/2023 12/15/2023   WBC      4.0 - 11.0 x10(3) uL 4.1  4.4    RBC      3.80 - 5.30 x10(6)uL 4.11  4.41    Hemoglobin      12.0 - 16.0 g/dL 13.1  14.2    Hematocrit      35.0 - 48.0 % 40.4  43.3    MCV      80.0 - 100.0 fL 98.3  98.2    MCH      26.0 - 34.0 pg 31.9  32.2    MCHC      31.0 - 37.0 g/dL 32.4  32.8    RDW      11.0 - 15.0 % 15.5 (H)  14.8    RDW-SD      35.1 - 46.3 fL 55.9 (H)  53.1 (H)    Platelet Count      150.0 - 450.0 10(3)uL 237.0  225.0    CREATININE      0.55 - 1.02 mg/dL 1.03 (H)     EGFR      >=60 mL/min/1.73m2 62     ALT (SGPT)      13 - 56 U/L 15     AST (SGOT)      15 - 37 U/L 18     C-REACTIVE PROTEIN      <1.00 mg/dL <0.29  <0.40    SED RATE      0 - 30 mm/Hr 19  14       Legend:  (H) High

## 2024-01-14 ENCOUNTER — PATIENT MESSAGE (OUTPATIENT)
Dept: ALLERGY | Facility: CLINIC | Age: 64
End: 2024-01-14

## 2024-01-14 DIAGNOSIS — E66.9 OBESITY (BMI 30-39.9): ICD-10-CM

## 2024-01-15 ENCOUNTER — TELEPHONE (OUTPATIENT)
Dept: ALLERGY | Facility: CLINIC | Age: 64
End: 2024-01-15

## 2024-01-15 RX ORDER — PHENTERMINE HYDROCHLORIDE 37.5 MG/1
TABLET ORAL
Qty: 45 TABLET | Refills: 0 | Status: SHIPPED | OUTPATIENT
Start: 2024-01-15

## 2024-01-15 NOTE — TELEPHONE ENCOUNTER
Spoke with patient. Verified name and . Patient states she already cancelled for today, 1/15/24 and will keep appointment for Saturday, 24.

## 2024-01-15 NOTE — TELEPHONE ENCOUNTER
Patient calling to state still has cough and runny nose, asking if should still come in for allergy shot today at 2:30. Please advise.

## 2024-01-15 NOTE — TELEPHONE ENCOUNTER
From: Lindsay Trotter  To: Chico Velasco  Sent: 1/14/2024 9:58 PM CST  Subject: Appt on Monday     My appt had to be rescheduled to Monday 1/15 because I was sick. I found out I had Covid. I am now Negative but I still have the cough & stuffy nose. Should I reschedule my appt at 2:30?   
Patient contacted via telephone.     She offers that she did test positive for Covid-19 on 1/4/2024.      Patient states that her at home Covid-19  test was negative today, 1/15/2024.     Patient offers that she still as a persistent cough and nasal congestion.     Nurse discussed with patient the possibility if not symptoms free of developing a systemic reaction from the Allergy injections.      Patient agreed to cancel AIT appt for today.     She has an AIT appt scheduled on 1/20/2024.   
Walk in

## 2024-01-20 ENCOUNTER — NURSE ONLY (OUTPATIENT)
Dept: ALLERGY | Facility: CLINIC | Age: 64
End: 2024-01-20
Payer: COMMERCIAL

## 2024-01-20 DIAGNOSIS — J30.89 ENVIRONMENTAL AND SEASONAL ALLERGIES: Primary | ICD-10-CM

## 2024-01-20 PROCEDURE — 95117 IMMUNOTHERAPY INJECTIONS: CPT | Performed by: ALLERGY & IMMUNOLOGY

## 2024-01-20 RX ORDER — ALBUTEROL SULFATE 90 UG/1
AEROSOL, METERED RESPIRATORY (INHALATION)
Qty: 8.5 EACH | Refills: 0 | OUTPATIENT
Start: 2024-01-20

## 2024-01-20 NOTE — TELEPHONE ENCOUNTER
Patient returning phone call  Per patient did not request albuterol refill - must be automatic refill  Recent COVID infection, patient tested positive on 1/4/23 was using albuterol inhaler 2-3 times a week  Has had to use inhaler once within the last couple   Not having to use sooner than 4 hours every use    Currently no symptoms   No fever, no shortness of breath or chest tightness  Slight cough when lying down for awhile but patient is not coughing up any phlegm  Previous symptoms of runny nose and congestion is resolved  Refill denied per patient's wish - patient to call office back when needing refill   Per patient's symptoms - ok to receive allergy injections today

## 2024-01-20 NOTE — TELEPHONE ENCOUNTER
Refill requested for   Requested Prescriptions   Pending Prescriptions Disp Refills    ALBUTEROL 108 (90 Base) MCG/ACT Inhalation Aero Soln [Pharmacy Med Name: ALBUTEROL HFA (PROAIR) INHALER] 8.5 each 0     Sig: INHALE 2 PUFFS BY INHALATION ROUTE EVERY 4 - 6 HOURS AS NEEDED       Rescue Inhalers Failed - 1/20/2024 12:04 AM        Failed - Last Refill > 30 days     Last rescue inhaler refill:                      Failed - Pass - Pending Nurse Triage Call        Passed - Appt in past 6 mos or next 3 mos     Recent Outpatient Visits              1 month ago S/P laparoscopic sleeve gastrectomy [Z98.84]    [facility] Urmila Gan RD    Office Visit    1 month ago Environmental and seasonal allergies    [facility]    Nurse Only    2 months ago Post-surgical hypothyroidism    [facility] Dante Palafox MD    Office Visit    2 months ago Rheumatoid arthritis, involving unspecified site, unspecified whether rheumatoid factor present ([facility])    [facility] Chucho Butcher MD    Office Visit    2 months ago Environmental and seasonal allergies [J30.89]    [facility]    Nurse Only          Future Appointments         Provider Department Appt Notes    Today EC ALLERGY [facility]     In 4 weeks Chico Velasco MD [facility] allergy shots also    In 1 month EC [facility]     In 1 month Harsha Thibodeaux MD [facility] [employer]  POST OP F/U    In 3 months Chucho Butcher MD [facility] 6 mo f/u    In 3 months Dante Palafox MD [facility],  [place] px last one unknown    In 5 months Shayna Conrad, APRN [facility] 6/30    In 10 months Urmila Gan, KASSANDRA [facility]                    Last office visit: 6/22/23    Previously advised to follow up in No follow-up timeline advised in last office visit. Diagnosis of asthma advised to follow-up in 6 months    F/U currently scheduled? 2/17/24    ACTION:  RN left message for patient to please call office back  Due to protocol patient must be triaged before refill can be filled

## 2024-02-13 NOTE — TELEPHONE ENCOUNTER
Talked to patient and she has an appointment already. Tres Eller MD  You5 minutes ago (1:31 PM)       Stop metoprolol

## 2024-02-17 ENCOUNTER — OFFICE VISIT (OUTPATIENT)
Dept: ALLERGY | Facility: CLINIC | Age: 64
End: 2024-02-17

## 2024-02-17 ENCOUNTER — NURSE ONLY (OUTPATIENT)
Dept: ALLERGY | Facility: CLINIC | Age: 64
End: 2024-02-17

## 2024-02-17 VITALS
OXYGEN SATURATION: 99 % | SYSTOLIC BLOOD PRESSURE: 121 MMHG | RESPIRATION RATE: 19 BRPM | DIASTOLIC BLOOD PRESSURE: 74 MMHG | HEART RATE: 99 BPM

## 2024-02-17 DIAGNOSIS — Z92.29 COVID-19 VACCINE SERIES COMPLETED: ICD-10-CM

## 2024-02-17 DIAGNOSIS — H10.10 SEASONAL AND PERENNIAL ALLERGIC RHINOCONJUNCTIVITIS: ICD-10-CM

## 2024-02-17 DIAGNOSIS — J30.89 SEASONAL AND PERENNIAL ALLERGIC RHINOCONJUNCTIVITIS: ICD-10-CM

## 2024-02-17 DIAGNOSIS — Z88.0 PENICILLIN ALLERGY: ICD-10-CM

## 2024-02-17 DIAGNOSIS — J45.40 ASTHMA, EXTRINSIC, MODERATE PERSISTENT, UNCOMPLICATED: Primary | ICD-10-CM

## 2024-02-17 DIAGNOSIS — J30.89 ENVIRONMENTAL AND SEASONAL ALLERGIES: Primary | ICD-10-CM

## 2024-02-17 DIAGNOSIS — J30.2 SEASONAL AND PERENNIAL ALLERGIC RHINOCONJUNCTIVITIS: ICD-10-CM

## 2024-02-17 PROCEDURE — 3078F DIAST BP <80 MM HG: CPT | Performed by: ALLERGY & IMMUNOLOGY

## 2024-02-17 PROCEDURE — 95117 IMMUNOTHERAPY INJECTIONS: CPT | Performed by: ALLERGY & IMMUNOLOGY

## 2024-02-17 PROCEDURE — 99214 OFFICE O/P EST MOD 30 MIN: CPT | Performed by: ALLERGY & IMMUNOLOGY

## 2024-02-17 PROCEDURE — 3074F SYST BP LT 130 MM HG: CPT | Performed by: ALLERGY & IMMUNOLOGY

## 2024-02-17 NOTE — PATIENT INSTRUCTIONS
#1 asthma  No ED visits or prednisone in the interim.  Denies symptoms more than 2 days/week with current Wixela and Singulair  Continue with current regimen  Reviewed signs and symptoms of persistent asthma including the rules of 2        #2 allergic rhinitis  Immunotherapy in office today followed by 30 minutes of observation without issue or incident  Continue with current medications  Reviewed avoidance measures    #3 penicillin allergy  Reviewed serum IgE testing to penicillin to further evaluate.  Reviewed 80% chance of outgrowing within 10 years of the previous episode    #4 COVID vaccines reviewed.  Recommend booster.  Reviewed I do not have the booster in my office  COVID booster up-to-date from December 2023    #5 flu vaccine up-to-date    #6 pneumonia vaccine up-to-date.  Will recommend Prevnar 20 at 60 years of age    #7 recommend RSV vaccine for 60 and older.  Patient also with a history of asthma.  Recommend to obtain RSV vaccine through local pharmacy

## 2024-02-17 NOTE — PROGRESS NOTES
Lindsay Trotter is a 63 year old female.    HPI:   No chief complaint on file.    Patient is a 63-year-old female who presents for follow-up with a chief complaint of asthma and allergies      Patient last seen by me in June 2023  History of asthma allergic rhinitis and penicillin allergy  Patient currently on maintenance dose immunotherapy every 4 weeks to underlying environmental allergens including trees ragweed weeds mold cat dog  Medication list include Advair 100/50 Flonase Singulair albuterol  COVID-vaccine reviewed Pneumovax up-to-date flu vaccine up-to-date  1 cat 1 dog    Today patient reports    Asthma  Active or persistent symptoms: denies   ED visits or prednisone in the interim: denies   Active meds:  wixela, singulair     Ar:  Active or persistent symptoms: denies   Ait today   Active meds see med list`  pets :  2 dogs 1 cat     Penicillin allergy:   Still avoiding  Childhood   Rash     Ait today       HISTORY:  Past Medical History:   Diagnosis Date    Acute torn meniscus of knee 2010    ARTHROSCOPY OF KNEE    Amenorrhea 04/2016    Asthma     Back problem     Chicken pox     Chronic cough     Disorder of thyroid     Extrapyramidal syndrome 2013    MEDICATION MGMT. W/ PARKINSONS FEATURES    Greater trochanteric bursitis, left 11/21/2017    High cholesterol     History of blood transfusion 1973    [facility]    Incontinence     Infertility, female     Parkinson disease     Parkinson's disease     Rheumatoid arthritis (HCC)     S/P laparoscopic sleeve gastrectomy 01/22/2020    for weight loss    Sleep apnea     cpap    Thyroid disease 2016    Tibia/fibula fracture     OPEN REDUCTION INTERNAL FIXATION    Urinary incontinence 02/12/1995    after childbirth    Viral conjunctivitis 2008    Visual impairment     glasses      Past Surgical History:   Procedure Laterality Date    BACK SURGERY      BACK SURGERY  04/21/2021    BACK SURGERY  04/25/2023    compressed disc, removal of bone    COLONOSCOPY  06/11/2019     return in 5 years    COLONOSCOPY N/A 06/11/2019    Procedure: COLONOSCOPY;  Surgeon: Dago Heart MD;  Location: [facility] ENDOSCOPY    COLPOSCOPY, CERVIX W/UPPER ADJACENT VAGINA; W/BIOPSY(S), CERVIX      D & C      for extended period    D & C      for extended period    FOOT SURGERY Left     Had right foot done recently.    KNEE ARTHROSCOPY Right     LAP SLEEVE GASTRECTOMY  12/02/2019    Dr Rios, [facility]    LAPAROSCOPY,PELVIC,BIOPSY  1990    for infertility    LAPAROSCOPY,PELVIC,BIOPSY  1990    for infertility    OTHER SURGICAL HISTORY Right 12/03/2015    OTHER SURGICAL HISTORY  12/03/2015    THYROIDECTOMY      Total       Family History   Problem Relation Age of Onset    Heart Disorder Father 55        heart attack-cause of death.    Heart Disorder Mother     Stroke Mother     Cancer Mother         AML-cause of death    Other (leukemia) Mother     Cancer Paternal Aunt         breast cancer-cause of death at age 80    Breast Cancer Paternal Aunt 80        approx age    Heart Disorder Brother     Ovarian Cancer Neg       Social History:   Social History     Socioeconomic History    Marital status:    Tobacco Use    Smoking status: Never     Passive exposure: Never    Smokeless tobacco: Never   Vaping Use    Vaping Use: Never used   Substance and Sexual Activity    Alcohol use: Yes     Alcohol/week: 2.0 - 3.0 standard drinks of alcohol     Types: 2 - 3 Glasses of wine per week     Comment: social    Drug use: No    Sexual activity: Yes     Partners: Male   Other Topics Concern    Caffeine Concern Yes     Comment: S2 glasses tea daily    Exercise No    Reaction to local anesthetic No    Pt has a pacemaker No    Pt has a defibrillator No   Social History Narrative    The patient uses the following assistive device(s):  single-point cane,uses 2 canes. Walker at night           The patient does live in a home with stairs.        Medications (Active prior to today's visit):  Current Outpatient  Medications   Medication Sig Dispense Refill    levothyroxine 112 MCG Oral Tab Take 1 tablet (112 mcg total) by mouth before breakfast. 90 tablet 1    Phentermine HCl 37.5 MG Oral Tab Take one full tablet in the morning and half tablet as needed. 45 tablet 0    Tofacitinib Citrate ER (XELJANZ XR) 11 MG Oral Tablet 24 Hr Take 1 tablet by mouth daily. 90 tablet 1    methylPREDNISolone 4 MG Oral Tablet Therapy Pack Take as directed on package. 1 each 0    fluticasone-salmeterol (ADVAIR DISKUS) 100-50 MCG/ACT Inhalation Aerosol Powder, Breath Activated Inhale 1 puff into the lungs 2 (two) times daily. 3 each 0    MONTELUKAST 10 MG Oral Tab TAKE 1 TABLET BY MOUTH EVERY DAY AT NIGHT 90 tablet 0    methotrexate 2.5 MG Oral Tab TAKE 8 TABS WEEKLY BY MOUTH 96 tablet 1    Hydrocod Navi-Chlorphe Navi ER 10-8 MG/5ML Oral Suspension Extended Release Take 5 mL by mouth 2 (two) times daily as needed. 115 mL 0    albuterol (2.5 MG/3ML) 0.083% Inhalation Nebu Soln Take 3 mL (2.5 mg total) by nebulization every 4 (four) hours as needed for Wheezing. 30 each 0    albuterol 108 (90 Base) MCG/ACT Inhalation Aero Soln Inhale 2 puffs by inhalation route every 4 - 6 hours as needed 1 each 0    calcitriol 0.25 MCG Oral Cap Take 1 capsule (0.25 mcg total) by mouth daily. 90 capsule 1    diclofenac 50 MG Oral Tab EC Take 1 tablet (50 mg total) by mouth daily. 90 tablet 1    pantoprazole 40 MG Oral Tab EC Take 1 tablet (40 mg total) by mouth before breakfast. 90 tablet 2    ERGOCALCIFEROL 1.25 MG (06375 UT) Oral Cap TAKE 1 CAPSULE BY MOUTH ONE TIME PER WEEK 12 capsule 3    DICLOFENAC 1 % External Gel APPLY FOUR GRAMS DAILY FOUR TIMES A  g 4    Boswellia-Glucosamine-Vit D (OSTEO BI-FLEX ONE PER DAY OR) Take by mouth As Directed.      folic acid 1 MG Oral Tab Take 1 tablet (1 mg total) by mouth daily. 90 tablet 3    PRAVASTATIN 20 MG Oral Tab TAKE 1 TABLET BY MOUTH EVERY DAY AT NIGHT 90 tablet 3    Amantadine HCl 100 MG Oral Cap Take 1  capsule (100 mg total) by mouth daily.      omega-3 fatty acids 1000 MG Oral Cap Take 1,000 mg by mouth daily.      TURMERIC OR Take by mouth 2 (two) times a day.      Multiple Vitamins-Minerals (BARIATRIC FUSION) Oral Chew Tab Chew 1 tablet by mouth in the morning, at noon, in the evening, and at bedtime.      rasagiline mesylate 1 MG Oral Tab Take 1 tablet (1 mg total) by mouth daily.      carbidopa-levodopa (SINEMET)  MG Oral Tab Take 1 tablet by mouth 4 (four) times daily. 1  in am and 3/4 11AM and 3/4 afternoon and 1 evening  0       Allergies:  Allergies   Allergen Reactions    Other HIVES     Reports side effect from DPT vaccine.  Other reaction(s): Rash/Hives/Urticaria    Penicillins HIVES    Seasonal Runny nose         ROS:   Allergic/Immuno:  See hpi  Cardiovascular:  Negative for irregular heartbeat/palpitations, chest pain, edema  Constitutional:  Negative night sweats,weight loss, irritability and lethargy  ENMT:  Negative for ear drainage, hearing loss see hpi   Eyes:  Negative for eye discharge and vision loss  Gastrointestinal:  Negative for abdominal pain, diarrhea and vomiting  Integumentary:  Negative for pruritus and rash  Respiratory:  Negative for cough, dyspnea and wheezing    PHYSICAL EXAM:   Constitutional: responsive, no acute distress noted  Head/Face: NC/Atraumatic  Eyes/Vision: conjunctiva and lids are normal extraocular motion is intact   Ears/Audiometry: tympanic membranes are normal bilaterally hearing is grossly intact  Nose/Mouth/Throat: nose and throat are clear mucous membranes are moist   Neck/Thyroid: neck is supple without adenopathy  Lymphatic: no abnormal cervical, supraclavicular or axillary adenopathy is noted  Respiratory: normal to inspection lungs are clear to auscultation bilaterally normal respiratory effort   Cardiovascular: regular rate and rhythm no murmurs, gallups, or rubs  Abdomen: soft non-tender non-distended  Skin/Hair: no unusual rashes present    Extremities: no edema, cyanosis, or clubbing     ASSESSMENT/PLAN:   Assessment   Encounter Diagnoses   Name Primary?    Asthma, extrinsic, moderate persistent, uncomplicated Yes    Seasonal and perennial allergic rhinoconjunctivitis     Penicillin allergy     COVID-19 vaccine series completed        Unable to perform spirometry due to COVID-19 pandemic    #1 asthma  No ED visits or prednisone in the interim.  Denies symptoms more than 2 days/week with current Wixela and Singulair  Continue with current regimen  Reviewed signs and symptoms of persistent asthma including the rules of 2        #2 allergic rhinitis  Immunotherapy in office today followed by 30 minutes of observation without issue or incident  Continue with current medications  Reviewed avoidance measures    #3 penicillin allergy  Reviewed serum IgE testing to penicillin to further evaluate.  Reviewed 80% chance of outgrowing within 10 years of the previous episode    #4 COVID vaccines reviewed.  Recommend booster.  Reviewed I do not have the booster in my office  COVID booster up-to-date from December 2023    #5 flu vaccine up-to-date    #6 pneumonia vaccine up-to-date.  Will recommend Prevnar 20 at 60 years of age    #7 recommend RSV vaccine for 60 and older.  Patient also with a history of asthma.  Recommend to obtain RSV vaccine through local pharmacy       Orders This Visit:  No orders of the defined types were placed in this encounter.      Meds This Visit:  Requested Prescriptions      No prescriptions requested or ordered in this encounter       Imaging & Referrals:  None     2/17/2024  Chico Velasco MD    If medication samples were provided today, they were provided solely for patient education and training related to self administration of these medications.  Teaching, instruction and sample was provided to the patient by myself.  Teaching included  a review of potential adverse side effects as well as potential efficacy.  Patient's  questions were answered in regards to medication administration and dosing and potential side effects. Teaching was provided via the teach back method

## 2024-02-26 RX ORDER — FOLIC ACID 1 MG/1
1 TABLET ORAL DAILY
Qty: 90 TABLET | Refills: 3 | Status: SHIPPED | OUTPATIENT
Start: 2024-02-26

## 2024-02-27 NOTE — TELEPHONE ENCOUNTER
Please review; protocol failed/no protocol.     Requested Prescriptions   Pending Prescriptions Disp Refills    FOLIC ACID 1 MG Oral Tab [Pharmacy Med Name: FOLIC ACID 1 MG TABLET] 90 tablet 3     Sig: TAKE 1 TABLET BY MOUTH EVERY DAY       There is no refill protocol information for this order           Recent Outpatient Visits              1 week ago Environmental and seasonal allergies [J30.89]    [facility]    Nurse Only    1 week ago Asthma, extrinsic, moderate persistent, uncomplicated (HCC)    [facility] Chico Velasco MD    Office Visit    1 month ago Environmental and seasonal allergies [J30.89]    [facility]    Nurse Only    2 months ago S/P laparoscopic sleeve gastrectomy [Z98.84]    [facility] Urmila Gan RD    Office Visit    2 months ago Environmental and seasonal allergies    [facility]    Nurse Only             Future Appointments         Provider Department Appt Notes    In 1 week EC ALLERGY [facility]     In 2 weeks Harsha Thibodeaux MD [facility] BCBS PPO  POST OP F/U    In 1 month EC ALLERGY [facility]     In 2 months Chucho Butcher MD [facility] 6 mo f/u    In 2 months Dante Palafox MD [facility] px last one unknown    In 4 months Shayna Conrad APRN [facility] 6/30    In 9 months Urmila Gan RD [facility]

## 2024-03-07 RX ORDER — PRAVASTATIN SODIUM 20 MG
20 TABLET ORAL NIGHTLY
Qty: 90 TABLET | Refills: 3 | Status: SHIPPED | OUTPATIENT
Start: 2024-03-07

## 2024-03-07 NOTE — TELEPHONE ENCOUNTER
Refill passed per [facility] protocol.  Requested Prescriptions   Pending Prescriptions Disp Refills    PRAVASTATIN 20 MG Oral Tab [Pharmacy Med Name: PRAVASTATIN SODIUM 20 MG TAB] 90 tablet 3     Sig: TAKE 1 TABLET BY MOUTH EVERY DAY AT NIGHT       Cholesterol Medication Protocol Passed - 3/6/2024 12:05 AM        Passed - ALT < 80     Lab Results   Component Value Date    ALT <7 (L) 12/15/2023             Passed - ALT resulted within past year        Passed - Lipid panel within past 12 months     Lab Results   Component Value Date    CHOLEST 208 (H) 12/15/2023    TRIG 61 12/15/2023    HDL 89 (H) 12/15/2023     (H) 12/15/2023    VLDL 10 12/15/2023    NONHDLC 119 12/15/2023             Passed - In person appointment or virtual visit in the past 12 mos or appointment in next 3 mos     Recent Outpatient Visits              2 weeks ago Environmental and seasonal allergies [J30.89]    [facility]    Nurse Only    2 weeks ago Asthma, extrinsic, moderate persistent, uncomplicated (HCC)    [facility] Chico Velasco MD    Office Visit    1 month ago Environmental and seasonal allergies [J30.89]    [facility]    Nurse Only    2 months ago S/P laparoscopic sleeve gastrectomy [Z98.84]    [facility] Urmila Gan RD    Office Visit    3 months ago Environmental and seasonal allergies    [facility]    Nurse Only          Future Appointments         Provider Department Appt Notes    In 2 days EC ALLERGY [facility]     In 1 week Harsha Thibodeaux MD [facility] [employer]BS PPO  POST OP F/U    In 1 month EC ALLERGY [facility]     In 2 months Chucho Butcher MD [place]  [facility] 6 mo f/u    In 2 months Dante Palafox MD [facility] px last one unknown    In 4 months Shayna Conrad APRN [facility] 6/30    In 9 months Urmila Gan RD [facility]                   Future Appointments         Provider Department Appt Notes    In 2 days EC ALLERGY [facility]     In 1 week Harsha Thibodeaux MD [facility] [employer] PPO  POST OP F/U    In 1 month EC ALLERGY [facility]     In 2 months Chucho Butcher MD [facility] 6 mo f/u    In 2 months Dante Palafox MD [facility] px last one unknown    In 4 months Shayan Conrad APRN [facility] 6/30    In 9 months Urmila Gan RD [facility]           Recent Outpatient Visits              2 weeks ago Environmental and seasonal allergies [J30.89]    [facility]    Nurse Only    2 weeks ago Asthma, extrinsic, moderate persistent, uncomplicated (HCC)    [facility] Chico Velasco MD    Office Visit    1 month ago Environmental and seasonal allergies [J30.89]    [facility]    Nurse Only    2 months ago S/P laparoscopic sleeve gastrectomy [Z98.84]    [facility] Urmila Gan RD    Office Visit    3 months ago Environmental and seasonal allergies    [facility]    Nurse Only

## 2024-03-08 NOTE — TELEPHONE ENCOUNTER
Dr. Cathrine Cranker and staff: Patient is scheduled for procedure with Dr. Cathrine Cranker on 10/15/19. Please see test result message in Email from Dr. Ted Pal. Newly diagnosed with sleep apnea. Dr. Ted Pal: Please advise on clearance for gastric sleeve surgery.      Pulm
See TE from 10/11/19 for further documentation.
- - -

## 2024-03-09 ENCOUNTER — NURSE ONLY (OUTPATIENT)
Dept: ALLERGY | Facility: CLINIC | Age: 64
End: 2024-03-09
Payer: COMMERCIAL

## 2024-03-09 DIAGNOSIS — J30.89 ENVIRONMENTAL AND SEASONAL ALLERGIES: Primary | ICD-10-CM

## 2024-03-09 PROCEDURE — 95117 IMMUNOTHERAPY INJECTIONS: CPT | Performed by: ALLERGY & IMMUNOLOGY

## 2024-03-14 ENCOUNTER — OFFICE VISIT (OUTPATIENT)
Dept: SURGERY | Facility: CLINIC | Age: 64
End: 2024-03-14
Payer: COMMERCIAL

## 2024-03-14 VITALS
WEIGHT: 212.88 LBS | SYSTOLIC BLOOD PRESSURE: 120 MMHG | BODY MASS INDEX: 31.53 KG/M2 | HEIGHT: 69 IN | DIASTOLIC BLOOD PRESSURE: 64 MMHG

## 2024-03-14 DIAGNOSIS — Z98.84 S/P LAPAROSCOPIC SLEEVE GASTRECTOMY: ICD-10-CM

## 2024-03-14 DIAGNOSIS — E66.9 OBESITY (BMI 30-39.9): ICD-10-CM

## 2024-03-14 DIAGNOSIS — E44.1 MILD PROTEIN-CALORIE MALNUTRITION (HCC): Primary | ICD-10-CM

## 2024-03-14 DIAGNOSIS — Z51.81 ENCOUNTER FOR THERAPEUTIC DRUG MONITORING: ICD-10-CM

## 2024-03-14 PROCEDURE — 3008F BODY MASS INDEX DOCD: CPT | Performed by: INTERNAL MEDICINE

## 2024-03-14 PROCEDURE — 3078F DIAST BP <80 MM HG: CPT | Performed by: INTERNAL MEDICINE

## 2024-03-14 PROCEDURE — 3074F SYST BP LT 130 MM HG: CPT | Performed by: INTERNAL MEDICINE

## 2024-03-14 PROCEDURE — 99214 OFFICE O/P EST MOD 30 MIN: CPT | Performed by: INTERNAL MEDICINE

## 2024-03-14 RX ORDER — PHENTERMINE HYDROCHLORIDE 37.5 MG/1
TABLET ORAL
Qty: 45 TABLET | Refills: 3 | Status: SHIPPED | OUTPATIENT
Start: 2024-03-14

## 2024-03-14 RX ORDER — TOPIRAMATE 100 MG/1
100 TABLET, FILM COATED ORAL 2 TIMES DAILY
Qty: 180 TABLET | Refills: 1 | Status: SHIPPED | OUTPATIENT
Start: 2024-03-14 | End: 2024-06-12

## 2024-03-14 RX ORDER — LEVOTHYROXINE SODIUM 0.12 MG/1
125 TABLET ORAL
Qty: 90 TABLET | Refills: 3 | OUTPATIENT
Start: 2024-03-14

## 2024-03-14 NOTE — TELEPHONE ENCOUNTER
Please review. Protocol failed/ No protocol.    Requested Prescriptions   Pending Prescriptions Disp Refills    LEVOTHYROXINE 125 MCG Oral Tab [Pharmacy Med Name: LEVOTHYROXINE 125 MCG TABLET] 90 tablet 3     Sig: TAKE 1 TABLET BY MOUTH BEFORE BREAKFAST.       Thyroid Medication Protocol Failed - 3/13/2024 12:06 AM        Failed - Last TSH value is normal     Lab Results   Component Value Date    TSH 0.500 (L) 12/15/2023                 Passed - TSH in past 12 months        Passed - In person appointment or virtual visit in the past 12 mos or appointment in next 3 mos     Recent Outpatient Visits              Today Mild protein-calorie malnutrition (HCC)    [facility] Harsha Thibodeaux MD    Office Visit    5 days ago Environmental and seasonal allergies    [facility]    Nurse Only    3 weeks ago Environmental and seasonal allergies [J30.89]    [facility]    Nurse Only    3 weeks ago Asthma, extrinsic, moderate persistent, uncomplicated (HCC)    [facility] Chico Velasco MD    Office Visit    1 month ago Environmental and seasonal allergies [J30.89]    [facility]    Nurse Only          Future Appointments         Provider Department Appt Notes    In 3 weeks EC ALLERGY [facility]     In 1 month Chucho Butcher MD [facility] 6 mo f/u    In 2 months Dante Palafox MD [facility] px last one unknown    In 3 months Shayna Conrad APRN [facility] 6/30    In 6 months Harsha Thibodeaux MD [facility]     In 9 months Urmila Gan RD [facility],  [place]

## 2024-03-14 NOTE — PROGRESS NOTES
[facility], [facility], [place]  [address]  [facility] [address]  Dept: [phone]    Patient:  Lindsay Trotter  :      1960  MRN:      [id number]    Referring Provider: Dante Palafox MD     Chief Complaint:     Chief Complaint   Patient presents with    Follow - Up    Weight Management     Weight weight check        Date of Surgery:   2019  Surgery Type:   Sleeve gastrectomy     Subjective     Satiety:  positive  Food Intake:  <01 cup(s)  Fluid Intake:  64 oz  Protein Intake:  adeq grams  Vitamin:  Yes   bariatric  Exercise: Yes         Comorbidities:  Back pain-Improvement?  yes, Joint pain-Improvement?  yes, Hypertension-Improvement?  yes, GAIL-Improvement?  yes and Snoring-Improvement?  yes    Objective     Vitals: /64   Ht 5' 9\" (1.753 m)   Wt 212 lb 14.4 oz (96.6 kg)   LMP 2015 (Exact Date)   BMI 31.44 kg/m²     Starting weight: 325   Current weight:    Interval weight loss: -11   Total weight loss:  -113    Last 3 Weights   24 0914 212 lb 14.4 oz (96.6 kg)   23 1634 216 lb 9.6 oz (98.2 kg)   11/15/23 0941 220 lb (99.8 kg)       Patient Medications:    Current Outpatient Medications:     pravastatin 20 MG Oral Tab, Take 1 tablet (20 mg total) by mouth nightly., Disp: 90 tablet, Rfl: 3    folic acid 1 MG Oral Tab, Take 1 tablet (1 mg total) by mouth daily., Disp: 90 tablet, Rfl: 3    Phentermine HCl 37.5 MG Oral Tab, Take one full tablet in the morning and half tablet as needed., Disp: 45 tablet, Rfl: 0    Tofacitinib Citrate ER (XELJANZ XR) 11 MG Oral Tablet 24 Hr, Take 1 tablet by mouth daily., Disp: 90 tablet, Rfl: 1    methylPREDNISolone 4 MG Oral Tablet Therapy Pack, Take as directed on package. (Patient not taking: Reported on 2024), Disp: 1 each, Rfl: 0    fluticasone-salmeterol (ADVAIR DISKUS) 100-50 MCG/ACT Inhalation Aerosol Powder, Breath Activated, Inhale 1 puff into the lungs 2 (two) times  daily., Disp: 3 each, Rfl: 0    MONTELUKAST 10 MG Oral Tab, TAKE 1 TABLET BY MOUTH EVERY DAY AT NIGHT, Disp: 90 tablet, Rfl: 0    methotrexate 2.5 MG Oral Tab, TAKE 8 TABS WEEKLY BY MOUTH, Disp: 96 tablet, Rfl: 1    levothyroxine 112 MCG Oral Tab, Take 1 tablet (112 mcg total) by mouth before breakfast., Disp: 90 tablet, Rfl: 1    Hydrocod Navi-Chlorphe Navi ER 10-8 MG/5ML Oral Suspension Extended Release, Take 5 mL by mouth 2 (two) times daily as needed. (Patient not taking: Reported on 2/17/2024), Disp: 115 mL, Rfl: 0    albuterol (2.5 MG/3ML) 0.083% Inhalation Nebu Soln, Take 3 mL (2.5 mg total) by nebulization every 4 (four) hours as needed for Wheezing. (Patient not taking: Reported on 2/17/2024), Disp: 30 each, Rfl: 0    albuterol 108 (90 Base) MCG/ACT Inhalation Aero Soln, Inhale 2 puffs by inhalation route every 4 - 6 hours as needed (Patient not taking: Reported on 2/17/2024), Disp: 1 each, Rfl: 0    calcitriol 0.25 MCG Oral Cap, Take 1 capsule (0.25 mcg total) by mouth daily., Disp: 90 capsule, Rfl: 1    diclofenac 50 MG Oral Tab EC, Take 1 tablet (50 mg total) by mouth daily., Disp: 90 tablet, Rfl: 1    pantoprazole 40 MG Oral Tab EC, Take 1 tablet (40 mg total) by mouth before breakfast., Disp: 90 tablet, Rfl: 2    ERGOCALCIFEROL 1.25 MG (87480 UT) Oral Cap, TAKE 1 CAPSULE BY MOUTH ONE TIME PER WEEK, Disp: 12 capsule, Rfl: 3    DICLOFENAC 1 % External Gel, APPLY FOUR GRAMS DAILY FOUR TIMES A DAY, Disp: 100 g, Rfl: 4    Boswellia-Glucosamine-Vit D (OSTEO BI-FLEX ONE PER DAY OR), Take by mouth As Directed., Disp: , Rfl:     Amantadine HCl 100 MG Oral Cap, Take 1 capsule (100 mg total) by mouth daily., Disp: , Rfl:     omega-3 fatty acids 1000 MG Oral Cap, Take 1,000 mg by mouth daily., Disp: , Rfl:     TURMERIC OR, Take by mouth 2 (two) times a day., Disp: , Rfl:     Multiple Vitamins-Minerals (BARIATRIC FUSION) Oral Chew Tab, Chew 1 tablet by mouth in the morning, at noon, in the evening, and at bedtime.,  Disp: , Rfl:     rasagiline mesylate 1 MG Oral Tab, Take 1 tablet (1 mg total) by mouth daily., Disp: , Rfl:     carbidopa-levodopa (SINEMET)  MG Oral Tab, Take 1 tablet by mouth 4 (four) times daily. 1  in am and 3/4 11AM and 3/4 afternoon and 1 evening, Disp: , Rfl: 0    Allergies:  Other, Penicillins, and Seasonal     Social History:    Social History     Socioeconomic History    Marital status:    Tobacco Use    Smoking status: Never     Passive exposure: Never    Smokeless tobacco: Never   Vaping Use    Vaping Use: Never used   Substance and Sexual Activity    Alcohol use: Yes     Alcohol/week: 2.0 - 3.0 standard drinks of alcohol     Types: 2 - 3 Glasses of wine per week     Comment: social    Drug use: No    Sexual activity: Yes     Partners: Male   Other Topics Concern    Caffeine Concern Yes     Comment: S2 glasses tea daily    Exercise No    Reaction to local anesthetic No    Pt has a pacemaker No    Pt has a defibrillator No   Social History Narrative    The patient uses the following assistive device(s):  single-point cane,uses 2 canes. Walker at night           The patient does live in a home with stairs.     Surgical History:    Past Surgical History:   Procedure Laterality Date    BACK SURGERY      BACK SURGERY  04/21/2021    BACK SURGERY  04/25/2023    compressed disc, removal of bone    COLONOSCOPY  06/11/2019    return in 5 years    COLONOSCOPY N/A 06/11/2019    Procedure: COLONOSCOPY;  Surgeon: Dago Heart MD;  Location: [facility] ENDOSCOPY    COLPOSCOPY, CERVIX W/UPPER ADJACENT VAGINA; W/BIOPSY(S), CERVIX      D & C      for extended period    D & C      for extended period    FOOT SURGERY Left     Had right foot done recently.    KNEE ARTHROSCOPY Right     LAP SLEEVE GASTRECTOMY  12/02/2019    Dr Rios, [facility]    LAPAROSCOPY,PELVIC,BIOPSY  1990    for infertility    LAPAROSCOPY,PELVIC,BIOPSY  1990    for infertility    OTHER SURGICAL HISTORY Right 12/03/2015     OTHER SURGICAL HISTORY  12/03/2015    THYROIDECTOMY      Total        Family History:    Family History   Problem Relation Age of Onset    Heart Disorder Father 55        heart attack-cause of death.    Heart Disorder Mother     Stroke Mother     Cancer Mother         AML-cause of death    Other (leukemia) Mother     Cancer Paternal Aunt         breast cancer-cause of death at age 80    Breast Cancer Paternal Aunt 80        approx age    Heart Disorder Brother     Ovarian Cancer Neg        Physical Exam:  Vital signs: /64   Ht 5' 9\" (1.753 m)   Wt 212 lb 14.4 oz (96.6 kg)   LMP 04/18/2015 (Exact Date)   BMI 31.44 kg/m²   General appearance: alert, appears stated age, cooperative and morbidly obese  Head: Normocephalic, without obvious abnormality, atraumatic  Eyes: conjunctivae/corneas clear. PERRL, EOM's intact. Fundi benign.  Neck: no adenopathy, no carotid bruit, no JVD, supple, symmetrical, trachea midline and thyroid not enlarged, symmetric, no tenderness/mass/nodules  Lungs: clear to auscultation bilaterally  Heart: S1, S2 normal, no murmur, click, rub or gallop, regular rate and rhythm  Abdomen:  soft, obese, non tender  Extremities: extremities normal, atraumatic, no cyanosis or edema  Pulses: 2+ and symmetric  Neurologic: Grossly normal       ROS:    Constitutional: negative  Respiratory: negative  Cardiovascular: negative  Gastrointestinal: negative  Musculoskeletal:negative  Neurological: negative  Behavioral/Psych: negative  Endocrine: negative  All other systems were reviewed and are negative    Assessment     OBSTRUCTIVE SLEEP APNEA: The patient states her sleep apnea has been stable since the last clinic visit. There has not been any increase in hyper-somnolence.     HYPERCHOLESTEROLEMIA:  The patient states that her cholesterol has been well controlled on her current medication.    Lab Results   Component Value Date/Time    CHOLEST 208 (H) 12/15/2023 08:56 AM     (H) 12/15/2023 08:56  AM    HDL 89 (H) 12/15/2023 08:56 AM    TRIG 61 12/15/2023 08:56 AM    VLDL 10 12/15/2023 08:56 AM       Encounter Diagnosis(ses):   Encounter Diagnoses   Name Primary?    Mild protein-calorie malnutrition (HCC) Yes    Encounter for therapeutic drug monitoring     S/P laparoscopic sleeve gastrectomy [Z98.84]     Obesity (BMI 30-39.9)        Plan     S/p VSG on 12/02/2019  Doing well       Continue current meds      DYSLIPIDEMIA: Stable on the above prescribed meal plan and medication. Liver function stable.    Lab Results   Component Value Date/Time    CHOLEST 208 (H) 12/15/2023 08:56 AM     (H) 12/15/2023 08:56 AM    HDL 89 (H) 12/15/2023 08:56 AM    TRIG 61 12/15/2023 08:56 AM    VLDL 10 12/15/2023 08:56 AM       Phentermine 37.5 mg (full in AM and half in PM)  ekg done    Continue topiramate BID  Increase dose  Increased sweet tooth at night        Blood work done and reviewed    No orders of the defined types were placed in this encounter.          Harsha Thibodeaux MD

## 2024-03-15 NOTE — TELEPHONE ENCOUNTER
Per notes patient should be taking levo 112mcg   She is also due for a recheck of her TSH  Please notify patient.

## 2024-03-18 NOTE — TELEPHONE ENCOUNTER
Patient has read both [employer] messages today.   We will postpone for a day, still waiting for the levothyroxine current dose reply .       Levothyroxine  Message [id number]  From  Savanah Rice RN To  Lindsay Trotter Sent and Delivered  3/18/2024  7:20 AM   Last Read in [employer]  3/18/2024 10:00 AM by Lindsay Trotter          Levothyroxine  Message [id number]  From  Aniyah Katz RN To  Lindsay Trotter Sent and Delivered  3/15/2024 10:04 AM   Last Read in [employer]  3/18/2024 10:00 AM by Lindsay Trotter

## 2024-03-19 NOTE — TELEPHONE ENCOUNTER
Pt was called and she stated that she is on 112 mcg. Pharmacy also confirmed this. Pt last  112mcg on 12/26/24. Pt has refills on file. Pharmacy will discontinue the 125 mcg.

## 2024-03-20 ENCOUNTER — LAB ENCOUNTER (OUTPATIENT)
Dept: LAB | Facility: HOSPITAL | Age: 64
End: 2024-03-20
Attending: ALLERGY & IMMUNOLOGY
Payer: COMMERCIAL

## 2024-03-20 DIAGNOSIS — E03.9 HYPOTHYROIDISM, UNSPECIFIED TYPE: ICD-10-CM

## 2024-03-20 DIAGNOSIS — Z88.0 PENICILLIN ALLERGY: ICD-10-CM

## 2024-03-20 LAB — TSI SER-ACNC: 7.2 MIU/ML (ref 0.55–4.78)

## 2024-03-20 PROCEDURE — 84443 ASSAY THYROID STIM HORMONE: CPT

## 2024-03-20 PROCEDURE — 86003 ALLG SPEC IGE CRUDE XTRC EA: CPT

## 2024-03-20 PROCEDURE — 36415 COLL VENOUS BLD VENIPUNCTURE: CPT

## 2024-03-22 RX ORDER — LEVOTHYROXINE SODIUM 112 UG/1
112 TABLET ORAL
Qty: 90 TABLET | Refills: 1 | Status: SHIPPED | OUTPATIENT
Start: 2024-03-22

## 2024-03-22 NOTE — TELEPHONE ENCOUNTER
Please review. Protocol failed / No Protocol.    Requested Prescriptions   Pending Prescriptions Disp Refills    LEVOTHYROXINE 112 MCG Oral Tab [Pharmacy Med Name: LEVOTHYROXINE 112 MCG TABLET] 90 tablet 1     Sig: TAKE 1 TABLET BY MOUTH BEFORE BREAKFAST.       Thyroid Medication Protocol Failed - 3/20/2024  6:59 PM        Failed - Last TSH value is normal     Lab Results   Component Value Date    TSH 7.196 (H) 03/20/2024                 Passed - TSH in past 12 months        Passed - In person appointment or virtual visit in the past 12 mos or appointment in next 3 mos     Recent Outpatient Visits              1 week ago Mild protein-calorie malnutrition (HCC)    [facility] Harsha Thibodeaux MD    Office Visit    1 week ago Environmental and seasonal allergies    [facility]    Nurse Only    1 month ago Environmental and seasonal allergies [J30.89]    [facility]    Nurse Only    1 month ago Asthma, extrinsic, moderate persistent, uncomplicated (HCC)    [facility] Chico Velasco MD    Office Visit    2 months ago Environmental and seasonal allergies [J30.89]    [facility]    Nurse Only          Future Appointments         Provider Department Appt Notes    In 2 weeks EC ALLERGY [facility]     In 1 month Chucho Butcher MD [facility] 6 mo f/u    In 1 month Dante Palafox MD [facility] px last one unknown    In 3 months Shayna Conrad APRN [facility] - OB/GYN 6/30    In 5 months Harsha Thibodeaux MD [facility]     In 8 months Urmila Gan RD [facility]  [place], [place]

## 2024-03-29 LAB
C001-IGE PENICILLOYL G: <0.1 KU/L
C002-IGE PENICILLOYL V: <0.1 KU/L

## 2024-04-01 ENCOUNTER — TELEPHONE (OUTPATIENT)
Dept: ALLERGY | Facility: CLINIC | Age: 64
End: 2024-04-01

## 2024-04-01 RX ORDER — FLUTICASONE PROPIONATE AND SALMETEROL 100; 50 UG/1; UG/1
1 POWDER RESPIRATORY (INHALATION) 2 TIMES DAILY
Qty: 180 EACH | Refills: 0 | Status: SHIPPED | OUTPATIENT
Start: 2024-04-01

## 2024-04-01 RX ORDER — MONTELUKAST SODIUM 10 MG/1
TABLET ORAL
Qty: 90 TABLET | Refills: 0 | Status: SHIPPED | OUTPATIENT
Start: 2024-04-01

## 2024-04-01 NOTE — TELEPHONE ENCOUNTER
----- Message from Chico Velasco MD sent at 3/29/2024  9:06 AM CDT -----  Please contact patient with negative serum IgE testing to penicillin V and penicillin G  Recommend oral challenge to further evaluate given prior clinical history

## 2024-04-01 NOTE — TELEPHONE ENCOUNTER
Patient last seen in Allergy 2/17/2024 for . . .    Asthma, extrinsic, moderate persistent, uncomplicated Yes     Seasonal and perennial allergic rhinoconjunctivitis      Penicillin allergy      COVID-19 vaccine series completed      Requested Prescriptions   Pending Prescriptions Disp Refills    MONTELUKAST 10 MG Oral Tab [Pharmacy Med Name: MONTELUKAST SOD 10 MG TABLET] 90 tablet 0     Sig: TAKE 1 TABLET BY MOUTH EVERY DAY AT NIGHT       Corticosteroids / Long-Acting Bronchodilators Passed - 3/31/2024 12:23 AM        Passed - Appt in past 6 mos or next 3 mos     Recent Outpatient Visits              2 weeks ago Mild protein-calorie malnutrition (HCC)    [facility] Harsha Thibodeaux MD    Office Visit    3 weeks ago Environmental and seasonal allergies    [facility]    Nurse Only    1 month ago Environmental and seasonal allergies [J30.89]    [facility]    Nurse Only    1 month ago Asthma, extrinsic, moderate persistent, uncomplicated (HCC)    [facility] Chico Velasco MD    Office Visit    2 months ago Environmental and seasonal allergies [J30.89]    [facility]    Nurse Only          Future Appointments         Provider Department Appt Notes    In 5 days EC ALLERGY [facility]     In 1 month Chucho Butcher MD [facility] 6 mo f/u    In 1 month Dante Palafox MD [facility] px last one unknown    In 3 months Shayna Conrad APRN [facility] - OB/GYN 6/30    In 5 months Harsha Thibodeaux MD [facility]     In 8 months Urmila Gan RD [facility],  [place]                   MONTELUKAST 10 MG Oral Tab 90 tablet 0 4/1/2024 --    Sig: TAKE 1 TABLET BY MOUTH EVERY DAY AT NIGHT    Sent to pharmacy as: Montelukast Sodium 10 MG Oral Tablet (Singulair)    E-Prescribing Status: Receipt confirmed by pharmacy (4/1/2024  9:11 AM CDT)      Pharmacy    [id number] [place] [facility] [address] AT [address], [phone], [phone]     Prescription as above sent to pharmacy per protocol.

## 2024-04-01 NOTE — TELEPHONE ENCOUNTER
Pt contacted, confirmed name, and . Pt verbalizes understanding, and denies further questions.     Oral challenge scheduled for July. Will send a tablet of PCN to pharmacy in .

## 2024-04-01 NOTE — TELEPHONE ENCOUNTER
Refill requested for   Requested Prescriptions   Pending Prescriptions Disp Refills    fluticasone-salmeterol 100-50 MCG/ACT Inhalation Aerosol Powder, Breath Activated [Pharmacy Med Name: WIXELA 100-50 INHUB] 1 each 0     Sig: INHALE 1 PUFF INTO THE LUNGS TWICE A DAY       Corticosteroids / Long-Acting Bronchodilators Passed - 4/1/2024  7:59 AM        Passed - Appt in past 6 mos or next 3 mos     Recent Outpatient Visits              2 weeks ago Mild protein-calorie malnutrition (HCC)    [facility] Harsha Thibodeaux MD    Office Visit    3 weeks ago Environmental and seasonal allergies    [facility]    Nurse Only    1 month ago Environmental and seasonal allergies [J30.89]    [facility]    Nurse Only    1 month ago Asthma, extrinsic, moderate persistent, uncomplicated (HCC)    [facility] Chico Velasco MD    Office Visit    2 months ago Environmental and seasonal allergies [J30.89]    [facility]    Nurse Only          Future Appointments         Provider Department Appt Notes    In 5 days EC ALLERGY [facility]     In 1 month Chucho Butcher MD [facility] 6 mo f/u    In 1 month Dante Palafox MD [facility] px last one unknown    In 3 months Shayna Conrad APRN [facility] - OB/GYN 6/30    In 5 months Harsha Thibodeaux MD [facility]     In 8 months Urmila Gan RD [facility]                      Last office visit: 2/17/24    Previously advised to follow up in 6 months, per Asthma f/u protocol    F/U currently scheduled? Not at this  time      Date of last refill: 1/6/24    ACTION: Refilled per protocol.

## 2024-04-06 ENCOUNTER — NURSE ONLY (OUTPATIENT)
Dept: ALLERGY | Facility: CLINIC | Age: 64
End: 2024-04-06
Payer: COMMERCIAL

## 2024-04-06 DIAGNOSIS — J30.89 ENVIRONMENTAL AND SEASONAL ALLERGIES: Primary | ICD-10-CM

## 2024-04-06 PROCEDURE — 95117 IMMUNOTHERAPY INJECTIONS: CPT | Performed by: ALLERGY & IMMUNOLOGY

## 2024-04-11 RX ORDER — CALCITRIOL 0.25 UG/1
0.25 CAPSULE, LIQUID FILLED ORAL DAILY
Qty: 90 CAPSULE | Refills: 1 | OUTPATIENT
Start: 2024-04-11

## 2024-04-13 RX ORDER — CALCITRIOL 0.25 UG/1
0.25 CAPSULE, LIQUID FILLED ORAL DAILY
Qty: 90 CAPSULE | Refills: 1 | OUTPATIENT
Start: 2024-04-13

## 2024-04-13 NOTE — TELEPHONE ENCOUNTER
Patient called and said she needs a refill and needs 90 day qty in order for insurance to cover.     [id number] [place] [facility]   [address] AT [facility]         Medication Detail    Medication Quantity Refills Start End   calcitriol 0.25 MCG Oral Cap 90 capsule 1 12/6/2023 --   Sig:   Take 1 capsule (0.25 mcg total) by mouth daily.     Route:   Oral     Order #:   [id number]

## 2024-04-13 NOTE — TELEPHONE ENCOUNTER
Duplicate request, previously addressed.       Disp Refills Start End    calcitriol 0.25 MCG Oral Cap 90 capsule 1 12/6/2023 --    Sig - Route: Take 1 capsule (0.25 mcg total) by mouth daily. - Oral    Sent to pharmacy as: Calcitriol 0.25 MCG Oral Capsule (Rocaltrol)    E-Prescribing Status: Receipt confirmed by pharmacy (12/6/2023  2:49 PM CST)      Pharmacy    [id number] - [place], [place] - [address] AT [facility], [phone], [phone]

## 2024-04-19 ENCOUNTER — TELEPHONE (OUTPATIENT)
Dept: INTERNAL MEDICINE | Age: 64
End: 2024-04-19

## 2024-04-22 ENCOUNTER — APPOINTMENT (OUTPATIENT)
Dept: FAMILY MEDICINE | Age: 64
End: 2024-04-22

## 2024-04-22 RX ORDER — MONTELUKAST SODIUM 10 MG/1
TABLET ORAL
Qty: 90 TABLET | Refills: 0 | Status: SHIPPED | OUTPATIENT
Start: 2024-04-22

## 2024-04-22 RX ORDER — ALBUTEROL SULFATE 90 UG/1
AEROSOL, METERED RESPIRATORY (INHALATION)
Qty: 3 EACH | Refills: 0 | Status: SHIPPED | OUTPATIENT
Start: 2024-04-22

## 2024-04-22 NOTE — TELEPHONE ENCOUNTER
LOV: 11/6/23  Last Refilled:Methotrexate#96, 1rf 12/27/23  Diclofenac#90, 1rf 11/6/23  Labs:AST 21  ALT  7 12/15/23  Summary:  1. Cont. . methotrexate - 8 tablets a week and folic acid 1mg a day   2. Cont.  xlejanz 11mg a day    3.  Return to clinic in  6 months -   4. .Check labs in 4  months - in January .   5. Diclofenac gel 1 % for left knee.    6. Cont. diclofneac 50mg once a day   7. Tylenol ES two tablets twice ad ay   8. Physical therapy left knee   9. Rest left knee x 3 days   10. Check left knee xray            - overall she's been stable on xeljanz        Chucho Butcher MD  11/6/2023   9:03 AM  - Reviewed [place]- information  through Epic                        Please advise.

## 2024-04-22 NOTE — TELEPHONE ENCOUNTER
Refill requested for   Requested Prescriptions   Pending Prescriptions Disp Refills    MONTELUKAST 10 MG Oral Tab [Pharmacy Med Name: MONTELUKAST SOD 10 MG TABLET] 90 tablet 0     Sig: TAKE 1 TABLET BY MOUTH EVERY DAY AT NIGHT       Corticosteroids / Long-Acting Bronchodilators Passed - 4/21/2024 11:41 PM        Passed - Appt in past 6 mos or next 3 mos     Recent Outpatient Visits              2 weeks ago Environmental and seasonal allergies    [facility]    Nurse Only    1 month ago Mild protein-calorie malnutrition (HCC)    [facility] Harsha Thibodeaux MD    Office Visit    1 month ago Environmental and seasonal allergies    [facility]    Nurse Only    2 months ago Environmental and seasonal allergies [J30.89]    [facility]    Nurse Only    2 months ago Asthma, extrinsic, moderate persistent, uncomplicated (HCC)    [facility] Chico Velasco MD    Office Visit          Future Appointments         Provider Department Appt Notes    In 1 week EC ALLERGY [facility]     In 2 weeks Chucho Butcher MD [facility] 6 mo f/u    In 3 weeks Dante Palafox MD [facility] 04/09 1st mychart msg to reschedule, can offer 1:00, 1:20 or 1:40 the same day-BA  px last one unknown    In 1 month  ALLERGY [facility]     In 2 months  ALLERGY [facility] reminder-- send a tablet of PCN to pharmacy for oral challenge next month. SKYE Patel    In 2 months Shayna Conrad APRN [facility] - OB/GYN 6/30    In 2 months Chico Velasco MD [place]  [facility] Oral challenge to Pencillin    In 3 months EC ALLERGY [facility]     In 4 months EC ALLERGY [facility]     In 4 months Harsha Thibodeaux MD [facility]     In 7 months Urmila Gan RD [facility]                       ALBUTEROL 108 (90 Base) MCG/ACT Inhalation Aero Soln [Pharmacy Med Name: ALBUTEROL HFA (PROAIR) INHALER] 8.5 each 0     Sig: INHALE 2 PUFFS BY INHALATION ROUTE EVERY 4 - 6 HOURS AS NEEDED                       Passed - Appt in past 6 mos or next 3 mos     Recent Outpatient Visits              2 weeks ago Environmental and seasonal allergies    [facility]    Nurse Only    1 month ago Mild protein-calorie malnutrition (HCC)    [facility] Harsha Thibodeaux MD    Office Visit    1 month ago Environmental and seasonal allergies    [facility]    Nurse Only    2 months ago Environmental and seasonal allergies [J30.89]    [facility]    Nurse Only    2 months ago Asthma, extrinsic, moderate persistent, uncomplicated (HCC)    [facility] Chico Velasco MD    Office Visit          Future Appointments         Provider Department Appt Notes    In 1 week EC ALLERGY [facility]     In 2 weeks Chucho Butcher MD [facility] 6 mo f/u    In 3 weeks Dante Palafox MD [facility] 04/09 1st mychart msg to reschedule, can offer 1:00, 1:20 or 1:40 the same day-BA  px last one unknown    In 1 month EC ALLERGY [facility]     In 2 months EC ALLERGY [facility] reminder-- send a tablet of PCN to pharmacy for oral challenge next month. SKYE Patel    In 2 months Shayna Conrad APRN [facility] - OB/GYN 6/30    In 2 months Chico Velasco MD [facility] Oral challenge to Pencillin    In 3 months EC ALLERGY [facility]     In 4 months EC ALLERGY [facility]     In 4 months Harsha Thibodeaux MD [facility]     In 7 months Urmila Gan RD [facility]                     Passed - Last Refill > 30 days     Last rescue inhaler refill: 12/26/2023                          Last office visit: 2/17/24    Previously advised to follow up in No follow-up timeline advised in last office visit. Diagnosis of asthma advised to follow-up in 1 year    F/U currently scheduled? Not at this time     Date of last refill: 4/1/24 - dispensed 90 days patient still should have refills      ACTION: RN called to patient   Per patient insurance asking for 90 day supply  No asthma symptoms or flares just an automatic refill request  Refill filled per protocol

## 2024-04-23 RX ORDER — METHOTREXATE 2.5 MG/1
TABLET ORAL
Qty: 96 TABLET | Refills: 1 | Status: SHIPPED | OUTPATIENT
Start: 2024-04-23

## 2024-04-26 ENCOUNTER — TELEPHONE (OUTPATIENT)
Dept: INTERNAL MEDICINE | Age: 64
End: 2024-04-26

## 2024-04-29 ENCOUNTER — APPOINTMENT (OUTPATIENT)
Dept: FAMILY MEDICINE | Age: 64
End: 2024-04-29

## 2024-04-29 RX ORDER — ERGOCALCIFEROL 1.25 MG/1
50000 CAPSULE ORAL WEEKLY
Qty: 12 CAPSULE | Refills: 3 | OUTPATIENT
Start: 2024-04-29

## 2024-05-01 ENCOUNTER — LAB ENCOUNTER (OUTPATIENT)
Dept: LAB | Facility: HOSPITAL | Age: 64
End: 2024-05-01
Attending: INTERNAL MEDICINE
Payer: COMMERCIAL

## 2024-05-01 DIAGNOSIS — Z51.81 THERAPEUTIC DRUG MONITORING: ICD-10-CM

## 2024-05-01 DIAGNOSIS — M06.9 RHEUMATOID ARTHRITIS, INVOLVING UNSPECIFIED SITE, UNSPECIFIED WHETHER RHEUMATOID FACTOR PRESENT (HCC): ICD-10-CM

## 2024-05-01 DIAGNOSIS — E03.9 HYPOTHYROIDISM, UNSPECIFIED TYPE: ICD-10-CM

## 2024-05-01 LAB
ALT SERPL-CCNC: <7 U/L
AST SERPL-CCNC: 24 U/L (ref ?–34)
CREAT BLD-MCNC: 1.07 MG/DL
CRP SERPL-MCNC: <0.4 MG/DL (ref ?–1)
DEPRECATED RDW RBC AUTO: 54.4 FL (ref 35.1–46.3)
EGFRCR SERPLBLD CKD-EPI 2021: 59 ML/MIN/1.73M2 (ref 60–?)
ERYTHROCYTE [DISTWIDTH] IN BLOOD BY AUTOMATED COUNT: 14.7 % (ref 11–15)
ERYTHROCYTE [SEDIMENTATION RATE] IN BLOOD: 12 MM/HR
HCT VFR BLD AUTO: 40.9 %
HGB BLD-MCNC: 13.4 G/DL
MCH RBC QN AUTO: 32.7 PG (ref 26–34)
MCHC RBC AUTO-ENTMCNC: 32.8 G/DL (ref 31–37)
MCV RBC AUTO: 99.8 FL
PLATELET # BLD AUTO: 197 10(3)UL (ref 150–450)
RBC # BLD AUTO: 4.1 X10(6)UL
TSI SER-ACNC: 4.31 MIU/ML (ref 0.55–4.78)
WBC # BLD AUTO: 4.5 X10(3) UL (ref 4–11)

## 2024-05-01 PROCEDURE — 84450 TRANSFERASE (AST) (SGOT): CPT

## 2024-05-01 PROCEDURE — 85027 COMPLETE CBC AUTOMATED: CPT

## 2024-05-01 PROCEDURE — 86140 C-REACTIVE PROTEIN: CPT

## 2024-05-01 PROCEDURE — 84460 ALANINE AMINO (ALT) (SGPT): CPT

## 2024-05-01 PROCEDURE — 84443 ASSAY THYROID STIM HORMONE: CPT

## 2024-05-01 PROCEDURE — 85652 RBC SED RATE AUTOMATED: CPT

## 2024-05-01 PROCEDURE — 36415 COLL VENOUS BLD VENIPUNCTURE: CPT

## 2024-05-01 PROCEDURE — 82565 ASSAY OF CREATININE: CPT

## 2024-05-04 ENCOUNTER — NURSE ONLY (OUTPATIENT)
Dept: ALLERGY | Facility: CLINIC | Age: 64
End: 2024-05-04
Payer: COMMERCIAL

## 2024-05-04 DIAGNOSIS — J30.89 ENVIRONMENTAL AND SEASONAL ALLERGIES: Primary | ICD-10-CM

## 2024-05-04 PROCEDURE — 95165 ANTIGEN THERAPY SERVICES: CPT | Performed by: ALLERGY & IMMUNOLOGY

## 2024-05-04 PROCEDURE — 95117 IMMUNOTHERAPY INJECTIONS: CPT | Performed by: ALLERGY & IMMUNOLOGY

## 2024-05-06 ENCOUNTER — OFFICE VISIT (OUTPATIENT)
Dept: RHEUMATOLOGY | Facility: CLINIC | Age: 64
End: 2024-05-06
Payer: COMMERCIAL

## 2024-05-06 VITALS
HEART RATE: 90 BPM | SYSTOLIC BLOOD PRESSURE: 113 MMHG | HEIGHT: 69 IN | WEIGHT: 216 LBS | BODY MASS INDEX: 31.99 KG/M2 | DIASTOLIC BLOOD PRESSURE: 70 MMHG

## 2024-05-06 DIAGNOSIS — Z51.81 THERAPEUTIC DRUG MONITORING: ICD-10-CM

## 2024-05-06 DIAGNOSIS — R26.89 IMBALANCE: ICD-10-CM

## 2024-05-06 DIAGNOSIS — G20.A1 PARKINSON'S DISEASE, UNSPECIFIED WHETHER DYSKINESIA PRESENT, UNSPECIFIED WHETHER MANIFESTATIONS FLUCTUATE (HCC): ICD-10-CM

## 2024-05-06 DIAGNOSIS — M06.9 RHEUMATOID ARTHRITIS, INVOLVING UNSPECIFIED SITE, UNSPECIFIED WHETHER RHEUMATOID FACTOR PRESENT (HCC): Primary | ICD-10-CM

## 2024-05-06 PROCEDURE — 3074F SYST BP LT 130 MM HG: CPT | Performed by: INTERNAL MEDICINE

## 2024-05-06 PROCEDURE — 3078F DIAST BP <80 MM HG: CPT | Performed by: INTERNAL MEDICINE

## 2024-05-06 PROCEDURE — 3008F BODY MASS INDEX DOCD: CPT | Performed by: INTERNAL MEDICINE

## 2024-05-06 PROCEDURE — 99214 OFFICE O/P EST MOD 30 MIN: CPT | Performed by: INTERNAL MEDICINE

## 2024-05-06 RX ORDER — TOFACITINIB 11 MG/1
1 TABLET, FILM COATED, EXTENDED RELEASE ORAL DAILY
Qty: 90 TABLET | Refills: 1 | Status: SHIPPED | OUTPATIENT
Start: 2024-05-06

## 2024-05-06 NOTE — PATIENT INSTRUCTIONS
1. Cont. . methotrexate - 8 tablets a week and folic acid 1mg a day   2. Cont.  xlejanz 11mg a day    3.  Return to clinic in  6 months -   4. .Check labs in 4  months - in January .   5. Diclofenac gel 1 % for left knee.    6. Try to stop  diclofneac 50mg once a day   7. Tylenol ES two tablets twice ad ay   8. Physical therapy for balance

## 2024-05-06 NOTE — PROGRESS NOTES
Lindsay Trotter is a 63 year old female who presents for   Chief Complaint   Patient presents with    Rheumatoid Arthritis   .   HPI:     She is a pleasant 57 year old who has elevated RF >300 and foot pain - c/w seropositive  RA.      She has hadhx of  foot ?infections for 3-4 months and she saw pidatry and physical therapy. She then felt around chrismtas she had a red knot around where her arch was. She was stold it was an infetion and she it felt like it was walking on a ball of pins. She was given an antibiotic and it went away. When she went off of it, it cxam back. Then after that she got another round of infection shse got antoher one. She also felt it went away . /When it went away, she got it on her left foot. She got esr and crp - and it was high.   She told Dr. Palafox and neurologist at [facility] and it was not her parkinsosn'ss.   She ssaw podiatry yestrday and was fine. But yesterday evening it was back.   She was given clindamycin.   She felt quinn clindamycin helped the infection - sspots but not the joint pain. She has had the foot pain fro several years she attributed it to weight.   Around new years she had sudden jotin pain in her shoulders intermittently - and wrists. It happened agaoin in feb for a few dayss. It was sos bad with this falre up. Then it was gone for 2 -3 days.s Her left 2nd finger would hurt.   sshe never thought her feet got swollne during the time she could have had infection.   The foot pain is more when shse is walking. It doesn't bother her when she is sitting or at night. It'ss just with walking. It's the farooq of the foot. It was the sisde of the foot.   She has a hx of mensiscal tear repair.     1/15/2020 -   She got decompression sx of her back in 9/2019 - her back pain is much better 2/10 pain.   And bariatric sx - for gastric sleeve.   She's taking less parkinson's medication b/c she might have had more   Healing was ok - right sided area still hurts.   Not on pain meds.    She's off the diclofenac as well.   Back sx was first and then the sleeve was done.   Not taking tramadol or gabapentin at this time. B/c she's better.   She is using the diclofenac gel 1 %   She has lost 40 pounds already!     5/13/2020  VIDEO VISIT  She had gastric sleeve sx - she's off gabpaentin and diclofenac - and she's not back on it.   h still has aches and pains.   2 tyelnol helps for 4 hours.   She lost 70 pounds.   Her back is doing really well since back sx.   She has about 4-5/10 pain - all over. She feels stiff all over.   The back of her legs can hurt and back.   hes' been surviving with out diclfoeac but she would like to feel better. She's asking if she can restart.   Otherwise no specific area is swelling.   walkign outside.   Not able to exercise at gym  b/c of coronovirus restrictions.     9/15/2020  In person   She lost 80 pounds since surgery and doing great.   Her back is doing well.   She has more pain in the evenings. It's hard to move after dinner.   She went back on diclofeanc - twice aday   She's working from home .   No areas of swelling.   She's doing a  at the gym twice a week.   Pain is 3/10 - can go to 5/10 at night     3/15/2021  The lower back pain started hurting again at the end of the year. The pain is radiating down her legs.   She had another round of injections and it helped a little bit. She has pain when she sits too long   She has another round of injections this Wednesday.   If she is walking her back pain is 9/10 pain. She has a  and helping with core strengthening.   She has kept her weight off.   She has no other joint pain.       9/22/2021  Her left hand is swollen and hurting.   Her left 5th finger is really swollen and hurts.   Rest of her hands are fine.   She had back fusion in 4/2021. Fine now and recovered.  Her left hand is 2-3/10 pain. If she hold something It hurts.   She has no pain in back, did PT and several rounds of it. Her back ffeels  great.   The 2nd round of physical therapy she felt great.     3/23/2022  She's doing well.   Her back is doing well.   No flare ups.   She has lost 7 pounds since the begninning of the year.   Working from home still.   She is taking diclofenac 75mg bid -   She is having 1/10 pain.   She has right ankle deformity - and got a righta nkel brace from gigi [facility] at [facility] since 8/2021 -   She declined surgery , got a 2nd opinon at [facility] and declined tendon transfer  Is doidng PT - for 3 months and now renewed again and she hasn't fallen since. It's the Parkinsons, previous sx to right ankle and RA .   She has no pain in it - just difficulty with falling and balancewith it.     9/23/2022  She is having her right foot turn in and her left achilles tendon. Her left achilels tendonitis is more in the morning. Sometimes she has to wake up in the night and it hurts badly at night.   She has done PT.   She is falling b/c of this.   She is doing ok with the arthriits pain but her right foot is turing in and she is not doing well with this.   Her falls are usuall when she doenst' wear her braces.   She dropped 5 pounds in the last 3 months.     Her back has been fine.   Her pain is 2-3/10 pain.     She's going to go back with PT soon - it's helping her left foot pain in the last 2 weeks.     5/5/2023  She has another compressed disc /herniated disc in her back - started about 2months ago.   She is getting sx next week   Her fingers are ok.   But her left elbow and knees are bothering her.   Her left ahciles is not bad.   The left elbow is not constant - it's only if she leans on something.   The knees are 5/10 pain.   Her left knee cap can come off - julienne left one - she has this feeling.   She has 10/10 pain - with moving.   She saw dr. guzman and getting sx next weke - the pain is all the time -   Sitting she is ok. She can't sleep well.   She has shooting pains/sciatica     11/6/2023 -   Her left knee is painful for the last  1-2 months.   Her back is much better post surgery. She just has issues with the knee.   Her other joints are fine.   She cont. To lose weight.     5/6/2024  She is losing weight still.   Her left knee is better.   Her back is good.   She still wants to get to goal weight is 180lbs.   No joint pain -     Body mass index is 31.9 kg/m².      Current Outpatient Medications   Medication Sig Dispense Refill    methotrexate 2.5 MG Oral Tab TAKE 8 TABS WEEKLY BY MOUTH 96 tablet 1    diclofenac 50 MG Oral Tab EC Take 1 tablet (50 mg total) by mouth daily. 90 tablet 1    MONTELUKAST 10 MG Oral Tab TAKE 1 TABLET BY MOUTH EVERY DAY AT NIGHT 90 tablet 0    albuterol 108 (90 Base) MCG/ACT Inhalation Aero Soln INHALE 2 PUFFS BY INHALATION ROUTE EVERY 4 - 6 HOURS AS NEEDED 3 each 0    calcitriol 0.25 MCG Oral Cap Take 1 capsule (0.25 mcg total) by mouth daily. 90 capsule 1    fluticasone-salmeterol 100-50 MCG/ACT Inhalation Aerosol Powder, Breath Activated INHALE 1 PUFF INTO THE LUNGS TWICE A  each 0    levothyroxine 112 MCG Oral Tab Take 1 tablet (112 mcg total) by mouth before breakfast. 90 tablet 1    Phentermine HCl 37.5 MG Oral Tab Take one full tablet in the morning and half tablet as needed. 45 tablet 3    topiramate 100 MG Oral Tab Take 1 tablet (100 mg total) by mouth 2 (two) times daily. 180 tablet 1    pravastatin 20 MG Oral Tab Take 1 tablet (20 mg total) by mouth nightly. 90 tablet 3    folic acid 1 MG Oral Tab Take 1 tablet (1 mg total) by mouth daily. 90 tablet 3    Tofacitinib Citrate ER (XELJANZ XR) 11 MG Oral Tablet 24 Hr Take 1 tablet by mouth daily. 90 tablet 1    pantoprazole 40 MG Oral Tab EC Take 1 tablet (40 mg total) by mouth before breakfast. 90 tablet 2    ERGOCALCIFEROL 1.25 MG (25167 UT) Oral Cap TAKE 1 CAPSULE BY MOUTH ONE TIME PER WEEK 12 capsule 3    DICLOFENAC 1 % External Gel APPLY FOUR GRAMS DAILY FOUR TIMES A  g 4    Boswellia-Glucosamine-Vit D (OSTEO BI-FLEX ONE PER DAY OR) Take  by mouth As Directed.      Amantadine HCl 100 MG Oral Cap Take 1 capsule (100 mg total) by mouth daily.      omega-3 fatty acids 1000 MG Oral Cap Take 1,000 mg by mouth daily.      TURMERIC OR Take by mouth 2 (two) times a day.      Multiple Vitamins-Minerals (BARIATRIC FUSION) Oral Chew Tab Chew 1 tablet by mouth in the morning, at noon, in the evening, and at bedtime.      rasagiline mesylate 1 MG Oral Tab Take 1 tablet (1 mg total) by mouth daily.      carbidopa-levodopa (SINEMET)  MG Oral Tab Take 1 tablet by mouth 4 (four) times daily. 1  in am and 3/4 11AM and 3/4 afternoon and 1 evening  0    methylPREDNISolone 4 MG Oral Tablet Therapy Pack Take as directed on package. (Patient not taking: Reported on 2/17/2024) 1 each 0    Hydrocod Navi-Chlorphe Navi ER 10-8 MG/5ML Oral Suspension Extended Release Take 5 mL by mouth 2 (two) times daily as needed. (Patient not taking: Reported on 2/17/2024) 115 mL 0    albuterol (2.5 MG/3ML) 0.083% Inhalation Nebu Soln Take 3 mL (2.5 mg total) by nebulization every 4 (four) hours as needed for Wheezing. (Patient not taking: Reported on 2/17/2024) 30 each 0      Past Medical History:    Acute torn meniscus of knee    ARTHROSCOPY OF KNEE    Amenorrhea    Asthma (HCC)    Back problem    Chicken pox    Chronic cough    Disorder of thyroid    Extrapyramidal syndrome    MEDICATION MGMT. W/ PARKINSONS FEATURES    Greater trochanteric bursitis, left    High cholesterol    History of blood transfusion    CDH    Incontinence    Infertility, female    Parkinson disease (HCC)    Parkinson's disease (HCC)    Rheumatoid arthritis (HCC)    S/P laparoscopic sleeve gastrectomy    for weight loss    Sleep apnea    cpap    Thyroid disease    Tibia/fibula fracture    OPEN REDUCTION INTERNAL FIXATION    Urinary incontinence    after childbirth    Viral conjunctivitis    Visual impairment    glasses      Past Surgical History:   Procedure Laterality Date    Back surgery      Back surgery   04/21/2021    Back surgery  04/25/2023    compressed disc, removal of bone    Colonoscopy  06/11/2019    return in 5 years    Colonoscopy N/A 06/11/2019    Procedure: COLONOSCOPY;  Surgeon: Dago Heart MD;  Location: [facility] ENDOSCOPY    Colposcopy, cervix w/upper adjacent vagina; w/biopsy(s), cervix      D & c      for extended period    D & c      for extended period    Foot surgery Left     Had right foot done recently.    Knee arthroscopy Right     Lap sleeve gastrectomy  12/02/2019    Dr Rios, [facility]    Laparoscopy,pelvic,biopsy  1990    for infertility    Laparoscopy,pelvic,biopsy  1990    for infertility    Other surgical history Right 12/03/2015    Other surgical history  12/03/2015    Thyroidectomy      Total       Family hx - reviewed   Social History:  reviewed     REVIEW OF SYSTEMS:   Review Of Systems:  Fatigue  Constitutional:No fever, no change in weight or appetitie  Derm: get rashes on legs - on bottom of her legss after itching even after lotion, x 1-2 months.   Joint/Muscluskeltal: see HPI,   All other ROS are negative.     EXAM:   /70 (BP Location: Right arm, Patient Position: Sitting, Cuff Size: large)   Pulse 90   Ht 5' 9\" (1.753 m)   Wt 216 lb (98 kg)   LMP 04/18/2015 (Exact Date)   BMI 31.90 kg/m²   HEENT:clear sclera PERRLA, pleasant, no acute distress, no CAD, no neck tendnerness, good ROM,   No rashes  CVS: RRR, no murmurs  RS: CTAB, no crackles, no rhonchi  ABD: Soft Non tender,   Joint exam:   Losing weight -   parkinsons movements.   Not tender in 1-5th mcps , no synovitis   Not tender in elbows or shoulders   Not tender in knees and ankles  Right ankle in a brace   no lower back tendnress in midline -   No edema      Component      Latest Ref Rng 5/1/2024   WBC      4.0 - 11.0 x10(3) uL 4.5    RBC      3.80 - 5.30 x10(6)uL 4.10    Hemoglobin      12.0 - 16.0 g/dL 13.4    Hematocrit      35.0 - 48.0 % 40.9    MCV      80.0 - 100.0 fL 99.8    MCH      26.0  - 34.0 pg 32.7    MCHC      31.0 - 37.0 g/dL 32.8    RDW      11.0 - 15.0 % 14.7    RDW-SD      35.1 - 46.3 fL 54.4 (H)    Platelet Count      150.0 - 450.0 10(3)uL 197.0    CREATININE      0.55 - 1.02 mg/dL 1.07 (H)    EGFR      >=60 mL/min/1.73m2 59 (L)    SED RATE      0 - 30 mm/Hr 12    C-REACTIVE PROTEIN      <1.00 mg/dL <0.40    AST (SGOT)      <=34 U/L 24    ALT (SGPT)      10 - 49 U/L <7 (L)    TSH      0.550 - 4.780 mIU/mL 4.314       Legend:  (H) High  (L) Low    2/9/2013 - shana 1:40  8/18/2014 - MRI left foot -   1. There is localized edema within the intermetatarsal muscle between the  second and third metatarsals, without fluid collection, compatible with  low-grade strain.  2. Low-grade strain pattern in the abductor digiti minimi muscle belly  close to the calcaneus.  3. Prominent tendinopathy of the peroneus longus tendon as it courses  around the lateral malleolus.  There is a calcaneal spur at the insertion of the plantar  fascia. There is minimal increased marrow signal within  the base of the second-fifth metatarsals likely  representing a change related to positioning     8/18/2014 - MRI right foot  There is a chronic healed fracture deformity of the proximal shaft of the  fifth metatarsal now present which has resulted in broadening of the  proximal fifth metatarsal shaft. There is a slight medial bowing deformity  of the fifth metatarsal there is a result of the previous fracture but the  distal shaft of the fifth metatarsal is parallel with the distal shaft of  the fourth metatarsal and alignment is near-anatomic.  There is subtle bone marrow edema in the base of the third metatarsal which  is probably degenerative although chronic stress response could also cause  this appearance. Otherwise normal marrow signal and alignment. Normal  marrow signal in the sesamoid bones without evidence of sesamoid avascular  necrosis, fracture or sesamoiditis.  1. Moderate inframalleolar peroneus longus  tendinosis.  2. 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  3. Abductor digit minimi muscle atrophy compatible with Cárdenas neuropathy.  4. Chronic healed fracture deformity of the fifth metatarsal.  5. Mild bone marrow edema in the base of the third metatarsal which could  be degenerative or related to chronic stress response.    Reviewed notes from 4/2008 - chronic plantar fasciitis  1/23/2013 - mri brain - unremarkable,   3/13/2012 - us venous doppler - right - normal   4/30/2009 - chest xray - unchange, mildly prominent pulmonary markings  9/27/2007 - b/l knee ortho xray - minimla degenerative findings in the knee  9/18/2004 - mri lumbar spine - mild disc dessication of l2-3 otherwise unremarkable study wihtout evidenc of tfocal disc herniation or spinal stenosis  10/6/2006 - fev1/fvc is 87  - dlco is 65%,   2/14/2014 - left knee cx - strep mitis /virins    7/22/2015 - b/l knee xray   Bilateral moderate 2 compartmental knee osteoarthritis, more  advanced on the right. Changes have pro\    5/16/2019 - mri left knee     Tricompartmental osteoarthritis, severe in the medial compartment with a complex degenerative tear of the medial meniscus.     Subchondral insufficiency fracture of the medial tibial plateau without collapse of the articular surface.    8/5/2019 - mri lumbar spine   1. Multilevel degenerative changes of the lumbar spine as detailed.  Findings have progressed since October, 2017.  Notable levels as follows:     2. L3-L4:  Severe multifactorial spinal canal stenosis, which is exacerbated by a medially directed 7-8 mm synovial cyst arising from the right facet joint.  Mild bilateral neural foraminal stenosis.  3. L4-L5:  Mild-to-moderate left lateral recess with mild to moderate right and mild left neural foraminal stenosis.     4. Edema within the L3-L4 interspinous ligament, which may be degenerative in nature.     5. Lesser incidental findings as above.    4/7/2023- mri lumbar  spine  1. L3-4:  Newly developed large right paracentral disc herniation resulting in moderate to severe asymmetric central narrowing accentuated towards the right, severe right and moderate left lateral narrowing.  A component extending into the right foramen   with smaller associated osteophyte results in moderate right foraminal narrowing.  Mild left foraminal narrowing.  Right-sided laminectomy.   2. L4-5:  Laminectomy.  Posterior instrumentation and interbody cage for fusion.  Mild bilateral foraminal narrowing.  No significant central narrowing.   3. L5-S1:  Advanced left facet arthrosis.  Newly developed disc osteophyte complex extending into left foramen and resulting in moderate left foraminal narrowing.     12/15/2023 - left knee xray   1. Primary osteoarthritic changes of the left knee, most pronounced with respect of the medial and patellofemoral compartments.      2. No radiographically visible acute osseous injury of the left knee.     ASSESSMENT AND PLAN:   Lindsay Trotter is a 63 year old female who presents for   Chief Complaint   Patient presents with    Rheumatoid Arthritis     1.Seropositive Rheumatoid arthriits  -  Positive AMINAH 1:80, , pos. CCP polyarthralgia with rash on feet - now positive ccp abs -   Stable - doing well . Except left knee pain-   Better with left heel tenodnitis with PT   - cont. Methotrexate - 20mg a week   Cont. xeljanz 11mg a day -   On xeljanz since 2/2018 - doing better -   -cont  diclfoenac 50mg bid  B/c of f gastric sleeve - can taper to once a day b/c of slightly elevated creatinine - d/w her again today about this -   Take tyelenol ES two tablets twice a day -   diclofenac gel 1 % topical helps. - refill     Acute pain in left knee = slight flare of her left knee   S./p left knee injectiosn on 5/5/2023, 11/6/2023   S/p PT for left knee and gait     - rtc in 6 months.   - labs every 4 months - -     In the past:  Off  leflunomide- she noticed no improvement of  her arthriits on leflunomide -   - no improvement on ssz   She's ont able to use subq injections b/c of her really bad parkinson's not able to hold down a pen in one place for very long b/c of severe hand tremors.    Elevated inflammatory markers are better on xeljanz.   - difficutly to distinguish from her parkinson's but her crp is elevated still.   Tramadol 50mg bid prn   S/p burst prednisone helped with flarea       Other hx:  - mri foot in the past showed changes in mtps of left foot that could be early RA - but non specific and not synovitis   - hx of strep viridans in knee cutlrue in the past - ? - concern for any ongoing or susceptibility to infection - will have to monitor her -   L3-4     2.. Lower back pain classic for spinal stenosis - f/u physiatry - braod based disc bulgd in L3-4 - recurrent pain -   S/p L2-3-4 minimally invasive laminectomy - 9/9/2019 -   Lumbar fusion 4/2021 - feelis better. And with PT - no back pain -   Going for laminectomy for next week due to     3. OA knees - s/p cortisone injections b/l 7/22/2015 by ortho  Diclofenac gel is really helping her. -   4. Hx of parkinson's - ordered PT for balacne -   5. S/p gastric sleeve 12/2/2019 - lost 70 pounds -   6. Right ankle brace - agree with continueing PT , hold off on surgery for now  7. osteoporsiois - next DEXA due in 6/2023 -   8. Slighlty low creatinein - try to stop the diclfoenac     Summary:  1. Cont. . methotrexate - 8 tablets a week and folic acid 1mg a day   2. Cont.  xlejanz 11mg a day    3.  Return to clinic in  6 months -   4. .Check labs in 4  months - in January .   5. Diclofenac gel 1 % for left knee.    6. Try to stop  diclofneac 50mg once a day   7. Tylenol ES two tablets twice ad ay   8. Physical therapy for balance           - overall she's been stable on xeljanz      Chucho Butcher MD  5/6/2024   9:06 AM

## 2024-06-01 ENCOUNTER — NURSE ONLY (OUTPATIENT)
Dept: ALLERGY | Facility: CLINIC | Age: 64
End: 2024-06-01
Payer: COMMERCIAL

## 2024-06-01 DIAGNOSIS — J30.89 ENVIRONMENTAL AND SEASONAL ALLERGIES: Primary | ICD-10-CM

## 2024-06-01 PROCEDURE — 95117 IMMUNOTHERAPY INJECTIONS: CPT | Performed by: ALLERGY & IMMUNOLOGY

## 2024-06-10 RX ORDER — PANTOPRAZOLE SODIUM 40 MG/1
40 TABLET, DELAYED RELEASE ORAL
Qty: 90 TABLET | Refills: 2 | Status: SHIPPED | OUTPATIENT
Start: 2024-06-10

## 2024-06-20 ENCOUNTER — TELEPHONE (OUTPATIENT)
Dept: RHEUMATOLOGY | Facility: CLINIC | Age: 64
End: 2024-06-20

## 2024-06-20 NOTE — TELEPHONE ENCOUNTER
PA start - reauthorization     Prior authorization for: Xeljanz    Medication form: 11mg    Submission method: Forms completed and faxed with clinical notes to [facility]    Spoke with (if by phone):     Date submitted: 6/20/2024    Tracking #: [id number]    QF-TB result:     Component      Latest Ref Rng 5/17/2023   Quantiferon TB NIL      IU/mL 0.00    Quantiferon-TB1 Minus NIL      IU/mL 0.01    Quantiferon-TB2 Minus NIL      IU/mL 0.01    Quantiferon TB Mitogen minus NIL      IU/mL >10.00    Quantiferon TB Result      Negative  Negative

## 2024-06-24 NOTE — TELEPHONE ENCOUNTER
PA Approved - No script needed at this time    Prior authorization for: Xeljanz XR    Medication form:11mg    Date received: 6/22/24    Approval #: [facility] [id number]     Approved dates:6/22/2024 to 06/22/2025    Pharmacy for medication:[facility]    QF-TB results:     Component      Latest Ref Rng 5/17/2023   Quantiferon TB NIL      IU/mL 0.00    Quantiferon-TB1 Minus NIL      IU/mL 0.01    Quantiferon-TB2 Minus NIL      IU/mL 0.01    Quantiferon TB Mitogen minus NIL      IU/mL >10.00    Quantiferon TB Result      Negative  Negative

## 2024-07-01 ENCOUNTER — TELEPHONE (OUTPATIENT)
Dept: ALLERGY | Facility: CLINIC | Age: 64
End: 2024-07-01

## 2024-07-01 ENCOUNTER — NURSE ONLY (OUTPATIENT)
Dept: ALLERGY | Facility: CLINIC | Age: 64
End: 2024-07-01
Payer: COMMERCIAL

## 2024-07-01 DIAGNOSIS — J30.89 ENVIRONMENTAL AND SEASONAL ALLERGIES: Primary | ICD-10-CM

## 2024-07-01 RX ORDER — PENICILLIN V POTASSIUM 500 MG/1
500 TABLET ORAL ONCE
Qty: 1 TABLET | Refills: 0 | Status: SHIPPED | OUTPATIENT
Start: 2024-07-01 | End: 2024-07-01

## 2024-07-01 NOTE — TELEPHONE ENCOUNTER
Patient with oral challenge to penicillin on 7/15/24  Patient aware to bring medication to the appointment  Does not take any antihistamines    Dr. Velasco may we have an order for penicillin for the oral challenge?  Patient would prefer medication to be sent to the [facility] in [place] on [facility]

## 2024-07-11 ENCOUNTER — OFFICE VISIT (OUTPATIENT)
Dept: OBGYN CLINIC | Facility: CLINIC | Age: 64
End: 2024-07-11
Payer: COMMERCIAL

## 2024-07-11 VITALS
WEIGHT: 211 LBS | BODY MASS INDEX: 31 KG/M2 | SYSTOLIC BLOOD PRESSURE: 125 MMHG | DIASTOLIC BLOOD PRESSURE: 80 MMHG | HEART RATE: 105 BPM

## 2024-07-11 DIAGNOSIS — Z12.31 SCREENING MAMMOGRAM FOR BREAST CANCER: ICD-10-CM

## 2024-07-11 DIAGNOSIS — Z01.419 ENCOUNTER FOR ANNUAL ROUTINE GYNECOLOGICAL EXAMINATION: Primary | ICD-10-CM

## 2024-07-11 DIAGNOSIS — Z78.0 POSTMENOPAUSAL: ICD-10-CM

## 2024-07-11 PROCEDURE — 3074F SYST BP LT 130 MM HG: CPT | Performed by: NURSE PRACTITIONER

## 2024-07-11 PROCEDURE — 3079F DIAST BP 80-89 MM HG: CPT | Performed by: NURSE PRACTITIONER

## 2024-07-11 PROCEDURE — 99396 PREV VISIT EST AGE 40-64: CPT | Performed by: NURSE PRACTITIONER

## 2024-07-11 RX ORDER — TOPIRAMATE 100 MG/1
100 TABLET, FILM COATED ORAL 2 TIMES DAILY
COMMUNITY
Start: 2024-06-14

## 2024-07-11 NOTE — PROGRESS NOTES
[facility]    Obstetrics and Gynecology    Chief Complaint   Patient presents with    Gyn Exam     Annual, patient states she noticed a small bump on her labia which she noticed about a week ago.        Lindsay Trotter is a 63 year old female  Patient's last menstrual period was 2015 (exact date). presenting for annual gynecology exam.    Last seen a year ago for annual visit.     No postmenopausal bleeding, no pelvic pain, no abnormal discharge. She is sexually active with her partner,   Some dryness with intercourse.  Sees dr. hope and neuro for her parkinson's, uses walker.     S/p gastric sleeve, sees dr. lowry for weight loss. Has lost 20# since last visit.     Does complain of urinary urgency and incontinence.  declines referral to urogyne today.     Some constipation but taking miralax.     Noticed a bump on her labia a week ago. No pain, no change in size.     Pap:2023 NILM, neg human papillomavirus   NILM, negative HPV   NILM, neg HPV       Mammo: 2023 normal  Colonoscopy: due in   Dexa: 2023 osteopenia      OBSTETRICS HISTORY:  OB History    Para Term  AB Living   1 1 1 0 0 1   SAB IAB Ectopic Multiple Live Births   0 0 0 0 1       GYNE HISTORY:  Menarche: 16 (2024  4:55 PM)  Period Cycle (Days): postmenopausal (2024  4:55 PM)  Use of Birth Control (if yes, specify type): Postmenopausal (2024  4:55 PM)  Hx Prior Abnormal Pap: No (2024  4:55 PM)  Pap Date: 23 (2024  4:55 PM)  Pap Result Notes: Pap neg HPV neg (2024  4:55 PM)  Follow Up Recommendation: MAMMO 23 BILAT NEG (2024  4:55 PM)      History   Sexual Activity    Sexual activity: Yes    Partners: Male           Latest Ref Rng & Units 2023    10:28 AM 2020     3:45 PM   RECENT PAP RESULTS   Thinprep Pap Negative for intraepithelial lesion or malignancy Negative for intraepithelial lesion or malignancy  Negative for intraepithelial lesion or  malignancy    HPV Negative Negative  Negative          MEDICAL HISTORY:  Past Medical History:    Acute torn meniscus of knee    ARTHROSCOPY OF KNEE    Amenorrhea    Asthma (HCC)    Back problem    Chicken pox    Chronic cough    Disorder of thyroid    Extrapyramidal syndrome    MEDICATION MGMT. W/ PARKINSONS FEATURES    Greater trochanteric bursitis, left    High cholesterol    History of blood transfusion    CDH    Incontinence    Infertility, female    Parkinson disease (HCC)    Parkinson's disease (HCC)    Rheumatoid arthritis (HCC)    S/P laparoscopic sleeve gastrectomy    for weight loss    Sleep apnea    cpap    Thyroid disease    Tibia/fibula fracture    OPEN REDUCTION INTERNAL FIXATION    Urinary incontinence    after childbirth    Viral conjunctivitis    Visual impairment    glasses     Past Surgical History:   Procedure Laterality Date    Back surgery      Back surgery  04/21/2021    Back surgery  04/25/2023    compressed disc, removal of bone    Colonoscopy  06/11/2019    return in 5 years    Colonoscopy N/A 06/11/2019    Procedure: COLONOSCOPY;  Surgeon: Dago Heart MD;  Location: [facility] ENDOSCOPY    Colposcopy, cervix w/upper adjacent vagina; w/biopsy(s), cervix      D & c      for extended period    D & c      for extended period    Foot surgery Left     Had right foot done recently.    Knee arthroscopy Right     Lap sleeve gastrectomy  12/02/2019    Dr Rios, [facility]    Laparoscopy,pelvic,biopsy  1990    for infertility    Laparoscopy,pelvic,biopsy  1990    for infertility    Other surgical history Right 12/03/2015    Other surgical history  12/03/2015    Thyroidectomy      Total        SOCIAL HISTORY:  Social History     Socioeconomic History    Marital status:      Spouse name: Not on file    Number of children: Not on file    Years of education: Not on file    Highest education level: Not on file   Occupational History    Not on file   Tobacco Use    Smoking status:  Never     Passive exposure: Never    Smokeless tobacco: Never   Vaping Use    Vaping status: Never Used   Substance and Sexual Activity    Alcohol use: Yes     Alcohol/week: 2.0 - 3.0 standard drinks of alcohol     Types: 2 - 3 Glasses of wine per week     Comment: social    Drug use: No    Sexual activity: Yes     Partners: Male   Other Topics Concern     Service Not Asked    Blood Transfusions Not Asked    Caffeine Concern Yes     Comment: S2 glasses tea daily    Occupational Exposure Not Asked    Hobby Hazards Not Asked    Sleep Concern Not Asked    Stress Concern Not Asked    Weight Concern Not Asked    Special Diet Not Asked    Back Care Not Asked    Exercise No    Bike Helmet Not Asked    Seat Belt Not Asked    Self-Exams Not Asked    Grew up on a farm Not Asked    History of tanning Not Asked    Outdoor occupation Not Asked    Breast feeding Not Asked    Reaction to local anesthetic No    Pt has a pacemaker No    Pt has a defibrillator No   Social History Narrative    The patient uses the following assistive device(s):  single-point cane,uses 2 canes. Walker at night           The patient does live in a home with stairs.     Social Determinants of Health     Financial Resource Strain: Not on file   Food Insecurity: Not on file   Transportation Needs: Not on file   Physical Activity: Not on file   Stress: Not on file   Social Connections: Unknown (3/9/2021)    Received from [facility]    Social Connections     Conversations with friends/family/neighbors per week: Not on file   Housing Stability: Low Risk  (7/8/2021)    Received from [facility]    Housing Stability     Mortgage Payment Concerns?: Not on file     Number of Places Lived in the Last Year: Not on file     Unstable Housing?: Not on file         Depression Screening (PHQ-2/PHQ-9): Over the LAST 2 WEEKS   Little interest or pleasure in doing things (over the last two weeks)?: Not at all    Feeling down,  depressed, or hopeless (over the last two weeks)?: Not at all    PHQ-2 SCORE: 0           FAMILY HISTORY:  Family History   Problem Relation Age of Onset    Heart Disorder Father 55        heart attack-cause of death.    Heart Disorder Mother     Stroke Mother     Cancer Mother         AML-cause of death    Other (leukemia) Mother     Cancer Paternal Aunt         breast cancer-cause of death at age 80    Breast Cancer Paternal Aunt 80        approx age    Heart Disorder Brother     Ovarian Cancer Neg        MEDICATIONS:    Current Outpatient Medications:     topiramate 100 MG Oral Tab, Take 1 tablet (100 mg total) by mouth 2 (two) times daily., Disp: , Rfl:     PANTOPRAZOLE 40 MG Oral Tab EC, TAKE 1 TABLET BY MOUTH BEFORE BREAKFAST, Disp: 90 tablet, Rfl: 2    Tofacitinib Citrate ER (XELJANZ XR) 11 MG Oral Tablet 24 Hr, Take 1 tablet by mouth daily., Disp: 90 tablet, Rfl: 1    methotrexate 2.5 MG Oral Tab, TAKE 8 TABS WEEKLY BY MOUTH, Disp: 96 tablet, Rfl: 1    diclofenac 50 MG Oral Tab EC, Take 1 tablet (50 mg total) by mouth daily., Disp: 90 tablet, Rfl: 1    MONTELUKAST 10 MG Oral Tab, TAKE 1 TABLET BY MOUTH EVERY DAY AT NIGHT, Disp: 90 tablet, Rfl: 0    albuterol 108 (90 Base) MCG/ACT Inhalation Aero Soln, INHALE 2 PUFFS BY INHALATION ROUTE EVERY 4 - 6 HOURS AS NEEDED, Disp: 3 each, Rfl: 0    calcitriol 0.25 MCG Oral Cap, Take 1 capsule (0.25 mcg total) by mouth daily., Disp: 90 capsule, Rfl: 1    fluticasone-salmeterol 100-50 MCG/ACT Inhalation Aerosol Powder, Breath Activated, INHALE 1 PUFF INTO THE LUNGS TWICE A DAY, Disp: 180 each, Rfl: 0    levothyroxine 112 MCG Oral Tab, Take 1 tablet (112 mcg total) by mouth before breakfast., Disp: 90 tablet, Rfl: 1    Phentermine HCl 37.5 MG Oral Tab, Take one full tablet in the morning and half tablet as needed., Disp: 45 tablet, Rfl: 3    pravastatin 20 MG Oral Tab, Take 1 tablet (20 mg total) by mouth nightly., Disp: 90 tablet, Rfl: 3    folic acid 1 MG Oral Tab, Take  1 tablet (1 mg total) by mouth daily., Disp: 90 tablet, Rfl: 3    Boswellia-Glucosamine-Vit D (OSTEO BI-FLEX ONE PER DAY OR), Take by mouth As Directed., Disp: , Rfl:     Amantadine HCl 100 MG Oral Cap, Take 1 capsule (100 mg total) by mouth daily., Disp: , Rfl:     omega-3 fatty acids 1000 MG Oral Cap, Take 1,000 mg by mouth daily., Disp: , Rfl:     TURMERIC OR, Take by mouth 2 (two) times a day., Disp: , Rfl:     Multiple Vitamins-Minerals (BARIATRIC FUSION) Oral Chew Tab, Chew 1 tablet by mouth in the morning, at noon, in the evening, and at bedtime., Disp: , Rfl:     rasagiline mesylate 1 MG Oral Tab, Take 1 tablet (1 mg total) by mouth daily., Disp: , Rfl:     carbidopa-levodopa (SINEMET)  MG Oral Tab, Take 1 tablet by mouth 4 (four) times daily. 1  in am and 3/4 11AM and 3/4 afternoon and 1 evening, Disp: , Rfl: 0    methylPREDNISolone 4 MG Oral Tablet Therapy Pack, Take as directed on package. (Patient not taking: Reported on 2/17/2024), Disp: 1 each, Rfl: 0    Hydrocod Navi-Chlorphe Navi ER 10-8 MG/5ML Oral Suspension Extended Release, Take 5 mL by mouth 2 (two) times daily as needed. (Patient not taking: Reported on 2/17/2024), Disp: 115 mL, Rfl: 0    albuterol (2.5 MG/3ML) 0.083% Inhalation Nebu Soln, Take 3 mL (2.5 mg total) by nebulization every 4 (four) hours as needed for Wheezing. (Patient not taking: Reported on 2/17/2024), Disp: 30 each, Rfl: 0    ERGOCALCIFEROL 1.25 MG (48307 UT) Oral Cap, TAKE 1 CAPSULE BY MOUTH ONE TIME PER WEEK, Disp: 12 capsule, Rfl: 3    DICLOFENAC 1 % External Gel, APPLY FOUR GRAMS DAILY FOUR TIMES A DAY, Disp: 100 g, Rfl: 4    ALLERGIES:    Allergies   Allergen Reactions    Other HIVES     Reports side effect from DPT vaccine.  Other reaction(s): Rash/Hives/Urticaria    Penicillins HIVES    Seasonal Runny nose         Review of Systems:  Constitutional:  Denies fatigue, night sweats, hot flashes  Eyes:  denies blurred or double vision  Cardiovascular:  denies chest  pain or palpitations  Respiratory:  denies shortness of breath  Gastrointestinal:  denies heartburn, abdominal pain, diarrhea or constipation  Genitourinary:  denies dysuria, incontinence, abnormal vaginal discharge, vaginal itching, see HPI  Musculoskeletal:  denies back pain   Skin/Breast:  Denies any breast pain, lumps, or discharge.   Neurological:  denies headaches, extremity weakness or numbness.  Psychiatric: denies depression or anxiety.  Endocrine:   denies excessive thirst or urination.  Heme/Lymph:  denies history of anemia, easy bruising or bleeding.      PHYSICAL EXAM:     Vitals:    07/11/24 1659   BP: 125/80   Pulse: 105   Weight: 211 lb (95.7 kg)       Body mass index is 31.16 kg/m².     Patient offered chaperone, patient declined    Constitutional: well developed, well nourished  Psychiatric:  Oriented to time, place, person and situation. Appropriate mood and affect  Head/Face: normocephalic  Neck/Thyroid: thyroid symmetric, no thyromegaly, no nodules, no adenopathy  Lymphatic:no abnormal supraclavicular or axillary adenopathy is noted  Breast: normal without palpable masses, tenderness, asymmetry, nipple discharge, nipple retraction or skin changes  Abdomen:  soft, nontender, nondistended, no masses  Skin/Hair: no unusual rashes or bruises  Extremities: no edema, no cyanosis    Pelvic Exam:  External Genitalia: small pea size sebaceous cyst at right lower labia, normal appearance, hair distribution, and no lesions  Urethral Meatus:  normal in size, location, without lesions and prolapse  Bladder:  No fullness, masses or tenderness  Vagina:  Normal appearance without lesions, no abnormal discharge  Cervix:  Normal without tenderness on motion  Uterus: normal in size, contour, position, mobility, without tenderness  Adnexa: normal without masses or tenderness  Perineum: normal  Anus: no hemorroids     Assessment & Plan:    ICD-10-CM    1. Encounter for annual routine gynecological examination   Z01.419       2. Screening mammogram for breast cancer  Z12.31 [facility] 2D+3D SCREENING BILAT (CPT=77067/97771)      3. Postmenopausal  Z78.0          Reviewed ASCCP guidelines with the patient   Pap UTD  Postmenopausal - reviewed to call with any bleeding. Water based lubricant - [place] or astroglide with intercourse.  Breast Health:     Reviewed current guidelines with the patient and to start Mammograms at age 40 - ordered  Reviewed monthly self breast exams with the patient   Discussed diet, exercise, MVIs with Ca/Vit D  Follow up in 1 yr for SILAS Perez    This note was prepared using Dragon Medical voice recognition dictation software. As a result errors may occur. When identified these errors have been corrected. While every attempt is made to correct errors during dictation discrepancies may still exist.

## 2024-07-15 ENCOUNTER — NURSE ONLY (OUTPATIENT)
Dept: ALLERGY | Facility: CLINIC | Age: 64
End: 2024-07-15

## 2024-07-15 ENCOUNTER — OFFICE VISIT (OUTPATIENT)
Dept: ALLERGY | Facility: CLINIC | Age: 64
End: 2024-07-15
Payer: COMMERCIAL

## 2024-07-15 VITALS
BODY MASS INDEX: 31.25 KG/M2 | HEART RATE: 92 BPM | SYSTOLIC BLOOD PRESSURE: 131 MMHG | WEIGHT: 211 LBS | OXYGEN SATURATION: 99 % | HEIGHT: 69 IN | DIASTOLIC BLOOD PRESSURE: 72 MMHG

## 2024-07-15 DIAGNOSIS — E66.9 OBESITY (BMI 30-39.9): ICD-10-CM

## 2024-07-15 DIAGNOSIS — Z88.0 PENICILLIN ALLERGY: Primary | ICD-10-CM

## 2024-07-15 PROCEDURE — 3075F SYST BP GE 130 - 139MM HG: CPT | Performed by: ALLERGY & IMMUNOLOGY

## 2024-07-15 PROCEDURE — 3078F DIAST BP <80 MM HG: CPT | Performed by: ALLERGY & IMMUNOLOGY

## 2024-07-15 PROCEDURE — 95076 INGEST CHALLENGE INI 120 MIN: CPT | Performed by: ALLERGY & IMMUNOLOGY

## 2024-07-15 PROCEDURE — 3008F BODY MASS INDEX DOCD: CPT | Performed by: ALLERGY & IMMUNOLOGY

## 2024-07-15 RX ORDER — PHENTERMINE HYDROCHLORIDE 37.5 MG/1
TABLET ORAL
Qty: 45 TABLET | Refills: 3 | Status: SHIPPED | OUTPATIENT
Start: 2024-07-15

## 2024-07-15 RX ORDER — PENICILLIN V POTASSIUM 500 MG/1
TABLET ORAL
COMMUNITY
Start: 2024-07-01

## 2024-07-15 NOTE — PROGRESS NOTES
Lindsya Trotter is a 63 year old female.    HPI:   No chief complaint on file.    Patient is a 63-year-old female who presents for follow-up with a chief complaint of allergies  Patient presents for oral challenge to penicillin to further evaluate as an allergic trigger.  Patient last seen by me in February 2024  Serum IgE testing to penicillin November 2024 was negative.    Today patient denies any active issues including fevers rashes or respiratory issues    Last reaction to penicillin was at 6 years of age      HISTORY:  Past Medical History:    Acute torn meniscus of knee    ARTHROSCOPY OF KNEE    Amenorrhea    Asthma (HCC)    Back problem    Chicken pox    Chronic cough    Disorder of thyroid    Extrapyramidal syndrome    MEDICATION MGMT. W/ PARKINSONS FEATURES    Greater trochanteric bursitis, left    High cholesterol    History of blood transfusion    CDH    Incontinence    Infertility, female    Parkinson disease (HCC)    Parkinson's disease (HCC)    Rheumatoid arthritis (HCC)    S/P laparoscopic sleeve gastrectomy    for weight loss    Sleep apnea    cpap    Thyroid disease    Tibia/fibula fracture    OPEN REDUCTION INTERNAL FIXATION    Urinary incontinence    after childbirth    Viral conjunctivitis    Visual impairment    glasses      Past Surgical History:   Procedure Laterality Date    Back surgery      Back surgery  04/21/2021    Back surgery  04/25/2023    compressed disc, removal of bone    Colonoscopy  06/11/2019    return in 5 years    Colonoscopy N/A 06/11/2019    Procedure: COLONOSCOPY;  Surgeon: Dago Heart MD;  [facility]: [facility] ENDOSCOPY    Colposcopy, cervix w/upper adjacent vagina; w/biopsy(s), cervix      D & c      for extended period    D & c      for extended period    Foot surgery Left     Had right foot done recently.    Knee arthroscopy Right     Lap sleeve gastrectomy  12/02/2019    Dr Rios, [facility]    Laparoscopy,pelvic,biopsy  1990    for infertility     Laparoscopy,pelvic,biopsy  1990    for infertility    Other surgical history Right 12/03/2015    Other surgical history  12/03/2015    Thyroidectomy      Total       Family History   Problem Relation Age of Onset    Heart Disorder Father 55        heart attack-cause of death.    Heart Disorder Mother     Stroke Mother     Cancer Mother         AML-cause of death    Other (leukemia) Mother     Cancer Paternal Aunt         breast cancer-cause of death at age 80    Breast Cancer Paternal Aunt 80        approx age    Heart Disorder Brother     Ovarian Cancer Neg       Social History:   Social History     Socioeconomic History    Marital status:    Tobacco Use    Smoking status: Never     Passive exposure: Never    Smokeless tobacco: Never   Vaping Use    Vaping status: Never Used   Substance and Sexual Activity    Alcohol use: Yes     Alcohol/week: 2.0 - 3.0 standard drinks of alcohol     Types: 2 - 3 Glasses of wine per week     Comment: social    Drug use: No    Sexual activity: Yes     Partners: Male   Other Topics Concern    Caffeine Concern Yes     Comment: S2 glasses tea daily    Exercise No    Reaction to local anesthetic No    Pt has a pacemaker No    Pt has a defibrillator No   Social History Narrative    The patient uses the following assistive device(s):  single-point cane,uses 2 canes. Walker at night           The patient does live in a home with stairs.     Social Determinants of Health      Received from [facility]    Social Connections    Received from [facility]    Housing Stability        Medications (Active prior to today's visit):  Current Outpatient Medications   Medication Sig Dispense Refill    topiramate 100 MG Oral Tab Take 1 tablet (100 mg total) by mouth 2 (two) times daily.      PANTOPRAZOLE 40 MG Oral Tab EC TAKE 1 TABLET BY MOUTH BEFORE BREAKFAST 90 tablet 2    Tofacitinib Citrate ER (XELJANZ XR) 11 MG Oral Tablet 24 Hr Take 1 tablet by mouth  daily. 90 tablet 1    methotrexate 2.5 MG Oral Tab TAKE 8 TABS WEEKLY BY MOUTH 96 tablet 1    diclofenac 50 MG Oral Tab EC Take 1 tablet (50 mg total) by mouth daily. 90 tablet 1    MONTELUKAST 10 MG Oral Tab TAKE 1 TABLET BY MOUTH EVERY DAY AT NIGHT 90 tablet 0    albuterol 108 (90 Base) MCG/ACT Inhalation Aero Soln INHALE 2 PUFFS BY INHALATION ROUTE EVERY 4 - 6 HOURS AS NEEDED 3 each 0    calcitriol 0.25 MCG Oral Cap Take 1 capsule (0.25 mcg total) by mouth daily. 90 capsule 1    fluticasone-salmeterol 100-50 MCG/ACT Inhalation Aerosol Powder, Breath Activated INHALE 1 PUFF INTO THE LUNGS TWICE A  each 0    levothyroxine 112 MCG Oral Tab Take 1 tablet (112 mcg total) by mouth before breakfast. 90 tablet 1    Phentermine HCl 37.5 MG Oral Tab Take one full tablet in the morning and half tablet as needed. 45 tablet 3    pravastatin 20 MG Oral Tab Take 1 tablet (20 mg total) by mouth nightly. 90 tablet 3    folic acid 1 MG Oral Tab Take 1 tablet (1 mg total) by mouth daily. 90 tablet 3    methylPREDNISolone 4 MG Oral Tablet Therapy Pack Take as directed on package. (Patient not taking: Reported on 2/17/2024) 1 each 0    Hydrocod Navi-Chlorphe Navi ER 10-8 MG/5ML Oral Suspension Extended Release Take 5 mL by mouth 2 (two) times daily as needed. (Patient not taking: Reported on 2/17/2024) 115 mL 0    albuterol (2.5 MG/3ML) 0.083% Inhalation Nebu Soln Take 3 mL (2.5 mg total) by nebulization every 4 (four) hours as needed for Wheezing. (Patient not taking: Reported on 2/17/2024) 30 each 0    ERGOCALCIFEROL 1.25 MG (33457 UT) Oral Cap TAKE 1 CAPSULE BY MOUTH ONE TIME PER WEEK 12 capsule 3    DICLOFENAC 1 % External Gel APPLY FOUR GRAMS DAILY FOUR TIMES A  g 4    Boswellia-Glucosamine-Vit D (OSTEO BI-FLEX ONE PER DAY OR) Take by mouth As Directed.      Amantadine HCl 100 MG Oral Cap Take 1 capsule (100 mg total) by mouth daily.      omega-3 fatty acids 1000 MG Oral Cap Take 1,000 mg by mouth daily.       TURMERIC OR Take by mouth 2 (two) times a day.      Multiple Vitamins-Minerals (BARIATRIC FUSION) Oral Chew Tab Chew 1 tablet by mouth in the morning, at noon, in the evening, and at bedtime.      rasagiline mesylate 1 MG Oral Tab Take 1 tablet (1 mg total) by mouth daily.      carbidopa-levodopa (SINEMET)  MG Oral Tab Take 1 tablet by mouth 4 (four) times daily. 1  in am and 3/4 11AM and 3/4 afternoon and 1 evening  0       Allergies:  Allergies   Allergen Reactions    Other HIVES     Reports side effect from DPT vaccine.  Other reaction(s): Rash/Hives/Urticaria    Penicillins HIVES    Seasonal Runny nose         ROS:   Allergic/Immuno:  See hpi  Cardiovascular:  Negative for irregular heartbeat/palpitations, chest pain, edema  Constitutional:  Negative night sweats,weight loss, irritability and lethargy  ENMT:  Negative for ear drainage, hearing loss and nasal drainage  Eyes:  Negative for eye discharge and vision loss  Gastrointestinal:  Negative for abdominal pain, diarrhea and vomiting  Integumentary:  Negative for pruritus and rash  Respiratory:  Negative for cough, dyspnea and wheezing    PHYSICAL EXAM:   Constitutional: responsive, no acute distress noted  Head/Face: NC/Atraumatic  Eyes/Vision: conjunctiva and lids are normal extraocular motion is intact   Ears/Audiometry: tympanic membranes are normal bilaterally hearing is grossly intact  Nose/Mouth/Throat: nose and throat are clear mucous membranes are moist   Neck/Thyroid: neck is supple without adenopathy  Lymphatic: no abnormal cervical, supraclavicular or axillary adenopathy is noted  Respiratory: normal to inspection lungs are clear to auscultation bilaterally normal respiratory effort   Cardiovascular: regular rate and rhythm no murmurs, gallups, or rubs  Abdomen: soft non-tender non-distended  Skin/Hair: no unusual rashes present   Extremities: no edema, cyanosis, or clubbing     ASSESSMENT/PLAN:   Assessment   Encounter Diagnosis   Name  Primary?    Penicillin allergy Yes     Reviewed potential adverse  with oral challenge to penicillin including local reaction systemic reactions including anaphylaxis as well as adverse drug reactions and not IgE mediated drug rashes  Patient acknowledges inherent risk and provides consents for oral challenge to penicillin  Patient underwent oral challenge with Pen-Vee K 500 mg p.o. x 1 followed by 70 minutes of observation    Oral challenge penicillin today was negative with no signs of allergic process     Recs: Given patient's negative serum IgE testing to penicillin V and penicillin G as well as unremarkable oral challenge with penicillin  mg patient is showing no signs of an IgE mediated allergy to penicillin therefore may be treated with penicillin and its ribs in the future medically warranted.  Penicillin removed from list of allergens         Orders This Visit:  No orders of the defined types were placed in this encounter.      Meds This Visit:  Requested Prescriptions      No prescriptions requested or ordered in this encounter       Imaging & Referrals:  None     7/15/2024  Chico Velasco MD    If medication samples were provided today, they were provided solely for patient education and training related to self administration of these medications.  Teaching, instruction and sample was provided to the patient by myself.  Teaching included  a review of potential adverse side effects as well as potential efficacy.  Patient's questions were answered in regards to medication administration and dosing and potential side effects. Teaching was provided via the teach back method

## 2024-07-19 RX ORDER — ALBUTEROL SULFATE 90 UG/1
AEROSOL, METERED RESPIRATORY (INHALATION)
Qty: 20.1 EACH | Refills: 0 | OUTPATIENT
Start: 2024-07-19

## 2024-07-19 RX ORDER — FLUTICASONE PROPIONATE AND SALMETEROL 100; 50 UG/1; UG/1
1 POWDER RESPIRATORY (INHALATION) 2 TIMES DAILY
Qty: 3 EACH | Refills: 0 | Status: SHIPPED | OUTPATIENT
Start: 2024-07-19

## 2024-07-19 NOTE — TELEPHONE ENCOUNTER
RN called to patient  Per patient denies refill request for albuterol  Requests refill for generic Advair   States no asthma flare   Last office visit 2/17/24  Refill filled per protocol  Albuterol denied per patient

## 2024-07-25 ENCOUNTER — TELEPHONE (OUTPATIENT)
Dept: ALLERGY | Facility: CLINIC | Age: 64
End: 2024-07-25

## 2024-07-25 NOTE — TELEPHONE ENCOUNTER
RN called to patient   Patient with allergy shot appointment on 7/27/24  Unfortunately Dr. Velasco will be out of office that day and patient will need to reschedule  Allergy shot rescheduled to 7/29/24  Patient will still be on time for shots and dosage will not be decreased  Patient verbalized understanding to plan of care and denies further questions at this time

## 2024-07-29 ENCOUNTER — NURSE ONLY (OUTPATIENT)
Dept: ALLERGY | Facility: CLINIC | Age: 64
End: 2024-07-29
Payer: COMMERCIAL

## 2024-07-29 DIAGNOSIS — J30.89 ENVIRONMENTAL AND SEASONAL ALLERGIES: Primary | ICD-10-CM

## 2024-08-16 ENCOUNTER — OFFICE VISIT (OUTPATIENT)
Dept: INTERNAL MEDICINE CLINIC | Facility: CLINIC | Age: 64
End: 2024-08-16

## 2024-08-16 VITALS
TEMPERATURE: 98 F | DIASTOLIC BLOOD PRESSURE: 50 MMHG | RESPIRATION RATE: 20 BRPM | BODY MASS INDEX: 29.77 KG/M2 | WEIGHT: 201 LBS | HEIGHT: 69 IN | SYSTOLIC BLOOD PRESSURE: 100 MMHG | HEART RATE: 72 BPM

## 2024-08-16 DIAGNOSIS — J45.40 MODERATE PERSISTENT EXTRINSIC ASTHMA WITHOUT COMPLICATION (HCC): ICD-10-CM

## 2024-08-16 DIAGNOSIS — Z00.00 ROUTINE PHYSICAL EXAMINATION: Primary | ICD-10-CM

## 2024-08-16 DIAGNOSIS — R00.0 RAPID HEART RATE: ICD-10-CM

## 2024-08-16 DIAGNOSIS — E03.9 HYPOTHYROIDISM, UNSPECIFIED TYPE: ICD-10-CM

## 2024-08-16 DIAGNOSIS — G47.33 OSA (OBSTRUCTIVE SLEEP APNEA): ICD-10-CM

## 2024-08-16 DIAGNOSIS — G20.B1 PARKINSON'S DISEASE WITH DYSKINESIA, UNSPECIFIED WHETHER MANIFESTATIONS FLUCTUATE (HCC): ICD-10-CM

## 2024-08-16 DIAGNOSIS — M51.9 LUMBAR DISC DISEASE: ICD-10-CM

## 2024-08-16 DIAGNOSIS — M06.9 RHEUMATOID ARTHRITIS, INVOLVING UNSPECIFIED SITE, UNSPECIFIED WHETHER RHEUMATOID FACTOR PRESENT (HCC): ICD-10-CM

## 2024-08-16 DIAGNOSIS — Z98.84 S/P LAPAROSCOPIC SLEEVE GASTRECTOMY: ICD-10-CM

## 2024-08-16 DIAGNOSIS — M25.512 LEFT SHOULDER PAIN, UNSPECIFIED CHRONICITY: ICD-10-CM

## 2024-08-16 PROBLEM — Z51.81 ENCOUNTER FOR THERAPEUTIC DRUG MONITORING: Status: RESOLVED | Noted: 2022-06-16 | Resolved: 2024-08-16

## 2024-08-16 PROCEDURE — 3078F DIAST BP <80 MM HG: CPT | Performed by: INTERNAL MEDICINE

## 2024-08-16 PROCEDURE — 3074F SYST BP LT 130 MM HG: CPT | Performed by: INTERNAL MEDICINE

## 2024-08-16 PROCEDURE — 99396 PREV VISIT EST AGE 40-64: CPT | Performed by: INTERNAL MEDICINE

## 2024-08-16 PROCEDURE — 3008F BODY MASS INDEX DOCD: CPT | Performed by: INTERNAL MEDICINE

## 2024-08-16 NOTE — PROGRESS NOTES
HPI:    Patient ID: Lindsay Trotter is a 63 year old female.    HPI    Patient returns to the office today for general physical exam as well as to discuss chronic medical issues as listed on the active problem list below.  Patient last seen in the office by me on November 15 of last year.  That was after she had the relatively minor back surgery.  Her back was doing very well at that time.  Her weight is down 20 pounds.  During the last visit, the following changes were made: None.  Since the last visit, the patient has seen the following doctors: Bariatrics, allergy, rheumatology, gynecology.   TSH level done on May 1 was normal.    Today, the patient offers the following complaints:  Pt got laid off in July. She is applying for disability. Her weight continues to go down. Down 20 pounds from last visit. About 40 pounds from last year. Parkinsons is worse. Very difficult time walking, writing, using computers. She is falling more frequently. She uses a walker. She is seeing Neuro soon. Asthma is not active in this time of year.   Her left shoulder bothers her with decreased ROM. No help with massage.   She has had 2 episodes of rapid heart rate.   Patient describes diet as ok.   For exercise, the patient goes to the gym 2 times a week for strength training and balance (the gym  specializes in Parkinsons).  Tobacco and alcohol use reviewed. EtOH is 1-2 times a week.   Current medications reviewed. She takes the levothyroxine alternating 112 and 125 daily since April.   Health maintenance issues reviewed.Colonoscopy later in the year.   Patient brings in forms to be completed for possible disability.    Wt Readings from Last 6 Encounters:   08/16/24 201 lb (91.2 kg)   07/15/24 211 lb (95.7 kg)   07/11/24 211 lb (95.7 kg)   05/06/24 216 lb (98 kg)   03/14/24 212 lb 14.4 oz (96.6 kg)   12/13/23 216 lb 9.6 oz (98.2 kg)       Patient Active Problem List   Diagnosis    Asthma (HCC)    Hyperlipidemia    Asthma,  extrinsic (HCC)    Allergic rhinitis due to pollen    Allergic rhinitis due to other allergen    Rheumatoid arthritis, adult (HCC)    Parkinson disease (HCC)    Post-surgical hypothyroidism    Lumbar spinal stenosis    Greater trochanteric bursitis, left    Chronic back pain    Adjustment disorder with mixed anxiety and depressed mood    Chronic fatigue    Increased appetite    S/P laparoscopic sleeve gastrectomy    Obesity (BMI 30-39.9)    S/P lumbar fusion    Slow transit constipation    Mild protein-calorie malnutrition (HCC)        HISTORY:  Past Medical History:    Acute torn meniscus of knee    ARTHROSCOPY OF KNEE    Amenorrhea    Asthma (HCC)    Back problem    Chicken pox    Chronic cough    Disorder of thyroid    Extrapyramidal syndrome    MEDICATION MGMT. W/ PARKINSONS FEATURES    Greater trochanteric bursitis, left    High cholesterol    History of blood transfusion    CDH    Incontinence    Infertility, female    Parkinson disease (HCC)    Parkinson's disease (HCC)    Rheumatoid arthritis (HCC)    S/P laparoscopic sleeve gastrectomy    for weight loss    Sleep apnea    cpap    Thyroid disease    Tibia/fibula fracture    OPEN REDUCTION INTERNAL FIXATION    Urinary incontinence    after childbirth    Viral conjunctivitis    Visual impairment    glasses      Past Surgical History:   Procedure Laterality Date    Back surgery      Back surgery  04/21/2021    Back surgery  04/25/2023    compressed disc, removal of bone    Colonoscopy  06/11/2019    return in 5 years    Colonoscopy N/A 06/11/2019    Procedure: COLONOSCOPY;  Surgeon: Dago Heart MD;  Location: [facility] ENDOSCOPY    Colposcopy, cervix w/upper adjacent vagina; w/biopsy(s), cervix      D & c      for extended period    D & c      for extended period    Foot surgery Left     Had right foot done recently.    Knee arthroscopy Right     Lap sleeve gastrectomy  12/02/2019    Dr Rios, [facility]    Laparoscopy,pelvic,biopsy  1990    for  infertility    Laparoscopy,pelvic,biopsy  1990    for infertility    Other surgical history Right 12/03/2015    Other surgical history  12/03/2015    Thyroidectomy      Total       Family History   Problem Relation Age of Onset    Heart Disorder Father 55        heart attack-cause of death.    Heart Disorder Mother     Stroke Mother     Cancer Mother         AML-cause of death    Other (leukemia) Mother     Cancer Paternal Aunt         breast cancer-cause of death at age 80    Breast Cancer Paternal Aunt 80        approx age    Heart Disorder Brother     Ovarian Cancer Neg       Social History     Socioeconomic History    Marital status:    Tobacco Use    Smoking status: Never     Passive exposure: Never    Smokeless tobacco: Never   Vaping Use    Vaping status: Never Used   Substance and Sexual Activity    Alcohol use: Yes     Alcohol/week: 2.0 - 3.0 standard drinks of alcohol     Types: 2 - 3 Glasses of wine per week     Comment: social    Drug use: No    Sexual activity: Yes     Partners: Male   Other Topics Concern    Caffeine Concern Yes     Comment: S2 glasses tea daily    Exercise No    Reaction to local anesthetic No    Pt has a pacemaker No    Pt has a defibrillator No   Social History Narrative    The patient uses the following assistive device(s):  single-point cane,uses 2 canes. Walker at night           The patient does live in a home with stairs.     Social Determinants of Health      Received from [facility]    Social Connections    Received from [facility]    Housing Stability          Review of Systems          Current Outpatient Medications   Medication Sig Dispense Refill    carbidopa-levodopa  MG Oral Tab       PHENTERMINE HCL 37.5 MG Oral Tab TAKE ONE FULL TABLET IN THE MORNING AND HALF TABLET AS NEEDED. 45 tablet 3    topiramate 100 MG Oral Tab Take 1 tablet (100 mg total) by mouth 2 (two) times daily.      PANTOPRAZOLE 40 MG Oral Tab EC TAKE  1 TABLET BY MOUTH BEFORE BREAKFAST 90 tablet 2    Tofacitinib Citrate ER (XELJANZ XR) 11 MG Oral Tablet 24 Hr Take 1 tablet by mouth daily. 90 tablet 1    methotrexate 2.5 MG Oral Tab TAKE 8 TABS WEEKLY BY MOUTH 96 tablet 1    diclofenac 50 MG Oral Tab EC Take 1 tablet (50 mg total) by mouth daily. 90 tablet 1    MONTELUKAST 10 MG Oral Tab TAKE 1 TABLET BY MOUTH EVERY DAY AT NIGHT 90 tablet 0    albuterol 108 (90 Base) MCG/ACT Inhalation Aero Soln INHALE 2 PUFFS BY INHALATION ROUTE EVERY 4 - 6 HOURS AS NEEDED 3 each 0    calcitriol 0.25 MCG Oral Cap Take 1 capsule (0.25 mcg total) by mouth daily. 90 capsule 1    levothyroxine 112 MCG Oral Tab Take 1 tablet (112 mcg total) by mouth before breakfast. 90 tablet 1    pravastatin 20 MG Oral Tab Take 1 tablet (20 mg total) by mouth nightly. 90 tablet 3    folic acid 1 MG Oral Tab Take 1 tablet (1 mg total) by mouth daily. 90 tablet 3    methylPREDNISolone 4 MG Oral Tablet Therapy Pack Take as directed on package. 1 each 0    Hydrocod Navi-Chlorphe Navi ER 10-8 MG/5ML Oral Suspension Extended Release Take 5 mL by mouth 2 (two) times daily as needed. 115 mL 0    albuterol (2.5 MG/3ML) 0.083% Inhalation Nebu Soln Take 3 mL (2.5 mg total) by nebulization every 4 (four) hours as needed for Wheezing. 30 each 0    Boswellia-Glucosamine-Vit D (OSTEO BI-FLEX ONE PER DAY OR) Take by mouth As Directed.      Amantadine HCl 100 MG Oral Cap Take 1 capsule (100 mg total) by mouth daily.      omega-3 fatty acids 1000 MG Oral Cap Take 1,000 mg by mouth daily.      TURMERIC OR Take by mouth 2 (two) times a day.      Multiple Vitamins-Minerals (BARIATRIC FUSION) Oral Chew Tab Chew 1 tablet by mouth in the morning, at noon, in the evening, and at bedtime.      rasagiline mesylate 1 MG Oral Tab Take 1 tablet (1 mg total) by mouth daily.      carbidopa-levodopa (SINEMET)  MG Oral Tab Take 1 tablet by mouth 4 (four) times daily. 1  in am and 3/4 11AM and 3/4 afternoon and 1 evening  0      Allergies:  Allergies   Allergen Reactions    Other HIVES     Reports side effect from DPT vaccine.  Other reaction(s): Rash/Hives/Urticaria    Seasonal Runny nose        PHYSICAL EXAM:   /50 (BP Location: Left arm, Patient Position: Sitting, Cuff Size: large)   Pulse 72   Temp 97.8 °F (36.6 °C) (Other)   Resp 20   Ht 5' 9\" (1.753 m)   Wt 201 lb (91.2 kg)   LMP 04/18/2015 (Exact Date)   BMI 29.68 kg/m²      Physical Exam  Constitutional:       Appearance: Normal appearance. She is well-developed.   HENT:      Right Ear: Tympanic membrane and ear canal normal.      Left Ear: Tympanic membrane and ear canal normal.      Nose: Nose normal.      Mouth/Throat:      Pharynx: No oropharyngeal exudate or posterior oropharyngeal erythema.   Eyes:      Conjunctiva/sclera: Conjunctivae normal.      Pupils: Pupils are equal, round, and reactive to light.   Neck:      Thyroid: No thyromegaly.      Vascular: No carotid bruit.   Cardiovascular:      Rate and Rhythm: Normal rate.      Pulses: Normal pulses.      Heart sounds: Normal heart sounds. No murmur heard.     No friction rub. No gallop.   Pulmonary:      Effort: Pulmonary effort is normal.      Breath sounds: Normal breath sounds. No wheezing or rales.   Abdominal:      General: Bowel sounds are normal.      Palpations: Abdomen is soft. There is no mass.      Tenderness: There is no abdominal tenderness.   Musculoskeletal:         General: No tenderness.      Cervical back: Neck supple.      Right lower leg: No edema.      Left lower leg: No edema.   Lymphadenopathy:      Cervical: No cervical adenopathy.   Skin:     General: Skin is warm and dry.      Findings: No rash.   Neurological:      Mental Status: She is alert.      Cranial Nerves: No cranial nerve deficit.      Coordination: Coordination abnormal.      Comments: Generalized whole body large tremors   Psychiatric:         Mood and Affect: Mood normal.         Behavior: Behavior normal.          Thought Content: Thought content normal.         Judgment: Judgment normal.                 ASSESSMENT/PLAN:   1. Routine physical examination   Physical exam shows the chronic issues with overweight status as well as the Parkinson's involuntary movements.  Current medications reviewed.  Health maintenance and vaccination status reviewed.  Will check fasting blood work and contact patient with results.  - Comp Metabolic Panel (14); Future  - Lipid Panel; Future  - Assay, Thyroid Stim Hormone; Future  - Vitamin B12; Future  - Vitamin D; Future  - EKG 12 Lead; Future    2. Hypothyroidism, unspecified type  Check TSH level.  She is currently alternating doses of 112 and 125 mcg of levothyroxine on a daily basis.  Adjust medication if needed.    3. Moderate persistent extrinsic asthma without complication (HCC)  Stable.  Not very active this time of the year.  Continue current treatment.    4. Parkinson's disease with dyskinesia, unspecified whether manifestations fluctuate (HCC)  Ongoing and worsening symptoms.  She will be seeing neurology soon.    5. Lumbar disc disease  Back is doing reasonably well.  History of surgeries.  Continue current treatment.    6. Rheumatoid arthritis, involving unspecified site, unspecified whether rheumatoid factor present (HCC)  Stable.  Continue current treatment.  Follow-up with rheumatology.    7. EVIE (obstructive sleep apnea)  She has not needed the CPAP since her weight loss.    8. S/P laparoscopic sleeve gastrectomy  Stable.  Check vitamin levels.  Continue working on diet and exercise.  - Vitamin B12; Future  - Vitamin D; Future    9. Left shoulder pain, unspecified chronicity  Left shoulder pain with decreased range of motion particularly with abduction.  Refer to physiatry for evaluation.  - Physiatry Referral - [facility] ([facility])    10. Rapid heart rate  Patient with occasional runs of rapid heart rate.  Check EKG.  Heart exam normal today.  - EKG 12 Lead; Future      For the end of the visit, patient gave a disability form.  It is quite extensive.  I advised her that I will review it and decide whether we can do this based on today's visit, an additional phone visit, or an additional in office visit.  We did not have time to do the form today during the visit.      Meds This Visit:  Requested Prescriptions      No prescriptions requested or ordered in this encounter       Imaging & Referrals:  None         Dante Palafox MD

## 2024-08-19 ENCOUNTER — LAB ENCOUNTER (OUTPATIENT)
Dept: LAB | Facility: HOSPITAL | Age: 64
End: 2024-08-19
Attending: INTERNAL MEDICINE
Payer: COMMERCIAL

## 2024-08-19 DIAGNOSIS — Z51.81 THERAPEUTIC DRUG MONITORING: ICD-10-CM

## 2024-08-19 DIAGNOSIS — M06.9 RHEUMATOID ARTHRITIS, INVOLVING UNSPECIFIED SITE, UNSPECIFIED WHETHER RHEUMATOID FACTOR PRESENT (HCC): ICD-10-CM

## 2024-08-19 DIAGNOSIS — Z98.84 S/P LAPAROSCOPIC SLEEVE GASTRECTOMY: ICD-10-CM

## 2024-08-19 DIAGNOSIS — Z00.00 ROUTINE PHYSICAL EXAMINATION: ICD-10-CM

## 2024-08-19 DIAGNOSIS — R00.0 RAPID HEART RATE: ICD-10-CM

## 2024-08-19 LAB
ALBUMIN SERPL-MCNC: 4.3 G/DL (ref 3.2–4.8)
ALBUMIN/GLOB SERPL: 1.7 {RATIO} (ref 1–2)
ALP LIVER SERPL-CCNC: 80 U/L
ALT SERPL-CCNC: <7 U/L
ANION GAP SERPL CALC-SCNC: 5 MMOL/L (ref 0–18)
AST SERPL-CCNC: 17 U/L (ref ?–34)
ATRIAL RATE: 92 BPM
BILIRUB SERPL-MCNC: 0.5 MG/DL (ref 0.2–1.1)
BUN BLD-MCNC: 17 MG/DL (ref 9–23)
BUN/CREAT SERPL: 15 (ref 10–20)
CALCIUM BLD-MCNC: 9.4 MG/DL (ref 8.7–10.4)
CHLORIDE SERPL-SCNC: 111 MMOL/L (ref 98–112)
CHOLEST SERPL-MCNC: 205 MG/DL (ref ?–200)
CO2 SERPL-SCNC: 25 MMOL/L (ref 21–32)
CREAT BLD-MCNC: 1.13 MG/DL
CRP SERPL-MCNC: <0.4 MG/DL (ref ?–1)
DEPRECATED RDW RBC AUTO: 54.7 FL (ref 35.1–46.3)
EGFRCR SERPLBLD CKD-EPI 2021: 55 ML/MIN/1.73M2 (ref 60–?)
ERYTHROCYTE [DISTWIDTH] IN BLOOD BY AUTOMATED COUNT: 15 % (ref 11–15)
ERYTHROCYTE [SEDIMENTATION RATE] IN BLOOD: 5 MM/HR
FASTING PATIENT LIPID ANSWER: YES
FASTING STATUS PATIENT QL REPORTED: YES
GLOBULIN PLAS-MCNC: 2.5 G/DL (ref 2–3.5)
GLUCOSE BLD-MCNC: 88 MG/DL (ref 70–99)
HCT VFR BLD AUTO: 40.8 %
HDLC SERPL-MCNC: 85 MG/DL (ref 40–59)
HGB BLD-MCNC: 13.7 G/DL
LDLC SERPL CALC-MCNC: 111 MG/DL (ref ?–100)
MCH RBC QN AUTO: 33.5 PG (ref 26–34)
MCHC RBC AUTO-ENTMCNC: 33.6 G/DL (ref 31–37)
MCV RBC AUTO: 99.8 FL
NONHDLC SERPL-MCNC: 120 MG/DL (ref ?–130)
OSMOLALITY SERPL CALC.SUM OF ELEC: 293 MOSM/KG (ref 275–295)
P AXIS: 77 DEGREES
P-R INTERVAL: 188 MS
PLATELET # BLD AUTO: 204 10(3)UL (ref 150–450)
POTASSIUM SERPL-SCNC: 3.7 MMOL/L (ref 3.5–5.1)
PROT SERPL-MCNC: 6.8 G/DL (ref 5.7–8.2)
Q-T INTERVAL: 364 MS
QRS DURATION: 80 MS
QTC CALCULATION (BEZET): 450 MS
R AXIS: -20 DEGREES
RBC # BLD AUTO: 4.09 X10(6)UL
SODIUM SERPL-SCNC: 141 MMOL/L (ref 136–145)
T AXIS: 45 DEGREES
TRIGL SERPL-MCNC: 46 MG/DL (ref 30–149)
TSI SER-ACNC: 1.84 MIU/ML (ref 0.55–4.78)
VENTRICULAR RATE: 92 BPM
VIT B12 SERPL-MCNC: 1875 PG/ML (ref 211–911)
VIT D+METAB SERPL-MCNC: 55.4 NG/ML (ref 30–100)
VLDLC SERPL CALC-MCNC: 8 MG/DL (ref 0–30)
WBC # BLD AUTO: 5.7 X10(3) UL (ref 4–11)

## 2024-08-19 PROCEDURE — 86140 C-REACTIVE PROTEIN: CPT

## 2024-08-19 PROCEDURE — 85027 COMPLETE CBC AUTOMATED: CPT

## 2024-08-19 PROCEDURE — 93005 ELECTROCARDIOGRAM TRACING: CPT

## 2024-08-19 PROCEDURE — 82306 VITAMIN D 25 HYDROXY: CPT

## 2024-08-19 PROCEDURE — 84443 ASSAY THYROID STIM HORMONE: CPT

## 2024-08-19 PROCEDURE — 80061 LIPID PANEL: CPT

## 2024-08-19 PROCEDURE — 80053 COMPREHEN METABOLIC PANEL: CPT

## 2024-08-19 PROCEDURE — 82607 VITAMIN B-12: CPT

## 2024-08-19 PROCEDURE — 93010 ELECTROCARDIOGRAM REPORT: CPT | Performed by: INTERNAL MEDICINE

## 2024-08-19 PROCEDURE — 85652 RBC SED RATE AUTOMATED: CPT

## 2024-08-19 PROCEDURE — 36415 COLL VENOUS BLD VENIPUNCTURE: CPT

## 2024-08-24 ENCOUNTER — NURSE ONLY (OUTPATIENT)
Dept: ALLERGY | Facility: CLINIC | Age: 64
End: 2024-08-24
Payer: COMMERCIAL

## 2024-08-24 DIAGNOSIS — J30.89 ENVIRONMENTAL AND SEASONAL ALLERGIES: Primary | ICD-10-CM

## 2024-09-07 ENCOUNTER — NURSE ONLY (OUTPATIENT)
Dept: ALLERGY | Facility: CLINIC | Age: 64
End: 2024-09-07
Payer: COMMERCIAL

## 2024-09-07 DIAGNOSIS — J30.89 ENVIRONMENTAL AND SEASONAL ALLERGIES: Primary | ICD-10-CM

## 2024-09-07 PROCEDURE — 95165 ANTIGEN THERAPY SERVICES: CPT | Performed by: ALLERGY & IMMUNOLOGY

## 2024-09-07 PROCEDURE — 95117 IMMUNOTHERAPY INJECTIONS: CPT | Performed by: ALLERGY & IMMUNOLOGY

## 2024-09-10 ENCOUNTER — HOSPITAL ENCOUNTER (OUTPATIENT)
Dept: MAMMOGRAPHY | Facility: HOSPITAL | Age: 64
Discharge: HOME | End: 2024-09-10
Attending: NURSE PRACTITIONER
Payer: COMMERCIAL

## 2024-09-10 DIAGNOSIS — E66.9 OBESITY, UNSPECIFIED: ICD-10-CM

## 2024-09-10 DIAGNOSIS — Z12.31 SCREENING MAMMOGRAM FOR BREAST CANCER: ICD-10-CM

## 2024-09-10 PROCEDURE — 77067 SCR MAMMO BI INCL CAD: CPT | Performed by: NURSE PRACTITIONER

## 2024-09-10 PROCEDURE — 77063 BREAST TOMOSYNTHESIS BI: CPT | Performed by: NURSE PRACTITIONER

## 2024-09-10 RX ORDER — TOPIRAMATE 100 MG/1
100 TABLET, FILM COATED ORAL 2 TIMES DAILY
Qty: 180 TABLET | Refills: 1 | Status: SHIPPED | OUTPATIENT
Start: 2024-09-10

## 2024-09-11 ENCOUNTER — APPOINTMENT (OUTPATIENT)
Dept: GENERAL RADIOLOGY | Age: 64
End: 2024-09-11
Attending: PHYSICIAN ASSISTANT
Payer: COMMERCIAL

## 2024-09-11 ENCOUNTER — HOSPITAL ENCOUNTER (OUTPATIENT)
Age: 64
Discharge: HOME | End: 2024-09-11
Payer: COMMERCIAL

## 2024-09-11 VITALS
SYSTOLIC BLOOD PRESSURE: 116 MMHG | DIASTOLIC BLOOD PRESSURE: 85 MMHG | OXYGEN SATURATION: 99 % | HEART RATE: 89 BPM | TEMPERATURE: 98 F | RESPIRATION RATE: 20 BRPM

## 2024-09-11 DIAGNOSIS — S40.022A CONTUSION OF LEFT UPPER EXTREMITY, INITIAL ENCOUNTER: Primary | ICD-10-CM

## 2024-09-11 PROCEDURE — 73080 X-RAY EXAM OF ELBOW: CPT | Performed by: PHYSICIAN ASSISTANT

## 2024-09-11 PROCEDURE — 99214 OFFICE O/P EST MOD 30 MIN: CPT

## 2024-09-11 PROCEDURE — 99213 OFFICE O/P EST LOW 20 MIN: CPT

## 2024-09-11 NOTE — ED PROVIDER NOTES
Patient Seen in: [facility]      History     Chief Complaint   Patient presents with    Arm or Hand Injury     Stated Complaint: Fall / Right Arm Injury    Subjective:   HPI    63-year female with history as listed below including Parkinson disease presenting to the immediate care for evaluation of pain, bruising to the left elbow.  Patient got her arm pinched in the crack of her recliner.  She notes pain and bruising just above the elbow.  She is still able to move the arm.  Initially she felt some numbness but this has resolved after several minutes.    Objective:   Past Medical History:    Acute torn meniscus of knee    ARTHROSCOPY OF KNEE    Amenorrhea    Asthma (HCC)    Back problem    Chicken pox    Chronic cough    Disorder of thyroid    Extrapyramidal syndrome    MEDICATION MGMT. W/ PARKINSONS FEATURES    Greater trochanteric bursitis, left    High cholesterol    History of blood transfusion    CDH    Incontinence    Infertility, female    Parkinson disease (HCC)    Parkinson's disease (HCC)    Rheumatoid arthritis (HCC)    S/P laparoscopic sleeve gastrectomy    for weight loss    Sleep apnea    cpap    Thyroid disease    Tibia/fibula fracture    OPEN REDUCTION INTERNAL FIXATION    Urinary incontinence    after childbirth    Viral conjunctivitis    Visual impairment    glasses              Past Surgical History:   Procedure Laterality Date    Back surgery      Back surgery  04/21/2021    Back surgery  04/25/2023    compressed disc, removal of bone    Colonoscopy  06/11/2019    return in 5 years    Colonoscopy N/A 06/11/2019    Procedure: COLONOSCOPY;  Surgeon: Dago Heart MD;  Location: [facility] ENDOSCOPY    Colposcopy, cervix w/upper adjacent vagina; w/biopsy(s), cervix      D & c      for extended period    D & c      for extended period    Foot surgery Left     Had right foot done recently.    Knee arthroscopy Right     Lap sleeve gastrectomy  12/02/2019    Pankaj [place]  [facility]    Laparoscopy,pelvic,biopsy  1990    for infertility    Laparoscopy,pelvic,biopsy  1990    for infertility    Other surgical history Right 12/03/2015    Other surgical history  12/03/2015    Thyroidectomy      Total                 Social History     Socioeconomic History    Marital status:    Tobacco Use    Smoking status: Never     Passive exposure: Never    Smokeless tobacco: Never   Vaping Use    Vaping status: Never Used   Substance and Sexual Activity    Alcohol use: Yes     Alcohol/week: 2.0 - 3.0 standard drinks of alcohol     Types: 2 - 3 Glasses of wine per week     Comment: social    Drug use: No    Sexual activity: Yes     Partners: Male   Other Topics Concern    Caffeine Concern Yes     Comment: S2 glasses tea daily    Exercise No    Reaction to local anesthetic No    Pt has a pacemaker No    Pt has a defibrillator No   Social History Narrative    The patient uses the following assistive device(s):  single-point cane,uses 2 canes. Walker at night           The patient does live in a home with stairs.     Social Determinants of Health      Received from [facility]    Social Connections    Received from [facility]    Housing Stability              Review of Systems    Positive for stated Chief Complaint: Arm or Hand Injury    Other systems are as noted in HPI.  Constitutional and vital signs reviewed.      All other systems reviewed and negative except as noted above.    Physical Exam     ED Triage Vitals [09/11/24 1129]   /85   Pulse 89   Resp 20   Temp 97.6 °F (36.4 °C)   Temp src Temporal   SpO2 99 %   O2 Device None (Room air)       Current Vitals:   Vital Signs  BP: 116/85  Pulse: 89  Resp: 20  Temp: 97.6 °F (36.4 °C)  Temp src: Temporal    Oxygen Therapy  SpO2: 99 %  O2 Device: None (Room air)            Physical Exam  Vitals and nursing note reviewed.   Constitutional:       General: She is not in acute distress.  HENT:      Head:  Normocephalic and atraumatic.      Right Ear: External ear normal.      Left Ear: External ear normal.      Nose: Nose normal.      Mouth/Throat:      Mouth: Mucous membranes are moist.   Eyes:      Extraocular Movements: Extraocular movements intact.      Pupils: Pupils are equal, round, and reactive to light.   Cardiovascular:      Rate and Rhythm: Normal rate.   Pulmonary:      Effort: Pulmonary effort is normal.   Abdominal:      General: Abdomen is flat.   Musculoskeletal:         General: Normal range of motion.        Arms:       Cervical back: Normal range of motion.      Comments: Small area of ecchymosis just above the elbow.  There is no elbow swelling or tenderness.  Elbow range of motion intact   Skin:     General: Skin is warm.   Neurological:      General: No focal deficit present.      Mental Status: She is alert and oriented to person, place, and time.   Psychiatric:         Mood and Affect: Mood normal.         Behavior: Behavior normal.               ED Course   Labs Reviewed - No data to display     63-year-old female presenting for evaluation of pain to the left elbow after she got it pinched between the armrest of her chair.  Ddx-soft tissue contusion, fracture, sprain    X-ray reviewed by this provider, no evidence of acute fracture  Discussed likely soft tissue contusion.  Supportive care and return precautions discussed              MDM                                         Medical Decision Making      Disposition and Plan     Clinical Impression:  1. Contusion of left upper extremity, initial encounter         Disposition:  Discharge  9/11/2024 12:35 pm    Follow-up:  Dante Palafox MD  [address] [place] [id number]  [phone]                Medications Prescribed:  Discharge Medication List as of 9/11/2024 12:36 PM

## 2024-09-11 NOTE — ED INITIAL ASSESSMENT (HPI)
States she was trying to get up from sitting when her left arm slid in between the arm chair at home, contusion /bruising/pain  to left elbow, no loc, no head or neck pain, h/o parkinsons, cms intact

## 2024-09-12 ENCOUNTER — OFFICE VISIT (OUTPATIENT)
Dept: SURGERY | Facility: CLINIC | Age: 64
End: 2024-09-12
Payer: COMMERCIAL

## 2024-09-12 VITALS
WEIGHT: 201.63 LBS | OXYGEN SATURATION: 98 % | HEIGHT: 69 IN | BODY MASS INDEX: 29.86 KG/M2 | HEART RATE: 100 BPM | DIASTOLIC BLOOD PRESSURE: 68 MMHG | SYSTOLIC BLOOD PRESSURE: 116 MMHG

## 2024-09-12 DIAGNOSIS — E44.1 MILD PROTEIN-CALORIE MALNUTRITION (HCC): Primary | ICD-10-CM

## 2024-09-12 DIAGNOSIS — Z98.84 S/P LAPAROSCOPIC SLEEVE GASTRECTOMY: ICD-10-CM

## 2024-09-12 DIAGNOSIS — Z51.81 ENCOUNTER FOR THERAPEUTIC DRUG MONITORING: ICD-10-CM

## 2024-09-12 DIAGNOSIS — E78.5 HYPERLIPIDEMIA, UNSPECIFIED HYPERLIPIDEMIA TYPE: ICD-10-CM

## 2024-09-12 DIAGNOSIS — E66.3 OVERWEIGHT (BMI 25.0-29.9): ICD-10-CM

## 2024-09-12 DIAGNOSIS — E66.9 OBESITY (BMI 30-39.9): ICD-10-CM

## 2024-09-12 PROCEDURE — 3008F BODY MASS INDEX DOCD: CPT | Performed by: INTERNAL MEDICINE

## 2024-09-12 PROCEDURE — 3074F SYST BP LT 130 MM HG: CPT | Performed by: INTERNAL MEDICINE

## 2024-09-12 PROCEDURE — 99214 OFFICE O/P EST MOD 30 MIN: CPT | Performed by: INTERNAL MEDICINE

## 2024-09-12 PROCEDURE — 3078F DIAST BP <80 MM HG: CPT | Performed by: INTERNAL MEDICINE

## 2024-09-12 RX ORDER — PHENTERMINE HYDROCHLORIDE 37.5 MG/1
37.5 TABLET ORAL
Qty: 30 TABLET | Refills: 5 | Status: SHIPPED | OUTPATIENT
Start: 2024-09-12 | End: 2024-10-12

## 2024-09-12 RX ORDER — CARBIDOPA AND LEVODOPA 25; 100 MG/1; MG/1
TABLET ORAL
COMMUNITY
Start: 2024-08-14

## 2024-09-12 NOTE — PROGRESS NOTES
[facility], [facility], [place]  [address]  [facility] [address]  Dept: [phone]    Patient:  Lindsay Trotter  :      1960  MRN:      [id number]    Referring Provider: Dante Palafox MD     Chief Complaint:     Chief Complaint   Patient presents with    Follow - Up    Weight Management       Date of Surgery:   2019  Surgery Type:   Sleeve gastrectomy     Subjective     Satiety:  positive  Food Intake:  <01 cup(s)  Fluid Intake:  64 oz  Protein Intake:  adeq grams  Vitamin:  Yes   bariatric  Exercise: Yes         Comorbidities:  Back pain-Improvement?  yes, Joint pain-Improvement?  yes, Hypertension-Improvement?  yes, GAIL-Improvement?  yes and Snoring-Improvement?  yes    Objective     Vitals: /68   Pulse 100   Ht 5' 9\" (1.753 m)   Wt 201 lb 9.6 oz (91.4 kg)   LMP 2015 (Exact Date)   SpO2 98%   BMI 29.77 kg/m²     Starting weight: 325   Current weight:    Interval weight loss: -11   Total weight loss:  -123    Last 3 Weights   24 0941 201 lb 9.6 oz (91.4 kg)   24 0810 201 lb (91.2 kg)   07/15/24 1340 211 lb (95.7 kg)       Patient Medications:    Current Outpatient Medications:     carbidopa-levodopa  MG Oral Tab, TAKE 1 TABLET BY MOUTH FOUR TIMES DAILY TAKE AT 630AM, 11AM, 2PM, AND 7PM, Disp: , Rfl:     TOPIRAMATE 100 MG Oral Tab, TAKE 1 TABLET BY MOUTH TWICE A DAY, Disp: 180 tablet, Rfl: 1    PHENTERMINE HCL 37.5 MG Oral Tab, TAKE ONE FULL TABLET IN THE MORNING AND HALF TABLET AS NEEDED., Disp: 45 tablet, Rfl: 3    PANTOPRAZOLE 40 MG Oral Tab EC, TAKE 1 TABLET BY MOUTH BEFORE BREAKFAST, Disp: 90 tablet, Rfl: 2    Tofacitinib Citrate ER (XELJANZ XR) 11 MG Oral Tablet 24 Hr, Take 1 tablet by mouth daily., Disp: 90 tablet, Rfl: 1    methotrexate 2.5 MG Oral Tab, TAKE 8 TABS WEEKLY BY MOUTH, Disp: 96 tablet, Rfl: 1    diclofenac 50 MG Oral Tab EC, Take 1 tablet (50 mg total) by mouth daily., Disp: 90  tablet, Rfl: 1    MONTELUKAST 10 MG Oral Tab, TAKE 1 TABLET BY MOUTH EVERY DAY AT NIGHT, Disp: 90 tablet, Rfl: 0    albuterol 108 (90 Base) MCG/ACT Inhalation Aero Soln, INHALE 2 PUFFS BY INHALATION ROUTE EVERY 4 - 6 HOURS AS NEEDED, Disp: 3 each, Rfl: 0    calcitriol 0.25 MCG Oral Cap, Take 1 capsule (0.25 mcg total) by mouth daily., Disp: 90 capsule, Rfl: 1    levothyroxine 112 MCG Oral Tab, Take 1 tablet (112 mcg total) by mouth before breakfast., Disp: 90 tablet, Rfl: 1    pravastatin 20 MG Oral Tab, Take 1 tablet (20 mg total) by mouth nightly., Disp: 90 tablet, Rfl: 3    folic acid 1 MG Oral Tab, Take 1 tablet (1 mg total) by mouth daily., Disp: 90 tablet, Rfl: 3    Hydrocod Navi-Chlorphe Navi ER 10-8 MG/5ML Oral Suspension Extended Release, Take 5 mL by mouth 2 (two) times daily as needed., Disp: 115 mL, Rfl: 0    albuterol (2.5 MG/3ML) 0.083% Inhalation Nebu Soln, Take 3 mL (2.5 mg total) by nebulization every 4 (four) hours as needed for Wheezing., Disp: 30 each, Rfl: 0    Boswellia-Glucosamine-Vit D (OSTEO BI-FLEX ONE PER DAY OR), Take by mouth As Directed., Disp: , Rfl:     Amantadine HCl 100 MG Oral Cap, Take 1 capsule (100 mg total) by mouth daily., Disp: , Rfl:     omega-3 fatty acids 1000 MG Oral Cap, Take 1,000 mg by mouth daily., Disp: , Rfl:     TURMERIC OR, Take by mouth 2 (two) times a day., Disp: , Rfl:     Multiple Vitamins-Minerals (BARIATRIC FUSION) Oral Chew Tab, Chew 1 tablet by mouth in the morning, at noon, in the evening, and at bedtime., Disp: , Rfl:     rasagiline mesylate 1 MG Oral Tab, Take 1 tablet (1 mg total) by mouth daily., Disp: , Rfl:     Allergies:  Other and Seasonal     Social History:    Social History     Socioeconomic History    Marital status:    Tobacco Use    Smoking status: Never     Passive exposure: Never    Smokeless tobacco: Never   Vaping Use    Vaping status: Never Used   Substance and Sexual Activity    Alcohol use: Yes     Alcohol/week: 2.0 - 3.0  standard drinks of alcohol     Types: 2 - 3 Glasses of wine per week     Comment: social    Drug use: No    Sexual activity: Yes     Partners: Male   Other Topics Concern    Caffeine Concern Yes     Comment: S2 glasses tea daily    Exercise No    Reaction to local anesthetic No    Pt has a pacemaker No    Pt has a defibrillator No   Social History Narrative    The patient uses the following assistive device(s):  single-point cane,uses 2 canes. Walker at night           The patient does live in a home with stairs.     Social Determinants of Health      Received from [facility]    Social Connections    Received from [facility]    Housing Stability     Surgical History:    Past Surgical History:   Procedure Laterality Date    Back surgery      Back surgery  04/21/2021    Back surgery  04/25/2023    compressed disc, removal of bone    Colonoscopy  06/11/2019    return in 5 years    Colonoscopy N/A 06/11/2019    Procedure: COLONOSCOPY;  Surgeon: Dago Heart MD;  Location: [facility] ENDOSCOPY    Colposcopy, cervix w/upper adjacent vagina; w/biopsy(s), cervix      D & c      for extended period    D & c      for extended period    Foot surgery Left     Had right foot done recently.    Knee arthroscopy Right     Lap sleeve gastrectomy  12/02/2019    Dr Rios, [facility]    Laparoscopy,pelvic,biopsy  1990    for infertility    Laparoscopy,pelvic,biopsy  1990    for infertility    Other surgical history Right 12/03/2015    Other surgical history  12/03/2015    Thyroidectomy      Total        Family History:    Family History   Problem Relation Age of Onset    Heart Disorder Father 55        heart attack-cause of death.    Heart Disorder Mother     Stroke Mother     Cancer Mother         AML-cause of death    Other (leukemia) Mother     Cancer Paternal Aunt         breast cancer-cause of death at age 80    Breast Cancer Paternal Aunt 80        approx age    Heart Disorder  Brother     Ovarian Cancer Neg        Physical Exam:  Vital signs: /68   Pulse 100   Ht 5' 9\" (1.753 m)   Wt 201 lb 9.6 oz (91.4 kg)   LMP 04/18/2015 (Exact Date)   SpO2 98%   BMI 29.77 kg/m²   General appearance: alert, appears stated age, cooperative and morbidly obese  Head: Normocephalic, without obvious abnormality, atraumatic  Eyes: conjunctivae/corneas clear. PERRL, EOM's intact. Fundi benign.  Neck: no adenopathy, no carotid bruit, no JVD, supple, symmetrical, trachea midline and thyroid not enlarged, symmetric, no tenderness/mass/nodules  Lungs: clear to auscultation bilaterally  Heart: S1, S2 normal, no murmur, click, rub or gallop, regular rate and rhythm  Abdomen:  soft, obese, non tender  Extremities: extremities normal, atraumatic, no cyanosis or edema  Pulses: 2+ and symmetric  Neurologic: Grossly normal       ROS:    Constitutional: negative  Respiratory: negative  Cardiovascular: negative  Gastrointestinal: negative  Musculoskeletal:negative  Neurological: negative  Behavioral/Psych: negative  Endocrine: negative  All other systems were reviewed and are negative    Assessment     OBSTRUCTIVE SLEEP APNEA: The patient states her sleep apnea has been stable since the last clinic visit. There has not been any increase in hyper-somnolence.     HYPERCHOLESTEROLEMIA:  The patient states that her cholesterol has been well controlled on her current medication.    Lab Results   Component Value Date/Time    CHOLEST 205 (H) 08/19/2024 09:34 AM     (H) 08/19/2024 09:34 AM    HDL 85 (H) 08/19/2024 09:34 AM    TRIG 46 08/19/2024 09:34 AM    VLDL 8 08/19/2024 09:34 AM       Encounter Diagnosis(ses):   Encounter Diagnoses   Name Primary?    Mild protein-calorie malnutrition (HCC) Yes    Hyperlipidemia, unspecified hyperlipidemia type [E78.5]     Encounter for therapeutic drug monitoring     S/P laparoscopic sleeve gastrectomy [Z98.84]     Overweight (BMI 25.0-29.9)        Plan     S/p VSG on  12/02/2019  Doing well       Continue current meds      DYSLIPIDEMIA: Stable on the above prescribed meal plan and medication. Liver function stable.    Lab Results   Component Value Date/Time    CHOLEST 205 (H) 08/19/2024 09:34 AM     (H) 08/19/2024 09:34 AM    HDL 85 (H) 08/19/2024 09:34 AM    TRIG 46 08/19/2024 09:34 AM    VLDL 8 08/19/2024 09:34 AM       Phentermine 37.5 mg (full in AM and half in PM)  ekg done    Continue topiramate   Decrease to just afternoon    Fell at home this morning  Scratched right knee  Medical assistants cleaned wound and placed bandaid        No orders of the defined types were placed in this encounter.          Harsha Thibodeaux MD

## 2024-09-19 ENCOUNTER — TELEPHONE (OUTPATIENT)
Dept: RHEUMATOLOGY | Facility: CLINIC | Age: 64
End: 2024-09-19

## 2024-09-19 NOTE — TELEPHONE ENCOUNTER
Forms (attending physician's statement) received from [employer] via fax. No signed HIPAA provided. Forms scanned and mailed to corporate/forms dept.

## 2024-09-25 ENCOUNTER — IMMUNIZATION (OUTPATIENT)
Dept: LAB | Age: 64
End: 2024-09-25
Attending: EMERGENCY MEDICINE
Payer: COMMERCIAL

## 2024-09-25 DIAGNOSIS — Z23 NEED FOR VACCINATION: Primary | ICD-10-CM

## 2024-09-25 PROCEDURE — 90471 IMMUNIZATION ADMIN: CPT

## 2024-09-25 PROCEDURE — 90480 ADMN SARSCOV2 VAC 1/ONLY CMP: CPT

## 2024-09-25 PROCEDURE — 90656 IIV3 VACC NO PRSV 0.5 ML IM: CPT

## 2024-09-26 ENCOUNTER — OFFICE VISIT (OUTPATIENT)
Dept: PHYSICAL MEDICINE AND REHAB | Facility: CLINIC | Age: 64
End: 2024-09-26
Payer: COMMERCIAL

## 2024-09-26 VITALS — BODY MASS INDEX: 29.77 KG/M2 | HEIGHT: 69 IN | WEIGHT: 201 LBS

## 2024-09-26 DIAGNOSIS — M75.22 TENDONITIS, BICIPITAL, LEFT: Primary | ICD-10-CM

## 2024-09-26 PROCEDURE — 99204 OFFICE O/P NEW MOD 45 MIN: CPT | Performed by: PHYSICAL MEDICINE & REHABILITATION

## 2024-09-26 PROCEDURE — 3008F BODY MASS INDEX DOCD: CPT | Performed by: PHYSICAL MEDICINE & REHABILITATION

## 2024-09-26 RX ORDER — METHYLPREDNISOLONE 4 MG
TABLET, DOSE PACK ORAL
Qty: 1 EACH | Refills: 0 | Status: SHIPPED | OUTPATIENT
Start: 2024-09-26

## 2024-09-26 RX ORDER — MONTELUKAST SODIUM 10 MG/1
TABLET ORAL
Qty: 90 TABLET | Refills: 0 | Status: SHIPPED | OUTPATIENT
Start: 2024-09-26

## 2024-09-26 NOTE — H&P
History and Physical    C/C:   Chief Complaint   Patient presents with    Shoulder Pain     Pt is re-establishing care. Presents with L shoulder aching pain. Denies T/N. Admits weakness. Rates pain 3/10. Denies imaging and PT. Symptoms began 2-3 months ago, denies injury. Denies injections. Takes Diclofenac 50mg PRN.     HPI: 64-year-old female with a history of Parkinson's disease, rheumatoid arthritis known to me for lower back disorder presents for left shoulder pain over the past 2 to 3 months without a clear inciting event.  She has significant abnormal parkinsonian movements and abnormal balance, falls at least once a week if not more.  That being said she does not recall a clear fall that resulted in left shoulder pain.  She localizes left shoulder pain along the proximal biceps tendon without radiation into the arm.  There is no associated numbness or tingling in the arm or hand.  She has had x-rays of the left shoulder in 2017 which were reported as normal.  When I asked her why she had the x-ray at that time she does not recall what happened at that time.  Left shoulder pain worsens when attempting to lift the left arm overhead, and with upper body dressing.    Since the last time I saw her she underwent bariatric surgery, and has lost a significant amount of weight.  She also underwent L4-L5 laminectomy and fusion in 2021; the surgery along with the weight loss have resolved her low back pain.    Unfortunately recently lost her job, losing insurance at the beginning of October.    Allergies:   Allergies   Allergen Reactions    Other HIVES     Reports side effect from DPT vaccine.  Other reaction(s): Rash/Hives/Urticaria    Seasonal Runny nose      Pertinent review of systems: Denies fever, chills, unintended weight loss, weakness left upper extremity.     Physical exam:  Ht 69\"   Wt 201 lb (91.2 kg)   LMP 04/18/2015 (Exact Date)   BMI 29.68 kg/m²     PE left shoulder exam:    Range of motion:  Forward  flexion: 160 degrees, end range pain  Abduction: 160 degrees, end range pain  Internal rotation: approximately T10, mild pain  External rotation: no restriction to external ROM    Provocative test:  Supraspinatus test: +  Neer's test: negative  AC joint tenderness: negative  Speed's test: +  Bicipital tenderness to palpation: +    Imaging: No imaging to review    Assessment and plan:  Right bicipital tendonitis  Rheumatoid arthritis, on chronic NSAIDs  Parkinson's disease, with significant abnormal parkinsonian movements    Recommend x-ray of the left shoulder, physical therapy for range of motion and eccentric elbow flexor strengthening, Medrol Dosepak.  Consider left biceps tendon sheath steroid injection under ultrasound guidance if no better.    F/u in 1-2 months if no better.     Cecilio Lee DO  Physical Medicine and Rehabilitation  [facility]

## 2024-09-26 NOTE — TELEPHONE ENCOUNTER
Patient last seen in Allergy for physician visit on 7/15/2024 for . . .    Penicillin allergy     Patient last seen in Allergy for physician visit on 2/17/2024 for . . .    Asthma, extrinsic, moderate persistent, uncomplicated Yes     Seasonal and perennial allergic rhinoconjunctivitis      Penicillin allergy      COVID-19 vaccine series completed      Requested Prescriptions   Pending Prescriptions Disp Refills    MONTELUKAST 10 MG Oral Tab [Pharmacy Med Name: MONTELUKAST SOD 10 MG TABLET] 90 tablet 0     Sig: TAKE 1 TABLET BY MOUTH EVERY DAY AT NIGHT       Corticosteroids / Long-Acting Bronchodilators Passed - 9/26/2024 12:06 AM        Passed - Appt in past 6 mos or next 3 mos     Recent Outpatient Visits              2 weeks ago Mild protein-calorie malnutrition (HCC)    [facility] Harsha Thibodeaux MD    Office Visit    2 weeks ago Environmental and seasonal allergies    [facility]    Nurse Only    1 month ago Environmental and seasonal allergies    [facility]    Nurse Only    1 month ago Routine physical examination    [facility] Dante Palafox MD    Office Visit    1 month ago Environmental and seasonal allergies    [facility]    Nurse Only          Future Appointments         Provider Department Appt Notes    Today Cecilio Lee DO [facility] Re-establishing care LOV 2/2/21/L shoulder pain/[employer] PPO    In 1 week [facility]     In 1 month [facility]     In 1 month Chucho Butcher MD [facility] 6 mo f/u    In 1 month [facility]     In  2 months Urmila Gan RD [facility]     In 2 months [facility]     In 4 months Dante Palafox MD [facility] 6 MONTH F/U    In 5 months Harsha Thibodeaux MD [facility]                        MONTELUKAST 10 MG Oral Tab 90 tablet 0 9/26/2024 --    Sig: TAKE 1 TABLET BY MOUTH EVERY DAY AT NIGHT    Sent to pharmacy as: Montelukast Sodium 10 MG Oral Tablet (Singulair)    E-Prescribing Status: Receipt confirmed by pharmacy (9/26/2024  8:01 AM CDT)      Pharmacy    [id number] - [place], [place] - [address] AT [address], [phone], [phone]     Prescription as above sent to pharmacy per protocol.

## 2024-09-29 ENCOUNTER — PATIENT MESSAGE (OUTPATIENT)
Dept: INTERNAL MEDICINE CLINIC | Facility: CLINIC | Age: 64
End: 2024-09-29

## 2024-09-30 ENCOUNTER — HOSPITAL ENCOUNTER (OUTPATIENT)
Dept: GENERAL RADIOLOGY | Facility: HOSPITAL | Age: 64
Discharge: HOME | End: 2024-09-30
Attending: PHYSICAL MEDICINE & REHABILITATION
Payer: COMMERCIAL

## 2024-09-30 DIAGNOSIS — M75.22 TENDONITIS, BICIPITAL, LEFT: ICD-10-CM

## 2024-09-30 PROCEDURE — 73030 X-RAY EXAM OF SHOULDER: CPT | Performed by: PHYSICAL MEDICINE & REHABILITATION

## 2024-09-30 NOTE — TELEPHONE ENCOUNTER
From: Lindsay Trotter  To: Dante Palafox  Sent: 9/29/2024 2:04 PM CDT  Subject: 8/16/24 appt coded wrong    Due to my many ailments, Dr. Palafox wants to see me every six months. I already had a Physical this year, so this 8/16/24 appointment should have been coded as a \"Follow-up visit' not a 'Preventative visit\", that is how it was coded in previous years. Can you please correct the billing so I can get this covered by my insurance.  Thank you,  Radha Trotter

## 2024-09-30 NOTE — TELEPHONE ENCOUNTER
The [place] attending physician statement received and logged for processing, sent mychart for tessa and fcr

## 2024-10-01 RX ORDER — CALCITRIOL 0.25 UG/1
0.25 CAPSULE, LIQUID FILLED ORAL DAILY
Qty: 90 CAPSULE | Refills: 1 | Status: SHIPPED | OUTPATIENT
Start: 2024-10-01

## 2024-10-01 NOTE — TELEPHONE ENCOUNTER
Please work with billing to change the coding and billing on this.  As per the patient's request, we can change it from a preventative visit to a routine follow-up with [place] diagnosis code of [id number].  It will be the same diagnoses that are listed in her note except for the routine physical exam; that diagnosis will be removed.  However, I do not entirely understand the patient's request.  We have not done a routine physical exam on her since 2022.  Perhaps the insurance is counting the GYN exam as her yearly physical.

## 2024-10-01 NOTE — TELEPHONE ENCOUNTER
Please review. Protocol Failed; No Protocol    Requested Prescriptions   Pending Prescriptions Disp Refills    CALCITRIOL 0.25 MCG Oral Cap [Pharmacy Med Name: CALCITRIOL 0.25 MCG CAPSULE] 90 capsule 1     Sig: TAKE 1 CAPSULE BY MOUTH EVERY DAY       There is no refill protocol information for this order            Future Appointments         Provider Department Appt Notes    In 4 days [facility]     In 1 month [facility]     In 1 month Chucho Butcher MD [facility] 6 mo f/u    In 1 month [facility]     In 2 months Urmila Gan RD [facility]     In 2 months [facility]     In 4 months [facility]    In 5 months Harsha Thibodeaux MD [facility]           Recent Outpatient Visits              5 days ago Tendonitis, bicipital, left    [facility] Cecilio Lee DO    Office Visit    2 weeks ago Mild protein-calorie malnutrition (HCC)    [facility] Harsha Thibodeaux MD    Office Visit    3 weeks ago Environmental and seasonal allergies    [facility]    Nurse Only    1 month ago Environmental and seasonal allergies    [facility]    Nurse Only    1 month ago Routine physical examination    [facility] Dante Palafox MD    Office Visit

## 2024-10-02 NOTE — TELEPHONE ENCOUNTER
RN called physician billing, spoke to Lauryn.   Provided written information from Dr. Palafox below.   A message will be sent to coding.   A response will be received in 7-10 business days confirming the coding change and at that time, patient will be contacted.     Shaka message sent to patient to inform.

## 2024-10-03 ENCOUNTER — OFFICE VISIT (OUTPATIENT)
Dept: RHEUMATOLOGY | Facility: CLINIC | Age: 64
End: 2024-10-03
Payer: COMMERCIAL

## 2024-10-03 VITALS
HEART RATE: 88 BPM | BODY MASS INDEX: 30 KG/M2 | SYSTOLIC BLOOD PRESSURE: 110 MMHG | DIASTOLIC BLOOD PRESSURE: 66 MMHG | HEIGHT: 69 IN

## 2024-10-03 DIAGNOSIS — G20.A1 PARKINSON'S DISEASE, UNSPECIFIED WHETHER DYSKINESIA PRESENT, UNSPECIFIED WHETHER MANIFESTATIONS FLUCTUATE (HCC): ICD-10-CM

## 2024-10-03 DIAGNOSIS — M17.0 BILATERAL PRIMARY OSTEOARTHRITIS OF KNEE: ICD-10-CM

## 2024-10-03 DIAGNOSIS — R26.89 IMBALANCE: ICD-10-CM

## 2024-10-03 DIAGNOSIS — G89.29 CHRONIC BILATERAL LOW BACK PAIN WITHOUT SCIATICA: ICD-10-CM

## 2024-10-03 DIAGNOSIS — Z51.81 THERAPEUTIC DRUG MONITORING: ICD-10-CM

## 2024-10-03 DIAGNOSIS — M06.9 RHEUMATOID ARTHRITIS, INVOLVING UNSPECIFIED SITE, UNSPECIFIED WHETHER RHEUMATOID FACTOR PRESENT (HCC): Primary | ICD-10-CM

## 2024-10-03 DIAGNOSIS — M54.50 CHRONIC BILATERAL LOW BACK PAIN WITHOUT SCIATICA: ICD-10-CM

## 2024-10-03 PROCEDURE — 99214 OFFICE O/P EST MOD 30 MIN: CPT | Performed by: INTERNAL MEDICINE

## 2024-10-03 NOTE — PROGRESS NOTES
Lindsay Trotter is a 64 year old female who presents for   Chief Complaint   Patient presents with    Rheumatoid Arthritis   .   HPI:     She is a pleasant 57 year old who has elevated RF >300 and foot pain - c/w seropositive  RA.      She has hadhx of  foot ?infections for 3-4 months and she saw poidatry and physical therapy. She then felt around chrismtas she had a red knot around where her arch was. She was stold it was an infetion and she it felt like it was walking on a ball of pins. She was given an antibiotic and it went away. When she went off of it, it cxam back. Then after that she got another round of infection shse got antoher one. She also felt it went away . /When it went away, she got it on her left foot. She got esr and crp - and it was high.   She told Dr. Palafox and neurologist at [facility] and it was not her parkinsosn'ss.   She ssaw podiatry yestrday and was fine. But yesterday evening it was back.   She was given clindamycin.   She felt quinn clindamycin helped the infection - sspots but not the joint pain. She has had the foot pain fro several years she attributed it to weight.   Around new years she had sudden jotin pain in her shoulders intermittently - and wrists. It happened agaoin in feb for a few dayss. It was sos bad with this falre up. Then it was gone for 2 -3 days.s Her left 2nd finger would hurt.   sshe never thought her feet got swollne during the time she could have had infection.   The foot pain is more when shse is walking. It doesn't bother her when she is sitting or at night. It'ss just with walking. It's the farooq of the foot. It was the sisde of the foot.   She has a hx of mensiscal tear repair.     1/15/2020 -   She got decompression sx of her back in 9/2019 - her back pain is much better 2/10 pain.   And bariatric sx - for gastric sleeve.   She's taking less parkinson's medication b/c she might have had more   Healing was ok - right sided area still hurts.   Not on pain  meds.   She's off the diclofenac as well.   Back sx was first and then the sleeve was done.   Not taking tramadol or gabapentin at this time. B/c she's better.   She is using the diclofenac gel 1 %   She has lost 40 pounds already!     5/13/2020  VIDEO VISIT  She had gastric sleeve sx - she's off gabpaentin and diclofenac - and she's not back on it.   richard still has aches and pains.   2 tyelnol helps for 4 hours.   She lost 70 pounds.   Her back is doing really well since back sx.   She has about 4-5/10 pain - all over. She feels stiff all over.   The back of her legs can hurt and back.   hes' been surviving with out diclfoeac but she would like to feel better. She's asking if she can restart.   Otherwise no specific area is swelling.   walkign outside.   Not able to exercise at gym  b/c of coronovirus restrictions.     9/15/2020  In person   She lost 80 pounds since surgery and doing great.   Her back is doing well.   She has more pain in the evenings. It's hard to move after dinner.   She went back on diclofeanc - twice aday   She's working from home .   No areas of swelling.   She's doing a  at the gym twice a week.   Pain is 3/10 - can go to 5/10 at night     3/15/2021  The lower back pain started hurting again at the end of the year. The pain is radiating down her legs.   She had another round of injections and it helped a little bit. She has pain when she sits too long   She has another round of injections this Wednesday.   If she is walking her back pain is 9/10 pain. She has a  and helping with core strengthening.   She has kept her weight off.   She has no other joint pain.       9/22/2021  Her left hand is swollen and hurting.   Her left 5th finger is really swollen and hurts.   Rest of her hands are fine.   She had back fusion in 4/2021. Fine now and recovered.  Her left hand is 2-3/10 pain. If she hold something It hurts.   She has no pain in back, did PT and several rounds of it. Her back  ffeels great.   The 2nd round of physical therapy she felt great.     3/23/2022  She's doing well.   Her back is doing well.   No flare ups.   She has lost 7 pounds since the begninning of the year.   Working from home still.   She is taking diclofenac 75mg bid -   She is having 1/10 pain.   She has right ankle deformity - and got a righta nkel brace from gigi Brooks at [facility] since 8/2021 -   She declined surgery , got a 2nd opinon at [facility] and declined tendon transfer  Is doidng PT - for 3 months and now renewed again and she hasn't fallen since. It's the Parkinsons, previous sx to right ankle and RA .   She has no pain in it - just difficulty with falling and balancewith it.     9/23/2022  She is having her right foot turn in and her left achilles tendon. Her left achilels tendonitis is more in the morning. Sometimes she has to wake up in the night and it hurts badly at night.   She has done PT.   She is falling b/c of this.   She is doing ok with the arthriits pain but her right foot is turing in and she is not doing well with this.   Her falls are usuall when she doenst' wear her braces.   She dropped 5 pounds in the last 3 months.     Her back has been fine.   Her pain is 2-3/10 pain.     She's going to go back with PT soon - it's helping her left foot pain in the last 2 weeks.     5/5/2023  She has another compressed disc /herniated disc in her back - started about 2months ago.   She is getting sx next week   Her fingers are ok.   But her left elbow and knees are bothering her.   Her left ahciles is not bad.   The left elbow is not constant - it's only if she leans on something.   The knees are 5/10 pain.   Her left knee cap can come off - julienne left one - she has this feeling.   She has 10/10 pain - with moving.   She saw dr. guzman and getting sx next weke - the pain is all the time -   Sitting she is ok. She can't sleep well.   She has shooting pains/sciatica     11/6/2023 -   Her left knee is painful for  the last 1-2 months.   Her back is much better post surgery. She just has issues with the knee.   Her other joints are fine.   She cont. To lose weight.     5/6/2024  She is losing weight still.   Her left knee is better.   Her back is good.   She still wants to get to goal weight is 180lbs.   No joint pain -     10/3/2024  She lost her job in July.   Her parkinson's is getting really bad and she has been working from home and she was under and accomadation from working from  home.   She is feeling her rheumatoid arthrititis is getting worse as well.   She is usign a full walker to get around   She can't write with her arthritis and parkinson's   She has more difficulty with workign with a computer - and holding a mouse.   Her pain is under control but while walking ehr knees are hurting.       Body mass index is 29.68 kg/m².      Current Outpatient Medications   Medication Sig Dispense Refill    calcitriol 0.25 MCG Oral Cap Take 1 capsule (0.25 mcg total) by mouth daily. 90 capsule 1    MONTELUKAST 10 MG Oral Tab TAKE 1 TABLET BY MOUTH EVERY DAY AT NIGHT 90 tablet 0    carbidopa-levodopa  MG Oral Tab TAKE 1 TABLET BY MOUTH FOUR TIMES DAILY TAKE AT 630AM, 11AM, 2PM, AND 7PM      Phentermine HCl 37.5 MG Oral Tab Take 1 tablet (37.5 mg total) by mouth every morning before breakfast. 30 tablet 5    TOPIRAMATE 100 MG Oral Tab TAKE 1 TABLET BY MOUTH TWICE A  tablet 1    PANTOPRAZOLE 40 MG Oral Tab EC TAKE 1 TABLET BY MOUTH BEFORE BREAKFAST 90 tablet 2    Tofacitinib Citrate ER (XELJANZ XR) 11 MG Oral Tablet 24 Hr Take 1 tablet by mouth daily. 90 tablet 1    methotrexate 2.5 MG Oral Tab TAKE 8 TABS WEEKLY BY MOUTH 96 tablet 1    diclofenac 50 MG Oral Tab EC Take 1 tablet (50 mg total) by mouth daily. 90 tablet 1    albuterol 108 (90 Base) MCG/ACT Inhalation Aero Soln INHALE 2 PUFFS BY INHALATION ROUTE EVERY 4 - 6 HOURS AS NEEDED 3 each 0    levothyroxine 112 MCG Oral Tab Take 1 tablet (112 mcg total) by mouth  before breakfast. 90 tablet 1    pravastatin 20 MG Oral Tab Take 1 tablet (20 mg total) by mouth nightly. 90 tablet 3    folic acid 1 MG Oral Tab Take 1 tablet (1 mg total) by mouth daily. 90 tablet 3    Hydrocod Navi-Chlorphe Navi ER 10-8 MG/5ML Oral Suspension Extended Release Take 5 mL by mouth 2 (two) times daily as needed. 115 mL 0    albuterol (2.5 MG/3ML) 0.083% Inhalation Nebu Soln Take 3 mL (2.5 mg total) by nebulization every 4 (four) hours as needed for Wheezing. 30 each 0    Boswellia-Glucosamine-Vit D (OSTEO BI-FLEX ONE PER DAY OR) Take by mouth As Directed.      Amantadine HCl 100 MG Oral Cap Take 1 capsule (100 mg total) by mouth daily.      omega-3 fatty acids 1000 MG Oral Cap Take 1,000 mg by mouth daily.      TURMERIC OR Take by mouth 2 (two) times a day.      Multiple Vitamins-Minerals (BARIATRIC FUSION) Oral Chew Tab Chew 1 tablet by mouth in the morning, at noon, in the evening, and at bedtime.      rasagiline mesylate 1 MG Oral Tab Take 1 tablet (1 mg total) by mouth daily.      methylPREDNISolone (MEDROL) 4 MG Oral Tablet Therapy Pack Take as directed 1 each 0      Past Medical History:    Acute torn meniscus of knee    ARTHROSCOPY OF KNEE    Amenorrhea    Asthma (HCC)    Back problem    Chicken pox    Chronic cough    Disorder of thyroid    Extrapyramidal syndrome    MEDICATION MGMT. W/ PARKINSONS FEATURES    Greater trochanteric bursitis, left    High cholesterol    History of blood transfusion    CDH    Incontinence    Infertility, female    Parkinson disease (HCC)    Parkinson's disease (HCC)    Rheumatoid arthritis (HCC)    S/P laparoscopic sleeve gastrectomy    for weight loss    Sleep apnea    cpap    Thyroid disease    Tibia/fibula fracture    OPEN REDUCTION INTERNAL FIXATION    Urinary incontinence    after childbirth    Viral conjunctivitis    Visual impairment    glasses      Past Surgical History:   Procedure Laterality Date    Back surgery      Back surgery  04/21/2021    Back  surgery  04/25/2023    compressed disc, removal of bone    Colonoscopy  06/11/2019    return in 5 years    Colonoscopy N/A 06/11/2019    Procedure: COLONOSCOPY;  Surgeon: Dago Heart MD;  Location: [facility] ENDOSCOPY    Colposcopy, cervix w/upper adjacent vagina; w/biopsy(s), cervix      D & c      for extended period    D & c      for extended period    Foot surgery Left     Had right foot done recently.    Knee arthroscopy Right     Lap sleeve gastrectomy  12/02/2019    Dr Rios, [facility]    Laparoscopy,pelvic,biopsy  1990    for infertility    Laparoscopy,pelvic,biopsy  1990    for infertility    Other surgical history Right 12/03/2015    Other surgical history  12/03/2015    Thyroidectomy      Total       Family hx - reviewed   Social History:  reviewed     REVIEW OF SYSTEMS:   Review Of Systems:  Fatigue  Constitutional:No fever, no change in weight or appetitie  Derm: get rashes on legs - on bottom of her legss after itching even after lotion, x 1-2 months.   Joint/Muscluskeltal: see HPI,   All other ROS are negative.     EXAM:   /66 (BP Location: Right arm, Patient Position: Sitting, Cuff Size: large)   Pulse 88   Ht 5' 9\" (1.753 m)   LMP 04/18/2015 (Exact Date)   BMI 29.68 kg/m²   HEENT:clear sclera PERRLA, pleasant, no acute distress, no CAD, no neck tendnerness, good ROM,   No rashes  CVS: RRR, no murmurs  RS: CTAB, no crackles, no rhonchi  ABD: Soft Non tender,   Joint exam:   Losing weight - thin  parkinsons movements. Using walker  Not tender in 1-5th mcps , no synovitis   Not tender in elbows or shoulders   Tender in left knee.   Not tender right knees   Not tender in b/l ankles  no lower back tendnress in midline -   No edema      Component      Latest Ref Rng 5/1/2024   WBC      4.0 - 11.0 x10(3) uL 4.5    RBC      3.80 - 5.30 x10(6)uL 4.10    Hemoglobin      12.0 - 16.0 g/dL 13.4    Hematocrit      35.0 - 48.0 % 40.9    MCV      80.0 - 100.0 fL 99.8    MCH      26.0 - 34.0  pg 32.7    MCHC      31.0 - 37.0 g/dL 32.8    RDW      11.0 - 15.0 % 14.7    RDW-SD      35.1 - 46.3 fL 54.4 (H)    Platelet Count      150.0 - 450.0 10(3)uL 197.0    CREATININE      0.55 - 1.02 mg/dL 1.07 (H)    EGFR      >=60 mL/min/1.73m2 59 (L)    SED RATE      0 - 30 mm/Hr 12    C-REACTIVE PROTEIN      <1.00 mg/dL <0.40    AST (SGOT)      <=34 U/L 24    ALT (SGPT)      10 - 49 U/L <7 (L)    TSH      0.550 - 4.780 mIU/mL 4.314       Legend:  (H) High  (L) Low    2/9/2013 - shana 1:40  8/18/2014 - MRI left foot -   1. There is localized edema within the intermetatarsal muscle between the  second and third metatarsals, without fluid collection, compatible with  low-grade strain.  2. Low-grade strain pattern in the abductor digiti minimi muscle belly  close to the calcaneus.  3. Prominent tendinopathy of the peroneus longus tendon as it courses  around the lateral malleolus.  There is a calcaneal spur at the insertion of the plantar  fascia. There is minimal increased marrow signal within  the base of the second-fifth metatarsals likely  representing a change related to positioning     8/18/2014 - MRI right foot  There is a chronic healed fracture deformity of the proximal shaft of the  fifth metatarsal now present which has resulted in broadening of the  proximal fifth metatarsal shaft. There is a slight medial bowing deformity  of the fifth metatarsal there is a result of the previous fracture but the  distal shaft of the fifth metatarsal is parallel with the distal shaft of  the fourth metatarsal and alignment is near-anatomic.  There is subtle bone marrow edema in the base of the third metatarsal which  is probably degenerative although chronic stress response could also cause  this appearance. Otherwise normal marrow signal and alignment. Normal  marrow signal in the sesamoid bones without evidence of sesamoid avascular  necrosis, fracture or sesamoiditis.  1. Moderate inframalleolar peroneus longus  tendinosis.  2. 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  3. Abductor digit minimi muscle atrophy compatible with Cárdenas neuropathy.  4. Chronic healed fracture deformity of the fifth metatarsal.  5. Mild bone marrow edema in the base of the third metatarsal which could  be degenerative or related to chronic stress response.    Reviewed notes from 4/2008 - chronic plantar fasciitis  1/23/2013 - mri brain - unremarkable,   3/13/2012 - us venous doppler - right - normal   4/30/2009 - chest xray - unchange, mildly prominent pulmonary markings  9/27/2007 - b/l knee ortho xray - minimla degenerative findings in the knee  9/18/2004 - mri lumbar spine - mild disc dessication of l2-3 otherwise unremarkable study wihtout evidenc of tfocal disc herniation or spinal stenosis  10/6/2006 - fev1/fvc is 87  - dlco is 65%,   2/14/2014 - left knee cx - strep mitis /virins    7/22/2015 - b/l knee xray   Bilateral moderate 2 compartmental knee osteoarthritis, more  advanced on the right. Changes have pro\    5/16/2019 - mri left knee     Tricompartmental osteoarthritis, severe in the medial compartment with a complex degenerative tear of the medial meniscus.     Subchondral insufficiency fracture of the medial tibial plateau without collapse of the articular surface.    8/5/2019 - mri lumbar spine   1. Multilevel degenerative changes of the lumbar spine as detailed.  Findings have progressed since October, 2017.  Notable levels as follows:     2. L3-L4:  Severe multifactorial spinal canal stenosis, which is exacerbated by a medially directed 7-8 mm synovial cyst arising from the right facet joint.  Mild bilateral neural foraminal stenosis.  3. L4-L5:  Mild-to-moderate left lateral recess with mild to moderate right and mild left neural foraminal stenosis.     4. Edema within the L3-L4 interspinous ligament, which may be degenerative in nature.     5. Lesser incidental findings as above.    4/7/2023- mri lumbar  spine  1. L3-4:  Newly developed large right paracentral disc herniation resulting in moderate to severe asymmetric central narrowing accentuated towards the right, severe right and moderate left lateral narrowing.  A component extending into the right foramen   with smaller associated osteophyte results in moderate right foraminal narrowing.  Mild left foraminal narrowing.  Right-sided laminectomy.   2. L4-5:  Laminectomy.  Posterior instrumentation and interbody cage for fusion.  Mild bilateral foraminal narrowing.  No significant central narrowing.   3. L5-S1:  Advanced left facet arthrosis.  Newly developed disc osteophyte complex extending into left foramen and resulting in moderate left foraminal narrowing.     12/15/2023 - left knee xray   1. Primary osteoarthritic changes of the left knee, most pronounced with respect of the medial and patellofemoral compartments.      2. No radiographically visible acute osseous injury of the left knee.     ASSESSMENT AND PLAN:   Lindsay Trotter is a 64 year old female who presents for   Chief Complaint   Patient presents with    Rheumatoid Arthritis     1.Seropositive Rheumatoid arthriits  -  Positive AMINAH 1:80, , pos. CCP polyarthralgia with rash on feet - now positive ccp abs -   Stable - Except left knee pain-   Having extreme imbalance and difficulty with walking due to RA and parkison's -   - cont. Methotrexate - 20mg a week   Cont. xeljanz 11mg a day -   On xeljanz since 2/2018 - doing better -   She now doesn't have a job - it is very difficult for her to go back to work given RA and parkison's combined - difficulty with walking , balance and joitn pain with activity   Some advancing osteoarthritis from the RA in her knees as we ll.   Requires medications that control her RA   She will not be able to go in for work and perform even computer fucntions due to her RA with parkison's   I agree that she would qualify for disability -   She should get  long term  disabiltiy given the severity of hre RA and parkinson's and osteoartrhritis of knees and back  -cont  diclfoenac 50mg bid  B/c of f gastric sleeve - can taper to once a day b/c of slightly elevated creatinine - d/w her again today about this -   Take tyelenol ES two tablets twice a day -   diclofenac gel 1 % topical helps. - refill     Acute pain in left knee = slight flare of her left knee   S./p left knee injectiosn on 5/5/2023, 11/6/2023   S/p PT for left knee and gait     - rtc in 6 months.   - labs every 4 months - -     In the past:  Off  leflunomide- she noticed no improvement of her arthriits on leflunomide -   - no improvement on ssz   She's ont able to use subq injections b/c of her really bad parkinson's not able to hold down a pen in one place for very long b/c of severe hand tremors.    Elevated inflammatory markers are better on xeljanz.   - difficutly to distinguish from her parkinson's but her crp is elevated still.   Tramadol 50mg bid prn   S/p burst prednisone helped with flarea       Other hx:  - mri foot in the past showed changes in mtps of left foot that could be early RA - but non specific and not synovitis   - hx of strep viridans in knee cutlrue in the past - ? - concern for any ongoing or susceptibility to infection - will have to monitor her -   L3-4     2.. Lower back pain classic for spinal stenosis - f/u physiatry - braod based disc bulgd in L3-4 - recurrent pain -   S/p L2-3-4 minimally invasive laminectomy - 9/9/2019 -   Lumbar fusion 4/2021 - feelis better. And with PT - no back pain -       3. OA knees - s/p cortisone injections b/l 7/22/2015 by ortho  Diclofenac gel is really helping her. -   4. Hx of parkinson's - severe - difficulty with walking - agree with neurology on disabilty -   5. S/p gastric sleeve 12/2/2019 - lost 70 pounds -   6. Right ankle brace - agree with continueing PT , hold off on surgery for now  7. osteoporsiois - next DEXA due in 6/2023 -   8. Slighlty low  creatine- try to stop the diclfoenac     Summary:  1. Cont. . methotrexate - 8 tablets a week and folic acid 1mg a day   2. Cont.  xlejanz 11mg a day    3.  Return to clinic in  2-4  months -   4. .Check labs every 4  months - in January .   5. Diclofenac gel 1 % for left knee.    6. Tylenol ES two tablets twice ad ay         - overall she's been stable on xeljanz and methotrexate 8 tablets a  week -   I whole heartedly agree that she should get long term disability given her rheumatoid arthritis with her parkison's. Her movemetn disorder with her inflammatory arthritis makes it difficulty for her to use her hands consistently and safely for most work. 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       Chucho Butcher MD  10/3/2024   4:01 PM

## 2024-10-03 NOTE — PATIENT INSTRUCTIONS
1. Cont. . methotrexate - 8 tablets a week and folic acid 1mg a day   2. Cont.  xlejanz 11mg a day    3.  Return to clinic in  2-4  months -   4. .Check labs every 4  months - in January .   5. Diclofenac gel 1 % for left knee.    6. Tylenol ES two tablets twice ad ay

## 2024-10-03 NOTE — TELEPHONE ENCOUNTER
Called patient to get details for disability forms    Type of Leave:  Short term Disability  Reason for Leave: Rheumatoid arthritis  Start date of leave: 07/24/24  End date of leave: 07/23/25  How many flare ups per month/length?:  How many appts per month/length?:   Was Fee and Turnaround info Given?: No

## 2024-10-03 NOTE — TELEPHONE ENCOUNTER
D/w pt. That she does not want to apply for short term disability with [facility] b/c [facility]. So she does NOT want short term disability paperwork filled out.   If it's long term disability paperwork she would like that filled out from [facility] -   Can you clarify what forms you have - she is pretty sure she sent only the long term disability forms

## 2024-10-03 NOTE — TELEPHONE ENCOUNTER
Harshad Trejo,    Patient is requesting Short term disability for rheumatoid arthritis with a start date of 07/24/24 ending 07/23/25.     Do you support?    Thank you  Marisol

## 2024-10-03 NOTE — TELEPHONE ENCOUNTER
Pt. Has a follow up appt. On 11/6 - am not sure the reason for a 1 year off of work at this time - can we have her do a visit today instead at [place] at 3:30 or 4:30 pm/. There are some openings - to reviewe the reasons for disability and then can submit documentation for it

## 2024-10-05 ENCOUNTER — NURSE ONLY (OUTPATIENT)
Dept: ALLERGY | Facility: CLINIC | Age: 64
End: 2024-10-05
Payer: COMMERCIAL

## 2024-10-05 DIAGNOSIS — J30.89 ENVIRONMENTAL AND SEASONAL ALLERGIES: Primary | ICD-10-CM

## 2024-10-05 PROCEDURE — 95117 IMMUNOTHERAPY INJECTIONS: CPT | Performed by: ALLERGY & IMMUNOLOGY

## 2024-10-07 NOTE — TELEPHONE ENCOUNTER
Per [employer] message on 10/4/24 patient no longer wants short term disability forms completed. See documentation below from provider as well. Forms will be placed in Archive folder.

## 2024-10-15 NOTE — TELEPHONE ENCOUNTER
LOV: 10/3/24  Future Appointments   Date Time Provider Department Center   11/2/2024 11:00 AM EC [facility]   11/6/2024  8:40 AM Chucho Butcher MD [facility]   11/23/2024 11:00 AM EC [facility]   12/11/2024  4:30 PM Urmila Gan RD [address]   12/21/2024 11:00 AM EC [facility]   2/17/2025 10:00 AM Dante Palafox MD [facility]   3/3/2025 12:30 PM Harsha Thibodeaux MD [address]     Summary:  1. Cont. . methotrexate - 8 tablets a week and folic acid 1mg a day   2. Cont.  xlejanz 11mg a day    3.  Return to clinic in  2-4  months -   4. .Check labs every 4  months - in January .   5. Diclofenac gel 1 % for left knee.    6. Tylenol ES two tablets twice ad ay            - overall she's been stable on xeljanz and methotrexate 8 tablets a  week -   I whole heartedly agree that she should get long term disability given her rheumatoid arthritis with her parkison's. Her movemetn disorder with her inflammatory arthritis makes it difficulty for her to use her hands consistently and safely for most work. 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         Chucho Butcher MD  10/3/2024   4:01 PM

## 2024-10-30 ENCOUNTER — TELEPHONE (OUTPATIENT)
Dept: RHEUMATOLOGY | Facility: CLINIC | Age: 64
End: 2024-10-30

## 2024-10-30 NOTE — TELEPHONE ENCOUNTER
Left message last office visit note stated to follow up in 2 to 4 months. Pt can reschedule appointments via My Chart or call office back at [phone].    Summary:  1. Cont. . methotrexate - 8 tablets a week and folic acid 1mg a day   2. Cont.  xlejanz 11mg a day    3.  Return to clinic in  2-4  months -   4. .Check labs every 4  months - in January .   5. Diclofenac gel 1 % for left knee.    6. Tylenol ES two tablets twice ad ay            - overall she's been stable on xeljanz and methotrexate 8 tablets a  week -   I whole heartedly agree that she should get long term disability given her rheumatoid arthritis with her parkison's. Her movemetn disorder with her inflammatory arthritis makes it difficulty for her to use her hands consistently and safely for most work. 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         Chuhco Butcher MD  10/3/2024   4:01 PM

## 2024-10-30 NOTE — TELEPHONE ENCOUNTER
Patient is scheduled and is asking if Dr. HAYES still wants her to come in. [facility] and is scheduled 11/6.

## 2024-10-31 DIAGNOSIS — M06.9 RHEUMATOID ARTHRITIS, INVOLVING UNSPECIFIED SITE, UNSPECIFIED WHETHER RHEUMATOID FACTOR PRESENT (HCC): ICD-10-CM

## 2024-10-31 NOTE — TELEPHONE ENCOUNTER
Patient calling for refill for   Tofacitinib Citrate ER (XELJANZ XR)   11 MG Oral Tablet 24 Hr     [facility] SPECIAL[facility] - Zoe [place]STER [phone], [phone]

## 2024-11-02 ENCOUNTER — NURSE ONLY (OUTPATIENT)
Dept: ALLERGY | Facility: CLINIC | Age: 64
End: 2024-11-02
Payer: COMMERCIAL

## 2024-11-02 DIAGNOSIS — J30.89 ENVIRONMENTAL AND SEASONAL ALLERGIES: Primary | ICD-10-CM

## 2024-11-02 PROCEDURE — 95117 IMMUNOTHERAPY INJECTIONS: CPT | Performed by: ALLERGY & IMMUNOLOGY

## 2024-11-04 RX ORDER — TOFACITINIB 11 MG/1
1 TABLET, FILM COATED, EXTENDED RELEASE ORAL DAILY
Qty: 90 TABLET | Refills: 1 | OUTPATIENT
Start: 2024-11-04

## 2024-11-05 RX ORDER — TOFACITINIB 11 MG/1
1 TABLET, FILM COATED, EXTENDED RELEASE ORAL DAILY
Qty: 90 TABLET | Refills: 1 | Status: SHIPPED | OUTPATIENT
Start: 2024-11-05

## 2024-11-23 ENCOUNTER — NURSE ONLY (OUTPATIENT)
Dept: ALLERGY | Facility: CLINIC | Age: 64
End: 2024-11-23

## 2024-11-23 DIAGNOSIS — J30.89 ENVIRONMENTAL AND SEASONAL ALLERGIES: Primary | ICD-10-CM

## 2024-11-23 PROCEDURE — 95117 IMMUNOTHERAPY INJECTIONS: CPT | Performed by: ALLERGY & IMMUNOLOGY

## 2024-11-27 ENCOUNTER — TELEPHONE (OUTPATIENT)
Dept: SURGERY | Facility: CLINIC | Age: 64
End: 2024-11-27

## 2024-12-06 NOTE — TELEPHONE ENCOUNTER
Requested Prescriptions     Pending Prescriptions Disp Refills    METHOTREXATE 2.5 MG Oral Tab [Pharmacy Med Name: METHOTREXATE 2.5 MG TABLET] 96 tablet 1     Sig: TAKE 8 TABS WEEKLY BY MOUTH     LOV: 10/3/24  Future Appointments   Date Time Provider Department Center   12/18/2024 12:00 PM Urmila Gan RD [address]   12/21/2024 11:00 AM [facility]   1/18/2025 10:50 AM [facility]   2/15/2025 11:00 AM [facility]   2/17/2025 10:00 AM Dante Palafox MD [facility]   2/19/2025 10:40 AM Chucho Butcher MD [facility] [place]   3/3/2025 12:30 PM Harsha Thibodeaux MD [address]   3/15/2025 11:00 AM [facility]     Labs:   Component      Latest Ref [facility] 8/19/2024   Glucose      70 - 99 mg/dL 88    Sodium      136 - 145 mmol/L 141    Potassium      3.5 - 5.1 mmol/L 3.7    Chloride      98 - 112 mmol/L 111    Carbon Dioxide, Total      21.0 - 32.0 mmol/L 25.0    ANION GAP      0 - 18 mmol/L 5    BUN      9 - 23 mg/dL 17    CREATININE      0.55 - 1.02 mg/dL 1.13 (H)    BUN/CREATININE RATIO      10.0 - 20.0  15.0    CALCIUM      8.7 - 10.4 mg/dL 9.4    CALCULATED OSMOLALITY      275 - 295 mOsm/kg 293    EGFR      >=60 mL/min/1.73m2 55 (L)    ALT (SGPT)      10 - 49 U/L <7 (L)    AST (SGOT)      <34 U/L 17    ALKALINE PHOSPHATASE      50 - 130 U/L 80    Total Bilirubin      0.2 - 1.1 mg/dL 0.5    PROTEIN, TOTAL      5.7 - 8.2 g/dL 6.8    Albumin      3.2 - 4.8 g/dL 4.3    Globulin      2.0 - 3.5 g/dL 2.5    A/G Ratio      1.0 - 2.0  1.7    Patient Fasting for CMP? Yes    WBC      4.0 - 11.0 x10(3) uL 5.7    RBC      3.80 - 5.30 x10(6)uL 4.09    Hemoglobin      12.0 - 16.0 g/dL 13.7    Hematocrit      35.0 - 48.0 % 40.8    MCV      80.0 - 100.0 fL 99.8    MCH      26.0 - 34.0 pg 33.5    MCHC      31.0 - 37.0 g/dL 33.6    RDW      11.0 - 15.0 % 15.0    RDW-SD      35.1 - 46.3 fL 54.7 (H)    Platelet Count       150.0 - 450.0 10(3)uL 204.0    C-REACTIVE PROTEIN      <1.00 mg/dL <0.40    SED RATE      0 - 30 mm/Hr 5       Legend:  (H) High  (L) Low  Summary:  1. Cont. . methotrexate - 8 tablets a week and folic acid 1mg a day   2. Cont.  xlejanz 11mg a day    3.  Return to clinic in  2-4  months -   4. .Check labs every 4  months - in January .   5. Diclofenac gel 1 % for left knee.    6. Tylenol ES two tablets twice ad ay

## 2024-12-09 RX ORDER — METHOTREXATE 2.5 MG/1
TABLET ORAL
Qty: 96 TABLET | Refills: 1 | Status: SHIPPED | OUTPATIENT
Start: 2024-12-09

## 2024-12-11 ENCOUNTER — LAB ENCOUNTER (OUTPATIENT)
Dept: LAB | Facility: HOSPITAL | Age: 64
End: 2024-12-11
Attending: INTERNAL MEDICINE
Payer: COMMERCIAL

## 2024-12-11 DIAGNOSIS — M06.9 RHEUMATOID ARTHRITIS, INVOLVING UNSPECIFIED SITE, UNSPECIFIED WHETHER RHEUMATOID FACTOR PRESENT (HCC): ICD-10-CM

## 2024-12-11 DIAGNOSIS — Z51.81 THERAPEUTIC DRUG MONITORING: ICD-10-CM

## 2024-12-11 LAB
ALT SERPL-CCNC: <7 U/L
AST SERPL-CCNC: 23 U/L (ref ?–34)
CREAT BLD-MCNC: 1.03 MG/DL
CRP SERPL-MCNC: <0.4 MG/DL (ref ?–1)
DEPRECATED RDW RBC AUTO: 53.6 FL (ref 35.1–46.3)
EGFRCR SERPLBLD CKD-EPI 2021: 61 ML/MIN/1.73M2 (ref 60–?)
ERYTHROCYTE [DISTWIDTH] IN BLOOD BY AUTOMATED COUNT: 14.6 % (ref 11–15)
ERYTHROCYTE [SEDIMENTATION RATE] IN BLOOD: 19 MM/HR
HCT VFR BLD AUTO: 41.2 %
HGB BLD-MCNC: 13.5 G/DL
MCH RBC QN AUTO: 33.1 PG (ref 26–34)
MCHC RBC AUTO-ENTMCNC: 32.8 G/DL (ref 31–37)
MCV RBC AUTO: 101 FL
PLATELET # BLD AUTO: 226 10(3)UL (ref 150–450)
RBC # BLD AUTO: 4.08 X10(6)UL
WBC # BLD AUTO: 4.4 X10(3) UL (ref 4–11)

## 2024-12-11 PROCEDURE — 84450 TRANSFERASE (AST) (SGOT): CPT

## 2024-12-11 PROCEDURE — 85027 COMPLETE CBC AUTOMATED: CPT

## 2024-12-11 PROCEDURE — 86140 C-REACTIVE PROTEIN: CPT

## 2024-12-11 PROCEDURE — 85652 RBC SED RATE AUTOMATED: CPT

## 2024-12-11 PROCEDURE — 36415 COLL VENOUS BLD VENIPUNCTURE: CPT

## 2024-12-11 PROCEDURE — 84460 ALANINE AMINO (ALT) (SGPT): CPT

## 2024-12-11 PROCEDURE — 82565 ASSAY OF CREATININE: CPT

## 2024-12-12 DIAGNOSIS — E66.9 OBESITY, UNSPECIFIED: ICD-10-CM

## 2024-12-12 RX ORDER — TOPIRAMATE 100 MG/1
100 TABLET, FILM COATED ORAL 2 TIMES DAILY
Qty: 180 TABLET | Refills: 1 | Status: SHIPPED | OUTPATIENT
Start: 2024-12-12

## 2024-12-16 ENCOUNTER — TELEPHONE (OUTPATIENT)
Dept: SURGERY | Facility: CLINIC | Age: 64
End: 2024-12-16

## 2024-12-16 RX ORDER — LEVOTHYROXINE SODIUM 112 UG/1
112 TABLET ORAL
Qty: 90 TABLET | Refills: 3 | Status: SHIPPED | OUTPATIENT
Start: 2024-12-16

## 2024-12-17 NOTE — TELEPHONE ENCOUNTER
Refill Passed Per Protocol    Requested Prescriptions   Pending Prescriptions Disp Refills    LEVOTHYROXINE 112 MCG Oral Tab [Pharmacy Med Name: LEVOTHYROXINE 112 MCG TABLET] 90 tablet 1     Sig: TAKE 1 TABLET BY MOUTH BEFORE BREAKFAST.       Thyroid Medication Protocol Passed - 12/16/2024 11:50 PM        Passed - TSH in past 12 months        Passed - Last TSH value is normal     Lab Results   Component Value Date    TSH 1.842 08/19/2024                 Passed - In person appointment or virtual visit in the past 12 mos or appointment in next 3 mos     Recent Outpatient Visits              3 weeks ago Environmental and seasonal allergies [J30.89]    [facility]    Nurse Only    1 month ago Environmental and seasonal allergies [J30.89]    [facility]    Nurse Only    2 months ago Environmental and seasonal allergies    [facility]    Nurse Only    2 months ago Rheumatoid arthritis, involving unspecified site, unspecified whether rheumatoid factor present ([facility])    [facility] [address] [place] Chucho Butcher MD    Office Visit    2 months ago Tendonitis, bicipital, left    [facility] Cecilio Lee DO    Office Visit          Future Appointments         Provider Department Appt Notes    In 2 days Urmila Gan RD [facility] Sign benefit summary form    In 5 days [facility]     In 1 month [facility]     In 1 month Chucho Butcher MD [facility] 6 mo f/u    In 2 months [facility]     In 2 months Dante Palafxo MD [facility],  [place] 6 MONTH F/U    In 2 months [facility]arsha [facility]ibodeaux MD [facility]     In 2 months [facility]                          Future Appointments         Provider Department Appt Notes    In 2 days Urmila Gan RD [facility] Sign benefit summary form    In 5 days [facility]     In 1 month [facility]     In 1 month Ch[facility]o Butcher MD [facility] 6 mo f/u    In 2 months [facility]     In 2 months [facility] 6 MONTH F/U    In 2 months Harsha Thibodeaux MD [facility]     In 2 months [facility]           Recent Outpatient Visits              3 weeks ago Environmental and seasonal allergies [J30.89]    [facility]    Nurse Only    1 month ago Environmental and seasonal allergies [J30.89]    [facility]    Nurse Only    2 months ago Environmental and seasonal allergies    [facility]    Nurse Only    2 months ago Rheumatoid arthritis, involving unspecified site, unspecified whether rheumatoid factor present (HCC)    [facility] [place] Palaniswami, Purani, MD    Office Visit    2 months ago Tendonitis, bicipital, left    [facility] Cecilio Lee DO    Office Visit

## 2024-12-18 ENCOUNTER — OFFICE VISIT (OUTPATIENT)
Dept: SURGERY | Facility: CLINIC | Age: 64
End: 2024-12-18
Payer: COMMERCIAL

## 2024-12-18 VITALS — HEIGHT: 69 IN | WEIGHT: 200.19 LBS | BODY MASS INDEX: 29.65 KG/M2

## 2024-12-18 DIAGNOSIS — E66.3 OVERWEIGHT (BMI 25.0-29.9): Primary | ICD-10-CM

## 2024-12-18 DIAGNOSIS — E78.5 HYPERLIPIDEMIA, UNSPECIFIED HYPERLIPIDEMIA TYPE: ICD-10-CM

## 2024-12-18 DIAGNOSIS — E44.1 MILD PROTEIN-CALORIE MALNUTRITION (HCC): ICD-10-CM

## 2024-12-18 PROCEDURE — 3008F BODY MASS INDEX DOCD: CPT

## 2024-12-18 PROCEDURE — 97803 MED NUTRITION INDIV SUBSEQ: CPT

## 2024-12-18 NOTE — PROGRESS NOTES
[facility] [place] [facility]  [address]  [facility] [address]  Dept: [phone]  Loc: [phone]    12/18/24      Bariatric Follow-up Nutrition Session    Lindsay Trotter is a 64 year old female.     Assessment     Procedure:  Vertical Sleeve Gastrectomy  Here for medical weight management: yes  Revision:  n/a  Surgery Date:  12/2/19  Medical Diagnosis:  obesity grade I    Labs:  Lab Results   Component Value Date    GLU 88 08/19/2024    BUN 17 08/19/2024    BUNCREA 15.0 08/19/2024    CREATSERUM 1.03 (H) 12/11/2024    ANIONGAP 5 08/19/2024    GFRNAA 56 (L) 03/23/2022    GFRAA 64 03/23/2022    CA 9.4 08/19/2024    OSMOCALC 293 08/19/2024    ALKPHO 80 08/19/2024    AST 23 12/11/2024    ALT <7 (L) 12/11/2024    ALKPHOS 69 08/31/2013    BILT 0.5 08/19/2024    TP 6.8 08/19/2024    ALB 4.3 08/19/2024    GLOBULIN 2.5 08/19/2024    AGRATIO 0.9 (L) 08/31/2013     08/19/2024    K 3.7 08/19/2024     08/19/2024    CO2 25.0 08/19/2024     Lab Results   Component Value Date    MG 2.3 07/18/2019      Phosphorus (mg/dL)   Date Value   07/18/2019 3.4       Thyroid:    Lab Results   Component Value Date    TSH 1.842 08/19/2024    TSH 4.314 05/01/2024    TSH 7.196 (H) 03/20/2024    T4F 1.2 08/08/2020       Iron Panel:  Lab Results   Component Value Date    IRON 62 07/18/2019    TRANSFERRIN 269 07/18/2019    TIBCP 401 07/18/2019    SAT 15 07/18/2019       CBC:  Lab Results   Component Value Date    WBC 4.4 12/11/2024    WBC 5.7 08/19/2024    WBC 4.5 05/01/2024     Lab Results   Component Value Date    HGB 13.5 12/11/2024    HGB 13.7 08/19/2024    HGB 13.4 05/01/2024      Lab Results   Component Value Date    .0 12/11/2024    .0 08/19/2024    .0 05/01/2024       Diabetes:    Lab Results   Component Value Date     05/20/2020    A1C 5.6 05/20/2020       Lipid Panel:  Lab Results   Component Value Date    CHOLEST 205 (H) 08/19/2024    TRIG 46  08/19/2024    HDL 85 (H) 08/19/2024     (H) 08/19/2024    VLDL 8 08/19/2024    NONHDLC 120 08/19/2024    CALCNONHDL 135 (H) 05/28/2016        Vitamins/Minerals:  Lab Results   Component Value Date    B12 1,875 (H) 08/19/2024    VITB12 432 05/28/2016     Lab Results   Component Value Date    VITD 55.4 08/19/2024    MVMX48MZ 21.5 08/28/2015     Lab Results   Component Value Date/Time    THIAMINE 199 (H) 01/21/2023 10:39 AM      Lab Results   Component Value Date/Time    VITB1 148.5 12/15/2023 08:56 AM     Lab Results   Component Value Date/Time    FOLIC >20.0 07/18/2019 04:39 PM        Meds:     Current Outpatient Medications:     levothyroxine 112 MCG Oral Tab, Take 1 tablet (112 mcg total) by mouth before breakfast., Disp: 90 tablet, Rfl: 3    TOPIRAMATE 100 MG Oral Tab, TAKE 1 TABLET BY MOUTH TWICE A DAY, Disp: 180 tablet, Rfl: 1    METHOTREXATE 2.5 MG Oral Tab, TAKE 8 TABS WEEKLY BY MOUTH, Disp: 96 tablet, Rfl: 1    Tofacitinib Citrate ER (XELJANZ XR) 11 MG Oral Tablet 24 Hr, Take 1 tablet by mouth daily., Disp: 90 tablet, Rfl: 1    DICLOFENAC 50 MG Oral Tab EC, TAKE 1 TABLET BY MOUTH EVERY DAY, Disp: 90 tablet, Rfl: 1    calcitriol 0.25 MCG Oral Cap, Take 1 capsule (0.25 mcg total) by mouth daily., Disp: 90 capsule, Rfl: 1    MONTELUKAST 10 MG Oral Tab, TAKE 1 TABLET BY MOUTH EVERY DAY AT NIGHT, Disp: 90 tablet, Rfl: 0    methylPREDNISolone (MEDROL) 4 MG Oral Tablet Therapy Pack, Take as directed, Disp: 1 each, Rfl: 0    carbidopa-levodopa  MG Oral Tab, TAKE 1 TABLET BY MOUTH FOUR TIMES DAILY TAKE AT 630AM, 11AM, 2PM, AND 7PM, Disp: , Rfl:     PANTOPRAZOLE 40 MG Oral Tab EC, TAKE 1 TABLET BY MOUTH BEFORE BREAKFAST, Disp: 90 tablet, Rfl: 2    albuterol 108 (90 Base) MCG/ACT Inhalation Aero Soln, INHALE 2 PUFFS BY INHALATION ROUTE EVERY 4 - 6 HOURS AS NEEDED, Disp: 3 each, Rfl: 0    pravastatin 20 MG Oral Tab, Take 1 tablet (20 mg total) by mouth nightly., Disp: 90 tablet, Rfl: 3    folic acid 1 MG  Oral Tab, Take 1 tablet (1 mg total) by mouth daily., Disp: 90 tablet, Rfl: 3    Hydrocod Navi-Chlorphe Navi ER 10-8 MG/5ML Oral Suspension Extended Release, Take 5 mL by mouth 2 (two) times daily as needed., Disp: 115 mL, Rfl: 0    albuterol (2.5 MG/3ML) 0.083% Inhalation Nebu Soln, Take 3 mL (2.5 mg total) by nebulization every 4 (four) hours as needed for Wheezing., Disp: 30 each, Rfl: 0    Boswellia-Glucosamine-Vit D (OSTEO BI-FLEX ONE PER DAY OR), Take by mouth As Directed., Disp: , Rfl:     Amantadine HCl 100 MG Oral Cap, Take 1 capsule (100 mg total) by mouth daily., Disp: , Rfl:     omega-3 fatty acids 1000 MG Oral Cap, Take 1,000 mg by mouth daily., Disp: , Rfl:     TURMERIC OR, Take by mouth 2 (two) times a day., Disp: , Rfl:     Multiple Vitamins-Minerals (BARIATRIC FUSION) Oral Chew Tab, Chew 1 tablet by mouth in the morning, at noon, in the evening, and at bedtime., Disp: , Rfl:     rasagiline mesylate 1 MG Oral Tab, Take 1 tablet (1 mg total) by mouth daily., Disp: , Rfl:     Height:    Ht Readings from Last 1 Encounters:   10/03/24 5' 9\" (1.753 m)     Weight:    Wt Readings from Last 6 Encounters:   09/26/24 201 lb (91.2 kg)   09/12/24 201 lb 9.6 oz (91.4 kg)   08/16/24 201 lb (91.2 kg)   07/15/24 211 lb (95.7 kg)   07/11/24 211 lb (95.7 kg)   05/06/24 216 lb (98 kg)       Weight change:  down 115.8 lbs since day of surgery  Post-Op Excess Body Weight Loss: 71% EBWL  BMI: There is no height or weight on file to calculate BMI.    Protein Intake: 52-68 grams/day  Fluid intake:  60+ ounces/day    Diet history:       Breakfast      AM Snack       Lunch     PM Snack     Dinner   Fitattas Manuel Nader or 2 eggs with shr cheese or grape nut with blueberry and Premier protein and Iced Tea no caffeine-[place] Iced Tea- Jewel  skip Leftovers from dinner After dinner or lactose free milk or oreos and ice cream Beef Stroganoff with egg noodles or Steak and asparagus or gyro meat and onions no rossy                 Total Calories:  unsure  Excessive in: nothing  Inadequate in:  vegetables and fiber     Patient has made some modifications and adjustments to diet: yes, is eating separately from drinking-not really waiting after eating before drinking, chewing well before swallowing, is eating 1 serving of fruit per day, is eating .5 servings or veg per day   Food intolerances:  milk-gas  Vitamin/mineral supplements:  Bariatric Fusion, calcium citrate  Protein supplements:  Premier Protein     Activity Level: moderate  Type: bike and other: strength and balance work  30 minutes twice per week  Duration: 30 minutes bike and 60 minutes strength and balance  Frequency: per week    Other:  Met with pt for 5 year post op visit.  Pt is 71% EBWL s/p VSG on 10/18/23 with Dr. Rios.  Per pt is cont taking phentermine and topiramate and feels that they are helping to control hunger and cravings.  Per pt is not keeping a food record currently.  Per pt is consuming ~52-68 grams of protein per day.  Per pt is eating separately from drinking, chewing well before swallowing,  is eating 1 serving of fruit per day, is eating .5 servings or veg per day.  Discussed with pt benefits of including more vegetables with protein in current diet.  Pt provided with ideas and recipes.  Pt verbalized understanding.  Per pt is taking Bariatric Fusion bariatric capsule with additional calcium citrate daily.  Per pt is doing 15 minutes of biking daily and strength training and balance work (about 30 minutes) twice per week.  Pt with follow up visit scheduled in one year.  Per pt with goal weight of 180 lbs.     Nutrition Diagnosis     Nutrition Diagnosis: Morbid obesity related to undesirable food choices and physical inactivity as evidenced by history     Intervention     Recommendations/goals:      1.  Keep a complete food record, My Net Diary, select macros dashboard. Once per week.  2.  Add a protein rich morning snack like Protein shake or Drinkable  yogurt or greek yogurt or peanut butter with apples.  Add some free vegetables to increase with protein.   Continue to consume 4-6 meals/snacks.  Continue to aim for 79-99 grams per day of protein.  Keep the carbohydrates at 100-120 grams per day or less per day.   Increase fruit and vegetables. Switching out edamame spaghetti or Black bean spaghetti for regular noodle or snack on roasted edamame beans. (Try greek yogurt with fruit or nuts with 1 T chocolate chip for sweet craving, combine sweet with protein).  3.  Continue to drink 64 oz of water per day, (decaf green tea ).  4.  Continue the strength training 2 times per week   5.  Continue to bike (within limits).      Monitor/Evaluate     Anthropometric measurements, Food/fluid intake/choices, Food intolerances, Activity level, Vitamin/mineral supplementation, Reinforce goals, and Calorie/protein intake  Additional RD visits required to review concepts? yes  Patient understands protein requirements? yes  Patient understand fluid requirements (amount and method of intake)? yes  Patient understands post-operative diet? yes  Patient keeping consistent food records? no  Patient ready for Liquid Protein Education? N/a      Urmila Gan, KASSANDRA, LDN    Face-to-face time spent with pt: 35 minutes

## 2024-12-18 NOTE — PATIENT INSTRUCTIONS
Recommendations/goals:      1.  Keep a complete food record, My Net Diary, select macros dashboard. Once per week.  2.  Add a protein rich morning snack like Protein shake or Drinkable yogurt or greek yogurt or peanut butter with apples.  Add some free vegetables to increase with protein.   Continue to consume 4-6 meals/snacks.  Continue to aim for 79-99 grams per day of protein.  Keep the carbohydrates at 100-120 grams per day or less per day.   Increase fruit and vegetables. Switching out edamame spaghetti or Black bean spaghetti for regular noodle or snack on roasted edamame beans. (Try greek yogurt with fruit or nuts with 1 T chocolate chip for sweet craving, combine sweet with protein).  3.  Continue to drink 64 oz of water per day, (decaf green tea).  4.  Continue the strength training 2 times per week   5.  Continue to bike (within limits).

## 2024-12-19 ENCOUNTER — PATIENT MESSAGE (OUTPATIENT)
Dept: INTERNAL MEDICINE CLINIC | Facility: CLINIC | Age: 64
End: 2024-12-19

## 2024-12-20 RX ORDER — LEVOTHYROXINE SODIUM 125 UG/1
125 TABLET ORAL
Qty: 90 TABLET | Refills: 3 | Status: SHIPPED | OUTPATIENT
Start: 2024-12-20

## 2024-12-20 NOTE — TELEPHONE ENCOUNTER
Office staff - please assist patient with forms faxed by the [employer] [place], there is no documentation of anything being received for this patient. Her letter is attached to her message, dated 12/11/24.

## 2024-12-20 NOTE — TELEPHONE ENCOUNTER
Dr Palafox, see patient's [employer] message, please provide script for levothyroxine 125 mcg, which she will alternate with 112 mcg.  Pended 125 mcg dose.    Script for 112 mcg was sent 12/16/2024.      Physical exam 8/16/2024  2. Hypothyroidism, unspecified type  Check TSH level.  She is currently alternating doses of 112 and 125 mcg of levothyroxine on a daily basis.  Adjust medication if needed.

## 2024-12-21 ENCOUNTER — NURSE ONLY (OUTPATIENT)
Dept: ALLERGY | Facility: CLINIC | Age: 64
End: 2024-12-21
Payer: COMMERCIAL

## 2024-12-21 ENCOUNTER — MED REC SCAN ONLY (OUTPATIENT)
Dept: INTERNAL MEDICINE CLINIC | Facility: CLINIC | Age: 64
End: 2024-12-21

## 2024-12-21 DIAGNOSIS — J30.89 ENVIRONMENTAL AND SEASONAL ALLERGIES: Primary | ICD-10-CM

## 2024-12-21 PROCEDURE — 95117 IMMUNOTHERAPY INJECTIONS: CPT | Performed by: ALLERGY & IMMUNOLOGY

## 2024-12-21 PROCEDURE — 95165 ANTIGEN THERAPY SERVICES: CPT | Performed by: ALLERGY & IMMUNOLOGY

## 2024-12-23 ENCOUNTER — TELEPHONE (OUTPATIENT)
Dept: INTERNAL MEDICINE CLINIC | Facility: CLINIC | Age: 64
End: 2024-12-23

## 2024-12-23 RX ORDER — MONTELUKAST SODIUM 10 MG/1
TABLET ORAL
Qty: 90 TABLET | Refills: 0 | Status: SHIPPED | OUTPATIENT
Start: 2024-12-23

## 2024-12-23 NOTE — TELEPHONE ENCOUNTER
Form received today from [facility] is a Request is for Medical records (Physician progress notes, test results, Hospital and medical records), Request forwarded to Medical records Dept. Physician progress note from 8/16/24 faxed to [phone].

## 2024-12-23 NOTE — TELEPHONE ENCOUNTER
Form received from [facility] is a Request is for Medical records (Physician progress notes, test results, Hospital and medical records), Request forwarded to Medical records Dept. Physicians progress note (8/16/24) faxed to [phone].

## 2024-12-23 NOTE — TELEPHONE ENCOUNTER
The form is requesting information from PCP. This is stated on the letter.     I cannot fill out this form.

## 2024-12-23 NOTE — TELEPHONE ENCOUNTER
Received a refill request for Singulair 10 mg- 1 tablet by mouth daily.    Last office visit was 7/15/24 for Penicillin testing.     Refill meets protocol- refilled.

## 2025-01-10 ENCOUNTER — NURSE TRIAGE (OUTPATIENT)
Dept: INTERNAL MEDICINE CLINIC | Facility: CLINIC | Age: 65
End: 2025-01-10

## 2025-01-10 NOTE — TELEPHONE ENCOUNTER
Action Requested: Summary for Provider     []  Critical Lab, Recommendations Needed  [] Need Additional Advice  [x]   FYI    []   Need Orders  [] Need Medications Sent to Pharmacy  []  Other     SUMMARY: Patient scheduled for an appointment Monday 1/13/25 with Dr. Hylton    Patient states the top/ side of left foot below her great and second toe has been hurting , feels like she stepped on a nail, but no known injury or numbness. Patient states she also has rheumatoid arthritis and parkinson's. She walks with a walker and has braces on her legs. She has been walking on the left heel due to the pain. She is using voltaren with some relief.    Patient is also going to call her rheumatologist's office    Patient will call back if any new or worsening symptoms. She is aware of immediate care locations.     Reason for call: Ball Of Foot Pain  Onset: worsening in past week    Reason for Disposition   MODERATE pain (e.g., interferes with normal activities, limping) and present > 3 days    Protocols used: Foot Pain-A-[place]

## 2025-01-13 ENCOUNTER — OFFICE VISIT (OUTPATIENT)
Dept: INTERNAL MEDICINE CLINIC | Facility: CLINIC | Age: 65
End: 2025-01-13
Payer: COMMERCIAL

## 2025-01-13 ENCOUNTER — HOSPITAL ENCOUNTER (OUTPATIENT)
Dept: GENERAL RADIOLOGY | Facility: HOSPITAL | Age: 65
Discharge: HOME | End: 2025-01-13
Attending: INTERNAL MEDICINE
Payer: COMMERCIAL

## 2025-01-13 ENCOUNTER — PATIENT MESSAGE (OUTPATIENT)
Dept: INTERNAL MEDICINE CLINIC | Facility: CLINIC | Age: 65
End: 2025-01-13

## 2025-01-13 VITALS
HEIGHT: 69 IN | BODY MASS INDEX: 30.16 KG/M2 | SYSTOLIC BLOOD PRESSURE: 144 MMHG | WEIGHT: 203.63 LBS | DIASTOLIC BLOOD PRESSURE: 80 MMHG | HEART RATE: 90 BPM | OXYGEN SATURATION: 100 %

## 2025-01-13 DIAGNOSIS — M84.376A STRESS FRACTURE OF METATARSAL BONE, UNSPECIFIED LATERALITY, INITIAL ENCOUNTER: Primary | ICD-10-CM

## 2025-01-13 DIAGNOSIS — M79.672 LEFT FOOT PAIN: ICD-10-CM

## 2025-01-13 DIAGNOSIS — M79.672 LEFT FOOT PAIN: Primary | ICD-10-CM

## 2025-01-13 PROCEDURE — 73630 X-RAY EXAM OF FOOT: CPT | Performed by: INTERNAL MEDICINE

## 2025-01-13 PROCEDURE — 3008F BODY MASS INDEX DOCD: CPT | Performed by: INTERNAL MEDICINE

## 2025-01-13 PROCEDURE — 99214 OFFICE O/P EST MOD 30 MIN: CPT | Performed by: INTERNAL MEDICINE

## 2025-01-13 PROCEDURE — 3079F DIAST BP 80-89 MM HG: CPT | Performed by: INTERNAL MEDICINE

## 2025-01-13 PROCEDURE — 3077F SYST BP >= 140 MM HG: CPT | Performed by: INTERNAL MEDICINE

## 2025-01-13 RX ORDER — PHENTERMINE HYDROCHLORIDE 37.5 MG/1
37.5 TABLET ORAL
COMMUNITY
Start: 2024-12-16

## 2025-01-13 NOTE — PROGRESS NOTES
Lindsay Trotter is a 64 year old female with complaints of:  Chief Complaint: Foot Pain (Pain in left foot)    HPI     Lindsay Trotter is a(n) 64 year old female with a history of high cholesterol, Parkinson's disease, rheumatoid arthritis, sleep apnea, status post laparoscopic sleeve gastrectomy,, vitamin D deficiency, hypothyroidism, GERD who presents for pain in left foot.     Started about 1.5 weeks ago. No inciting factors, no falls, no trauma, did not drop anything on it. No redness, swelling, or changes to the skin. She is able touch and massage it without a lot of pain. More pain with weight bearing, worse at the end of the day.     Past Medical History     Past Medical History:    Acute torn meniscus of knee    ARTHROSCOPY OF KNEE    Amenorrhea    Asthma (HCC)    Back problem    Chicken pox    Chronic cough    Disorder of thyroid    Extrapyramidal syndrome    MEDICATION MGMT. W/ PARKINSONS FEATURES    Greater trochanteric bursitis, left    High cholesterol    History of blood transfusion    CDH    Incontinence    Infertility, female    Parkinson disease (HCC)    Parkinson's disease (HCC)    Rheumatoid arthritis (HCC)    S/P laparoscopic sleeve gastrectomy    for weight loss    Sleep apnea    cpap    Thyroid disease    Tibia/fibula fracture    OPEN REDUCTION INTERNAL FIXATION    Urinary incontinence    after childbirth    Viral conjunctivitis    Visual impairment    glasses        Past Surgical History     Past Surgical History:   Procedure Laterality Date    Back surgery      Back surgery  04/21/2021    Back surgery  04/25/2023    compressed disc, removal of bone    Colonoscopy  06/11/2019    return in 5 years    Colonoscopy N/A 06/11/2019    Procedure: COLONOSCOPY;  Surgeon: Dago Heart MD;  Location: [facility] ENDOSCOPY    Colposcopy, cervix w/upper adjacent vagina; w/biopsy(s), cervix      D & c      for extended period    D & c      for extended period    Foot surgery Left     Had right foot  done recently.    Knee arthroscopy Right     Lap sleeve gastrectomy  12/02/2019    Dr Rios, [facility]    Laparoscopy,pelvic,biopsy  1990    for infertility    Laparoscopy,pelvic,biopsy  1990    for infertility    Other surgical history Right 12/03/2015    Other surgical history  12/03/2015    Thyroidectomy      Total         Family History     Family History   Problem Relation Age of Onset    Heart Disorder Father 55        heart attack-cause of death.    Heart Disorder Mother     Stroke Mother     Cancer Mother         AML-cause of death    Other (leukemia) Mother     Cancer Paternal Aunt         breast cancer-cause of death at age 80    Breast Cancer Paternal Aunt 80        approx age    Heart Disorder Brother     Ovarian Cancer Neg        Social History     Social History     Socioeconomic History    Marital status:    Tobacco Use    Smoking status: Never     Passive exposure: Never    Smokeless tobacco: Never   Vaping Use    Vaping status: Never Used   Substance and Sexual Activity    Alcohol use: Yes     Alcohol/week: 2.0 - 3.0 standard drinks of alcohol     Types: 2 - 3 Glasses of wine per week     Comment: social    Drug use: No    Sexual activity: Yes     Partners: Male   Other Topics Concern    Caffeine Concern Yes     Comment: S2 glasses tea daily    Exercise No    Reaction to local anesthetic No    Pt has a pacemaker No    Pt has a defibrillator No   Social History Narrative    The patient uses the following assistive device(s):  single-point cane,uses 2 canes. Walker at night           The patient does live in a home with stairs.     Social Drivers of Health     Food Insecurity: No Food Insecurity (1/13/2025)    NCSS - Food Insecurity     Worried About Running Out of Food in the Last Year: No     Ran Out of Food in the Last Year: No   Transportation Needs: No Transportation Needs (1/13/2025)    NCSS - Transportation     Lack of Transportation: No    Received from [facility]  [place]    Social Connections   Housing Stability: Not At Risk (1/13/2025)    NCSS - Housing/Utilities     Has Housing: Yes     Worried About Losing Housing: No     Unable to Get Utilities: No       Allergies     Allergies[1]    Current Medications     Current Outpatient Medications   Medication Sig Dispense Refill    Phentermine HCl 37.5 MG Oral Tab Take 1 tablet (37.5 mg total) by mouth before breakfast.      MONTELUKAST 10 MG Oral Tab TAKE 1 TABLET BY MOUTH EVERY DAY AT NIGHT 90 tablet 0    levothyroxine 125 MCG Oral Tab Take 1 tablet (125 mcg total) by mouth before breakfast. Alternate every other day with levothyroxine 112 mgg. 90 tablet 3    levothyroxine 112 MCG Oral Tab Take 1 tablet (112 mcg total) by mouth before breakfast. 90 tablet 3    TOPIRAMATE 100 MG Oral Tab TAKE 1 TABLET BY MOUTH TWICE A  tablet 1    METHOTREXATE 2.5 MG Oral Tab TAKE 8 TABS WEEKLY BY MOUTH 96 tablet 1    Tofacitinib Citrate ER (XELJANZ XR) 11 MG Oral Tablet 24 Hr Take 1 tablet by mouth daily. 90 tablet 1    DICLOFENAC 50 MG Oral Tab EC TAKE 1 TABLET BY MOUTH EVERY DAY 90 tablet 1    calcitriol 0.25 MCG Oral Cap Take 1 capsule (0.25 mcg total) by mouth daily. 90 capsule 1    carbidopa-levodopa  MG Oral Tab TAKE 1 TABLET BY MOUTH FOUR TIMES DAILY TAKE AT 630AM, 11AM, 2PM, AND 7PM      PANTOPRAZOLE 40 MG Oral Tab EC TAKE 1 TABLET BY MOUTH BEFORE BREAKFAST 90 tablet 2    albuterol 108 (90 Base) MCG/ACT Inhalation Aero Soln INHALE 2 PUFFS BY INHALATION ROUTE EVERY 4 - 6 HOURS AS NEEDED 3 each 0    pravastatin 20 MG Oral Tab Take 1 tablet (20 mg total) by mouth nightly. 90 tablet 3    folic acid 1 MG Oral Tab Take 1 tablet (1 mg total) by mouth daily. 90 tablet 3    albuterol (2.5 MG/3ML) 0.083% Inhalation Nebu Soln Take 3 mL (2.5 mg total) by nebulization every 4 (four) hours as needed for Wheezing. 30 each 0    Boswellia-Glucosamine-Vit D (OSTEO BI-FLEX ONE PER DAY OR) Take by mouth As Directed.      Amantadine HCl 100 MG  Oral Cap Take 1 capsule (100 mg total) by mouth daily.      omega-3 fatty acids 1000 MG Oral Cap Take 1,000 mg by mouth daily.      TURMERIC OR Take by mouth 2 (two) times a day.      Multiple Vitamins-Minerals (BARIATRIC FUSION) Oral Chew Tab Chew 1 tablet by mouth in the morning, at noon, in the evening, and at bedtime.      methylPREDNISolone (MEDROL) 4 MG Oral Tablet Therapy Pack Take as directed 1 each 0    Hydrocod Navi-Chlorphe Navi ER 10-8 MG/5ML Oral Suspension Extended Release Take 5 mL by mouth 2 (two) times daily as needed. 115 mL 0    rasagiline mesylate 1 MG Oral Tab Take 1 tablet (1 mg total) by mouth daily.       No current facility-administered medications for this visit.       Review of Systems     GENERAL HEALTH: feels well otherwise  SKIN: denies any unusual skin lesions or rashes  RESPIRATORY: denies shortness of breath with exertion  CARDIOVASCULAR: denies chest pain on exertion  GI: denies abdominal pain and denies heartburn  : denies any burning with urination, urinary frequency or urgency  NEURO: denies headaches, numbness or tingling, mental status changes  PSYCH: denies depressed mood, anxiety  MUSC: denies muscle aches, joint pain    Physical Exam     /80 (BP Location: Left arm, Patient Position: Sitting, Cuff Size: adult)   Pulse 90   Ht 5' 9\" (1.753 m)   Wt 203 lb 9.6 oz (92.4 kg)   LMP 04/18/2015 (Exact Date)   SpO2 100%   BMI 30.07 kg/m²     GENERAL: well developed, well nourished,in no apparent distress  SKIN: no rashes,no suspicious lesions  HEENT: atraumatic, normocephalic  EXTREMITIES: no cyanosis, clubbing or edema. Left foot without skin changes, joint swelling or erythema, no TTP     Assessment and Plan     Diagnoses and all orders for this visit:    Left foot pain. Likely OA of foot. Not likely gout or RA flare given exam findings as above. Could be OA vs fasciitis. Follow up with podiatry. Get XR to r/o fracture. Follow up with podiatry.  For now, would not  prescribe any new pain medications, especially tramadol or other opioids, given renal insufficiency, and multiple interactions with her current medications.  -     XR FOOT, COMPLETE (MIN 3 VIEWS), LEFT (CPT=73650); Future  -     Podiatry Referral - In Network          Phentermine HCl 37.5 MG Oral Tab Take 1 tablet (37.5 mg total) by mouth before breakfast.      MONTELUKAST 10 MG Oral Tab TAKE 1 TABLET BY MOUTH EVERY DAY AT NIGHT 90 tablet 0    levothyroxine 125 MCG Oral Tab Take 1 tablet (125 mcg total) by mouth before breakfast. Alternate every other day with levothyroxine 112 mgg. 90 tablet 3    levothyroxine 112 MCG Oral Tab Take 1 tablet (112 mcg total) by mouth before breakfast. 90 tablet 3    TOPIRAMATE 100 MG Oral Tab TAKE 1 TABLET BY MOUTH TWICE A  tablet 1    METHOTREXATE 2.5 MG Oral Tab TAKE 8 TABS WEEKLY BY MOUTH 96 tablet 1    Tofacitinib Citrate ER (XELJANZ XR) 11 MG Oral Tablet 24 Hr Take 1 tablet by mouth daily. 90 tablet 1    DICLOFENAC 50 MG Oral Tab EC TAKE 1 TABLET BY MOUTH EVERY DAY 90 tablet 1    calcitriol 0.25 MCG Oral Cap Take 1 capsule (0.25 mcg total) by mouth daily. 90 capsule 1    carbidopa-levodopa  MG Oral Tab TAKE 1 TABLET BY MOUTH FOUR TIMES DAILY TAKE AT 630AM, 11AM, 2PM, AND 7PM      PANTOPRAZOLE 40 MG Oral Tab EC TAKE 1 TABLET BY MOUTH BEFORE BREAKFAST 90 tablet 2    albuterol 108 (90 Base) MCG/ACT Inhalation Aero Soln INHALE 2 PUFFS BY INHALATION ROUTE EVERY 4 - 6 HOURS AS NEEDED 3 each 0    pravastatin 20 MG Oral Tab Take 1 tablet (20 mg total) by mouth nightly. 90 tablet 3    folic acid 1 MG Oral Tab Take 1 tablet (1 mg total) by mouth daily. 90 tablet 3    albuterol (2.5 MG/3ML) 0.083% Inhalation Nebu Soln Take 3 mL (2.5 mg total) by nebulization every 4 (four) hours as needed for Wheezing. 30 each 0    Boswellia-Glucosamine-Vit D (OSTEO BI-FLEX ONE PER DAY OR) Take by mouth As Directed.      Amantadine HCl 100 MG Oral Cap Take 1 capsule (100 mg total) by mouth  daily.      omega-3 fatty acids 1000 MG Oral Cap Take 1,000 mg by mouth daily.      TURMERIC OR Take by mouth 2 (two) times a day.      Multiple Vitamins-Minerals (BARIATRIC FUSION) Oral Chew Tab Chew 1 tablet by mouth in the morning, at noon, in the evening, and at bedtime.         Requested Prescriptions      No prescriptions requested or ordered in this encounter       No orders of the defined types were placed in this encounter.      No follow-ups on file.    The patient indicates understanding of these issues and agrees to the plan.    Electronically signed by Oriana Hylton MD 01/13/25             [1]   Allergies  Allergen Reactions    Other HIVES     Reports side effect from DPT vaccine.  Other reaction(s): Rash/Hives/Urticaria    Seasonal Runny nose

## 2025-01-15 ENCOUNTER — OFFICE VISIT (OUTPATIENT)
Dept: PODIATRY CLINIC | Facility: CLINIC | Age: 65
End: 2025-01-15
Payer: COMMERCIAL

## 2025-01-15 DIAGNOSIS — M84.375A METATARSAL STRESS FRACTURE, LEFT, INITIAL ENCOUNTER: Primary | ICD-10-CM

## 2025-01-15 PROCEDURE — 99204 OFFICE O/P NEW MOD 45 MIN: CPT | Performed by: PODIATRIST

## 2025-01-15 NOTE — PROGRESS NOTES
Reason for Visit      Lindsay Trotter is a 64 year old female presents today complaining of left foot pain.     History of Present Illness     Patient presents to clinic today after being seen by her primary care physician on 1/13/2025 complaining of left foot pain.  Patient has a past medical history of Parkinson's's disease rheumatoid arthritis vitamin D deficiency who presented to her primary care office complaining of left foot pain for approximately 2 weeks.  Patient denies any trauma to the area and states that she has more pain towards the end of the day.  Radiographs were taken which were unremarkable and patient was told to follow-up with podiatry.  Patient presents to clinic today with an [place] brace to bilateral lower extremities and good supportive new balance shoes.  Patient states that it has been bothering her for about 2 weeks.  Patient states more that she is on it the more that it hurts.    The following portions of the patient's history were reviewed and updated as appropriate: allergies, current medications, past family history, past medical history, past social history, past surgical history and problem list.    Allergies[1]      Current Outpatient Medications:     Phentermine HCl 37.5 MG Oral Tab, Take 1 tablet (37.5 mg total) by mouth before breakfast., Disp: , Rfl:     MONTELUKAST 10 MG Oral Tab, TAKE 1 TABLET BY MOUTH EVERY DAY AT NIGHT, Disp: 90 tablet, Rfl: 0    levothyroxine 125 MCG Oral Tab, Take 1 tablet (125 mcg total) by mouth before breakfast. Alternate every other day with levothyroxine 112 mgg., Disp: 90 tablet, Rfl: 3    levothyroxine 112 MCG Oral Tab, Take 1 tablet (112 mcg total) by mouth before breakfast., Disp: 90 tablet, Rfl: 3    TOPIRAMATE 100 MG Oral Tab, TAKE 1 TABLET BY MOUTH TWICE A DAY, Disp: 180 tablet, Rfl: 1    METHOTREXATE 2.5 MG Oral Tab, TAKE 8 TABS WEEKLY BY MOUTH, Disp: 96 tablet, Rfl: 1    Tofacitinib Citrate ER (XELJANZ XR) 11 MG Oral Tablet 24 Hr, Take 1  tablet by mouth daily., Disp: 90 tablet, Rfl: 1    DICLOFENAC 50 MG Oral Tab EC, TAKE 1 TABLET BY MOUTH EVERY DAY, Disp: 90 tablet, Rfl: 1    calcitriol 0.25 MCG Oral Cap, Take 1 capsule (0.25 mcg total) by mouth daily., Disp: 90 capsule, Rfl: 1    methylPREDNISolone (MEDROL) 4 MG Oral Tablet Therapy Pack, Take as directed, Disp: 1 each, Rfl: 0    carbidopa-levodopa  MG Oral Tab, TAKE 1 TABLET BY MOUTH FOUR TIMES DAILY TAKE AT 630AM, 11AM, 2PM, AND 7PM, Disp: , Rfl:     PANTOPRAZOLE 40 MG Oral Tab EC, TAKE 1 TABLET BY MOUTH BEFORE BREAKFAST, Disp: 90 tablet, Rfl: 2    albuterol 108 (90 Base) MCG/ACT Inhalation Aero Soln, INHALE 2 PUFFS BY INHALATION ROUTE EVERY 4 - 6 HOURS AS NEEDED, Disp: 3 each, Rfl: 0    pravastatin 20 MG Oral Tab, Take 1 tablet (20 mg total) by mouth nightly., Disp: 90 tablet, Rfl: 3    folic acid 1 MG Oral Tab, Take 1 tablet (1 mg total) by mouth daily., Disp: 90 tablet, Rfl: 3    Hydrocod Navi-Chlorphe Navi ER 10-8 MG/5ML Oral Suspension Extended Release, Take 5 mL by mouth 2 (two) times daily as needed., Disp: 115 mL, Rfl: 0    albuterol (2.5 MG/3ML) 0.083% Inhalation Nebu Soln, Take 3 mL (2.5 mg total) by nebulization every 4 (four) hours as needed for Wheezing., Disp: 30 each, Rfl: 0    Boswellia-Glucosamine-Vit D (OSTEO BI-FLEX ONE PER DAY OR), Take by mouth As Directed., Disp: , Rfl:     Amantadine HCl 100 MG Oral Cap, Take 1 capsule (100 mg total) by mouth daily., Disp: , Rfl:     omega-3 fatty acids 1000 MG Oral Cap, Take 1,000 mg by mouth daily., Disp: , Rfl:     TURMERIC OR, Take by mouth 2 (two) times a day., Disp: , Rfl:     Multiple Vitamins-Minerals (BARIATRIC FUSION) Oral Chew Tab, Chew 1 tablet by mouth in the morning, at noon, in the evening, and at bedtime., Disp: , Rfl:     rasagiline mesylate 1 MG Oral Tab, Take 1 tablet (1 mg total) by mouth daily., Disp: , Rfl:     There are no discontinued medications.    Patient Active Problem List   Diagnosis    Asthma (HCC)     Hyperlipidemia    Asthma, extrinsic (HCC)    Allergic rhinitis due to pollen    Allergic rhinitis due to other allergen    Rheumatoid arthritis, adult (HCC)    Parkinson disease (HCC)    Post-surgical hypothyroidism    Lumbar spinal stenosis    Greater trochanteric bursitis, left    Chronic back pain    Adjustment disorder with mixed anxiety and depressed mood    Chronic fatigue    Increased appetite    S/P laparoscopic sleeve gastrectomy    Obesity (BMI 30-39.9)    S/P lumbar fusion    Encounter for therapeutic drug monitoring    Slow transit constipation    Mild protein-calorie malnutrition (HCC)    Overweight (BMI 25.0-29.9)       Past Medical History:    Acute torn meniscus of knee    ARTHROSCOPY OF KNEE    Amenorrhea    Asthma (HCC)    Back problem    Chicken pox    Chronic cough    Disorder of thyroid    Extrapyramidal syndrome    MEDICATION MGMT. W/ PARKINSONS FEATURES    Greater trochanteric bursitis, left    High cholesterol    History of blood transfusion    CDH    Incontinence    Infertility, female    Parkinson disease (HCC)    Parkinson's disease (HCC)    Rheumatoid arthritis (HCC)    S/P laparoscopic sleeve gastrectomy    for weight loss    Sleep apnea    cpap    Thyroid disease    Tibia/fibula fracture    OPEN REDUCTION INTERNAL FIXATION    Urinary incontinence    after childbirth    Viral conjunctivitis    Visual impairment    glasses       Past Surgical History:   Procedure Laterality Date    Back surgery      Back surgery  04/21/2021    Back surgery  04/25/2023    compressed disc, removal of bone    Colonoscopy  06/11/2019    return in 5 years    Colonoscopy N/A 06/11/2019    Procedure: COLONOSCOPY;  Surgeon: Dago Heart MD;  Location: [facility] ENDOSCOPY    Colposcopy, cervix w/upper adjacent vagina; w/biopsy(s), cervix      D & c      for extended period    D & c      for extended period    Foot surgery Left     Had right foot done recently.    Knee arthroscopy Right     Lap sleeve  gastrectomy  12/02/2019    Dr Rios, [facility]    Laparoscopy,pelvic,biopsy  1990    for infertility    Laparoscopy,pelvic,biopsy  1990    for infertility    Other surgical history Right 12/03/2015    Other surgical history  12/03/2015    Thyroidectomy      Total        Family History   Problem Relation Age of Onset    Heart Disorder Father 55        heart attack-cause of death.    Heart Disorder Mother     Stroke Mother     Cancer Mother         AML-cause of death    Other (leukemia) Mother     Cancer Paternal Aunt         breast cancer-cause of death at age 80    Breast Cancer Paternal Aunt 80        approx age    Heart Disorder Brother     Ovarian Cancer Neg        Social History     Occupational History    Not on file   Tobacco Use    Smoking status: Never     Passive exposure: Never    Smokeless tobacco: Never   Vaping Use    Vaping status: Never Used   Substance and Sexual Activity    Alcohol use: Yes     Alcohol/week: 2.0 - 3.0 standard drinks of alcohol     Types: 2 - 3 Glasses of wine per week     Comment: social    Drug use: No    Sexual activity: Yes     Partners: Male       ROS      Constitutional: negative for chills, fevers and sweats  Gastrointestinal: negative for abdominal pain, diarrhea, nausea and vomiting  Genitourinary:negative for dysuria and hematuria  Musculoskeletal:negative for arthralgias and muscle weakness  Neurological: negative for paresthesia and weakness  All others reviewed and negative.      Physical Exam     LE PHYSICAL EXAM    Constitution: Well-developed and well-nourished. Gait appears normal. No apparent distress. Alert and oriented to person, place, and time.  Integument: There are no varicosities. Skin appears moist, warm, and supple with positive hair growth. There are no color changes. No open lesions. No macerations, No Hyperkeratotic lesions.  Vascular examination: Dorsalis pedis and posterior tibial pulses are strong bilaterally with capillary filling time less  than 3 seconds to all digits. There is no peripheral edema..  Neurological Sensorium: Grossly intact to sharp/dull. Vibratory: Intact.  Musculoskeletal:   5/5 pedal muscle strength b/l     Pain on palpation to the second metatarsal of the left foot.  Mostly in the neck and shaft.  No pain on palpation intermetatarsal spaces no pain to the third and fourth metatarsal.  No pain with psych metatarsal cuneiform stress.    Assessment and Plan     Encounter Diagnoses   Name Primary?    Metatarsal stress fracture, left, initial encounter Yes   Discussed with patient differential diagnosis but believe with her clinical presentation today and her vitamin D deficiency that she has a stress fracture of her left second metatarsal.  Discussed further imaging such as an MRI which would diagnose it would not change her initial treatment.  Patient has Parkinson's and may need to be sedated for the MRI.  Recommend scheduling MRI for 4 weeks from now and if her pain resolves with a walking boot and she no longer needs a the MRI and she can cancel it.  Discussed protection mobilization for the next 4 weeks to.  Discussed RICE and elevation.    Patient was instructed to call the office or on-call podiatric physician immediately with any issues or concerns before the next scheduled visit. Patient to follow-up in clinic in 4 weeks      Val Andres DPM, D.HONORIO PEREZ  Diplomat, American Board of Foot and Ankle Surgery  Certified in Foot and Rearfoot/Ankle Reconstruction  Fellow of the American College of Foot and Ankle Surgeons  Fellowship Trained Foot and Ankle Surgeon   [facility]     1/15/2025    7:33 AM         [1]   Allergies  Allergen Reactions    Other HIVES     Reports side effect from DPT vaccine.  Other reaction(s): Rash/Hives/Urticaria    Seasonal Runny nose

## 2025-01-15 NOTE — TELEPHONE ENCOUNTER
Patient was seen during office visit with Dr. Hylton on 1/13/25.    Please see [employer] message below and advise. Thank you.      Lindsay HAYES Em Rn Triage (supporting Oriana Hylton MD)2 days ago       Dr. Hylton, I stopped taking the Rasagiline in November.  I put it on the Medication I No Longer Take section but hasn't been removed.  Does that make it possible to find a pain med?  Thank you,    Radha Rodríguez Rn Triage (supporting Oriana Hylton MD)Yesterday (12:22 PM)       My provider stopped it to see if it would help to reduce my dyskinesia.            Please reply to pool: EM RN TRIAGE

## 2025-01-16 RX ORDER — TRAMADOL HYDROCHLORIDE 50 MG/1
25 TABLET ORAL EVERY 8 HOURS PRN
Qty: 30 TABLET | Refills: 0 | Status: SHIPPED | OUTPATIENT
Start: 2025-01-16

## 2025-01-16 NOTE — TELEPHONE ENCOUNTER
Perfect. If the patient is no longer on an MAOI, then can do tramadol. Tramadol can cause some drowsiness, julienne if taken in concert with phentermine and topiramate, so she should watch out for that.

## 2025-01-18 ENCOUNTER — NURSE ONLY (OUTPATIENT)
Dept: ALLERGY | Facility: CLINIC | Age: 65
End: 2025-01-18
Payer: COMMERCIAL

## 2025-01-18 DIAGNOSIS — J30.89 ENVIRONMENTAL AND SEASONAL ALLERGIES: Primary | ICD-10-CM

## 2025-01-18 PROCEDURE — 95117 IMMUNOTHERAPY INJECTIONS: CPT | Performed by: ALLERGY & IMMUNOLOGY

## 2025-01-18 PROCEDURE — 95165 ANTIGEN THERAPY SERVICES: CPT | Performed by: ALLERGY & IMMUNOLOGY

## 2025-01-20 ENCOUNTER — OFFICE VISIT (OUTPATIENT)
Dept: RHEUMATOLOGY | Facility: CLINIC | Age: 65
End: 2025-01-20
Payer: COMMERCIAL

## 2025-01-20 VITALS
HEART RATE: 83 BPM | BODY MASS INDEX: 29.99 KG/M2 | DIASTOLIC BLOOD PRESSURE: 77 MMHG | RESPIRATION RATE: 16 BRPM | WEIGHT: 202.5 LBS | SYSTOLIC BLOOD PRESSURE: 120 MMHG | HEIGHT: 69 IN

## 2025-01-20 DIAGNOSIS — M06.9 RHEUMATOID ARTHRITIS, INVOLVING UNSPECIFIED SITE, UNSPECIFIED WHETHER RHEUMATOID FACTOR PRESENT (HCC): Primary | ICD-10-CM

## 2025-01-20 DIAGNOSIS — Z51.81 THERAPEUTIC DRUG MONITORING: ICD-10-CM

## 2025-01-20 PROCEDURE — 3074F SYST BP LT 130 MM HG: CPT | Performed by: INTERNAL MEDICINE

## 2025-01-20 PROCEDURE — 3078F DIAST BP <80 MM HG: CPT | Performed by: INTERNAL MEDICINE

## 2025-01-20 PROCEDURE — 3008F BODY MASS INDEX DOCD: CPT | Performed by: INTERNAL MEDICINE

## 2025-01-20 PROCEDURE — 99214 OFFICE O/P EST MOD 30 MIN: CPT | Performed by: INTERNAL MEDICINE

## 2025-01-20 RX ORDER — FOLIC ACID 1 MG/1
1 TABLET ORAL DAILY
Qty: 90 TABLET | Refills: 3 | Status: SHIPPED | OUTPATIENT
Start: 2025-01-20

## 2025-01-20 NOTE — PROGRESS NOTES
Lindsay Trotter is a 64 year old female who presents for   Chief Complaint   Patient presents with    Rheumatoid Arthritis    Medication Follow-Up    Lab Results    Foot Pain     left   .   HPI:     She is a pleasant 57 year old who has elevated RF >300 and foot pain - c/w seropositive  RA.      She has hadhx of  foot ?infections for 3-4 months and she saw poidatry and physical therapy. She then felt around chrismtas she had a red knot around where her arch was. She was stold it was an infetion and she it felt like it was walking on a ball of pins. She was given an antibiotic and it went away. When she went off of it, it cxam back. Then after that she got another round of infection shse got antoher one. She also felt it went away . /When it went away, she got it on her left foot. She got esr and crp - and it was high.   She told Dr. Palafox and neurologist at [facility] and it was not her parkinsosn'ss.   She ssaw podiatry yestrday and was fine. But yesterday evening it was back.   She was given clindamycin.   She felt quinn clindamycin helped the infection - sspots but not the joint pain. She has had the foot pain fro several years she attributed it to weight.   Around new years she had sudden jotin pain in her shoulders intermittently - and wrists. It happened agaoin in feb for a few dayss. It was sos bad with this falre up. Then it was gone for 2 -3 days.s Her left 2nd finger would hurt.   sshe never thought her feet got swollne during the time she could have had infection.   The foot pain is more when shse is walking. It doesn't bother her when she is sitting or at night. It'ss just with walking. It's the farooq of the foot. It was the sisde of the foot.   She has a hx of mensiscal tear repair.     1/15/2020 -   She got decompression sx of her back in 9/2019 - her back pain is much better 2/10 pain.   And bariatric sx - for gastric sleeve.   She's taking less parkinson's medication b/c she might have had more    Healing was ok - right sided area still hurts.   Not on pain meds.   She's off the diclofenac as well.   Back sx was first and then the sleeve was done.   Not taking tramadol or gabapentin at this time. B/c she's better.   She is using the diclofenac gel 1 %   She has lost 40 pounds already!     5/13/2020  VIDEO VISIT  She had gastric sleeve sx - she's off gabpaentin and diclofenac - and she's not back on it.   seh still has aches and pains.   2 tyelnol helps for 4 hours.   She lost 70 pounds.   Her back is doing really well since back sx.   She has about 4-5/10 pain - all over. She feels stiff all over.   The back of her legs can hurt and back.   hes' been surviving with out diclfoeac but she would like to feel better. She's asking if she can restart.   Otherwise no specific area is swelling.   walkign outside.   Not able to exercise at gym  b/c of coronovirus restrictions.     9/15/2020  In person   She lost 80 pounds since surgery and doing great.   Her back is doing well.   She has more pain in the evenings. It's hard to move after dinner.   She went back on diclofeanc - twice aday   She's working from home .   No areas of swelling.   She's doing a  at the gym twice a week.   Pain is 3/10 - can go to 5/10 at night     3/15/2021  The lower back pain started hurting again at the end of the year. The pain is radiating down her legs.   She had another round of injections and it helped a little bit. She has pain when she sits too long   She has another round of injections this Wednesday.   If she is walking her back pain is 9/10 pain. She has a  and helping with core strengthening.   She has kept her weight off.   She has no other joint pain.       9/22/2021  Her left hand is swollen and hurting.   Her left 5th finger is really swollen and hurts.   Rest of her hands are fine.   She had back fusion in 4/2021. Fine now and recovered.  Her left hand is 2-3/10 pain. If she hold something It hurts.   She  has no pain in back, did PT and several rounds of it. Her back ffeels great.   The 2nd round of physical therapy she felt great.     3/23/2022  She's doing well.   Her back is doing well.   No flare ups.   She has lost 7 pounds since the begninning of the year.   Working from home still.   She is taking diclofenac 75mg bid -   She is having 1/10 pain.   She has right ankle deformity - and got a righta nkel brace from gigi Brooks at [facility] since 8/2021 -   She declined surgery , got a 2nd opinon at [facility] and declined tendon transfer  Is doidng PT - for 3 months and now renewed again and she hasn't fallen since. It's the Parkinsons, previous sx to right ankle and RA .   She has no pain in it - just difficulty with falling and balancewith it.     9/23/2022  She is having her right foot turn in and her left achilles tendon. Her left achilels tendonitis is more in the morning. Sometimes she has to wake up in the night and it hurts badly at night.   She has done PT.   She is falling b/c of this.   She is doing ok with the arthriits pain but her right foot is turing in and she is not doing well with this.   Her falls are usuall when she doenst' wear her braces.   She dropped 5 pounds in the last 3 months.     Her back has been fine.   Her pain is 2-3/10 pain.     She's going to go back with PT soon - it's helping her left foot pain in the last 2 weeks.     5/5/2023  She has another compressed disc /herniated disc in her back - started about 2months ago.   She is getting sx next week   Her fingers are ok.   But her left elbow and knees are bothering her.   Her left ahciles is not bad.   The left elbow is not constant - it's only if she leans on something.   The knees are 5/10 pain.   Her left knee cap can come off - julienne left one - she has this feeling.   She has 10/10 pain - with moving.   She saw dr. guzman and getting sx next weke - the pain is all the time -   Sitting she is ok. She can't sleep well.   She has shooting  pains/sciatica     11/6/2023 -   Her left knee is painful for the last 1-2 months.   Her back is much better post surgery. She just has issues with the knee.   Her other joints are fine.   She cont. To lose weight.     5/6/2024  She is losing weight still.   Her left knee is better.   Her back is good.   She still wants to get to goal weight is 180lbs.   No joint pain -     10/3/2024  She lost her job in July.   Her parkinson's is getting really bad and she has been working from home and she was under and accomadation from working from  home.   She is feeling her rheumatoid arthrititis is getting worse as well.   She is usign a full walker to get around   She can't write with her arthritis and parkinson's   She has more difficulty with workign with a computer - and holding a mouse.   Her pain is under control but while walking ehr knees are hurting.     1/20/2025  History of Present Illness  The patient, with a history of rheumatoid arthritis managed on Xeljanz and methotrexate, presents with foot pain of two to three weeks duration. The pain, which is not associated with any known injury, worsens as the day progresses and is most severe by bedtime. Rest does not completely alleviate the pain, but it does lessen its intensity. The patient has been wearing a boot for less than a week as recommended by her podiatrist, who also ordered an MRI.    The patient also reports taking Tramadol for the foot pain, but it does not provide significant relief. She only takes it when she needs to leave the house. The patient has been managing the pain by resting and using a wheeled chair at home.    The patient's other joints are reportedly doing well on the current regimen of Xeljanz and methotrexate. She is also on COBRA insurance but plans to transition to Medicare in the future.    The patient's kidney function is noted to have improved, and she is also taking folic acid. The patient has applied for disability due to her health  conditions.      She has pain her top of her foot and between her 1st and 2nd toe. - it started 2-3 weeks ago - no recall of injry   Worse the longer the day goe s- not as bad a the morning.   It's not as painful when she rests her foot   She sees luis miguel jones podiatrist on 1/15/2025 -  Her GP thinks it's a stress fracture- and thinks she needs and MRI.   It hurts when she puts pressure on her left foot     Her other joitn pain is controlled on xeljanz and methotrexate is working.     Body mass index is 29.9 kg/m².      Current Outpatient Medications   Medication Sig Dispense Refill    traMADol 50 MG Oral Tab Take 0.5 tablets (25 mg total) by mouth every 8 (eight) hours as needed for Pain. 30 tablet 0    Phentermine HCl 37.5 MG Oral Tab Take 1 tablet (37.5 mg total) by mouth before breakfast.      MONTELUKAST 10 MG Oral Tab TAKE 1 TABLET BY MOUTH EVERY DAY AT NIGHT 90 tablet 0    levothyroxine 125 MCG Oral Tab Take 1 tablet (125 mcg total) by mouth before breakfast. Alternate every other day with levothyroxine 112 mgg. 90 tablet 3    levothyroxine 112 MCG Oral Tab Take 1 tablet (112 mcg total) by mouth before breakfast. 90 tablet 3    TOPIRAMATE 100 MG Oral Tab TAKE 1 TABLET BY MOUTH TWICE A  tablet 1    METHOTREXATE 2.5 MG Oral Tab TAKE 8 TABS WEEKLY BY MOUTH 96 tablet 1    Tofacitinib Citrate ER (XELJANZ XR) 11 MG Oral Tablet 24 Hr Take 1 tablet by mouth daily. 90 tablet 1    DICLOFENAC 50 MG Oral Tab EC TAKE 1 TABLET BY MOUTH EVERY DAY 90 tablet 1    calcitriol 0.25 MCG Oral Cap Take 1 capsule (0.25 mcg total) by mouth daily. 90 capsule 1    carbidopa-levodopa  MG Oral Tab TAKE 1 TABLET BY MOUTH FOUR TIMES DAILY TAKE AT 630AM, 11AM, 2PM, AND 7PM      PANTOPRAZOLE 40 MG Oral Tab EC TAKE 1 TABLET BY MOUTH BEFORE BREAKFAST 90 tablet 2    albuterol 108 (90 Base) MCG/ACT Inhalation Aero Soln INHALE 2 PUFFS BY INHALATION ROUTE EVERY 4 - 6 HOURS AS NEEDED 3 each 0    pravastatin 20 MG Oral Tab Take 1  tablet (20 mg total) by mouth nightly. 90 tablet 3    folic acid 1 MG Oral Tab Take 1 tablet (1 mg total) by mouth daily. 90 tablet 3    Hydrocod Navi-Chlorphe Navi ER 10-8 MG/5ML Oral Suspension Extended Release Take 5 mL by mouth 2 (two) times daily as needed. 115 mL 0    albuterol (2.5 MG/3ML) 0.083% Inhalation Nebu Soln Take 3 mL (2.5 mg total) by nebulization every 4 (four) hours as needed for Wheezing. 30 each 0    Boswellia-Glucosamine-Vit D (OSTEO BI-FLEX ONE PER DAY OR) Take by mouth As Directed.      Amantadine HCl 100 MG Oral Cap Take 1 capsule (100 mg total) by mouth daily.      omega-3 fatty acids 1000 MG Oral Cap Take 1,000 mg by mouth daily.      TURMERIC OR Take by mouth 2 (two) times a day.      Multiple Vitamins-Minerals (BARIATRIC FUSION) Oral Chew Tab Chew 1 tablet by mouth in the morning, at noon, in the evening, and at bedtime.        Past Medical History:    Acute torn meniscus of knee    ARTHROSCOPY OF KNEE    Amenorrhea    Asthma (HCC)    Back problem    Chicken pox    Chronic cough    Disorder of thyroid    Extrapyramidal syndrome    MEDICATION MGMT. W/ PARKINSONS FEATURES    Greater trochanteric bursitis, left    High cholesterol    History of blood transfusion    CDH    Incontinence    Infertility, female    Parkinson disease (HCC)    Parkinson's disease (HCC)    Rheumatoid arthritis (HCC)    S/P laparoscopic sleeve gastrectomy    for weight loss    Sleep apnea    cpap    Thyroid disease    Tibia/fibula fracture    OPEN REDUCTION INTERNAL FIXATION    Urinary incontinence    after childbirth    Viral conjunctivitis    Visual impairment    glasses      Past Surgical History:   Procedure Laterality Date    Back surgery      Back surgery  04/21/2021    Back surgery  04/25/2023    compressed disc, removal of bone    Colonoscopy  06/11/2019    return in 5 years    Colonoscopy N/A 06/11/2019    Procedure: COLONOSCOPY;  Surgeon: Dago Heart MD;  Location: [facility] ENDOSCOPY     Colposcopy, cervix w/upper adjacent vagina; w/biopsy(s), cervix      D & c      for extended period    D & c      for extended period    Foot surgery Left     Had right foot done recently.    Knee arthroscopy Right     Lap sleeve gastrectomy  12/02/2019    Dr Rios, [facility]    Laparoscopy,pelvic,biopsy  1990    for infertility    Laparoscopy,pelvic,biopsy  1990    for infertility    Other surgical history Right 12/03/2015    Other surgical history  12/03/2015    Thyroidectomy      Total       Family hx - reviewed   Social History:  reviewed     REVIEW OF SYSTEMS:   Review Of Systems:  Fatigue  Constitutional:No fever, no change in weight or appetitie  Derm: get rashes on legs - on bottom of her legss after itching even after lotion, x 1-2 months.   Joint/Muscluskeltal: see HPI,   All other ROS are negative.     EXAM:   /77   Pulse 83   Resp 16   Ht 5' 9\" (1.753 m)   Wt 202 lb 8 oz (91.9 kg)   LMP 04/18/2015 (Exact Date)   BMI 29.90 kg/m²   HEENT:clear sclera PERRLA, pleasant, no acute distress, no CAD, no neck tendnerness, good ROM,   No rashes  CVS: RRR, no murmurs  RS: CTAB, no crackles, no rhonchi  ABD: Soft Non tender,   Joint exam:   Losing weight - thin  parkinsons movements. Using walker  Not tender in 1-5th mcps , no synovitis   Not tender in elbows or shoulders   Tender in left knee.   Not tender right knees   Not tender in b/l ankles  no lower back tendnress in midline -   No edema    Physical Exam  EXTREMITIES: No tenderness in the foot.  MUSCULOSKELETAL: No back pain.        Component      Latest Ref Rng 5/1/2024   WBC      4.0 - 11.0 x10(3) uL 4.5    RBC      3.80 - 5.30 x10(6)uL 4.10    Hemoglobin      12.0 - 16.0 g/dL 13.4    Hematocrit      35.0 - 48.0 % 40.9    MCV      80.0 - 100.0 fL 99.8    MCH      26.0 - 34.0 pg 32.7    MCHC      31.0 - 37.0 g/dL 32.8    RDW      11.0 - 15.0 % 14.7    RDW-SD      35.1 - 46.3 fL 54.4 (H)    Platelet Count      150.0 - 450.0 10(3)uL 197.0     CREATININE      0.55 - 1.02 mg/dL 1.07 (H)    EGFR      >=60 mL/min/1.73m2 59 (L)    SED RATE      0 - 30 mm/Hr 12    C-REACTIVE PROTEIN      <1.00 mg/dL <0.40    AST (SGOT)      <=34 U/L 24    ALT (SGPT)      10 - 49 U/L <7 (L)    TSH      0.550 - 4.780 mIU/mL 4.314       Component      Latest Ref Rng 8/19/2024 12/11/2024   Glucose      70 - 99 mg/dL 88     Sodium      136 - 145 mmol/L 141     Potassium      3.5 - 5.1 mmol/L 3.7     Chloride      98 - 112 mmol/L 111     Carbon Dioxide, Total      21.0 - 32.0 mmol/L 25.0     ANION GAP      0 - 18 mmol/L 5     BUN      9 - 23 mg/dL 17     CREATININE      0.55 - 1.02 mg/dL 1.13 (H)  1.03 (H)    BUN/CREATININE RATIO      10.0 - 20.0  15.0     CALCIUM      8.7 - 10.4 mg/dL 9.4     CALCULATED OSMOLALITY      275 - 295 mOsm/kg 293     EGFR      >=60 mL/min/1.73m2 55 (L)  61    ALT (SGPT)      10 - 49 U/L <7 (L)  <7 (L)    AST (SGOT)      <34 U/L 17  23    ALKALINE PHOSPHATASE      50 - 130 U/L 80     Total Bilirubin      0.2 - 1.1 mg/dL 0.5     PROTEIN, TOTAL      5.7 - 8.2 g/dL 6.8     Albumin      3.2 - 4.8 g/dL 4.3     Globulin      2.0 - 3.5 g/dL 2.5     A/G Ratio      1.0 - 2.0  1.7     Patient Fasting for CMP? Yes     WBC      4.0 - 11.0 x10(3) uL 5.7  4.4    RBC      3.80 - 5.30 x10(6)uL 4.09  4.08    Hemoglobin      12.0 - 16.0 g/dL 13.7  13.5    Hematocrit      35.0 - 48.0 % 40.8  41.2    MCV      80.0 - 100.0 fL 99.8  101.0 (H)    MCH      26.0 - 34.0 pg 33.5  33.1    MCHC      31.0 - 37.0 g/dL 33.6  32.8    RDW      11.0 - 15.0 % 15.0  14.6    RDW-SD      35.1 - 46.3 fL 54.7 (H)  53.6 (H)    Platelet Count      150.0 - 450.0 10(3)uL 204.0  226.0    Cholesterol, Total      <200 mg/dL 205 (H)     HDL Cholesterol      40 - 59 mg/dL 85 (H)     Triglycerides      30 - 149 mg/dL 46     LDL Cholesterol Calc      <100 mg/dL 111 (H)     VLDL      0 - 30 mg/dL 8     NON-HDL CHOLESTEROL      <130 mg/dL 120     Patient Fasting for Lipid? Yes     C-REACTIVE PROTEIN       <1.00 mg/dL <0.40  <0.40    SED RATE      0 - 30 mm/Hr 5  19    TSH      0.550 - 4.780 mIU/mL 1.842     Vitamin B12      211 - 911 pg/mL 1,875 (H)     VITAMIN D, 25-OH, TOTAL      30.0 - 100.0 ng/mL 55.4        Legend:  (H) High  (L) Low  2/9/2013 - shana 1:40  8/18/2014 - MRI left foot -   1. There is localized edema within the intermetatarsal muscle between the  second and third metatarsals, without fluid collection, compatible with  low-grade strain.  2. Low-grade strain pattern in the abductor digiti minimi muscle belly  close to the calcaneus.  3. Prominent tendinopathy of the peroneus longus tendon as it courses  around the lateral malleolus.  There is a calcaneal spur at the insertion of the plantar  fascia. There is minimal increased marrow signal within  the base of the second-fifth metatarsals likely  representing a change related to positioning     8/18/2014 - MRI right foot  There is a chronic healed fracture deformity of the proximal shaft of the  fifth metatarsal now present which has resulted in broadening of the  proximal fifth metatarsal shaft. There is a slight medial bowing deformity  of the fifth metatarsal there is a result of the previous fracture but the  distal shaft of the fifth metatarsal is parallel with the distal shaft of  the fourth metatarsal and alignment is near-anatomic.  There is subtle bone marrow edema in the base of the third metatarsal which  is probably degenerative although chronic stress response could also cause  this appearance. Otherwise normal marrow signal and alignment. Normal  marrow signal in the sesamoid bones without evidence of sesamoid avascular  necrosis, fracture or sesamoiditis.  1. Moderate inframalleolar peroneus longus tendinosis.  2. 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  3. Abductor digit minimi muscle atrophy compatible with Cárdenas neuropathy.  4. Chronic healed fracture deformity of the fifth metatarsal.  5. Mild bone marrow edema in  the base of the third metatarsal which could  be degenerative or related to chronic stress response.    Reviewed notes from 4/2008 - chronic plantar fasciitis  1/23/2013 - mri brain - unremarkable,   3/13/2012 - us venous doppler - right - normal   4/30/2009 - chest xray - unchange, mildly prominent pulmonary markings  9/27/2007 - b/l knee ortho xray - minimla degenerative findings in the knee  9/18/2004 - mri lumbar spine - mild disc dessication of l2-3 otherwise unremarkable study wihtout evidenc of tfocal disc herniation or spinal stenosis  10/6/2006 - fev1/fvc is 87  - dlco is 65%,   2/14/2014 - left knee cx - strep mitis /virins    7/22/2015 - b/l knee xray   Bilateral moderate 2 compartmental knee osteoarthritis, more  advanced on the right. Changes have pro\    5/16/2019 - mri left knee     Tricompartmental osteoarthritis, severe in the medial compartment with a complex degenerative tear of the medial meniscus.     Subchondral insufficiency fracture of the medial tibial plateau without collapse of the articular surface.    8/5/2019 - mri lumbar spine   1. Multilevel degenerative changes of the lumbar spine as detailed.  Findings have progressed since October, 2017.  Notable levels as follows:     2. L3-L4:  Severe multifactorial spinal canal stenosis, which is exacerbated by a medially directed 7-8 mm synovial cyst arising from the right facet joint.  Mild bilateral neural foraminal stenosis.  3. L4-L5:  Mild-to-moderate left lateral recess with mild to moderate right and mild left neural foraminal stenosis.     4. Edema within the L3-L4 interspinous ligament, which may be degenerative in nature.     5. Lesser incidental findings as above.    4/7/2023- mri lumbar spine  1. L3-4:  Newly developed large right paracentral disc herniation resulting in moderate to severe asymmetric central narrowing accentuated towards the right, severe right and moderate left lateral narrowing.  A component extending into the  right foramen   with smaller associated osteophyte results in moderate right foraminal narrowing.  Mild left foraminal narrowing.  Right-sided laminectomy.   2. L4-5:  Laminectomy.  Posterior instrumentation and interbody cage for fusion.  Mild bilateral foraminal narrowing.  No significant central narrowing.   3. L5-S1:  Advanced left facet arthrosis.  Newly developed disc osteophyte complex extending into left foramen and resulting in moderate left foraminal narrowing.     12/15/2023 - left knee xray   1. Primary osteoarthritic changes of the left knee, most pronounced with respect of the medial and patellofemoral compartments.      2. No radiographically visible acute osseous injury of the left knee.     ASSESSMENT AND PLAN:   Lindsay Trotter is a 64 year old female who presents for   Chief Complaint   Patient presents with    Rheumatoid Arthritis    Medication Follow-Up    Lab Results    Foot Pain     left     1.Seropositive Rheumatoid arthriits  -  Positive AMINAH 1:80, , pos. CCP polyarthralgia with rash on feet - now positive ccp abs -   Stable - Except left knee pain-   Having extreme imbalance and difficulty with walking due to RA and parkison's -   - cont. Methotrexate - 20mg a week   Cont. xeljanz 11mg a day -   On xeljanz since 2/2018 - doing better -   She now doesn't have a job - it is very difficult for her to go back to work given RA and parkison's combined - difficulty with walking , balance and joitn pain with activity   Some advancing osteoarthritis from the RA in her knees as we ll.   Requires medications that control her RA   She will not be able to go in for work and perform even computer fucntions due to her RA with parkison's   I agree that she would qualify for disability -   She should get  long term disabiltiy given the severity of hre RA and parkinson's and osteoartrhritis of knees and back  -cont  diclfoenac 50mg bid  B/c of f gastric sleeve - can taper to once a day b/c of slightly  elevated creatinine - d/w her again today about this -   Take tyelenol ES two tablets twice a day -   diclofenac gel 1 % topical helps. - refill     Acute pain in left knee = slight flare of her left knee   S./p left knee injectiosn on 5/5/2023, 11/6/2023   S/p PT for left knee and gait     - rtc in 6 months.   - labs every 4 months - -     In the past:  Off  leflunomide- she noticed no improvement of her arthriits on leflunomide -   - no improvement on ssz   She's ont able to use subq injections b/c of her really bad parkinson's not able to hold down a pen in one place for very long b/c of severe hand tremors.    Elevated inflammatory markers are better on xeljanz.   - difficutly to distinguish from her parkinson's but her crp is elevated still.   Tramadol 50mg bid prn   S/p burst prednisone helped with flarea       Other hx:  - mri foot in the past showed changes in mtps of left foot that could be early RA - but non specific and not synovitis   - hx of strep viridans in knee cutlrue in the past - ? - concern for any ongoing or susceptibility to infection - will have to monitor her -   L3-4     2.. Lower back pain classic for spinal stenosis - f/u physiatry - braod based disc bulgd in L3-4 - recurrent pain -   S/p L2-3-4 minimally invasive laminectomy - 9/9/2019 -   Lumbar fusion 4/2021 - feelis better. And with PT - no back pain -       3. OA knees - s/p cortisone injections b/l 7/22/2015 by ortho  Diclofenac gel is really helping her. -   4. Hx of parkinson's - severe - difficulty with walking - agree with neurology on disabilty -   5. S/p gastric sleeve 12/2/2019 - lost 70 pounds -   6. Right ankle brace - agree with continueing PT , hold off on surgery for now  7. osteoporsiois - next DEXA due in 6/2025   8. Slighlty low creatine- try to stop the diclfoenac   9. Left foot pain- agree with getting mri scheduled, tramadol for pain - mostly like a stress fracture -     Assessment & Plan  Foot Pain  Foot pain for  2-3 weeks, worsening throughout the day, alleviated somewhat by rest. No clear injury reported. Differential includes stress fracture, which may not be visible on X-ray. Discussed scheduling MRI now due to potential delays and reassessing based on symptom progression. Healing may take 6-8 weeks with non-weight bearing activities.  - Schedule MRI for foot  - Continue using the boot  - Rest and avoid weight-bearing activities  - Consider Tylenol for pain management  - Follow-up in 3-4 months or earlier if needed    Pain Management  Current pain management with tramadol is not very effective. Resting alleviates pain. Ibuprofen is not recommended due to potential kidney function issues. Discussed alternative pain management options including Tylenol.  - Continue tramadol as needed  - Avoid ibuprofen due to kidney function concerns  - Consider Tylenol for additional pain relief    Rheumatoid Arthritis  Managed with Xeljanz and methotrexate, which are effective. Discussed transitioning to Medicare and potential medication changes due to coverage issues. Options include switching to biologics administered in-office or via infusion, such as Humira, Enbrel, Remicade, Cimzia, Actemra, or Orencia. Medicare Part B covers in-office biologics and infusions, while Part D has poor prescription coverage for Xeljanz. Discussed financial assistance for biologics under Medicare Advantage plans and upcoming formulary changes, including generic Humira and Enbrel by 2026.  - Continue Xeljanz and methotrexate  - Discuss Medicare options and potential medication changes  - Consider in-office biologics or infusions if transitioning to Medicare    General Health Maintenance  Bone density scan from 2023 was normal.  - Review bone density scan results    Follow-up  - Follow-up appointment in 3-4 months unless earlier documentation is needed.      Summary:  1. Cont. . methotrexate - 8 tablets a week and folic acid 1mg a day   2. Cont.  xlejanz  11mg a day    3.  Return to clinic in  2-4  months -   4. .Check labs every 4  months - in January .   5. Diclofenac gel 1 % for left knee.    6. Tylenol ES two tablets twice ad ay   7. Tramadol for the pain -         - overall she's been stable on xeljanz and methotrexate 8 tablets a  week -   I whole heartedly agree that she should get long term disability given her rheumatoid arthritis with her parkison's. Her movemetn disorder with her inflammatory arthritis makes it difficulty for her to use her hands consistently and safely for most work. 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       Chucho Butcher MD  1/20/2025   11:25 AM      The following individual(s) verbally consented to be recorded using ambient AI listening technology and understand that they can each withdraw their consent to this listening technology at any point by asking the clinician to turn off or pause the recording: Lindsay Trotter

## 2025-01-20 NOTE — PATIENT INSTRUCTIONS
1. Cont. . methotrexate - 8 tablets a week and folic acid 1mg a day   2. Cont.  xlejanz 11mg a day    3.  Return to clinic in  2-4  months -   4. .Check labs every 4  months - in January .   5. Diclofenac gel 1 % for left knee.    6. Tylenol ES two tablets twice ad ay   7. Tramadol for hte pain -

## 2025-02-03 ENCOUNTER — DOCUMENTATION ONLY (OUTPATIENT)
Dept: SURGERY | Facility: CLINIC | Age: 65
End: 2025-02-03

## 2025-02-03 NOTE — PROGRESS NOTES
LUZ MOORE (Newman: [id number])  PA Case ID #: [id number]  Sent to Plan today  Drug  Phentermine HCl 37.5MG tablets  [facility] Electronic PA Form (2017 NCPDP)

## 2025-02-08 ENCOUNTER — LAB ENCOUNTER (OUTPATIENT)
Dept: LAB | Facility: HOSPITAL | Age: 65
End: 2025-02-08
Attending: INTERNAL MEDICINE
Payer: COMMERCIAL

## 2025-02-08 DIAGNOSIS — Z01.818 PRE-OP EXAMINATION: ICD-10-CM

## 2025-02-08 DIAGNOSIS — Z01.818 PREOP EXAMINATION: Primary | ICD-10-CM

## 2025-02-08 LAB
ALBUMIN SERPL-MCNC: 4.2 G/DL (ref 3.2–4.8)
ALBUMIN/GLOB SERPL: 1.8 {RATIO} (ref 1–2)
ALP LIVER SERPL-CCNC: 70 U/L
ALT SERPL-CCNC: <7 U/L
ANION GAP SERPL CALC-SCNC: 8 MMOL/L (ref 0–18)
AST SERPL-CCNC: 18 U/L (ref ?–34)
ATRIAL RATE: 77 BPM
BASOPHILS # BLD AUTO: 0.05 X10(3) UL (ref 0–0.2)
BASOPHILS NFR BLD AUTO: 1.4 %
BILIRUB SERPL-MCNC: 0.5 MG/DL (ref 0.2–1.1)
BUN BLD-MCNC: 18 MG/DL (ref 9–23)
BUN/CREAT SERPL: 15.3 (ref 10–20)
CALCIUM BLD-MCNC: 9.3 MG/DL (ref 8.7–10.4)
CHLORIDE SERPL-SCNC: 109 MMOL/L (ref 98–112)
CO2 SERPL-SCNC: 26 MMOL/L (ref 21–32)
CREAT BLD-MCNC: 1.18 MG/DL
DEPRECATED RDW RBC AUTO: 54.8 FL (ref 35.1–46.3)
EGFRCR SERPLBLD CKD-EPI 2021: 52 ML/MIN/1.73M2 (ref 60–?)
EOSINOPHIL # BLD AUTO: 0.1 X10(3) UL (ref 0–0.7)
EOSINOPHIL NFR BLD AUTO: 2.7 %
ERYTHROCYTE [DISTWIDTH] IN BLOOD BY AUTOMATED COUNT: 14.6 % (ref 11–15)
FASTING STATUS PATIENT QL REPORTED: YES
GLOBULIN PLAS-MCNC: 2.4 G/DL (ref 2–3.5)
GLUCOSE BLD-MCNC: 82 MG/DL (ref 70–99)
HCT VFR BLD AUTO: 41.1 %
HGB BLD-MCNC: 13.2 G/DL
IMM GRANULOCYTES # BLD AUTO: 0.01 X10(3) UL (ref 0–1)
IMM GRANULOCYTES NFR BLD: 0.3 %
LYMPHOCYTES # BLD AUTO: 0.86 X10(3) UL (ref 1–4)
LYMPHOCYTES NFR BLD AUTO: 23.5 %
MCH RBC QN AUTO: 32.8 PG (ref 26–34)
MCHC RBC AUTO-ENTMCNC: 32.1 G/DL (ref 31–37)
MCV RBC AUTO: 102 FL
MONOCYTES # BLD AUTO: 0.32 X10(3) UL (ref 0.1–1)
MONOCYTES NFR BLD AUTO: 8.7 %
NEUTROPHILS # BLD AUTO: 2.32 X10 (3) UL (ref 1.5–7.7)
NEUTROPHILS # BLD AUTO: 2.32 X10(3) UL (ref 1.5–7.7)
NEUTROPHILS NFR BLD AUTO: 63.4 %
OSMOLALITY SERPL CALC.SUM OF ELEC: 297 MOSM/KG (ref 275–295)
P AXIS: 53 DEGREES
P-R INTERVAL: 218 MS
PLATELET # BLD AUTO: 200 10(3)UL (ref 150–450)
POTASSIUM SERPL-SCNC: 4.1 MMOL/L (ref 3.5–5.1)
PROT SERPL-MCNC: 6.6 G/DL (ref 5.7–8.2)
Q-T INTERVAL: 388 MS
QRS DURATION: 90 MS
QTC CALCULATION (BEZET): 439 MS
R AXIS: -18 DEGREES
RBC # BLD AUTO: 4.03 X10(6)UL
SODIUM SERPL-SCNC: 143 MMOL/L (ref 136–145)
T AXIS: 43 DEGREES
VENTRICULAR RATE: 77 BPM
WBC # BLD AUTO: 3.7 X10(3) UL (ref 4–11)

## 2025-02-08 PROCEDURE — 85025 COMPLETE CBC W/AUTO DIFF WBC: CPT

## 2025-02-08 PROCEDURE — 93010 ELECTROCARDIOGRAM REPORT: CPT | Performed by: INTERNAL MEDICINE

## 2025-02-08 PROCEDURE — 36415 COLL VENOUS BLD VENIPUNCTURE: CPT

## 2025-02-08 PROCEDURE — 80053 COMPREHEN METABOLIC PANEL: CPT

## 2025-02-08 PROCEDURE — 93005 ELECTROCARDIOGRAM TRACING: CPT

## 2025-02-12 RX ORDER — PRAVASTATIN SODIUM 20 MG
20 TABLET ORAL NIGHTLY
Qty: 90 TABLET | Refills: 3 | Status: SHIPPED | OUTPATIENT
Start: 2025-02-12

## 2025-02-12 NOTE — TELEPHONE ENCOUNTER
Refill Per Protocol     Requested Prescriptions   Pending Prescriptions Disp Refills    PRAVASTATIN 20 MG Oral Tab [Pharmacy Med Name: PRAVASTATIN SODIUM 20 MG TAB] 90 tablet 3     Sig: TAKE 1 TABLET BY MOUTH EVERY DAY AT NIGHT       Cholesterol Medication Protocol Passed - 2/12/2025  7:16 AM        Passed - ALT < 80     Lab Results   Component Value Date    ALT <7 (L) 02/08/2025             Passed - ALT resulted within past year        Passed - Lipid panel within past 12 months     Lab Results   Component Value Date    CHOLEST 205 (H) 08/19/2024    TRIG 46 08/19/2024    HDL 85 (H) 08/19/2024     (H) 08/19/2024    VLDL 8 08/19/2024    NONHDLC 120 08/19/2024             Passed - In person appointment or virtual visit in the past 12 mos or appointment in next 3 mos     Recent Outpatient Visits              3 weeks ago Rheumatoid arthritis, involving unspecified site, unspecified whether rheumatoid factor present (HCC)    [facility] Chucho Butcher MD    Office Visit    3 weeks ago Environmental and seasonal allergies    [facility]    Nurse Only    4 weeks ago Metatarsal stress fracture, left, initial encounter    [facility] [place], Jessica Marie, DPM    Office Visit    1 month ago Left foot pain    [facility] Oriana Hylton MD    Office Visit    1 month ago Environmental and seasonal allergies [J30.89]    [facility]    Nurse Only          Future Appointments         Provider Department Appt Notes    Tomorrow EC ALLERGY [facility]     In 5 days Dante Palafox MD [facility] 6 MONTH F/U    In 6 days [facility] PACU; [facility] MRI RM1 (1.5T WIDE) [facility] MRI     In 2 weeks Harsha Thibodeaux MD [facility]     In 1 month EC ALLERGY [facility]     In 3 months Chucho Butcher MD [facility] 6 mo f/u    In 9 months Urmila Gan RD [facility]                     Passed - Medication is active on med list               Future Appointments         Provider Department Appt Notes    Tomorrow EC ALLERGY [facility]     In 5 days Dante Palafox MD [facility] 6 MONTH F/U    In 6 days [facility] PACU; [facility] MRI 1 (1.5T WIDE) [facility] MRI     In 2 weeks Harsha Thibodeaux MD [facility]     In [facility] month [facility]     In 3 months Chucho Butcher MD [facility] 6 mo f/u    In 9 months Urmila Gan RD [facility]           Recent Outpatient Visits              3 weeks ago Rheumatoid arthritis, involving unspecified site, unspecified whether rheumatoid factor present (HCC)    [facility] Chucho Butcher MD    Office Visit    3 weeks ago Environmental and seasonal allergies    [facility]    Nurse Only    4 weeks ago Metatarsal stress fracture, left, initial encounter    [facility] [place], Jessica Marie, DPM    Office Visit    1 month ago Left foot pain    [facility] Oriana Hylton MD    Office Visit    1 month ago Environmental and seasonal allergies [J30.89]    [facility]    Nurse Only

## 2025-02-13 RX ORDER — ACETAMINOPHEN 500 MG
1000 TABLET ORAL ONCE
Status: DISCONTINUED | OUTPATIENT
Start: 2025-02-13 | End: 2025-02-13

## 2025-02-17 ENCOUNTER — NURSE ONLY (OUTPATIENT)
Dept: ALLERGY | Facility: CLINIC | Age: 65
End: 2025-02-17
Payer: COMMERCIAL

## 2025-02-17 ENCOUNTER — OFFICE VISIT (OUTPATIENT)
Dept: INTERNAL MEDICINE CLINIC | Facility: CLINIC | Age: 65
End: 2025-02-17

## 2025-02-17 VITALS
DIASTOLIC BLOOD PRESSURE: 60 MMHG | HEART RATE: 100 BPM | RESPIRATION RATE: 20 BRPM | BODY MASS INDEX: 30.66 KG/M2 | WEIGHT: 207 LBS | HEIGHT: 69 IN | TEMPERATURE: 99 F | SYSTOLIC BLOOD PRESSURE: 116 MMHG

## 2025-02-17 DIAGNOSIS — Z01.818 PRE-OP EXAMINATION: Primary | ICD-10-CM

## 2025-02-17 DIAGNOSIS — J30.89 ENVIRONMENTAL AND SEASONAL ALLERGIES: Primary | ICD-10-CM

## 2025-02-17 DIAGNOSIS — M06.9 RHEUMATOID ARTHRITIS, INVOLVING UNSPECIFIED SITE, UNSPECIFIED WHETHER RHEUMATOID FACTOR PRESENT (HCC): ICD-10-CM

## 2025-02-17 DIAGNOSIS — M84.376A STRESS FRACTURE OF METATARSAL BONE, UNSPECIFIED LATERALITY, INITIAL ENCOUNTER: ICD-10-CM

## 2025-02-17 DIAGNOSIS — R94.31 ABNORMAL EKG: ICD-10-CM

## 2025-02-17 DIAGNOSIS — E03.9 HYPOTHYROIDISM, UNSPECIFIED TYPE: ICD-10-CM

## 2025-02-17 DIAGNOSIS — G20.B1 PARKINSON'S DISEASE WITH DYSKINESIA, UNSPECIFIED WHETHER MANIFESTATIONS FLUCTUATE (HCC): ICD-10-CM

## 2025-02-17 PROBLEM — F43.22 ADJUSTMENT DISORDER WITH ANXIETY: Status: ACTIVE | Noted: 2021-04-28

## 2025-02-17 PROBLEM — T42.8X5A LEVODOPA-INDUCED DYSKINESIA: Status: ACTIVE | Noted: 2023-03-28

## 2025-02-17 PROBLEM — G24.01 LEVODOPA-INDUCED DYSKINESIA: Status: ACTIVE | Noted: 2023-03-28

## 2025-02-17 PROBLEM — R06.09 EXERTIONAL DYSPNEA: Status: ACTIVE | Noted: 2019-09-03

## 2025-02-17 PROBLEM — R94.39 ABNORMAL NUCLEAR STRESS TEST: Status: ACTIVE | Noted: 2019-09-03

## 2025-02-17 PROCEDURE — 99214 OFFICE O/P EST MOD 30 MIN: CPT | Performed by: INTERNAL MEDICINE

## 2025-02-17 PROCEDURE — 3074F SYST BP LT 130 MM HG: CPT | Performed by: INTERNAL MEDICINE

## 2025-02-17 PROCEDURE — G2211 COMPLEX E/M VISIT ADD ON: HCPCS | Performed by: INTERNAL MEDICINE

## 2025-02-17 PROCEDURE — 3008F BODY MASS INDEX DOCD: CPT | Performed by: INTERNAL MEDICINE

## 2025-02-17 PROCEDURE — 3078F DIAST BP <80 MM HG: CPT | Performed by: INTERNAL MEDICINE

## 2025-02-17 NOTE — PROGRESS NOTES
HPI:    Patient ID: Lindsay Trotter is a 64 year old female.    HPI    Patient returns to the office today for preprocedure evaluation and medical clearance prior to undergoing an MRI under anesthesia for her foot issue.  Patient last seen in the office by me on August 16 for physical exam.  At that time she had been laid off and was looking at disability.  She had lost 40 pounds at that point with a history of sleeve gastrectomy in the past.  Also at that visit last August, Parkinson's was somewhat worse.  During the last visit, the following changes were made: None.  Since the last visit, the patient has seen the following doctors: Bariatrics, physiatry, rheumatology, podiatry.  Thought to have possible stress fracture of metatarsal; will get an MRI tomorrow; painful for 1.5 months or so.     Today, the patient offers the following complaints: Other than the foot, nothing else new. The Parkinsons is better controlled now but still not great; gets dyskinesia in the evening. She fell on her butt 2 days ago; otherwise no recent falling. This is an improvement. Asthma is perfect. She stopped the Advair.   She got long term disability with her company.  She is not working now.  Patient describes diet as ok. WEight has levelled off at about 200. She is up about 7 pounds recently.  For exercise, the patient is less active with the foot issue and walking boot.   Tobacco and alcohol use reviewed.   Current medications reviewed.   Health maintenance issues reviewed.    Wt Readings from Last 6 Encounters:   02/17/25 207 lb (93.9 kg)   01/20/25 202 lb 8 oz (91.9 kg)   01/13/25 203 lb 9.6 oz (92.4 kg)   12/18/24 200 lb 3.2 oz (90.8 kg)   09/26/24 201 lb (91.2 kg)   09/12/24 201 lb 9.6 oz (91.4 kg)       Patient Active Problem List   Diagnosis    Asthma (HCC)    Hyperlipidemia    Asthma, extrinsic (HCC)    Allergic rhinitis due to pollen    Allergic rhinitis due to other allergen    Rheumatoid arthritis, adult (HCC)     Parkinson disease (HCC)    Post-surgical hypothyroidism    Lumbar spinal stenosis    Greater trochanteric bursitis, left    Chronic back pain    Adjustment disorder with mixed anxiety and depressed mood    Chronic fatigue    Increased appetite    S/P laparoscopic sleeve gastrectomy    Obesity (BMI 30-39.9)    S/P lumbar fusion    Encounter for therapeutic drug monitoring    Slow transit constipation    Mild protein-calorie malnutrition (HCC)    Overweight (BMI 25.0-29.9)    Abnormal EKG    Abnormal mammogram    Abnormal nuclear stress test    Abnormal weight gain    Accessory carpal bone    Acute bronchitis    Adjustment disorder with anxiety    Benign neoplasm of skin of upper limb, including shoulder    Calcaneal spur    Candidiasis of mouth    Closed fracture of foot    Congenital anomaly of foot    Conjunctivitis    Contusion of foot    Cough    Dermatitis    Disease of conjunctiva due to viruses    Disorder of bursae and tendons in shoulder region    Esophageal reflux    Exertional dyspnea    Enthesopathy    Extrapyramidal disease and abnormal movement disorder    Extrinsic asthma with exacerbation ([facility])    Internal derangement of ankle    Lateral epicondylitis of elbow    Levodopa-induced dyskinesia    Lump or mass in breast    Musculoskeletal symptoms referable to limbs    Nontoxic multinodular goiter    Pain in soft tissues of limb    Painful respiration    Plantar fascial fibromatosis    Postnasal drip    Scanty or infrequent menstruation    Sebaceous cyst    Sprain of ligament of ankle    Synovitis and tenosynovitis    Peroneal tendon tear    Voice disturbance        HISTORY:  Past Medical History:    Acute torn meniscus of knee    ARTHROSCOPY OF KNEE    Amenorrhea    Asthma (HCC)    Back problem    Chicken pox    Chronic cough    Disorder of thyroid    Extrapyramidal syndrome    MEDICATION MGMT. W/ PARKINSONS FEATURES    Greater trochanteric bursitis, left    High cholesterol    History of blood  transfusion    CDH    Incontinence    Infertility, female    Parkinson disease (HCC)    Parkinson's disease (HCC)    Rheumatoid arthritis (HCC)    S/P laparoscopic sleeve gastrectomy    for weight loss    Thyroid disease    Tibia/fibula fracture    OPEN REDUCTION INTERNAL FIXATION    Urinary incontinence    after childbirth    Viral conjunctivitis    Visual impairment    glasses      Past Surgical History:   Procedure Laterality Date    Back surgery      Back surgery  04/21/2021    Back surgery  04/25/2023    compressed disc, removal of bone    Colonoscopy  06/11/2019    return in 5 years    Colonoscopy N/A 06/11/2019    Procedure: COLONOSCOPY;  Surgeon: Dago Heart MD;  Location: [facility] ENDOSCOPY    Colposcopy, cervix w/upper adjacent vagina; w/biopsy(s), cervix      D & c      for extended period    D & c      for extended period    Foot surgery Left     Had right foot done recently.    Knee arthroscopy Right     Lap sleeve gastrectomy  12/02/2019    Dr Rios, [facility]    Laparoscopy,pelvic,biopsy  1990    for infertility    Laparoscopy,pelvic,biopsy  1990    for infertility    Other surgical history Right 12/03/2015    Other surgical history  12/03/2015    Thyroidectomy      Total       Family History   Problem Relation Age of Onset    Heart Disorder Father 55        heart attack-cause of death.    Heart Disorder Mother     Stroke Mother     Cancer Mother         AML-cause of death    Other (leukemia) Mother     Cancer Paternal Aunt         breast cancer-cause of death at age 80    Breast Cancer Paternal Aunt 80        approx age    Heart Disorder Brother     Ovarian Cancer Neg       Social History     Socioeconomic History    Marital status:    Tobacco Use    Smoking status: Never     Passive exposure: Never    Smokeless tobacco: Never   Vaping Use    Vaping status: Never Used   Substance and Sexual Activity    Alcohol use: Yes     Alcohol/week: 2.0 - 3.0 standard drinks of alcohol      Types: 2 - 3 Glasses of wine per week     Comment: social    Drug use: No    Sexual activity: Yes     Partners: Male   Other Topics Concern    Caffeine Concern Yes     Comment: S2 glasses tea daily    Exercise No    Reaction to local anesthetic No    Pt has a pacemaker No    Pt has a defibrillator No   Social History Narrative    The patient uses the following assistive device(s):  single-point cane,uses 2 canes. Walker at night           The patient does live in a home with stairs.     Social Drivers of Health     Food Insecurity: No Food Insecurity (2/17/2025)    NCSS - Food Insecurity     Worried About Running Out of Food in the Last Year: No     Ran Out of Food in the Last Year: No   Transportation Needs: No Transportation Needs (2/17/2025)    NCSS - Transportation     Lack of Transportation: No   Housing Stability: Not At Risk (2/17/2025)    NCSS - Housing/Utilities     Has Housing: Yes     Worried About Losing Housing: No     Unable to Get Utilities: No          Review of Systems          Current Outpatient Medications   Medication Sig Dispense Refill    pravastatin 20 MG Oral Tab Take 1 tablet (20 mg total) by mouth nightly. 90 tablet 3    folic acid 1 MG Oral Tab Take 1 tablet (1 mg total) by mouth daily. (Patient taking differently: Take 1 tablet (1 mg total) by mouth at bedtime.) 90 tablet 3    traMADol 50 MG Oral Tab Take 0.5 tablets (25 mg total) by mouth every 8 (eight) hours as needed for Pain. 30 tablet 0    Phentermine HCl 37.5 MG Oral Tab Take 1 tablet (37.5 mg total) by mouth before breakfast.      MONTELUKAST 10 MG Oral Tab TAKE 1 TABLET BY MOUTH EVERY DAY AT NIGHT 90 tablet 0    levothyroxine 125 MCG Oral Tab Take 1 tablet (125 mcg total) by mouth before breakfast. Alternate every other day with levothyroxine 112 mgg. (Patient taking differently: Take 1 tablet (125 mcg total) by mouth every other day. Alternate every other day with levothyroxine 112 mgg.) 90 tablet 3    levothyroxine 112 MCG Oral  Tab Take 1 tablet (112 mcg total) by mouth before breakfast. (Patient taking differently: Take 1 tablet (112 mcg total) by mouth every other day.) 90 tablet 3    TOPIRAMATE 100 MG Oral Tab TAKE 1 TABLET BY MOUTH TWICE A  tablet 1    METHOTREXATE 2.5 MG Oral Tab TAKE 8 TABS WEEKLY BY MOUTH (Patient taking differently: TAKE 8 TABS WEEKLY BY MOUTH - TAKES ON MONDAY EVENING) 96 tablet 1    Tofacitinib Citrate ER (XELJANZ XR) 11 MG Oral Tablet 24 Hr Take 1 tablet by mouth daily. 90 tablet 1    DICLOFENAC 50 MG Oral Tab EC TAKE 1 TABLET BY MOUTH EVERY DAY (Patient taking differently: Take 1 tablet (50 mg total) by mouth every other day.) 90 tablet 1    calcitriol 0.25 MCG Oral Cap Take 1 capsule (0.25 mcg total) by mouth daily. 90 capsule 1    carbidopa-levodopa  MG Oral Tab TAKE 1 TABLET BY MOUTH FOUR TIMES DAILY TAKE AT 630AM, 11AM, 2PM, AND 7PM      PANTOPRAZOLE 40 MG Oral Tab EC TAKE 1 TABLET BY MOUTH BEFORE BREAKFAST 90 tablet 2    albuterol 108 (90 Base) MCG/ACT Inhalation Aero Soln INHALE 2 PUFFS BY INHALATION ROUTE EVERY 4 - 6 HOURS AS NEEDED 3 each 0    Hydrocod Navi-Chlorphe Navi ER 10-8 MG/5ML Oral Suspension Extended Release Take 5 mL by mouth 2 (two) times daily as needed. 115 mL 0    albuterol (2.5 MG/3ML) 0.083% Inhalation Nebu Soln Take 3 mL (2.5 mg total) by nebulization every 4 (four) hours as needed for Wheezing. 30 each 0    Boswellia-Glucosamine-Vit D (OSTEO BI-FLEX ONE PER DAY OR) Take by mouth As Directed.      Amantadine HCl 100 MG Oral Cap Take 1 capsule (100 mg total) by mouth daily.      omega-3 fatty acids 1000 MG Oral Cap Take 1,000 mg by mouth daily.      TURMERIC OR Take by mouth 2 (two) times a day.      Multiple Vitamins-Minerals (BARIATRIC FUSION) Oral Chew Tab Chew 1 tablet by mouth in the morning, at noon, in the evening, and at bedtime.       Allergies:Allergies[1]     PHYSICAL EXAM:   /60 (BP Location: Left arm, Patient Position: Sitting, Cuff Size: adult)   Pulse  100   Temp 98.6 °F (37 °C) (Other)   Resp 20   Ht 5' 9\" (1.753 m)   Wt 207 lb (93.9 kg)   LMP 04/18/2015 (Exact Date)   BMI 30.57 kg/m²      Physical Exam  Constitutional:       Appearance: Normal appearance.   Eyes:      Extraocular Movements: Extraocular movements intact.      Conjunctiva/sclera: Conjunctivae normal.   Cardiovascular:      Rate and Rhythm: Normal rate and regular rhythm.      Pulses: Normal pulses.      Heart sounds: Normal heart sounds.   Pulmonary:      Effort: Pulmonary effort is normal.      Breath sounds: Normal breath sounds.   Abdominal:      General: Bowel sounds are normal.      Tenderness: There is no abdominal tenderness.   Musculoskeletal:      Right lower leg: No edema.      Left lower leg: No edema.   Neurological:      Mental Status: She is alert. Mental status is at baseline.   Psychiatric:         Mood and Affect: Mood normal.         Behavior: Behavior normal.       Preprocedure testing:  CBC normal except for white count of 3.7.  CMP normal except for GFR of 51.  EKG results:  Sinus rhythm with 1st degree A-V block  Moderate voltage criteria for LVH, may be normal variant ( R in aVL , Gazelle product )  Borderline ECG  When compared with ECG of 19-AUG-2024 09:38,  CT interval has increased  ST no longer depressed in Inferior leads  Nonspecific T wave abnormality no longer evident in Lateral leads              ASSESSMENT/PLAN:   1. Pre-op examination.  Patient will be getting an MRI tomorrow.  Preprocedure evaluation as above.  Blood work was unremarkable.  EKG showed nonspecific abnormalities but nothing particularly concerning.  Of note in 2019 patient had a cardiac catheterization that showed no blockage.  Exercise capacity is limited by her numerous medical problems.  Also by the foot issue.  However she is not having any chest pain or shortness of breath.  At this time, she is medically cleared to proceed with general anesthesia for the MRI.    2. Stress fracture of  metatarsal bone, unspecified laterality, initial encounter  Patient with possible stress fracture of the foot.  She will be getting MRI tomorrow.  Further care by podiatry.    3. Parkinson's disease with dyskinesia, unspecified whether manifestations fluctuate (HCC)  Better than what it has been previously.  Continue current treatment.  Follow-up with neurology.    4. Hypothyroidism, unspecified type  Stable.  Continue current treatment.    5. Rheumatoid arthritis, involving unspecified site, unspecified whether rheumatoid factor present (HCC)  Stable.  Continue current treatment and follow-up with rheumatology.    6. Abnormal EKG  Several relatively nonspecific abnormalities on EKG.  Previous cardiac evaluation has been negative.  We will order an echocardiogram to make sure nothing is developed since her evaluation back in September 2019 when she had an essentially normal cardiac cath and echocardiogram.  - CARD ECHO 2D DOPPLER (CPT=93306); Future        Meds This Visit:  Requested Prescriptions      No prescriptions requested or ordered in this encounter       Imaging & Referrals:  None         Dante Palafox MD          [1]   Allergies  Allergen Reactions    Other HIVES     Reports side effect from DPT vaccine.  Other reaction(s): Rash/Hives/Urticaria    Seasonal Runny nose

## 2025-02-18 ENCOUNTER — ANESTHESIA (OUTPATIENT)
Dept: MRI IMAGING | Facility: HOSPITAL | Age: 65
End: 2025-02-18
Payer: COMMERCIAL

## 2025-02-18 ENCOUNTER — HOSPITAL ENCOUNTER (OUTPATIENT)
Dept: MRI IMAGING | Facility: HOSPITAL | Age: 65
Discharge: HOME | End: 2025-02-18
Attending: PODIATRIST
Payer: COMMERCIAL

## 2025-02-18 ENCOUNTER — ANESTHESIA EVENT (OUTPATIENT)
Dept: MRI IMAGING | Facility: HOSPITAL | Age: 65
End: 2025-02-18
Payer: COMMERCIAL

## 2025-02-18 VITALS
RESPIRATION RATE: 16 BRPM | BODY MASS INDEX: 30.66 KG/M2 | HEART RATE: 73 BPM | WEIGHT: 207 LBS | TEMPERATURE: 99 F | SYSTOLIC BLOOD PRESSURE: 124 MMHG | OXYGEN SATURATION: 98 % | DIASTOLIC BLOOD PRESSURE: 67 MMHG | HEIGHT: 69 IN

## 2025-02-18 DIAGNOSIS — M84.375A METATARSAL STRESS FRACTURE, LEFT, INITIAL ENCOUNTER: ICD-10-CM

## 2025-02-18 PROCEDURE — 73718 MRI LOWER EXTREMITY W/O DYE: CPT | Performed by: PODIATRIST

## 2025-02-18 RX ORDER — HYDROMORPHONE HYDROCHLORIDE 1 MG/ML
0.4 INJECTION, SOLUTION INTRAMUSCULAR; INTRAVENOUS; SUBCUTANEOUS EVERY 5 MIN PRN
Status: DISCONTINUED | OUTPATIENT
Start: 2025-02-18 | End: 2025-02-20

## 2025-02-18 RX ORDER — ONDANSETRON 2 MG/ML
INJECTION INTRAMUSCULAR; INTRAVENOUS AS NEEDED
Status: DISCONTINUED | OUTPATIENT
Start: 2025-02-18 | End: 2025-02-18 | Stop reason: SURG

## 2025-02-18 RX ORDER — SODIUM CHLORIDE, SODIUM LACTATE, POTASSIUM CHLORIDE, CALCIUM CHLORIDE 600; 310; 30; 20 MG/100ML; MG/100ML; MG/100ML; MG/100ML
INJECTION, SOLUTION INTRAVENOUS CONTINUOUS
Status: DISCONTINUED | OUTPATIENT
Start: 2025-02-18 | End: 2025-02-20

## 2025-02-18 RX ORDER — HYDROMORPHONE HYDROCHLORIDE 1 MG/ML
0.6 INJECTION, SOLUTION INTRAMUSCULAR; INTRAVENOUS; SUBCUTANEOUS EVERY 5 MIN PRN
Status: DISCONTINUED | OUTPATIENT
Start: 2025-02-18 | End: 2025-02-20

## 2025-02-18 RX ORDER — MORPHINE SULFATE 4 MG/ML
2 INJECTION, SOLUTION INTRAMUSCULAR; INTRAVENOUS EVERY 10 MIN PRN
Status: DISCONTINUED | OUTPATIENT
Start: 2025-02-18 | End: 2025-02-20

## 2025-02-18 RX ORDER — DEXAMETHASONE SODIUM PHOSPHATE 4 MG/ML
VIAL (ML) INJECTION AS NEEDED
Status: DISCONTINUED | OUTPATIENT
Start: 2025-02-18 | End: 2025-02-18 | Stop reason: SURG

## 2025-02-18 RX ORDER — LIDOCAINE HYDROCHLORIDE 10 MG/ML
INJECTION, SOLUTION EPIDURAL; INFILTRATION; INTRACAUDAL; PERINEURAL AS NEEDED
Status: DISCONTINUED | OUTPATIENT
Start: 2025-02-18 | End: 2025-02-18 | Stop reason: SURG

## 2025-02-18 RX ORDER — NALOXONE HYDROCHLORIDE 0.4 MG/ML
80 INJECTION, SOLUTION INTRAMUSCULAR; INTRAVENOUS; SUBCUTANEOUS AS NEEDED
Status: ACTIVE | OUTPATIENT
Start: 2025-02-18 | End: 2025-02-18

## 2025-02-18 RX ORDER — HYDROMORPHONE HYDROCHLORIDE 1 MG/ML
0.2 INJECTION, SOLUTION INTRAMUSCULAR; INTRAVENOUS; SUBCUTANEOUS EVERY 5 MIN PRN
Status: DISCONTINUED | OUTPATIENT
Start: 2025-02-18 | End: 2025-02-20

## 2025-02-18 RX ORDER — MORPHINE SULFATE 10 MG/ML
6 INJECTION, SOLUTION INTRAMUSCULAR; INTRAVENOUS EVERY 10 MIN PRN
Status: DISCONTINUED | OUTPATIENT
Start: 2025-02-18 | End: 2025-02-20

## 2025-02-18 RX ORDER — MORPHINE SULFATE 4 MG/ML
4 INJECTION, SOLUTION INTRAMUSCULAR; INTRAVENOUS EVERY 10 MIN PRN
Status: DISCONTINUED | OUTPATIENT
Start: 2025-02-18 | End: 2025-02-20

## 2025-02-18 RX ADMIN — SODIUM CHLORIDE, SODIUM LACTATE, POTASSIUM CHLORIDE, CALCIUM CHLORIDE: 600; 310; 30; 20 INJECTION, SOLUTION INTRAVENOUS at 12:49:00

## 2025-02-18 RX ADMIN — ONDANSETRON 4 MG: 2 INJECTION INTRAMUSCULAR; INTRAVENOUS at 13:13:00

## 2025-02-18 RX ADMIN — LIDOCAINE HYDROCHLORIDE 50 MG: 10 INJECTION, SOLUTION EPIDURAL; INFILTRATION; INTRACAUDAL; PERINEURAL at 13:13:00

## 2025-02-18 RX ADMIN — SODIUM CHLORIDE, SODIUM LACTATE, POTASSIUM CHLORIDE, CALCIUM CHLORIDE: 600; 310; 30; 20 INJECTION, SOLUTION INTRAVENOUS at 12:02:00

## 2025-02-18 RX ADMIN — DEXAMETHASONE SODIUM PHOSPHATE 4 MG: 4 MG/ML VIAL (ML) INJECTION at 13:13:00

## 2025-02-18 NOTE — ANESTHESIA POSTPROCEDURE EVALUATION
Patient: Lindsay Trotter    Procedure Summary       Date: 02/18/25 Room / Location: [facility]; [facility] Post Anesthesia Care Unit    Anesthesia Start: 1309 Anesthesia Stop: 1353    Procedure: MRI FOOT, LEFT (CPT=73718) Diagnosis: Metatarsal stress fracture, left, initial encounter    Scheduled Providers: Fabricio Adler DO Anesthesiologist: Jaspal Galvin MD    Anesthesia Type: general ASA Status: 3            Anesthesia Type: No value filed.    Vitals Value Taken Time   /78 02/18/25 1353   Temp 99.3 °F (37.4 °C) 02/18/25 1353   Pulse 75 02/18/25 1354   Resp 16 02/18/25 1353   SpO2 99 % 02/18/25 1354   Vitals shown include unfiled device data.    [facility] AN Post Evaluation:   Patient Evaluated in PACU  Patient Participation: complete - patient participated  Level of Consciousness: awake  Pain Management: adequate  Airway Patency:patent  Dental exam unchanged from preop  Yes    Nausea/Vomiting: none  Cardiovascular Status: acceptable  Respiratory Status: acceptable and room air  Postoperative Hydration acceptable      FABRICIO ADLER DO  2/18/2025 1:55 PM

## 2025-02-18 NOTE — ANESTHESIA PROCEDURE NOTES
Airway  Date/Time: 2/18/2025 1:27 PM  Urgency: Elective      General Information and Staff    Patient location during procedure: OR  Anesthesiologist: Jaspal Galvin MD  Performed: anesthesiologist   Performed by: Jaspal Galvin MD  Authorized by: Fabricio Adler DO      Indications and Patient Condition  Indications for airway management: anesthesia  Sedation level: deep  Preoxygenated: yes  Patient position: sniffing  Mask difficulty assessment: 1 - vent by mask    Final Airway Details  Final airway type: supraglottic airway      Successful airway: classic  Size 4       Number of attempts at approach: 1

## 2025-02-18 NOTE — ANESTHESIA PREPROCEDURE EVALUATION
Anesthesia PreOp Note    HPI:     Lindsay Trotter is a 64 year old female who presents for preoperative consultation requested by: * No surgeons listed *    Date of Surgery: 2/18/2025    * No procedures listed *  Indication: * No pre-op diagnosis entered *    Relevant Problems   No relevant active problems       NPO:  Last Liquid Consumption Date: 02/17/25  Last Liquid Consumption Time: 1100 (sip of water)  Last Solid Consumption Date: 02/17/25  Last Solid Consumption Time: 1800  Last Liquid Consumption Date: 02/17/25          History Review:  Patient Active Problem List    Diagnosis Date Noted    Overweight (BMI 25.0-29.9) 09/12/2024    Mild protein-calorie malnutrition (HCC) 05/30/2023    Levodopa-induced dyskinesia 03/28/2023    Slow transit constipation 09/21/2022    Encounter for therapeutic drug monitoring 06/16/2022    S/P lumbar fusion 11/13/2021    Adjustment disorder with anxiety 04/28/2021    Obesity (BMI 30-39.9) 09/28/2020    S/P laparoscopic sleeve gastrectomy 01/22/2020    Abnormal EKG 09/03/2019    Abnormal nuclear stress test 09/03/2019    Exertional dyspnea 09/03/2019    Increased appetite 04/16/2019    Chronic fatigue 12/27/2018    Adjustment disorder with mixed anxiety and depressed mood 06/25/2018    Chronic back pain 01/23/2018    Greater trochanteric bursitis, left 11/21/2017    Lumbar spinal stenosis 10/30/2017    Post-surgical hypothyroidism 09/12/2016    Nontoxic multinodular goiter 08/15/2016    Rheumatoid arthritis, adult ([facility]) 05/26/2016    Parkinson disease ([facility]) 05/26/2016    Internal derangement of ankle 10/25/2015    Peroneal tendon tear 10/25/2015    Asthma, extrinsic ([facility]) 10/27/2014    Allergic rhinitis due to pollen 10/27/2014    Allergic rhinitis due to other allergen 10/27/2014    Closed fracture of foot 05/08/2014    Pain in soft tissues of limb 02/17/2014    Extrapyramidal disease and abnormal movement disorder 08/27/2013    Musculoskeletal symptoms referable to limbs  01/18/2013    Lump or mass in breast 01/08/2013    Sebaceous cyst 01/08/2013    Scanty or infrequent menstruation 05/24/2012    Dermatitis 10/06/2011    Hyperlipidemia 08/18/2011    Abnormal mammogram 04/09/2010    Abnormal weight gain 01/12/2010    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    Postnasal drip 10/03/2008    Accessory carpal bone 07/09/2008    Contusion of foot 07/09/2008    Benign neoplasm of skin of upper limb, including shoulder 06/02/2008    Painful respiration 01/19/2008    Disease of conjunctiva due to viruses 01/16/2008    Conjunctivitis 01/09/2008    Asthma (HCC) 01/08/2008    Disorder of bursae and tendons in shoulder region 10/17/2007    Candidiasis of mouth 07/05/2007    Lateral epicondylitis of elbow 01/17/2007    Synovitis and tenosynovitis 01/04/2007    Enthesopathy 12/04/2006    Cough 10/04/2006    Voice disturbance 09/15/2006    Esophageal reflux 08/29/2006    Extrinsic asthma with exacerbation ([facility]) 08/05/2006    Sprain of ligament of ankle 04/11/2006    Calcaneal spur 08/26/2005    Plantar fascial fibromatosis 08/26/2005    Acute bronchitis 05/04/2005       Past Medical History:    Acute torn meniscus of knee    ARTHROSCOPY OF KNEE    Amenorrhea    Asthma ([facility])    Back problem    Chicken pox    Chronic cough    Disorder of thyroid    Extrapyramidal syndrome    MEDICATION MGMT. W/ PARKINSONS FEATURES    Greater trochanteric bursitis, left    High cholesterol    History of blood transfusion    CDH    Incontinence    Infertility, female    Parkinson disease (HCC)    Parkinson's disease (HCC)    Rheumatoid arthritis (HCC)    S/P laparoscopic sleeve gastrectomy    for weight loss    Thyroid disease    Tibia/fibula fracture    OPEN REDUCTION INTERNAL FIXATION    Urinary incontinence    after childbirth    Viral conjunctivitis    Visual impairment    glasses       Past Surgical History:   Procedure Laterality Date    Back surgery      Back surgery  04/21/2021    Back surgery  04/25/2023     compressed disc, removal of bone    Colonoscopy  06/11/2019    return in 5 years    Colonoscopy N/A 06/11/2019    Procedure: COLONOSCOPY;  Surgeon: Dago Heart MD;  Location: [facility] ENDOSCOPY    Colposcopy, cervix w/upper adjacent vagina; w/biopsy(s), cervix      D & c      for extended period    D & c      for extended period    Foot surgery Left     Had right foot done recently.    Knee arthroscopy Right     Lap sleeve gastrectomy  12/02/2019    Dr Rios, [facility]    Laparoscopy,pelvic,biopsy  1990    for infertility    Laparoscopy,pelvic,biopsy  1990    for infertility    Other surgical history Right 12/03/2015    Other surgical history  12/03/2015    Thyroidectomy      Total        Prescriptions Prior to Admission[1]  Current Medications and Prescriptions Ordered in Epic[2]    Allergies[3]    Family History   Problem Relation Age of Onset    Heart Disorder Father 55        heart attack-cause of death.    Heart Disorder Mother     Stroke Mother     Cancer Mother         AML-cause of death    Other (leukemia) Mother     Cancer Paternal Aunt         breast cancer-cause of death at age 80    Breast Cancer Paternal Aunt 80        approx age    Heart Disorder Brother     Ovarian Cancer Neg      Social History     Socioeconomic History    Marital status:    Tobacco Use    Smoking status: Never     Passive exposure: Never    Smokeless tobacco: Never   Vaping Use    Vaping status: Never Used   Substance and Sexual Activity    Alcohol use: Yes     Alcohol/week: 2.0 - 3.0 standard drinks of alcohol     Types: 2 - 3 Glasses of wine per week     Comment: social    Drug use: No    Sexual activity: Yes     Partners: Male   Other Topics Concern    Caffeine Concern Yes     Comment: S2 glasses tea daily    Exercise No    Reaction to local anesthetic No    Pt has a pacemaker No    Pt has a defibrillator No       Available pre-op labs reviewed.  Lab Results   Component Value Date    WBC 3.7 (L) 02/08/2025     RBC 4.03 02/08/2025    HGB 13.2 02/08/2025    HCT 41.1 02/08/2025    .0 (H) 02/08/2025    MCH 32.8 02/08/2025    MCHC 32.1 02/08/2025    RDW 14.6 02/08/2025    .0 02/08/2025     Lab Results   Component Value Date     02/08/2025    K 4.1 02/08/2025     02/08/2025    CO2 26.0 02/08/2025    BUN 18 02/08/2025    CREATSERUM 1.18 (H) 02/08/2025    GLU 82 02/08/2025    CA 9.3 02/08/2025          Vital Signs:  Body mass index is 30.57 kg/m².   height is 1.753 m (5' 9\") and weight is 93.9 kg (207 lb). Her blood pressure is 138/69 and her pulse is 88. Her oxygen saturation is 99%.   Vitals:    02/13/25 1056   BP: 138/69   Pulse: 88   SpO2: 99%   Weight: 93.9 kg (207 lb)   Height: 1.753 m (5' 9\")        Anesthesia Evaluation     Patient summary reviewed and Nursing notes reviewed    No history of anesthetic complications   Airway   Mallampati: II  Neck ROM: full  Dental      Pulmonary - negative ROS and normal exam   Cardiovascular - negative ROS and normal exam    Neuro/Psych - negative ROS     GI/Hepatic/Renal    (+) GERD    Endo/Other    (+) arthritis  Abdominal  - normal exam  (+) obese                 Anesthesia Plan:   ASA:  3  Plan:   General  Airway:  LMA  Post-op Pain Management: IV analgesics  Informed Consent Plan and Risks Discussed With:  Patient      I have informed Lindsay Trotter and/or legal guardian or family member of the nature of the anesthetic plan, benefits, risks including possible dental damage if relevant, major complications, and any alternative forms of anesthetic management.   All of the patient's questions were answered to the best of my ability. The patient desires the anesthetic management as planned.  Jaspal Galvin MD  2/18/2025 12:20 PM  Present on Admission:  **None**           [1] (Not in a hospital admission)  [2]   Current Outpatient Medications Ordered in Epic   Medication Sig Dispense Refill    pravastatin 20 MG Oral Tab Take 1 tablet (20 mg total) by  mouth nightly. 90 tablet 3    folic acid 1 MG Oral Tab Take 1 tablet (1 mg total) by mouth daily. (Patient taking differently: Take 1 tablet (1 mg total) by mouth at bedtime.) 90 tablet 3    Phentermine HCl 37.5 MG Oral Tab Take 1 tablet (37.5 mg total) by mouth before breakfast.      MONTELUKAST 10 MG Oral Tab TAKE 1 TABLET BY MOUTH EVERY DAY AT NIGHT 90 tablet 0    levothyroxine 125 MCG Oral Tab Take 1 tablet (125 mcg total) by mouth before breakfast. Alternate every other day with levothyroxine 112 mgg. (Patient taking differently: Take 1 tablet (125 mcg total) by mouth every other day. Alternate every other day with levothyroxine 112 mgg.) 90 tablet 3    levothyroxine 112 MCG Oral Tab Take 1 tablet (112 mcg total) by mouth before breakfast. (Patient taking differently: Take 1 tablet (112 mcg total) by mouth every other day.) 90 tablet 3    TOPIRAMATE 100 MG Oral Tab TAKE 1 TABLET BY MOUTH TWICE A  tablet 1    METHOTREXATE 2.5 MG Oral Tab TAKE 8 TABS WEEKLY BY MOUTH (Patient taking differently: TAKE 8 TABS WEEKLY BY MOUTH - TAKES ON MONDAY EVENING) 96 tablet 1    Tofacitinib Citrate ER (XELJANZ XR) 11 MG Oral Tablet 24 Hr Take 1 tablet by mouth daily. 90 tablet 1    DICLOFENAC 50 MG Oral Tab EC TAKE 1 TABLET BY MOUTH EVERY DAY (Patient taking differently: Take 1 tablet (50 mg total) by mouth every other day.) 90 tablet 1    calcitriol 0.25 MCG Oral Cap Take 1 capsule (0.25 mcg total) by mouth daily. 90 capsule 1    carbidopa-levodopa  MG Oral Tab TAKE 1 TABLET BY MOUTH FOUR TIMES DAILY TAKE AT 630AM, 11AM, 2PM, AND 7PM      PANTOPRAZOLE 40 MG Oral Tab EC TAKE 1 TABLET BY MOUTH BEFORE BREAKFAST 90 tablet 2    albuterol 108 (90 Base) MCG/ACT Inhalation Aero Soln INHALE 2 PUFFS BY INHALATION ROUTE EVERY 4 - 6 HOURS AS NEEDED 3 each 0    Boswellia-Glucosamine-Vit D (OSTEO BI-FLEX ONE PER DAY OR) Take by mouth As Directed.      Amantadine HCl 100 MG Oral Cap Take 1 capsule (100 mg total) by mouth daily.       omega-3 fatty acids 1000 MG Oral Cap Take 1,000 mg by mouth daily.      TURMERIC OR Take by mouth 2 (two) times a day.      Multiple Vitamins-Minerals (BARIATRIC FUSION) Oral Chew Tab Chew 1 tablet by mouth in the morning, at noon, in the evening, and at bedtime.      traMADol 50 MG Oral Tab Take 0.5 tablets (25 mg total) by mouth every 8 (eight) hours as needed for Pain. 30 tablet 0    Hydrocod Navi-Chlorphe Navi ER 10-8 MG/5ML Oral Suspension Extended Release Take 5 mL by mouth 2 (two) times daily as needed. 115 mL 0    albuterol (2.5 MG/3ML) 0.083% Inhalation Nebu Soln Take 3 mL (2.5 mg total) by nebulization every 4 (four) hours as needed for Wheezing. 30 each 0     Current Facility-Administered Medications Ordered in Epic   Medication Dose Route Frequency Provider Last Rate Last Admin    lactated ringers infusion   Intravenous Continuous Val Pfeiffer DPM 20 mL/hr at 02/18/25 1202 New Bag at 02/18/25 1202   [3]   Allergies  Allergen Reactions    Other HIVES     Reports side effect from DPT vaccine.  Other reaction(s): Rash/Hives/Urticaria    Seasonal Runny nose

## 2025-02-19 ENCOUNTER — HOSPITAL ENCOUNTER (OUTPATIENT)
Dept: CV DIAGNOSTICS | Facility: HOSPITAL | Age: 65
Discharge: HOME | End: 2025-02-19
Attending: INTERNAL MEDICINE
Payer: COMMERCIAL

## 2025-02-19 DIAGNOSIS — R94.31 ABNORMAL EKG: ICD-10-CM

## 2025-02-19 PROCEDURE — 93306 TTE W/DOPPLER COMPLETE: CPT | Performed by: INTERNAL MEDICINE

## 2025-03-03 ENCOUNTER — OFFICE VISIT (OUTPATIENT)
Dept: SURGERY | Facility: CLINIC | Age: 65
End: 2025-03-03
Payer: COMMERCIAL

## 2025-03-03 VITALS
DIASTOLIC BLOOD PRESSURE: 70 MMHG | SYSTOLIC BLOOD PRESSURE: 110 MMHG | HEIGHT: 69 IN | RESPIRATION RATE: 16 BRPM | HEART RATE: 106 BPM | OXYGEN SATURATION: 98 % | BODY MASS INDEX: 30.07 KG/M2 | WEIGHT: 203 LBS

## 2025-03-03 DIAGNOSIS — E66.3 OVERWEIGHT (BMI 25.0-29.9): ICD-10-CM

## 2025-03-03 DIAGNOSIS — Z51.81 ENCOUNTER FOR THERAPEUTIC DRUG MONITORING: ICD-10-CM

## 2025-03-03 DIAGNOSIS — E44.1 MILD PROTEIN-CALORIE MALNUTRITION (HCC): Primary | ICD-10-CM

## 2025-03-03 DIAGNOSIS — Z98.84 S/P LAPAROSCOPIC SLEEVE GASTRECTOMY: ICD-10-CM

## 2025-03-03 DIAGNOSIS — E66.9 OBESITY, UNSPECIFIED: ICD-10-CM

## 2025-03-03 PROCEDURE — 99214 OFFICE O/P EST MOD 30 MIN: CPT | Performed by: INTERNAL MEDICINE

## 2025-03-03 PROCEDURE — 3078F DIAST BP <80 MM HG: CPT | Performed by: INTERNAL MEDICINE

## 2025-03-03 PROCEDURE — 3008F BODY MASS INDEX DOCD: CPT | Performed by: INTERNAL MEDICINE

## 2025-03-03 PROCEDURE — 3074F SYST BP LT 130 MM HG: CPT | Performed by: INTERNAL MEDICINE

## 2025-03-03 RX ORDER — TOPIRAMATE 100 MG/1
100 TABLET, FILM COATED ORAL 2 TIMES DAILY
Qty: 180 TABLET | Refills: 1 | Status: SHIPPED | OUTPATIENT
Start: 2025-03-03

## 2025-03-03 RX ORDER — PHENTERMINE HYDROCHLORIDE 37.5 MG/1
37.5 TABLET ORAL
Qty: 30 TABLET | Refills: 5 | Status: SHIPPED | OUTPATIENT
Start: 2025-03-03

## 2025-03-03 NOTE — PROGRESS NOTES
[facility], [facility], [place]  [address]  [place] [address]  Dept: [phone]    Patient:  Lindsay Trotter  :      1960  MRN:      [id number]    Referring Provider: Dante Palafox MD     Chief Complaint:     Chief Complaint   Patient presents with    Follow - Up    Weight Management       Date of Surgery:   2019  Surgery Type:   Sleeve gastrectomy     Subjective     Satiety:  positive  Food Intake:  <01 cup(s)  Fluid Intake:  64 oz  Protein Intake:  adeq grams  Vitamin:  Yes   bariatric  Exercise: Yes         Comorbidities:  Back pain-Improvement?  yes, Joint pain-Improvement?  yes, Hypertension-Improvement?  yes, GAIL-Improvement?  yes and Snoring-Improvement?  yes    Objective     Vitals: /70   Pulse 106   Resp 16   Ht 5' 9\" (1.753 m)   Wt 203 lb (92.1 kg)   LMP 2015 (Exact Date)   SpO2 98%   BMI 29.98 kg/m²     Starting weight: 325   Current weight:    Interval weight loss: +02   Total weight loss:  -122    Last 3 Weights   25 1228 203 lb (92.1 kg)   25 1056 207 lb (93.9 kg)   25 0959 207 lb (93.9 kg)       Patient Medications:    Current Outpatient Medications:     pravastatin 20 MG Oral Tab, Take 1 tablet (20 mg total) by mouth nightly., Disp: 90 tablet, Rfl: 3    folic acid 1 MG Oral Tab, Take 1 tablet (1 mg total) by mouth daily. (Patient taking differently: Take 1 tablet (1 mg total) by mouth at bedtime.), Disp: 90 tablet, Rfl: 3    traMADol 50 MG Oral Tab, Take 0.5 tablets (25 mg total) by mouth every 8 (eight) hours as needed for Pain., Disp: 30 tablet, Rfl: 0    Phentermine HCl 37.5 MG Oral Tab, Take 1 tablet (37.5 mg total) by mouth before breakfast., Disp: , Rfl:     MONTELUKAST 10 MG Oral Tab, TAKE 1 TABLET BY MOUTH EVERY DAY AT NIGHT, Disp: 90 tablet, Rfl: 0    levothyroxine 125 MCG Oral Tab, Take 1 tablet (125 mcg total) by mouth before breakfast. Alternate every other day with  levothyroxine 112 mgg. (Patient taking differently: Take 1 tablet (125 mcg total) by mouth every other day. Alternate every other day with levothyroxine 112 mgg.), Disp: 90 tablet, Rfl: 3    levothyroxine 112 MCG Oral Tab, Take 1 tablet (112 mcg total) by mouth before breakfast. (Patient taking differently: Take 1 tablet (112 mcg total) by mouth every other day.), Disp: 90 tablet, Rfl: 3    TOPIRAMATE 100 MG Oral Tab, TAKE 1 TABLET BY MOUTH TWICE A DAY, Disp: 180 tablet, Rfl: 1    METHOTREXATE 2.5 MG Oral Tab, TAKE 8 TABS WEEKLY BY MOUTH (Patient taking differently: TAKE 8 TABS WEEKLY BY MOUTH - TAKES ON MONDAY EVENING), Disp: 96 tablet, Rfl: 1    Tofacitinib Citrate ER (XELJANZ XR) 11 MG Oral Tablet 24 Hr, Take 1 tablet by mouth daily., Disp: 90 tablet, Rfl: 1    DICLOFENAC 50 MG Oral Tab EC, TAKE 1 TABLET BY MOUTH EVERY DAY (Patient taking differently: Take 1 tablet (50 mg total) by mouth every other day.), Disp: 90 tablet, Rfl: 1    calcitriol 0.25 MCG Oral Cap, Take 1 capsule (0.25 mcg total) by mouth daily., Disp: 90 capsule, Rfl: 1    carbidopa-levodopa  MG Oral Tab, TAKE 1 TABLET BY MOUTH FOUR TIMES DAILY TAKE AT 630AM, 11AM, 2PM, AND 7PM, Disp: , Rfl:     PANTOPRAZOLE 40 MG Oral Tab EC, TAKE 1 TABLET BY MOUTH BEFORE BREAKFAST, Disp: 90 tablet, Rfl: 2    albuterol 108 (90 Base) MCG/ACT Inhalation Aero Soln, INHALE 2 PUFFS BY INHALATION ROUTE EVERY 4 - 6 HOURS AS NEEDED, Disp: 3 each, Rfl: 0    Hydrocod Navi-Chlorphe Navi ER 10-8 MG/5ML Oral Suspension Extended Release, Take 5 mL by mouth 2 (two) times daily as needed., Disp: 115 mL, Rfl: 0    albuterol (2.5 MG/3ML) 0.083% Inhalation Nebu Soln, Take 3 mL (2.5 mg total) by nebulization every 4 (four) hours as needed for Wheezing., Disp: 30 each, Rfl: 0    Boswellia-Glucosamine-Vit D (OSTEO BI-FLEX ONE PER DAY OR), Take by mouth As Directed., Disp: , Rfl:     Amantadine HCl 100 MG Oral Cap, Take 1 capsule (100 mg total) by mouth daily., Disp: , Rfl:      omega-3 fatty acids 1000 MG Oral Cap, Take 1,000 mg by mouth daily., Disp: , Rfl:     TURMERIC OR, Take by mouth 2 (two) times a day., Disp: , Rfl:     Multiple Vitamins-Minerals (BARIATRIC FUSION) Oral Chew Tab, Chew 1 tablet by mouth in the morning, at noon, in the evening, and at bedtime., Disp: , Rfl:     Allergies:  Other and Seasonal     Social History:    Social History     Socioeconomic History    Marital status:    Tobacco Use    Smoking status: Never     Passive exposure: Never    Smokeless tobacco: Never   Vaping Use    Vaping status: Never Used   Substance and Sexual Activity    Alcohol use: Yes     Alcohol/week: 2.0 - 3.0 standard drinks of alcohol     Types: 2 - 3 Glasses of wine per week     Comment: social    Drug use: No    Sexual activity: Yes     Partners: Male   Other Topics Concern    Caffeine Concern Yes     Comment: S2 glasses tea daily    Exercise No    Reaction to local anesthetic No    Pt has a pacemaker No    Pt has a defibrillator No   Social History Narrative    The patient uses the following assistive device(s):  single-point cane,uses 2 canes. Walker at night           The patient does live in a home with stairs.     Social Drivers of Health     Food Insecurity: No Food Insecurity (2/17/2025)    NCSS - Food Insecurity     Worried About Running Out of Food in the Last Year: No     Ran Out of Food in the Last Year: No   Transportation Needs: No Transportation Needs (2/17/2025)    NCSS - Transportation     Lack of Transportation: No   Housing Stability: Not At Risk (2/17/2025)    NCSS - Housing/Utilities     Has Housing: Yes     Worried About Losing Housing: No     Unable to Get Utilities: No     Surgical History:    Past Surgical History:   Procedure Laterality Date    Back surgery      Back surgery  04/21/2021    Back surgery  04/25/2023    compressed disc, removal of bone    Colonoscopy  06/11/2019    return in 5 years    Colonoscopy N/A 06/11/2019    Procedure: COLONOSCOPY;   Surgeon: Dago Heart MD;  Location: [facility] ENDOSCOPY    Colposcopy, cervix w/upper adjacent vagina; w/biopsy(s), cervix      D & c      for extended period    D & c      for extended period    Foot surgery Left     Had right foot done recently.    Knee arthroscopy Right     Lap sleeve gastrectomy  12/02/2019    Dr Rios, [facility]    Laparoscopy,pelvic,biopsy  1990    for infertility    Laparoscopy,pelvic,biopsy  1990    for infertility    Other surgical history Right 12/03/2015    Other surgical history  12/03/2015    Thyroidectomy      Total        Family History:    Family History   Problem Relation Age of Onset    Heart Disorder Father 55        heart attack-cause of death.    Heart Disorder Mother     Stroke Mother     Cancer Mother         AML-cause of death    Other (leukemia) Mother     Cancer Paternal Aunt         breast cancer-cause of death at age 80    Breast Cancer Paternal Aunt 80        approx age    Heart Disorder Brother     Ovarian Cancer Neg        Physical Exam:  Vital signs: /70   Pulse 106   Resp 16   Ht 5' 9\" (1.753 m)   Wt 203 lb (92.1 kg)   LMP 04/18/2015 (Exact Date)   SpO2 98%   BMI 29.98 kg/m²   General appearance: alert, appears stated age, cooperative and morbidly obese  Head: Normocephalic, without obvious abnormality, atraumatic  Eyes: conjunctivae/corneas clear. PERRL, EOM's intact. Fundi benign.  Neck: no adenopathy, no carotid bruit, no JVD, supple, symmetrical, trachea midline and thyroid not enlarged, symmetric, no tenderness/mass/nodules  Lungs: clear to auscultation bilaterally  Heart: S1, S2 normal, no murmur, click, rub or gallop, regular rate and rhythm  Abdomen:  soft, obese, non tender  Extremities: extremities normal, atraumatic, no cyanosis or edema  Pulses: 2+ and symmetric  Neurologic: Grossly normal       ROS:    Constitutional: negative  Respiratory: negative  Cardiovascular: negative  Gastrointestinal:  negative  Musculoskeletal:negative  Neurological: negative  Behavioral/Psych: negative  Endocrine: negative  All other systems were reviewed and are negative    Assessment     OBSTRUCTIVE SLEEP APNEA: The patient states her sleep apnea has been stable since the last clinic visit. There has not been any increase in hyper-somnolence.     HYPERCHOLESTEROLEMIA:  The patient states that her cholesterol has been well controlled on her current medication.    Lab Results   Component Value Date/Time    CHOLEST 205 (H) 08/19/2024 09:34 AM     (H) 08/19/2024 09:34 AM    HDL 85 (H) 08/19/2024 09:34 AM    TRIG 46 08/19/2024 09:34 AM    VLDL 8 08/19/2024 09:34 AM       Encounter Diagnosis(ses):   Encounter Diagnoses   Name Primary?    Mild protein-calorie malnutrition (HCC) [E44.1] Yes    Encounter for therapeutic drug monitoring     S/P laparoscopic sleeve gastrectomy [Z98.84]     Overweight (BMI 25.0-29.9) [E66.3]        Plan     S/p VSG on 12/02/2019  Doing well       Continue current meds      DYSLIPIDEMIA: Stable on the above prescribed meal plan and medication. Liver function stable.    Lab Results   Component Value Date/Time    CHOLEST 205 (H) 08/19/2024 09:34 AM     (H) 08/19/2024 09:34 AM    HDL 85 (H) 08/19/2024 09:34 AM    TRIG 46 08/19/2024 09:34 AM    VLDL 8 08/19/2024 09:34 AM       Phentermine 37.5 mg   Constipation noted  Black stools per patient. Should speak to PCP  May lower Phentermine to half tablet  ekg done    Continue topiramate   Decrease to just afternoon    Stay hydrated      No orders of the defined types were placed in this encounter.          Harsha Thibodeaux MD

## 2025-03-10 RX ORDER — PANTOPRAZOLE SODIUM 40 MG/1
40 TABLET, DELAYED RELEASE ORAL
Qty: 90 TABLET | Refills: 2 | Status: SHIPPED | OUTPATIENT
Start: 2025-03-10

## 2025-03-10 RX ORDER — MONTELUKAST SODIUM 10 MG/1
TABLET ORAL
Qty: 90 TABLET | Refills: 0 | Status: SHIPPED | OUTPATIENT
Start: 2025-03-10

## 2025-03-10 NOTE — TELEPHONE ENCOUNTER
Refill requested for   Name from pharmacy: MONTELUKAST SOD 10 MG TABLET          Will file in chart as: MONTELUKAST 10 MG Oral Tab    Sig: TAKE 1 TABLET BY MOUTH EVERY DAY AT NIGHT    Disp: 90 tablet    Refills: 0 (Pharmacy requested: Not specified)    Start: 3/9/2025    Class: Normal    Non-formulary    Last ordered: 2 months ago (12/23/2024) by Chico Velasco MD    Last refill: 12/23/2024    Rx #: [id number]    Corticosteroids / Long-Acting Bronchodilators Failed 03/09/2025 01:44 PM   Protocol Details Appt in past 6 mos or next 3 mos    Medication is active on med list      To be filled at: [id number] - [place], [place] - [address], [phone], [phone]       Last office visit: 07/15/2024    Previously advised to follow up in one year     F/U currently scheduled? Yes 6/30/2025      ACTION: Refilled per protocol.

## 2025-03-13 RX ORDER — CALCITRIOL 0.25 UG/1
0.25 CAPSULE, LIQUID FILLED ORAL DAILY
Qty: 90 CAPSULE | Refills: 3 | Status: SHIPPED | OUTPATIENT
Start: 2025-03-13

## 2025-03-15 ENCOUNTER — NURSE ONLY (OUTPATIENT)
Dept: ALLERGY | Facility: CLINIC | Age: 65
End: 2025-03-15

## 2025-03-15 DIAGNOSIS — J30.89 ENVIRONMENTAL AND SEASONAL ALLERGIES: Primary | ICD-10-CM

## 2025-03-15 PROCEDURE — 95117 IMMUNOTHERAPY INJECTIONS: CPT | Performed by: ALLERGY & IMMUNOLOGY

## 2025-04-08 ENCOUNTER — APPOINTMENT (OUTPATIENT)
Dept: LAB | Facility: HOSPITAL | Age: 65
End: 2025-04-08
Attending: INTERNAL MEDICINE
Payer: COMMERCIAL

## 2025-04-08 PROCEDURE — 82274 ASSAY TEST FOR BLOOD FECAL: CPT

## 2025-04-09 ENCOUNTER — LAB ENCOUNTER (OUTPATIENT)
Dept: LAB | Facility: HOSPITAL | Age: 65
End: 2025-04-09
Attending: INTERNAL MEDICINE
Payer: COMMERCIAL

## 2025-04-09 DIAGNOSIS — K92.1 BLACK STOOL: ICD-10-CM

## 2025-04-09 LAB — HEMOCCULT STL QL: NEGATIVE

## 2025-04-13 DIAGNOSIS — M06.9 RHEUMATOID ARTHRITIS, INVOLVING UNSPECIFIED SITE, UNSPECIFIED WHETHER RHEUMATOID FACTOR PRESENT (HCC): Primary | ICD-10-CM

## 2025-04-13 DIAGNOSIS — Z51.81 THERAPEUTIC DRUG MONITORING: ICD-10-CM

## 2025-04-14 ENCOUNTER — NURSE ONLY (OUTPATIENT)
Dept: ALLERGY | Facility: CLINIC | Age: 65
End: 2025-04-14
Payer: COMMERCIAL

## 2025-04-14 DIAGNOSIS — J30.89 ENVIRONMENTAL AND SEASONAL ALLERGIES: Primary | ICD-10-CM

## 2025-04-14 NOTE — TELEPHONE ENCOUNTER
LOV: 1/20/25  Future Appointments   Date Time Provider Department Center   5/12/2025  1:10 PM EC [facility]GY [facility] [place]   6/9/2025  1:10 PM EC [facility]ERGY [facility] [place]   6/17/2025  2:20 PM Harmony Walker DO [facility] [place]   6/30/2025  2:30 PM Chico Velasco MD [facility] [place]   8/18/2025  2:00 PM Dante Palafox MD ECS[facility] [place]   9/11/2025  2:15 PM Harsha Thibodeaux MD [address]   12/2/2025 12:00 PM Urmila Gan RD [address]     Labs: AST 18 ALT 7 2/8/25  Summary:  1. Cont. . methotrexate - 8 tablets a week and folic acid 1mg a day   2. Cont.  xlejanz 11mg a day    3.  Return to clinic in  2-4  months -   4. .Check labs every 4  months - in January .   5. Diclofenac gel 1 % for left knee.    6. Tylenol ES two tablets twice ad ay   7. Tramadol for the pain -            - overall she's been stable on xeljanz and methotrexate 8 tablets a  week -   I whole heartedly agree that she should get long term disability given her rheumatoid arthritis with her parkison's. Her movemetn disorder with her inflammatory arthritis makes it difficulty for her to use her hands consistently and safely for most work. 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         Chucho Butcher MD  1/20/2025   11:25 AM

## 2025-04-15 RX ORDER — METHOTREXATE 2.5 MG/1
20 TABLET ORAL WEEKLY
Qty: 96 TABLET | Refills: 1 | Status: SHIPPED | OUTPATIENT
Start: 2025-04-15

## 2025-04-16 ENCOUNTER — ORDER TRANSCRIPTION (OUTPATIENT)
Dept: PHYSICAL THERAPY | Facility: HOSPITAL | Age: 65
End: 2025-04-16

## 2025-04-16 DIAGNOSIS — G20.A1 PARKINSON DISEASE (HCC): Primary | ICD-10-CM

## 2025-04-28 ENCOUNTER — TELEPHONE (OUTPATIENT)
Dept: PHYSICAL THERAPY | Facility: HOSPITAL | Age: 65
End: 2025-04-28

## 2025-05-01 ENCOUNTER — OFFICE VISIT (OUTPATIENT)
Dept: PHYSICAL THERAPY | Facility: HOSPITAL | Age: 65
End: 2025-05-01
Attending: Other
Payer: COMMERCIAL

## 2025-05-01 PROCEDURE — 97162 PT EVAL MOD COMPLEX 30 MIN: CPT

## 2025-05-01 NOTE — PROGRESS NOTES
NEUROLOGICAL EVALUATION:     Diagnosis:   Parkinson's Disease, abnormality of gait, malaise, difficulty walking Patient:  Lindsay Trotter (64 year old, female)        Referring Provider: Dago Orozco  Today's Date   5/1/2025    Precautions:  Fall Risk Date of Evaluation: 05/01/25       PATIENT SUMMARY   Summary of chief complaints:  impaired gait and balance with +hx of multiple falls   History of current condition:  Pt last seen in PT approximately 2 years ago. Since that time she report she has been \"falling a lot\" especially for the past few months. Occurs in the posterior direction. Has continued to work with her  2x/wk. Does not engage in any other exercise per her report. Notes some level of on/off cycling but not consistently. No recent adjustments to meds. Referred back to PT by her neurologist.      Pain level: current 2/10 , at best  0/10, at worst 5/10   Description of symptoms:  low back     Occupation: no longer working - laid off over the summer.   Exercise:  works out with  2x/wk.   Prior level of function:  tolerating activity better with less fatigue, moving with better balance     Current limitations:  unable to stand for more than 5-10 minutes due to fatigue, very limited in walking as she fatigues quickly, difficulty turning,  FoG, LOB due to shuffling/festination of gait, difficulty navigating obstacles. Impaired unsupported sitting balance.   Pt goals:  to reduce falls and improve balance  History of falls: fell backwards into the tub this AM (was sitting on edge). Falling 2-3 times per week.    Home Environment:  Lives at home with  in a house with 7-8 LINH. 2 level home with basement. 13 steps to second floor (bedrooms) with rail. 13 steps to access basement (Rail).        Past medical history was reviewed with Lindsay.    Lindsay  has a past medical history of Acute torn meniscus of knee (2010), Amenorrhea (04/2016), Asthma (HCC), Back problem, Chicken pox,  Chronic cough, Disorder of thyroid, Extrapyramidal syndrome (2013), Greater trochanteric bursitis, left (11/21/2017), High cholesterol, History of blood transfusion (1973), Incontinence, Infertility, female, Parkinson disease (HCC), Parkinson's disease (HCC), Rheumatoid arthritis (HCC), S/P laparoscopic sleeve gastrectomy (01/22/2020), Thyroid disease (2016), Tibia/fibula fracture, Urinary incontinence (02/12/1995), Viral conjunctivitis (2008), and Visual impairment.  She  has a past surgical history that includes foot surgery (Left); knee arthroscopy (Right); colonoscopy (06/11/2019); d & c; laparoscopy,pelvic,biopsy (1990); other surgical history (Right, 12/03/2015); thyroidectomy; colposcopy, cervix w/upper adjacent vagina; w/biopsy(s), cervix; d & c; laparoscopy,pelvic,biopsy (1990); other surgical history (12/03/2015); colonoscopy (N/A, 06/11/2019); lap sleeve gastrectomy (12/02/2019); back surgery; back surgery (04/21/2021); and back surgery (04/25/2023).    ASSESSMENT  Lindsay presents to physical therapy evaluation with primary c/o impaired gait and balance with +hx of multiple, frequent falls. Comparative to when she was last seen in PT approximately 2 years ago, she demo's a decline in functional status with worsened FoG. She is considered a fall risk based on BBS and TUG. Gait speed below what is considered normal posing additional safety concerns. 6MWT will be completed next session to for formal assessment of tolerance to activity. Pt verbalized understanding of material taught today.     Lindsay would benefit from skilled Physical Therapy that is medically necessary to improve gait and balance in order to reduce risk of falls.   OBJECTIVE:   Observation/Posture:   Pleasant 63 y/o; NAD.     ROM and Strength:  WFL BLE    Freezing of Gait Questionnaire      1. During your worst state--Do you walk:  0 Normally  1 Almost normally--somewhat slow  2 Slow but fully independent  3 Need assistance or walking aid  4  Unable to walk     2. Are your gait difficulties affecting your daily activities  and independence?  0 Not at all  1 Mildly  2 Moderately  3 Severely  4 Unable to walk     Do you feel that your feet get glued to the floor while  walking, making a turn or when trying to initiate walking  (freezing)?  0 Never  1 Very rarely--about once a month  2 Rarely--about once a week  3 Often--about once a day  4 Always--whenever walking     4. How long is your longest freezing episode?  0 Never happened  1 1-2 s  2 3-10 s  3 11-30 s  4 Unable to walk for more than 30 s     5. How long is your typical start hesitation episode  (freezing when initiating the first step)?  0 None  1 Takes longer than 1 s to start walking  2 Takes longer than 3 s to start walking  3 Takes longer than 10 s to start walking  4 Takes longer than 30 s to start walking     6. How long is your typical turning hesitation: (freezing  when turning)  0 None  1 Resume turning in 1-2 s  2 Resume turning in 3-10 s  3 Resume turning in 11-30 s  4 Unable to resume turning for more than 30 s     Total: 11/24    Postural Control:              Patel Balance Scale      Item Description Score (0 worst-4 best)     ___4___1. Sitting to standing  ___3___2. Standing unsupported   ___4___3. Sitting unsupported   ___4___4. Standing to sitting   ___3___5. Transfers   ___3___6. Standing with eyes closed   ___1___7. Standing with feet together   ___3___8. Reaching forward with outstretched arm   ___1___9. Retrieving object from floor   ___4__10. Turning to look behind   ___1__11. Turning 360 degrees   ___2__12. Placing alternate foot on stool   ___0__13. Standing with one foot in front   ___1__14. Standing on one foot      Total __36___/56 (less than 46/56 = fall risk)      Gait: pt ambulates on level ground independently with rollator    Gait speed: SSWS 0.46 m/s  rollator  FWS: 0.64 m/s rollator   Gait deviations: shuffling of gait with +festination both more pronounced with  turns and in busier areas     Timed Up and Go (AD,time): 14.5 seconds rollator  Fall risk: YES    5x Sit -->Stand:12 seconds     6 Minute Walk Test:TBA     Today's Treatment and Response:   Pt education was provided on exam findings and POC. Discussed cycling as mode of additional exercise at home- rationale explained.   Today's Treatment       5/1/2025   Neuro Treatment   Evaluation Minutes 30   Total Time Of Timed Procedures 0   Total Time Of Service-Based Procedures 30   Total Treatment Time 30        HEP to be initiated next session    Charges:  PT EVAL: Moderate Complexity,    In agreement with functional outcome measures and clinical rationale, this evaluation involved Moderate Complexity decision making due to 3+ personal factors/comorbidities, 4+ body structures involved/activity limitations, and evolving symptoms including  declining functional status with +hx of frequent falls .                                                     PLAN OF CARE:    Goals: (to be met in 10 visits)   Pt to be independent in HEP   Pt will improve quality of gait in order to improve SSWS by at least 0.2 m/s thus reflecting more consistent and efficient gait pattern  Pt will improve BBS by at least 5 points to reflect reduced risk of falls.   Pt will perform TUG in 12 seconds or less in order to reflect improved level of safety with household ambulation.      Frequency / Duration: Patient will be seen 2x/week or a total of 10 visits over a 90 day period. Treatment will include: Gait training; Neuromuscular Re-education; Self-Care Home Management; Therapeutic Activities; Therapeutic Exercise; Home Exercise Program instruction; Patient/Family Education    Education or treatment limitation: None   Rehab Potential: good     Patient was advised of these findings, precautions, and treatment options and has agreed to actively participate in planning and for this course of care.    Thank you for your referral. Please co-sign or sign and  return this letter via fax as soon as possible to [phone]. If you have any questions, please contact me at Dept: [phone]    Sincerely,  Laine Doran PT, NCS    Electronically signed by therapist: Laine Doran PT  Physician's certification required: Yes  I certify the need for these services furnished under this plan of treatment and while under my care.    X___________________________________________________ Date____________________    Certification From: 5/1/2025  To:7/30/2025

## 2025-05-05 DIAGNOSIS — M06.9 RHEUMATOID ARTHRITIS, INVOLVING UNSPECIFIED SITE, UNSPECIFIED WHETHER RHEUMATOID FACTOR PRESENT (HCC): ICD-10-CM

## 2025-05-06 ENCOUNTER — OFFICE VISIT (OUTPATIENT)
Dept: PHYSICAL THERAPY | Facility: HOSPITAL | Age: 65
End: 2025-05-06
Attending: Other
Payer: COMMERCIAL

## 2025-05-06 PROCEDURE — 97110 THERAPEUTIC EXERCISES: CPT

## 2025-05-06 PROCEDURE — 97112 NEUROMUSCULAR REEDUCATION: CPT

## 2025-05-06 NOTE — PROGRESS NOTES
Patient: Lindsay Trotter (64 year old, female) Referring Provider:  Insurance:   Diagnosis: Parkinson's Disease, abnormality of gait, malaise, difficulty walking Dago Orozco  [employer] [place]   Date of Surgery: No data recorded Next MD visit:  N/A   Precautions:  Fall Risk No data recorded Referral Information:    Date of Evaluation: Req: 0, Auth: 0, Exp:     05/01/25 POC Auth Visits:  10 (no auth req)       Today's Date   5/6/2025    Subjective  No new information to report       Pain: 0/10     Objective  see flowsheet            Assessment   Demo's understanding of exercises provided for home. Session tolerated well without adverse response.     Goals (to be met in 10 visits)   Pt to be independent in HEP   Pt will improve quality of gait in order to improve SSWS by at least 0.2 m/s thus reflecting more consistent and efficient gait pattern  Pt will improve BBS by at least 5 points to reflect reduced risk of falls.   Pt will perform TUG in 12 seconds or less in order to reflect improved level of safety with household ambulation.        Plan  add strengthening exercises to HEP    Treatment Last 4 Visits  Treatment Day: 2       5/1/2025 5/6/2025   Neuro Treatment   Therapeutic Exercise  Nu-step level 3 x10 mins- SPM goal  Discussed modes of exercises recommended - information for [employer]'s exercise videos on YouTube provided.      Neuro Re-Education  Lg amp fwd step x10 B, 1 UE support- demo with return demo   Lg amp backwards step x10 B, 1 UE support-demo with return demo   Wider RODY alt reach across midline with mini trunk rotation x10 B-demo with return demo, mirroring   Mini step stance wing opening (ant wt shift) x10 ea foot in front. Demo with return demo   Seated alt march with opp UE lift (Coordination) x10 B, mirroring    Therapeutic Exercise Minutes  14   Neuro Re-Educ Minutes  24   Evaluation Minutes 30    Total Time Of Timed Procedures 0 38   Total Time Of Service-Based  Procedures 30 0   Total Treatment Time 30 38        Patient education    HEP initiated- see pt instructions    Charges  1 TE, 2 NMR

## 2025-05-06 NOTE — TELEPHONE ENCOUNTER
LOV: 1/20/25  Future Appointments   Date Time Provider Department Center   5/6/2025 10:45 AM Degeorge, Laine, PT [facility]   5/8/2025 10:15 AM Degeorge, Laine, PT [facility]   5/12/2025  1:10 PM [facility]   5/13/2025  4:15 PM Degeorge, Laine, PT [facility]   5/20/2025  3:30 PM Degeorge, Laine, PT [facility]   5/22/2025  9:45 AM Degeorge, Laine, PT [facility]   6/9/2025  1:10 PM [facility]   6/10/2025  3:30 PM Degeorge, Laine, PT [facility]   6/12/2025  3:00 PM Degeorge, Laine, PT [facility]   6/16/2025  4:30 PM Degeorge, Laine, PT [facility]   6/17/2025  2:20 PM [facility]   6/18/2025  2:15 PM Degeorge, Laine, PT [facility]   6/30/2025  2:30 PM Chico Velasco MD [facility]   8/18/2025  2:00 PM [facility]   9/11/2025  2:15 PM Harsha Thibodeaux MD [address]   12/2/2025 12:00 PM Urmila Gan RD [address]       Summary:  1. Cont. . methotrexate - 8 tablets a week and folic acid 1mg a day   2. Cont.  xlejanz 11mg a day    3.  Return to clinic in  2-4  months -   4. .Check labs every 4  months - in January .   5. Diclofenac gel 1 % for left knee.    6. Tylenol ES two tablets twice ad ay   7. Tramadol for the pain -            - overall she's been stable on xeljanz and methotrexate 8 tablets a  week -   I whole heartedly agree that she should get long term disability given her rheumatoid arthritis with her parkison's. Her movemetn disorder with her inflammatory arthritis makes it difficulty for her to use her hands consistently and safely for most work. 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         Chucho Butcher MD  1/20/2025   11:25 AM

## 2025-05-06 NOTE — PATIENT INSTRUCTIONS
Do these daily unless you do an exercise video on Youtube. [employer] Youtube channel. Under playlists look for \"Fitness Fridays\" for various exercise videos.     Stand up tall at countertop. Hold on with 1 hand. With the leg closer to the countertop take a large step forward. Then step back to the starting position. Do this 10 times. Then perform with the other leg, holding on with the other hand. 10 times.     Stand up tall at countertop. Hold on with 1 hand. With the leg closer to the countertop take a large step backward. Then return foot to starting position. Do this 10 times. Then perform with the other leg, holding on with the other hand. 10 times.     Stand up tall with walker in front and bed/couch behind you (do not lean against it). Feet a little wider apart than normal. Reach across body with hand as pushing someone away- you will rotate your upper body a small amount. Return to center, pause, then repeat the other direction. Alternate until you have done 10 each way.     Stand up tall with walker in front and bed/couch behind you (do not lean against it). Step one foot forward a small amount. Keep feet in this position, hands in front. Open arms as you shift weight forward onto front foot. Then rock back to starting position, closing your arms. Do this 10 times. Then repeat with the other foot in front 10 times.     Sit up tall. March one knee up as you lift opposite leg. Lower them together, then lift other arm and march other leg up. Alternate until you have done 10 on each side.

## 2025-05-06 NOTE — PROGRESS NOTES
Patient: Lindsay Trotter (64 year old, female) Referring Provider:  Insurance:   Diagnosis: Parkinson's Disease, abnormality of gait, malaise, difficulty walking Dago Orozco  [employer] [place]   Date of Surgery: No data recorded Next MD visit:  N/A   Precautions:  Fall Risk No data recorded Referral Information:    Date of Evaluation: Req: 0, Auth: 0, Exp:     05/01/25 POC Auth Visits:  10 (no auth req)       Today's Date   5/6/2025    Subjective  No new information to report       Pain: 0/10     Objective  see flowsheet          Assessment   Demo's and verbalizes understanding of material taught today. Session tolerated well without adverse response.     Goals (to be met in 10 visits)   Pt to be independent in HEP   Pt will improve quality of gait in order to improve SSWS by at least 0.2 m/s thus reflecting more consistent and efficient gait pattern  Pt will improve BBS by at least 5 points to reflect reduced risk of falls.   Pt will perform TUG in 12 seconds or less in order to reflect improved level of safety with household ambulation.        Plan  add strengthening exercises to HEP    Treatment Last 4 Visits  Treatment Day: 2       5/1/2025 5/6/2025   Neuro Treatment   Therapeutic Exercise  Nu-step level 3 x10 mins- SPM goal  Discussed modes of exercises recommended - information for [employer]'s exercise videos on YouTube provided.      Neuro Re-Education  Lg amp fwd step x10 B, 1 UE support- demo with return demo   Lg amp backwards step x10 B, 1 UE support-demo with return demo   Wider RODY alt reach across midline with mini trunk rotation x10 B-demo with return demo, mirroring   Mini step stance wing opening (ant wt shift) x10 ea foot in front. Demo with return demo   Seated alt march with opp UE lift (Coordination) x10 B, mirroring    Therapeutic Exercise Minutes  14   Neuro Re-Educ Minutes  24   Evaluation Minutes 30    Total Time Of Timed Procedures 0 38   Total Time Of Service-Based  Procedures 30 0   Total Treatment Time 30 38        Patient education   HEP initiated -see pt instructions.     Charges  1 TE, 2 NMR

## 2025-05-07 RX ORDER — TOFACITINIB 11 MG/1
1 TABLET, FILM COATED, EXTENDED RELEASE ORAL DAILY
Qty: 90 TABLET | Refills: 1 | Status: SHIPPED | OUTPATIENT
Start: 2025-05-07

## 2025-05-08 ENCOUNTER — OFFICE VISIT (OUTPATIENT)
Dept: PHYSICAL THERAPY | Facility: HOSPITAL | Age: 65
End: 2025-05-08
Attending: Other
Payer: COMMERCIAL

## 2025-05-08 PROCEDURE — 97110 THERAPEUTIC EXERCISES: CPT

## 2025-05-08 PROCEDURE — 97112 NEUROMUSCULAR REEDUCATION: CPT

## 2025-05-08 NOTE — PROGRESS NOTES
Patient: Lindsay Trotter (64 year old, female) Referring Provider:  Insurance:   Diagnosis: Parkinson's Disease, abnormality of gait, malaise, difficulty walking Dago Orozco  [employer] [place]   Date of Surgery: No data recorded Next MD visit:  N/A   Precautions:  Fall Risk No data recorded Referral Information:    Date of Evaluation: Req: 0, Auth: 0, Exp:     05/01/25 POC Auth Visits:  10 (no auth req)       Today's Date   5/8/2025    Subjective  would like to review a couple of the exercises provided for home.       Pain: 0/10     Objective  see flowsheet         Assessment   Demo's understanding of additional exercises provided for home today. Verbalizes understanding of material taught today. Generally good postural stability throughout    Goals (to be met in 10 visits)   Pt to be independent in HEP   Pt will improve quality of gait in order to improve SSWS by at least 0.2 m/s thus reflecting more consistent and efficient gait pattern  Pt will improve BBS by at least 5 points to reflect reduced risk of falls.   Pt will perform TUG in 12 seconds or less in order to reflect improved level of safety with household ambulation.          Plan  in future sessions: TDM??, FoG exercise program    Treatment Last 4 Visits  Treatment Day: 3       5/1/2025 5/6/2025 5/8/2025   Neuro Treatment   Therapeutic Exercise  Nu-step level 3 x10 mins- SPM goal  Discussed modes of exercises recommended - information for [employer]'s exercise videos on YouTube provided.    Nu-step level 3 x10 mins- SPM goal  Seated hip ABD stomp outs green TB- demo with return demo x10   Seated LE diagonals x10 B -demo with return demo   Standing hip EXT x10 B -demo with return demo  Standing side step R<>L x10 cycles    Neuro Re-Education  Lg amp fwd step x10 B, 1 UE support- demo with return demo   Lg amp backwards step x10 B, 1 UE support-demo with return demo   Wider RODY alt reach across midline with mini trunk rotation x10  B-demo with return demo, mirroring   Mini step stance wing opening (ant wt shift) x10 ea foot in front. Demo with return demo   Seated alt march with opp UE lift (Coordination) x10 B, mirroring  Verbal review of previously provided exercises for home   Rebounder 2# SBA-CGA  -pt selected RODY x10  -step stance x12 ea foot in front   Standing on foam- pt selected RODY 3# reverse flies B 2x10   Therapeutic Exercise Minutes  14 30   Neuro Re-Educ Minutes  24 10   Evaluation Minutes 30     Total Time Of Timed Procedures 0 38 40   Total Time Of Service-Based Procedures 30 0 0   Total Treatment Time 30 38 40        Patient education   HEP updated, see pt instructions     Charges  2 TE, 1 NMR

## 2025-05-08 NOTE — PATIENT INSTRUCTIONS
Do these daily unless you do an exercise video on Youtube. [employer] Youtube channel. Under playlists look for \"Fitness Fridays\" for various exercise videos.     Stand up tall at countertop. Hold on with 1 hand. With the leg closer to the countertop take a large step forward. Then step back to the starting position. Do this 10 times. Then perform with the other leg, holding on with the other hand. 10 times.     Stand up tall at countertop. Hold on with 1 hand. With the leg closer to the countertop take a large step backward. Then return foot to starting position. Do this 10 times. Then perform with the other leg, holding on with the other hand. 10 times.     Stand up tall with walker in front and bed/couch behind you (do not lean against it). Feet a little wider apart than normal. Reach across body with hand as pushing someone away- you will rotate your upper body a small amount. Return to center, pause, then repeat the other direction. Alternate until you have done 10 each way.     Stand up tall with walker in front and bed/couch behind you (do not lean against it). Step one foot forward a small amount. Keep feet in this position, hands in front. Open arms as you shift weight forward onto front foot. Then rock back to starting position, closing your arms. Do this 10 times. Then repeat with the other foot in front 10 times.     Sit up tall. March one knee up as you lift opposite arm. Lower them together, then lift other arm and march other leg up. Alternate until you have done 10 on each side.     Strengthening     Sit up tall. Tie green band around thighs just above knees. Open legs wide pushing knees out into the band, then bring them back to starting position. Make movements big. Do this 10 times.     Sit up tall. Lift one leg up and across on a diagonal then lower back down. Move slow and controlled. Do this 10 times with one leg and 10 times with the other.     Stand at countertop and hold on  lightly with both hands. Keep leg straight. Lift it backwards just enough to feel your bottom tighten. Return to starting position. Do this 10 times with one leg and then 10 with the other.     Stand at countertop and hold on lightly with both hands. Toes pointed forward. Take a side step to the right, then a side step to the left. Do this 10 times. Make sure to  your feet as you move.     Sit up tall in a sturdy chair, scoot towards front edge. Reach forward (nose over toes), then stand up tall. Return to sitting in a controlled manner by reaching forward and sticking bottom out slowly bending knees to sit in chair.  Do this 10 times.

## 2025-05-12 ENCOUNTER — NURSE ONLY (OUTPATIENT)
Dept: ALLERGY | Facility: CLINIC | Age: 65
End: 2025-05-12
Payer: COMMERCIAL

## 2025-05-12 DIAGNOSIS — J30.89 ENVIRONMENTAL AND SEASONAL ALLERGIES: Primary | ICD-10-CM

## 2025-05-13 ENCOUNTER — OFFICE VISIT (OUTPATIENT)
Dept: PHYSICAL THERAPY | Facility: HOSPITAL | Age: 65
End: 2025-05-13
Attending: Other
Payer: COMMERCIAL

## 2025-05-13 PROCEDURE — 97110 THERAPEUTIC EXERCISES: CPT

## 2025-05-13 NOTE — PROGRESS NOTES
Patient: Lindsay Trotter (64 year old, female) Referring Provider:  Insurance:   Diagnosis: Parkinson's Disease, abnormality of gait, malaise, difficulty walking Dago Orozco  [employer] [place]   Date of Surgery: No data recorded Next MD visit:  N/A   Precautions:  Fall Risk No data recorded Referral Information:    Date of Evaluation: Req: 0, Auth: 0, Exp:     05/01/25 POC Auth Visits:  10 (no auth req)       Today's Date   5/13/2025    Subjective  No new information to report.       Pain: 0/10     Objective  see flowsheet          Assessment   Intermittent rest breaks provided to limit fatigue. Pt with more pronounced dyskinesias today.     Goals (to be met in 10 visits)   Pt to be independent in HEP   Pt will improve quality of gait in order to improve SSWS by at least 0.2 m/s thus reflecting more consistent and efficient gait pattern  Pt will improve BBS by at least 5 points to reflect reduced risk of falls.   Pt will perform TUG in 12 seconds or less in order to reflect improved level of safety with household ambulation.            Plan  in future sessions: TDM??, continue FoG exercise program    Treatment Last 4 Visits  Treatment Day: 4       5/1/2025 5/6/2025 5/8/2025 5/13/2025   Neuro Treatment   Therapeutic Exercise  Nu-step level 3 x10 mins- SPM goal  Discussed modes of exercises recommended - information for [employer]'s exercise videos on [employer] provided.    Nu-step level 3 x10 mins- SPM goal  Seated hip ABD stomp outs green TB- demo with return demo x10   Seated LE diagonals x10 B -demo with return demo   Standing hip EXT x10 B -demo with return demo  Standing side step R<>L x10 cycles  Nu- step level 4 x10 mins   Standing on foam reverse fly- 3# 3x10 reps SBA  Chest press 5# bar hooklying- dynadisc behind back x10-->dynadisc behind back and under feet 2x10   Sit<>stand EOB 5# DB -dual motor task B symmetrical bicep curl demo with return demo x10-->alt bicep curl demo with return  demo, mirroring 2x10   Standing fitness slider hip EXT with opp UE lift -demo with return demo 2x10 B      Neuro Re-Education  Lg amp fwd step x10 B, 1 UE support- demo with return demo   Lg amp backwards step x10 B, 1 UE support-demo with return demo   Wider RODY alt reach across midline with mini trunk rotation x10 B-demo with return demo, mirroring   Mini step stance wing opening (ant wt shift) x10 ea foot in front. Demo with return demo   Seated alt march with opp UE lift (Coordination) x10 B, mirroring  Verbal review of previously provided exercises for home   Rebounder 2# SBA-CGA  -pt selected RODY x10  -step stance x12 ea foot in front   Standing on foam- pt selected RODY 3# reverse flies B 2x10    Therapeutic Exercise Minutes  14 30 42   Neuro Re-Educ Minutes  24 10    Evaluation Minutes 30      Total Time Of Timed Procedures 0 38 40 42   Total Time Of Service-Based Procedures 30 0 0 0   Total Treatment Time 30 38 40 42        Patient education   Encouraged compliance to HEP.     Charges  3 TE

## 2025-05-14 ENCOUNTER — LAB ENCOUNTER (OUTPATIENT)
Dept: LAB | Facility: HOSPITAL | Age: 65
End: 2025-05-14
Attending: INTERNAL MEDICINE
Payer: COMMERCIAL

## 2025-05-14 DIAGNOSIS — M06.9 RHEUMATOID ARTHRITIS, INVOLVING UNSPECIFIED SITE, UNSPECIFIED WHETHER RHEUMATOID FACTOR PRESENT (HCC): ICD-10-CM

## 2025-05-14 DIAGNOSIS — Z51.81 THERAPEUTIC DRUG MONITORING: ICD-10-CM

## 2025-05-14 LAB
ALT SERPL-CCNC: <7 U/L (ref 10–49)
AST SERPL-CCNC: 21 U/L (ref ?–34)
BASOPHILS # BLD AUTO: 0.07 X10(3) UL (ref 0–0.2)
BASOPHILS NFR BLD AUTO: 1.2 %
CREAT BLD-MCNC: 1.31 MG/DL (ref 0.55–1.02)
CRP SERPL-MCNC: <0.4 MG/DL (ref ?–1)
DEPRECATED RDW RBC AUTO: 57.9 FL (ref 35.1–46.3)
EGFRCR SERPLBLD CKD-EPI 2021: 46 ML/MIN/1.73M2 (ref 60–?)
EOSINOPHIL # BLD AUTO: 0.09 X10(3) UL (ref 0–0.7)
EOSINOPHIL NFR BLD AUTO: 1.5 %
ERYTHROCYTE [DISTWIDTH] IN BLOOD BY AUTOMATED COUNT: 15.9 % (ref 11–15)
ERYTHROCYTE [SEDIMENTATION RATE] IN BLOOD: 7 MM/HR (ref 0–30)
HCT VFR BLD AUTO: 38.9 % (ref 35–48)
HGB BLD-MCNC: 12.8 G/DL (ref 12–16)
IMM GRANULOCYTES # BLD AUTO: 0.02 X10(3) UL (ref 0–1)
IMM GRANULOCYTES NFR BLD: 0.3 %
LYMPHOCYTES # BLD AUTO: 1.45 X10(3) UL (ref 1–4)
LYMPHOCYTES NFR BLD AUTO: 24 %
MCH RBC QN AUTO: 32.9 PG (ref 26–34)
MCHC RBC AUTO-ENTMCNC: 32.9 G/DL (ref 31–37)
MCV RBC AUTO: 100 FL (ref 80–100)
MONOCYTES # BLD AUTO: 0.5 X10(3) UL (ref 0.1–1)
MONOCYTES NFR BLD AUTO: 8.3 %
NEUTROPHILS # BLD AUTO: 3.9 X10 (3) UL (ref 1.5–7.7)
NEUTROPHILS # BLD AUTO: 3.9 X10(3) UL (ref 1.5–7.7)
NEUTROPHILS NFR BLD AUTO: 64.7 %
PLATELET # BLD AUTO: 197 10(3)UL (ref 150–450)
RBC # BLD AUTO: 3.89 X10(6)UL (ref 3.8–5.3)
WBC # BLD AUTO: 6 X10(3) UL (ref 4–11)

## 2025-05-14 PROCEDURE — 84460 ALANINE AMINO (ALT) (SGPT): CPT

## 2025-05-14 PROCEDURE — 84450 TRANSFERASE (AST) (SGOT): CPT

## 2025-05-14 PROCEDURE — 86140 C-REACTIVE PROTEIN: CPT

## 2025-05-14 PROCEDURE — 36415 COLL VENOUS BLD VENIPUNCTURE: CPT

## 2025-05-14 PROCEDURE — 85652 RBC SED RATE AUTOMATED: CPT

## 2025-05-14 PROCEDURE — 82565 ASSAY OF CREATININE: CPT

## 2025-05-14 PROCEDURE — 85025 COMPLETE CBC W/AUTO DIFF WBC: CPT

## 2025-05-20 ENCOUNTER — OFFICE VISIT (OUTPATIENT)
Dept: PHYSICAL THERAPY | Facility: HOSPITAL | Age: 65
End: 2025-05-20
Attending: Other
Payer: COMMERCIAL

## 2025-05-20 PROCEDURE — 97110 THERAPEUTIC EXERCISES: CPT

## 2025-05-20 NOTE — PROGRESS NOTES
Patient: Lindsay Trotter (64 year old, female) Referring Provider:  Insurance:   Diagnosis: Parkinson's Disease, abnormality of gait, malaise, difficulty walking Dago Orozco  [employer] [place]   Date of Surgery: No data recorded Next MD visit:  N/A   Precautions:  Fall Risk No data recorded Referral Information:    Date of Evaluation: Req: 0, Auth: 0, Exp:     05/01/25 POC Auth Visits:  10 (no auth req)       Today's Date   5/20/2025    Subjective   No new information to report.        Pain: 0/10     Objective  see flowsheet          Assessment   Intermittent rest breaks provided in session. Session tolerated well over despite a couple freezing episodes.     Goals (to be met in 10 visits)   Pt to be independent in HEP   Pt will improve quality of gait in order to improve SSWS by at least 0.2 m/s thus reflecting more consistent and efficient gait pattern  Pt will improve BBS by at least 5 points to reflect reduced risk of falls.   Pt will perform TUG in 12 seconds or less in order to reflect improved level of safety with household ambulation.              Plan  in future sessions: TDM??, continue FoG exercise program    Treatment Last 4 Visits  Treatment Day: 5 5/6/2025 5/8/2025 5/13/2025 5/20/2025   Neuro Treatment   Therapeutic Exercise Nu-step level 3 x10 mins- SPM goal  Discussed modes of exercises recommended - information for [employer]'s exercise videos on [employer] provided.    Nu-step level 3 x10 mins- SPM goal  Seated hip ABD stomp outs green TB- demo with return demo x10   Seated LE diagonals x10 B -demo with return demo   Standing hip EXT x10 B -demo with return demo  Standing side step R<>L x10 cycles  Nu- step level 4 x10 mins   Standing on foam reverse fly- 3# 3x10 reps SBA  Chest press 5# bar hooklying- dynadisc behind back x10-->dynadisc behind back and under feet 2x10   Sit<>stand EOB 5# DB -dual motor task B symmetrical bicep curl demo with return demo x10-->alt bicep curl  demo with return demo, mirroring 2x10   Standing fitness slider hip EXT with opp UE lift -demo with return demo 2x10 B    Nu- step level 4 x10 mins   Shuttle   -B press (2 blue, silver) 2x10  -B press feet on dynadisc 2 x10  -March 3x10 B   Standing fitness slider   -hip EXT 2x10 B - demo with return demo - to target to improve quality of movement   Standing TKE red TB 3 sec hold x15 B      Neuro Re-Education Lg amp fwd step x10 B, 1 UE support- demo with return demo   Lg amp backwards step x10 B, 1 UE support-demo with return demo   Wider RODY alt reach across midline with mini trunk rotation x10 B-demo with return demo, mirroring   Mini step stance wing opening (ant wt shift) x10 ea foot in front. Demo with return demo   Seated alt march with opp UE lift (Coordination) x10 B, mirroring  Verbal review of previously provided exercises for home   Rebounder 2# SBA-CGA  -pt selected RODY x10  -step stance x12 ea foot in front   Standing on foam- pt selected RODY 3# reverse flies B 2x10     Therapeutic Exercise Minutes 14 30 42 39   Neuro Re-Educ Minutes 24 10     Total Time Of Timed Procedures 38 40 42 39   Total Time Of Service-Based Procedures 0 0 0 0   Total Treatment Time 38 40 42 39             Charges  3 TE

## 2025-05-22 ENCOUNTER — OFFICE VISIT (OUTPATIENT)
Dept: PHYSICAL THERAPY | Facility: HOSPITAL | Age: 65
End: 2025-05-22
Attending: Other
Payer: COMMERCIAL

## 2025-05-22 PROCEDURE — 97112 NEUROMUSCULAR REEDUCATION: CPT

## 2025-05-22 PROCEDURE — 97110 THERAPEUTIC EXERCISES: CPT

## 2025-05-22 NOTE — PROGRESS NOTES
Patient: Lindsay Trotter (64 year old, female) Referring Provider:  Insurance:   Diagnosis: Parkinson's Disease, abnormality of gait, malaise, difficulty walking Dago Orozco  [employer] [place]   Date of Surgery: No data recorded Next MD visit:  N/A   Precautions:  Fall Risk No data recorded Referral Information:    Date of Evaluation: Req: 0, Auth: 0, Exp:     05/01/25 POC Auth Visits:  10 (no auth req)       Today's Date   5/22/2025    Subjective  No new information to report. Presents without AFO's- reports she was in a rush leaving the house       Pain: 0/10     Objective  see flowsheet       Assessment   Pt challenged by seated balance exercises with frequent posterior LOB. Pt also with L lateral lean in sitting contributing to instability.     Goals (to be met in 10 visits)   Pt to be independent in HEP   Pt will improve quality of gait in order to improve SSWS by at least 0.2 m/s thus reflecting more consistent and efficient gait pattern  Pt will improve BBS by at least 5 points to reflect reduced risk of falls.   Pt will perform TUG in 12 seconds or less in order to reflect improved level of safety with household ambulation.                Plan  in future sessions: TDM, continue FoG exercise program    Treatment Last 4 Visits  Treatment Day: 6       5/8/2025 5/13/2025 5/20/2025 5/22/2025   Neuro Treatment   Therapeutic Exercise Nu-step level 3 x10 mins- SPM goal  Seated hip ABD stomp outs green TB- demo with return demo x10   Seated LE diagonals x10 B -demo with return demo   Standing hip EXT x10 B -demo with return demo  Standing side step R<>L x10 cycles  Nu- step level 4 x10 mins   Standing on foam reverse fly- 3# 3x10 reps SBA  Chest press 5# bar hooklying- dynadisc behind back x10-->dynadisc behind back and under feet 2x10   Sit<>stand EOB 5# DB -dual motor task B symmetrical bicep curl demo with return demo x10-->alt bicep curl demo with return demo, mirroring 2x10   Standing fitness slider hip  EXT with opp UE lift -demo with return demo 2x10 B    Nu- step level 4 x10 mins   Shuttle   -B press (2 blue, silver) 2x10  -B press feet on dynadisc 2 x10  -March 3x10 B   Standing fitness slider   -hip EXT 2x10 B - demo with return demo - to target to improve quality of movement   Standing TKE red TB 3 sec hold x15 B    Nu- step level 4 x12 mins   Seated EOB fwd hinge hold<>lean back hold CGA-MOD A for balance multiple reps    Neuro Re-Education Verbal review of previously provided exercises for home   Rebounder 2# SBA-CGA  -pt selected RODY x10  -step stance x12 ea foot in front   Standing on foam- pt selected RODY 3# reverse flies B 2x10   Boxing seated EOB  -->on foam  -45 sec x2 bouts alt cross (trunk rotation)   -jabs 30 sec ea UE  -upper cuts 45 sec   -reaching for lighted targets 1 min bout  -->on foam beam, SBA-MIN A for balance   - cross 30 sec ea  (trunk rotation)  -30 sec upper cuts     Therapeutic Exercise Minutes 30 42 39 18   Neuro Re-Educ Minutes 10   20   Total Time Of Timed Procedures 40 42 39 38   Total Time Of Service-Based Procedures 0 0 0 0   Total Treatment Time 40 42 39 38        Patient education   Encouraged continued performance of HEP.     Charges  2 NMR, 1 TE

## 2025-06-03 ENCOUNTER — TELEPHONE (OUTPATIENT)
Dept: RHEUMATOLOGY | Facility: CLINIC | Age: 65
End: 2025-06-03

## 2025-06-03 NOTE — TELEPHONE ENCOUNTER
PA Approved    Prior authorization for:  Xeljanz XR     Medication form: 11 mg tablet     Approval #: [id number]    Approved dates: 6/3/25 to 6/3/26

## 2025-06-03 NOTE — TELEPHONE ENCOUNTER
PA start    Prior authorization for: Xeljanz XR    Medication form: 11 mg tablet    Submission method: Fax to [facility]    Tracking #: [id number]    QF-TB result:     Component      Latest Ref Rng 5/17/2023   Quantiferon TB NIL      IU/mL 0.00    Quantiferon-TB1 Minus NIL      IU/mL 0.01    Quantiferon-TB2 Minus NIL      IU/mL 0.01    Quantiferon TB Mitogen minus NIL      IU/mL >10.00    Quantiferon TB Result      Negative  Negative

## 2025-06-07 RX ORDER — MONTELUKAST SODIUM 10 MG/1
10 TABLET ORAL NIGHTLY
Qty: 90 TABLET | Refills: 0 | Status: SHIPPED | OUTPATIENT
Start: 2025-06-07

## 2025-06-07 NOTE — TELEPHONE ENCOUNTER
Refill requested for   Name from pharmacy: MONTELUKAST SOD 10 MG TABLET          Will file in chart as: MONTELUKAST 10 MG Oral Tab    Sig: TAKE 1 TABLET BY MOUTH EVERY DAY AT NIGHT    Disp: 90 tablet    Refills: 0 (Pharmacy requested: Not specified)    Start: 6/7/2025    Class: Normal    Non-formulary    Last ordered: 2 months ago (3/10/2025) by Chico Velasco MD    Last refill: 3/10/2025    Rx #: [id number]    Corticosteroids / Long-Acting Bronchodilators Passed 06/07/2025 12:01 AM   Protocol Details Appt in past 6 mos or next 3 mos    Medication is active on med list      To be filled at: [id number] - [place], [place] - [address] AT [facility], [phone], [phone]       Last office visit: 07/15/2024    Previously advised to follow up in yearly     F/U currently scheduled? Yes 6/30/2025        ACTION: Refilled per protocol.

## 2025-06-09 ENCOUNTER — NURSE ONLY (OUTPATIENT)
Dept: ALLERGY | Facility: CLINIC | Age: 65
End: 2025-06-09
Payer: COMMERCIAL

## 2025-06-09 DIAGNOSIS — J30.89 ENVIRONMENTAL AND SEASONAL ALLERGIES: Primary | ICD-10-CM

## 2025-06-10 ENCOUNTER — OFFICE VISIT (OUTPATIENT)
Dept: PHYSICAL THERAPY | Facility: HOSPITAL | Age: 65
End: 2025-06-10
Attending: Other
Payer: COMMERCIAL

## 2025-06-10 PROCEDURE — 97116 GAIT TRAINING THERAPY: CPT

## 2025-06-10 NOTE — PROGRESS NOTES
Patient: Lindsay Trotter (64 year old, female) Referring Provider:  Insurance:   Diagnosis: Parkinson's Disease, abnormality of gait, malaise, difficulty walking [place]   Date of Surgery: No data recorded Next MD visit:  N/A   Precautions:  Fall Risk No data recorded Referral Information:    Date of Evaluation: Req: 0, Auth: 0, Exp:     05/01/25 POC Auth Visits:  10 (no auth req)       Today's Date   6/10/2025    Subjective  No new information to report.       Pain: 0/10     Objective  see flowsheet           Assessment   Pt with controlled lower to knees (B) while walking into appointment after LOB -controlled via PT pushing rollator to stop forward momentum. Attempted to prevent pt from lowering to floor but unable despite attempt. Pt uninjured and able to tolerate session without adverse response. Pt does fatigue relatively quickly on TDM requiring extended seated rest after ea bout of ambulation. Improved quality of walking noted post TDM training.     Goals (to be met in 10 visits)   Pt to be independent in HEP   Pt will improve quality of gait in order to improve SSWS by at least 0.2 m/s thus reflecting more consistent and efficient gait pattern  Pt will improve BBS by at least 5 points to reflect reduced risk of falls.   Pt will perform TUG in 12 seconds or less in order to reflect improved level of safety with household ambulation.                  Plan  in future sessions: TDM, continue FoG exercise program    Treatment Last 4 Visits  Treatment Day: 7       5/13/2025 5/20/2025 5/22/2025 6/10/2025   Neuro Treatment   Therapeutic Exercise Nu- step level 4 x10 mins   Standing on foam reverse fly- 3# 3x10 reps SBA  Chest press 5# bar hooklying- dynadisc behind back x10-->dynadisc behind back and under feet 2x10   Sit<>stand EOB 5# DB -dual motor task B symmetrical bicep curl demo with return demo x10-->alt bicep curl demo with return demo, mirroring 2x10   Standing fitness  slider hip EXT with opp UE lift -demo with return demo 2x10 B    Nu- step level 4 x10 mins   Shuttle   -B press (2 blue, silver) 2x10  -B press feet on dynadisc 2 x10  -March 3x10 B   Standing fitness slider   -hip EXT 2x10 B - demo with return demo - to target to improve quality of movement   Standing TKE red TB 3 sec hold x15 B    Nu- step level 4 x12 mins   Seated EOB fwd hinge hold<>lean back hold CGA-MOD A for balance multiple reps     Neuro Re-Education   Boxing seated EOB  -->on foam  -45 sec x2 bouts alt cross (trunk rotation)   -jabs 30 sec ea UE  -upper cuts 45 sec   -reaching for lighted targets 1 min bout  -->on foam beam, SBA-MIN A for balance   - cross 30 sec ea  (trunk rotation)  -30 sec upper cuts      Gait Training    TDM with harness no BWS- laser point for visual target   - 0.6 mph 2 bouts 5.5 mins, 6 mins- seated rest after ea bout- dual task (engaged in conversation)  -0.4-0.5 mph backwards walking 4 mins - seated rest after bout   Ambulation OG with rollator 1 lg lap around clinic- PT in front to help control rollator, VC's for increased B step length as well as for body position   Therapeutic Exercise Minutes 42 39 18    Neuro Re-Educ Minutes   20    Gait Training Minutes    39   Total Time Of Timed Procedures 42 39 38 39   Total Time Of Service-Based Procedures 0 0 0 0   Total Treatment Time 42 39 38 39        Patient education   Encouraged continued performance of HEP    Charges  3 GT

## 2025-06-16 ENCOUNTER — TELEPHONE (OUTPATIENT)
Dept: PHYSICAL THERAPY | Facility: HOSPITAL | Age: 65
End: 2025-06-16

## 2025-06-16 ENCOUNTER — APPOINTMENT (OUTPATIENT)
Dept: PHYSICAL THERAPY | Facility: HOSPITAL | Age: 65
End: 2025-06-16
Attending: Other
Payer: COMMERCIAL

## 2025-06-17 ENCOUNTER — OFFICE VISIT (OUTPATIENT)
Dept: RHEUMATOLOGY | Facility: CLINIC | Age: 65
End: 2025-06-17
Payer: COMMERCIAL

## 2025-06-17 VITALS
BODY MASS INDEX: 29.5 KG/M2 | WEIGHT: 199.19 LBS | SYSTOLIC BLOOD PRESSURE: 116 MMHG | HEIGHT: 69 IN | DIASTOLIC BLOOD PRESSURE: 70 MMHG | RESPIRATION RATE: 16 BRPM | HEART RATE: 103 BPM | OXYGEN SATURATION: 98 %

## 2025-06-17 DIAGNOSIS — N18.9 CHRONIC KIDNEY DISEASE, UNSPECIFIED CKD STAGE: ICD-10-CM

## 2025-06-17 DIAGNOSIS — Z79.899 ENCOUNTER FOR LONG-TERM (CURRENT) USE OF HIGH-RISK MEDICATION: ICD-10-CM

## 2025-06-17 DIAGNOSIS — G20.A1 PARKINSON'S DISEASE, UNSPECIFIED WHETHER DYSKINESIA PRESENT, UNSPECIFIED WHETHER MANIFESTATIONS FLUCTUATE (HCC): ICD-10-CM

## 2025-06-17 DIAGNOSIS — Z90.3 H/O GASTRIC SLEEVE: ICD-10-CM

## 2025-06-17 DIAGNOSIS — M06.4 INFLAMMATORY POLYARTHRITIS (HCC): ICD-10-CM

## 2025-06-17 DIAGNOSIS — M06.9 RHEUMATOID ARTHRITIS, INVOLVING UNSPECIFIED SITE, UNSPECIFIED WHETHER RHEUMATOID FACTOR PRESENT (HCC): Primary | ICD-10-CM

## 2025-06-17 DIAGNOSIS — Z51.81 THERAPEUTIC DRUG MONITORING: ICD-10-CM

## 2025-06-17 PROCEDURE — 3008F BODY MASS INDEX DOCD: CPT | Performed by: INTERNAL MEDICINE

## 2025-06-17 PROCEDURE — 3074F SYST BP LT 130 MM HG: CPT | Performed by: INTERNAL MEDICINE

## 2025-06-17 PROCEDURE — 3078F DIAST BP <80 MM HG: CPT | Performed by: INTERNAL MEDICINE

## 2025-06-17 PROCEDURE — G2211 COMPLEX E/M VISIT ADD ON: HCPCS | Performed by: INTERNAL MEDICINE

## 2025-06-17 PROCEDURE — 99215 OFFICE O/P EST HI 40 MIN: CPT | Performed by: INTERNAL MEDICINE

## 2025-06-17 RX ORDER — PREDNISONE 5 MG/1
5 TABLET ORAL DAILY PRN
Qty: 90 TABLET | Refills: 2 | Status: SHIPPED | OUTPATIENT
Start: 2025-06-17

## 2025-06-17 NOTE — PROGRESS NOTES
RHEUMATOLOGY CLINIC- FOLLOW-UP/TRANSFER OF CARE    Lindsay Trotter is a 64 year old female.    ASSESSMENT/PLAN:       ICD-10-CM    1. Rheumatoid arthritis, involving unspecified site, unspecified whether rheumatoid factor present (HCC)  M06.9 Hepatitis Panel, Acute (4)     Quantiferon TB Plus     ALT(SGPT)     AST (SGOT)     CBC With Differential With Platelet     Creatinine, Serum     C-Reactive Protein     Sed Rate, Westergren (Automated)      2. Inflammatory polyarthritis (HCC)  M06.4 Hepatitis Panel, Acute (4)     Quantiferon TB Plus     ALT(SGPT)     AST (SGOT)     CBC With Differential With Platelet     Creatinine, Serum     C-Reactive Protein     Sed Rate, Westergren (Automated)      3. Encounter for long-term (current) use of high-risk medication  Z79.899 Hepatitis Panel, Acute (4)     Quantiferon TB Plus     ALT(SGPT)     AST (SGOT)     CBC With Differential With Platelet     Creatinine, Serum     C-Reactive Protein     Sed Rate, Westergren (Automated)      4. Therapeutic drug monitoring  Z51.81 Hepatitis Panel, Acute (4)     Quantiferon TB Plus     ALT(SGPT)     AST (SGOT)     CBC With Differential With Platelet     Creatinine, Serum     C-Reactive Protein     Sed Rate, Westergren (Automated)      5. Parkinson's disease, unspecified whether dyskinesia present, unspecified whether manifestations fluctuate (HCC)  G20.A1 Hepatitis Panel, Acute (4)     Quantiferon TB Plus     ALT(SGPT)     AST (SGOT)     CBC With Differential With Platelet     Creatinine, Serum     C-Reactive Protein     Sed Rate, Westergren (Automated)      6. Chronic kidney disease, unspecified CKD stage  N18.9       7. H/O gastric sleeve  Z90.3         DISCUSSION:  Patient presents for transfer of care, formally following with Dr. Butcher, for history of double seropositive RA in the setting of Parkinson's disease.  No synovitis on current exam.  Therefore we will continue current management for now.  However, lab work notable for CKD  so discussed with patient trying to avoid NSAIDs going forward including diclofenac.  Rather, discussed with/benefits of low-dose prednisone as needed to which she is agreeable.    PLAN:  -Continue Xeljanz 11 mg daily       -Risks of Xeljanz include but not limited to increased risk of thrombosis, blood count abnormalities, increased risk of infection including shingles, weight gain, skin reactions, GI symptoms, abnormal cholesterol, hepatotoxicity, and possible risk of lymphoma.   - Continue methotrexate 2.5 mg tablets x 8 tablets once weekly with folic acid 1 mg daily       - Side effects of MTX include possible alopecia, hepatotoxicity, mucositis, GI upset, as well as immunosuppressive effects.  It is important to avoid EtOH while on MTX and, given the possible immunosuppressive effects of methotrexate, to remain up-to-date date with vaccinations and age-appropriate healthcare screenings including malignancy screenings.    - Discontinue diclofenac given CKD.  Modified to PRN Prednisone 5 mg qAM  - Consult/evaluation communicated with referring physician/provider.    Will continue close lab monitoring.  Otherwise, patient to follow-up in about 6/7 months with repeat labs prior.    Harmony Walker DO  6/17/2025   3:11 PM  There is a longitudinal care relationship with me, the care plan reflects the ongoing nature of the continuous relationship of care, and the medical record indicates that there is ongoing treatment of a serious/complex medical condition which I am currently managing.  is Applicable.     Discussed with patient that they are on chronic treatment with a high-risk medication that requires close lab monitoring for possible drug toxicity.  Additionally, discussed with patient give the possible immunosuppressive effects of their medication as well as their underlying hyper-inflammatory state, the importance of keeping vaccinations and age-appropriate screenings up-to-date, given increased risk of  complications and malignancies seen in these patient populations.  Patient to f/u with repeat labs drawn prior (standing labs ordered).  Last QuantiFERON TB testin2023 > reordered  Last Hepatitis testing: 3/11/15 > reordered    HPI:     25 Follow-Up/Transfer of Care: I had the pleasure of seeing Lindsay Trotter on 25 for h/o RA, formerly following with Dr. Butcher.     64 year old female w/ PMH RA, Parkinson's disease, HLD, asthma, postsurgical hypothyroidism, history of lap sleeve gastrectomy, lumbar spinal fusion, GERD, allergic rhinitis, who presents to clinic today.Of note, patient formally following with Dr. Butcher, with last appointment 2025 for follow-up of seropositive RA.  During that appointment, patient noting 2 to 3 weeks of foot pain without clear preceding injury but worsening as the day progressed and was severe at bedtime.  Rest seem to help the pain.  Was wearing a foot boot as recommended by her podiatrist.  Other arthralgias doing well on Xeljanz and methotrexate.  Was recommended to continue current management.    Since her last appointment, denies flare of RA, however, states that it is hard to tell the difference between her underlying Parkinson's pain versus osteoarthritis versus RA.  Overall, feels about her baseline on Xeljanz and methotrexate.  Taking diclofenac about 4 times a week.    Current Medications:  Xeljanz 11 mg daily- since 2018  Methotrexate 2.5 mg tablets x 8 tablets once weekly (Monday) with folic acid 1 mg daily    Diclofenac 50 mg daily- taking about 4x a week     Medication History:  Leflunomide- persistent arthralgias  SSZ- ineffective     Tramadol   Gabapentin     PT  Knee CIS with Ortho     Interval History:     25 Dr. Butcher Note:     She is a pleasant 57 year old who has elevated RF >300 and foot pain - c/w seropositive  RA.      She has hadhx of  foot ?infections for 3-4 months and she saw poidatry and physical therapy.  She then felt around [facility] she had a red knot around where her arch was. She was stold it was an infetion and she it felt like it was walking on a ball of pins. She was given an antibiotic and it went away. When she went off of it, it cxam back. Then after that she got another round of infection shse got antoher one. She also felt it went away . /When it went away, she got it on her left foot. She got esr and crp - and it was high.   She told Dr. Palafox and neurologist at [facility] and it was not her parkinsosn'ss.   She ssaw podiatry yestrday and was fine. But yesterday evening it was back.   She was given clindamycin.   She felt quinn clindamycin helped the infection - sspots but not the joint pain. She has had the foot pain fro several years she attributed it to weight.   Around new years she had sudden jotin pain in her shoulders intermittently - and wrists. It happened agaoin in feb for a few dayss. It was sos bad with this falre up. Then it was gone for 2 -3 days.s Her left 2nd finger would hurt.   sshe never thought her feet got swollne during the time she could have had infection.   The foot pain is more when shse is walking. It doesn't bother her when she is sitting or at night. It'ss just with walking. It's the farooq of the foot. It was the sisde of the foot.   She has a hx of mensiscal tear repair.      1/15/2020 -   She got decompression sx of her back in 9/2019 - her back pain is much better 2/10 pain.   And bariatric sx - for gastric sleeve.   She's taking less parkinson's medication b/c she might have had more   Healing was ok - right sided area still hurts.   Not on pain meds.   She's off the diclofenac as well.   Back sx was first and then the sleeve was done.   Not taking tramadol or gabapentin at this time. B/c she's better.   She is using the diclofenac gel 1 %   She has lost 40 pounds already!      5/13/2020  VIDEO VISIT  She had gastric sleeve sx - she's off gabpaentin and diclofenac - and she's  not back on it.   richard still has aches and pains.   2 tyelnol helps for 4 hours.   She lost 70 pounds.   Her back is doing really well since back sx.   She has about 4-5/10 pain - all over. She feels stiff all over.   The back of her legs can hurt and back.   hes' been surviving with out diclfoeac but she would like to feel better. She's asking if she can restart.   Otherwise no specific area is swelling.   walkign outside.   Not able to exercise at gym  b/c of coronovirus restrictions.      9/15/2020  In person   She lost 80 pounds since surgery and doing great.   Her back is doing well.   She has more pain in the evenings. It's hard to move after dinner.   She went back on diclofeanc - twice aday   She's working from home .   No areas of swelling.   She's doing a  at the gym twice a week.   Pain is 3/10 - can go to 5/10 at night      3/15/2021  The lower back pain started hurting again at the end of the year. The pain is radiating down her legs.   She had another round of injections and it helped a little bit. She has pain when she sits too long   She has another round of injections this Wednesday.   If she is walking her back pain is 9/10 pain. She has a  and helping with core strengthening.   She has kept her weight off.   She has no other joint pain.         9/22/2021  Her left hand is swollen and hurting.   Her left 5th finger is really swollen and hurts.   Rest of her hands are fine.   She had back fusion in 4/2021. Fine now and recovered.  Her left hand is 2-3/10 pain. If she hold something It hurts.   She has no pain in back, did PT and several rounds of it. Her back ffeels great.   The 2nd round of physical therapy she felt great.      3/23/2022  She's doing well.   Her back is doing well.   No flare ups.   She has lost 7 pounds since the begninning of the year.   Working from home still.   She is taking diclofenac 75mg bid -   She is having 1/10 pain.   She has right ankle deformity - and got  a righta nkel brace from dr. Nieto orth at [facility] since 8/2021 -   She declined surgery , got a 2nd opinon at [facility] and declined tendon transfer  Is doidng PT - for 3 months and now renewed again and she hasn't fallen since. It's the Parkinsons, previous sx to right ankle and RA .   She has no pain in it - just difficulty with falling and balancewith it.      9/23/2022  She is having her right foot turn in and her left achilles tendon. Her left achilels tendonitis is more in the morning. Sometimes she has to wake up in the night and it hurts badly at night.   She has done PT.   She is falling b/c of this.   She is doing ok with the arthriits pain but her right foot is turing in and she is not doing well with this.   Her falls are usuall when she doenst' wear her braces.   She dropped 5 pounds in the last 3 months.      Her back has been fine.   Her pain is 2-3/10 pain.      She's going to go back with PT soon - it's helping her left foot pain in the last 2 weeks.      5/5/2023  She has another compressed disc /herniated disc in her back - started about 2months ago.   She is getting sx next week   Her fingers are ok.   But her left elbow and knees are bothering her.   Her left ahciles is not bad.   The left elbow is not constant - it's only if she leans on something.   The knees are 5/10 pain.   Her left knee cap can come off - julienne left one - she has this feeling.   She has 10/10 pain - with moving.   She saw dr. guzman and getting sx next weke - the pain is all the time -   Sitting she is ok. She can't sleep well.   She has shooting pains/sciatica      11/6/2023 -   Her left knee is painful for the last 1-2 months.   Her back is much better post surgery. She just has issues with the knee.   Her other joints are fine.   She cont. To lose weight.      5/6/2024  She is losing weight still.   Her left knee is better.   Her back is good.   She still wants to get to goal weight is 180lbs.   No joint pain -       10/3/2024  She lost her job in July.   Her parkinson's is getting really bad and she has been working from home and she was under and accomadation from working from  home.   She is feeling her rheumatoid arthrititis is getting worse as well.   She is usign a full walker to get around   She can't write with her arthritis and parkinson's   She has more difficulty with workign with a computer - and holding a mouse.   Her pain is under control but while walking ehr knees are hurting.      1/20/2025  History of Present Illness  The patient, with a history of rheumatoid arthritis managed on Xeljanz and methotrexate, presents with foot pain of two to three weeks duration. The pain, which is not associated with any known injury, worsens as the day progresses and is most severe by bedtime. Rest does not completely alleviate the pain, but it does lessen its intensity. The patient has been wearing a boot for less than a week as recommended by her podiatrist, who also ordered an MRI.     The patient also reports taking Tramadol for the foot pain, but it does not provide significant relief. She only takes it when she needs to leave the house. The patient has been managing the pain by resting and using a wheeled chair at home.     The patient's other joints are reportedly doing well on the current regimen of Xeljanz and methotrexate. She is also on COBRA insurance but plans to transition to Medicare in the future.     The patient's kidney function is noted to have improved, and she is also taking folic acid. The patient has applied for disability due to her health conditions.        She has pain her top of her foot and between her 1st and 2nd toe. - it started 2-3 weeks ago - no recall of injry   Worse the longer the day goe s- not as bad a the morning.   It's not as painful when she rests her foot   She sees luis miguel jones podiatrist on 1/15/2025 -  Her GP thinks it's a stress fracture- and thinks she needs and MRI.   It  hurts when she puts pressure on her left foot      Her other joitn pain is controlled on xeljanz and methotrexate is working.     HISTORY:  Past Medical History[1]   Social Hx Reviewed   Family Hx Reviewed     Medications (Active prior to today's visit):  Current Medications[2]    Allergies:  Allergies[3]      ROS:   Review of Systems   Constitutional:  Negative for chills and fever.   HENT:  Negative for congestion, hearing loss, mouth sores, nosebleeds and trouble swallowing.    Eyes:  Negative for photophobia, pain, redness and visual disturbance.   Respiratory:  Negative for cough and shortness of breath.    Cardiovascular:  Negative for chest pain, palpitations and leg swelling.   Gastrointestinal:  Negative for abdominal pain, blood in stool, diarrhea and nausea.   Endocrine: Negative for cold intolerance and heat intolerance.   Genitourinary:  Negative for dysuria, frequency and hematuria.   Musculoskeletal:  Positive for arthralgias, back pain and gait problem. Negative for joint swelling, myalgias, neck pain and neck stiffness.   Skin:  Negative for color change and rash.   Neurological:  Positive for weakness. Negative for dizziness, numbness and headaches.   Psychiatric/Behavioral:  Negative for confusion and dysphoric mood.         PHYSICAL EXAM:     Constitutional:  Well developed, Well nourished, No acute distress  HENT:  Normocephalic, Atraumatic, Bilateral external ears normal, Oropharynx moist, No oral exudates.  Neck: Normal range of motion, No tenderness, Supple, No stridor.  Eyes:  PERRL, EOMI, Conjunctiva normal, No discharge.  Respiratory:  Normal breath sounds, No respiratory distress, No wheezing.  Cardiovascular:  Normal heart rate, Normal rhythm, No murmurs, No rubs, No gallops.  GI:  Bowel sounds normal, Soft, No tenderness, No masses, No pulsatile masses.  : No CVA tenderness.  Musculoskeletal:  A comprehensive 28 count joint exam was done and was negative for swelling or tenderness  except as noted. Inspections for misalignment, asymmetry, crepitation, defects, tenderness, masses, nodules, effusions, range of motion, and stability in the upper and lower extremities bilaterally are all normal unless noted.           Integument:  Warm, Dry, No erythema, No rash.  Lymphatic:  No lymphadenopathy noted.  Neurologic:  Alert & oriented x 3, Normal motor function, Normal sensory function, No focal deficits noted.  Psychiatric:  Affect normal, Judgment normal, Mood normal.    LABS:     Prior lab work reviewed and notable for:     25:  ESR 7 WNL, CRP less than 0.4 WNL  CBC with normal WBC, Hg, PLT  AST 21 WNL, ALT less than 7, CR 1.31 (high)    2023:  QuantiFERON TB testing negative    3/11/2015:  Hepatitis C antibody nonreactive, hepatitis B surface antigen nonreactive, hepatitis B core antibody nonreactive  SPEP without significant abnormalities     (high),   AMINAH 1: 80 homogenous  Uric acid 4.4  Ocampo negative, Ocampo/RNP negative, dsDNA less than 10 WNL, SSA/SSB negative    Imagin/18/25 L Foot MRI:     Impression   CONCLUSION:     Mild forefoot osteoarthritis without acute fracture or dislocation.     Mild dorsal foot soft tissue edema without a mass or loculated collection.       24 R shoulder XR:     BONES: No acute fracture or dislocation.  Left glenohumeral degeneration.  Hypertrophic degeneration left AC joint  SOFT TISSUES: Negative. No visible soft tissue swelling.  EFFUSION: None visible.  OTHER: Negative.               Impression   CONCLUSION:  1. No acute fracture or dislocation.  2. Left shoulder arthropathy.        12/15/23 L Knee XR:     CONCLUSION:  1. Primary osteoarthritic changes of the left knee, most pronounced with respect of the medial and patellofemoral compartments.     2. No radiographically visible acute osseous injury of the left knee.     23 Lumbar MRI:   FINDINGS:  Evaluation is slightly degraded by patient-related motion  artifact.    PARASPINAL AREA: Normal with no visible mass.  Atrophy of the posterior paraspinal musculature L4, L5 and parasacral.  BONES: No fracture, pars defect, or suspicious bone lesion.  L1 vertebral hemangioma..    CORD/CAUDA EQUINA: Normal caliber, contour, and signal intensity.    OTHER: Mild lower thoracic degenerative disc disease without stenosis.     LUMBAR DISC LEVELS:  L1-L2: .  Mild spondylotic disc bulge.  No significant stenosis.  L2-L3: Mild spondylotic disc bulge.  No significant stenosis  L3-L4: Minimal retrolisthesis.  Right-sided laminectomy.  Large subligamentous right paracentral disc extrusion extending above and below the disc is superimposed on a spondylotic disc bulge and results in severe right lateral narrowing and moderate to  severe asymmetric central narrowing.  Disc osteophyte complex extends into right foramen and contributes to mild to moderate right and mild left foraminal narrowing.  Reactive enhancing tissue around the large disc herniation extends into the right  foramen.  Mild left perineural enhancement.  L4-L5: Laminectomy.  Ferromagnetic artifact related to an right paramedian interbody cage and posterior instrumentation for fusion.  No significant residual central narrowing.  Mild bilateral left greater than foraminal narrowing.  L5-S1: Advanced left and mild-to-moderate right facet arthrosis.  Newly developed spondylotic disc bulge with disc osteophyte complex extending into the left foramen.  Moderate left foraminal narrowing.  No central narrowing or right foraminal narrowing.                        Impression   CONCLUSION:  1. L3-4:  Newly developed large right paracentral disc herniation resulting in moderate to severe asymmetric central narrowing accentuated towards the right, severe right and moderate left lateral narrowing.  A component extending into the right foramen  with smaller associated osteophyte results in moderate right foraminal narrowing.  Mild left  foraminal narrowing.  Right-sided laminectomy.  2. L4-5:  Laminectomy.  Posterior instrumentation and interbody cage for fusion.  Mild bilateral foraminal narrowing.  No significant central narrowing.  3. L5-S1:  Advanced left facet arthrosis.  Newly developed disc osteophyte complex extending into left foramen and resulting in moderate left foraminal narrowing.     10/26/17 Lumbar MRI/SI XR:   Impression   CONCLUSION:  1.  There is disc disease and osteoarthritis at L2-3 through L4-5 resulting in central canal and neuroforaminal narrowing.  These changes are most advanced at L3-4.  2.  Inflammatory changes within the interspinous foramen at L3-4 suggesting a postprocedural change or low-grade sprain.         Impression   CONCLUSION:  1. Normal SI joints.            [1]   Past Medical History:   Acute torn meniscus of knee    ARTHROSCOPY OF KNEE    Amenorrhea    Asthma (HCC)    Back problem    Chicken pox    Chronic cough    Disorder of thyroid    Extrapyramidal syndrome    MEDICATION MGMT. W/ PARKINSONS FEATURES    Greater trochanteric bursitis, left    High cholesterol    History of blood transfusion    CDH    Incontinence    Infertility, female    Parkinson disease (HCC)    Parkinson's disease (HCC)    Rheumatoid arthritis (HCC)    S/P laparoscopic sleeve gastrectomy    for weight loss    Thyroid disease    Tibia/fibula fracture    OPEN REDUCTION INTERNAL FIXATION    Urinary incontinence    after childbirth    Viral conjunctivitis    Visual impairment    glasses   [2]   Current Outpatient Medications   Medication Sig Dispense Refill    MONTELUKAST 10 MG Oral Tab TAKE 1 TABLET BY MOUTH EVERY DAY AT NIGHT 90 tablet 0    XELJANZ XR 11 MG Oral Tablet 24 Hr TAKE 1 TABLET BY MOUTH 1 TIME A DAY 90 tablet 1    METHOTREXATE 2.5 MG Oral Tab TAKE 8 TABS WEEKLY BY MOUTH 96 tablet 1    calcitriol 0.25 MCG Oral Cap Take 1 capsule (0.25 mcg total) by mouth daily. 90 capsule 3    PANTOPRAZOLE 40 MG Oral Tab EC TAKE 1 TABLET BY  MOUTH BEFORE BREAKFAST 90 tablet 2    Phentermine HCl 37.5 MG Oral Tab Take 1 tablet (37.5 mg total) by mouth before breakfast. 30 tablet 5    topiramate 100 MG Oral Tab Take 1 tablet (100 mg total) by mouth 2 (two) times daily. 180 tablet 1    pravastatin 20 MG Oral Tab Take 1 tablet (20 mg total) by mouth nightly. 90 tablet 3    folic acid 1 MG Oral Tab Take 1 tablet (1 mg total) by mouth daily. (Patient taking differently: Take 1 tablet (1 mg total) by mouth at bedtime.) 90 tablet 3    traMADol 50 MG Oral Tab Take 0.5 tablets (25 mg total) by mouth every 8 (eight) hours as needed for Pain. 30 tablet 0    levothyroxine 125 MCG Oral Tab Take 1 tablet (125 mcg total) by mouth before breakfast. Alternate every other day with levothyroxine 112 mgg. (Patient taking differently: Take 1 tablet (125 mcg total) by mouth every other day. Alternate every other day with levothyroxine 112 mgg.) 90 tablet 3    levothyroxine 112 MCG Oral Tab Take 1 tablet (112 mcg total) by mouth before breakfast. (Patient taking differently: Take 1 tablet (112 mcg total) by mouth every other day.) 90 tablet 3    DICLOFENAC 50 MG Oral Tab EC TAKE 1 TABLET BY MOUTH EVERY DAY (Patient taking differently: Take 1 tablet (50 mg total) by mouth every other day.) 90 tablet 1    carbidopa-levodopa  MG Oral Tab TAKE 1 TABLET BY MOUTH FOUR TIMES DAILY TAKE AT 630AM, 11AM, 2PM, AND 7PM      albuterol 108 (90 Base) MCG/ACT Inhalation Aero Soln INHALE 2 PUFFS BY INHALATION ROUTE EVERY 4 - 6 HOURS AS NEEDED 3 each 0    Hydrocod Navi-Chlorphe Navi ER 10-8 MG/5ML Oral Suspension Extended Release Take 5 mL by mouth 2 (two) times daily as needed. 115 mL 0    albuterol (2.5 MG/3ML) 0.083% Inhalation Nebu Soln Take 3 mL (2.5 mg total) by nebulization every 4 (four) hours as needed for Wheezing. 30 each 0    Boswellia-Glucosamine-Vit D (OSTEO BI-FLEX ONE PER DAY OR) Take by mouth As Directed.      Amantadine HCl 100 MG Oral Cap Take 1 capsule (100 mg total) by  mouth daily.      omega-3 fatty acids 1000 MG Oral Cap Take 1,000 mg by mouth daily.      TURMERIC OR Take by mouth 2 (two) times a day.      Multiple Vitamins-Minerals (BARIATRIC FUSION) Oral Chew Tab Chew 1 tablet by mouth in the morning, at noon, in the evening, and at bedtime.     [3]   Allergies  Allergen Reactions    Other HIVES     Reports side effect from DPT vaccine.  Other reaction(s): Rash/Hives/Urticaria    Seasonal Runny nose

## 2025-06-18 ENCOUNTER — OFFICE VISIT (OUTPATIENT)
Dept: PHYSICAL THERAPY | Facility: HOSPITAL | Age: 65
End: 2025-06-18
Attending: Other
Payer: COMMERCIAL

## 2025-06-18 PROCEDURE — 97530 THERAPEUTIC ACTIVITIES: CPT

## 2025-06-18 PROCEDURE — 97110 THERAPEUTIC EXERCISES: CPT

## 2025-06-18 PROCEDURE — 97112 NEUROMUSCULAR REEDUCATION: CPT

## 2025-06-18 NOTE — PROGRESS NOTES
Patient: Lindsay Trotter (64 year old, female) Referring Provider:  Insurance:   Diagnosis: Parkinson's Disease, abnormality of gait, malaise, difficulty walking Dago Orozco  [facility] [place]   Date of Surgery: No data recorded Next MD visit:  N/A   Precautions:  Fall Risk No data recorded Referral Information:    Date of Evaluation: Req: 0, Auth: 0, Exp:     05/01/25 POC Auth Visits:  10 (no auth req)       Today's Date   6/18/2025    Subjective   Reports increased difficulty walking for past several weeks with increased frequency of falls. States she is currently falling daily. She feels as if her legs are weak and \"give out\". She continues to work with her  which does not seem to exacerbate the issue. She denies numbness/tingling or back pain. Denies changes to vision or weakness in UE's. She denies changes to bowel/bladder with exception that she is soiling her pad more frequently than usual. Denies pain with urination or blood. No recent changes to meds.        Pain: 0/10     Objective       See flowsheet      Assessment   Pt verbalized understanding of material taught today. No red flags based on pt report though a rather sharp decline in function has been noted over past couple of weeks by this author. Not safe to attempt standing exercises today. May benefit from power w/c to maximize safety and independence- pt appears interested in and open to this option    Goals (to be met in 10 visits)   Pt to be independent in HEP   Pt will improve quality of gait in order to improve SSWS by at least 0.2 m/s thus reflecting more consistent and efficient gait pattern  Pt will improve BBS by at least 5 points to reflect reduced risk of falls.   Pt will perform TUG in 12 seconds or less in order to reflect improved level of safety with household ambulation.                    Plan  in future sessions: TDM???, continue FoG exercise program, shuttle    Treatment Last 4 Visits  Treatment Day: 8        5/20/2025 5/22/2025 6/10/2025 6/18/2025   Neuro Treatment   Therapeutic Exercise Nu- step level 4 x10 mins   Shuttle   -B press (2 blue, silver) 2x10  -B press feet on dynadisc 2 x10  -March 3x10 B   Standing fitness slider   -hip EXT 2x10 B - demo with return demo - to target to improve quality of movement   Standing TKE red TB 3 sec hold x15 B    Nu- step level 4 x12 mins   Seated EOB fwd hinge hold<>lean back hold CGA-MOD A for balance multiple reps   Nu-step level 4 x10 mins      Neuro Re-Education  Boxing seated EOB  -->on foam  -45 sec x2 bouts alt cross (trunk rotation)   -jabs 30 sec ea UE  -upper cuts 45 sec   -reaching for lighted targets 1 min bout  -->on foam beam, SBA-MIN A for balance   - cross 30 sec ea  (trunk rotation)  -30 sec upper cuts    Seated:   -lg amp fwd reach (trunk flexion) <>arms wide sitting upright - demo with return demo, mirroring provided x10 reps   -lg amp diagonal reach x10 B - demo with return demo, mirroring   -arms wide, wide RODY reach across midline (hand to hand) with trunk rotation- demo with return demo x10 B, mirroring   -lg amp stomp step out<>in x10 B - demo with return demo, mirroring    Therapeutic Activity    Discussed recommendation to contact PCP. PCP was also informed of her current issues and also encouraged an appointment for evaluation.   Discussed w/c and openness to pwr mobility options. Discussed general process for obtaining pwr w/c. Encouraged to discuss with PCP as well.   Pt taken dependently to bathroom by w/c- SBA provided for short walk into and out of stall. Taken to gym dependently with w/c.   Discussed home seated exercise videos through [employer]  SBA for transfer on/off nu-step     Gait Training   TDM with harness no BWS- laser point for visual target   - 0.6 mph 2 bouts 5.5 mins, 6 mins- seated rest after ea bout- dual task (engaged in conversation)  -0.4-0.5 mph backwards walking 4 mins - seated rest after bout   Ambulation OG with rollator  1 lg lap around clinic- PT in front to help control rollator, VC's for increased B step length as well as for body position    Therapeutic Exercise Minutes 39 18  12   Neuro Re-Educ Minutes  20  15   Therapeutic Activity Minutes    15   Gait Training Minutes   39    Total Time Of Timed Procedures 39 38 39 42   Total Time Of Service-Based Procedures 0 0 0 0   Total Treatment Time 39 38 39 42        Patient education   See flowsheet     Charges  1 NMR, 1 TE, 1 TA

## 2025-06-19 ENCOUNTER — OFFICE VISIT (OUTPATIENT)
Dept: FAMILY MEDICINE CLINIC | Facility: CLINIC | Age: 65
End: 2025-06-19
Payer: COMMERCIAL

## 2025-06-19 ENCOUNTER — NURSE TRIAGE (OUTPATIENT)
Dept: INTERNAL MEDICINE CLINIC | Facility: CLINIC | Age: 65
End: 2025-06-19

## 2025-06-19 ENCOUNTER — TELEPHONE (OUTPATIENT)
Dept: FAMILY MEDICINE CLINIC | Facility: CLINIC | Age: 65
End: 2025-06-19

## 2025-06-19 VITALS
SYSTOLIC BLOOD PRESSURE: 104 MMHG | HEART RATE: 103 BPM | WEIGHT: 197 LBS | OXYGEN SATURATION: 97 % | BODY MASS INDEX: 29.18 KG/M2 | DIASTOLIC BLOOD PRESSURE: 70 MMHG | HEIGHT: 69 IN

## 2025-06-19 DIAGNOSIS — N30.00 ACUTE CYSTITIS WITHOUT HEMATURIA: Primary | ICD-10-CM

## 2025-06-19 DIAGNOSIS — R53.1 WEAKNESS: ICD-10-CM

## 2025-06-19 DIAGNOSIS — G20.A1 PARKINSON'S DISEASE, UNSPECIFIED WHETHER DYSKINESIA PRESENT, UNSPECIFIED WHETHER MANIFESTATIONS FLUCTUATE (HCC): ICD-10-CM

## 2025-06-19 LAB
BILIRUBIN: NEGATIVE
GLUCOSE (URINE DIPSTICK): NEGATIVE MG/DL
KETONES (URINE DIPSTICK): NEGATIVE MG/DL
MULTISTIX LOT#: ABNORMAL NUMERIC
NITRITE, URINE: POSITIVE
OCCULT BLOOD: NEGATIVE
PH, URINE: 7 (ref 4.5–8)
PROTEIN (URINE DIPSTICK): NEGATIVE MG/DL
SPECIFIC GRAVITY: 1.02 (ref 1–1.03)
URINE-COLOR: YELLOW
UROBILINOGEN,SEMI-QN: 0.2 MG/DL (ref 0–1.9)

## 2025-06-19 PROCEDURE — 99213 OFFICE O/P EST LOW 20 MIN: CPT

## 2025-06-19 PROCEDURE — 3078F DIAST BP <80 MM HG: CPT

## 2025-06-19 PROCEDURE — 3074F SYST BP LT 130 MM HG: CPT

## 2025-06-19 PROCEDURE — 81002 URINALYSIS NONAUTO W/O SCOPE: CPT

## 2025-06-19 PROCEDURE — 3008F BODY MASS INDEX DOCD: CPT

## 2025-06-19 RX ORDER — NITROFURANTOIN 25; 75 MG/1; MG/1
100 CAPSULE ORAL 2 TIMES DAILY
Qty: 14 CAPSULE | Refills: 0 | Status: SHIPPED | OUTPATIENT
Start: 2025-06-19

## 2025-06-19 NOTE — TELEPHONE ENCOUNTER
Patient called to speak to a nurse to clarify if the patient was only supposed to receive one prescription from the Pharmacy, as the patient thought they were supposed to get two.     Please call.    68

## 2025-06-19 NOTE — TELEPHONE ENCOUNTER
Patient stated that she saw her physical therapy yesterday and her physical therapist was going to sent the consultation note to .  Per patient the physical therapist recommend she see her PCP as patient has been having increase difficulty walking for the past several weeks and increase frequent falls. Patient feels her legs are weak and they give out. Patient denied any chest pain or shortness of breath. Patient was given a appointment for today. Inform patient to have someone bring her to the appointment. Patient stated that she does not have some one to bring her. She will bring herself. Patient inform to be careful as we do not want her to fall and hit her head. Patient verbalized  understanding.       Future Appointments   Date Time Provider Department Center   6/19/2025  1:10 PM Mely Hayes APRN [facility] [place]

## 2025-06-19 NOTE — PROGRESS NOTES
Subjective:   Lindsay Trotter is a 64 year old female who presents for Leg Pain (Pt states she has been having pain and weakness in legs and states she was advised to get tested for UTI. ).     HPI   Patient presents for leg pain and weakness. Patient has a history of rheumatoid arthritis, Parkinson's disease, HLD, asthma, postsurgical hypothyroidism, history of lap sleeve gastrectomy, lumbar spinal fusion, GERD, allergic rhinitis. Patient reports difficulty walking for the past 1 month with increased falls due to her leg weakness. She states that she has been falling daily. Her legs feels like it is about to give out and are weak. She works with physical therapist and a .  Patient went to her physical therapist who advised that she get her urine tested for UTI. Patient denies numbness or tingling, back pain, or dizziness. Patient having UTI complains of  frequency, urgency, and weakness . She also complains of falls. Symptoms started around 1 month ago.  She does not have a feverhas not witnessed some blood in the urine. She denies back pain, congestion, cough, headache, rhinitis, sorethroat, stomach ache, and vaginal discharge.   Patient has not had intercourse in the last week.  She may have a past history of urinary tract or kidney infections.        History/Other:      Chief Complaint Reviewed and Verified  Nursing Notes Reviewed and   Verified  Tobacco Reviewed  Allergies Reviewed  Medications Reviewed    Problem List Reviewed  Medical History Reviewed  Surgical History   Reviewed  OB Status Reviewed  Family History Reviewed  Social History   Reviewed           Tobacco:  She has never smoked tobacco.      Current Medications[1]    Allergies[2]      Review of Systems   Constitutional: Negative.  Negative for activity change and fatigue.   HENT: Negative.  Negative for congestion, ear pain, rhinorrhea and sneezing.    Eyes: Negative.  Negative for redness.   Respiratory: Negative.  Negative  for cough, shortness of breath and wheezing.    Cardiovascular:  Positive for leg swelling. Negative for chest pain.   Gastrointestinal: Negative.  Negative for abdominal pain, constipation, diarrhea, nausea and vomiting.   Endocrine: Negative.    Genitourinary:  Positive for frequency and urgency. Negative for difficulty urinating.   Musculoskeletal:  Positive for gait problem and joint swelling. Negative for back pain and myalgias.   Skin: Negative.  Negative for rash.   Allergic/Immunologic: Negative.    Neurological:  Positive for weakness. Negative for dizziness, syncope, light-headedness and headaches.   Hematological: Negative.    Psychiatric/Behavioral: Negative.           Objective:   /70 (BP Location: Right arm, Patient Position: Sitting, Cuff Size: large)   Pulse 103   Ht 5' 9\" (1.753 m)   Wt 197 lb (89.4 kg)   LMP 04/18/2015 (Exact Date)   SpO2 97%   BMI 29.09 kg/m²  Estimated body mass index is 29.09 kg/m² as calculated from the following:    Height as of this encounter: 5' 9\" (1.753 m).    Weight as of this encounter: 197 lb (89.4 kg).      Physical Exam  Vitals and nursing note reviewed.   Constitutional:       Appearance: Normal appearance. She is normal weight.   HENT:      Head: Normocephalic and atraumatic.      Right Ear: Tympanic membrane normal.      Left Ear: Tympanic membrane normal.      Nose: Nose normal.      Mouth/Throat:      Mouth: Mucous membranes are moist.      Pharynx: Oropharynx is clear.   Eyes:      Extraocular Movements: Extraocular movements intact.      Conjunctiva/sclera: Conjunctivae normal.      Pupils: Pupils are equal, round, and reactive to light.   Cardiovascular:      Rate and Rhythm: Normal rate and regular rhythm.      Pulses: Normal pulses.      Heart sounds: Normal heart sounds.   Pulmonary:      Effort: Pulmonary effort is normal.      Breath sounds: Normal breath sounds.   Abdominal:      General: Abdomen is flat. Bowel sounds are normal.       Palpations: Abdomen is soft.   Musculoskeletal:      Cervical back: Normal range of motion and neck supple.      Right hip: Decreased range of motion.      Left hip: Decreased range of motion.      Right knee: Decreased range of motion.      Left knee: Decreased range of motion.      Right lower leg: Tenderness present.      Left lower leg: Tenderness present.   Skin:     General: Skin is warm and dry.   Neurological:      General: No focal deficit present.      Mental Status: She is alert and oriented to person, place, and time. Mental status is at baseline.      Motor: Weakness present.      Gait: Gait abnormal.   Psychiatric:         Mood and Affect: Mood normal.         Behavior: Behavior normal.         Thought Content: Thought content normal.         Judgment: Judgment normal.           Assessment & Plan:     Assessment & Plan  Acute cystitis without hematuria  -Office urinalysis is suggestive of UTI  -Will start on antibiotics as directed.  -Encouraged patient on increased fluids and discussed about incorporating cranberry juice.  -Patient educated about women's probiotics.  -Educated about voiding before and after sexual activity and using pH balanced soap.  -Urine culture was sent and will follow up after results received.   Orders:    Urine Culture, Routine; Future    nitrofurantoin monohydrate macro (MACROBID) 100 MG Oral Cap; Take 1 capsule (100 mg total) by mouth 2 (two) times daily.    Weakness    Orders:    URINALYSIS NONAUTO W/O SCOPE    Parkinson's disease, unspecified whether dyskinesia present, unspecified whether manifestations fluctuate (HCC)  -Advised patient to continue current medication and follow up with neurologist            Medication use, effects and side effects discussed in detail with patient. The patient indicated understanding of the diagnosis and agreed with the plan of care.    Return if symptoms worsen or fail to improve.    SILAS Villar         [1]   Current  Outpatient Medications   Medication Sig Dispense Refill    nitrofurantoin monohydrate macro (MACROBID) 100 MG Oral Cap Take 1 capsule (100 mg total) by mouth 2 (two) times daily. 14 capsule 0    predniSONE 5 MG Oral Tab Take 1 tablet (5 mg total) by mouth daily as needed. Take Prednisone 5 mg daily as needed for pain in the morning with food. 90 tablet 2    MONTELUKAST 10 MG Oral Tab TAKE 1 TABLET BY MOUTH EVERY DAY AT NIGHT 90 tablet 0    XELJANZ XR 11 MG Oral Tablet 24 Hr TAKE 1 TABLET BY MOUTH 1 TIME A DAY 90 tablet 1    METHOTREXATE 2.5 MG Oral Tab TAKE 8 TABS WEEKLY BY MOUTH 96 tablet 1    calcitriol 0.25 MCG Oral Cap Take 1 capsule (0.25 mcg total) by mouth daily. 90 capsule 3    PANTOPRAZOLE 40 MG Oral Tab EC TAKE 1 TABLET BY MOUTH BEFORE BREAKFAST 90 tablet 2    Phentermine HCl 37.5 MG Oral Tab Take 1 tablet (37.5 mg total) by mouth before breakfast. 30 tablet 5    topiramate 100 MG Oral Tab Take 1 tablet (100 mg total) by mouth 2 (two) times daily. 180 tablet 1    pravastatin 20 MG Oral Tab Take 1 tablet (20 mg total) by mouth nightly. 90 tablet 3    folic acid 1 MG Oral Tab Take 1 tablet (1 mg total) by mouth daily. 90 tablet 3    traMADol 50 MG Oral Tab Take 0.5 tablets (25 mg total) by mouth every 8 (eight) hours as needed for Pain. 30 tablet 0    levothyroxine 125 MCG Oral Tab Take 1 tablet (125 mcg total) by mouth before breakfast. Alternate every other day with levothyroxine 112 mgg. (Patient taking differently: Take 1 tablet (125 mcg total) by mouth every other day. Alternate every other day with levothyroxine 112 mgg.) 90 tablet 3    levothyroxine 112 MCG Oral Tab Take 1 tablet (112 mcg total) by mouth before breakfast. (Patient taking differently: Take 1 tablet (112 mcg total) by mouth every other day.) 90 tablet 3    carbidopa-levodopa  MG Oral Tab TAKE 1 TABLET BY MOUTH FOUR TIMES DAILY TAKE AT 630AM, 11AM, 2PM, AND 7PM      albuterol 108 (90 Base) MCG/ACT Inhalation Aero Soln INHALE 2  PUFFS BY INHALATION ROUTE EVERY 4 - 6 HOURS AS NEEDED 3 each 0    Hydrocod Navi-Chlorphe Navi ER 10-8 MG/5ML Oral Suspension Extended Release Take 5 mL by mouth 2 (two) times daily as needed. 115 mL 0    albuterol (2.5 MG/3ML) 0.083% Inhalation Nebu Soln Take 3 mL (2.5 mg total) by nebulization every 4 (four) hours as needed for Wheezing. 30 each 0    Boswellia-Glucosamine-Vit D (OSTEO BI-FLEX ONE PER DAY OR) Take by mouth As Directed.      Amantadine HCl 100 MG Oral Cap Take 1 capsule (100 mg total) by mouth daily.      omega-3 fatty acids 1000 MG Oral Cap Take 1,000 mg by mouth daily.      TURMERIC OR Take by mouth 2 (two) times a day.      Multiple Vitamins-Minerals (BARIATRIC FUSION) Oral Chew Tab Chew 1 tablet by mouth in the morning, at noon, in the evening, and at bedtime.     [2]   Allergies  Allergen Reactions    Other HIVES     Reports side effect from DPT vaccine.  Other reaction(s): Rash/Hives/Urticaria    Seasonal Runny nose

## 2025-06-20 ENCOUNTER — APPOINTMENT (OUTPATIENT)
Dept: GENERAL RADIOLOGY | Facility: HOSPITAL | Age: 65
End: 2025-06-20
Attending: NURSE PRACTITIONER
Payer: COMMERCIAL

## 2025-06-20 ENCOUNTER — HOSPITAL ENCOUNTER (OUTPATIENT)
Age: 65
Discharge: ANOTHER HOSPITAL | End: 2025-06-20
Attending: STUDENT IN AN ORGANIZED HEALTH CARE EDUCATION/TRAINING PROGRAM
Payer: COMMERCIAL

## 2025-06-20 ENCOUNTER — HOSPITAL ENCOUNTER (EMERGENCY)
Facility: HOSPITAL | Age: 65
Discharge: HOME | End: 2025-06-20
Payer: COMMERCIAL

## 2025-06-20 VITALS
TEMPERATURE: 98 F | WEIGHT: 195 LBS | OXYGEN SATURATION: 100 % | DIASTOLIC BLOOD PRESSURE: 82 MMHG | HEIGHT: 69 IN | SYSTOLIC BLOOD PRESSURE: 136 MMHG | HEART RATE: 70 BPM | RESPIRATION RATE: 18 BRPM | BODY MASS INDEX: 28.88 KG/M2

## 2025-06-20 VITALS
OXYGEN SATURATION: 100 % | HEART RATE: 110 BPM | TEMPERATURE: 98 F | DIASTOLIC BLOOD PRESSURE: 56 MMHG | RESPIRATION RATE: 18 BRPM | SYSTOLIC BLOOD PRESSURE: 126 MMHG

## 2025-06-20 DIAGNOSIS — S60.021A CONTUSION OF RIGHT INDEX FINGER WITHOUT DAMAGE TO NAIL, INITIAL ENCOUNTER: Primary | ICD-10-CM

## 2025-06-20 DIAGNOSIS — R20.0 NUMBNESS OF FINGER: Primary | ICD-10-CM

## 2025-06-20 DIAGNOSIS — R23.0 BLUISH SKIN DISCOLORATION: ICD-10-CM

## 2025-06-20 PROCEDURE — 99283 EMERGENCY DEPT VISIT LOW MDM: CPT

## 2025-06-20 PROCEDURE — 73130 X-RAY EXAM OF HAND: CPT | Performed by: NURSE PRACTITIONER

## 2025-06-20 PROCEDURE — 99213 OFFICE O/P EST LOW 20 MIN: CPT

## 2025-06-20 NOTE — TELEPHONE ENCOUNTER
Verified name and .    Patient was advised of SILAS Boone's message. Patient verbalizes understanding and agrees with plan.

## 2025-06-21 NOTE — ED INITIAL ASSESSMENT (HPI)
Patient with left second finger discoloration, coolness and tingling starting today around 1600.  Denies injury/trauma.

## 2025-06-21 NOTE — ED INITIAL ASSESSMENT (HPI)
PT to ED via wc with spouse.   PT reporting bruising on R second digit, noticed at 1600 today, denies trauma, denies blood thinners.

## 2025-06-21 NOTE — ED PROVIDER NOTES
Patient Seen in: [facility]        History  Chief Complaint   Patient presents with    Tingling     Stated Complaint: Finger problem    Subjective:   HPI      64-year-old female with past medical history of Parkinson's disease, who presents with bluish discoloration of the volar aspect of the left second finger since 1600 p.m., approximately 3.5 hours prior to arrival, no pain and intact range of motion and no reported trauma, but feels numb and is tingling, was concerned for potential vascular injury, she is right-hand dominant.            Objective:     Past Medical History:    Acute torn meniscus of knee    ARTHROSCOPY OF KNEE    Amenorrhea    Asthma (HCC)    Back problem    Chicken pox    Chronic cough    Disorder of thyroid    Extrapyramidal syndrome    MEDICATION MGMT. W/ PARKINSONS FEATURES    Greater trochanteric bursitis, left    High cholesterol    History of blood transfusion    CDH    Incontinence    Infertility, female    Parkinson disease (HCC)    Parkinson's disease (HCC)    Rheumatoid arthritis (HCC)    S/P laparoscopic sleeve gastrectomy    for weight loss    Thyroid disease    Tibia/fibula fracture    OPEN REDUCTION INTERNAL FIXATION    Urinary incontinence    after childbirth    Viral conjunctivitis    Visual impairment    glasses              Past Surgical History:   Procedure Laterality Date    Back surgery      Back surgery  04/21/2021    Back surgery  04/25/2023    compressed disc, removal of bone    Colonoscopy  06/11/2019    return in 5 years    Colonoscopy N/A 06/11/2019    Procedure: COLONOSCOPY;  Surgeon: Dago Heart MD;  [facility] ENDOSCOPY    Colposcopy, cervix w/upper adjacent vagina; w/biopsy(s), cervix      D & c      for extended period    D & c      for extended period    Foot surgery Left     Had right foot done recently.    Knee arthroscopy Right     Lap sleeve gastrectomy  12/02/2019    Dr Rios, [facility]    Laparoscopy,pelvic,biopsy  1990     for infertility    Laparoscopy,pelvic,biopsy  1990    for infertility    Other surgical history Right 12/03/2015    Other surgical history  12/03/2015    Thyroidectomy      Total                 Social History     Socioeconomic History    Marital status:    Tobacco Use    Smoking status: Never     Passive exposure: Never    Smokeless tobacco: Never   Vaping Use    Vaping status: Never Used   Substance and Sexual Activity    Alcohol use: Yes     Alcohol/week: 2.0 - 3.0 standard drinks of alcohol     Types: 2 - 3 Glasses of wine per week     Comment: social    Drug use: No    Sexual activity: Yes     Partners: Male   Other Topics Concern    Caffeine Concern Yes     Comment: S2 glasses tea daily    Exercise No    Reaction to local anesthetic No    Pt has a pacemaker No    Pt has a defibrillator No   Social History Narrative    The patient uses the following assistive device(s):  single-point cane,uses 2 canes. Walker at night           The patient does live in a home with stairs.     Social Drivers of Health     Food Insecurity: No Food Insecurity (2/17/2025)    NCSS - Food Insecurity     Worried About Running Out of Food in the Last Year: No     Ran Out of Food in the Last Year: No   Transportation Needs: No Transportation Needs (2/17/2025)    NCSS - Transportation     Lack of Transportation: No   Housing Stability: Not At Risk (2/17/2025)    NCSS - Housing/Utilities     Has Housing: Yes     Worried About Losing Housing: No     Unable to Get Utilities: No              Review of Systems    Positive for stated complaint: Finger problem  Other systems are as noted in HPI.  Constitutional and vital signs reviewed.      All other systems reviewed and negative except as noted above.                  Physical Exam    ED Triage Vitals [06/20/25 1904]   /56   Pulse 110   Resp 18   Temp 97.6 °F (36.4 °C)   Temp src Oral   SpO2 100 %   O2 Device None (Room air)       Current Vitals:   Vital Signs  BP:  126/56  Pulse: 110  Resp: 18  Temp: 97.6 °F (36.4 °C)  Temp src: Oral    Oxygen Therapy  SpO2: 100 %  O2 Device: None (Room air)            Physical Exam    General: Awake, alert, comfortable on room air, in no distress, tolerating oral secretions, interactive, involuntary writhing consistent with h/o parkinson's  Pulmonary: No conversational dyspnea  Cardiac: +1 B/L regular radial pulses  Neuro: Symmetrical facial expressions on gross observation, reports numb sensation to light touch throughout LT second finger  Musculoskeletal: Patient has intact active flexion and extension of the left second MCP/PIP/DIP joints, no TTP throughout the left second finger  HEENT: No periorbital edema or erythema  Skin: There is bluish discoloration throughout the left second volar finger with mild associated soft and nonindurated edema with no crepitus  Psych: Normal mood, normal affect        ED Course  No labs or imaging performed    MDM  Patient has bluish discoloration of the left second volar finger with mild associated nonindurated edema, intact active range of motion of the the left second finger, no flexor tenosynovitis, she has intact B/L radial pulses, no reported trauma to explain the findings today nor her symptoms of numbness throughout the left second finger, patient is concerned for potential vascular injury versus also consider thromboembolism, there is no tenderness to suggest a ruptured blood vessel, but discussed that given the limitations of the immediate care as we do not have advanced imaging available at this time, I cannot rule out vascular injury, she would need to go to the emergency department for further assessment, but treatment plan and assessment plan would be determined by the emergency department team, patient prefers the [place] emergency department, she is going to talk to her , unclear if patient will go to the emergency department, she had appropriate insight and  understanding        Disposition and Plan     Clinical Impression:  1. Numbness of finger    2. Bluish skin discoloration         Disposition:  Ic to ed  6/20/2025  7:36 pm    Follow-up:  No follow-up provider specified.        Medications Prescribed:  Discharge Medication List as of 6/20/2025  7:37 PM            None

## 2025-06-21 NOTE — ED PROVIDER NOTES
Patient Seen in: [facility] Emergency Department        History  Chief Complaint   Patient presents with    Contusion     Stated Complaint: bruising on finger    Subjective:   63yo/f w hx  PMH RA, Parkinson's disease, HLD, asthma, postsurgical hypothyroidism, history of lap sleeve gastrectomy, lumbar spinal fusion, GERD reports w right index finger bruising. Acute onset 1600 today. Denies injuries. Reports it feels cooler/tingling.                       Objective:     Past Medical History:    Acute torn meniscus of knee    ARTHROSCOPY OF KNEE    Amenorrhea    Asthma (HCC)    Back problem    Chicken pox    Chronic cough    Disorder of thyroid    Extrapyramidal syndrome    MEDICATION MGMT. W/ PARKINSONS FEATURES    Greater trochanteric bursitis, left    High cholesterol    History of blood transfusion    CDH    Incontinence    Infertility, female    Parkinson disease (HCC)    Parkinson's disease (HCC)    Rheumatoid arthritis (HCC)    S/P laparoscopic sleeve gastrectomy    for weight loss    Thyroid disease    Tibia/fibula fracture    OPEN REDUCTION INTERNAL FIXATION    Urinary incontinence    after childbirth    Viral conjunctivitis    Visual impairment    glasses              Past Surgical History:   Procedure Laterality Date    Back surgery      Back surgery  04/21/2021    Back surgery  04/25/2023    compressed disc, removal of bone    Colonoscopy  06/11/2019    return in 5 years    Colonoscopy N/A 06/11/2019    Procedure: COLONOSCOPY;  Surgeon: Dago Heart MD;  Location: [facility] ENDOSCOPY    Colposcopy, cervix w/upper adjacent vagina; w/biopsy(s), cervix      D & c      for extended period    D & c      for extended period    Foot surgery Left     Had right foot done recently.    Knee arthroscopy Right     Lap sleeve gastrectomy  12/02/2019    Dr Rios, [facility]    Laparoscopy,pelvic,biopsy  1990    for infertility    Laparoscopy,pelvic,biopsy  1990    for infertility    Other surgical  history Right 12/03/2015    Other surgical history  12/03/2015    Thyroidectomy      Total                 Social History     Socioeconomic History    Marital status:    Tobacco Use    Smoking status: Never     Passive exposure: Never    Smokeless tobacco: Never   Vaping Use    Vaping status: Never Used   Substance and Sexual Activity    Alcohol use: Yes     Alcohol/week: 2.0 - 3.0 standard drinks of alcohol     Types: 2 - 3 Glasses of wine per week     Comment: social    Drug use: No    Sexual activity: Yes     Partners: Male   Other Topics Concern    Caffeine Concern Yes     Comment: S2 glasses tea daily    Exercise No    Reaction to local anesthetic No    Pt has a pacemaker No    Pt has a defibrillator No   Social History Narrative    The patient uses the following assistive device(s):  single-point cane,uses 2 canes. Walker at night           The patient does live in a home with stairs.     Social Drivers of Health     Food Insecurity: No Food Insecurity (2/17/2025)    NCSS - Food Insecurity     Worried About Running Out of Food in the Last Year: No     Ran Out of Food in the Last Year: No   Transportation Needs: No Transportation Needs (2/17/2025)    NCSS - Transportation     Lack of Transportation: No   Housing Stability: Not At Risk (2/17/2025)    NCSS - Housing/Utilities     Has Housing: Yes     Worried About Losing Housing: No     Unable to Get Utilities: No                                Physical Exam    ED Triage Vitals   BP 06/20/25 2039 106/61   Pulse 06/20/25 2039 84   Resp 06/20/25 2039 18   Temp 06/20/25 2034 97.8 °F (36.6 °C)   Temp src 06/20/25 2034 Temporal   SpO2 06/20/25 2039 99 %   O2 Device 06/20/25 2039 None (Room air)       Current Vitals:   Vital Signs  BP: 108/80  Pulse: 75  Resp: 18  Temp: 97.8 °F (36.6 °C)  Temp src: Temporal  MAP (mmHg): 88    Oxygen Therapy  SpO2: 100 %  O2 Device: None (Room air)            Physical Exam  Vitals and nursing note reviewed.   Constitutional:        General: She is not in acute distress.     Appearance: She is well-developed.   HENT:      Head: Normocephalic and atraumatic.      Nose: Nose normal.      Mouth/Throat:      Mouth: Mucous membranes are moist.   Eyes:      Conjunctiva/sclera: Conjunctivae normal.      Pupils: Pupils are equal, round, and reactive to light.   Cardiovascular:      Rate and Rhythm: Normal rate and regular rhythm.      Heart sounds: Normal heart sounds.   Pulmonary:      Effort: Pulmonary effort is normal.      Breath sounds: Normal breath sounds.   Abdominal:      General: Bowel sounds are normal.      Palpations: Abdomen is soft.   Musculoskeletal:         General: Tenderness present. No deformity. Normal range of motion.      Cervical back: Normal range of motion and neck supple.      Comments: Proximal palmar phalanx ecchymosis, non circumferential; brisk cap refill distal to bruising, soft compartments, passive rom intact, distal extremity warm, no edema; nail bed clear; strong pulses to ulna/radius   Skin:     General: Skin is warm and dry.      Capillary Refill: Capillary refill takes less than 2 seconds.      Findings: No rash.      Comments: Normal color   Neurological:      General: No focal deficit present.      Mental Status: She is alert and oriented to person, place, and time.      GCS: GCS eye subscore is 4. GCS verbal subscore is 5. GCS motor subscore is 6.      Cranial Nerves: No cranial nerve deficit.      Gait: Gait normal.                 ED Course  Labs Reviewed - No data to display       Left hand radiographs  Comparison: None    IMPRESSION:  No fracture or dislocation.  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  Soft tissues are unremarkable.  No acute findings at the index finger.                  MDM             Medical Decision Making  65yo/f w hx and exam as stated; c/o right index finger    Xray non acute  Distal cms intact  Strong pulses  Warm distal extremity  Soft compartments proximally and  distally  Nailbed unaffected  Remaining extremity unaffected    Plan  Dc to home  Close fu with Dr Palafox      Amount and/or Complexity of Data Reviewed  Radiology:  Decision-making details documented in ED Course.    Risk  OTC drugs.  Prescription drug management.        Disposition and Plan     Clinical Impression:  1. Contusion of right index finger without damage to nail, initial encounter         Disposition:  Discharge  6/20/2025 11:07 pm    Follow-up:  Dante Palafox MD  [address] [place] [id number]  [phone]    Follow up in 2 day(s)            Medications Prescribed:  Current Discharge Medication List                Supplementary Documentation:

## 2025-06-21 NOTE — Clinical Note
Patient safe to discharge home per ED Provider. Discharge education provided, including follow up instructions.Patient verbalizes understanding. Pt agrees to return for new/worse sx. No further questions, pt ambulates w/ steady gait, denies pain at t his time.

## 2025-06-30 ENCOUNTER — NURSE ONLY (OUTPATIENT)
Dept: ALLERGY | Facility: CLINIC | Age: 65
End: 2025-06-30

## 2025-06-30 ENCOUNTER — OFFICE VISIT (OUTPATIENT)
Dept: PHYSICAL THERAPY | Facility: HOSPITAL | Age: 65
End: 2025-06-30
Attending: Other
Payer: COMMERCIAL

## 2025-06-30 ENCOUNTER — OFFICE VISIT (OUTPATIENT)
Dept: ALLERGY | Facility: CLINIC | Age: 65
End: 2025-06-30
Payer: COMMERCIAL

## 2025-06-30 DIAGNOSIS — J45.40: Primary | ICD-10-CM

## 2025-06-30 DIAGNOSIS — J30.89 ENVIRONMENTAL AND SEASONAL ALLERGIES: Primary | ICD-10-CM

## 2025-06-30 DIAGNOSIS — J30.89 SEASONAL AND PERENNIAL ALLERGIC RHINOCONJUNCTIVITIS: ICD-10-CM

## 2025-06-30 DIAGNOSIS — Z23 NEED FOR COVID-19 VACCINE: ICD-10-CM

## 2025-06-30 DIAGNOSIS — Z23 NEED FOR PROPHYLACTIC VACCINATION WITH STREPTOCOCCUS PNEUMONIAE (PNEUMOCOCCUS) AND INFLUENZA VACCINES: ICD-10-CM

## 2025-06-30 DIAGNOSIS — J30.2 SEASONAL AND PERENNIAL ALLERGIC RHINOCONJUNCTIVITIS: ICD-10-CM

## 2025-06-30 DIAGNOSIS — H10.10 SEASONAL AND PERENNIAL ALLERGIC RHINOCONJUNCTIVITIS: ICD-10-CM

## 2025-06-30 DIAGNOSIS — Z23 FLU VACCINE NEED: ICD-10-CM

## 2025-06-30 PROCEDURE — 97112 NEUROMUSCULAR REEDUCATION: CPT

## 2025-06-30 PROCEDURE — 97530 THERAPEUTIC ACTIVITIES: CPT

## 2025-06-30 RX ORDER — AZELASTINE 1 MG/ML
2 SPRAY, METERED NASAL 2 TIMES DAILY
Qty: 3 EACH | Refills: 0 | Status: SHIPPED | OUTPATIENT
Start: 2025-06-30

## 2025-06-30 RX ORDER — ALBUTEROL SULFATE 90 UG/1
2 INHALANT RESPIRATORY (INHALATION) EVERY 4 HOURS PRN
Qty: 1 EACH | Refills: 0 | Status: SHIPPED | OUTPATIENT
Start: 2025-06-30

## 2025-06-30 RX ORDER — MONTELUKAST SODIUM 10 MG/1
10 TABLET ORAL NIGHTLY
Qty: 90 TABLET | Refills: 2 | Status: SHIPPED | OUTPATIENT
Start: 2025-06-30

## 2025-06-30 NOTE — PROGRESS NOTES
Patient: Lindsay Trotter (64 year old, female) Referring Provider:  Insurance:   Diagnosis: Parkinson's Disease, abnormality of gait, malaise, difficulty walking [facility]   Date of Surgery: No data recorded Next MD visit:  N/A   Precautions:  Fall Risk No data recorded Referral Information:    Date of Evaluation: Req: 0, Auth: 0, Exp:     05/01/25 POC Auth Visits:  10 (no auth req)       Today's Date   6/30/2025    Discharge Summary    Pt has attended 9 visits in Physical Therapy.     Subjective   Reports she continues to fall nearly daily. Also feels as if her freezing of gait is worse. She has completed her antibiotics for her UTI and \"feels better\" but continues to struggle with mobility. Has not followed-up with her neurologist.        Pain: 0/10     Objective     *values from initial testing documented in parenthesis below     Patel Balance Scale      Item Description Score (0 worst-4 best)     ___4___1. Sitting to standing  ___2___2. Standing unsupported (90 sec)  ___4___3. Sitting unsupported   ___4___4. Standing to sitting   ___3___5. Transfers   ___2___6. Standing with eyes closed   ___3___7. Standing with feet together   ___2___8. Reaching forward with outstretched arm   ___3___9. Retrieving object from floor   ___3__10. Turning to look behind   ___1__11. Turning 360 degrees   ___1__12. Placing alternate foot on stool   ___0__13. Standing with one foot in front   ___1__14. Standing on one foot      Total __33 (36)___/56 (less than 46/56 = fall risk)    Gait speed: SSWS 0.44 m/s (0.46 m/s)  rollator  FWS: 0.49 m/s (0.64 m/s) rollator       Timed Up and Go (AD,time): 15.4 seconds (14.5 seconds) rollator       Freezing of Gait Questionnaire      1. During your worst state--Do you walk:  0 Normally  1 Almost normally--somewhat slow  2 Slow but fully independent  3 Need assistance or walking aid  4 Unable to walk     2. Are your gait difficulties affecting your daily  activities  and independence?  0 Not at all  1 Mildly  2 Moderately  3 Severely  4 Unable to walk     Do you feel that your feet get glued to the floor while  walking, making a turn or when trying to initiate walking  (freezing)?  0 Never  1 Very rarely--about once a month  2 Rarely--about once a week  3 Often--about once a day  4 Always--whenever walking     4. How long is your longest freezing episode?  0 Never happened  1 1-2 s  2 3-10 s  3 11-30 s  4 Unable to walk for more than 30 s     5. How long is your typical start hesitation episode  (freezing when initiating the first step)?  0 None  1 Takes longer than 1 s to start walking  2 Takes longer than 3 s to start walking  3 Takes longer than 10 s to start walking  4 Takes longer than 30 s to start walking     6. How long is your typical turning hesitation: (freezing  when turning)  0 None  1 Resume turning in 1-2 s  2 Resume turning in 3-10 s  3 Resume turning in 11-30 s  4 Unable to resume turning for more than 30 s     Total: 14 (11)/24     Assessment   Pt presents with continued decline in functional status. Required w/c to enter clinic due to significant freezing episode when approaching door. Pt has unfortunately not demonstrated progress in PT at this time and actually demo's an overall decline. Would recommend follow-up with neurologist as soon as able. May benefit from power w/c in future should she continue to have difficulty safely ambulating in community. She verbalized understanding of material taught today and expressed agreement with plan. She is to be discharged from PT at this time due to lack of progress.     Goals (to be met in 10 visits)   Pt to be independent in HEP Met   Pt will improve quality of gait in order to improve SSWS by at least 0.2 m/s thus reflecting more consistent and efficient gait pattern Not met   Pt will improve BBS by at least 5 points to reflect reduced risk of falls. Not met   Pt will perform TUG in 12 seconds or less  in order to reflect improved level of safety with household ambulation. Not met                      Plan   Discharge    Treatment Last 4 Visits  Treatment Day: 9       5/22/2025 6/10/2025 6/18/2025 6/30/2025   Neuro Treatment   Therapeutic Exercise Nu- step level 4 x12 mins   Seated EOB fwd hinge hold<>lean back hold CGA-MOD A for balance multiple reps   Nu-step level 4 x10 mins       Neuro Re-Education Boxing seated EOB  -->on foam  -45 sec x2 bouts alt cross (trunk rotation)   -jabs 30 sec ea UE  -upper cuts 45 sec   -reaching for lighted targets 1 min bout  -->on foam beam, SBA-MIN A for balance   - cross 30 sec ea  (trunk rotation)  -30 sec upper cuts    Seated:   -lg amp fwd reach (trunk flexion) <>arms wide sitting upright - demo with return demo, mirroring provided x10 reps   -lg amp diagonal reach x10 B - demo with return demo, mirroring   -arms wide, wide RODY reach across midline (hand to hand) with trunk rotation- demo with return demo x10 B, mirroring   -lg amp stomp step out<>in x10 B - demo with return demo, mirroring  BBS completed see objective  TUG completed see objective  SSWS and FWS see objective    Therapeutic Activity   Discussed recommendation to contact PCP. PCP was also informed of her current issues and also encouraged an appointment for evaluation.   Discussed w/c and openness to pwr mobility options. Discussed general process for obtaining pwr w/c. Encouraged to discuss with PCP as well.   Pt taken dependently to bathroom by w/c- SBA provided for short walk into and out of stall. Taken to gym dependently with w/c.   Discussed home seated exercise videos through [employer]  SBA for transfer on/off nu-step   Pt education on seating and mobility options and pluses/minuses of each. Discussed goals of w/c prescription and process to obtain  Discussed POC and recommendation to f/u with neurologist due to decline in function. Encouraged continued physical activity as able.    Gait Training   TDM with harness no BWS- laser point for visual target   - 0.6 mph 2 bouts 5.5 mins, 6 mins- seated rest after ea bout- dual task (engaged in conversation)  -0.4-0.5 mph backwards walking 4 mins - seated rest after bout   Ambulation OG with rollator 1 lg lap around clinic- PT in front to help control rollator, VC's for increased B step length as well as for body position     Therapeutic Exercise Minutes 18  12    Neuro Re-Educ Minutes 20  15 20   Therapeutic Activity Minutes   15 15   Gait Training Minutes  39     Total Time Of Timed Procedures 38 39 42 35   Total Time Of Service-Based Procedures 0 0 0 0   Total Treatment Time 38 39 42 35        Patient education   See flowsheet     Charges  1 NMR, 1 TA           Patient was advised of these findings, precautions, and treatment options and has agreed to actively participate in planning and for this course of care.    Thank you for your referral. If you have any questions, please contact me at Dept: [phone]    Sincerely,    Laine Doran PT, NCS    Electronically signed by therapist: Laine Doran PT

## 2025-06-30 NOTE — PROGRESS NOTES
The following individual(s) verbally consented to be recorded using ambient AI listening technology and understand that they can each withdraw their consent to this listening technology at any point by asking the clinician to turn off or pause the recording:    Patient name: Lindsay Trotter

## 2025-06-30 NOTE — PROGRESS NOTES
Lindsay Trotter is a 64 year old female.    HPI:     Chief Complaint   Patient presents with    Allergies     Patient presents for follow up for allergies.     Patient is a 64-year-old female who presents for follow-up with a chief complaint of allergies and asthma  Patient last seen by me in July 2024.  Oral challenge to penicillin at that time was negative  History of allergic rhinitis and asthma.  Patient on maintenance dose immunotherapy for underlying environmental allergies.  Medication list includes Singulair albuterol and Synthroid  Immunizations reviewed      History of Present Illness  Lindsay Trotter is a 64 year old female with asthma and allergies who presents with a cough that worsens at night.    She has been experiencing a cough for approximately two weeks, which is notably worse when lying down at night. The cough is described as wet, and there are no associated heartburn or reflux symptoms. She has not used her albuterol inhaler to see if it alleviates the cough, and her Advair, which she restarted due to the cough, does not seem to help.    Her nose becomes runny, particularly when she is in the bathroom right before bed. She continues to use Singulair for her allergies. There is no production of yellow or green sputum with the cough, and she has not required emergency room visits or prednisone for asthma or breathing issues recently.    She also reports experiencing a sensation of ear fullness, describing it as feeling like she has ear plugs in and sometimes hearing her own voice echo.         HISTORY:  Past Medical History[1]   Past Surgical History[2]   Family History[3]   Social History: Short Social Hx on File[4]     Medications (Active prior to today's visit):  Current Medications[5]    Allergies:  Allergies[6]      ROS:   Allergic/Immuno:  See hpi  Cardiovascular:  Negative for irregular heartbeat/palpitations, chest pain, edema  Constitutional:  Negative night sweats,weight loss,  irritability and lethargy  ENMT:  Negative for ear drainage, hearing loss and nasal drainage  Eyes:  Negative for eye discharge and vision loss  Gastrointestinal:  Negative for abdominal pain, diarrhea and vomiting  Integumentary:  Negative for pruritus and rash  Respiratory:  Negative for cough, dyspnea and wheezing    PHYSICAL EXAM:   Constitutional: responsive, no acute distress noted  Head/Face: NC/Atraumatic  Eyes/Vision: conjunctiva and lids are normal extraocular motion is intact   Ears/Audiometry: tympanic membranes are normal bilaterally hearing is grossly intact  Nose/Mouth/Throat: nose and throat are clear mucous membranes are moist   Neck/Thyroid: neck is supple without adenopathy  Lymphatic: no abnormal cervical, supraclavicular or axillary adenopathy is noted  Respiratory: normal to inspection lungs are clear to auscultation bilaterally normal respiratory effort   Cardiovascular: regular rate and rhythm no murmurs, gallups, or rubs  Abdomen: soft non-tender non-distended  Skin/Hair: no unusual rashes present   Extremities: no edema, cyanosis, or clubbing     ASSESSMENT/PLAN:   Assessment   Encounter Diagnoses   Name Primary?    Asthma, extrinsic, moderate persistent, uncomplicated (HCC) Yes    Seasonal and perennial allergic rhinoconjunctivitis     Flu vaccine need     Need for COVID-19 vaccine     Need for prophylactic vaccination with Streptococcus pneumoniae (Pneumococcus) and Influenza vaccines        Assessment & Plan  Cough with postnasal drip  Intermittent cough worsening at night when supine, likely due to postnasal drip. No heartburn or reflux symptoms. Cough is productive. Advair restarted but not yet effective; may take 3-5 days for full effect. No purulent sputum. Rhinorrhea noted before bed.  - Prescribe albuterol inhaler. Instruct to use two puffs at night if coughing occurs to assess effectiveness.  - Consider nasal spray if nasal symptoms persist with cough.  Prescription for Astelin 2  sprays per nostril twice a day as an antihistamine nasal spray sent  - Advise to avoid eating at least two hours before bedtime to prevent reflux-related cough.  - Continue Singulair (montelukast) as prescribed.    Eustachian tube dysfunction  Intermittent sensation of ear fullness and echoing, suggestive of eustachian tube dysfunction. No fluid, cerumen, or erythema in the ear. Symptoms may be related to congestion.  - Prescribe Astelin nasal spray, two sprays in each nostril one to two times per day, preferably before bed.  - Instruct on autoinflation technique to relieve ear pressure.  - Advise to use nasal spray before brushing teeth to mitigate bitter taste.     Allergic rhinitis  Maintenance dose immunotherapy in office today followed by 30 minutes of observation.  Continue with Singulair.  Add Astelin 2 sprays per nostril up to twice a day as an antihistamine nasal spray is still symptomatic    Flu vaccine in the fall recommended    COVID-vaccine booster in the fall recommended    Pneumonia vaccine for 50 and older recommended       Orders This Visit:  No orders of the defined types were placed in this encounter.      Meds This Visit:  Requested Prescriptions      No prescriptions requested or ordered in this encounter       Imaging & Referrals:  None     6/30/2025  Chico Velasco MD    If medication samples were provided today, they were provided solely for patient education and training related to self administration of these medications.  Teaching, instruction and sample was provided to the patient by myself.  Teaching included  a review of potential adverse side effects as well as potential efficacy.  Patient's questions were answered in regards to medication administration and dosing and potential side effects. Teaching was provided via the teach back method           [1]   Past Medical History:   Acute torn meniscus of knee    ARTHROSCOPY OF KNEE    Amenorrhea    Asthma (HCC)    Back problem     Chicken pox    Chronic cough    Disorder of thyroid    Extrapyramidal syndrome    MEDICATION MGMT. W/ PARKINSONS FEATURES    Greater trochanteric bursitis, left    High cholesterol    History of blood transfusion    CDH    Incontinence    Infertility, female    Parkinson disease (HCC)    Parkinson's disease (HCC)    Rheumatoid arthritis (HCC)    S/P laparoscopic sleeve gastrectomy    for weight loss    Thyroid disease    Tibia/fibula fracture    OPEN REDUCTION INTERNAL FIXATION    Urinary incontinence    after childbirth    Viral conjunctivitis    Visual impairment    glasses   [2]   Past Surgical History:  Procedure Laterality Date    Back surgery      Back surgery  04/21/2021    Back surgery  04/25/2023    compressed disc, removal of bone    Colonoscopy  06/11/2019    return in 5 years    Colonoscopy N/A 06/11/2019    Procedure: COLONOSCOPY;  Surgeon: Dago Heart MD;  Location: [facility] ENDOSCOPY    Colposcopy, cervix w/upper adjacent vagina; w/biopsy(s), cervix      D & c      for extended period    D & c      for extended period    Foot surgery Left     Had right foot done recently.    Knee arthroscopy Right     Lap sleeve gastrectomy  12/02/2019    Dr Rios, [facility]    Laparoscopy,pelvic,biopsy  1990    for infertility    Laparoscopy,pelvic,biopsy  1990    for infertility    Other surgical history Right 12/03/2015    Other surgical history  12/03/2015    Thyroidectomy      Total    [3]   Family History  Problem Relation Age of Onset    Heart Disorder Father 55        heart attack-cause of death.    Heart Disorder Mother     Stroke Mother     Cancer Mother         AML-cause of death    Other (leukemia) Mother     Cancer Paternal Aunt         breast cancer-cause of death at age 80    Breast Cancer Paternal Aunt 80        approx age    Heart Disorder Brother     Ovarian Cancer Neg    [4]   Social History  Socioeconomic History    Marital status:    Tobacco Use    Smoking status: Never      Passive exposure: Never    Smokeless tobacco: Never   Vaping Use    Vaping status: Never Used   Substance and Sexual Activity    Alcohol use: Yes     Alcohol/week: 2.0 - 3.0 standard drinks of alcohol     Types: 2 - 3 Glasses of wine per week     Comment: social    Drug use: No    Sexual activity: Yes     Partners: Male   Other Topics Concern    Caffeine Concern Yes     Comment: S2 glasses tea daily    Exercise No    Reaction to local anesthetic No    Pt has a pacemaker No    Pt has a defibrillator No   Social History Narrative    The patient uses the following assistive device(s):  single-point cane,uses 2 canes. Walker at night           The patient does live in a home with stairs.     Social Drivers of Health     Food Insecurity: No Food Insecurity (2/17/2025)    NCSS - Food Insecurity     Worried About Running Out of Food in the Last Year: No     Ran Out of Food in the Last Year: No   Transportation Needs: No Transportation Needs (2/17/2025)    NCSS - Transportation     Lack of Transportation: No   Housing Stability: Not At Risk (2/17/2025)    NCSS - Housing/Utilities     Has Housing: Yes     Worried About Losing Housing: No     Unable to Get Utilities: No   [5]   Current Outpatient Medications   Medication Sig Dispense Refill    predniSONE 5 MG Oral Tab Take 1 tablet (5 mg total) by mouth daily as needed. Take Prednisone 5 mg daily as needed for pain in the morning with food. 90 tablet 2    MONTELUKAST 10 MG Oral Tab TAKE 1 TABLET BY MOUTH EVERY DAY AT NIGHT 90 tablet 0    XELJANZ XR 11 MG Oral Tablet 24 Hr TAKE 1 TABLET BY MOUTH 1 TIME A DAY 90 tablet 1    METHOTREXATE 2.5 MG Oral Tab TAKE 8 TABS WEEKLY BY MOUTH 96 tablet 1    calcitriol 0.25 MCG Oral Cap Take 1 capsule (0.25 mcg total) by mouth daily. 90 capsule 3    PANTOPRAZOLE 40 MG Oral Tab EC TAKE 1 TABLET BY MOUTH BEFORE BREAKFAST 90 tablet 2    Phentermine HCl 37.5 MG Oral Tab Take 1 tablet (37.5 mg total) by mouth before breakfast. 30 tablet 5     topiramate 100 MG Oral Tab Take 1 tablet (100 mg total) by mouth 2 (two) times daily. 180 tablet 1    pravastatin 20 MG Oral Tab Take 1 tablet (20 mg total) by mouth nightly. 90 tablet 3    folic acid 1 MG Oral Tab Take 1 tablet (1 mg total) by mouth daily. 90 tablet 3    traMADol 50 MG Oral Tab Take 0.5 tablets (25 mg total) by mouth every 8 (eight) hours as needed for Pain. 30 tablet 0    levothyroxine 125 MCG Oral Tab Take 1 tablet (125 mcg total) by mouth before breakfast. Alternate every other day with levothyroxine 112 mgg. 90 tablet 3    levothyroxine 112 MCG Oral Tab Take 1 tablet (112 mcg total) by mouth before breakfast. 90 tablet 3    carbidopa-levodopa  MG Oral Tab       albuterol 108 (90 Base) MCG/ACT Inhalation Aero Soln INHALE 2 PUFFS BY INHALATION ROUTE EVERY 4 - 6 HOURS AS NEEDED 3 each 0    Hydrocod Navi-Chlorphe Navi ER 10-8 MG/5ML Oral Suspension Extended Release Take 5 mL by mouth 2 (two) times daily as needed. 115 mL 0    albuterol (2.5 MG/3ML) 0.083% Inhalation Nebu Soln Take 3 mL (2.5 mg total) by nebulization every 4 (four) hours as needed for Wheezing. 30 each 0    Boswellia-Glucosamine-Vit D (OSTEO BI-FLEX ONE PER DAY OR) Take by mouth As Directed.      Amantadine HCl 100 MG Oral Cap Take 1 capsule (100 mg total) by mouth in the morning.      omega-3 fatty acids 1000 MG Oral Cap Take 1,000 mg by mouth in the morning.      TURMERIC OR Take by mouth in the morning and before bedtime.      Multiple Vitamins-Minerals (BARIATRIC FUSION) Oral Chew Tab Chew 1 tablet by mouth in the morning, at noon, in the evening, and at bedtime.      nitrofurantoin monohydrate macro (MACROBID) 100 MG Oral Cap Take 1 capsule (100 mg total) by mouth 2 (two) times daily. (Patient not taking: Reported on 6/30/2025) 14 capsule 0   [6]   Allergies  Allergen Reactions    Seasonal Runny nose

## 2025-07-02 ENCOUNTER — OFFICE VISIT (OUTPATIENT)
Dept: INTERNAL MEDICINE CLINIC | Facility: CLINIC | Age: 65
End: 2025-07-02
Payer: COMMERCIAL

## 2025-07-02 VITALS
HEIGHT: 69 IN | HEART RATE: 108 BPM | TEMPERATURE: 98 F | BODY MASS INDEX: 28.73 KG/M2 | SYSTOLIC BLOOD PRESSURE: 130 MMHG | WEIGHT: 194 LBS | RESPIRATION RATE: 20 BRPM | DIASTOLIC BLOOD PRESSURE: 74 MMHG | OXYGEN SATURATION: 99 %

## 2025-07-02 DIAGNOSIS — H93.292 DISTORTED HEARING OF LEFT EAR: ICD-10-CM

## 2025-07-02 DIAGNOSIS — S60.021D CONTUSION OF RIGHT INDEX FINGER WITHOUT DAMAGE TO NAIL, SUBSEQUENT ENCOUNTER: Primary | ICD-10-CM

## 2025-07-02 DIAGNOSIS — G20.B1 PARKINSON'S DISEASE WITH DYSKINESIA, UNSPECIFIED WHETHER MANIFESTATIONS FLUCTUATE (HCC): ICD-10-CM

## 2025-07-02 PROCEDURE — G2211 COMPLEX E/M VISIT ADD ON: HCPCS | Performed by: INTERNAL MEDICINE

## 2025-07-02 PROCEDURE — 99213 OFFICE O/P EST LOW 20 MIN: CPT | Performed by: INTERNAL MEDICINE

## 2025-07-02 PROCEDURE — 3075F SYST BP GE 130 - 139MM HG: CPT | Performed by: INTERNAL MEDICINE

## 2025-07-02 PROCEDURE — 3008F BODY MASS INDEX DOCD: CPT | Performed by: INTERNAL MEDICINE

## 2025-07-02 PROCEDURE — 3078F DIAST BP <80 MM HG: CPT | Performed by: INTERNAL MEDICINE

## 2025-07-02 NOTE — PROGRESS NOTES
HPI:    Patient ID: Lindsay Trotter is a 64 year old female.    HPI  Patient is here for follow-up on emergency room visit.  I last saw her February 17th for preoperative evaluation prior to undergoing an MRI.  She had the MRI without issue.  It showed no fracture of the foot.  They were suspicious of a stress fracture.  Since that time she has seen bariatrics, physical therapy, rheumatology, allergy.  Was seen in this building by primary care on June 19 for UTI growing out E. coli sensitive to all antibiotics.  Then the next day June 20, patient was in the emergency room with contusion and bruising of the right finger.  X-ray was normal.  Current medications reviewed.    The left index finger turned purple and tingled. It stayed that way for a week. The UTI resolved. No precipitating factors. No prior episodes. No other changes in meds other than the antibiotic. Picture on phone shows purple color from MCP to PIP. It slowly resolved. Was going to PT but stopped due to lack of progress. The Parkinsons is still active; she is changing neurologist. She is looking into a wheelchair. She cannot walk further than 50 feet due to her leg pain. Someone would be pushing her in the wheelchair. She is not looking for a motorized one.   Her ear is still bugging her; she hears echos. Allergy did not see anything in ear.       Problem List[1]       HISTORY:  Past Medical History[2]   Past Surgical History[3]   Family History[4]   Short Social Hx on File[5]       Review of Systems        Current Medications[6]  Allergies:Allergies[7]     PHYSICAL EXAM:   [facility] 04/18/2015 (Exact Date)      Physical Exam  Cardiovascular:      Rate and Rhythm: Normal rate and regular rhythm.      Pulses: Normal pulses.      Heart sounds: Normal heart sounds.   Pulmonary:      Effort: Pulmonary effort is normal.      Breath sounds: Normal breath sounds.   Musculoskeletal:         General: No swelling, tenderness, deformity or signs of injury.    Skin:     Findings: No bruising, erythema, lesion or rash.   Neurological:      Mental Status: She is alert.      Comments: Chronic dyskinesia          Wt Readings from Last 6 Encounters:   06/20/25 195 lb (88.5 kg)   06/19/25 197 lb (89.4 kg)   06/17/25 199 lb 3.2 oz (90.4 kg)   03/03/25 203 lb (92.1 kg)   02/13/25 207 lb (93.9 kg)   02/17/25 207 lb (93.9 kg)             ASSESSMENT/PLAN:   1. Contusion of right index finger without damage to nail, subsequent encounter  Exam is totally normal now.  Examining the pictures of the phone, it looks like a bruise.  Possibly she may have done something when she was using the walker or with her falling.  She falls quite a bit.  No evidence of any damage or neurovascular compromise at this time.  Monitor.  Call if symptoms recur.    2. Parkinson's disease with dyskinesia, unspecified whether manifestations fluctuate (HCC)  Ongoing issues with Parkinson's.  She is no longer doing physical therapy.  Gave her an order for DME for a lightweight wheelchair.  This would help her get around with her  pushing.  Follow-up with neurology.  - Wheelchair - Lightweight    3. Distorted hearing of left ear  Patient notes some distorted hearing in the left ear.  Exam today is normal of both ears.  Refer to ENT.  - ENT Referral - In [facility] This Visit:  Requested Prescriptions      No prescriptions requested or ordered in this encounter       Imaging & Referrals:  None         Dante Palafox MD          [1]   Patient Active Problem List  Diagnosis    Asthma (HCC)    Hyperlipidemia    Asthma, extrinsic (HCC)    Allergic rhinitis due to pollen    Allergic rhinitis due to other allergen    Rheumatoid arthritis, adult (HCC)    Parkinson disease (HCC)    Post-surgical hypothyroidism    Lumbar spinal stenosis    Greater trochanteric bursitis, left    Chronic back pain    Adjustment disorder with mixed anxiety and depressed mood    Chronic fatigue    Increased appetite    S/P  laparoscopic sleeve gastrectomy    Obesity (BMI 30-39.9)    S/P lumbar fusion    Encounter for therapeutic drug monitoring    Slow transit constipation    Mild protein-calorie malnutrition (HCC)    Overweight (BMI 25.0-29.9)    Abnormal EKG    Abnormal mammogram    Abnormal nuclear stress test    Abnormal weight gain    Accessory carpal bone    Acute bronchitis    Adjustment disorder with anxiety    Benign neoplasm of skin of upper limb, including shoulder    Calcaneal spur    Candidiasis of mouth    Closed fracture of foot    Congenital anomaly of foot    Conjunctivitis    Contusion of foot    Cough    Dermatitis    Disease of conjunctiva due to viruses    Disorder of bursae and tendons in shoulder region    Esophageal reflux    Exertional dyspnea    Enthesopathy    Extrapyramidal disease and abnormal movement disorder    Extrinsic asthma with exacerbation (HCC)    Internal derangement of ankle    Lateral epicondylitis of elbow    Levodopa-induced dyskinesia    Lump or mass in breast    Musculoskeletal symptoms referable to limbs    Nontoxic multinodular goiter    Pain in soft tissues of limb    Painful respiration    Plantar fascial fibromatosis    Postnasal drip    Scanty or infrequent menstruation    Sebaceous cyst    Sprain of ligament of ankle    Synovitis and tenosynovitis    Peroneal tendon tear    Voice disturbance   [2]   Past Medical History:   Acute torn meniscus of knee    ARTHROSCOPY OF KNEE    Amenorrhea    Asthma (HCC)    Back problem    Chicken pox    Chronic cough    Disorder of thyroid    Extrapyramidal syndrome    MEDICATION MGMT. W/ PARKINSONS FEATURES    Greater trochanteric bursitis, left    High cholesterol    History of blood transfusion    CDH    Incontinence    Infertility, female    Parkinson disease (HCC)    Parkinson's disease (HCC)    Rheumatoid arthritis (HCC)    S/P laparoscopic sleeve gastrectomy    for weight loss    Thyroid disease    Tibia/fibula fracture    OPEN REDUCTION  INTERNAL FIXATION    Urinary incontinence    after childbirth    Viral conjunctivitis    Visual impairment    glasses   [3]   Past Surgical History:  Procedure Laterality Date    Back surgery      Back surgery  04/21/2021    Back surgery  04/25/2023    compressed disc, removal of bone    Colonoscopy  06/11/2019    return in 5 years    Colonoscopy N/A 06/11/2019    Procedure: COLONOSCOPY;  Surgeon: Dago Heart MD;  Location: [facility] ENDOSCOPY    Colposcopy, cervix w/upper adjacent vagina; w/biopsy(s), cervix      D & c      for extended period    D & c      for extended period    Foot surgery Left     Had right foot done recently.    Knee arthroscopy Right     Lap sleeve gastrectomy  12/02/2019    Dr Rios, [facility]    Laparoscopy,pelvic,biopsy  1990    for infertility    Laparoscopy,pelvic,biopsy  1990    for infertility    Other surgical history Right 12/03/2015    Other surgical history  12/03/2015    Thyroidectomy      Total    [4]   Family History  Problem Relation Age of Onset    Heart Disorder Father 55        heart attack-cause of death.    Heart Disorder Mother     Stroke Mother     Cancer Mother         AML-cause of death    Other (leukemia) Mother     Cancer Paternal Aunt         breast cancer-cause of death at age 80    Breast Cancer Paternal Aunt 80        approx age    Heart Disorder Brother     Ovarian Cancer Neg    [5]   Social History  Socioeconomic History    Marital status:    Tobacco Use    Smoking status: Never     Passive exposure: Never    Smokeless tobacco: Never   Vaping Use    Vaping status: Never Used   Substance and Sexual Activity    Alcohol use: Yes     Alcohol/week: 2.0 - 3.0 standard drinks of alcohol     Types: 2 - 3 Glasses of wine per week     Comment: social    Drug use: No    Sexual activity: Yes     Partners: Male   Other Topics Concern    Caffeine Concern Yes     Comment: S2 glasses tea daily    Exercise No    Reaction to local anesthetic No    Pt has a  pacemaker No    Pt has a defibrillator No   Social History Narrative    The patient uses the following assistive device(s):  single-point cane,uses 2 canes. Walker at night           The patient does live in a home with stairs.     Social Drivers of Health     Food Insecurity: No Food Insecurity (2/17/2025)    NCSS - Food Insecurity     Worried About Running Out of Food in the Last Year: No     Ran Out of Food in the Last Year: No   Transportation Needs: No Transportation Needs (2/17/2025)    NCSS - Transportation     Lack of Transportation: No   Housing Stability: Not At Risk (2/17/2025)    NCSS - Housing/Utilities     Has Housing: Yes     Worried About Losing Housing: No     Unable to Get Utilities: No   [6]   Current Outpatient Medications   Medication Sig Dispense Refill    albuterol (VENTOLIN HFA) 108 (90 Base) MCG/ACT Inhalation Aero Soln Inhale 2 puffs into the lungs every 4 (four) hours as needed for Wheezing. 1 each 0    azelastine 0.1 % Nasal Solution 2 sprays by Nasal route 2 (two) times daily. 3 each 0    montelukast 10 MG Oral Tab Take 1 tablet (10 mg total) by mouth nightly. 90 tablet 2    nitrofurantoin monohydrate macro (MACROBID) 100 MG Oral Cap Take 1 capsule (100 mg total) by mouth 2 (two) times daily. (Patient not taking: Reported on 6/30/2025) 14 capsule 0    predniSONE 5 MG Oral Tab Take 1 tablet (5 mg total) by mouth daily as needed. Take Prednisone 5 mg daily as needed for pain in the morning with food. 90 tablet 2    XELJANZ XR 11 MG Oral Tablet 24 Hr TAKE 1 TABLET BY MOUTH 1 TIME A DAY 90 tablet 1    METHOTREXATE 2.5 MG Oral Tab TAKE 8 TABS WEEKLY BY MOUTH 96 tablet 1    calcitriol 0.25 MCG Oral Cap Take 1 capsule (0.25 mcg total) by mouth daily. 90 capsule 3    PANTOPRAZOLE 40 MG Oral Tab EC TAKE 1 TABLET BY MOUTH BEFORE BREAKFAST 90 tablet 2    Phentermine HCl 37.5 MG Oral Tab Take 1 tablet (37.5 mg total) by mouth before breakfast. 30 tablet 5    topiramate 100 MG Oral Tab Take 1 tablet  (100 mg total) by mouth 2 (two) times daily. 180 tablet 1    pravastatin 20 MG Oral Tab Take 1 tablet (20 mg total) by mouth nightly. 90 tablet 3    folic acid 1 MG Oral Tab Take 1 tablet (1 mg total) by mouth daily. 90 tablet 3    traMADol 50 MG Oral Tab Take 0.5 tablets (25 mg total) by mouth every 8 (eight) hours as needed for Pain. 30 tablet 0    levothyroxine 125 MCG Oral Tab Take 1 tablet (125 mcg total) by mouth before breakfast. Alternate every other day with levothyroxine 112 mgg. 90 tablet 3    levothyroxine 112 MCG Oral Tab Take 1 tablet (112 mcg total) by mouth before breakfast. 90 tablet 3    carbidopa-levodopa  MG Oral Tab       albuterol 108 (90 Base) MCG/ACT Inhalation Aero Soln INHALE 2 PUFFS BY INHALATION ROUTE EVERY 4 - 6 HOURS AS NEEDED 3 each 0    Hydrocod Navi-Chlorphe Navi ER 10-8 MG/5ML Oral Suspension Extended Release Take 5 mL by mouth 2 (two) times daily as needed. 115 mL 0    albuterol (2.5 MG/3ML) 0.083% Inhalation Nebu Soln Take 3 mL (2.5 mg total) by nebulization every 4 (four) hours as needed for Wheezing. 30 each 0    Boswellia-Glucosamine-Vit D (OSTEO BI-FLEX ONE PER DAY OR) Take by mouth As Directed.      Amantadine HCl 100 MG Oral Cap Take 1 capsule (100 mg total) by mouth in the morning.      omega-3 fatty acids 1000 MG Oral Cap Take 1,000 mg by mouth in the morning.      TURMERIC OR Take by mouth in the morning and before bedtime.      Multiple Vitamins-Minerals (BARIATRIC FUSION) Oral Chew Tab Chew 1 tablet by mouth in the morning, at noon, in the evening, and at bedtime.     [7]   Allergies  Allergen Reactions    Seasonal Runny nose

## 2025-07-07 ENCOUNTER — APPOINTMENT (OUTPATIENT)
Dept: PHYSICAL THERAPY | Facility: HOSPITAL | Age: 65
End: 2025-07-07
Attending: Other

## 2025-07-14 ENCOUNTER — OFFICE VISIT (OUTPATIENT)
Dept: AUDIOLOGY | Facility: CLINIC | Age: 65
End: 2025-07-14
Payer: COMMERCIAL

## 2025-07-14 ENCOUNTER — OFFICE VISIT (OUTPATIENT)
Dept: OTOLARYNGOLOGY | Facility: CLINIC | Age: 65
End: 2025-07-14
Payer: COMMERCIAL

## 2025-07-14 VITALS — BODY MASS INDEX: 28.73 KG/M2 | WEIGHT: 194 LBS | HEIGHT: 69 IN

## 2025-07-14 DIAGNOSIS — H91.8X9 OTHER SPECIFIED FORMS OF HEARING LOSS, UNSPECIFIED LATERALITY: Primary | ICD-10-CM

## 2025-07-14 DIAGNOSIS — H91.8X3 ASYMMETRICAL HEARING LOSS: ICD-10-CM

## 2025-07-14 DIAGNOSIS — H91.90 HEARING LOSS, UNSPECIFIED HEARING LOSS TYPE, UNSPECIFIED LATERALITY: Primary | ICD-10-CM

## 2025-07-14 DIAGNOSIS — H90.3 SENSORINEURAL HEARING LOSS, BILATERAL: ICD-10-CM

## 2025-07-14 DIAGNOSIS — R42 DIZZINESS: ICD-10-CM

## 2025-07-14 DIAGNOSIS — H90.3 SENSORINEURAL HEARING LOSS (SNHL) OF BOTH EARS: ICD-10-CM

## 2025-07-14 NOTE — PROGRESS NOTES
Lindsay Trotter is a 64 year old female.   Chief Complaint   Patient presents with    Hearing Loss     Hearing issues     HPI:   64-year-old woman who has a history of Parkinson disease she presents with dizziness and a full feeling in her left ear for about 1 month.  She says the dizziness is worse when she turns and feels as if she needs to fall over sometimes.    Current Medications[1]   Past Medical History[2]   Social History:  Short Social Hx on File[3]   Past Surgical History[4]      EXAM:   Ht 5' 9\" (1.753 m)   Wt 194 lb (88 kg)   LMP 04/18/2015 (Exact Date)   BMI 28.65 kg/m²     System Details   Skin Inspection - Normal.   Constitutional Overall appearance - Normal.   Head/Face Symmetric, TMJ tenderness not present    Eyes EOMI, PERRL   Right ear:  Canal clear, TM intact, no SHERLEY   Left ear:  Canal clear, TM intact, no SHERLEY   Nose: Septum midline, inferior turbinates not enlarged, nasal valves without collapse    Oral cavity/Oropharynx: No lesions or masses on inspection or palpation, tonsils symmetric    Neck: Soft without LAD, thyroid not enlarged  Voice clear/ no stridor   Other:      SCOPES AND PROCEDURES:         AUDIOGRAM AND IMAGING:         IMPRESSION:   1. Hearing loss, unspecified hearing loss type, unspecified laterality    2. Asymmetrical hearing loss    3. Sensorineural hearing loss (SNHL) of both ears    4. Dizziness  - PHYSICAL THERAPY - INTERNAL       Recommendations:  - On the audiogram she had a very slight asymmetry worse in the left ear although only at 2000 through 4000 Hz to a mild degree.  Otherwise her ear exam and tympanograms are normal  - Unsure if this represents a sudden hearing loss such as a labyrinthitis as the audiogram findings are subtle and the treatment would be high-dose oral steroids which she would currently want to avoid due to to side effects  - May also represent BPPV I provided her a referral for vestibular rehab to further treat this  - I also gave her home  balance exercises if she can manage and she is going to be also meeting with her Parkinson's disease neurologist as this can also cause similar symptoms    The patient indicates understanding of these issues and agrees to the plan.       Esteban Harrell MD  7/14/2025  4:40 PM       [1]   Current Outpatient Medications   Medication Sig Dispense Refill    albuterol (VENTOLIN HFA) 108 (90 Base) MCG/ACT Inhalation Aero Soln Inhale 2 puffs into the lungs every 4 (four) hours as needed for Wheezing. 1 each 0    azelastine 0.1 % Nasal Solution 2 sprays by Nasal route 2 (two) times daily. 3 each 0    montelukast 10 MG Oral Tab Take 1 tablet (10 mg total) by mouth nightly. 90 tablet 2    nitrofurantoin monohydrate macro (MACROBID) 100 MG Oral Cap Take 1 capsule (100 mg total) by mouth 2 (two) times daily. 14 capsule 0    predniSONE 5 MG Oral Tab Take 1 tablet (5 mg total) by mouth daily as needed. Take Prednisone 5 mg daily as needed for pain in the morning with food. 90 tablet 2    XELJANZ XR 11 MG Oral Tablet 24 Hr TAKE 1 TABLET BY MOUTH 1 TIME A DAY 90 tablet 1    METHOTREXATE 2.5 MG Oral Tab TAKE 8 TABS WEEKLY BY MOUTH 96 tablet 1    calcitriol 0.25 MCG Oral Cap Take 1 capsule (0.25 mcg total) by mouth daily. 90 capsule 3    PANTOPRAZOLE 40 MG Oral Tab EC TAKE 1 TABLET BY MOUTH BEFORE BREAKFAST 90 tablet 2    Phentermine HCl 37.5 MG Oral Tab Take 1 tablet (37.5 mg total) by mouth before breakfast. 30 tablet 5    topiramate 100 MG Oral Tab Take 1 tablet (100 mg total) by mouth 2 (two) times daily. 180 tablet 1    pravastatin 20 MG Oral Tab Take 1 tablet (20 mg total) by mouth nightly. 90 tablet 3    folic acid 1 MG Oral Tab Take 1 tablet (1 mg total) by mouth daily. 90 tablet 3    traMADol 50 MG Oral Tab Take 0.5 tablets (25 mg total) by mouth every 8 (eight) hours as needed for Pain. 30 tablet 0    levothyroxine 125 MCG Oral Tab Take 1 tablet (125 mcg total) by mouth before breakfast. Alternate every other day with  levothyroxine 112 mgg. 90 tablet 3    levothyroxine 112 MCG Oral Tab Take 1 tablet (112 mcg total) by mouth before breakfast. 90 tablet 3    carbidopa-levodopa  MG Oral Tab       albuterol 108 (90 Base) MCG/ACT Inhalation Aero Soln INHALE 2 PUFFS BY INHALATION ROUTE EVERY 4 - 6 HOURS AS NEEDED 3 each 0    Hydrocod Navi-Chlorphe Navi ER 10-8 MG/5ML Oral Suspension Extended Release Take 5 mL by mouth 2 (two) times daily as needed. 115 mL 0    albuterol (2.5 MG/3ML) 0.083% Inhalation Nebu Soln Take 3 mL (2.5 mg total) by nebulization every 4 (four) hours as needed for Wheezing. 30 each 0    Boswellia-Glucosamine-Vit D (OSTEO BI-FLEX ONE PER DAY OR) Take by mouth As Directed.      Amantadine HCl 100 MG Oral Cap Take 1 capsule (100 mg total) by mouth in the morning.      omega-3 fatty acids 1000 MG Oral Cap Take 1,000 mg by mouth in the morning.      TURMERIC OR Take by mouth in the morning and before bedtime.      Multiple Vitamins-Minerals (BARIATRIC FUSION) Oral Chew Tab Chew 1 tablet by mouth in the morning, at noon, in the evening, and at bedtime.     [2]   Past Medical History:   Acute torn meniscus of knee    ARTHROSCOPY OF KNEE    Amenorrhea    Asthma (HCC)    Back problem    Chicken pox    Chronic cough    Disorder of thyroid    Extrapyramidal syndrome    MEDICATION MGMT. W/ PARKINSONS FEATURES    Greater trochanteric bursitis, left    High cholesterol    History of blood transfusion    CDH    Incontinence    Infertility, female    Parkinson disease (HCC)    Parkinson's disease (HCC)    Rheumatoid arthritis (HCC)    S/P laparoscopic sleeve gastrectomy    for weight loss    Thyroid disease    Tibia/fibula fracture    OPEN REDUCTION INTERNAL FIXATION    Urinary incontinence    after childbirth    Viral conjunctivitis    Visual impairment    glasses   [3]   Social History  Socioeconomic History    Marital status:    Tobacco Use    Smoking status: Never     Passive exposure: Never    Smokeless tobacco:  Never   Vaping Use    Vaping status: Never Used   Substance and Sexual Activity    Alcohol use: Yes     Alcohol/week: 2.0 - 3.0 standard drinks of alcohol     Types: 2 - 3 Glasses of wine per week     Comment: social    Drug use: No    Sexual activity: Yes     Partners: Male   Other Topics Concern    Caffeine Concern Yes     Comment: S2 glasses tea daily    Exercise No    Reaction to local anesthetic No    Pt has a pacemaker No    Pt has a defibrillator No   Social History Narrative    The patient uses the following assistive device(s):  single-point cane,uses 2 canes. Walker at night           The patient does live in a home with stairs.     Social Drivers of Health     Food Insecurity: No Food Insecurity (7/2/2025)    NCSS - Food Insecurity     Worried About Running Out of Food in the Last Year: No     Ran Out of Food in the Last Year: No   Transportation Needs: No Transportation Needs (7/2/2025)    NCSS - Transportation     Lack of Transportation: No   Housing Stability: Not At Risk (7/2/2025)    NCSS - Housing/Utilities     Has Housing: Yes     Worried About Losing Housing: No     Unable to Get Utilities: No   [4]   Past Surgical History:  Procedure Laterality Date    Back surgery      Back surgery  04/21/2021    Back surgery  04/25/2023    compressed disc, removal of bone    Colonoscopy  06/11/2019    return in 5 years    Colonoscopy N/A 06/11/2019    Procedure: COLONOSCOPY;  Surgeon: Dago Heart MD;  Location: [facility]    Colposcopy, cervix w/upper adjacent vagina; w/biopsy(s), cervix      D & c      for extended period    D & c      for extended period    Foot surgery Left     Had right foot done recently.    Knee arthroscopy Right     Lap sleeve gastrectomy  12/02/2019    Dr Rios, [facility]    Laparoscopy,pelvic,biopsy  1990    for infertility    Laparoscopy,pelvic,biopsy  1990    for infertility    Other surgical history Right 12/03/2015    Other surgical history  12/03/2015     Thyroidectomy      Total

## 2025-07-15 ENCOUNTER — HOSPITAL ENCOUNTER (OUTPATIENT)
Age: 65
Discharge: HOME | End: 2025-07-15
Payer: COMMERCIAL

## 2025-07-15 ENCOUNTER — NURSE TRIAGE (OUTPATIENT)
Dept: INTERNAL MEDICINE CLINIC | Facility: CLINIC | Age: 65
End: 2025-07-15

## 2025-07-15 ENCOUNTER — APPOINTMENT (OUTPATIENT)
Dept: GENERAL RADIOLOGY | Age: 65
End: 2025-07-15
Payer: COMMERCIAL

## 2025-07-15 VITALS
TEMPERATURE: 98 F | RESPIRATION RATE: 22 BRPM | DIASTOLIC BLOOD PRESSURE: 83 MMHG | HEART RATE: 98 BPM | OXYGEN SATURATION: 98 % | SYSTOLIC BLOOD PRESSURE: 110 MMHG

## 2025-07-15 DIAGNOSIS — S20.212A RIB CONTUSION, LEFT, INITIAL ENCOUNTER: Primary | ICD-10-CM

## 2025-07-15 LAB
#MXD IC: 0.7 X10ˆ3/UL (ref 0.1–1)
BUN BLD-MCNC: 18 MG/DL (ref 7–18)
CHLORIDE BLD-SCNC: 109 MMOL/L (ref 98–112)
CO2 BLD-SCNC: 22 MMOL/L (ref 21–32)
CREAT BLD-MCNC: 1 MG/DL (ref 0.55–1.02)
EGFRCR SERPLBLD CKD-EPI 2021: 63 ML/MIN/1.73M2 (ref 60–?)
GLUCOSE BLD-MCNC: 103 MG/DL (ref 70–99)
HCT VFR BLD AUTO: 39.8 % (ref 35–48)
HCT VFR BLD CALC: 42 % (ref 34–50)
HGB BLD-MCNC: 12.5 G/DL (ref 12–16)
ISTAT IONIZED CALCIUM FOR CHEM 8: 1.16 MMOL/L (ref 1.12–1.32)
LYMPHOCYTES # BLD AUTO: 0.5 X10ˆ3/UL (ref 1–4)
LYMPHOCYTES NFR BLD AUTO: 6.3 %
MCH RBC QN AUTO: 32.2 PG (ref 26–34)
MCHC RBC AUTO-ENTMCNC: 31.4 G/DL (ref 31–37)
MCV RBC AUTO: 102.6 FL (ref 80–100)
MIXED CELL %: 9 %
NEUTROPHILS # BLD AUTO: 6.7 X10ˆ3/UL (ref 1.5–7.7)
NEUTROPHILS NFR BLD AUTO: 84.7 %
PLATELET # BLD AUTO: 206 X10ˆ3/UL (ref 150–450)
POTASSIUM BLD-SCNC: 4.1 MMOL/L (ref 3.6–5.1)
RBC # BLD AUTO: 3.88 X10ˆ6/UL (ref 3.8–5.3)
SODIUM BLD-SCNC: 144 MMOL/L (ref 136–145)
TROPONIN I BLD-MCNC: <0.02 NG/ML (ref ?–0.05)
WBC # BLD AUTO: 7.9 X10ˆ3/UL (ref 4–11)

## 2025-07-15 PROCEDURE — 36415 COLL VENOUS BLD VENIPUNCTURE: CPT

## 2025-07-15 PROCEDURE — 71101 X-RAY EXAM UNILAT RIBS/CHEST: CPT | Performed by: RADIOLOGY

## 2025-07-15 PROCEDURE — 99214 OFFICE O/P EST MOD 30 MIN: CPT

## 2025-07-15 PROCEDURE — 85025 COMPLETE CBC W/AUTO DIFF WBC: CPT

## 2025-07-15 PROCEDURE — 80047 BASIC METABLC PNL IONIZED CA: CPT

## 2025-07-15 PROCEDURE — 84484 ASSAY OF TROPONIN QUANT: CPT

## 2025-07-15 PROCEDURE — 93010 ELECTROCARDIOGRAM REPORT: CPT

## 2025-07-15 PROCEDURE — 99215 OFFICE O/P EST HI 40 MIN: CPT

## 2025-07-15 PROCEDURE — 93005 ELECTROCARDIOGRAM TRACING: CPT

## 2025-07-15 NOTE — ED INITIAL ASSESSMENT (HPI)
Patient states she fell on her walker over the weekend and hit the metal bar on it. Patient with pain under her left breast that wraps around to the left middle back. Patient states it is still very painful and it hurts to take a deep breath. Patient denies hitting her head or any LOC when she fell on her walker.

## 2025-07-15 NOTE — ED QUICK NOTES
Patient states she did fall off her bed today as well. Patient states she slipped off her bed/sheets when getting out of bed. Patient denies hitting her head or any LOC with this fall. Patient did say she was lying on the floor for about 3-4 hours before being able to get up. Patient denies any SOB, just pain with taking a deep breath.

## 2025-07-15 NOTE — ED PROVIDER NOTES
History     Chief Complaint   Patient presents with    Pain       Subjective:   HPI    Lindsay Trotter, 64 year old female with notable medical history of rheumatoid arthritis, Parkinsons, asthma, thyroid disease, EPS who presents with left anterior and posterior chest and back pain after falling forward onto her walker yesterday.  She reports there is point tenderness to mostly the posterior ribs.  Denies any fevers, chills, SOB, chest pain, nausea, emesis, abdominal pain, back pain, head injury, neck pain, LOC, abdominal pain, numbness, or tingling. At the time of the fall she denies having any SOB, chest pain, palpitations, LH, dizziness. She reports she has been having increased falls likely due to her Parkinsons and is seeing her neurologist on 7/24. She reports falling onto buttocks today after attempting to get off of her bed. She reports sitting and laying on the floor for 3-4 hours, then she was able to call her . She denied any head injury, LOC, back pain, neck pain, buttocks pain, hip pain, leg pain, or urinary changes.  2 weeks ago did not have UTI.  Finished antibiotics.  Any urinary symptoms such as dysuria, urgency, frequency or incontinence.  Taken a tramadol prior to arrival for her symptoms. Has been told her Cr function worsened and to stop taking NSAIDs/diclofenac.        Problem List[1]   Objective:   Past Medical History:    Acute torn meniscus of knee    ARTHROSCOPY OF KNEE    Amenorrhea    Asthma (HCC)    Back problem    Chicken pox    Chronic cough    Disorder of thyroid    Extrapyramidal syndrome    MEDICATION MGMT. W/ PARKINSONS FEATURES    Greater trochanteric bursitis, left    High cholesterol    History of blood transfusion    CDH    Incontinence    Infertility, female    Parkinson disease (HCC)    Parkinson's disease (HCC)    Rheumatoid arthritis (HCC)    S/P laparoscopic sleeve gastrectomy    for weight loss    Thyroid disease    Tibia/fibula fracture    OPEN REDUCTION  INTERNAL FIXATION    Urinary incontinence    after childbirth    Viral conjunctivitis    Visual impairment    glasses              Past Surgical History:   Procedure Laterality Date    Back surgery      Back surgery  04/21/2021    Back surgery  04/25/2023    compressed disc, removal of bone    Colonoscopy  06/11/2019    return in 5 years    Colonoscopy N/A 06/11/2019    Procedure: COLONOSCOPY;  Surgeon: Dago Heart MD;  Location: [facility] ENDOSCOPY    Colposcopy, cervix w/upper adjacent vagina; w/biopsy(s), cervix      D & c      for extended period    D & c      for extended period    Foot surgery Left     Had right foot done recently.    Knee arthroscopy Right     Lap sleeve gastrectomy  12/02/2019    Dr Rios, [facility]    Laparoscopy,pelvic,biopsy  1990    for infertility    Laparoscopy,pelvic,biopsy  1990    for infertility    Other surgical history Right 12/03/2015    Other surgical history  12/03/2015    Thyroidectomy      Total                 Social History     Socioeconomic History    Marital status:    Tobacco Use    Smoking status: Never     Passive exposure: Never    Smokeless tobacco: Never   Vaping Use    Vaping status: Never Used   Substance and Sexual Activity    Alcohol use: Yes     Alcohol/week: 2.0 - 3.0 standard drinks of alcohol     Types: 2 - 3 Glasses of wine per week     Comment: social    Drug use: No    Sexual activity: Yes     Partners: Male   Other Topics Concern    Caffeine Concern Yes     Comment: S2 glasses tea daily    Exercise No    Reaction to local anesthetic No    Pt has a pacemaker No    Pt has a defibrillator No   Social History Narrative    The patient uses the following assistive device(s):  single-point cane,uses 2 canes. Walker at night           The patient does live in a home with stairs.     Social Drivers of Health     Food Insecurity: No Food Insecurity (7/2/2025)    NCSS - Food Insecurity     Worried About Running Out of Food in the Last Year:  No     Ran Out of Food in the Last Year: No   Transportation Needs: No Transportation Needs (7/2/2025)    NCSS - Transportation     Lack of Transportation: No   Housing Stability: Not At Risk (7/2/2025)    NCSS - Housing/Utilities     Has Housing: Yes     Worried About Losing Housing: No     Unable to Get Utilities: No              Medications Ordered Prior to Encounter[2]      Constitutional and vital signs reviewed.      All other systems reviewed and negative except as noted above.    I have reviewed the family history, social history, allergies, and outpatient medications.     History reviewed from EMR: Encounters, problem list, allergies, medications      Physical Exam     ED Triage Vitals [07/15/25 1456]   /83   Pulse 98   Resp 22   Temp 98.1 °F (36.7 °C)   Temp src Oral   SpO2 98 %   O2 Device None (Room air)       Current:/83   Pulse 98   Temp 98.1 °F (36.7 °C) (Oral)   Resp 22   LMP 04/18/2015 (Exact Date)   SpO2 98%       Physical Exam  Vitals and nursing note reviewed.   Constitutional:       General: She is not in acute distress.     Appearance: Normal appearance. She is not ill-appearing or toxic-appearing.      Comments: involuntary writhing consistent with h/o parkinson's   HENT:      Head: Normocephalic and atraumatic. No raccoon eyes, right periorbital erythema or left periorbital erythema.      Right Ear: Tympanic membrane and ear canal normal.      Left Ear: Tympanic membrane and ear canal normal.      Nose: Nose normal.   Eyes:      General:         Right eye: No discharge.         Left eye: No discharge.      Extraocular Movements: Extraocular movements intact.      Conjunctiva/sclera: Conjunctivae normal.      Pupils: Pupils are equal, round, and reactive to light.   Cardiovascular:      Rate and Rhythm: Normal rate and regular rhythm.      Pulses: Normal pulses.      Heart sounds: Normal heart sounds.   Pulmonary:      Effort: Pulmonary effort is normal.      Breath sounds:  Normal breath sounds.   Chest:      Chest wall: Tenderness present. No mass, lacerations, deformity or crepitus.          Comments: TTP along left anterior rib cage, small yellow healing bruise to midsternum  Abdominal:      General: Abdomen is flat. Bowel sounds are normal. There is no distension.      Tenderness: There is no abdominal tenderness. There is no right CVA tenderness, left CVA tenderness or guarding.          Comments: Superficial erythematous, moist macerated rash in the infra-abdominal skin fold, likely intertrigo   Genitourinary:     Comments: Buttocks and sacrum without evidence of ecchymosis, erythema, or pressure ulcer  Musculoskeletal:      Cervical back: Normal range of motion and neck supple. No swelling, deformity, rigidity, tenderness, bony tenderness or crepitus.      Thoracic back: No deformity, tenderness or bony tenderness.      Lumbar back: No deformity or tenderness. Normal range of motion.        Back:       Right lower leg: No edema.      Left lower leg: No edema.      Comments: Lumbar scar consistent with hx of back surgery   Skin:     General: Skin is warm and dry.      Coloration: Skin is not jaundiced or pale.      Comments: Multiple bruises to bilateral arms and legs in multiple stages of healing, consistent with frequently fall hx   Neurological:      General: No focal deficit present.      Mental Status: She is alert and oriented to person, place, and time.      Cranial Nerves: Cranial nerves 2-12 are intact. No dysarthria or facial asymmetry.      Motor: Tremor present. No weakness.   Psychiatric:         Mood and Affect: Mood normal.         Behavior: Behavior normal.      Comments: Safe at home and in her relationships. Daughter a paramedic frequently involved in care            ED Course     Labs Reviewed   POCT CBC - Abnormal; Notable for the following components:       Result Value    MCV .6 (*)     # Lymphocyte 0.5 (*)     All other components within normal limits    POCT ISTAT CHEM8 CARTRIDGE - Abnormal; Notable for the following components:    ISTAT Glucose 103 (*)     All other components within normal limits   ISTAT TROPONIN - Normal     XR RIBS WITH CHEST (3 VIEWS), LEFT  (CPT=71101)   Final Result   PROCEDURE: XR RIBS WITH CHEST (3 VIEWS), LEFT  (CPT=71101)      INDICATIONS: 64-year-old female with left-sided chest wall pain after    falling onto walker.      COMPARISON: 01/04/2024 XR CHEST PA + LAT CHEST (CPT=71046)   05/17/2023 XR CHEST PA + LAT CHEST (CPT=71046)   04/10/2021 XR CHEST PA + LAT CHEST (CPT=71046)   08/15/2019 XR CHEST PA + LAT CHEST (CPT=71046)      TECHNIQUE: A frontal chest radiograph and left-sided rib series were    performed.      FINDINGS:   CARDIAC/VASC: The cardiomediastinal silhouette is not enlarged. There is    mild tortuosity of the thoracic aorta with peripheral atherosclerotic    vascular calcification. The pulmonary vascularity is within normal limits.      MEDIAST/JOHN: No visible mass or adenopathy.      LUNGS/PLEURA: No airspace consolidation, pleural effusion, or pneumothorax    is evident.      BONES: Mild degenerative changes of the thoracic spine are apparent. There    is no acute displaced fracture or visible bony lesion. Lower lumbar fusion    hardware is partially depicted.      OTHER: Surgical clips and suture material project over the left upper    quadrant.         =====   CONCLUSION:   Negative for radiographically evident acute intrathoracic process. No    radiographically visible acute displaced osseous injury of the left-sided    ribs.      Electronically Verified and Signed by Attending Radiologist: Dexter Verdugo MD 7/15/2025 4:19 PM   Workstation: [id number]          Vitals:    07/15/25 1456   BP: 110/83   Pulse: 98   Resp: 22   Temp: 98.1 °F (36.7 °C)   TempSrc: Oral   SpO2: 98%            [facility]        Lindsay Trotter, 64 year old female with medical history as noted above who presents with pain to anterior and  posterior chest pain s/p fall.  -VSS. NAD. No respiratory distress, tachypnea  -ddx considered but not limited to rib fracture, sternal fracture, pulmonary/cardiac contusion, chest wall pain, muscle strain/contusion, pneumothorax, rhabdomyolysis, ACS, vs other  -less likely rhabdomyolysis, Cr function has improved from 1.3 to 1.0 in clinic. Less likely ACS, troponin negative. Heart Score: 0  -EKG Sinus rhythm with 1st degree A-V block, no changes from comparison 2/8/25  -CXR without abnormalities, no pneumothorax or broken ribs  -The patient is encouraged to return if any concerning symptoms arise. Additional verbal discharge instructions are given and discussed. Discharge medications are discussed. The patient is in good condition throughout the visit today and remains so upon discharge. I discuss the plan of care with the patient, who expresses understanding. All questions and concerns are addressed to the patient's satisfaction prior to discharge today. ED precautions discussed.         ** See ED course below for additional information on care provided / interventions / notable events throughout patient's encounter.       ** I have independently reviewed the radiology images, clinical lab results, and ECG tracings as described above (if applicable)    ** Concerning co-morbidities possibly affecting complaint / care: parkinsons        Medical Decision Making  Amount and/or Complexity of Data Reviewed  External Data Reviewed: labs, ECG and notes.  Labs: ordered. Decision-making details documented in ED Course.  Radiology: ordered. Decision-making details documented in ED Course.  ECG/medicine tests: ordered. Decision-making details documented in ED Course.    Risk  OTC drugs.        Disposition and Plan     Disposition:  Discharge  7/15/2025  4:40 pm    Clinical Impression:  1. Rib contusion, left, initial encounter           Follow-up:  Dante Palafox MD  [address] [place] [address]  [id number]  [phone]              Medications Prescribed:  Discharge Medication List as of 7/15/2025  4:47 PM            CHARITO Lunsford, APRN, FNP-BC  Family Nurse Practitioner  [facility]      The above patient (and/or guardian) was made aware that an appropriate evaluation has been performed, and that no additional testing is required at this time. In my medical judgment, there is currently no evidence of an immediate life-threatening or surgical condition, therefore discharge is indicated at this time. The patient (and/or guardian) was advised that a small risk still exists that a serious condition could develop. The patient was instructed to arrange close follow-up with their primary care provider (or the referral provider given today). The patient received written and verbal instructions regarding their condition / concerns, demonstrated understanding, and is agreement with the outpatient treatment plan.            [1]   Patient Active Problem List  Diagnosis    Asthma (HCC)    Hyperlipidemia    Asthma, extrinsic (HCC)    Allergic rhinitis due to pollen    Allergic rhinitis due to other allergen    Rheumatoid arthritis, adult (HCC)    Parkinson disease (HCC)    Post-surgical hypothyroidism    Lumbar spinal stenosis    Greater trochanteric bursitis, left    Chronic back pain    Adjustment disorder with mixed anxiety and depressed mood    Chronic fatigue    Increased appetite    S/P laparoscopic sleeve gastrectomy    Obesity (BMI 30-39.9)    S/P lumbar fusion    Encounter for therapeutic drug monitoring    Slow transit constipation    Mild protein-calorie malnutrition (HCC)    Overweight (BMI 25.0-29.9)    Abnormal EKG    Abnormal mammogram    Abnormal nuclear stress test    Abnormal weight gain    Accessory carpal bone    Acute bronchitis    Adjustment disorder with anxiety    Benign neoplasm of skin of upper limb, including shoulder    Calcaneal spur    Candidiasis of mouth    Closed fracture of foot     Congenital anomaly of foot    Conjunctivitis    Contusion of foot    Cough    Dermatitis    Disease of conjunctiva due to viruses    Disorder of bursae and tendons in shoulder region    Esophageal reflux    Exertional dyspnea    Enthesopathy    Extrapyramidal disease and abnormal movement disorder    Extrinsic asthma with exacerbation (HCC)    Internal derangement of ankle    Lateral epicondylitis of elbow    Levodopa-induced dyskinesia    Lump or mass in breast    Musculoskeletal symptoms referable to limbs    Nontoxic multinodular goiter    Pain in soft tissues of limb    Painful respiration    Plantar fascial fibromatosis    Postnasal drip    Scanty or infrequent menstruation    Sebaceous cyst    Sprain of ligament of ankle    Synovitis and tenosynovitis    Peroneal tendon tear    Voice disturbance   [2]   No current facility-administered medications on file prior to encounter.     Current Outpatient Medications on File Prior to Encounter   Medication Sig Dispense Refill    albuterol (VENTOLIN HFA) 108 (90 Base) MCG/ACT Inhalation Aero Soln Inhale 2 puffs into the lungs every 4 (four) hours as needed for Wheezing. 1 each 0    azelastine 0.1 % Nasal Solution 2 sprays by Nasal route 2 (two) times daily. 3 each 0    montelukast 10 MG Oral Tab Take 1 tablet (10 mg total) by mouth nightly. 90 tablet 2    nitrofurantoin monohydrate macro (MACROBID) 100 MG Oral Cap Take 1 capsule (100 mg total) by mouth 2 (two) times daily. 14 capsule 0    predniSONE 5 MG Oral Tab Take 1 tablet (5 mg total) by mouth daily as needed. Take Prednisone 5 mg daily as needed for pain in the morning with food. 90 tablet 2    XELJANZ XR 11 MG Oral Tablet 24 Hr TAKE 1 TABLET BY MOUTH 1 TIME A DAY 90 tablet 1    METHOTREXATE 2.5 MG Oral Tab TAKE 8 TABS WEEKLY BY MOUTH 96 tablet 1    calcitriol 0.25 MCG Oral Cap Take 1 capsule (0.25 mcg total) by mouth daily. 90 capsule 3    PANTOPRAZOLE 40 MG Oral Tab EC TAKE 1 TABLET BY MOUTH BEFORE BREAKFAST  90 tablet 2    Phentermine HCl 37.5 MG Oral Tab Take 1 tablet (37.5 mg total) by mouth before breakfast. 30 tablet 5    topiramate 100 MG Oral Tab Take 1 tablet (100 mg total) by mouth 2 (two) times daily. 180 tablet 1    pravastatin 20 MG Oral Tab Take 1 tablet (20 mg total) by mouth nightly. 90 tablet 3    folic acid 1 MG Oral Tab Take 1 tablet (1 mg total) by mouth daily. 90 tablet 3    traMADol 50 MG Oral Tab Take 0.5 tablets (25 mg total) by mouth every 8 (eight) hours as needed for Pain. 30 tablet 0    levothyroxine 125 MCG Oral Tab Take 1 tablet (125 mcg total) by mouth before breakfast. Alternate every other day with levothyroxine 112 mgg. 90 tablet 3    levothyroxine 112 MCG Oral Tab Take 1 tablet (112 mcg total) by mouth before breakfast. 90 tablet 3    carbidopa-levodopa  MG Oral Tab       albuterol 108 (90 Base) MCG/ACT Inhalation Aero Soln INHALE 2 PUFFS BY INHALATION ROUTE EVERY 4 - 6 HOURS AS NEEDED 3 each 0    Hydrocod Navi-Chlorphe Navi ER 10-8 MG/5ML Oral Suspension Extended Release Take 5 mL by mouth 2 (two) times daily as needed. 115 mL 0    albuterol (2.5 MG/3ML) 0.083% Inhalation Nebu Soln Take 3 mL (2.5 mg total) by nebulization every 4 (four) hours as needed for Wheezing. 30 each 0    Boswellia-Glucosamine-Vit D (OSTEO BI-FLEX ONE PER DAY OR) Take by mouth As Directed.      Amantadine HCl 100 MG Oral Cap Take 1 capsule (100 mg total) by mouth in the morning.      omega-3 fatty acids 1000 MG Oral Cap Take 1,000 mg by mouth in the morning.      TURMERIC OR Take by mouth in the morning and before bedtime.      Multiple Vitamins-Minerals (BARIATRIC FUSION) Oral Chew Tab Chew 1 tablet by mouth in the morning, at noon, in the evening, and at bedtime.

## 2025-07-15 NOTE — TELEPHONE ENCOUNTER
Action Requested: Summary for Provider     []  Critical Lab, Recommendations Needed  [] Need Additional Advice  []   FYI    []   Need Orders  [] Need Medications Sent to Pharmacy  []  Other     SUMMARY: Patient reports fell on her walker on , hit her chest.  Denied loss of consciousness.  Has been icing chest since then but now it hurts to take deep breath.  Used tramadol which helped her sleep.   Also reports fell out of bed this morning, and just now was able to get up.  Advised her to get someone to take her to ER, but she wanted an appointment.  She went to [facility] last evening but it was closing time and she could not be seen.  She declined to have ambulance called.  Plan:  scheduled office visit but advised patient to get a .  Advised if pain worsens, to go to ER.  She stated understanding.  Future Appointments   Date Time Provider Department Center   7/15/2025 11:00 AM Katelyn Marr MD E[facility] [place]   2025  4:10 PM EC [facility] [place]   2025  2:00 PM Dante Palafox MD ECS[facility] [place]   2025  4:00 PM EC [facility]ERGY [facility] [place]   2025  2:15 PM Harsha Thibodeaux MD [address]   2025 12:00 PM Urmila Gan RD [address]   2025 11:40 AM [facility] [place]     Reason for call: Acute fell on her walker, injured chest  Onset: 2025.    Spoke with patient,  verified.      Reason for Disposition   Can't take a deep breath but no respiratory distress (e.g., hurts to take a deep breath)    Protocols used: Chest Injury-A-OH     contact guard minimum assist (75% patients effort)

## 2025-07-16 ENCOUNTER — APPOINTMENT (OUTPATIENT)
Dept: PHYSICAL THERAPY | Facility: HOSPITAL | Age: 65
End: 2025-07-16
Attending: Other

## 2025-07-16 LAB
ATRIAL RATE: 87 BPM
P AXIS: 61 DEGREES
P-R INTERVAL: 210 MS
Q-T INTERVAL: 364 MS
QRS DURATION: 86 MS
QTC CALCULATION (BEZET): 438 MS
R AXIS: -14 DEGREES
T AXIS: 20 DEGREES
VENTRICULAR RATE: 87 BPM

## 2025-07-21 ENCOUNTER — PATIENT MESSAGE (OUTPATIENT)
Dept: INTERNAL MEDICINE CLINIC | Facility: CLINIC | Age: 65
End: 2025-07-21

## 2025-07-22 ENCOUNTER — APPOINTMENT (OUTPATIENT)
Dept: PHYSICAL THERAPY | Facility: HOSPITAL | Age: 65
End: 2025-07-22
Attending: Other

## 2025-07-23 ENCOUNTER — TELEPHONE (OUTPATIENT)
Dept: INTERNAL MEDICINE CLINIC | Facility: CLINIC | Age: 65
End: 2025-07-23

## 2025-07-23 NOTE — TELEPHONE ENCOUNTER
Forms from [facility] 6 pages have been received and will send to the Forms Department.

## 2025-07-25 NOTE — TELEPHONE ENCOUNTER
Received Protected health information. Via QA on Request message. The heart form is requesting 6 months of notes. Will be forwarded to medical records.

## 2025-07-28 ENCOUNTER — NURSE ONLY (OUTPATIENT)
Dept: ALLERGY | Facility: CLINIC | Age: 65
End: 2025-07-28
Payer: COMMERCIAL

## 2025-07-28 ENCOUNTER — TELEPHONE (OUTPATIENT)
Dept: INTERNAL MEDICINE CLINIC | Facility: CLINIC | Age: 65
End: 2025-07-28

## 2025-07-28 DIAGNOSIS — J30.89 ENVIRONMENTAL AND SEASONAL ALLERGIES: Primary | ICD-10-CM

## 2025-07-28 NOTE — TELEPHONE ENCOUNTER
Patient calling ( name and date of birth of patient verified ) regards to MyChart message   The area is her left groin , the area is pink , no odor noted   Needs to be seen     Appointment made       Future Appointments   Date Time Provider Department Center   7/28/2025  4:10 PM EC [facility] [place]   7/29/2025 11:15 AM Katelyn aMrr MD E[facility]ND[facility] [place]   8/18/2025  2:00 PM Dante Palafox MD ECSCHIM [facility] [place]   8/25/2025  4:00 PM EC ALLERGY [facility] [place]   9/11/2025  2:15 PM Harsha Thibodeaux MD [id number]   12/2/2025 12:00 PM Urmila Gan RD [address]   12/23/2025 11:40 AM Harmony Walker DO ECLMBR[facility]ULCE MARIA  [place] [facility] to P [facility] R[facility] Triage (supporting Dante Palafox MD)      7/21/25  8:34 PM  When I was in the urgent care last week for my rib pain from a fall  the doctor told me that I had a yeast infection in the crease of my left leg. But she never told me what I was supposed to do about it. Can you please tell me what I need to do to get rid of it?

## 2025-07-29 ENCOUNTER — OFFICE VISIT (OUTPATIENT)
Dept: INTERNAL MEDICINE CLINIC | Facility: CLINIC | Age: 65
End: 2025-07-29

## 2025-07-29 ENCOUNTER — APPOINTMENT (OUTPATIENT)
Dept: PHYSICAL THERAPY | Facility: HOSPITAL | Age: 65
End: 2025-07-29
Attending: Other

## 2025-07-29 VITALS
TEMPERATURE: 98 F | SYSTOLIC BLOOD PRESSURE: 110 MMHG | OXYGEN SATURATION: 99 % | HEART RATE: 97 BPM | RESPIRATION RATE: 16 BRPM | HEIGHT: 69 IN | DIASTOLIC BLOOD PRESSURE: 60 MMHG | BODY MASS INDEX: 29.18 KG/M2 | WEIGHT: 197 LBS

## 2025-07-29 DIAGNOSIS — B36.9 FUNGAL RASH OF TRUNK: Primary | ICD-10-CM

## 2025-07-29 PROCEDURE — 99213 OFFICE O/P EST LOW 20 MIN: CPT | Performed by: INTERNAL MEDICINE

## 2025-07-29 PROCEDURE — 3074F SYST BP LT 130 MM HG: CPT | Performed by: INTERNAL MEDICINE

## 2025-07-29 PROCEDURE — 3008F BODY MASS INDEX DOCD: CPT | Performed by: INTERNAL MEDICINE

## 2025-07-29 PROCEDURE — 3078F DIAST BP <80 MM HG: CPT | Performed by: INTERNAL MEDICINE

## 2025-07-29 RX ORDER — NYSTATIN 100000 [USP'U]/G
POWDER TOPICAL
Qty: 1 G | Refills: 0 | Status: SHIPPED | OUTPATIENT
Start: 2025-07-29

## 2025-08-05 ENCOUNTER — APPOINTMENT (OUTPATIENT)
Dept: PHYSICAL THERAPY | Facility: HOSPITAL | Age: 65
End: 2025-08-05
Attending: Other

## 2025-08-12 ENCOUNTER — APPOINTMENT (OUTPATIENT)
Dept: PHYSICAL THERAPY | Facility: HOSPITAL | Age: 65
End: 2025-08-12
Attending: Other

## 2025-08-18 ENCOUNTER — OFFICE VISIT (OUTPATIENT)
Dept: INTERNAL MEDICINE CLINIC | Facility: CLINIC | Age: 65
End: 2025-08-18

## 2025-08-18 VITALS
OXYGEN SATURATION: 99 % | TEMPERATURE: 98 F | RESPIRATION RATE: 20 BRPM | SYSTOLIC BLOOD PRESSURE: 104 MMHG | WEIGHT: 192 LBS | BODY MASS INDEX: 28.44 KG/M2 | HEIGHT: 69 IN | HEART RATE: 90 BPM | DIASTOLIC BLOOD PRESSURE: 52 MMHG

## 2025-08-18 DIAGNOSIS — G47.33 OSA (OBSTRUCTIVE SLEEP APNEA): ICD-10-CM

## 2025-08-18 DIAGNOSIS — G20.B1 PARKINSON'S DISEASE WITH DYSKINESIA, UNSPECIFIED WHETHER MANIFESTATIONS FLUCTUATE (HCC): ICD-10-CM

## 2025-08-18 DIAGNOSIS — M51.9 LUMBAR DISC DISEASE: ICD-10-CM

## 2025-08-18 DIAGNOSIS — J45.40 MODERATE PERSISTENT EXTRINSIC ASTHMA WITHOUT COMPLICATION (HCC): ICD-10-CM

## 2025-08-18 DIAGNOSIS — Z23 NEED FOR VACCINATION: ICD-10-CM

## 2025-08-18 DIAGNOSIS — E03.9 HYPOTHYROIDISM, UNSPECIFIED TYPE: ICD-10-CM

## 2025-08-18 DIAGNOSIS — M06.9 RHEUMATOID ARTHRITIS, INVOLVING UNSPECIFIED SITE, UNSPECIFIED WHETHER RHEUMATOID FACTOR PRESENT (HCC): ICD-10-CM

## 2025-08-18 DIAGNOSIS — Z00.00 ROUTINE PHYSICAL EXAMINATION: Primary | ICD-10-CM

## 2025-08-18 DIAGNOSIS — Z12.11 COLON CANCER SCREENING: ICD-10-CM

## 2025-08-18 DIAGNOSIS — Z98.84 S/P LAPAROSCOPIC SLEEVE GASTRECTOMY: ICD-10-CM

## 2025-08-18 PROBLEM — E44.1 MILD PROTEIN-CALORIE MALNUTRITION (HCC): Status: RESOLVED | Noted: 2023-05-30 | Resolved: 2025-08-18

## 2025-08-20 ENCOUNTER — LAB ENCOUNTER (OUTPATIENT)
Dept: LAB | Facility: HOSPITAL | Age: 65
End: 2025-08-20
Attending: INTERNAL MEDICINE

## 2025-08-20 DIAGNOSIS — M06.9 RHEUMATOID ARTHRITIS, INVOLVING UNSPECIFIED SITE, UNSPECIFIED WHETHER RHEUMATOID FACTOR PRESENT (HCC): ICD-10-CM

## 2025-08-20 DIAGNOSIS — Z79.899 ENCOUNTER FOR LONG-TERM (CURRENT) USE OF HIGH-RISK MEDICATION: ICD-10-CM

## 2025-08-20 DIAGNOSIS — Z51.81 THERAPEUTIC DRUG MONITORING: ICD-10-CM

## 2025-08-20 DIAGNOSIS — Z00.00 ROUTINE PHYSICAL EXAMINATION: ICD-10-CM

## 2025-08-20 DIAGNOSIS — G20.A1 PARKINSON'S DISEASE, UNSPECIFIED WHETHER DYSKINESIA PRESENT, UNSPECIFIED WHETHER MANIFESTATIONS FLUCTUATE (HCC): ICD-10-CM

## 2025-08-20 DIAGNOSIS — Z98.84 S/P LAPAROSCOPIC SLEEVE GASTRECTOMY: ICD-10-CM

## 2025-08-20 DIAGNOSIS — M06.4 INFLAMMATORY POLYARTHRITIS (HCC): ICD-10-CM

## 2025-08-20 LAB
ALBUMIN SERPL-MCNC: 4.5 G/DL (ref 3.2–4.8)
ALBUMIN/GLOB SERPL: 1.6 (ref 1–2)
ALP LIVER SERPL-CCNC: 81 U/L (ref 50–130)
ALT SERPL-CCNC: <7 U/L (ref 10–49)
ANION GAP SERPL CALC-SCNC: 8 MMOL/L (ref 0–18)
AST SERPL-CCNC: 14 U/L (ref ?–34)
BASOPHILS # BLD AUTO: 0.05 X10(3) UL (ref 0–0.2)
BASOPHILS NFR BLD AUTO: 0.7 %
BILIRUB SERPL-MCNC: 0.5 MG/DL (ref 0.2–1.1)
BUN BLD-MCNC: 15 MG/DL (ref 9–23)
BUN/CREAT SERPL: 14 (ref 10–20)
CALCIUM BLD-MCNC: 9.7 MG/DL (ref 8.7–10.4)
CHLORIDE SERPL-SCNC: 108 MMOL/L (ref 98–112)
CHOLEST SERPL-MCNC: 179 MG/DL (ref ?–200)
CO2 SERPL-SCNC: 25 MMOL/L (ref 21–32)
CREAT BLD-MCNC: 1.07 MG/DL (ref 0.55–1.02)
CRP SERPL-MCNC: 5.3 MG/DL (ref ?–0.5)
DEPRECATED RDW RBC AUTO: 51.8 FL (ref 35.1–46.3)
EGFRCR SERPLBLD CKD-EPI 2021: 58 ML/MIN/1.73M2 (ref 60–?)
EOSINOPHIL # BLD AUTO: 0.62 X10(3) UL (ref 0–0.7)
EOSINOPHIL NFR BLD AUTO: 8.6 %
ERYTHROCYTE [DISTWIDTH] IN BLOOD BY AUTOMATED COUNT: 14.1 % (ref 11–15)
ERYTHROCYTE [SEDIMENTATION RATE] IN BLOOD: 57 MM/HR (ref 0–30)
FASTING PATIENT LIPID ANSWER: YES
FASTING STATUS PATIENT QL REPORTED: YES
GLOBULIN PLAS-MCNC: 2.8 G/DL (ref 2–3.5)
GLUCOSE BLD-MCNC: 91 MG/DL (ref 70–99)
HAV IGM SER QL: NONREACTIVE
HBV CORE IGM SER QL: NONREACTIVE
HBV SURFACE AG SERPL QL IA: NONREACTIVE
HCT VFR BLD AUTO: 39.1 % (ref 35–48)
HCV AB SERPL QL IA: NONREACTIVE
HDLC SERPL-MCNC: 85 MG/DL (ref 40–59)
HGB BLD-MCNC: 12.6 G/DL (ref 12–16)
IMM GRANULOCYTES # BLD AUTO: 0.02 X10(3) UL (ref 0–1)
IMM GRANULOCYTES NFR BLD: 0.3 %
LDLC SERPL CALC-MCNC: 83 MG/DL (ref ?–100)
LYMPHOCYTES # BLD AUTO: 0.36 X10(3) UL (ref 1–4)
LYMPHOCYTES NFR BLD AUTO: 5 %
MCH RBC QN AUTO: 32.5 PG (ref 26–34)
MCHC RBC AUTO-ENTMCNC: 32.2 G/DL (ref 31–37)
MCV RBC AUTO: 100.8 FL (ref 80–100)
MONOCYTES # BLD AUTO: 0.39 X10(3) UL (ref 0.1–1)
MONOCYTES NFR BLD AUTO: 5.4 %
NEUTROPHILS # BLD AUTO: 5.75 X10 (3) UL (ref 1.5–7.7)
NEUTROPHILS # BLD AUTO: 5.75 X10(3) UL (ref 1.5–7.7)
NEUTROPHILS NFR BLD AUTO: 80 %
NONHDLC SERPL-MCNC: 94 MG/DL (ref ?–130)
OSMOLALITY SERPL CALC.SUM OF ELEC: 292 MOSM/KG (ref 275–295)
PLATELET # BLD AUTO: 245 10(3)UL (ref 150–450)
POTASSIUM SERPL-SCNC: 4.1 MMOL/L (ref 3.5–5.1)
PROT SERPL-MCNC: 7.3 G/DL (ref 5.7–8.2)
RBC # BLD AUTO: 3.88 X10(6)UL (ref 3.8–5.3)
SODIUM SERPL-SCNC: 141 MMOL/L (ref 136–145)
TRIGL SERPL-MCNC: 56 MG/DL (ref 30–149)
TSI SER-ACNC: 3.96 UIU/ML (ref 0.55–4.78)
VIT B12 SERPL-MCNC: 1823 PG/ML (ref 211–911)
VIT D+METAB SERPL-MCNC: 48 NG/ML (ref 30–100)
VLDLC SERPL CALC-MCNC: 9 MG/DL (ref 0–30)
WBC # BLD AUTO: 7.2 X10(3) UL (ref 4–11)

## 2025-08-20 PROCEDURE — 80061 LIPID PANEL: CPT

## 2025-08-20 PROCEDURE — 80053 COMPREHEN METABOLIC PANEL: CPT

## 2025-08-20 PROCEDURE — 36415 COLL VENOUS BLD VENIPUNCTURE: CPT

## 2025-08-20 PROCEDURE — 80074 ACUTE HEPATITIS PANEL: CPT

## 2025-08-20 PROCEDURE — 84443 ASSAY THYROID STIM HORMONE: CPT

## 2025-08-20 PROCEDURE — 86140 C-REACTIVE PROTEIN: CPT

## 2025-08-20 PROCEDURE — 85652 RBC SED RATE AUTOMATED: CPT

## 2025-08-20 PROCEDURE — 82607 VITAMIN B-12: CPT

## 2025-08-20 PROCEDURE — 82306 VITAMIN D 25 HYDROXY: CPT

## 2025-08-20 PROCEDURE — 86480 TB TEST CELL IMMUN MEASURE: CPT

## 2025-08-20 PROCEDURE — 85025 COMPLETE CBC W/AUTO DIFF WBC: CPT

## 2025-08-22 LAB
M TB IFN-G CD4+ T-CELLS BLD-ACNC: 0.06 IU/ML
M TB TUBERC IGNF/MITOGEN IGNF CONTROL: 0.33 IU/ML
QFT TB1 AG MINUS NIL: 0 IU/ML
QFT TB2 AG MINUS NIL: 0 IU/ML

## 2025-08-25 ENCOUNTER — NURSE ONLY (OUTPATIENT)
Dept: ALLERGY | Facility: CLINIC | Age: 65
End: 2025-08-25

## 2025-08-25 DIAGNOSIS — J30.89 ENVIRONMENTAL AND SEASONAL ALLERGIES: Primary | ICD-10-CM

## 2025-08-25 RX ORDER — PANTOPRAZOLE SODIUM 40 MG/1
40 TABLET, DELAYED RELEASE ORAL
Qty: 90 TABLET | Refills: 2 | OUTPATIENT
Start: 2025-08-25

## 2025-08-25 RX ORDER — ALBUTEROL SULFATE 90 UG/1
2 INHALANT RESPIRATORY (INHALATION) EVERY 4 HOURS PRN
Qty: 6.7 EACH | Refills: 0 | Status: SHIPPED | OUTPATIENT
Start: 2025-08-25

## 2025-08-28 PROBLEM — S16.1XXA STRAIN OF NECK MUSCLE, INITIAL ENCOUNTER: Status: ACTIVE | Noted: 2025-08-28
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
## 2025-08-28 PROBLEM — R93.7 ABNORMAL CT SCAN, CERVICAL SPINE: Status: ACTIVE | Noted: 2025-08-28

## 2025-08-28 PROBLEM — S46.911A STRAIN OF RIGHT SHOULDER, INITIAL ENCOUNTER: Status: ACTIVE | Noted: 2025-08-28

## 2025-08-29 RX ORDER — LEVOTHYROXINE SODIUM 125 UG/1
125 TABLET ORAL
Qty: 90 TABLET | Refills: 3 | OUTPATIENT
Start: 2025-08-29

## (undated) DIAGNOSIS — K59.01 SLOW TRANSIT CONSTIPATION: ICD-10-CM

## (undated) DEVICE — TROCAR: Brand: KII® SLEEVE

## (undated) DEVICE — DERMABOND LIQUID ADHESIVE

## (undated) DEVICE — LAP CHOLE: Brand: MEDLINE INDUSTRIES, INC.

## (undated) DEVICE — NVM5 PROBE SNGL USE STER PKG

## (undated) DEVICE — Device: Brand: CUSTOM PROCEDURE KIT

## (undated) DEVICE — Device: Brand: DEFENDO AIR/WATER/SUCTION AND BIOPSY VALVE

## (undated) DEVICE — STERILE LATEX POWDER-FREE SURGICAL GLOVESWITH NITRILE COATING: Brand: PROTEXIS

## (undated) DEVICE — WRAP COOLING BACK W/NO PILLOW

## (undated) DEVICE — VISIGI 3D®  CALIBRATION SYSTEM  SIZE 40FR STD W/ BULB: Brand: BOEHRINGER® VISIGI 3D™ SLEEVE GASTRECTOMY CALIBRATION SYSTEM, SIZE 40FR W/BULB

## (undated) DEVICE — WIRE FX PRCPT KRSH BVL BLNT

## (undated) DEVICE — LAMINECTOMY: Brand: MEDLINE INDUSTRIES, INC.

## (undated) DEVICE — SUTURE MONOCRYL 4-0 PS-2

## (undated) DEVICE — LINE MNTR ADLT SET O2 INTMD

## (undated) DEVICE — FLOSEAL HEMOSTATIC MATRIX, 5ML: Brand: FLOSEAL HEMOSTATIC MATRIX

## (undated) DEVICE — HOVERMATT 34IN SINGLE USE

## (undated) DEVICE — 3M™ TEGADERM™ TRANSPARENT FILM DRESSING, 1626W, 4 IN X 4-3/4 IN (10 CM X 12 CM), 50 EACH/CARTON, 4 CARTON/CASE: Brand: 3M™ TEGADERM™

## (undated) DEVICE — ECHELON FLEX POWERED PLUS LONG ARTICULATING ENDOSCOPIC LINEAR CUTTER, 60MM: Brand: ECHELON FLEX

## (undated) DEVICE — ENCORE® LATEX ACCLAIM SIZE 7.5, STERILE LATEX POWDER-FREE SURGICAL GLOVE: Brand: ENCORE

## (undated) DEVICE — DRAPE SHEET LG

## (undated) DEVICE — MEDI-VAC NON-CONDUCTIVE SUCTION TUBING 6MM X 1.8M (6FT.) L: Brand: CARDINAL HEALTH

## (undated) DEVICE — 35 ML SYRINGE REGULAR TIP: Brand: MONOJECT

## (undated) DEVICE — KIT DRN 1/8IN PVC 3 SPRG EVAC

## (undated) DEVICE — NVM5 MULTIMODALITY SURFACE KIT

## (undated) DEVICE — NV CLIP DSC&IN-LINE ACTIVATOR

## (undated) DEVICE — PRECISION MATCH HEAD

## (undated) DEVICE — FORCEP RADIAL JAW 4

## (undated) DEVICE — X-RAY DETECTABLE SPONGES,16 PLY: Brand: VISTEC

## (undated) DEVICE — C-ARMOR C-ARM EQUIPMENT COVERS CLEAR STERILE UNIVERSAL FIT 12 PER CASE: Brand: C-ARMOR

## (undated) DEVICE — TROCAR: Brand: KII FIOS FIRST ENTRY

## (undated) DEVICE — APPLICATOR COTTON TIP 6\" 2/PK

## (undated) DEVICE — SOL  .9 1000ML BTL

## (undated) DEVICE — DRAPE SRG 90X60IN BCK TBL CVR

## (undated) DEVICE — PEN: MARKING STD PT 100/CS: Brand: MEDICAL ACTION INDUSTRIES

## (undated) DEVICE — LIGACLIP 10-M/L, 10MM ENDOSCOPIC ROTATING MULTIPLE CLIP APPLIERS: Brand: LIGACLIP

## (undated) DEVICE — GLV RAD SENSICARE BLACK 8.5

## (undated) DEVICE — 3M™ STERI-STRIP™ REINFORCED ADHESIVE SKIN CLOSURES, R1548, 1 IN X 5 IN (25 MM X 125 MM), 4 STRIPS/ENVELOPE: Brand: 3M™ STERI-STRIP™

## (undated) DEVICE — UNDYED BRAIDED (POLYGLACTIN 910), SYNTHETIC ABSORBABLE SUTURE: Brand: COATED VICRYL

## (undated) DEVICE — 9534HP TRANSPARENT DRSG W/FRAME: Brand: 3M™ TEGADERM™

## (undated) DEVICE — GAMMEX® PI HYBRID SIZE 7.5, STERILE POWDER-FREE SURGICAL GLOVE, POLYISOPRENE AND NEOPRENE BLEND: Brand: GAMMEX

## (undated) DEVICE — SOL  .9 3000ML

## (undated) DEVICE — PROXIMATE SKIN STAPLERS (35 WIDE) CONTAINS 35 STAINLESS STEEL STAPLES (FIXED HEAD): Brand: PROXIMATE

## (undated) DEVICE — SEAM GUARD 60 ECHELON

## (undated) DEVICE — SUTURE VICRYL 3-0 SH

## (undated) DEVICE — DISPOSABLE SLIM BIPOLAR FORCEPS, NON-STICK,: Brand: SPETZLER-MALIS

## (undated) DEVICE — CONMED SCOPE SAVER BITE BLOCK, 20X27 MM: Brand: SCOPE SAVER

## (undated) DEVICE — SUTURE PDS II 1 CT-1

## (undated) DEVICE — NV I-PAS III BEVELED TIP STER

## (undated) DEVICE — 3M™ STERI-DRAPE™ INSTRUMENT POUCH 1018L: Brand: STERI-DRAPE™

## (undated) DEVICE — DRESSING BIOPATCH 1X4 CNTR

## (undated) DEVICE — GOWN SURG AERO BLUE PERF LG

## (undated) DEVICE — NEEDLE CONTRAST INTERJECT 25G

## (undated) DEVICE — ENDOPATH ECHELON ENDOSCOPIC LINEAR CUTTER RELOADS, BLACK, 60MM: Brand: ECHELON ENDOPATH

## (undated) DEVICE — ENDOPATH ECHELON ENDOSCOPIC LINEAR CUTTER RELOADS, GREEN, 60MM: Brand: ECHELON ENDOPATH

## (undated) DEVICE — [HIGH FLOW INSUFFLATOR,  DO NOT USE IF PACKAGE IS DAMAGED,  KEEP DRY,  KEEP AWAY FROM SUNLIGHT,  PROTECT FROM HEAT AND RADIOACTIVE SOURCES.]: Brand: PNEUMOSURE

## (undated) DEVICE — KIT ACCESS MAXCESS 4

## (undated) DEVICE — GOWN SRG XL IMPRV BRTHBL STRL

## (undated) DEVICE — Device: Brand: JELCO

## (undated) DEVICE — CHLORAPREP 26ML APPLICATOR

## (undated) DEVICE — SUTURE VICRYL 3-0 J761D

## (undated) DEVICE — ENCORE® LATEX ACCLAIM SIZE 7, STERILE LATEX POWDER-FREE SURGICAL GLOVE: Brand: ENCORE

## (undated) DEVICE — ENCORE® LATEX MICRO SIZE 7, STERILE LATEX POWDER-FREE SURGICAL GLOVE: Brand: ENCORE

## (undated) DEVICE — GRAFT DELIVERY SYS MODULE

## (undated) NOTE — LETTER
AUTHORIZATION FOR SURGICAL OPERATION OR OTHER PROCEDURE    1.  I hereby authorize Dr. Lani Conley and the [facility] staff assigned to my case to perform the following operation and/or procedure at the [facility]:    ___________Left trochanteric bursa inj Patient Name:  __________Lindsay Trotter______________________________  (please print)       Patient signature:  ___________________________________________________             Relationship to Patient:             Parent    Responsible person

## (undated) NOTE — MR AVS SNAPSHOT
After Visit Summary   10/26/2017    Valerio Balaji    MRN: [id number]           Visit Information     Date & Time  10/26/2017 12:30 PM Provider  Jesus Luna, [address] [facility] Dept.  Phone  [phone] Were Melatonin 10 MG Oral Cap Take 1 tablet by mouth nightly. FOLIC ACID 1 MG Oral Tab TAKE 1 TABLET BY MOUTH DAILY. ergocalciferol (DRISDOL/VITAMIN D2) 61952 UNITS Oral Cap 50,000 Units every 30 (thirty) days.     carbidopa-levodopa (SINEMET)  MG O Injury & illness are never convenient. If you are dealing with a   non-emergency, consider your options before heading to an ER.          SAME DAY  APPOINTMENTS  Available at primary care offices      [facility]

## (undated) NOTE — MR AVS SNAPSHOT
Andrew  [address]  [phone]               Thank you for choosing us for your health care visit with RAFAELA Kearns.   We are glad to serve you and happy to provide you with this summar Thursday April 20, 2017     Imaging:  US PELVIS (TRANSABDOMINAL AND TRANSVAGINAL) (ACF=[id number])    Instructions:   To schedule a test at any [facility] at [phone], Monday through Friday between INHALE 2 PUFFS INTO THE LUNGS EVERY 6 (SIX) HOURS AS NEEDED FOR WHEEZING. AZILECT 1 MG Tabs   Generic drug:  Rasagiline Mesylate   Take 1 tablet by mouth once daily.            calciTRIOL 0.25 MCG Caps   Take 1 capsule (0.25 mcg total) by mouth da sulfaSALAzine 500 MG Tabs   Take 1 tablet (500 mg total) by mouth 2 (two) times daily.  With meals   Commonly known as:  AZULFIDINE           TraMADol HCl 50 MG Tabs   TAKE 1 TABLET BY MOUTH EVERY 12 HOURS AS NEEDED   Commonly known as:  ULTRAM           *

## (undated) NOTE — LETTER
[facility] [address], [address] [place] [address]    Consent for Anesthesia   1.    Elbert Torres agree to be cared for by a physician anesthesiologist alone and/or with a nurse anesthetist, who is specially trained to monitor me and give m allergic reactions to medications, injury to my airway, heart, lungs, vision, nerves, or muscles and in extremely rare instances death. 5. My doctor has explained to me other choices available to me for my care (alternatives).   6. Pregnant Patients (“epid Printed: 9/13/2021 at 3:17 PM    Medical Record #: [id number]                                            Page 1 of 1

## (undated) NOTE — LETTER
[facility] SURGERY SCHEDULING FORM   [address]   [phone] (scheduling phone) [phone] (scheduling fax)     PATIENT INFORMATION   Last Name:      Luis Antonio Le      First Name:    Marla Mcgee Allergies:  Other; Penicillins; Diptheria-Tetanus Toxoids-Dt  [Diphteria And Tetanus]         Completed by:   Tavia Mcelroy      Date:   12/4/2018

## (undated) NOTE — ED AVS SNAPSHOT
[facility] Emergency [facility]    [address]     [place] [address]    Phone:  [phone]    Fax:  [phone] [facility]   MRN: [id number]    Department:  [facility] Emergency [facility]   Date of Visit:  4/24 Quantity:  10 tablet   Commonly known as:  NORCO   Take 1-2 tablets by mouth every 4 (four) hours as needed for Pain (Do not exceed 8 tabs per day. ).             Where to Get Your Medications      You can get these medications from any pharmacy     Bring a [facility]. Follow-up care is at the discretion of that Physician.   If you need additional assistance selecting a physician, you may call the [employer] Physician Referral and Class Registration line at [phone] [address] [address]  [place] [address]) [phone]                Additional Information       We are concerned for your overall well being:    - If you are a smoker or have smoked in the last 12 months, we encourage you to explore op

## (undated) NOTE — MR AVS SNAPSHOT
Andrew  [address]  [phone]               Thank you for choosing us for your health care visit with RAFAELA Armstrong.   We are glad to serve you and happy to provide you with this summar Pacheco    It is the patient's responsibility to check with and follow their insurance company's guidelines for prior authorization for this test.  You may be held responsible for payment in full if proper authorization is not acqui TAKE 1 TABLET BY MOUTH TWICE A DAY   Commonly known as:  VOLTAREN           ergocalciferol 19203 units Caps   50,000 Units every 30 (thirty) days.    Commonly known as:  DRISDOL/VITAMIN D2           fluticasone-salmeterol 250-50 MCG/DOSE Aepb   INHALE 1 PUF [email or web address]. If you've recently had a stay at the Hospital you can access your discharge instructions in [employer] by going to Visits < Admission Summaries.  If you've been to the Emergency Department or your doctor's office, you can view yo

## (undated) NOTE — LETTER
[address]   Date:   2/2/2021     Name:   Laila Dennis    YOB: 1960   MRN:   [id number]       WHERE IS YOUR PAIN NOW? Roscoe the areas on your body where you feel the described sensations.   Use the appropriate s

## (undated) NOTE — LETTER
ASTHMA ACTION PLAN for Thad Flor     : 1960          Date: 2021    Sam Giron MD  [place] [address], [address] [facility] [address] [phone] 1.   GREEN - GO!  % Personal Best P given to the patient/caregiver. [] Asthma Action Plan reviewed with patient (and caregiver if necessary) on the phone and mailed copy to patient.          Physician Patient Caretaker (if necessary)   MD Devyn Henson

## (undated) NOTE — LETTER
Dear Dr. Arias Pleasant    This letter is to inform you that Michael Goff has been attending Physical Therapy with me. See below for my most recent plan of care.        Patient Name: Michael Goff, : 1960, MRN: [id number]   Date:   2. Pt will increase distance walked during 6MWT by at least 100 ft to indicate improved efficiency of gait as well as tolerance to activity. -in progress  3.  Pt will improve TUG by at least 4 seconds to reflect a reduced risk of falling with functional gai

## (undated) NOTE — LETTER
Patient Name: Mauro Moyer  YOB: 1960          MRN number:  [id number]  Date:  3/13/2019  Referring Physician: Katherine Romero     NEUROLOGICAL PHYSICAL THERAPY EVALUATION:    Referring Physician: Dr. Santi Holt  Diagnosis: [place] [facility]' • Allergic rhinitis 2007    DESENSITIZATION   • Amenorrhea 4/2016   • Asthma    • Chest pain 2003    CARDIAC CATHETERIZATION - NORMAL LVF; MILD DIAG IRREG'S; NO OBSTRUCTIVE DISEASE   • Chicken pox    • Chronic cough    • Disorder of thyroid    • Extrapyram indicate improved efficiency of gait as well as tolerance to activity. 3. Pt will improve TUG by at least 4 seconds to reflect a reduced risk of falling with functional gait tasks.      Frequency / Duration: Patient will be seen for 1-2 x/week or a total

## (undated) NOTE — MR AVS SNAPSHOT
After Visit Summary   6/8/2020    Priya Abebe    MRN: [id number]           Visit Information     Date & Time  6/8/2020  3:00 PM Provider  Alice Navarrete, Yolie [facility], [address] [facility],[address], [facility]  Phone  3 folic acid 1 MG Oral Tab Take 1 tablet (1 mg total) by mouth once daily. Rasagiline Mesylate (AZILECT) 1 MG Oral Tab Take 1 tablet by mouth daily.     Albuterol Sulfate HFA (PROAIR HFA) 108 (90 Base) MCG/ACT Inhalation Aero Soln Inhale 2 puffs into the YRIS OHARA 2D+3D SCREENING BILAT (CPT=77067/20568)    Instructions:   To schedule a test at any [facility], call Central Scheduling at [phone], Monday through Friday between 7:30am to 6pm and on Saturday between 8am and 1pm. · Always wear good shoes with proper support and traction. · Always use hand rails on stairs and escalators. · Cover porch steps with gritty weather proof paint. · Pay attention to curbs and other changes in surfaces when out in the community.   · Take c Monday – Friday  8:00 am – 8:00 pm   Saturday – Sunday  8:00 am – 4:00 pm    WALK-IN CARE  Primary Care Providers  Treatment for acute illness   or injury that are   non-life-threatening.   Also available by appointment     Average cost  $70*       [facility]

## (undated) NOTE — LETTER
3/20/2021              [address] [place] [address]         To Whom it may concern: This is to certify that Jyoti Horan has Asthma, and is under my care.  She qualifies for a COVID vaccine under the CDC g

## (undated) NOTE — LETTER
[facility]  Administration of Anesthesia  1. Ajay Mijares, or _________________________________ acting on her behalf, (Patient) (Dependent/Representative) request to receive anesthesia for my pending procedure/operation/treatment.   A infections, high spinal block, spinal bleeding, seizure, cardiac arrest and death. 7. AWARENESS: I understand that it is possible (but unlikely) to have explicit memory of events from the operating room while under general anesthesia.   8. ELECTROCONVULSIV unconscious pt /Relationship    My signature below affirms that prior to the time of the procedure, I have explained to the patient and/or his/her guardian, the risks and benefits of undergoing anesthesia, as well as any reasonable alternatives.     _______

## (undated) NOTE — LETTER
[facility] SURGERY SCHEDULING FORM   [address] [place] [address]   [phone] (scheduling phone) [phone] (scheduling fax)     PATIENT INFORMATION   Last Name:      Teresa Marsh      First Name:    Андрей Lara []  Regional/              []  Spinal  []  No Anesthesia   [x]  Yes  []  No or using our own   Allergies:  Other; Penicillins; Diptheria-Tetanus Toxoids-Dt  [Diphteria And Tetanus]         Completed by:    Analy Fenton      Date:    4/25/2018

## (undated) NOTE — LETTER
ASTHMA ACTION PLAN for Buffy Abdul     : 1960     Date: 19  Doctor:  Homer Salazar MD  Phone for doctor or clinic: [address], [address] [facility]  [address] [facility] [address]  [phone]           [facility] MONTELUKAST SODIUM 10 MG Oral Tab    TAKE 1 TABLET BY MOUTH EVERY DAY AT NIGHT    Sympathomimetics Instructions     ADVAIR DISKUS 100-50 MCG/DOSE Inhalation Aerosol Powder, Breath Activated    TAKE 1 PUFF BY MOUTH TWICE A DAY     Patient taking different

## (undated) NOTE — LETTER
[facility] SURGERY SCHEDULING FORM   [address]  [address] [facility]   [phone] (scheduling phone) [phone] (scheduling fax)     PATIENT INFORMATION   Last Name:      José Miguel Villeda      First Name:    Iris Knight Allergies:  Other; Penicillins; Diptheria-Tetanus Toxoids-Dt  [Diphteria And Tetanus]         Completed by:   Leslie Ramirez      Date:   12/4/2018

## (undated) NOTE — LETTER
AUTHORIZATION FOR SURGICAL OPERATION OR OTHER PROCEDURE    1. I hereby authorize Dr. Oziel Whitehead and the [facility] staff assigned to my case to perform the following operation and/or procedure at the [facility]:    Left Knee Joint Injection    2.   My ph []  Parent    Responsible person                          []  Spouse  In case of minor or                    [] Other  _____________   Incompetent name:  __________________________________________________                               (please prin

## (undated) NOTE — LETTER
ASTHMA ACTION PLAN for Bari Davis     : 1960     Date: 08/10/20  Doctor:  Abhi Jasmine MD  [facility] [address] [phone] Leukotriene Modulators Instructions     Montelukast Sodium 10 MG Oral Tab    Take 1 tablet (10 mg total) by mouth nightly.     Sympathomimetics Instructions     ADVAIR DISKUS 100-50 MCG/DOSE Inhalation Aerosol     Powder, Breath Activated    TAKE 1 PUFF BY

## (undated) NOTE — LETTER
10/3/2024              Lindsay Trotter        [address] [facility] [address]         To Whom It May Concern,     Lindsay Trotter is well known to me and has been under my care for rheumatoid arthritis for 7 years.  She has progressively had increasing rheumatoid arthritis and Parkinson's during this time. She' shad increasing osteoarthritis of her knees and DJD of her back s/p 2 back surgeries .   She uses a walker for balance and takes immunosuppresent medications for her rheumatoid arthritis to keep it under control.   I whole heartedly agree that she should get  long term disability given her rheumatoid arthritis with her parkison's. Her movemetn disorder with her inflammatory arthritis makes it difficulty for her to use her hands consistently and safely for most work. 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     If you have any questions or concerns please contact my office.     Sincerely,          Chucho Butcher MD          Document electronically generated by:  Chucho Butcher MD

## (undated) NOTE — LETTER
[facility] SURGERY SCHEDULING FORM   [address]  [address] [place] [address]   [phone] (scheduling phone) [phone] (scheduling fax)     PATIENT INFORMATION   Last Name:      Waldemar Bolaños      First Name:    Nigel Maurer [x]  Yes  []  No or using our own   Allergies:  Other; Penicillins; Diptheria-Tetanus Toxoids-Dt  [Diphteria And Tetanus]         Completed by:   Sundar Rios      Date:   2/28/2019

## (undated) NOTE — MR AVS SNAPSHOT
[address] [address]  [phone]               Thank you for choosing us for your health care visit with Homer Choudhary MD.  We are glad to serve you and happy to provide you with this hernandez · fever, chills, or any other sign of infection  · painful, difficult, or reduced urination  · redness, blistering, peeling or loosening of the skin, including inside the mouth  · severe stomach pain  · unusual bleeding or bruising  · unusually weak or tir Drink plenty of water while taking this medicine. Date Last Reviewed:   NOTE:This sheet is a summary. It may not cover all possible information. If you have questions about this medicine, talk to your doctor, pharmacist, or health care provider.  Copyright fluticasone-salmeterol 250-50 MCG/DOSE Aepb   INHALE 1 PUFF INTO THE LUNGS EVERY 12 (TWELVE) HOURS. Commonly known as:  ADVAIR DISKUS           folic acid 1 MG Tabs   TAKE 1 TABLET BY MOUTH DAILY.    What changed:  Another medication with the same Summaries. If you've been to the Emergency Department or your doctor's office, you can view your past visit information in R17 by going to Visits < Visit Summaries. R17 questions? Call [phone] for help.   R17 is NOT to be used for urge

## (undated) NOTE — LETTER
[address], [place]  Authorization for Invasive Procedures  1.  I hereby authorize  Dr. Alisa Caraballo, my physician and whomever may be designated as the doctor's assistant, to perform the following operation and/or procedure:  Cardiac 5. I consent to the photographing of the operations or procedures to be performed for the purposes of advancing medicine, science, and/or education, provided my identity is not revealed.  If the procedure has been videotaped, the physician/surgeon will obta __________ Time: ___________    Statement of Physician  My signature below affirms that prior to the time of the procedure, I have explained to the patient and/or her legal representative, the risks and benefits involved in the proposed treatment and any r

## (undated) NOTE — IP AVS SNAPSHOT
Patient Demographics     Address  [address] [place], [place] [address] Phone  [phone] [facility])  [phone] (Mobile) *Preferred* E-mail Address  [email or web address]      Emergency Contact(s)     Name Relation Home Work Mobile    Celestine Quiles post-op appointment.     Follow-up Information     Schedule an appointment as soon as possible for a visit with Maranda Vasquez MD.    Specialty: Internal Medicine  Why: After discharge from rehab  Contact information:  [address] [place] [address]  MD Guadalupe         Diclofenac Sodium 75 MG Tbec  Commonly known as: VOLTAREN      TAKE 1 TABLET (75 MG TOTAL) BY MOUTH 2 (TWO) TIMES DAILY.  AS NEEDED   Nkechi Wells MD         ergocalciferol 1.25 MG (25209 UT) Caps  Commonly known as: Libia Houser taking on: June 20, 2021   Sylvia Bang MD                  [phone] - [facility] ACTION REPORT  (last 24 hrs)    ** SITE UNKNOWN **     Order ID Medication Name Action Time Action Reason Comments    [id number] Fluticasone Furoate-Vilanterol (BREO ELLIPTA) 100-25 topiramate (Topamax) tab 25 mg 04/24/21 2140 Given      [id number] topiramate (Topamax) tab 25 mg 04/25/21 0849 Given              Recent Vital Signs       Most Recent Value   Vitals  122/64 Filed at 04/25/2021 0500   Pulse  83 Filed at 04/25/2021 0500   Re for lumbar surgery on 4/21/2021 . She was taken to the OR by Dr. Gabriele Ruiz s/pL4-L5 TLIF.[RC.1]     Patient reports at baseline she was ambulating with cane for stairclimbing and walker on level surfaces. She was able to form basic ADLs independently. Surgeon: Andrei Verduzco MD;  Location: [address] [place] [address] ENDOSCOPY   • COLPOSCOPY, CERVIX W/UPPER ADJACENT VAGINA; W/BIOPSY(S), CERVIX     • D & C      for extended period   • D & C      for extended period   • FOOT SURGERY Left     Had right foot done recent other family members with Parkinson's disease[RC.3]    Social History    Tobacco Use      Smoking status: Never Smoker      Smokeless tobacco: Never Used    Vaping Use      Vaping Use: Never used    Alcohol use:  Yes      Alcohol/week: 2.0 - 3.0 standard dr is 36.03 kg/m². [RC.2]     Physical Exam:  General: awake, no distress, appears stated age[RC.1], sitting in recliner, obese[RC.3]  HEENT: normocephalic, normal FFAMOVJQZDA[LO.8], no obvious hypophonia[RC.3]  Cardiovascular: warm, well perfused, RRR  Pulm: Encounter for therapeutic drug monitoring     Increased appetite     Pre-operative clearance     EVIE (obstructive sleep apnea)     S/P laparoscopic sleeve gastrectomy     Obesity (BMI 30-39. 9)     Preop testing[RC.2]        IMPAIRMENTS:      Activities of management. Physiatric medical oversight to monitor anemia, cardiac function, neurologic status, deep vein thrombosis prevention. Estimated length of stay is[RC.1] 2-3[RC. 3] weeks. Rehabilitation potential is good.  Discharge goal is home with family at [facility] S/P right L4-5 transforaminal lumbar interbody fusion  PAIN CONTROLLED  ACUTE REHAB  PMR CONSULT APPRECIATED        Hyperlipidemia  CONT HOME MEDS.         Asthma, extrinsic  CONT HOME MEDS.         Rheumatoid arthritis, adult ([facility] [address]  DW DR Sathya Novak - [address] PHOS 3.4 07/18/2019    B12 513 05/20/2020       Recent Labs   Lab 04/23/21  0720 04/24/21  0750   RBC 3.52* 3.47*   HGB 11.2* 11.1*   HCT 34.7* 34.5*   MCV 98.6 99.4   MCH 31.8 32.0   MCHC 32.3 32.2   RDW 15.4* 15.2*   NEPRELIM 5.63 6.05   WBC 7.7 7.6   PL Refills: 0     Bariatric Fusion Chew      Chew 1 tablet by mouth daily.    Refills: 0     calciTRIOL 0.25 MCG Caps  Commonly known as: ROCALTROL      TAKE 1 CAPSULE BY MOUTH EVERY DAY   Quantity: 90 capsule  Refills: 1     carbidopa-levodopa  MG Tab Generic drug: Phentermine HCl        Methotrexate Sodium 2.5 MG Tabs        Xeljanz XR 11 MG Tb24  Generic drug: Tofacitinib Citrate ER               Follow up Visits  No follow-up provider specified.     Consultants  Chat With All Active Members    Provide 72 hours) (Notes from 4/22/2021 12:00 PM through 4/25/2021 12:00 PM)      Physical Therapy Note signed by Chema Richards PT, DPT [facility] at 4/25/2021 11:39 AM  Version 1 of 1    Author: Chema Richards PT, DPT [facility] Service: Rehab Author Type: Physical Therapis conservation;Gait training;Strengthening;Transfer training;Balance training;Patient education; Neuromuscular re-educate    SUBJECTIVE  I feel ok and thank you for spending extra time to help me walk so much that felt good to challenge my muscles and it feel Position Sitting       Patient End of Session: Up in chair; With [address] staff;Needs met;Call light within reach;RN aware of session/findings; All patient questions and concerns addressed; Family present; Alarm set    CURRENT GOALS   Goals to be met by: 4/29  Pa RW x 200 ft with Min A and mod cues to lift feet up and use heel toe gait pattern. Pt navigated 12 steps up and down with 1HR and other side HHA with Min  A. Pt went to restroom and sit to stand with Min A.  Pt a shaky and with tremors, and when cued, pt Climbing 3-5 steps with a railing?: A Little     AM-PAC Score:  Raw Score: 16   Approx Degree of Impairment: 54.16%   Standardized Score (AM-PAC Scale): 40.78   CMS Modifier (G-Code): CK    FUNCTIONAL ABILITY STATUS  Gait Assessment   Gait Assistance: Mini spine)    Problem List  Principal Problem:    Lumbar spinal stenosis  Active Problems:    Hyperlipidemia    Asthma, extrinsic    Rheumatoid arthritis, adult (HCC)    Parkinson disease (HCC)    EVIE (obstructive sleep apnea)    Preop testing      PHYSICAL TH Pulse: 74  Heart Rate Source: Monitor  Resp: 18  BP: 127/89  BP Location: Right arm  BP Method: Automatic  Patient Position: Sitting    O2 WALK  Oxygen Therapy  SPO2% on Room Air at Rest: 96    AM-PAC '6-Clicks' INPATIENT SHORT FORM - BASIC MOBILITY  How m device: LRAD at assistance level: minimum assistance   Goal #3   Current Status Min A  ft   Goal #4    Goal #4   Current Status    Goal #5 Patient to demonstrate independence with home activity/exercise instructions provided to patient in preparation of impairment may benefit from acute IP rehab. [AD.1]    Pt seen for pm PT pt has less dyskinesia during this session, activity is more labored for pt but she has better control over her balance. Pt has difficulty with toe clearance of R LE.  Pt is able to p Surgical History:   Procedure Laterality Date   • BACK SURGERY     • BACK SURGERY  04/21/2021   • COLONOSCOPY  12/16/2013   • COLONOSCOPY N/A 6/11/2019    Procedure: COLONOSCOPY;  Surgeon: Arcenio Boudreaux MD;  Location: [facility] ENDOSCOPY   • Counts include 234 beds at the [facility] NEUROLOGICAL FINDINGS                      ACTIVITY TOLERANCE                         O2 WALK       AM-PAC '6-Clicks' INPATIENT SHORT FORM - BASIC MOBILITY  How much difficulty does the patient currently have. ..[AD.1]  -   Turning over in bed (including ad Current Status    Goal #6    Goal #6  Current Status[AD.1]         Attribution Key    AD. 1 - Bhavin Stone, PT on 4/22/2021 10:34 AM  AD. 2 - Bhavin Stone, PT on 4/22/2021 10:41 AM  AD. 3 - Celso Coats, PT on 4/22/2021  4:32 PM               Phys Discharge Recommendations: Acute rehabilitation[AD.2]    PLAN[AD.1]  PT Treatment Plan: Bed mobility;Gait training;Family education;Patient education;Transfer training  Rehab Potential : Fair  Frequency (Obs): BID[AD.2]       PHYSICAL THERAPY MEDICAL/SOCIA KNEE ARTHROSCOPY Right    • LAP SLEEVE GASTRECTOMY  12/02/2019    Dr Lois Sewell, [facility]   • [address]    for infertility   • [address]    for infertility   • OTHER SURGICAL HISTORY Right 12/3/2015   • OTHER How much help from another person does the patient currently need. ..[AD.1]   -   Moving to and from a bed to a chair (including a wheelchair)?: A Lot   -   Need to walk in hospital room?: A Lot   -   Climbing 3-5 steps with a railing?: A Lot[AD. 2]     AM- encounter.      Immunizations     Name Date      Covid-19 Moderna 03/31/21     INFLUENZA 10/10/20     INFLUENZA 10/07/19     INFLUENZA 10/04/18     INFLUENZA 10/04/18     INFLUENZA 09/23/17     INFLUENZA 09/23/17     INFLUENZA 11/10/16     INFLUENZA 10/13/1 Short Term Goal: Transfer to rehab when stable. Interventions:   - Up with walker as much as tolerated at home. - Monitor incision for any signs of infection.  - No bending, lifting nor twisting above waist area.   - Pain management with oral medication

## (undated) NOTE — Clinical Note
Dr. Irineo Wright, Just Jane Lowe, I saw Thad Robertsonide today for a weight management consult. She states that she may be interested in bariatric surgery in the future. We discussed dietary modifications and she will be following up with me in 4 weeks. Thanks!  Raysa Maher

## (undated) NOTE — LETTER
[facility] SURGERY SCHEDULING FORM   [address] [facility]   [phone] (scheduling phone) [phone] (scheduling fax)     PATIENT INFORMATION   Patient Name:   Pallavi Cole  :   1960   Oli Salmon Allergies: Diptheria-Tetanus Toxoids-Dt  [Diphteria And Tetanus]; Penicillins       Completed by:   Gloria Osorio      Date:   1/30/2018    [facility] will follow its Pre-Admission Assessment and Screening Policy for pre-admission testing.   Physicians that requir

## (undated) NOTE — Clinical Note
AUTHORIZATION FOR SURGICAL OPERATION OR OTHER PROCEDURE    1.  I hereby authorize ,RAFAELA Lewis and [place] [facility] staff assigned to my case to perform the following operation and/or procedure at the [facility]:    Endometrial Biopsy      2 Relationship to Patient:             Parent    Responsible person                            Spouse  In case of minor or                     Other  _____________   Incompetent name:  __________________________________________________

## (undated) NOTE — LETTER
ASTHMA ACTION PLAN for Cy Franco     : 1960     Date: 18  Doctor:  Roger Washington MD  Phone for doctor or clinic: [address] [facility],[address], [address] [place] [address]  [phone]  Personal best peak flow instructions  Medications     Leukotriene Modulators Instructions    MONTELUKAST SODIUM 10 MG Oral Tab TAKE 1 TABLET (10 MG TOTAL) BY MOUTH NIGHTLY.     Sympathomimetics Instructions    fluticasone-salmeterol (ADVAIR DISKUS) 250-50 MCG/DOSE Inhalation Aeros

## (undated) NOTE — MR AVS SNAPSHOT
[address] [address]  [phone]               Thank you for choosing us for your health care visit with Laurel Davis MD.  We are glad to serve you and happy to provide you with this hernandez · unusually weak or tired  · vomiting  · yellowing of eyes or skin  Side effects that usually do not require medical attention (report to your doctor or health care professional if they continue or are bothersome):  · diarrhea  · hair loss  · headache  · n If you are a woman who has the potential to become pregnant, discuss birth control options with your doctor or health care professional. Bro Hussein must not be pregnant, and you must be using a reliable form of birth control. The medicine may harm an unborn baby. Commonly known as:  VOLTAREN           ergocalciferol 56648 units Caps   50,000 Units every 30 (thirty) days.    Commonly known as:  DRISDOL/VITAMIN D2           fluticasone-salmeterol 250-50 MCG/DOSE Aepb   INHALE 1 PUFF INTO THE LUNGS EVERY 12 (TWELVE) HO If you've recently had a stay at the Hospital you can access your discharge instructions in [employer] by going to Visits < Admission Summaries.  If you've been to the Emergency Department or your doctor's office, you can view your past visit information in My

## (undated) NOTE — LETTER
[facility] SURGERY SCHEDULING FORM   [address] [place] [address] [facility]   [phone] (scheduling phone) [phone] (scheduling fax)     PATIENT INFORMATION   Patient Name:    Stella Spatz   :    1960 [address]       Completed by:    Yu Amaro      Date:    10/31/2017    [facility] will follow its Pre-Admission Assessment and Screening Policy for pre-admission testing.   Physicians that require   additional testing must order this directly and have results

## (undated) NOTE — LETTER
[facility]  Administration of Anesthesia  1. Tana Mckeon, or _________________________________ acting on her behalf, (Patient) (Dependent/Representative) request to receive anesthesia for my pending procedure/operation/treatment. 6. OBSTETRIC PATIENTS: Specific risks/consequences of spinal/epidural anesthesia may include itching, low blood pressure, difficulty urinating, slowing of the baby's heart rate and headache.  Rare risks include infections, high spinal block, spinal bleeding ___________________________________________________           _____________________________________________________  Date/Time                                                                                               Responsible person in case of minor

## (undated) NOTE — MR AVS SNAPSHOT
[facility] SPECIALTY [facility] [place] [address] [address] [address]  [phone]               Thank you for choosing us for your health care visit with Clinton Rice MD.  We are glad to serve you and happy to provide you with this summary o * Albuterol Sulfate  (90 BASE) MCG/ACT Aers   Inhale 2 puffs into the lungs every 6 (six) hours as needed for Wheezing. What changed:  See the new instructions.    Commonly known as:  PROAIR HFA           AZILECT 1 MG Tabs   Generic drug:  Rasagil Commonly known as:  SYNTHROID, LEVOTHROID           methotrexate 2.5 MG Tabs   TAKE 8 TABLETS (20 MG TOTAL) BY MOUTH ONCE A WEEK. Commonly known as:  RHEUMATREX           Montelukast Sodium 10 MG Tabs   Take 1 tablet (10 mg total) by mouth nightly.    Com [employer] questions? Call [phone] for help. [employer] is NOT to be used for urgent needs. For medical emergencies, dial 911.            Visit [facility] online at  [email or web address]

## (undated) NOTE — ED AVS SNAPSHOT
[facility] Emergency Department    [address]     [place] [address]    Phone:  [phone]    [facility]ax:  [address] [facility]   MRN: [id number]    Department:  [facility] Emergency Department   Date of Visit:  4/24 and Class Registration line at [phone] or find a doctor online by visiting [email or web address].    IF THERE IS ANY CHANGE OR WORSENING OF YOUR CONDITION, CALL YOUR PRIMARY CARE PHYSICIAN AT ONCE OR RETURN IMMEDIATELY TO [address] [place] [address].     If

## (undated) NOTE — LETTER
ASTHMA ACTION PLAN for Fanny Rao     : 1960     Date: 17  Doctor:  Capri Hill MD  Phone for doctor or clinic: [address] [facility],[address], [address] [place] [address]  [phone]  Personal best peak flow instructions  Medications     Leukotriene Modulators Instructions    MONTELUKAST SODIUM 10 MG Oral Tab TAKE 1 TABLET (10 MG TOTAL) BY MOUTH NIGHTLY.     Sympathomimetics Instructions    fluticasone-salmeterol (ADVAIR DISKUS) 250-50 MCG/DOSE Inhalation Aeros